# Patient Record
Sex: FEMALE | Race: WHITE | Employment: FULL TIME | URBAN - METROPOLITAN AREA
[De-identification: names, ages, dates, MRNs, and addresses within clinical notes are randomized per-mention and may not be internally consistent; named-entity substitution may affect disease eponyms.]

---

## 2017-09-29 ENCOUNTER — MYC MEDICAL ADVICE (OUTPATIENT)
Dept: FAMILY MEDICINE | Facility: CLINIC | Age: 47
End: 2017-09-29

## 2018-05-10 ENCOUNTER — OFFICE VISIT (OUTPATIENT)
Dept: FAMILY MEDICINE | Facility: CLINIC | Age: 48
End: 2018-05-10
Payer: COMMERCIAL

## 2018-05-10 VITALS
WEIGHT: 124 LBS | SYSTOLIC BLOOD PRESSURE: 98 MMHG | DIASTOLIC BLOOD PRESSURE: 62 MMHG | TEMPERATURE: 97.4 F | HEART RATE: 68 BPM | HEIGHT: 64 IN | BODY MASS INDEX: 21.17 KG/M2

## 2018-05-10 DIAGNOSIS — M54.50 ACUTE MIDLINE LOW BACK PAIN WITHOUT SCIATICA: Primary | ICD-10-CM

## 2018-05-10 DIAGNOSIS — B00.1 RECURRENT COLD SORES: ICD-10-CM

## 2018-05-10 PROCEDURE — 99213 OFFICE O/P EST LOW 20 MIN: CPT | Performed by: PHYSICIAN ASSISTANT

## 2018-05-10 RX ORDER — BIOTIN 10 MG
10000 TABLET ORAL DAILY
COMMUNITY

## 2018-05-10 RX ORDER — DIAZEPAM 5 MG
TABLET ORAL
Qty: 5 TABLET | Refills: 0 | Status: SHIPPED | OUTPATIENT
Start: 2018-05-10 | End: 2018-07-16

## 2018-05-10 RX ORDER — VALACYCLOVIR HYDROCHLORIDE 500 MG/1
500 TABLET, FILM COATED ORAL DAILY
Qty: 90 TABLET | Refills: 3 | Status: SHIPPED | OUTPATIENT
Start: 2018-05-10 | End: 2019-11-27 | Stop reason: DRUGHIGH

## 2018-05-10 ASSESSMENT — PAIN SCALES - GENERAL: PAINLEVEL: SEVERE PAIN (6)

## 2018-05-10 NOTE — MR AVS SNAPSHOT
After Visit Summary   5/10/2018    Kiesha Mcguire    MRN: [id number]           Patient Information     Date Of Birth          1970        Visit Information        Provider Department      5/10/2018 3:00 PM Alban Lynch PA-C [facility]        Today's Diagnoses     Acute midline low back pain without sciatica    -  1    Recurrent cold sores           Follow-ups after your visit        Additional Services     [facility] PT, HAND, AND CHIROPRACTIC REFERRAL       **This order will print in the [facility] Scheduling Office**    Physical Therapy, Hand Therapy and Chiropractic Care are available through:    *[facility]  *[facility]  *[facility]    Call one number to schedule at any of the above locations: [phone].    Your provider has referred you to: Physical Therapy at [facility] or [facility]    Indication/Reason for Referral: Low back pain acute, spasm, some pelvic floor tightness history   Onset of Illness: 2 days -   Therapy Orders: Evaluate and Treat  Special Programs: None  Special Request: None    Oumar Oseguera      Additional Comments for the Therapist or Chiropractor: Thanks     Please be aware that coverage of these services is subject to the terms and limitations of your health insurance plan.  Call member services at your health plan with any benefit or coverage questions.      Please bring the following to your appointment:    *Your personal calendar for scheduling future appointments  *Comfortable clothing                  Who to contact     If you have questions or need follow up information about today's clinic visit or your schedule please contact [facility] directly at [phone].  Normal or non-critical lab and imaging results will be communicated to you by MyChart, letter or phone within 4 business days after the clinic has received the results. If you do not hear from us within 7 days, please  "contact the clinic through [employer] or phone. If you have a critical or abnormal lab result, we will notify you by phone as soon as possible.  Submit refill requests through [employer] or call your pharmacy and they will forward the refill request to us. Please allow 3 business days for your refill to be completed.          Additional Information About Your Visit        [employer] Information     [employer] gives you secure access to your electronic health record. If you see a primary care provider, you can also send messages to your care team and make appointments. If you have questions, please call your primary care clinic.  If you do not have a primary care provider, please call [phone] and they will assist you.        Care EveryWhere ID     This is your Care EveryWhere ID. This could be used by other organizations to access your [facility] medical records  [id number]        Your Vitals Were     Pulse Temperature Height BMI (Body Mass Index)          68 97.4  F (36.3  C) (Oral) 5' 4.02\" (1.626 m) 21.27 kg/m2         Blood Pressure from Last 3 Encounters:   05/10/18 98/62   12/22/16 93/67   12/09/16 96/64    Weight from Last 3 Encounters:   05/10/18 124 lb (56.2 kg)   12/22/16 122 lb 14.4 oz (55.7 kg)   12/09/16 131 lb (59.4 kg)              We Performed the Following     [facility] PT, HAND, AND CHIROPRACTIC REFERRAL          Today's Medication Changes          These changes are accurate as of 5/10/18  3:32 PM.  If you have any questions, ask your nurse or doctor.               Start taking these medicines.        Dose/Directions    diazepam 5 MG tablet   Commonly known as:  VALIUM   Used for:  Acute midline low back pain without sciatica   Started by:  Alban Lynch PA-C        1/2 to 1 at bedtime for spasm   Quantity:  5 tablet   Refills:  0            Where to get your medicines      These medications were [place] [facility] [place] [place] - [address]  [address] [place] [facility] [address]     Phone:  [phone]     valACYclovir 500 MG tablet         Some of these will need a paper prescription and others can be bought over the counter.  Ask your nurse if you have questions.     Bring a paper prescription for each of these medications     diazepam 5 MG tablet                Primary Care Provider Office Phone # Fax #    Alban Lynch PA-C [phone]       [address] [facility] [address]        Equal Access to Services     KUN CHAMBERS : Hadii aad ku hadasho Soomaali, waaxda luqadaha, qaybta kaalmada adeegyada, waxay idiin hayaan adeeg kharash la'aan . So [facility] [phone].    ATENCIÓN: Si habla español, tiene a real disposición servicios gratuitos de asistencia lingüística. [facility] [phone].    We comply with applicable federal civil rights laws and [place] laws. We do not discriminate on the basis of race, color, national origin, age, disability, sex, sexual orientation, or gender identity.            Thank you!     Thank you for choosing [facility]  for your care. Our goal is always to provide you with excellent care. Hearing back from our patients is one way we can continue to improve our services. Please take a few minutes to complete the written survey that you may receive in the mail after your visit with us. Thank you!             Your Updated Medication List - Protect others around you: Learn how to safely use, store and throw away your medicines at www.disposemymeds.org.          This list is accurate as of 5/10/18  3:32 PM.  Always use your most recent med list.                   Brand Name Dispense Instructions for use Diagnosis    Biotin 10 MG Tabs tablet      Take 10,000 mcg by mouth daily        diazepam 5 MG tablet    VALIUM    5 tablet    1/2 to 1 at bedtime for spasm    Acute midline low back pain without sciatica       Lysine 500 MG Tabs      Take 500 mg by mouth daily        magnesium citrate solution       Takes 1 tsp twice a day        MIRENA (52 MG) 20 MCG/24HR IUD   Generic drug:  levonorgestrel      1 each by Intrauterine route once        sertraline 50 MG tablet    ZOLOFT    90 tablet    Take 1 tablet (50 mg) by mouth daily    Adjustment disorder with mixed anxiety and depressed mood       valACYclovir 500 MG tablet    VALTREX    90 tablet    Take 1 tablet (500 mg) by mouth daily    Recurrent cold sores       VITAMIN B 12 PO      Take by mouth daily        VITAMIN D3 PO      Take by mouth daily

## 2018-05-10 NOTE — PROGRESS NOTES
SUBJECTIVE:   Kiesha Mcguire is a 47 year old female who presents to clinic today for the following health issues:    Back Pain       Duration: 2 days         Specific cause: Working out    Description:   Location of pain: low back Middle   Character of pain: Spasming   Pain radiation:radiates into the right leg  New numbness or weakness in legs, not attributed to pain:  no     Intensity: Currently 6/10    History:   Pain interferes with job: YES  History of back problems: Pelvis issues after giving birth, does see PT   Any previous MRI or X-rays: None  Sees a specialist for back pain:  No  Therapies tried without relief: None     Alleviating factors:   Improved by: Ibuprofen has been helping       Precipitating factors:  Worsened by: Bending, Sitting and twisting     Functional and Psychosocial Screen (Oumar STarT Back):      Not performed today    Doing yoga regularly. Started a glute work out and developed this recently - low back pain. Bilateral. No radiation.    Patient Active Problem List   Diagnosis     Pure hypercholesterolemia     Herpes simplex virus (HSV) infection     iamTIBIALIS TENDINITIS     iamSPRAIN HIP & THIGH NOS     HYPERLIPIDEMIA LDL GOAL <160      Current Outpatient Prescriptions   Medication     Biotin 10 MG TABS tablet     Cholecalciferol (VITAMIN D3 PO)     Cyanocobalamin (VITAMIN B 12 PO)     diazepam (VALIUM) 5 MG tablet     levonorgestrel (MIRENA) 20 MCG/24HR IUD     Lysine 500 MG TABS     magnesium citrate solution     sertraline (ZOLOFT) 50 MG tablet     valACYclovir (VALTREX) 500 MG tablet     No current facility-administered medications for this visit.         Problem list and histories reviewed & adjusted, as indicated.  Additional history: as documented    Labs reviewed in EPIC    Reviewed and updated as needed this visit by clinical staff       Reviewed and updated as needed this visit by Provider         ROS:  Constitutional, HEENT, cardiovascular, pulmonary, gi and gu  "systems are negative, except as otherwise noted.    OBJECTIVE:     BP 98/62 (Cuff Size: Adult Regular)  Pulse 68  Temp 97.4  F (36.3  C) (Oral)  Ht 5' 4.02\" (1.626 m)  Wt 124 lb (56.2 kg)  BMI 21.27 kg/m2  Body mass index is 21.27 kg/(m^2).  GENERAL: healthy, alert and no distress  MUSC: Lumbar paraspinal and lumbosacral tightness and spasm. Bony exam normal. ROM is stiff but intact. Lower extremity strength, reflexes and sensation are symmetric and intact. Gait is mildly antalgic.    ASSESSMENT/PLAN:       (M54.5) Acute midline low back pain without sciatica  (primary encounter diagnosis)  Comment:   Plan: [facility] PT, HAND, AND CHIROPRACTIC REFERRAL,         diazepam (VALIUM) 5 MG tablet        Spasm. Acute. PT. Muscle spasms management given. Follow up if issues arise or persist.     (B00.1) Recurrent cold sores  Comment:   Plan: valACYclovir (VALTREX) 500 MG tablet        Refills - stable.     NAOMI MENJIVAR PA-C  [facility]  "

## 2018-05-18 ENCOUNTER — THERAPY VISIT (OUTPATIENT)
Dept: PHYSICAL THERAPY | Facility: CLINIC | Age: 48
End: 2018-05-18
Payer: COMMERCIAL

## 2018-05-18 DIAGNOSIS — M54.50 ACUTE RIGHT-SIDED LOW BACK PAIN WITHOUT SCIATICA: Primary | ICD-10-CM

## 2018-05-18 PROCEDURE — 97110 THERAPEUTIC EXERCISES: CPT | Mod: GP | Performed by: PHYSICAL THERAPIST

## 2018-05-18 PROCEDURE — 97530 THERAPEUTIC ACTIVITIES: CPT | Mod: GP | Performed by: PHYSICAL THERAPIST

## 2018-05-18 PROCEDURE — 97161 PT EVAL LOW COMPLEX 20 MIN: CPT | Mod: GP | Performed by: PHYSICAL THERAPIST

## 2018-05-18 NOTE — PROGRESS NOTES
[facility] Initial Evaluation -- Lumbar Spine    Evaluation Date: May 18, 2018  Kiesha Mcguire is a 47 year old female with a LS condition.   Referral: GP  Work mechanical stresses:  Desk job    Employment status: from home  Leisure mechanical stresses: doing Yoga 2 x week gluts are weak  Functional disability score: see chart  VAS score (0-10): 1  Patient goals/expectations:  Bend and lifting     HISTORY:    Present symptoms: Bilat.  LS R thigh  Pain quality (sharp/shooting/stabbing/aching/burning/cramping):  Tweak   Present since (onset date): 5/8/2018 when she started a video for glut strengthening   Symptoms (improving/unchaning/worsening):  improving.      Symptoms commenced as a result of: working out- started new glut program  Condition occurred in the following environment: home    Symptoms at onset: LS Paresthesia (yes/no):  In past R lat leg N/T foot drop post partum  Spinal history:   Cough/Sneeze (pos/neg):  Pos at start    Constant symptoms:   Intermittent symptoms: Y    Symptoms are worse with the following: Always Bending, Sometimes Sitting,  Walking,   and Time of day - No effect   Symptoms are better with the following: Other - supine 90/90 with heat    Continued use makes the pain (better/worse/no effect): worse    Disturbed night (yes/no): no    Pain at rest (yes/no):  no  Site (back/hip/knee/ankle/foot):      Other questions (swelling/clicking/locking/giving way/falling):       Previous episodes: 2010 neuropathy for 6 weeks had ZAYRA incontinent   Previous treatments: PT    Specific Questions:  General health (excellent/good/fair/poor):  excellent  Pertinent medical history includes: None  Medications (nil/NSAIDS/analg/steroids/anticoag/other):  Muscle relaxants  Medical allergies:  none  Imaging (none/Xray/MRI/other):  no  Recent or major surgery (yes/no):  no  Night pain (yes/no):  no  Accidents (yes/no):  no  Unexplained weight loss (yes/no):  na  Barriers at home:  none  Other red flags: none    Sites for physical examination (back/hip/knee/ankle/foot/other): LS    EXAMINATION    Posture:  Sitting (good/fair/poor): fair   Correction of Posture (better/worse/no effect/NA): better  Standing (good/fair/poor): good  Other observations:      Neurological: (NA/motor/sensory/reflexes/dural): na        Extremities (Hip / Knee / Ankle / Foot):     Movement Loss Ross Mod Min Nil Pain   Flexion        Extension        Abduction        Adduction        Internal Rotation        External Rotation        Dorsiflexion        Plantarflexion        Inversion        Eversion        LSROM: flexion to distal tibias ERP extension major loss SG wnl  Rep mvmts: RFIS produced, no worse CICI: produced dec ROM, dec ROM  EIL- NE then increased with reps, worse  JUNI- 3 x 30 sec NE, better inc ROM ext    Baseline Symptoms:   Repeated Tests Symptom Response Mechanical Response   Active/Passive movement, resisted test, functional test During -  Produce, Abolish, Increase, Decrease, NE After -  Better, Worse, NB, NW, NE Effect -   ? or ? ROM, strength or key functional test No   Effect                                       Effect of static positioning                  Provisional Classification (Extremity/Spine):  Spine - Derangement - Asymmetrical, unilateral, symptoms above knee      Princicple of Management:   Education:  Etiology DP POC sitting posture     Equipment provided:    Exercise and dosage:  JUNI 3 x 30 sec q 2-3 hrs    ASSESSMENT/PLAN:    Patient is a 47 year old female with lumbar complaints.    Patient has the following significant findings with corresponding treatment plan.                Diagnosis 1:  LBP  Pain -  self management, education, directional preference exercise and home program  Decreased ROM/flexibility - therapeutic exercise, therapeutic activity and home program  Decreased function - therapeutic activities and home program  Impaired posture - neuro re-education, therapeutic  activities and home program    Therapy Evaluation Codes:   1) History comprised of:   Personal factors that impact the plan of care:      None.    Comorbidity factors that impact the plan of care are:      None.     Medications impacting care: None.  2) Examination of Body Systems comprised of:   Body structures and functions that impact the plan of care:      Lumbar spine.   Activity limitations that impact the plan of care are:      Bending and Sitting.  3) Clinical presentation characteristics are:   Stable/Uncomplicated.  4) Decision-Making    Low complexity using standardized patient assessment instrument and/or measureable assessment of functional outcome.  Cumulative Therapy Evaluation is: Low complexity.    Previous and current functional limitations:  (See Goal Flow Sheet for this information)    Short term and Long term goals: (See Goal Flow Sheet for this information)     Communication ability:  Patient appears to be able to clearly communicate and understand verbal and written communication and follow directions correctly.  Treatment Explanation - The following has been discussed with the patient:   RX ordered/plan of care  Anticipated outcomes  Possible risks and side effects  This patient would benefit from PT intervention to resume normal activities.   Rehab potential is good.    Frequency:  1 X week, once daily  Duration:  for 6 weeks  Discharge Plan:  Achieve all LTG.  Independent in home treatment program.  Reach maximal therapeutic benefit.    Please refer to the daily flowsheet for treatment today, total treatment time and time spent performing 1:1 timed codes.     [facility] Initial Evaluation  Subjective:  HPI                    Objective:  System    Physical Exam    General     ROS

## 2018-05-23 ENCOUNTER — THERAPY VISIT (OUTPATIENT)
Dept: PHYSICAL THERAPY | Facility: CLINIC | Age: 48
End: 2018-05-23
Payer: COMMERCIAL

## 2018-05-23 DIAGNOSIS — M54.50 ACUTE RIGHT-SIDED LOW BACK PAIN WITHOUT SCIATICA: ICD-10-CM

## 2018-05-23 PROCEDURE — 97110 THERAPEUTIC EXERCISES: CPT | Mod: GP | Performed by: PHYSICAL THERAPIST

## 2018-05-23 PROCEDURE — 97530 THERAPEUTIC ACTIVITIES: CPT | Mod: GP | Performed by: PHYSICAL THERAPIST

## 2018-05-30 ENCOUNTER — THERAPY VISIT (OUTPATIENT)
Dept: PHYSICAL THERAPY | Facility: CLINIC | Age: 48
End: 2018-05-30
Payer: COMMERCIAL

## 2018-05-30 DIAGNOSIS — M54.50 ACUTE RIGHT-SIDED LOW BACK PAIN WITHOUT SCIATICA: ICD-10-CM

## 2018-05-30 PROCEDURE — 97110 THERAPEUTIC EXERCISES: CPT | Mod: GP | Performed by: PHYSICAL THERAPIST

## 2018-05-30 PROCEDURE — 97530 THERAPEUTIC ACTIVITIES: CPT | Mod: GP | Performed by: PHYSICAL THERAPIST

## 2018-06-13 ENCOUNTER — THERAPY VISIT (OUTPATIENT)
Dept: PHYSICAL THERAPY | Facility: CLINIC | Age: 48
End: 2018-06-13
Payer: COMMERCIAL

## 2018-06-13 DIAGNOSIS — M54.50 ACUTE RIGHT-SIDED LOW BACK PAIN WITHOUT SCIATICA: ICD-10-CM

## 2018-06-13 PROCEDURE — 97110 THERAPEUTIC EXERCISES: CPT | Mod: GP | Performed by: PHYSICAL THERAPIST

## 2018-06-13 PROCEDURE — 97530 THERAPEUTIC ACTIVITIES: CPT | Mod: GP | Performed by: PHYSICAL THERAPIST

## 2018-07-16 ENCOUNTER — OFFICE VISIT (OUTPATIENT)
Dept: OBGYN | Facility: CLINIC | Age: 48
End: 2018-07-16
Attending: ADVANCED PRACTICE MIDWIFE
Payer: COMMERCIAL

## 2018-07-16 VITALS
BODY MASS INDEX: 20.32 KG/M2 | WEIGHT: 119 LBS | SYSTOLIC BLOOD PRESSURE: 108 MMHG | HEART RATE: 61 BPM | HEIGHT: 64 IN | DIASTOLIC BLOOD PRESSURE: 72 MMHG

## 2018-07-16 DIAGNOSIS — Z97.5 IUD (INTRAUTERINE DEVICE) IN PLACE: Primary | ICD-10-CM

## 2018-07-16 DIAGNOSIS — Z00.00 VISIT FOR PREVENTIVE HEALTH EXAMINATION: ICD-10-CM

## 2018-07-16 RX ORDER — COLLAGEN, HYDROLYSATE (BOVINE) 100 %
POWDER (GRAM) MISCELLANEOUS
COMMUNITY

## 2018-07-16 NOTE — LETTER
2018       RE: Kiesha Mcguire  [address] [facility] [address]     Dear Colleague,    Thank you for referring your patient, Kiesha Mcguire, to the [facility] at [facility]. Please see a copy of my visit note below.          Progress Note    SUBJECTIVE:  Kiesha Mcguire is an 47 year old, , who requests an Annual Preventive Exam.       Concerns today include: none, plans to check fasting lipids this fall w work. Feeling good, doing hot yoga 2x wk    Menstrual History:  Menstrual History 2016   LAST MENSTRUAL PERIOD 2016 - -   Menarche age - 12 -   Period Cycle (Days) - every 90 days very light   Period Duration (Days) - - -   Method of Contraception - Mirena IUD Mirena IUD   Period Pattern - Regular -   Menstrual Flow - Light Light   Menstrual Control - - Panty liner   Dysmenorrhea - Mild None   PMS Symptoms - Cramping -   Reviewed Today - Yes Yes       Last    Lab Results   Component Value Date    PAP NIL 2015     History of abnormal Pap smear: NO - age 30-65 PAP every 5 years with negative HPV co-testing recommended    Last   Lab Results   Component Value Date    HPV16 Negative 2015     Last   Lab Results   Component Value Date    HPV18 Negative 2015     Last   Lab Results   Component Value Date    HRHPV Negative 2015       Mammogram current: no (patient will schedule)--pt needs to go to the breast center due to hx of lymph node in breast tissue on L breast  Last Mammogram:   Ma Screening Digital Bilateral    Result Date: 2016  Narrative: SCREENING MAMMOGRAM, BILATERAL, DIGITAL, with CAD 2016 HISTORY: Asymptomatic screening mammogram COMPARISON:9/26/15, 3/7/13 BREAST DENSITY: Heterogeneously dense. No significant change.     Ma Screening Digital Bilateral    Addendum Date: 2015    Addendum: Reason for addendum is that the appropriate EPIC  template was not included in the original report. BREAST DENSITY: Heterogeneously dense. IMPRESSION: BI-RADS CATEGORY: 1 -  NEGATIVE. RECOMMENDED FOLLOW-UP: Annual Mammography Results to be sent to patient. I have personally reviewed the examination and initial interpretation and I agree with the findings. CINDI DE LA VEGA MD    Result Date: 9/29/2015  Narrative: Examination: Bilateral digital screening mammography with computer aided detection. Comparison: 4/18/14. History: No current breast complaints, routine screening. Patient's calculated lifetime risk of breast cancer is 13 %. Findings: The breast tissue is heterogeneously dense.  There has been no change since the comparison study.     Us Breast Left    Result Date: 12/27/2016  Narrative: Exam: Left breast digital diagnostic mammogram and ultrasound including tomosynthesis COMPARISON: November 25, 2016 screening mammogram, 9/25/2015, 4/18/2015, 5/8/2014, 3/7/2013 HISTORY: Episodic left lateral breast pain. Recently had worsened although now has mostly resolved. She has had it off and on for the last several years. FINDINGS: No significant change mammographically including the region of pain. A normal-appearing intramammary lymph node is seen in the region of pain but no abnormality is seen.        Last Colonoscopy:  No results found for this or any previous visit.      HISTORY:    Current Outpatient Prescriptions on File Prior to Visit:  Biotin 10 MG TABS tablet Take 10,000 mcg by mouth daily   Cholecalciferol (VITAMIN D3 PO) Take by mouth daily   Cyanocobalamin (VITAMIN B 12 PO) Take by mouth daily   levonorgestrel (MIRENA) 20 MCG/24HR IUD 1 each by Intrauterine route once   Lysine 500 MG TABS Take 500 mg by mouth daily   magnesium citrate solution Takes 1 tsp twice a day   valACYclovir (VALTREX) 500 MG tablet Take 1 tablet (500 mg) by mouth daily (Patient not taking: Reported on 7/16/2018)     No current facility-administered medications on file prior to  visit.   Allergies   Allergen Reactions     Sumatriptan      imitrex     Augmentin Hives     Unsure if true reaction to med      Immunization History   Administered Date(s) Administered     Influenza (IIV3) PF 2010     TD (ADULT, 7+) 2004       Obstetric History       T2      L2     SAB0   TAB0   Ectopic0   Multiple0   Live Births2      Past Medical History:   Diagnosis Date     Depressive disorder      Depressive disorder, not elsewhere classified     resolved     Herpes simplex without mention of complication     oral     IUD (intrauterine device) in place 08/15/2013    Mirena     Migraine headache with aura     very occass, sig aura, speech loss x1     Pure hypercholesterolemia     follows w BIPIN CHATMAN     Past Surgical History:   Procedure Laterality Date     HC DILATION/CURETTAGE DIAG/THER NON OB  2006     Family History   Problem Relation Age of Onset     Hypertension Father      Cancer Father      neuro endrocin CA, neck     C.A.D. No family hx of      Cancer - colorectal No family hx of      Diabetes No family hx of      Cerebrovascular Disease No family hx of      Breast Cancer No family hx of      Prostate Cancer No family hx of      Social History     Social History     Marital status:      Spouse name: N/A     Number of children: 2     Years of education: 16     Occupational History      Ing     fulltime     Social History Main Topics     Smoking status: Former Smoker     Packs/day: 0.50     Years: 10.00     Types: Cigarettes     Quit date: 1994     Smokeless tobacco: Never Used     Alcohol use Yes      Comment: very rare     Drug use: No     Sexual activity: Yes     Partners: Male     Birth control/ protection: IUD      Comment: Mirena     Other Topics Concern     Parent/Sibling W/ Cabg, Mi Or Angioplasty Before 65f 55m? No     Social History Narrative    How much exercise per week? 2 90 minute yoga sessions    How much calcium per day? Diet     "   How much caffeine per day? 1-2 cups of coffee    How much vitamin D per day? Supplement     Do you/your family wear seatbelts?  Yes    Do you/your family use safety helmets? Yes    Do you/your family use sunscreen? Yes    Do you/your family keep firearms in the home? No    Do you/your family have a smoke detector(s)? Yes        Do you feel safe in your home? Yes    Has anyone ever touched you in an unwanted manner? No     Explain Kiesha Fletcher RYLEE 7/16/18                   ROS  [unfilled]  PHQ-9 SCORE 12/22/2016   Total Score 0     No flowsheet data found.  phq-9: 0  May-7: 0    EXAM:  Blood pressure 108/72, pulse 61, height 1.626 m (5' 4.02\"), weight 54 kg (119 lb). Body mass index is 20.41 kg/(m^2).  General - pleasant female in no acute distress.  Skin - no suspicious lesions or rashes  EENT-  PERRLA, euthyroid with out palpable nodules  Neck - supple without lymphadenopathy.  Lungs - clear to auscultation bilaterally.  Heart - regular rate and rhythm without murmur.  Abdomen - soft, nontender, nondistended, no masses or organomegaly noted.  Musculoskeletal - no gross deformities.  Neurological - normal strength, sensation, and mental status.    Breast Exam:  Breast: Without visible skin changes. No dimpling or lesions seen.   Breasts supple, non-tender with palpation, no dominant mass, nodularity, or nipple discharge noted bilaterally. Axillary nodes negative.      Pelvic Exam:  EG/BUS: Normal genital architecture without lesions, erythema or abnormal secretions Bartholin's, Urethra, [place]'s normal   Urethral meatus: normal   Urethra: no masses, tenderness, or scarring   Bladder: no masses or tenderness   Vagina: moist, pink, rugae with creamy, white and odorless  secretions  Cervix: Multiparous,, no lesions and pink, moist, closed, without lesion or CMT, strings felt at OS x 3 cm  Uterus: anteverted,  and small, smooth, firm, mobile w/o pain  Adnexa: Within normal limits and No masses, nodularity, " tenderness  Rectum:anus normal       ASSESSMENT:  Encounter Diagnoses   Name Primary?     Visit for preventive health examination      IUD (intrauterine device) in place Yes        PLAN:   No orders of the defined types were placed in this encounter.    Orders Placed This Encounter   Medications     Collagen Hydrolysate POWD       Additional teaching done at this visit regarding calcium (1200 mg per day), self breast exam and birth control.    Return to clinic in one year.  Follow-up as needed.      Again, thank you for allowing me to participate in the care of your patient.      Sincerely,    JERAMY Guerin CNM

## 2018-07-16 NOTE — PATIENT INSTRUCTIONS

## 2018-07-16 NOTE — MR AVS SNAPSHOT
After Visit Summary   7/16/2018    Kiesha Mcguire    MRN: [id number]           Patient Information     Date Of Birth          1970        Visit Information        Provider Department      7/16/2018 11:15 AM Nell Dos Santos APRN CNM [facility]        Today's Diagnoses     IUD (intrauterine device) in place    -  1    Visit for preventive health examination          Care Instructions      PREVENTIVE HEALTH RECOMMENDATIONS:   Most women need a yearly breast and pelvic exam.    A PAP screen, a test done DURING a pelvic exam, is NO longer recommended yearly.    March 2013, screening guidelines recommended by ACOG for PAP screen are:    1) First pap at age 21.    2) Pap every 3 years until age 30.    3) After age 30, pap every 3 years or Pap with HR HPV screen every 5 years until age 65.  4) Women do NOT need a vaginal Pap screen after a hysterectomy (surgical removal of the uterus) when they have not had cancer.    Exceptions:  1) Yearly pap if HIV+ or immunosuppressed secondary to organ transplant  2) LOGAN II-III continue routine screening for 20 years.    I encourage you continue looking for opportunities to choose a healthy lifestyle:       * Choose to eat a heart healthy diet. Check out the FOOD PLATE guidelines at: http://www.choosemyplate.gov/ for helpful hints on weight and cholesterol management.  Balance your caloric intake with exercise to maintain a BMI in the 22 to 26 range. For bone health: Eat calcium-rich foods like yogurt, broccoli or take chewable calcium pills (500 to 600 mg) twice a day with food.       * Exercise for at least an average of 30 minutes a day, 5 days of the week. This will help you control your weight, release stress, and help prevent disease.      * Take a Vitamin D3 supplement daily fall through spring and during summer unless you [id number]' full body sun exposure to skin without sunscreen.      * DO wear sunscreen to prevent  skin cancer after the first 15-30 minutes.      * Identify stressors in your life, find ways to release the stress, and, make time for yourself. PLEASE ask for help if mood changes last longer than two weeks.     * Limit alcohol to one drink per day.  No smoking.  Avoid second hand smoke. If you smoke, ask for help to stop.       *  If you are in a sexual relationship, talk with your partner about possible infection risks and take action to protect yourself from exposure to a sexual infection.    Please request an infection screen for STIs (sexually transmitted infections) if you are less than age 26 OR believe that you may be at risk.     Get a flu shot each year. Get a tetanus shot every 10 years. EVERYONE needs a pertussis (Whooping cough) booster.    See your dentist twice a year for an exam and preventive care cleaning.     Consider the following screen tests:    1) cholesterol test every 5 years.     2) yearly mammogram after age 40 unless you have identified risks.    3) colonoscopy every 10 years after age 50 unless you have identified risks.    4) diabetes blood test screening if you are at risk for diabetes.      Additional information that you may also find helpful:  The Internet now gives us access to LOTS of information -- some of it helpful, research documented and also plenty of harmful, anecdotal information that may not pertain to your situtaion. Consider visiting the following websites for accurate health information:    www.vitamindcouncil.org/ : Info and ongoing research re Vitamin D    www.fairview.org : Up to date and easily searchable information on multiple topics.    www.medlineplus.gov : medication info, interactive tutorials, watch real surgeries online    www.cdc.gov : public health info, travel advisories, epidemics (H1N1)    www.whitney/std.org: current research re diagnosis, treatment and prevention of sexually contacted infections.    www.health.[facility].[place] : [place] dept of heatlh, public  "health issues in [place], N1N1    www.familydoctor.org : good info from the Academy of Family Physicians                Follow-ups after your visit        Follow-up notes from your care team     Return in 1 year (on 7/16/2019) for Preventative Health Visit.      Who to contact     Please call your clinic at [phone] to:    Ask questions about your health    Make or cancel appointments    Discuss your medicines    Learn about your test results    Speak to your doctor            Additional Information About Your Visit        [employer] Information     [employer] gives you secure access to your electronic health record. If you see a primary care provider, you can also send messages to your care team and make appointments. If you have questions, please call your primary care clinic.  If you do not have a primary care provider, please call [phone] and they will assist you.      [employer] is an electronic gateway that provides easy, online access to your medical records. With [employer], you can request a clinic appointment, read your test results, renew a prescription or communicate with your care team.     To access your existing account, please contact your [facility] or call [phone] for assistance.        Care EveryWhere ID     This is your Care EveryWhere ID. This could be used by other organizations to access your [place] medical records  [id number]        Your Vitals Were     Pulse Height BMI (Body Mass Index)             61 1.626 m (5' 4.02\") 20.41 kg/m2          Blood Pressure from Last 3 Encounters:   07/16/18 108/72   05/10/18 98/62   12/22/16 93/67    Weight from Last 3 Encounters:   07/16/18 54 kg (119 lb)   05/10/18 56.2 kg (124 lb)   12/22/16 55.7 kg (122 lb 14.4 oz)              Today, you had the following     No orders found for display         Today's Medication Changes          These changes are accurate as of 7/16/18  1:03 PM.  If you have any questions, ask your nurse " or doctor.               Stop taking these medicines if you haven't already. Please contact your care team if you have questions.     diazepam 5 MG tablet   Commonly known as:  VALIUM   Stopped by:  Nell Dos Santos APRN CNM           Magnesium Chloride Powd   Stopped by:  Nell Dos Santos APRN CNM           sertraline 50 MG tablet   Commonly known as:  ZOLOFT   Stopped by:  Nell Dos Santos APRN CNM                    Primary Care Provider Office Phone # Fax Maria Del Carmen Lynch PA-C [phone]       [facility] [address]        Equal Access to Services     [facility]: Hadii aad ku hadasho Soomaali, waaxda luqadaha, qaybta kaalmada adeegyada, waxay ramila haypetrosn pattie martin . So [facility] [phone].    ATENCIÓN: Si habla español, tiene a real disposición servicios gratuitos de asistencia lingüística. [facility] [phone].    We comply with applicable federal civil rights laws and [place] laws. We do not discriminate on the basis of race, color, national origin, age, disability, sex, sexual orientation, or gender identity.            Thank you!     Thank you for choosing [facility]  for your care. Our goal is always to provide you with excellent care. Hearing back from our patients is one way we can continue to improve our services. Please take a few minutes to complete the written survey that you may receive in the mail after your visit with us. Thank you!             Your Updated Medication List - Protect others around you: Learn how to safely use, store and throw away your medicines at www.disposemymeds.org.          This list is accurate as of 7/16/18  1:03 PM.  Always use your most recent med list.                   Brand Name Dispense Instructions for use Diagnosis    Biotin 10 MG Tabs tablet      Take 10,000 mcg by mouth daily        Collagen Hydrolysate Powd           Lysine 500 MG Tabs      Take 500 mg by  mouth daily        magnesium citrate solution      Takes 1 tsp twice a day        MIRENA (52 MG) 20 MCG/24HR IUD   Generic drug:  levonorgestrel      1 each by Intrauterine route once        valACYclovir 500 MG tablet    VALTREX    90 tablet    Take 1 tablet (500 mg) by mouth daily    Recurrent cold sores       VITAMIN B 12 PO      Take by mouth daily        VITAMIN D3 PO      Take by mouth daily

## 2018-07-16 NOTE — PROGRESS NOTES
Progress Note    SUBJECTIVE:  Kiesha Mcguire is an 47 year old, , who requests an Annual Preventive Exam.       Concerns today include: none, plans to check fasting lipids this fall w work. Feeling good, doing hot yoga 2x wk    Menstrual History:  Menstrual History 2016   LAST MENSTRUAL PERIOD 2016 - -   Menarche age - 12 -   Period Cycle (Days) - every 90 days very light   Period Duration (Days) - - -   Method of Contraception - Mirena IUD Mirena IUD   Period Pattern - Regular -   Menstrual Flow - Light Light   Menstrual Control - - Panty liner   Dysmenorrhea - Mild None   PMS Symptoms - Cramping -   Reviewed Today - Yes Yes       Last    Lab Results   Component Value Date    PAP NIL 2015     History of abnormal Pap smear: NO - age 30-65 PAP every 5 years with negative HPV co-testing recommended    Last   Lab Results   Component Value Date    HPV16 Negative 2015     Last   Lab Results   Component Value Date    HPV18 Negative 2015     Last   Lab Results   Component Value Date    HRHPV Negative 2015       Mammogram current: no (patient will schedule)--pt needs to go to the breast center due to hx of lymph node in breast tissue on L breast  Last Mammogram:   Ma Screening Digital Bilateral    Result Date: 2016  Narrative: SCREENING MAMMOGRAM, BILATERAL, DIGITAL, with CAD 2016 HISTORY: Asymptomatic screening mammogram COMPARISON:9/26/15, 3/7/13 BREAST DENSITY: Heterogeneously dense. No significant change.     Ma Screening Digital Bilateral    Addendum Date: 2015    Addendum: Reason for addendum is that the appropriate EPIC template was not included in the original report. BREAST DENSITY: Heterogeneously dense. IMPRESSION: BI-RADS CATEGORY: 1 -  NEGATIVE. RECOMMENDED FOLLOW-UP: Annual Mammography Results to be sent to patient. I have personally reviewed the examination and initial interpretation and I agree with the findings.  CINDI DE LA VEGA MD    Result Date: 2015  Narrative: Examination: Bilateral digital screening mammography with computer aided detection. Comparison: 14. History: No current breast complaints, routine screening. Patient's calculated lifetime risk of breast cancer is 13 %. Findings: The breast tissue is heterogeneously dense.  There has been no change since the comparison study.     Us Breast Left    Result Date: 2016  Narrative: Exam: Left breast digital diagnostic mammogram and ultrasound including tomosynthesis COMPARISON: 2016 screening mammogram, 2015, 2015, 2014, 3/7/2013 HISTORY: Episodic left lateral breast pain. Recently had worsened although now has mostly resolved. She has had it off and on for the last several years. FINDINGS: No significant change mammographically including the region of pain. A normal-appearing intramammary lymph node is seen in the region of pain but no abnormality is seen.        Last Colonoscopy:  No results found for this or any previous visit.      HISTORY:    Current Outpatient Prescriptions on File Prior to Visit:  Biotin 10 MG TABS tablet Take 10,000 mcg by mouth daily   Cholecalciferol (VITAMIN D3 PO) Take by mouth daily   Cyanocobalamin (VITAMIN B 12 PO) Take by mouth daily   levonorgestrel (MIRENA) 20 MCG/24HR IUD 1 each by Intrauterine route once   Lysine 500 MG TABS Take 500 mg by mouth daily   magnesium citrate solution Takes 1 tsp twice a day   valACYclovir (VALTREX) 500 MG tablet Take 1 tablet (500 mg) by mouth daily (Patient not taking: Reported on 2018)     No current facility-administered medications on file prior to visit.   Allergies   Allergen Reactions     Sumatriptan      imitrex     Augmentin Hives     Unsure if true reaction to med      Immunization History   Administered Date(s) Administered     Influenza (IIV3) PF 2010     TD (ADULT, 7+) 2004       Obstetric History       T2      L2      SAB0   TAB0   Ectopic0   Multiple0   Live Births2      Past Medical History:   Diagnosis Date     Depressive disorder      Depressive disorder, not elsewhere classified     resolved     Herpes simplex without mention of complication     oral     IUD (intrauterine device) in place 08/15/2013    Mirena     Migraine headache with aura     very occass, sig aura, speech loss x1     Pure hypercholesterolemia     follows w BIPIN CHATMAN     Past Surgical History:   Procedure Laterality Date     HC DILATION/CURETTAGE DIAG/THER NON OB  12/2006     Family History   Problem Relation Age of Onset     Hypertension Father      Cancer Father      neuro endrocin CA, neck     C.A.D. No family hx of      Cancer - colorectal No family hx of      Diabetes No family hx of      Cerebrovascular Disease No family hx of      Breast Cancer No family hx of      Prostate Cancer No family hx of      Social History     Social History     Marital status:      Spouse name: N/A     Number of children: 2     Years of education: 16     Occupational History      [employer]     fulltime     Social History Main Topics     Smoking status: Former Smoker     Packs/day: 0.50     Years: 10.00     Types: Cigarettes     Quit date: 7/9/1994     Smokeless tobacco: Never Used     Alcohol use Yes      Comment: very rare     Drug use: No     Sexual activity: Yes     Partners: Male     Birth control/ protection: IUD      Comment: Mirena     Other Topics Concern     Parent/Sibling W/ Cabg, Mi Or Angioplasty Before 65f 55m? No     Social History Narrative    How much exercise per week? 2 90 minute yoga sessions    How much calcium per day? Diet       How much caffeine per day? 1-2 cups of coffee    How much vitamin D per day? Supplement     Do you/your family wear seatbelts?  Yes    Do you/your family use safety helmets? Yes    Do you/your family use sunscreen? Yes    Do you/your family keep firearms in the home? No    Do you/your family have a smoke  "detector(s)? Yes        Do you feel safe in your home? Yes    Has anyone ever touched you in an unwanted manner? No     Explain Kiesha Fletcher CMA 7/16/18                   ROS  [unfilled]  PHQ-9 SCORE 12/22/2016   Total Score 0     No flowsheet data found.  phq-9: 0  May-7: 0    EXAM:  Blood pressure 108/72, pulse 61, height 1.626 m (5' 4.02\"), weight 54 kg (119 lb). Body mass index is 20.41 kg/(m^2).  General - pleasant female in no acute distress.  Skin - no suspicious lesions or rashes  EENT-  PERRLA, euthyroid with out palpable nodules  Neck - supple without lymphadenopathy.  Lungs - clear to auscultation bilaterally.  Heart - regular rate and rhythm without murmur.  Abdomen - soft, nontender, nondistended, no masses or organomegaly noted.  Musculoskeletal - no gross deformities.  Neurological - normal strength, sensation, and mental status.    Breast Exam:  Breast: Without visible skin changes. No dimpling or lesions seen.   Breasts supple, non-tender with palpation, no dominant mass, nodularity, or nipple discharge noted bilaterally. Axillary nodes negative.      Pelvic Exam:  EG/BUS: Normal genital architecture without lesions, erythema or abnormal secretions Bartholin's, Urethra, [place]'s normal   Urethral meatus: normal   Urethra: no masses, tenderness, or scarring   Bladder: no masses or tenderness   Vagina: moist, pink, rugae with creamy, white and odorless  secretions  Cervix: Multiparous,, no lesions and pink, moist, closed, without lesion or CMT, strings felt at OS x 3 cm  Uterus: anteverted,  and small, smooth, firm, mobile w/o pain  Adnexa: Within normal limits and No masses, nodularity, tenderness  Rectum:anus normal       ASSESSMENT:  Encounter Diagnoses   Name Primary?     Visit for preventive health examination      IUD (intrauterine device) in place Yes        PLAN:   No orders of the defined types were placed in this encounter.    Orders Placed This Encounter   Medications     Collagen " Hydrolysate POWD       Additional teaching done at this visit regarding calcium (1200 mg per day), self breast exam and birth control.    Return to clinic in one year.  Follow-up as needed.

## 2018-09-18 ENCOUNTER — RADIANT APPOINTMENT (OUTPATIENT)
Dept: MAMMOGRAPHY | Facility: CLINIC | Age: 48
End: 2018-09-18
Payer: COMMERCIAL

## 2018-09-18 DIAGNOSIS — Z12.31 VISIT FOR SCREENING MAMMOGRAM: ICD-10-CM

## 2018-10-28 ENCOUNTER — HEALTH MAINTENANCE LETTER (OUTPATIENT)
Age: 48
End: 2018-10-28

## 2019-04-09 ENCOUNTER — OFFICE VISIT (OUTPATIENT)
Dept: OBGYN | Facility: CLINIC | Age: 49
End: 2019-04-09
Attending: ADVANCED PRACTICE MIDWIFE
Payer: COMMERCIAL

## 2019-04-09 VITALS
HEART RATE: 71 BPM | WEIGHT: 118.1 LBS | BODY MASS INDEX: 20.16 KG/M2 | HEIGHT: 64 IN | SYSTOLIC BLOOD PRESSURE: 107 MMHG | DIASTOLIC BLOOD PRESSURE: 73 MMHG

## 2019-04-09 DIAGNOSIS — N95.1 PERIMENOPAUSE: ICD-10-CM

## 2019-04-09 DIAGNOSIS — N95.2 VAGINAL ATROPHY: Primary | ICD-10-CM

## 2019-04-09 RX ORDER — ESTRADIOL 10 UG/1
10 INSERT VAGINAL DAILY
Qty: 14 TABLET | Refills: 0 | Status: SHIPPED | OUTPATIENT
Start: 2019-04-09 | End: 2019-09-23

## 2019-04-09 RX ORDER — ESTRADIOL 0.1 MG/G
2 CREAM VAGINAL DAILY
Qty: 42.5 G | Refills: 1 | Status: SHIPPED | OUTPATIENT
Start: 2019-04-09 | End: 2019-11-27 | Stop reason: DRUGHIGH

## 2019-04-09 RX ORDER — ESTRADIOL 10 UG/1
10 INSERT VAGINAL
Qty: 24 TABLET | Refills: 1 | Status: SHIPPED | OUTPATIENT
Start: 2019-04-11 | End: 2019-11-27

## 2019-04-09 RX ORDER — VALACYCLOVIR HYDROCHLORIDE 1 G/1
1000 TABLET, FILM COATED ORAL DAILY PRN
COMMUNITY
Start: 2019-02-19 | End: 2019-11-27

## 2019-04-09 ASSESSMENT — MIFFLIN-ST. JEOR: SCORE: 1145.7

## 2019-04-09 ASSESSMENT — PAIN SCALES - GENERAL: PAINLEVEL: NO PAIN (0)

## 2019-04-09 NOTE — LETTER
"2019       RE: Kiesha Mcguire  [address] [facility] [address]     Dear Colleague,    Thank you for referring your patient, Kiesha Mcguire, to the [facility] at [facility]. Please see a copy of my visit note below.    Pelvic Pain Visit Note    Kiesha Mcguire is a 48 year old y/o  presenting today for new evaluation and treatment of vaginal pain with SI. She reports onset of pain 6 week(s) ago. She describes the pain as Sharp, Throbbing and stinging . \"feels tight and very painful when we have SI, I was not active sexually x 1y (break-up) and now we're back together\" She states that the pain is during intercourse.   She has tried OTC lubricants and vaginal moisturizers this past month with very little improvement  Gyn Hx: happy w Mirena IUD x 5 yrs   No LMP recorded. (Menstrual status: IUD).     Pt denies hot flashes, mood swings, trouble sleeping. Does not get menses d/t to mirena. Has done research and would like local estrogen product to help with perimenopausal vaginal atrophy.    Contraception:   IUD mirena  Sexual Activity  yes, single partner, contraception - IUD  Male  Sexually transmitted disease history:  none  PAP smear history:  Lab Results   Component Value Date    PAP NIL 2015    PAP NIL 2012    PAP NIL 2011              Current Outpatient Medications   Medication Sig Dispense Refill     levonorgestrel (MIRENA) 20 MCG/24HR IUD 1 each by Intrauterine route once       valACYclovir (VALTREX) 500 MG tablet Take 1 tablet (500 mg) by mouth daily 90 tablet 3     Biotin 10 MG TABS tablet Take 10,000 mcg by mouth daily       Cholecalciferol (VITAMIN D3 PO) Take by mouth daily       Collagen Hydrolysate POWD        Cyanocobalamin (VITAMIN B 12 PO) Take by mouth daily       Lysine 500 MG TABS Take 500 mg by mouth daily       magnesium citrate solution Takes 1 tsp twice a day       valACYclovir " (VALTREX) 1000 mg tablet Take 1,000 mg by mouth daily as needed       Allergies   Allergen Reactions     Sumatriptan      imitrex     Augmentin Hives     Unsure if true reaction to med        Past Medical History:   Diagnosis Date     Depressive disorder      Depressive disorder, not elsewhere classified     resolved     Herpes simplex without mention of complication     oral     IUD (intrauterine device) in place 08/15/2013    Mirena     Migraine headache with aura     very occass, sig aura, speech loss x1     Pure hypercholesterolemia     follows w FM MD     Past Surgical History:   Procedure Laterality Date     HC DILATION/CURETTAGE DIAG/THER NON OB  2006     Family History   Problem Relation Age of Onset     Hypertension Father      Cancer Father         neuro endrocin CA, neck     C.A.D. No family hx of      Cancer - colorectal No family hx of      Diabetes No family hx of      Cerebrovascular Disease No family hx of      Breast Cancer No family hx of      Prostate Cancer No family hx of      Social History     Socioeconomic History     Marital status:      Spouse name: Not on file     Number of children: 2     Years of education: 16     Highest education level: Not on file   Occupational History     Occupation:      Employer: [employer]     Comment: fulltime   Social Needs     Financial resource strain: Not on file     Food insecurity:     Worry: Not on file     Inability: Not on file     Transportation needs:     Medical: Not on file     Non-medical: Not on file   Tobacco Use     Smoking status: Former Smoker     Packs/day: 0.50     Years: 10.00     Pack years: 5.00     Types: Cigarettes     Last attempt to quit: 1994     Years since quittin.7     Smokeless tobacco: Never Used   Substance and Sexual Activity     Alcohol use: Yes     Comment: very rare     Drug use: No     Sexual activity: Yes     Partners: Male     Birth control/protection: IUD     Comment: Mirena  "  Lifestyle     Physical activity:     Days per week: Not on file     Minutes per session: Not on file     Stress: Not on file   Relationships     Social connections:     Talks on phone: Not on file     Gets together: Not on file     Attends Protestant service: Not on file     Active member of club or organization: Not on file     Attends meetings of clubs or organizations: Not on file     Relationship status: Not on file     Intimate partner violence:     Fear of current or ex partner: Not on file     Emotionally abused: Not on file     Physically abused: Not on file     Forced sexual activity: Not on file   Other Topics Concern     Parent/sibling w/ CABG, MI or angioplasty before 65F 55M? No   Social History Narrative    How much exercise per week? 2 90 minute yoga sessions    How much calcium per day? Diet       How much caffeine per day? 1-2 cups of coffee    How much vitamin D per day? Supplement     Do you/your family wear seatbelts?  Yes    Do you/your family use safety helmets? Yes    Do you/your family use sunscreen? Yes    Do you/your family keep firearms in the home? No    Do you/your family have a smoke detector(s)? Yes        Do you feel safe in your home? Yes    Has anyone ever touched you in an unwanted manner? No     Explain Kiesha Slatermicheal [facility] 7/16/18              ROS: 10 point ROS neg other than the symptoms noted above in the HPI.    Exam:  /73   Pulse 71   Ht 1.626 m (5' 4\")   Wt 53.6 kg (118 lb 1.6 oz)   BMI 20.27 kg/m       Pelvic exam: EG: pale and atrophic, labial minora thinning. Introitus pale and atrophic s/p to estrogen loss. No s/s of lichen sclerosis. Not sensitive to touch or exam,no s/s vestibulitis  A;   perimenopause  (N95.2) Vaginal atrophy  (primary encounter diagnosis)    Plan:         estradiol (VAGIFEM) 10 MCG TABS vaginal tablet    Discussed use of IUD through menopause, will consider replacing IUD in August 2019  Discussed estrogen product options, possible cost, " use, expectations, and safety profiles.  Pt would like to try Estring, will RX vag cream or vagifem depending on cost.  Discussed instructions for use and gave patient handout.  RTC august 2019 for annual, vag atrophy check and IUD change.      Nell Dos Santos, APRN CNM

## 2019-04-09 NOTE — NURSING NOTE
Chief Complaint   Patient presents with     Menopausal Sx     Pt c/ of vaginal dryness and painful intercourse.

## 2019-04-09 NOTE — PATIENT INSTRUCTIONS
Patient Education     Estradiol vaginal ring (Estring)  Brand Name: Estring  What is this medicine?  ESTRADIOL (es tra DYE ole) vaginal ring is an insert that contains a female hormone. This medicine helps relieve symptoms of vaginal irritation and dryness that occurs in some women during menopause.  How should I use this medicine?  This medicine may be inserted by you or your physician. Follow the directions that are included with your prescription. If you are unsure how to insert the ring, contact your doctor or health care professional. The vaginal ring should remain in place for 90 days. After 90 days you should replace your old ring and insert a new one. Do not stop using except on the advice of your doctor or health care professional.  Contact your pediatrician regarding the use of this medicine in children. Special care may be needed.  A patient package insert for the product will be given with each prescription and refill. Read this sheet carefully each time. The sheet may change frequently.  What side effects may I notice from receiving this medicine?  Side effects that you should report to your doctor or health care professional as soon as possible:    allergic reactions like skin rash, itching or hives, swelling of the face, lips, or tongue    breast tissue changes or discharge    signs and symptoms of a blood clot such as breathing problems; changes in vision; chest pain; severe, sudden headache; pain, swelling, warmth in the leg; trouble speaking; sudden numbness or weakness of the face, arm or leg    signs and symptoms of infection like fever or chills; vomiting; diarrhea; muscle pain; dizziness; or a red, sunburn-like rash on face and body    signs and symptoms of liver injury like dark yellow or brown urine; general ill feeling or flu-like symptoms; light-colored stools; loss of appetite; nausea; right upper belly pain; unusually weak or tired; yellowing of the eyes or skin    symptoms of bowel  blockage like constipation, abdominal swelling, abdominal pain, inability to pass gas or have a bowel movement    symptoms of vaginal infection like itching, irritation or unusual discharge    unusual or increased vaginal bleeding    vaginal pain or soreness, redness, swelling  Side effects that usually do not require medical attention (report to your doctor or health care professional if they continue or are bothersome):    breast tenderness    fluid retention    hair loss    headache    nausea    upset stomach    vaginal spotting  What may interact with this medicine?  Do not take this medicine with any of the following medications:    aromatase inhibitors like aminoglutethimide, anastrozole, exemestane, letrozole, testolactone, vorozole  This medicine may also interact with the following medications:    carbamazepine    certain antibiotics used to treat infections    certain barbiturates used for inducing sleep or treating seizures    grapefruit juice    medicines for fungus infections like itraconazole and ketoconazole    raloxifene or tamoxifen    rifabutin, rifampin, or rifapentine    ritonavir    [place] Wort  What if I miss a dose?  If you miss a dose, use it as soon as you can. If it is almost time for your next dose, use only that dose. Do not use double or extra doses.  Where should I keep my medicine?  Keep out of the reach of children.  Store at room temperature between 15 and 25 degrees C (59 and 77 degrees F). Throw away any unused medicine after the expiration date.  What should I tell my health care provider before I take this medicine?  They need to know if you have any of these conditions:    abnormal vaginal bleeding    blood vessel disease or blood clots    breast, cervical, endometrial, ovarian, liver, or uterine cancer    dementia    diabetes    gallbladder disease    heart disease or recent heart attack    high blood pressure    high cholesterol    high level of calcium in the  blood    hysterectomy    kidney disease    liver disease    migraine headaches    protein C deficiency    protein S deficiency    stroke    systemic lupus erythematosus (SLE)    tobacco smoker    an unusual or allergic reaction to estrogens, other hormones, medicines, foods, dyes, or preservatives    pregnant or trying to get pregnant    breast-feeding  What should I watch for while using this medicine?  Visit your doctor or health care professional for regular checks on your progress. You will need a regular breast and pelvic exam and Pap smear while on this medicine. You should also discuss the need for regular mammograms with your health care professional, and follow his or her guidelines for these tests.  This medicine can make your body retain fluid, making your fingers, hands, or ankles swell. Your blood pressure can go up. Contact your doctor or health care professional if you feel you are retaining fluid.  If you have any reason to think you are pregnant, stop taking this medicine right away and contact your doctor or health care professional.  Smoking increases the risk of getting a blood clot or having a stroke while you are taking this medicine, especially if you are more than 35 years old. You are strongly advised not to smoke.  If you wear contact lenses and notice visual changes, or if the lenses begin to feel uncomfortable, consult your eye doctor or health care professional.  This medicine can increase the risk of developing a condition (endometrial hyperplasia) that may lead to cancer of the lining of the uterus. Taking progestins, another hormone drug, with this medicine lowers the risk of developing this condition. Therefore, if your uterus has not been removed (by a hysterectomy), your doctor may prescribe a progestin for you to take together with your estrogen. You should know, however, that taking estrogens with progestins may have additional health risks. You should discuss the use of  estrogens and progestins with your health care professional to determine the benefits and risks for you.  If you are going to have surgery, you may need to stop taking this medicine. Consult your health care professional for advice before you schedule the surgery.  You may bathe or participate in other activities while using this medicine. You do not need to remove the vaginal ring during sexual or other activities unless you are more comfortable doing so. Within the 90-day dosage period, you may remove the vaginal ring, rinse it with clean lukewarm (not hot or boiling) water, and re-insert the ring as needed.  NOTE:This sheet is a summary. It may not cover all possible information. If you have questions about this medicine, talk to your doctor, pharmacist, or health care provider. Copyright  2018 [employer]

## 2019-04-10 NOTE — PROGRESS NOTES
"Pelvic Pain Visit Note    Kiesha Mcguire is a 48 year old y/o  presenting today for new evaluation and treatment of vaginal pain with SI. She reports onset of pain 6 week(s) ago. She describes the pain as Sharp, Throbbing and stinging . \"feels tight and very painful when we have SI, I was not active sexually x 1y (break-up) and now we're back together\" She states that the pain is during intercourse.   She has tried OTC lubricants and vaginal moisturizers this past month with very little improvement  Gyn Hx: happy w Mirena IUD x 5 yrs   No LMP recorded. (Menstrual status: IUD).     Pt denies hot flashes, mood swings, trouble sleeping. Does not get menses d/t to mirena. Has done research and would like local estrogen product to help with perimenopausal vaginal atrophy.    Contraception:   IUD mirena  Sexual Activity  yes, single partner, contraception - IUD  Male  Sexually transmitted disease history:  none  PAP smear history:  Lab Results   Component Value Date    PAP NIL 2015    PAP NIL 2012    PAP NIL 2011              Current Outpatient Medications   Medication Sig Dispense Refill     levonorgestrel (MIRENA) 20 MCG/24HR IUD 1 each by Intrauterine route once       valACYclovir (VALTREX) 500 MG tablet Take 1 tablet (500 mg) by mouth daily 90 tablet 3     Biotin 10 MG TABS tablet Take 10,000 mcg by mouth daily       Cholecalciferol (VITAMIN D3 PO) Take by mouth daily       Collagen Hydrolysate POWD        Cyanocobalamin (VITAMIN B 12 PO) Take by mouth daily       Lysine 500 MG TABS Take 500 mg by mouth daily       magnesium citrate solution Takes 1 tsp twice a day       valACYclovir (VALTREX) 1000 mg tablet Take 1,000 mg by mouth daily as needed       Allergies   Allergen Reactions     Sumatriptan      imitrex     Augmentin Hives     Unsure if true reaction to med        Past Medical History:   Diagnosis Date     Depressive disorder      Depressive disorder, not elsewhere classified  "    resolved     Herpes simplex without mention of complication     oral     IUD (intrauterine device) in place 08/15/2013    Mirena     Migraine headache with aura     very occass, sig aura, speech loss x1     Pure hypercholesterolemia     follows w BIPIN CHATMAN     Past Surgical History:   Procedure Laterality Date     HC DILATION/CURETTAGE DIAG/THER NON OB  2006     Family History   Problem Relation Age of Onset     Hypertension Father      Cancer Father         neuro endrocin CA, neck     C.A.D. No family hx of      Cancer - colorectal No family hx of      Diabetes No family hx of      Cerebrovascular Disease No family hx of      Breast Cancer No family hx of      Prostate Cancer No family hx of      Social History     Socioeconomic History     Marital status:      Spouse name: Not on file     Number of children: 2     Years of education: 16     Highest education level: Not on file   Occupational History     Occupation:      Employer: [employer]     Comment: fulltime   Social Needs     Financial resource strain: Not on file     Food insecurity:     Worry: Not on file     Inability: Not on file     Transportation needs:     Medical: Not on file     Non-medical: Not on file   Tobacco Use     Smoking status: Former Smoker     Packs/day: 0.50     Years: 10.00     Pack years: 5.00     Types: Cigarettes     Last attempt to quit: 1994     Years since quittin.7     Smokeless tobacco: Never Used   Substance and Sexual Activity     Alcohol use: Yes     Comment: very rare     Drug use: No     Sexual activity: Yes     Partners: Male     Birth control/protection: IUD     Comment: Mirena   Lifestyle     Physical activity:     Days per week: Not on file     Minutes per session: Not on file     Stress: Not on file   Relationships     Social connections:     Talks on phone: Not on file     Gets together: Not on file     Attends Catholic service: Not on file     Active member of club or organization:  "Not on file     Attends meetings of clubs or organizations: Not on file     Relationship status: Not on file     Intimate partner violence:     Fear of current or ex partner: Not on file     Emotionally abused: Not on file     Physically abused: Not on file     Forced sexual activity: Not on file   Other Topics Concern     Parent/sibling w/ CABG, MI or angioplasty before 65F 55M? No   Social History Narrative    How much exercise per week? 2 90 minute yoga sessions    How much calcium per day? Diet       How much caffeine per day? 1-2 cups of coffee    How much vitamin D per day? Supplement     Do you/your family wear seatbelts?  Yes    Do you/your family use safety helmets? Yes    Do you/your family use sunscreen? Yes    Do you/your family keep firearms in the home? No    Do you/your family have a smoke detector(s)? Yes        Do you feel safe in your home? Yes    Has anyone ever touched you in an unwanted manner? No     Explain Kiesha Justine [facility] 7/16/18              ROS: 10 point ROS neg other than the symptoms noted above in the HPI.    Exam:  /73   Pulse 71   Ht 1.626 m (5' 4\")   Wt 53.6 kg (118 lb 1.6 oz)   BMI 20.27 kg/m      Pelvic exam: EG: pale and atrophic, labial minora thinning. Introitus pale and atrophic s/p to estrogen loss. No s/s of lichen sclerosis. Not sensitive to touch or exam,no s/s vestibulitis  A;   perimenopause  (N95.2) Vaginal atrophy  (primary encounter diagnosis)    Plan:         estradiol (VAGIFEM) 10 MCG TABS vaginal tablet    Discussed use of IUD through menopause, will consider replacing IUD in August 2019  Discussed estrogen product options, possible cost, use, expectations, and safety profiles.  Pt would like to try Estring, will RX vag cream or vagifem depending on cost.  Discussed instructions for use and gave patient handout.  RTC august 2019 for annual, vag atrophy check and IUD change.  Nell Dos Santos, APRN CNM          "

## 2019-09-23 ENCOUNTER — OFFICE VISIT (OUTPATIENT)
Dept: OBGYN | Facility: CLINIC | Age: 49
End: 2019-09-23
Attending: ADVANCED PRACTICE MIDWIFE
Payer: COMMERCIAL

## 2019-09-23 VITALS
HEART RATE: 68 BPM | BODY MASS INDEX: 19.81 KG/M2 | SYSTOLIC BLOOD PRESSURE: 102 MMHG | HEIGHT: 64 IN | WEIGHT: 116 LBS | DIASTOLIC BLOOD PRESSURE: 69 MMHG

## 2019-09-23 DIAGNOSIS — Z01.419 ENCOUNTER FOR WELL WOMAN EXAM WITH ROUTINE GYNECOLOGICAL EXAM: Primary | ICD-10-CM

## 2019-09-23 DIAGNOSIS — Z97.5 IUD (INTRAUTERINE DEVICE) IN PLACE: ICD-10-CM

## 2019-09-23 DIAGNOSIS — N95.2 VAGINAL ATROPHY: ICD-10-CM

## 2019-09-23 RX ORDER — ESTRADIOL 0.1 MG/G
2 CREAM VAGINAL
Qty: 42.5 G | Refills: 3 | Status: SHIPPED | OUTPATIENT
Start: 2019-09-23 | End: 2019-11-27

## 2019-09-23 RX ORDER — ESTRADIOL 10 UG/1
10 INSERT VAGINAL
Qty: 24 TABLET | Refills: 3 | Status: SHIPPED | OUTPATIENT
Start: 2019-09-23 | End: 2019-11-27

## 2019-09-23 ASSESSMENT — ANXIETY QUESTIONNAIRES
2. NOT BEING ABLE TO STOP OR CONTROL WORRYING: NOT AT ALL
5. BEING SO RESTLESS THAT IT IS HARD TO SIT STILL: NOT AT ALL
1. FEELING NERVOUS, ANXIOUS, OR ON EDGE: NOT AT ALL
7. FEELING AFRAID AS IF SOMETHING AWFUL MIGHT HAPPEN: NOT AT ALL
GAD7 TOTAL SCORE: 0
3. WORRYING TOO MUCH ABOUT DIFFERENT THINGS: NOT AT ALL
6. BECOMING EASILY ANNOYED OR IRRITABLE: NOT AT ALL

## 2019-09-23 ASSESSMENT — MIFFLIN-ST. JEOR: SCORE: 1136.17

## 2019-09-23 ASSESSMENT — PATIENT HEALTH QUESTIONNAIRE - PHQ9
SUM OF ALL RESPONSES TO PHQ QUESTIONS 1-9: 0
5. POOR APPETITE OR OVEREATING: NOT AT ALL

## 2019-09-23 NOTE — LETTER
2019       RE: Kiesha Mcguire  [address] [address]     Dear Colleague,    Thank you for referring your patient, Kiesha Mcguire, to the [facility] at [facility]. Please see a copy of my visit note below.      Progress Note    SUBJECTIVE:  Kiesha Mcguire is an 48 year old, , who requests an Annual Preventive Exam.       Concerns today include: -wants to renew vaginal estrogen. Liked effects of estring, but not using (bathing). Not sexually active, would like to be able to control dose of estrogen for lesser dose if she wants and then ability to go back to full dose if she is sexually active again  -happy w IUD, wants to go full 7 yr w mirena    Menstrual History:  Menstrual History 2016   LAST MENSTRUAL PERIOD 2016 - -   Menarche Age - 12 -   Period Cycle (Days) - every 90 days very light   Period Duration (Days) - - -   Method of Contraception - Mirena IUD Mirena IUD   Period Pattern - Regular -   Menstrual Flow - Light Light   Menstrual Control - - Panty liner   Dysmenorrhea - Mild None   PMS Symptoms - Cramping -   Reviewed Today - Yes Yes       Last    Lab Results   Component Value Date    PAP NIL 2015     History of abnormal Pap smear: NO - age 30-65 PAP every 5 years with negative HPV co-testing recommended    Last   Lab Results   Component Value Date    HPV16 Negative 2015     Last   Lab Results   Component Value Date    HPV18 Negative 2015     Last   Lab Results   Component Value Date    HRHPV Negative 2015     Mammogram current: no (patient will schedule)  Last Mammogram:   Ma Screening Digital Bilateral    Result Date: 2016  Narrative: SCREENING MAMMOGRAM, BILATERAL, DIGITAL, with CAD 2016 HISTORY: Asymptomatic screening mammogram COMPARISON:9/26/15, 3/7/13 BREAST DENSITY: Heterogeneously dense. No significant change.     Us  Breast Left    Result Date: 12/27/2016  Narrative: Exam: Left breast digital diagnostic mammogram and ultrasound including tomosynthesis COMPARISON: November 25, 2016 screening mammogram, 9/25/2015, 4/18/2015, 5/8/2014, 3/7/2013 HISTORY: Episodic left lateral breast pain. Recently had worsened although now has mostly resolved. She has had it off and on for the last several years. FINDINGS: No significant change mammographically including the region of pain. A normal-appearing intramammary lymph node is seen in the region of pain but no abnormality is seen.      Last Colonoscopy:  No results found for this or any previous visit.    HISTORY:  Biotin 10 MG TABS tablet, Take 10,000 mcg by mouth daily  Cholecalciferol (VITAMIN D3 PO), Take by mouth daily  Collagen Hydrolysate POWD,   Cyanocobalamin (VITAMIN B 12 PO), Take by mouth daily  estradiol (ESTRACE) 0.1 MG/GM vaginal cream, Place 2 g vaginally daily 2 weeks then 1 gm daily x 7 days, then 1 gm 2 times week  estradiol (ESTRING) 2 MG vaginal ring, Place 1 each vaginally every 3 months  estradiol (VAGIFEM) 10 MCG TABS vaginal tablet, Place 1 tablet (10 mcg) vaginally twice a week  levonorgestrel (MIRENA) 20 MCG/24HR IUD, 1 each by Intrauterine route once  Lysine 500 MG TABS, Take 500 mg by mouth daily  magnesium citrate solution, Takes 1 tsp twice a day  valACYclovir (VALTREX) 1000 mg tablet, Take 1,000 mg by mouth daily as needed  valACYclovir (VALTREX) 500 MG tablet, Take 1 tablet (500 mg) by mouth daily    No current facility-administered medications on file prior to visit.     Allergies   Allergen Reactions     Sumatriptan      imitrex     Augmentin Hives     Unsure if true reaction to med      Immunization History   Administered Date(s) Administered     Influenza (IIV3) PF 11/26/2010     Influenza Vaccine IM > 6 months Valent IIV4 10/14/2018     TD (ADULT, 7+) 03/23/2004     TDAP Vaccine (Adacel) 09/25/2019     Twinrix A/B 09/25/2019     Typhoid IM 09/25/2019        OB History    Para Term  AB Living   3 2 2 0 1 2   SAB TAB Ectopic Multiple Live Births   0 0 0 0 2     Past Medical History:   Diagnosis Date     Depressive disorder      Depressive disorder, not elsewhere classified     resolved     Herpes simplex without mention of complication     oral     IUD (intrauterine device) in place 08/15/2013    Mirena     Migraine headache with aura     very occass, sig aura, speech loss x1     Pure hypercholesterolemia     follows w BIPIN CHATMAN     Past Surgical History:   Procedure Laterality Date     HC DILATION/CURETTAGE DIAG/THER NON OB  2006     Family History   Problem Relation Age of Onset     Hypertension Father      Cancer Father         neuro endrocin CA, neck     C.A.D. No family hx of      Cancer - colorectal No family hx of      Diabetes No family hx of      Cerebrovascular Disease No family hx of      Breast Cancer No family hx of      Prostate Cancer No family hx of      Social History     Socioeconomic History     Marital status:      Spouse name: None     Number of children: 2     Years of education: 16     Highest education level: None   Occupational History     Occupation:      Employer: [employer]     Comment: fulltime   Social Needs     Financial resource strain: None     Food insecurity:     Worry: None     Inability: None     Transportation needs:     Medical: None     Non-medical: None   Tobacco Use     Smoking status: Former Smoker     Packs/day: 0.50     Years: 10.00     Pack years: 5.00     Types: Cigarettes     Last attempt to quit: 1994     Years since quittin.2     Smokeless tobacco: Never Used   Substance and Sexual Activity     Alcohol use: Yes     Comment: very rare     Drug use: No     Sexual activity: Yes     Partners: Male     Birth control/protection: I.U.D.     Comment: Mirena   Lifestyle     Physical activity:     Days per week: None     Minutes per session: None     Stress: None   Relationships  "    Social connections:     Talks on phone: None     Gets together: None     Attends Druze service: None     Active member of club or organization: None     Attends meetings of clubs or organizations: None     Relationship status: None     Intimate partner violence:     Fear of current or ex partner: None     Emotionally abused: None     Physically abused: None     Forced sexual activity: None   Other Topics Concern     Parent/sibling w/ CABG, MI or angioplasty before 65F 55M? No   Social History Narrative    How much exercise per week? 2 90 minute yoga sessions    How much calcium per day? Diet       How much caffeine per day? 1-2 cups of coffee    How much vitamin D per day? Supplement     Do you/your family wear seatbelts?  Yes    Do you/your family use safety helmets? Yes    Do you/your family use sunscreen? Yes    Do you/your family keep firearms in the home? No    Do you/your family have a smoke detector(s)? Yes        Do you feel safe in your home? Yes    Has anyone ever touched you in an unwanted manner? No     Explain Kiesha [facility] 7/16/18             ROS  [unfilled]  PHQ-9 SCORE 12/22/2016 9/23/2019   PHQ-9 Total Score 0 0     NORBERT-7 SCORE 9/23/2019   Total Score 0     EXAM:  Blood pressure 102/69, pulse 68, height 1.626 m (5' 4\"), weight 52.6 kg (116 lb). Body mass index is 19.91 kg/m .  General - pleasant female in no acute distress.  Skin - no suspicious lesions or rashes  EENT-  PERRLA, euthyroid with out palpable nodules  Neck - supple without lymphadenopathy.  Lungs - clear to auscultation bilaterally.  Heart - regular rate and rhythm without murmur.  Abdomen - soft, nontender, nondistended, no masses or organomegaly noted.  Musculoskeletal - no gross deformities.  Neurological - normal strength, sensation, and mental status.    Breast Exam:  Breast: Without visible skin changes. No dimpling or lesions seen.   Breasts supple, non-tender with palpation, no dominant mass, nodularity, or nipple " discharge noted bilaterally. Axillary nodes negative.      Pelvic Exam:  EG/BUS: Normal genital architecture without lesions, erythema or abnormal secretions Bartholin's, Urethra, [place]'s normal   Urethral meatus: normal, vulvar is slightly pale  Urethra: no masses, tenderness, or scarring   Bladder: no masses or tenderness   Vagina: moist, pink, rugae with creamy, white and odorless  secretions  Cervix: no lesions and pink, moist, closed, without lesion or CMT  Uterus: anteverted,  and small, smooth, firm, mobile w/o pain  Adnexa: Within normal limits and No masses, nodularity, tenderness  Rectum:anus normal     ASSESSMENT:  Encounter Diagnoses   Name Primary?     IUD (intrauterine device) in place      Vaginal atrophy      Encounter for well woman exam with routine gynecological exam Yes      PLAN:   No orders of the defined types were placed in this encounter.    Orders Placed This Encounter   Medications     estradiol (VAGIFEM) 10 MCG TABS vaginal tablet     Sig: Place 1 tablet (10 mcg) vaginally twice a week     Dispense:  24 tablet     Refill:  3     estradiol (ESTRACE) 0.1 MG/GM vaginal cream     Sig: Place 2 g vaginally twice a week     Dispense:  42.5 g     Refill:  3     Additional teaching done at this visit regarding self breast exam, exercise, birth control and perimenopause.  Discussed vaginal estrogen options--will order both vagifem and cream, pt will use the least expensive. Discuss approach to min use.  Discussed evidence to support 7 yr of mirena and due for pap 8/2020. Pt will consider replacing IUD or going without next summer  Return to clinic in one year.  Follow-up as needed. Following kate CHATMAN for blood work      Again, thank you for allowing me to participate in the care of your patient.      Sincerely,    JERAMY Guerin CNM

## 2019-09-24 ASSESSMENT — ANXIETY QUESTIONNAIRES: GAD7 TOTAL SCORE: 0

## 2019-09-25 ENCOUNTER — TELEPHONE (OUTPATIENT)
Dept: OBGYN | Facility: CLINIC | Age: 49
End: 2019-09-25

## 2019-09-25 ENCOUNTER — OFFICE VISIT (OUTPATIENT)
Dept: FAMILY MEDICINE | Facility: CLINIC | Age: 49
End: 2019-09-25
Payer: COMMERCIAL

## 2019-09-25 VITALS
SYSTOLIC BLOOD PRESSURE: 96 MMHG | RESPIRATION RATE: 16 BRPM | TEMPERATURE: 98.1 F | HEART RATE: 66 BPM | DIASTOLIC BLOOD PRESSURE: 66 MMHG | OXYGEN SATURATION: 98 %

## 2019-09-25 DIAGNOSIS — Z71.84 TRAVEL ADVICE ENCOUNTER: Primary | ICD-10-CM

## 2019-09-25 PROCEDURE — 90715 TDAP VACCINE 7 YRS/> IM: CPT | Mod: GA | Performed by: NURSE PRACTITIONER

## 2019-09-25 PROCEDURE — 90636 HEP A/HEP B VACC ADULT IM: CPT | Mod: GA | Performed by: NURSE PRACTITIONER

## 2019-09-25 PROCEDURE — 90471 IMMUNIZATION ADMIN: CPT | Mod: GA | Performed by: NURSE PRACTITIONER

## 2019-09-25 PROCEDURE — 90472 IMMUNIZATION ADMIN EACH ADD: CPT | Mod: GA | Performed by: NURSE PRACTITIONER

## 2019-09-25 PROCEDURE — 99402 PREV MED CNSL INDIV APPRX 30: CPT | Mod: 25 | Performed by: NURSE PRACTITIONER

## 2019-09-25 PROCEDURE — 86735 MUMPS ANTIBODY: CPT | Mod: GA | Performed by: NURSE PRACTITIONER

## 2019-09-25 PROCEDURE — 86765 RUBEOLA ANTIBODY: CPT | Mod: GA | Performed by: NURSE PRACTITIONER

## 2019-09-25 PROCEDURE — 36415 COLL VENOUS BLD VENIPUNCTURE: CPT | Mod: GA | Performed by: NURSE PRACTITIONER

## 2019-09-25 PROCEDURE — 86762 RUBELLA ANTIBODY: CPT | Mod: GA | Performed by: NURSE PRACTITIONER

## 2019-09-25 PROCEDURE — 90691 TYPHOID VACCINE IM: CPT | Mod: GA | Performed by: NURSE PRACTITIONER

## 2019-09-25 RX ORDER — AZITHROMYCIN 500 MG/1
500 TABLET, FILM COATED ORAL DAILY
Qty: 3 TABLET | Refills: 0 | Status: SHIPPED | OUTPATIENT
Start: 2019-09-25 | End: 2019-11-27

## 2019-09-25 NOTE — PATIENT INSTRUCTIONS
Today September 25, 2019 you received the    Twinrix (Hepatitis A & B combo) Vaccine - Please return on 10/25/19 for your 2nd dose and 3/23/20 or later for your 3rd and final dose.    Tetanus (Tdap) Vaccine    Typhoid - injectable. This vaccine is valid for two years.   .    These appointments can be made as a NURSE ONLY visit.    **It is very important for the vaccinations to be given on the scheduled day(s), this helps ensure you receive the full effectiveness of the vaccine.**    Please call [facility] with any questions [phone]    Thank you for visiting [facility]

## 2019-09-25 NOTE — NURSING NOTE
Prior to immunization administration, verified patients identity using patient s name and date of birth. Please see Immunization Activity for additional information.     Screening Questionnaire for Adult Immunization    Are you sick today?   No   Do you have allergies to medications, food, a vaccine component or latex?   No   Have you ever had a serious reaction after receiving a vaccination?   No   Do you have a long-term health problem with heart disease, lung disease, asthma, kidney disease, metabolic disease (e.g. diabetes), anemia, or other blood disorder?   No   Do you have cancer, leukemia, HIV/AIDS, or any other immune system problem?   No   In the past 3 months, have you taken medications that affect  your immune system, such as prednisone, other steroids, or anticancer drugs; drugs for the treatment of rheumatoid arthritis, Crohn s disease, or psoriasis; or have you had radiation treatments?   No   Have you had a seizure, or a brain or other nervous system problem?   No   During the past year, have you received a transfusion of blood or blood     products, or been given immune (gamma) globulin or antiviral drug?   No   For women: Are you pregnant or is there a chance you could become        pregnant during the next month?   No   Have you received any vaccinations in the past 4 weeks?   No     Immunization questionnaire answers were all negative.        Per orders of TAMIKA Simons, injection of Typhoid, Twinrix, and TDAP given by Radha Andrews CMA. Patient instructed to remain in clinic for 15 minutes afterwards, and to report any adverse reaction to me immediately.       Screening performed by Radha Andresw CMA on 9/25/2019 at 11:13 AM.

## 2019-09-25 NOTE — TELEPHONE ENCOUNTER
----- Message from Josephine Jones sent at 9/25/2019  9:16 AM CDT -----  Regarding: MMR booster shot  Contact: [phone]  Pt wants to know if she had an MMR booster shot when she was pregnant around [id number] please call pt back thanks

## 2019-09-25 NOTE — PROGRESS NOTES
Nurse Note      Itinerary:  [place]       Departure Date: 10/31/2019      Return Date: 11/12/2019      Length of Trip 2 weeks       Reason for Travel: Tourism           Urban or rural: both      Accommodations: Hotel        IMMUNIZATION HISTORY  Have you received any immunizations within the past 4 weeks?  No  Have you ever fainted from having your blood drawn or from an injection?  Yes  Have you ever had a fever reaction to vaccination?  No  Have you ever had any bad reaction or side effect from any vaccination?  No  Have you ever had hepatitis A or B vaccine?  No  Do you live (or work closely) with anyone who has AIDS, an AIDS-like condition, any other immune disorder or who is on chemotherapy for cancer?  No  Do you have a family history of immunodeficiency?  No  Have you received any injection of immune globulin or any blood products during the past 12 months?  No    Patient roomed by CAREN Grady is a 48 year old female seen today alone for counsultation for international travel to the stated countries.   Patient will be departing in  6 week(s) and  traveling with organized tour group.      Patient itinerary :  will be in the [facility]  region [facility] which presents risk for Dengue Fever and food borne illnesses. exposure.      Patient's activities will include Yoga.    Patient's country of birth is [place]    Special medical concerns: none  Pre-travel questionnaire was completed by patient and reviewed by provider.     Vitals: BP 96/66   Pulse 66   Temp 98.1  F (36.7  C) (Oral)   Resp 16   SpO2 98%   BMI= There is no height or weight on file to calculate BMI.    EXAM:  General:  Well-nourished, well-developed in no acute distress.  Appears to be stated age, interacts appropriately and expresses understanding of information given to patient.    Current Outpatient Medications   Medication Sig Dispense Refill     azithromycin (ZITHROMAX) 500 MG tablet Take 1 tablet  (500 mg) by mouth daily for 3 doses Take 1 tablet a day for up to 3 days for severe diarrhea 3 tablet 0     Biotin 10 MG TABS tablet Take 10,000 mcg by mouth daily       Cholecalciferol (VITAMIN D3 PO) Take by mouth daily       Collagen Hydrolysate POWD        Cyanocobalamin (VITAMIN B 12 PO) Take by mouth daily       estradiol (ESTRACE) 0.1 MG/GM vaginal cream Place 2 g vaginally twice a week 42.5 g 3     estradiol (ESTRACE) 0.1 MG/GM vaginal cream Place 2 g vaginally daily 2 weeks then 1 gm daily x 7 days, then 1 gm 2 times week 42.5 g 1     estradiol (ESTRING) 2 MG vaginal ring Place 1 each vaginally every 3 months 1 each 1     estradiol (VAGIFEM) 10 MCG TABS vaginal tablet Place 1 tablet (10 mcg) vaginally twice a week 24 tablet 3     estradiol (VAGIFEM) 10 MCG TABS vaginal tablet Place 1 tablet (10 mcg) vaginally twice a week 24 tablet 1     levonorgestrel (MIRENA) 20 MCG/24HR IUD 1 each by Intrauterine route once       Lysine 500 MG TABS Take 500 mg by mouth daily       magnesium citrate solution Takes 1 tsp twice a day       valACYclovir (VALTREX) 1000 mg tablet Take 1,000 mg by mouth daily as needed       valACYclovir (VALTREX) 500 MG tablet Take 1 tablet (500 mg) by mouth daily 90 tablet 3     Patient Active Problem List   Diagnosis     Pure hypercholesterolemia     Herpes simplex virus (HSV) infection     iamTIBIALIS TENDINITIS     iamSPRAIN HIP & THIGH NOS     HYPERLIPIDEMIA LDL GOAL <160     IUD (intrauterine device) in place     Allergies   Allergen Reactions     Sumatriptan      imitrex     Augmentin Hives     Unsure if true reaction to med          Immunizations discussed include:   Hepatitis A:  Twin Denise series started today  Hepatitis B: Twin Denise series started today  Influenza: deferred  Typhoid: Ordered/given today, risks, benefits and side effects reviewed  Rabies: Declined  reviewed managment of a animal bite or scratch (washing wound, seek medical care within 24 hours for post exposure  prophylaxis )  Yellow Fever: Not indicated  Japanese Encephalitis: low risk, declined vaccine  Meningococcus: Not indicated  Tetanus/Diphtheria: Ordered/given today, risks, benefits and side effects reviewed  Measles/Mumps/Rubella: Titers drawn  Cholera: Not needed  Polio: Up to date  Pneumococcal: Under age of 65  Varicella: Immune by disease history per patient report  Zostavax:  Not indicated  Shingrix: Not indicated  HPV:  Not indicated  TB:  Low risk     Altitude Exposure on this trip: no  Past tolerance to Altitude: na    ASSESSMENT/PLAN:    ICD-10-CM    1. Travel advice encounter Z71.89 Rubeola Antibody IgG     Mumps Immune Status, IgG     Rubella Antibody IgG Quantitative     Mumps Immune Status, IgG     azithromycin (ZITHROMAX) 500 MG tablet     I have reviewed general recommendations for safe travel   including: food/water precautions, insect precautions, safer sex   practices given high prevalence of Zika, HIV and other STDs,   roadway safety. Educational materials and [employer] report provided.    Malaraia prophylaxis recommended: na  Symptomatic treatment for traveler's diarrhea: azithromycin  Altitude illness prevention and treatment: na      Evacuation insurance advised and resources were provided to patient.    Total visit time 30 minutes  with over 50% of time spent counseling patient as detailed above.    Swathi Simons CNP

## 2019-09-25 NOTE — PROGRESS NOTES
Progress Note    SUBJECTIVE:  Kiesha Mcguire is an 48 year old, , who requests an Annual Preventive Exam.       Concerns today include: -wants to renew vaginal estrogen. Liked effects of estring, but not using (bathing). Not sexually active, would like to be able to control dose of estrogen for lesser dose if she wants and then ability to go back to full dose if she is sexually active again  -happy w IUD, wants to go full 7 yr w mirena    Menstrual History:  Menstrual History 2016   LAST MENSTRUAL PERIOD 2016 - -   Menarche Age - 12 -   Period Cycle (Days) - every 90 days very light   Period Duration (Days) - - -   Method of Contraception - Mirena IUD Mirena IUD   Period Pattern - Regular -   Menstrual Flow - Light Light   Menstrual Control - - Panty liner   Dysmenorrhea - Mild None   PMS Symptoms - Cramping -   Reviewed Today - Yes Yes       Last    Lab Results   Component Value Date    PAP NIL 2015     History of abnormal Pap smear: NO - age 30-65 PAP every 5 years with negative HPV co-testing recommended    Last   Lab Results   Component Value Date    HPV16 Negative 2015     Last   Lab Results   Component Value Date    HPV18 Negative 2015     Last   Lab Results   Component Value Date    HRHPV Negative 2015       Mammogram current: no (patient will schedule)  Last Mammogram:   Ma Screening Digital Bilateral    Result Date: 2016  Narrative: SCREENING MAMMOGRAM, BILATERAL, DIGITAL, with CAD 2016 HISTORY: Asymptomatic screening mammogram COMPARISON:9/26/15, 3/7/13 BREAST DENSITY: Heterogeneously dense. No significant change.     Us Breast Left    Result Date: 2016  Narrative: Exam: Left breast digital diagnostic mammogram and ultrasound including tomosynthesis COMPARISON: 2016 screening mammogram, 2015, 2015, 2014, 3/7/2013 HISTORY: Episodic left lateral breast pain. Recently had worsened although now  has mostly resolved. She has had it off and on for the last several years. FINDINGS: No significant change mammographically including the region of pain. A normal-appearing intramammary lymph node is seen in the region of pain but no abnormality is seen.        Last Colonoscopy:  No results found for this or any previous visit.      HISTORY:  Biotin 10 MG TABS tablet, Take 10,000 mcg by mouth daily  Cholecalciferol (VITAMIN D3 PO), Take by mouth daily  Collagen Hydrolysate POWD,   Cyanocobalamin (VITAMIN B 12 PO), Take by mouth daily  estradiol (ESTRACE) 0.1 MG/GM vaginal cream, Place 2 g vaginally daily 2 weeks then 1 gm daily x 7 days, then 1 gm 2 times week  estradiol (ESTRING) 2 MG vaginal ring, Place 1 each vaginally every 3 months  estradiol (VAGIFEM) 10 MCG TABS vaginal tablet, Place 1 tablet (10 mcg) vaginally twice a week  levonorgestrel (MIRENA) 20 MCG/24HR IUD, 1 each by Intrauterine route once  Lysine 500 MG TABS, Take 500 mg by mouth daily  magnesium citrate solution, Takes 1 tsp twice a day  valACYclovir (VALTREX) 1000 mg tablet, Take 1,000 mg by mouth daily as needed  valACYclovir (VALTREX) 500 MG tablet, Take 1 tablet (500 mg) by mouth daily    No current facility-administered medications on file prior to visit.     Allergies   Allergen Reactions     Sumatriptan      imitrex     Augmentin Hives     Unsure if true reaction to med      Immunization History   Administered Date(s) Administered     Influenza (IIV3) PF 2010     Influenza Vaccine IM > 6 months Valent IIV4 10/14/2018     TD (ADULT, 7+) 2004     TDAP Vaccine (Adacel) 2019     Twinrix A/B 2019     Typhoid IM 2019       OB History    Para Term  AB Living   3 2 2 0 1 2   SAB TAB Ectopic Multiple Live Births   0 0 0 0 2     Past Medical History:   Diagnosis Date     Depressive disorder      Depressive disorder, not elsewhere classified     resolved     Herpes simplex without mention of complication      oral     IUD (intrauterine device) in place 08/15/2013    Mirena     Migraine headache with aura     very occass, sig aura, speech loss x1     Pure hypercholesterolemia     follows w BIPIN CHATMAN     Past Surgical History:   Procedure Laterality Date     HC DILATION/CURETTAGE DIAG/THER NON OB  2006     Family History   Problem Relation Age of Onset     Hypertension Father      Cancer Father         neuro endrocin CA, neck     C.A.D. No family hx of      Cancer - colorectal No family hx of      Diabetes No family hx of      Cerebrovascular Disease No family hx of      Breast Cancer No family hx of      Prostate Cancer No family hx of      Social History     Socioeconomic History     Marital status:      Spouse name: None     Number of children: 2     Years of education: 16     Highest education level: None   Occupational History     Occupation:      Employer: [employer]     Comment: fulltime   Social Needs     Financial resource strain: None     Food insecurity:     Worry: None     Inability: None     Transportation needs:     Medical: None     Non-medical: None   Tobacco Use     Smoking status: Former Smoker     Packs/day: 0.50     Years: 10.00     Pack years: 5.00     Types: Cigarettes     Last attempt to quit: 1994     Years since quittin.2     Smokeless tobacco: Never Used   Substance and Sexual Activity     Alcohol use: Yes     Comment: very rare     Drug use: No     Sexual activity: Yes     Partners: Male     Birth control/protection: I.U.D.     Comment: Mirena   Lifestyle     Physical activity:     Days per week: None     Minutes per session: None     Stress: None   Relationships     Social connections:     Talks on phone: None     Gets together: None     Attends [employer] service: None     Active member of club or organization: None     Attends meetings of clubs or organizations: None     Relationship status: None     Intimate partner violence:     Fear of current or ex partner:  "None     Emotionally abused: None     Physically abused: None     Forced sexual activity: None   Other Topics Concern     Parent/sibling w/ CABG, MI or angioplasty before 65F 55M? No   Social History Narrative    How much exercise per week? 2 90 minute yoga sessions    How much calcium per day? Diet       How much caffeine per day? 1-2 cups of coffee    How much vitamin D per day? Supplement     Do you/your family wear seatbelts?  Yes    Do you/your family use safety helmets? Yes    Do you/your family use sunscreen? Yes    Do you/your family keep firearms in the home? No    Do you/your family have a smoke detector(s)? Yes        Do you feel safe in your home? Yes    Has anyone ever touched you in an unwanted manner? No     Explain Kiesha Fletcher CMA 7/16/18               ROS  [unfilled]  PHQ-9 SCORE 12/22/2016 9/23/2019   PHQ-9 Total Score 0 0     NORBERT-7 SCORE 9/23/2019   Total Score 0         EXAM:  Blood pressure 102/69, pulse 68, height 1.626 m (5' 4\"), weight 52.6 kg (116 lb). Body mass index is 19.91 kg/m .  General - pleasant female in no acute distress.  Skin - no suspicious lesions or rashes  EENT-  PERRLA, euthyroid with out palpable nodules  Neck - supple without lymphadenopathy.  Lungs - clear to auscultation bilaterally.  Heart - regular rate and rhythm without murmur.  Abdomen - soft, nontender, nondistended, no masses or organomegaly noted.  Musculoskeletal - no gross deformities.  Neurological - normal strength, sensation, and mental status.    Breast Exam:  Breast: Without visible skin changes. No dimpling or lesions seen.   Breasts supple, non-tender with palpation, no dominant mass, nodularity, or nipple discharge noted bilaterally. Axillary nodes negative.      Pelvic Exam:  EG/BUS: Normal genital architecture without lesions, erythema or abnormal secretions Bartholin's, Urethra, Washam's normal   Urethral meatus: normal, vulvar is slightly pale  Urethra: no masses, tenderness, or scarring   Bladder: " no masses or tenderness   Vagina: moist, pink, rugae with creamy, white and odorless  secretions  Cervix: no lesions and pink, moist, closed, without lesion or CMT  Uterus: anteverted,  and small, smooth, firm, mobile w/o pain  Adnexa: Within normal limits and No masses, nodularity, tenderness  Rectum:anus normal       ASSESSMENT:  Encounter Diagnoses   Name Primary?     IUD (intrauterine device) in place      Vaginal atrophy      Encounter for well woman exam with routine gynecological exam Yes        PLAN:   No orders of the defined types were placed in this encounter.    Orders Placed This Encounter   Medications     estradiol (VAGIFEM) 10 MCG TABS vaginal tablet     Sig: Place 1 tablet (10 mcg) vaginally twice a week     Dispense:  24 tablet     Refill:  3     estradiol (ESTRACE) 0.1 MG/GM vaginal cream     Sig: Place 2 g vaginally twice a week     Dispense:  42.5 g     Refill:  3       Additional teaching done at this visit regarding self breast exam, exercise, birth control and perimenopause.  Discussed vaginal estrogen options--will order both vagifem and cream, pt will use the least expensive. Discuss approach to min use.  Discussed evidence to support 7 yr of mirena and due for pap 8/2020. Pt will consider replacing IUD or going without next summer  Return to clinic in one year.  Follow-up as needed. Following w MD for blood work

## 2019-09-27 LAB
MEV IGG SER QL IA: 0.4 AI (ref 0–0.8)
MUV IGG SER QL IA: 0.3 AI (ref 0–0.8)
RUBV IGG SERPL IA-ACNC: 7 IU/ML

## 2019-10-10 ENCOUNTER — TELEPHONE (OUTPATIENT)
Dept: FAMILY MEDICINE | Facility: CLINIC | Age: 49
End: 2019-10-10

## 2019-10-10 NOTE — TELEPHONE ENCOUNTER
Patient will be calling to schedule a lab and mmr appt - if having trouble coordinating for patient's preferred time, please contact me to schedule.    Thanks!  Edison Reddy

## 2019-10-11 ENCOUNTER — ALLIED HEALTH/NURSE VISIT (OUTPATIENT)
Dept: NURSING | Facility: CLINIC | Age: 49
End: 2019-10-11
Payer: COMMERCIAL

## 2019-10-11 DIAGNOSIS — Z23 NEED FOR VACCINATION: Primary | ICD-10-CM

## 2019-10-11 PROCEDURE — 90471 IMMUNIZATION ADMIN: CPT

## 2019-10-11 PROCEDURE — 90707 MMR VACCINE SC: CPT

## 2019-10-29 ENCOUNTER — TELEPHONE (OUTPATIENT)
Dept: FAMILY MEDICINE | Facility: CLINIC | Age: 49
End: 2019-10-29

## 2019-10-29 NOTE — TELEPHONE ENCOUNTER
Detailed message left on VM for patient to call and make nurse only appointment for her 2nd series of Twinrix vaccination.    Aleyda Robbins RN

## 2019-10-29 NOTE — TELEPHONE ENCOUNTER
Reason for Call:  Other - Immunization Question    Detailed comments: Patient would like to know if the Twinrix Hep A & B is available at the clinic, or if she has to go to a travel clinic to get this. She stated her first one was done at the travel clinic with other shots. Please call back to discuss.    Phone Number Patient can be reached at: Cell: [phone]    Best Time: Anytime    Can we leave a detailed message on this number? YES    Call taken on 10/29/2019 at 12:50 PM by Julissa Lima

## 2019-10-30 ENCOUNTER — ALLIED HEALTH/NURSE VISIT (OUTPATIENT)
Dept: NURSING | Facility: CLINIC | Age: 49
End: 2019-10-30
Payer: COMMERCIAL

## 2019-10-30 DIAGNOSIS — Z23 NEED FOR HEPATITIS A AND B VACCINATION: Primary | ICD-10-CM

## 2019-10-30 PROCEDURE — 90471 IMMUNIZATION ADMIN: CPT

## 2019-10-30 PROCEDURE — 90636 HEP A/HEP B VACC ADULT IM: CPT

## 2019-10-30 NOTE — PROGRESS NOTES
Prior to immunization administration, verified patients identity using patient s name and date of birth. Please see Immunization Activity for additional information.     Screening Questionnaire for Adult Immunization    Are you sick today?   No   Do you have allergies to medications, food, a vaccine component or latex?   Yes   Have you ever had a serious reaction after receiving a vaccination?   No   Do you have a long-term health problem with heart disease, lung disease, asthma, kidney disease, metabolic disease (e.g. diabetes), anemia, or other blood disorder?   No   Do you have cancer, leukemia, HIV/AIDS, or any other immune system problem?   No   In the past 3 months, have you taken medications that affect  your immune system, such as prednisone, other steroids, or anticancer drugs; drugs for the treatment of rheumatoid arthritis, Crohn s disease, or psoriasis; or have you had radiation treatments?   No   Have you had a seizure, or a brain or other nervous system problem?   No   During the past year, have you received a transfusion of blood or blood     products, or been given immune (gamma) globulin or antiviral drug?   No   For women: Are you pregnant or is there a chance you could become        pregnant during the next month?   No   Have you received any vaccinations in the past 4 weeks?   Yes     Immunization questionnaire was positive for at least one answer.  Notified Martha Perez CNP.        Per orders of Alban Lynch PA-C, injection of Twinrix given by Lois Diamond CMA. Patient instructed to remain in clinic for 15 minutes afterwards, and to report any adverse reaction to me immediately.       Screening performed by Lois Diamond CMA on 10/30/2019 at 10:09 AM.

## 2019-11-05 ENCOUNTER — HEALTH MAINTENANCE LETTER (OUTPATIENT)
Age: 49
End: 2019-11-05

## 2019-11-27 ENCOUNTER — OFFICE VISIT (OUTPATIENT)
Dept: FAMILY MEDICINE | Facility: CLINIC | Age: 49
End: 2019-11-27
Payer: COMMERCIAL

## 2019-11-27 VITALS
HEIGHT: 64 IN | DIASTOLIC BLOOD PRESSURE: 62 MMHG | RESPIRATION RATE: 22 BRPM | BODY MASS INDEX: 19.29 KG/M2 | TEMPERATURE: 98.5 F | OXYGEN SATURATION: 96 % | HEART RATE: 94 BPM | SYSTOLIC BLOOD PRESSURE: 108 MMHG | WEIGHT: 113 LBS

## 2019-11-27 DIAGNOSIS — Z13.1 SCREENING FOR DIABETES MELLITUS: ICD-10-CM

## 2019-11-27 DIAGNOSIS — Z12.11 SPECIAL SCREENING FOR MALIGNANT NEOPLASMS, COLON: ICD-10-CM

## 2019-11-27 DIAGNOSIS — B00.9 HERPES SIMPLEX VIRUS (HSV) INFECTION: ICD-10-CM

## 2019-11-27 DIAGNOSIS — Z13.220 SCREENING FOR HYPERLIPIDEMIA: Primary | ICD-10-CM

## 2019-11-27 PROCEDURE — 99213 OFFICE O/P EST LOW 20 MIN: CPT | Performed by: NURSE PRACTITIONER

## 2019-11-27 PROCEDURE — 80061 LIPID PANEL: CPT | Performed by: NURSE PRACTITIONER

## 2019-11-27 PROCEDURE — 82947 ASSAY GLUCOSE BLOOD QUANT: CPT | Performed by: NURSE PRACTITIONER

## 2019-11-27 PROCEDURE — 36415 COLL VENOUS BLD VENIPUNCTURE: CPT | Performed by: NURSE PRACTITIONER

## 2019-11-27 RX ORDER — VALACYCLOVIR HYDROCHLORIDE 1 G/1
2000 TABLET, FILM COATED ORAL 2 TIMES DAILY
Qty: 12 TABLET | Refills: 3 | Status: SHIPPED | OUTPATIENT
Start: 2019-11-27 | End: 2020-12-21

## 2019-11-27 ASSESSMENT — MIFFLIN-ST. JEOR: SCORE: 1117.56

## 2019-11-28 LAB
CHOLEST SERPL-MCNC: 212 MG/DL
GLUCOSE SERPL-MCNC: 82 MG/DL (ref 70–99)
HDLC SERPL-MCNC: 65 MG/DL
LDLC SERPL CALC-MCNC: 134 MG/DL
NONHDLC SERPL-MCNC: 147 MG/DL
TRIGL SERPL-MCNC: 66 MG/DL

## 2019-11-29 ENCOUNTER — TELEPHONE (OUTPATIENT)
Dept: FAMILY MEDICINE | Facility: CLINIC | Age: 49
End: 2019-11-29

## 2019-11-29 NOTE — TELEPHONE ENCOUNTER
Biometric screen form completed and faxed.    Thank you,  Flavia REDMOND  [facility]  Team [place] Coordinator

## 2019-12-19 ENCOUNTER — MYC MEDICAL ADVICE (OUTPATIENT)
Dept: OBGYN | Facility: CLINIC | Age: 49
End: 2019-12-19

## 2019-12-19 DIAGNOSIS — N95.2 VAGINAL ATROPHY: ICD-10-CM

## 2019-12-20 RX ORDER — ESTRADIOL 10 UG/1
10 INSERT VAGINAL
Qty: 24 TABLET | Refills: 3 | Status: SHIPPED | OUTPATIENT
Start: 2019-12-23 | End: 2021-08-05

## 2020-02-03 ENCOUNTER — TELEPHONE (OUTPATIENT)
Dept: LAB | Facility: CLINIC | Age: 50
End: 2020-02-03

## 2020-10-12 ENCOUNTER — OFFICE VISIT (OUTPATIENT)
Dept: OBGYN | Facility: CLINIC | Age: 50
End: 2020-10-12
Attending: ADVANCED PRACTICE MIDWIFE
Payer: COMMERCIAL

## 2020-10-12 VITALS
BODY MASS INDEX: 21.22 KG/M2 | WEIGHT: 124.3 LBS | HEIGHT: 64 IN | SYSTOLIC BLOOD PRESSURE: 100 MMHG | DIASTOLIC BLOOD PRESSURE: 69 MMHG | HEART RATE: 69 BPM

## 2020-10-12 DIAGNOSIS — N95.2 VAGINAL ATROPHY: ICD-10-CM

## 2020-10-12 DIAGNOSIS — N39.3 FEMALE STRESS INCONTINENCE: ICD-10-CM

## 2020-10-12 DIAGNOSIS — Z12.4 SCREENING FOR MALIGNANT NEOPLASM OF CERVIX: ICD-10-CM

## 2020-10-12 DIAGNOSIS — Z01.419 ENCOUNTER FOR GYNECOLOGICAL EXAMINATION WITHOUT ABNORMAL FINDING: ICD-10-CM

## 2020-10-12 DIAGNOSIS — Z30.433 ENCOUNTER FOR REMOVAL AND REINSERTION OF INTRAUTERINE CONTRACEPTIVE DEVICE: Primary | ICD-10-CM

## 2020-10-12 PROCEDURE — 58301 REMOVE INTRAUTERINE DEVICE: CPT | Performed by: ADVANCED PRACTICE MIDWIFE

## 2020-10-12 PROCEDURE — 58300 INSERT INTRAUTERINE DEVICE: CPT | Performed by: ADVANCED PRACTICE MIDWIFE

## 2020-10-12 PROCEDURE — G0101 CA SCREEN;PELVIC/BREAST EXAM: HCPCS | Performed by: ADVANCED PRACTICE MIDWIFE

## 2020-10-12 PROCEDURE — 87624 HPV HI-RISK TYP POOLED RSLT: CPT | Performed by: ADVANCED PRACTICE MIDWIFE

## 2020-10-12 PROCEDURE — G0463 HOSPITAL OUTPT CLINIC VISIT: HCPCS | Mod: 25

## 2020-10-12 PROCEDURE — 250N000011 HC RX IP 250 OP 636: Performed by: ADVANCED PRACTICE MIDWIFE

## 2020-10-12 PROCEDURE — G0145 SCR C/V CYTO,THINLAYER,RESCR: HCPCS | Performed by: ADVANCED PRACTICE MIDWIFE

## 2020-10-12 RX ADMIN — LEVONORGESTREL 20 MCG: 52 INTRAUTERINE DEVICE INTRAUTERINE at 18:16

## 2020-10-12 SDOH — SOCIAL STABILITY: SOCIAL NETWORK: HOW OFTEN DO YOU GET TOGETHER WITH FRIENDS OR RELATIVES?: TWICE A WEEK

## 2020-10-12 SDOH — HEALTH STABILITY: MENTAL HEALTH: HOW OFTEN DO YOU HAVE 6 OR MORE DRINKS ON ONE OCCASION?: NOT ASKED

## 2020-10-12 SDOH — SOCIAL STABILITY: SOCIAL INSECURITY
WITHIN THE LAST YEAR, HAVE TO BEEN RAPED OR FORCED TO HAVE ANY KIND OF SEXUAL ACTIVITY BY YOUR PARTNER OR EX-PARTNER?: NOT ASKED

## 2020-10-12 SDOH — SOCIAL STABILITY: SOCIAL NETWORK
DO YOU BELONG TO ANY CLUBS OR ORGANIZATIONS SUCH AS CHURCH GROUPS UNIONS, FRATERNAL OR ATHLETIC GROUPS, OR SCHOOL GROUPS?: NO

## 2020-10-12 SDOH — SOCIAL STABILITY: SOCIAL INSECURITY
WITHIN THE LAST YEAR, HAVE YOU BEEN HUMILIATED OR EMOTIONALLY ABUSED IN OTHER WAYS BY YOUR PARTNER OR EX-PARTNER?: NOT ASKED

## 2020-10-12 SDOH — HEALTH STABILITY: MENTAL HEALTH: HOW OFTEN DO YOU HAVE A DRINK CONTAINING ALCOHOL?: MONTHLY OR LESS

## 2020-10-12 SDOH — SOCIAL STABILITY: SOCIAL NETWORK: HOW OFTEN DO YOU ATTEND CHURCH OR RELIGIOUS SERVICES?: NEVER

## 2020-10-12 SDOH — SOCIAL STABILITY: SOCIAL NETWORK: IN A TYPICAL WEEK, HOW MANY TIMES DO YOU TALK ON THE PHONE WITH FAMILY, FRIENDS, OR NEIGHBORS?: THREE TIMES A WEEK

## 2020-10-12 SDOH — SOCIAL STABILITY: SOCIAL INSECURITY: WITHIN THE LAST YEAR, HAVE YOU BEEN AFRAID OF YOUR PARTNER OR EX-PARTNER?: NOT ASKED

## 2020-10-12 SDOH — HEALTH STABILITY: PHYSICAL HEALTH: ON AVERAGE, HOW MANY MINUTES DO YOU ENGAGE IN EXERCISE AT THIS LEVEL?: 10 MIN

## 2020-10-12 SDOH — HEALTH STABILITY: MENTAL HEALTH
STRESS IS WHEN SOMEONE FEELS TENSE, NERVOUS, ANXIOUS, OR CAN'T SLEEP AT NIGHT BECAUSE THEIR MIND IS TROUBLED. HOW STRESSED ARE YOU?: TO SOME EXTENT

## 2020-10-12 SDOH — SOCIAL STABILITY: SOCIAL INSECURITY
WITHIN THE LAST YEAR, HAVE YOU BEEN KICKED, HIT, SLAPPED, OR OTHERWISE PHYSICALLY HURT BY YOUR PARTNER OR EX-PARTNER?: NOT ASKED

## 2020-10-12 SDOH — HEALTH STABILITY: MENTAL HEALTH: HOW MANY STANDARD DRINKS CONTAINING ALCOHOL DO YOU HAVE ON A TYPICAL DAY?: 1 OR 2

## 2020-10-12 SDOH — HEALTH STABILITY: PHYSICAL HEALTH: ON AVERAGE, HOW MANY DAYS PER WEEK DO YOU ENGAGE IN MODERATE TO STRENUOUS EXERCISE (LIKE A BRISK WALK)?: 1 DAY

## 2020-10-12 SDOH — SOCIAL STABILITY: SOCIAL NETWORK: ARE YOU MARRIED, WIDOWED, DIVORCED, SEPARATED, NEVER MARRIED, OR LIVING WITH A PARTNER?: DIVORCED

## 2020-10-12 SDOH — SOCIAL STABILITY: SOCIAL NETWORK: HOW OFTEN DO YOU ATTENT MEETINGS OF THE CLUB OR ORGANIZATION YOU BELONG TO?: NEVER

## 2020-10-12 ASSESSMENT — PAIN SCALES - GENERAL: PAINLEVEL: NO PAIN (0)

## 2020-10-12 ASSESSMENT — ANXIETY QUESTIONNAIRES
6. BECOMING EASILY ANNOYED OR IRRITABLE: NOT AT ALL
2. NOT BEING ABLE TO STOP OR CONTROL WORRYING: NOT AT ALL
7. FEELING AFRAID AS IF SOMETHING AWFUL MIGHT HAPPEN: NOT AT ALL
3. WORRYING TOO MUCH ABOUT DIFFERENT THINGS: NOT AT ALL
5. BEING SO RESTLESS THAT IT IS HARD TO SIT STILL: NOT AT ALL
GAD7 TOTAL SCORE: 0
1. FEELING NERVOUS, ANXIOUS, OR ON EDGE: NOT AT ALL

## 2020-10-12 ASSESSMENT — MIFFLIN-ST. JEOR: SCORE: 1169.07

## 2020-10-12 ASSESSMENT — PATIENT HEALTH QUESTIONNAIRE - PHQ9
SUM OF ALL RESPONSES TO PHQ QUESTIONS 1-9: 0
5. POOR APPETITE OR OVEREATING: NOT AT ALL

## 2020-10-12 NOTE — PATIENT INSTRUCTIONS
PREVENTIVE HEALTH RECOMMENDATIONS:   Most women need a yearly breast and pelvic exam.    A PAP screen, a test done DURING a pelvic exam, is NO longer recommended yearly.    March 2013, screening guidelines recommended by ACOG for PAP screen are:    1) First pap at age 21.    2) Pap every 3 years until age 30.    3) After age 30, pap every 3 years or Pap with HR HPV screen every 5 years until age 65.  4) Women do NOT need a vaginal Pap screen after a hysterectomy (surgical removal of the uterus) when they have not had cancer.    Exceptions:  1) Yearly pap if HIV+ or immunosuppressed secondary to organ transplant  2) LOGAN II-III continue routine screening for 20 years.    I encourage you continue looking for opportunities to choose a healthy lifestyle:       * Choose to eat a heart healthy diet. Check out the FOOD PLATE guidelines at: http://www.choosemyplate.gov/ for helpful hints on weight and cholesterol management.  Balance your caloric intake with exercise to maintain a BMI in the 22 to 26 range. For bone health: Eat calcium-rich foods like yogurt, broccoli or take chewable calcium pills (500 to 600 mg) twice a day with food.       * Exercise for at least an average of 30 minutes a day, 5 days of the week. This will help you control your weight, release stress, and help prevent disease.      * Take a Vitamin D3 supplement daily fall through spring and during summer unless you [id number]' full body sun exposure to skin without sunscreen.      * DO wear sunscreen to prevent skin cancer after the first 15-30 minutes.      * Identify stressors in your life, find ways to release the stress, and, make time for yourself. PLEASE ask for help if mood changes last longer than two weeks.     * Limit alcohol to one drink per day.  No smoking.  Avoid second hand smoke. If you smoke, ask for help to stop.       *  If you are in a sexual relationship, talk with your partner about possible infection risks and take action to  protect yourself from exposure to a sexual infection.    Please request an infection screen for STIs (sexually transmitted infections) if you are less than age 26 OR believe that you may be at risk.     Get a flu shot each year. Get a tetanus shot every 10 years. EVERYONE needs a pertussis (Whooping cough) booster.    See your dentist twice a year for an exam and preventive care cleaning.     Consider the following screen tests:    1) cholesterol test every 5 years.     2) yearly mammogram after age 40 unless you have identified risks.    3) colonoscopy every 10 years after age 50 unless you have identified risks.    4) diabetes blood test screening if you are at risk for diabetes.      Additional information that you may also find helpful:  The Internet now gives us access to LOTS of information -- some of it helpful, research documented and also plenty of harmful, anecdotal information that may not pertain to your situtaion. Consider visiting the following websites for accurate health information:    www.vitamindcouncil.org/ : Info and ongoing research re Vitamin D    www.fairview.org : Up to date and easily searchable information on multiple topics.    www.medlineplus.gov : medication info, interactive tutorials, watch real surgeries online    www.cdc.gov : public health info, travel advisories, epidemics (H1N1)    www.whitney/std.org: current research re diagnosis, treatment and prevention of sexually contacted infections.    www.health.[facility].[place] : [place] dept of heatl, public health issues in [place], N1N1    www.familydoctor.org : good info from the Academy of Family Physicians        IUD information:     Benefits: The IUD can be 97-99% effective when carefully following directions regarding use. It can be more effective if used with additional contraception. IUD containing progestin may decrease menstrual flow and menstrual cramping.     Risks/Side Effects: include but are not limited to spotting, bleeding,  hemorrhage, or anemia: cramping or pain: partial or complete expulsion of device; lost IUD strings; uterine or cervical perforation; embedding of IUD in the uterine wall; increased risk of pelvic inflammatory disease. Women who become pregnant with an IUD in place are at a higher risk for ectopic pregnancy should a pregnancy occur with an IUD in situ. There is a higher rate of miscarriage when pregnancy occurs with IUD in place.    Warning signs: Please call clinic if you have abnormal spotting or heavy bleeding, abdominal pain, dyspareunia, fever, chills, flu like symptoms, or unable to locate strings of IUD, or strings are longer or shorter than expected.    You are encouraged to use condoms for prevention of STD. You may also need back up contraception for 7 days with your IUD. You may use pain medications (ibuprofen) as needed for mild to moderate pain. Please follow-up in clinic in 4-6 weeks for IUD string check if unable to find strings or as directed by provider.

## 2020-10-12 NOTE — PROGRESS NOTES
Progress Note      SUBJECTIVE:  Kiesha Mcguire is an 49 year old, , here for breast and pelvic exam. Pt is due for Pap and for removal and insert of new IUD.     Concerns today include:   - Pap with co-test. No abn pap history. Last pap 8/25/15, NIL with neg high risk HPV  - Screenings due: Pt is due for mammogram and colonoscopy. Pt plans to schedule them before the end of the year.   - HX of HSV. Takes Valtrex for cold sores.   - Vaginal atrophy: Request new prescription for estrogen ring as she has found it to work the best for her.   - Hypercholesterolemia: Being followed by PCP. Being monitored yearly.   - Concerns of pelvic floor, her mother had experienced pelvic organ prolapse. Pt is agreeable to referral for pelvic floor therapy. C/o of occasional stress incontinence  - Stress: Pt is experiencing stress with life changes due to pandemic. Negative PHQ-9 and NORBERT-7.     Recommended Flu Vaccine.  Patient declined, do not offer --planning to do with children     Menstrual History:  Menstrual History 2016 2018 10/12/2020   LAST MENSTRUAL PERIOD - - 2020   Menarche Age 12 - -   Period Cycle (Days) every 90 days very light -   Period Duration (Days) - - -   Method of Contraception Mirena IUD Mirena IUD -   Period Pattern Regular - -   Menstrual Flow Light Light -   Menstrual Control - Panty liner -   Dysmenorrhea Mild None -   PMS Symptoms Cramping - -   Reviewed Today Yes Yes -       Last    Lab Results   Component Value Date    PAP NIL 2015     History of abnormal Pap smear: NO - age 30-65 PAP every 5 years with negative HPV co-testing recommended    Last   Lab Results   Component Value Date    HPV16 Negative 2015     Last   Lab Results   Component Value Date    HPV18 Negative 2015     Last   Lab Results   Component Value Date    HRHPV Negative 2015       Mammogram current: no, patient will schedule at breast center  Last Mammogram:   Ma Screening Digital  Bilateral    Result Date: 11/30/2016  Narrative: SCREENING MAMMOGRAM, BILATERAL, DIGITAL, with CAD 11/25/2016 HISTORY: Asymptomatic screening mammogram COMPARISON:9/26/15, 3/7/13 BREAST DENSITY: Heterogeneously dense. No significant change.     Us Breast Left    Result Date: 12/27/2016  Narrative: Exam: Left breast digital diagnostic mammogram and ultrasound including tomosynthesis COMPARISON: November 25, 2016 screening mammogram, 9/25/2015, 4/18/2015, 5/8/2014, 3/7/2013 HISTORY: Episodic left lateral breast pain. Recently had worsened although now has mostly resolved. She has had it off and on for the last several years. FINDINGS: No significant change mammographically including the region of pain. A normal-appearing intramammary lymph node is seen in the region of pain but no abnormality is seen.        Last Colonoscopy:  No results found for this or any previous visit.      HISTORY:       Biotin 10 MG TABS tablet, Take 10,000 mcg by mouth daily       Cholecalciferol (VITAMIN D3 PO), Take by mouth daily       Collagen Hydrolysate POWD,        Cyanocobalamin (VITAMIN B 12 PO), Take by mouth daily       estradiol (VAGIFEM) 10 MCG TABS vaginal tablet, Place 1 tablet (10 mcg) vaginally twice a week       levonorgestrel (MIRENA) 20 MCG/24HR IUD, 1 each (20 mcg) by Intrauterine route once       levonorgestrel (MIRENA) 20 MCG/24HR IUD, 1 each by Intrauterine route once       Lysine 500 MG TABS, Take 500 mg by mouth daily       magnesium citrate solution, Takes 1 tsp twice a day       NONFORMULARY, 2 tablets daily seabuckthorn       valACYclovir (VALTREX) 1000 mg tablet, Take 2 tablets (2,000 mg) by mouth 2 times daily for 1 day As needed for cold sores    No current facility-administered medications on file prior to visit.     Allergies   Allergen Reactions     Sumatriptan      imitrex     Augmentin Hives     Unsure if true reaction to med      Immunization History   Administered Date(s) Administered     Influenza  (IIV3) PF 2010     Influenza Vaccine IM > 6 months Valent IIV4 10/14/2018, 10/18/2019     MMR 10/11/2019     TD (ADULT, 7+) 2004     TDAP Vaccine (Adacel) 2019     Twinrix A/B 2019, 10/30/2019     Typhoid IM 2019       OB History    Para Term  AB Living   3 2 2 0 1 2   SAB TAB Ectopic Multiple Live Births   0 0 0 0 2     Past Medical History:   Diagnosis Date     Depressive disorder      Depressive disorder, not elsewhere classified     resolved     Herpes simplex without mention of complication     oral     IUD (intrauterine device) in place 08/15/2013    Mirena     Migraine headache with aura     very occass, sig aura, speech loss x1     Pure hypercholesterolemia     follows w BIPIN CHATMAN     Past Surgical History:   Procedure Laterality Date     HC DILATION/CURETTAGE DIAG/THER NON OB  2006     Family History   Problem Relation Age of Onset     Hypertension Father      Cancer Father         neuro endrocine CA, neck     Aortic aneurysm Maternal Grandfather          of ascending aortic aneurysm at age 64     Aortic aneurysm Maternal Aunt          in her 40s from ascending aortic aneurysm     C.A.D. No family hx of      Cancer - colorectal No family hx of      Diabetes No family hx of      Cerebrovascular Disease No family hx of      Breast Cancer No family hx of      Prostate Cancer No family hx of      Social History     Socioeconomic History     Marital status:      Spouse name: None     Number of children: 2     Years of education: 16     Highest education level: None   Occupational History     Occupation:      Employer: [employer]     Comment: fulltime   Social Needs     Financial resource strain: None     Food insecurity     Worry: None     Inability: None     Transportation needs     Medical: None     Non-medical: None   Tobacco Use     Smoking status: Former Smoker     Packs/day: 0.50     Years: 10.00     Pack years: 5.00     Types:  "Cigarettes     Quit date: 1994     Years since quittin.2     Smokeless tobacco: Never Used   Substance and Sexual Activity     Alcohol use: Yes     Frequency: Monthly or less     Drinks per session: 1 or 2     Comment: very rare     Drug use: No     Sexual activity: Not Currently     Partners: Male     Birth control/protection: I.U.D.     Comment: Mirena   Lifestyle     Physical activity     Days per week: None     Minutes per session: None     Stress: None   Relationships     Social connections     Talks on phone: None     Gets together: None     Attends Ep[employer] service: None     Active member of club or organization: None     Attends meetings of clubs or organizations: None     Relationship status: None     Intimate partner violence     Fear of current or ex partner: None     Emotionally abused: None     Physically abused: None     Forced sexual activity: None   Other Topics Concern     Parent/sibling w/ CABG, MI or angioplasty before 65F 55M? No   Social History Narrative    How much exercise per week? 2 90 minute yoga sessions      How much calcium per day? Diet       How much caffeine per day? 0    How much vitamin D per day? Supplement     Do you/your family wear seatbelts?  Yes    Do you/your family use safety helmets? Yes    Do you/your family use sunscreen? Yes    Do you/your family keep firearms in the home? No    Do you/your family have a smoke detector(s)? Yes        Do you feel safe in your home? Yes    Has anyone ever touched you in an unwanted manner? No     Explain Kiesha [facility] 18        Reviewed [facility] 10-12-20               PHQ-9 SCORE 2016 2019 10/12/2020   PHQ-9 Total Score 0 0 0     NORBERT-7 SCORE 2019 10/12/2020   Total Score 0 0       EXAM:  Blood pressure 100/69, pulse 69, height 1.626 m (5' 4.02\"), weight 56.4 kg (124 lb 4.8 oz), last menstrual period 2020. Body mass index is 21.33 kg/m .  General - pleasant female in no acute distress.    Breast " Exam:  Breast: Without visible skin changes. No dimpling or lesions seen.   Breasts supple, non-tender with palpation, no dominant mass, nodularity, or nipple discharge noted bilaterally. Axillary nodes negative.      Pelvic Exam:  EG/BUS: Normal genital architecture without lesions, erythema or abnormal secretions Bartholin's, Urethra, Salvo's normal   Urethral meatus: normal   Urethra: no masses, tenderness, or scarring   Bladder: no masses or tenderness  Vagina: moist, pink, rugae with odorless and clear  secretions  Cervix: Multiparous, and no lesions  Uterus: anteverted,   Adnexa: Within normal limits and No masses, nodularity, tenderness  Rectum:anus normal     SUBJECTIVE:    Is a pregnancy test required: No. Not sexually active. Has Mirena IUD.  Was a consent obtained?  Yes    Subjective: Kiesha Mcguire is a 49 year old  presents for IUD and desires Mirena type IUD.  She requests removal of the IUD because the IUD effectiveness has     Patient has been given the opportunity to ask questions about all forms of birth control, including all options appropriate for Kiesha Mcguire. Discussed that no method of birth control, except abstinence is 100% effective against pregnancy or sexually transmitted infection.     Kiesha Mcguire understands she may have the IUD removed at any time. IUD should be removed by a health care provider and the current IUD will be removed today.    The entire removal and insertion procedure was reviewed with the patient, including care after placement.    PROCEDURE:    Premedicated with ibuprofen.  A speculum exam was performed and the cervix was visualized. The IUD string was visualized. Using ring forceps, the string  was grasped and the IUD removed intact. Pt experienced brief cramping with removal of IUD.     Under sterile technique, cervix was visualized with speculum and prepped with Betadine solution swab x 3. Tenaculum was placed for stability.  The uterus was gently straightened and sounded to 7.0 cm. IUD prepared for placement, and IUD inserted according to 's instructions without difficulty or significant ressitance, and deployed at the fundus. The strings were visualized and trimmed to 3.0 cm from the external os. Tenaculum was removed and hemostasis noted. Speculum removed.  Patient tolerated procedure well.    Lot # [id number]  Exp: 2022    EBL: minimal    Complications: none      POST PROCEDURE:    Given 's handouts, including when to have IUD removed, list of danger s/sx, side effects and follow up recommended. Encouraged condom use for prevention of STD. Advised to call for any fever, for prolonged or severe pain or bleeding, abnormal vaginal dischage, or unable to palpate strings. She was advised to use pain medications (ibuprofen) as needed for mild to moderate pain. Advised to follow-up in clinic in 4-6 weeks for IUD string check if unable to find strings or as directed by provider.       ASSESSMENT:  Encounter Diagnoses   Name Primary?     Screening for malignant neoplasm of cervix      Encounter for gynecological examination without abnormal finding      Encounter for removal and reinsertion of intrauterine contraceptive device Yes     Female stress incontinence      Vaginal atrophy         PLAN:   Orders Placed This Encounter   Procedures     Pelvic and Breast Exam Procedure []     INSERTION INTRAUTERINE DEVICE     REMOVE INTRAUTERINE DEVICE     Pelvic and Breast Exam Procedure []     Pap Smear Exam [] Do Not Remove     Pap imaged thin layer screen with HPV - recommended age 30 - 65 years (select HPV order below)     HPV High Risk Types DNA Cervical     PHYSICAL THERAPY REFERRAL     Orders Placed This Encounter   Medications     NONFORMULARY     Si tablets daily seabuckthorn     levonorgestrel (MIRENA) 20 MCG/24HR IUD 20 mcg     levonorgestrel (MIRENA) 20 MCG/24HR IUD     Si each (20 mcg) by  Intrauterine route once     Dispense:        estradiol (ESTRING) 2 MG vaginal ring     Sig: Place 1 each vaginally every 3 months     Dispense:  1 each     Refill:  3     - Mirena IUD removed and  inserted  - Pap with co testing ordered  - Estring prescribed  - Pelvic floor therapy referral completed. PTOSI    Additional teaching done at this visit regarding self breast exam and perimenopause.    Return to clinic in one year.  Follow-up as needed.    I, Cliff Pepper, am serving as a scribe; to document services personally performed by  Nell Dos Santos based on data collection and the provider's statements to me.     Cliff Pepper, JILLIANN, DNP Student  I agree with the PFSH and ROS as completed by the student, except for changes made by me. The remainder of the encounter was performed by me and scribed by the student. The scribed note accurately reflects my personal services and decisions made by me.  Nell Dos Santos, SHAYY, CNM, APRN

## 2020-10-12 NOTE — LETTER
10/12/2020       RE: Kiesha Mcguire  [address] [place] [address]     Dear Colleague,    Thank you for referring your patient, Kiesha Mcguire, to the [facility] [place] at [facility]. Please see a copy of my visit note below.      Progress Note      SUBJECTIVE:  Kiesha Mcguire is an 49 year old, , here for breast and pelvic exam. Pt is due for Pap and for removal and insert of new IUD.     Concerns today include:   - Pap with co-test. No abn pap history. Last pap 8/25/15, NIL with neg high risk HPV  - Screenings due: Pt is due for mammogram and colonoscopy. Pt plans to schedule them before the end of the year.   - HX of HSV. Takes Valtrex for cold sores.   - Vaginal atrophy: Request new prescription for estrogen ring as she has found it to work the best for her.   - Hypercholesterolemia: Being followed by PCP. Being monitored yearly.   - Concerns of pelvic floor, her mother had experienced pelvic organ prolapse. Pt is agreeable to referral for pelvic floor therapy. C/o of occasional stress incontinence  - Stress: Pt is experiencing stress with life changes due to pandemic. Negative PHQ-9 and NORBERT-7.     Recommended Flu Vaccine.  Patient declined, do not offer --planning to do with children     Menstrual History:  Menstrual History 2016 2018 10/12/2020   LAST MENSTRUAL PERIOD - - 2020   Menarche Age 12 - -   Period Cycle (Days) every 90 days very light -   Period Duration (Days) - - -   Method of Contraception Mirena IUD Mirena IUD -   Period Pattern Regular - -   Menstrual Flow Light Light -   Menstrual Control - Panty liner -   Dysmenorrhea Mild None -   PMS Symptoms Cramping - -   Reviewed Today Yes Yes -       Last    Lab Results   Component Value Date    PAP NIL 2015     History of abnormal Pap smear: NO - age 30-65 PAP every 5 years with negative HPV co-testing recommended    Last   Lab  Results   Component Value Date    HPV16 Negative 08/28/2015     Last   Lab Results   Component Value Date    HPV18 Negative 08/28/2015     Last   Lab Results   Component Value Date    HRHPV Negative 08/28/2015       Mammogram current: no, patient will schedule at breast center  Last Mammogram:   Ma Screening Digital Bilateral    Result Date: 11/30/2016  Narrative: SCREENING MAMMOGRAM, BILATERAL, DIGITAL, with CAD 11/25/2016 HISTORY: Asymptomatic screening mammogram COMPARISON:9/26/15, 3/7/13 BREAST DENSITY: Heterogeneously dense. No significant change.     Us Breast Left    Result Date: 12/27/2016  Narrative: Exam: Left breast digital diagnostic mammogram and ultrasound including tomosynthesis COMPARISON: November 25, 2016 screening mammogram, 9/25/2015, 4/18/2015, 5/8/2014, 3/7/2013 HISTORY: Episodic left lateral breast pain. Recently had worsened although now has mostly resolved. She has had it off and on for the last several years. FINDINGS: No significant change mammographically including the region of pain. A normal-appearing intramammary lymph node is seen in the region of pain but no abnormality is seen.        Last Colonoscopy:  No results found for this or any previous visit.      HISTORY:       Biotin 10 MG TABS tablet, Take 10,000 mcg by mouth daily       Cholecalciferol (VITAMIN D3 PO), Take by mouth daily       Collagen Hydrolysate POWD,        Cyanocobalamin (VITAMIN B 12 PO), Take by mouth daily       estradiol (VAGIFEM) 10 MCG TABS vaginal tablet, Place 1 tablet (10 mcg) vaginally twice a week       levonorgestrel (MIRENA) 20 MCG/24HR IUD, 1 each (20 mcg) by Intrauterine route once       levonorgestrel (MIRENA) 20 MCG/24HR IUD, 1 each by Intrauterine route once       Lysine 500 MG TABS, Take 500 mg by mouth daily       magnesium citrate solution, Takes 1 tsp twice a day       NONFORMULARY, 2 tablets daily seabuckthorn       valACYclovir (VALTREX) 1000 mg tablet, Take 2 tablets (2,000 mg) by mouth 2  times daily for 1 day As needed for cold sores    No current facility-administered medications on file prior to visit.     Allergies   Allergen Reactions     Sumatriptan      imitrex     Augmentin Hives     Unsure if true reaction to med      Immunization History   Administered Date(s) Administered     Influenza (IIV3) PF 2010     Influenza Vaccine IM > 6 months Valent IIV4 10/14/2018, 10/18/2019     MMR 10/11/2019     TD (ADULT, 7+) 2004     TDAP Vaccine (Adacel) 2019     Twinrix A/B 2019, 10/30/2019     Typhoid IM 2019       OB History    Para Term  AB Living   3 2 2 0 1 2   SAB TAB Ectopic Multiple Live Births   0 0 0 0 2     Past Medical History:   Diagnosis Date     Depressive disorder      Depressive disorder, not elsewhere classified     resolved     Herpes simplex without mention of complication     oral     IUD (intrauterine device) in place 08/15/2013    Mirena     Migraine headache with aura     very occass, sig aura, speech loss x1     Pure hypercholesterolemia     follows w BIPIN CHATMAN     Past Surgical History:   Procedure Laterality Date     HC DILATION/CURETTAGE DIAG/THER NON OB  2006     Family History   Problem Relation Age of Onset     Hypertension Father      Cancer Father         neuro endrocine CA, neck     Aortic aneurysm Maternal Grandfather          of ascending aortic aneurysm at age 64     Aortic aneurysm Maternal Aunt          in her 40s from ascending aortic aneurysm     C.A.D. No family hx of      Cancer - colorectal No family hx of      Diabetes No family hx of      Cerebrovascular Disease No family hx of      Breast Cancer No family hx of      Prostate Cancer No family hx of      Social History     Socioeconomic History     Marital status:      Spouse name: None     Number of children: 2     Years of education: 16     Highest education level: None   Occupational History     Occupation:      Employer: [employer]      Comment: fulltime   Social Needs     Financial resource strain: None     Food insecurity     Worry: None     Inability: None     Transportation needs     Medical: None     Non-medical: None   Tobacco Use     Smoking status: Former Smoker     Packs/day: 0.50     Years: 10.00     Pack years: 5.00     Types: Cigarettes     Quit date: 1994     Years since quittin.2     Smokeless tobacco: Never Used   Substance and Sexual Activity     Alcohol use: Yes     Frequency: Monthly or less     Drinks per session: 1 or 2     Comment: very rare     Drug use: No     Sexual activity: Not Currently     Partners: Male     Birth control/protection: I.U.D.     Comment: Mirena   Lifestyle     Physical activity     Days per week: None     Minutes per session: None     Stress: None   Relationships     Social connections     Talks on phone: None     Gets together: None     Attends Gnosticist service: None     Active member of club or organization: None     Attends meetings of clubs or organizations: None     Relationship status: None     Intimate partner violence     Fear of current or ex partner: None     Emotionally abused: None     Physically abused: None     Forced sexual activity: None   Other Topics Concern     Parent/sibling w/ CABG, MI or angioplasty before 65F 55M? No   Social History Narrative    How much exercise per week? 2 90 minute yoga sessions      How much calcium per day? Diet       How much caffeine per day? 0    How much vitamin D per day? Supplement     Do you/your family wear seatbelts?  Yes    Do you/your family use safety helmets? Yes    Do you/your family use sunscreen? Yes    Do you/your family keep firearms in the home? No    Do you/your family have a smoke detector(s)? Yes        Do you feel safe in your home? Yes    Has anyone ever touched you in an unwanted manner? No     Explain Kiesha Fletcher CMA 18        Reviewed ana conde 10-12-20               PHQ-9 SCORE 2016 2019 10/12/2020  "  PHQ-9 Total Score 0 0 0     NORBERT-7 SCORE 2019 10/12/2020   Total Score 0 0       EXAM:  Blood pressure 100/69, pulse 69, height 1.626 m (5' 4.02\"), weight 56.4 kg (124 lb 4.8 oz), last menstrual period 2020. Body mass index is 21.33 kg/m .  General - pleasant female in no acute distress.    Breast Exam:  Breast: Without visible skin changes. No dimpling or lesions seen.   Breasts supple, non-tender with palpation, no dominant mass, nodularity, or nipple discharge noted bilaterally. Axillary nodes negative.      Pelvic Exam:  EG/BUS: Normal genital architecture without lesions, erythema or abnormal secretions Bartholin's, Urethra, Odum's normal   Urethral meatus: normal   Urethra: no masses, tenderness, or scarring   Bladder: no masses or tenderness  Vagina: moist, pink, rugae with odorless and clear  secretions  Cervix: Multiparous, and no lesions  Uterus: anteverted,   Adnexa: Within normal limits and No masses, nodularity, tenderness  Rectum:anus normal     SUBJECTIVE:    Is a pregnancy test required: No. Not sexually active. Has Mirena IUD.  Was a consent obtained?  Yes    Subjective: Kiesha Mcguire is a 49 year old  presents for IUD and desires Mirena type IUD.  She requests removal of the IUD because the IUD effectiveness has     Patient has been given the opportunity to ask questions about all forms of birth control, including all options appropriate for Kiesha Mcguire. Discussed that no method of birth control, except abstinence is 100% effective against pregnancy or sexually transmitted infection.     Kiesha Mcguire understands she may have the IUD removed at any time. IUD should be removed by a health care provider and the current IUD will be removed today.    The entire removal and insertion procedure was reviewed with the patient, including care after placement.    PROCEDURE:    Premedicated with ibuprofen.  A speculum exam was performed and the cervix was " visualized. The IUD string was visualized. Using ring forceps, the string  was grasped and the IUD removed intact. Pt experienced brief cramping with removal of IUD.     Under sterile technique, cervix was visualized with speculum and prepped with Betadine solution swab x 3. Tenaculum was placed for stability. The uterus was gently straightened and sounded to 7.0 cm. IUD prepared for placement, and IUD inserted according to 's instructions without difficulty or significant ressitance, and deployed at the fundus. The strings were visualized and trimmed to 3.0 cm from the external os. Tenaculum was removed and hemostasis noted. Speculum removed.  Patient tolerated procedure well.    Lot # [id number]  Exp: Nov 2022    EBL: minimal    Complications: none      POST PROCEDURE:    Given 's handouts, including when to have IUD removed, list of danger s/sx, side effects and follow up recommended. Encouraged condom use for prevention of STD. Advised to call for any fever, for prolonged or severe pain or bleeding, abnormal vaginal dischage, or unable to palpate strings. She was advised to use pain medications (ibuprofen) as needed for mild to moderate pain. Advised to follow-up in clinic in 4-6 weeks for IUD string check if unable to find strings or as directed by provider.       ASSESSMENT:  Encounter Diagnoses   Name Primary?     Screening for malignant neoplasm of cervix      Encounter for gynecological examination without abnormal finding      Encounter for removal and reinsertion of intrauterine contraceptive device Yes     Female stress incontinence      Vaginal atrophy         PLAN:   Orders Placed This Encounter   Procedures     Pelvic and Breast Exam Procedure []     INSERTION INTRAUTERINE DEVICE     REMOVE INTRAUTERINE DEVICE     Pelvic and Breast Exam Procedure []     Pap Smear Exam [] Do Not Remove     Pap imaged thin layer screen with HPV - recommended age 30 - 65 years (select  HPV order below)     HPV High Risk Types DNA Cervical     PHYSICAL THERAPY REFERRAL     Orders Placed This Encounter   Medications     NONFORMULARY     Si tablets daily seabuckthorn     levonorgestrel (MIRENA) 20 MCG/24HR IUD 20 mcg     levonorgestrel (MIRENA) 20 MCG/24HR IUD     Si each (20 mcg) by Intrauterine route once     Dispense:        estradiol (ESTRING) 2 MG vaginal ring     Sig: Place 1 each vaginally every 3 months     Dispense:  1 each     Refill:  3     - Mirena IUD removed and  inserted  - Pap with co testing ordered  - Estring prescribed  - Pelvic floor therapy referral completed. PTOSI    Additional teaching done at this visit regarding self breast exam and perimenopause.    Return to clinic in one year.  Follow-up as needed.    I, Cliff Pepper, am serving as a scribe; to document services personally performed by  Nell Dos Santos based on data collection and the provider's statements to me.     Cliff Pepper, BSN, DNP Student  I agree with the PFSH and ROS as completed by the student, except for changes made by me. The remainder of the encounter was performed by me and scribed by the student. The scribed note accurately reflects my personal services and decisions made by me.  Nell Dos Santos, DNP, CNM, APRN

## 2020-10-13 ASSESSMENT — ANXIETY QUESTIONNAIRES: GAD7 TOTAL SCORE: 0

## 2020-10-15 LAB
COPATH REPORT: NORMAL
PAP: NORMAL

## 2020-10-18 LAB
FINAL DIAGNOSIS: NORMAL
HPV HR 12 DNA CVX QL NAA+PROBE: NEGATIVE
HPV16 DNA SPEC QL NAA+PROBE: NEGATIVE
HPV18 DNA SPEC QL NAA+PROBE: NEGATIVE
SPECIMEN DESCRIPTION: NORMAL
SPECIMEN SOURCE CVX/VAG CYTO: NORMAL

## 2020-11-22 ENCOUNTER — HEALTH MAINTENANCE LETTER (OUTPATIENT)
Age: 50
End: 2020-11-22

## 2020-12-21 ENCOUNTER — MYC REFILL (OUTPATIENT)
Dept: FAMILY MEDICINE | Facility: CLINIC | Age: 50
End: 2020-12-21

## 2020-12-21 DIAGNOSIS — B00.9 HERPES SIMPLEX VIRUS (HSV) INFECTION: ICD-10-CM

## 2020-12-21 NOTE — TELEPHONE ENCOUNTER
Routing refill request to provider for review/approval because:  Patient needs to be seen because it has been more than 1 year since last office visit.    Creatinine   Date Value Ref Range Status   12/09/2016 0.59 0.52 - 1.04 mg/dL Final     Marly Hussein, RN, BSN, PHN  [facility]: [place]

## 2020-12-22 RX ORDER — VALACYCLOVIR HYDROCHLORIDE 1 G/1
2000 TABLET, FILM COATED ORAL 2 TIMES DAILY
Qty: 12 TABLET | Refills: 3 | Status: SHIPPED | OUTPATIENT
Start: 2020-12-22 | End: 2021-08-05

## 2021-02-18 ENCOUNTER — ANCILLARY PROCEDURE (OUTPATIENT)
Dept: MAMMOGRAPHY | Facility: CLINIC | Age: 51
End: 2021-02-18
Attending: PHYSICIAN ASSISTANT
Payer: COMMERCIAL

## 2021-02-18 DIAGNOSIS — Z12.31 VISIT FOR SCREENING MAMMOGRAM: ICD-10-CM

## 2021-02-18 PROCEDURE — 77063 BREAST TOMOSYNTHESIS BI: CPT | Performed by: RADIOLOGY

## 2021-02-18 PROCEDURE — 77067 SCR MAMMO BI INCL CAD: CPT | Performed by: RADIOLOGY

## 2021-08-05 ENCOUNTER — OFFICE VISIT (OUTPATIENT)
Dept: FAMILY MEDICINE | Facility: CLINIC | Age: 51
End: 2021-08-05
Payer: COMMERCIAL

## 2021-08-05 ENCOUNTER — TELEPHONE (OUTPATIENT)
Dept: OBGYN | Facility: CLINIC | Age: 51
End: 2021-08-05

## 2021-08-05 VITALS
OXYGEN SATURATION: 100 % | BODY MASS INDEX: 21.61 KG/M2 | HEART RATE: 65 BPM | SYSTOLIC BLOOD PRESSURE: 102 MMHG | WEIGHT: 126.6 LBS | HEIGHT: 64 IN | TEMPERATURE: 98.3 F | DIASTOLIC BLOOD PRESSURE: 72 MMHG

## 2021-08-05 DIAGNOSIS — Z13.220 SCREENING FOR HYPERLIPIDEMIA: ICD-10-CM

## 2021-08-05 DIAGNOSIS — Z12.11 SCREEN FOR COLON CANCER: ICD-10-CM

## 2021-08-05 DIAGNOSIS — L65.9 HAIR LOSS: Primary | ICD-10-CM

## 2021-08-05 DIAGNOSIS — Z11.59 NEED FOR HEPATITIS C SCREENING TEST: ICD-10-CM

## 2021-08-05 DIAGNOSIS — B00.9 HERPES SIMPLEX VIRUS (HSV) INFECTION: ICD-10-CM

## 2021-08-05 DIAGNOSIS — R41.840 ATTENTION DEFICIT: ICD-10-CM

## 2021-08-05 LAB
HOLD SPECIMEN: NORMAL
T3FREE SERPL-MCNC: 3.2 PG/ML (ref 2.3–4.2)
VIT B12 SERPL-MCNC: 414 PG/ML (ref 193–986)

## 2021-08-05 PROCEDURE — 80048 BASIC METABOLIC PNL TOTAL CA: CPT | Performed by: PHYSICIAN ASSISTANT

## 2021-08-05 PROCEDURE — 36415 COLL VENOUS BLD VENIPUNCTURE: CPT | Performed by: PHYSICIAN ASSISTANT

## 2021-08-05 PROCEDURE — 86803 HEPATITIS C AB TEST: CPT | Performed by: PHYSICIAN ASSISTANT

## 2021-08-05 PROCEDURE — 82607 VITAMIN B-12: CPT | Performed by: PHYSICIAN ASSISTANT

## 2021-08-05 PROCEDURE — 84443 ASSAY THYROID STIM HORMONE: CPT | Performed by: PHYSICIAN ASSISTANT

## 2021-08-05 PROCEDURE — 80061 LIPID PANEL: CPT | Performed by: PHYSICIAN ASSISTANT

## 2021-08-05 PROCEDURE — 84439 ASSAY OF FREE THYROXINE: CPT | Performed by: PHYSICIAN ASSISTANT

## 2021-08-05 PROCEDURE — 84481 FREE ASSAY (FT-3): CPT | Performed by: PHYSICIAN ASSISTANT

## 2021-08-05 PROCEDURE — 99214 OFFICE O/P EST MOD 30 MIN: CPT | Performed by: PHYSICIAN ASSISTANT

## 2021-08-05 PROCEDURE — 82728 ASSAY OF FERRITIN: CPT | Performed by: PHYSICIAN ASSISTANT

## 2021-08-05 RX ORDER — VALACYCLOVIR HYDROCHLORIDE 500 MG/1
500 TABLET, FILM COATED ORAL DAILY
Qty: 90 TABLET | Refills: 1 | Status: SHIPPED | OUTPATIENT
Start: 2021-08-05 | End: 2022-11-14

## 2021-08-05 RX ORDER — VALACYCLOVIR HYDROCHLORIDE 1 G/1
2000 TABLET, FILM COATED ORAL 2 TIMES DAILY
Qty: 12 TABLET | Refills: 11 | Status: SHIPPED | OUTPATIENT
Start: 2021-08-05

## 2021-08-05 ASSESSMENT — MIFFLIN-ST. JEOR: SCORE: 1174.57

## 2021-08-05 NOTE — TELEPHONE ENCOUNTER
Prior Authorization Retail Medication Request    Medication/Dose: Estring  ICD code (if different than what is on RX):    Previously Tried and Failed:    Rationale:    Insurance Name:  [facility]  Insurance ID:  [id number]      Pharmacy Information (if different than what is on RX)  Name:  [facility]  Phone:  [phone]

## 2021-08-05 NOTE — PATIENT INSTRUCTIONS
1. Could do Rhodiola for anxiety? Try 200 to 300 mg starting 1 day prior to kids leaving and take for the 2-3 days they are gone. (Claudia Brand)

## 2021-08-05 NOTE — PROGRESS NOTES
Assessment & Plan     Hair loss  This looks suspicious for alopecia areata.  Recommend she see dermatologist.  I will check labs.  Could consider topical steroids but will defer that to dermatology.  - Ferritin; Future  - Vitamin B12; Future  - TSH; Future  - T4, free; Future  - T3, Free; Future  - Adult Dermatology Referral; Future  - T3, Free  - T4, free  - TSH  - Vitamin B12  - Ferritin    Screen for colon cancer  She requested referral  - Adult Gastro Ref - Procedure Only; Future    Attention deficit  Recommend a formal evaluation.  - MENTAL HEALTH REFERRAL  - Adult; Assessments and Testing; ADHD; [facility] - Located within [facility] [phone]; We will contact you to schedule the appointment or please call with any questions; Future    Need for hepatitis C screening test  Biometric screening  - Hepatitis C Screen Reflex to HCV RNA Quant and Genotype; Future  - Hepatitis C Screen Reflex to HCV RNA Quant and Genotype    Screening for hyperlipidemia  Biometric screening  - Lipid panel reflex to direct LDL Fasting; Future  - Basic metabolic panel  (Ca, Cl, CO2, Creat, Gluc, K, Na, BUN); Future  - Basic metabolic panel  (Ca, Cl, CO2, Creat, Gluc, K, Na, BUN)  - Lipid panel reflex to direct LDL Fasting    Herpes simplex virus (HSV) infection  Refills given.  - valACYclovir (VALTREX) 1000 mg tablet; Take 2 tablets (2,000 mg) by mouth 2 times daily For 2 days as needed for cold sores  - valACYclovir (VALTREX) 500 MG tablet; Take 1 tablet (500 mg) by mouth daily       Return in about 2 weeks (around 8/19/2021) for Update me Via My Chart - No Charge.    KEVIN COLIN [facility]    Francine Pop is a 50 year old who presents for the following health issues     HPI     Concern - Hair loss  Onset: 7 months  Description: patient has noticed hair loss and bald spots, has been trying supplements and different shampoos which aren't helping. Patient would like to check thyroid.  She seems  to have 2 focal areas of hair loss on sides of her head.  She denies a known trigger.  She has not been ill recently.  The areas are not itchy or otherwise irritated.  Intensity: moderate  Progression of Symptoms:  worsening  Accompanying Signs & Symptoms: hair loss, noticed anxiety, fatigue  Previous history of similar problem: father  of neuroendocrine cancer  Precipitating factors:        Worsened by: none  Alleviating factors:        Improved by: none  Therapies tried and outcome: tried different hair shampoos and supplements for months, nothing is helping.     Patient would like to get labs for biometric screening form and patient is fasting today.     She needs refills of her Valtrex.    She had problems with task completion and initiation and she wonders if she could have ADHD and wonders if she can get an evaluation.  Patient Active Problem List   Diagnosis     Pure hypercholesterolemia     Herpes simplex virus (HSV) infection     iamTIBIALIS TENDINITIS     iamSPRAIN HIP & THIGH NOS     HYPERLIPIDEMIA LDL GOAL <160     IUD (intrauterine device) in place      Current Outpatient Medications   Medication     Biotin 10 MG TABS tablet     CALCIUM-VITAMIN D-VITAMIN K PO     Cyanocobalamin (VITAMIN B 12 PO)     estradiol (ESTRING) 2 MG vaginal ring     levonorgestrel (MIRENA) 20 MCG/24HR IUD     levonorgestrel (MIRENA) 20 MCG/24HR IUD     Lysine 500 MG TABS     magnesium citrate solution     NONFORMULARY     valACYclovir (VALTREX) 1000 mg tablet     valACYclovir (VALTREX) 500 MG tablet     Collagen Hydrolysate POWD     No current facility-administered medications for this visit.        Review of Systems   Constitutional, HEENT, cardiovascular, pulmonary, GI, , musculoskeletal, neuro, skin, endocrine and psych systems are negative, except as otherwise noted.      Objective    /72 (BP Location: Right arm, Patient Position: Chair, Cuff Size: Adult Regular)   Pulse 65   Temp 98.3  F (36.8  C) (Oral)   " Ht 1.626 m (5' 4.02\")   Wt 57.4 kg (126 lb 9.6 oz)   LMP  (LMP Unknown)   SpO2 100%   Breastfeeding No   BMI 21.72 kg/m    Body mass index is 21.72 kg/m .  Physical Exam   GENERAL: healthy, alert and no distress  SKIN: She has 2 patches on the sides of her scalp that have no hair.  This is smooth without any pin hairs or other findings.  The one on the right is about 3 cm x 4 cm and the one on the left is about 2 x 3 cm.        "

## 2021-08-06 LAB
ANION GAP SERPL CALCULATED.3IONS-SCNC: 6 MMOL/L (ref 3–14)
BUN SERPL-MCNC: 15 MG/DL (ref 7–30)
CALCIUM SERPL-MCNC: 9.2 MG/DL (ref 8.5–10.1)
CHLORIDE BLD-SCNC: 97 MMOL/L (ref 94–109)
CHOLEST SERPL-MCNC: 231 MG/DL
CO2 SERPL-SCNC: 27 MMOL/L (ref 20–32)
CREAT SERPL-MCNC: 0.66 MG/DL (ref 0.52–1.04)
FASTING STATUS PATIENT QL REPORTED: YES
FERRITIN SERPL-MCNC: 168 NG/ML (ref 8–252)
GFR SERPL CREATININE-BSD FRML MDRD: >90 ML/MIN/1.73M2
GLUCOSE BLD-MCNC: 78 MG/DL (ref 70–99)
HCV AB SERPL QL IA: NONREACTIVE
HDLC SERPL-MCNC: 79 MG/DL
LDLC SERPL CALC-MCNC: 142 MG/DL
NONHDLC SERPL-MCNC: 152 MG/DL
POTASSIUM BLD-SCNC: 3.7 MMOL/L (ref 3.4–5.3)
SODIUM SERPL-SCNC: 130 MMOL/L (ref 133–144)
T4 FREE SERPL-MCNC: 1.05 NG/DL (ref 0.76–1.46)
TRIGL SERPL-MCNC: 52 MG/DL
TSH SERPL DL<=0.005 MIU/L-ACNC: 2.2 MU/L (ref 0.4–4)

## 2021-08-06 NOTE — TELEPHONE ENCOUNTER
Central Prior Authorization Team   Phone: [phone]      [place] Initiation    Medication: estradiol (ESTRING) 2 MG vaginal ring  Insurance Company: [employer] - Phone [phone] Fax [phone]  Pharmacy Filling the Rx: [facility] - [place], [place] - [address] [place] [address].  Filling Pharmacy Phone: [phone]  Filling Pharmacy Fax:    Start Date: 8/6/2021

## 2021-08-09 NOTE — TELEPHONE ENCOUNTER
Prior Authorization Approval    Authorization Effective Date: 8/9/2021  Authorization Expiration Date: 8/9/2022  Medication: estradiol (ESTRING) 2 MG vaginal ring  Approved Dose/Quantity: 1  Reference #:     Insurance Company: [employer] - Phone [phone] Fax [phone]  Expected CoPay:        Which Pharmacy is filling the prescription (Not needed for infusion/clinic administered): [facility] [place] - [place], [place] - [address] [place] [address].  Pharmacy Notified: Yes - spoke to Elly  Patient Notified: No

## 2021-08-10 ENCOUNTER — TELEPHONE (OUTPATIENT)
Dept: FAMILY MEDICINE | Facility: CLINIC | Age: 51
End: 2021-08-10

## 2021-08-10 NOTE — TELEPHONE ENCOUNTER
Biometric form complete and faxed to [phone]  And stat faxed as well.     Ashley Waldrop,   [facility]

## 2021-08-11 ENCOUNTER — MYC MEDICAL ADVICE (OUTPATIENT)
Dept: FAMILY MEDICINE | Facility: CLINIC | Age: 51
End: 2021-08-11

## 2021-08-11 NOTE — TELEPHONE ENCOUNTER
There are two encounters created for this-- patient sent message 8/11 saying there was a date missing on form faxed on 8/10-- I fixed this and resent.     Ashley Waldrop,   [facility]

## 2021-09-17 ENCOUNTER — TELEPHONE (OUTPATIENT)
Dept: FAMILY MEDICINE | Facility: CLINIC | Age: 51
End: 2021-09-17

## 2021-09-17 NOTE — TELEPHONE ENCOUNTER
Index finger of right hand spontaneously turns purple, she sometimes hears a pop and swells and turns purple.  This does not happen gradually, it is instantly.  This occurs once or twice per year.  She has never had this evaluated by a doctor and repots it normally goes away on it's own- takes a couple of days.      Patient reports this started yesterday but now today says that it is spreading to her palm   During the time it is purple- it becomes uncomfortable because of the swelling - significant to where she cannot bend her finger.   Patient states same temperature as other fingers, no loss sensation, no numbness or tingling.    She has never had this evaluated by a Provider even though is seems chronic.  Advised that she either be seen by UC or walk in ortho to evaluate what is happening to her finger.      Aleyda Robbins RN

## 2021-09-19 ENCOUNTER — HEALTH MAINTENANCE LETTER (OUTPATIENT)
Age: 51
End: 2021-09-19

## 2021-09-22 ENCOUNTER — OFFICE VISIT (OUTPATIENT)
Dept: FAMILY MEDICINE | Facility: CLINIC | Age: 51
End: 2021-09-22
Payer: COMMERCIAL

## 2021-09-22 VITALS
HEART RATE: 60 BPM | WEIGHT: 127 LBS | RESPIRATION RATE: 16 BRPM | SYSTOLIC BLOOD PRESSURE: 120 MMHG | TEMPERATURE: 97.6 F | BODY MASS INDEX: 21.79 KG/M2 | OXYGEN SATURATION: 100 % | DIASTOLIC BLOOD PRESSURE: 69 MMHG

## 2021-09-22 DIAGNOSIS — M79.89 SWELLING OF FINGER, RIGHT: Primary | ICD-10-CM

## 2021-09-22 PROCEDURE — 99213 OFFICE O/P EST LOW 20 MIN: CPT | Performed by: PHYSICIAN ASSISTANT

## 2021-09-22 NOTE — PATIENT INSTRUCTIONS
Spontaneous swelling and bruising discoloration without pain.  Conservative measures discussed including rest and ice. Referred to ortho as this has happened a few times. Seek emergency care if you develop severe pain/swelling, inability to move extremity, skin paleness, or weakness.

## 2021-09-22 NOTE — PROGRESS NOTES
URGENT CARE VISIT:    SUBJECTIVE:   Chief Complaint   Patient presents with     Numbness     Index finger of right hand spontaneously turns purple, she sometimes hears a pop and swells and turns purple.  This does not happen gradually, it is instantly. started 9/16/21 spreading to her palm.  During the time it is purple- it becomes uncomfortable because of the swelling - significant to where she cannot bend her finger.  finger is getting more numb      Kiesha Mcguire is a 50 year old female who presents with a chief complaint of right finger second swelling, numbness, and purple discoloration.  Symptoms began 6 day(s)  ago, are moderate and sudden onset  No known injury however, she heard a pop then it swelled up and turned purple.  She denies pain. She treated it initially with no therapy. This is not the first time this type of injury has occurred to this patient. This usually happens once or twice a year but this episode is worse.    PMH:   Past Medical History:   Diagnosis Date     Depressive disorder      Depressive disorder, not elsewhere classified     resolved     Herpes simplex without mention of complication     oral     IUD (intrauterine device) in place 08/15/2013    Mirena     Migraine headache with aura     very occass, sig aura, speech loss x1     Pure hypercholesterolemia     follows w BIPIN CHATMAN     Allergies: Sumatriptan and Augmentin   Medications:   Current Outpatient Medications   Medication Sig Dispense Refill     Biotin 10 MG TABS tablet Take 10,000 mcg by mouth daily       CALCIUM-VITAMIN D-VITAMIN K PO        Cyanocobalamin (VITAMIN B 12 PO) Take by mouth daily       estradiol (ESTRING) 2 MG vaginal ring Place 1 each vaginally every 3 months 1 each 3     levonorgestrel (MIRENA) 20 MCG/24HR IUD 1 each (20 mcg) by Intrauterine route once       levonorgestrel (MIRENA) 20 MCG/24HR IUD 1 each by Intrauterine route once       Lysine 500 MG TABS Take 1,000 mg by mouth daily        magnesium  citrate solution Takes 1 tsp twice a day, 150 mg daily       NONFORMULARY 2 tablets daily seabuckthorn       valACYclovir (VALTREX) 1000 mg tablet Take 2 tablets (2,000 mg) by mouth 2 times daily For 2 days as needed for cold sores 12 tablet 11     valACYclovir (VALTREX) 500 MG tablet Take 1 tablet (500 mg) by mouth daily 90 tablet 1     Collagen Hydrolysate POWD  (Patient not taking: Reported on 2021)       Social History:   Social History     Tobacco Use     Smoking status: Former Smoker     Packs/day: 0.50     Years: 10.00     Pack years: 5.00     Types: Cigarettes     Quit date: 1994     Years since quittin.2     Smokeless tobacco: Never Used   Substance Use Topics     Alcohol use: Yes     Comment: very rare       ROS:  Review of systems negative except as stated above.    OBJECTIVE:  /69   Pulse 60   Temp 97.6  F (36.4  C) (Oral)   Resp 16   Wt 57.6 kg (127 lb)   SpO2 100%   BMI 21.79 kg/m    GENERAL APPEARANCE: healthy, alert and no distress  MUSCULOSKELETAL: No TTP over right index finger. FROM elicits no pain.  EXTREMITIES: peripheral pulses normal. Cap refill less than 2 sec  SKIN: skin is warm to touch. Purple discoloration and edema visible throughout 2nd right index finger.  NEURO: sensation intact.          ASSESSMENT:    ICD-10-CM    1. Swelling of finger, right  M79.89 Orthopedic  Referral       PLAN:  Patient Instructions   Spontaneous swelling and bruising discoloration without pain.  Unclear what caused it however, it doesn't appear to be emergent as exam was benign. DDx includes thrombosis, reynauds, or sprain. Unlikely to be reynauds as it is not related to temperatures. Continue to monitor. I Conservative measures discussed including rest and ice. Referred to ortho as this has happened a few times. Seek emergency care if you develop severe pain/swelling, inability to move extremity, skin paleness, or weakness.     Patient verbalized understanding and is  agreeable to plan. The patient was discharged ambulatory and in stable condition.    Gloria Corrigan PA-C on 9/22/2021 at 3:17 PM

## 2021-10-11 ENCOUNTER — TELEPHONE (OUTPATIENT)
Dept: GASTROENTEROLOGY | Facility: CLINIC | Age: 51
End: 2021-10-11

## 2021-10-11 DIAGNOSIS — Z11.59 ENCOUNTER FOR SCREENING FOR OTHER VIRAL DISEASES: ICD-10-CM

## 2021-10-11 NOTE — TELEPHONE ENCOUNTER
Screening Questions  1. Are you active on mychart? YES     2. What insurance is in the chart? [facility]     2.  Ordering/Referring Provider: Alban Lynch PA-C in [facility]    3. BMI 21.5    4. Do you have any pulmonary issues? Yes: NO    5. Have you had a heart, lung, or liver transplant? NO    6. Are you currently on dialysis or have chronic kidney disease? NO    7. Have you had a stroke or Transient ischemic atttack (TIA) within 6 months? NO    8. In the past 6 months, have you had any heart related issues including cardiomyopathy or heart attack? NO      If yes, did it require cardiac stenting or other implantable device?NO      9. Do you have any implantable devices in your body (pacemaker, defib, LVAD)? NO    10. Do you take nitroglycerin? If yes, how often? NO    11. Are you currently taking any blood thinners?NO    12. Are you a diabetic? NO    13. (Females) Are you currently pregnant? NO  If yes, how many weeks?      15. Are you taking any prescription pain medications on a routine schedule? NO If yes, MAC sedation.    16. Do you have any chemical dependencies such as alcohol, street drugs, or methadone? NOIf yes, MAC sedation.    17. Do you have any history of post-traumatic stress syndrome, severe anxiety or history of psychosis? NO    18. Do you transfer independently? YES    19.  Do you have any issues with constipation? NO    20. Preferred Pharmacy for Pre Prescription [facility]     Scheduling Details    Which Colonoscopy Prep was Sent?: MIRALAX  Procedure Scheduled: COLONOSCOPY   Provider/Surgeon: DR. VARELA  Date of Procedure: 10/27/2021  [place] [facility] [place] (Please ask for phone number if not scheduled by patient): JOHN       Sedation Type: CS  Conscious Sedation- Needs  for 6 hours after the procedure  MAC/General-Needs  for 24 hours after procedure    Pre-op Required at [facility] and OR for MAC sedation:   (if yes advise  patient they will need a pre-op prior to procedure)      Is patient on blood thinners? -NO (If yes- inform patient to follow up with PCP or provider for follow up instructions)     Informed patient they will need an adult  YES  Cannot take any type of public or medical transportation alone    Pre-Procedure Covid test to be completed at [facility] or Externally: SCHEDULED     Confirmed Nurse will call to complete assessment YES    Additional comments: NO

## 2021-10-13 ENCOUNTER — OFFICE VISIT (OUTPATIENT)
Dept: DERMATOLOGY | Facility: CLINIC | Age: 51
End: 2021-10-13
Attending: PHYSICIAN ASSISTANT
Payer: COMMERCIAL

## 2021-10-13 VITALS
DIASTOLIC BLOOD PRESSURE: 62 MMHG | WEIGHT: 127 LBS | HEART RATE: 69 BPM | SYSTOLIC BLOOD PRESSURE: 123 MMHG | OXYGEN SATURATION: 100 % | BODY MASS INDEX: 21.79 KG/M2

## 2021-10-13 DIAGNOSIS — L63.9 ALOPECIA AREATA: Primary | ICD-10-CM

## 2021-10-13 DIAGNOSIS — D48.5 NEOPLASM OF UNCERTAIN BEHAVIOR OF SKIN: ICD-10-CM

## 2021-10-13 DIAGNOSIS — L65.9 HAIR LOSS: ICD-10-CM

## 2021-10-13 PROCEDURE — 11102 TANGNTL BX SKIN SINGLE LES: CPT | Performed by: PHYSICIAN ASSISTANT

## 2021-10-13 PROCEDURE — 88305 TISSUE EXAM BY PATHOLOGIST: CPT | Performed by: DERMATOLOGY

## 2021-10-13 PROCEDURE — 99213 OFFICE O/P EST LOW 20 MIN: CPT | Mod: 25 | Performed by: PHYSICIAN ASSISTANT

## 2021-10-13 PROCEDURE — 11900 INJECT SKIN LESIONS </W 7: CPT | Mod: 51 | Performed by: PHYSICIAN ASSISTANT

## 2021-10-13 NOTE — PROGRESS NOTES
Kiesha Mcguire is an extremely pleasant 50 year old year old female patient here today for hair loss on scalp. Present for months. She notes no new patches. No rashes or itching. She also has tag like spot by back that gets irritated with clothing. Present for many years.   Patient has no other skin complaints today.  Remainder of the HPI, Meds, PMH, Allergies, FH, and SH was reviewed in chart.    Past Medical History:   Diagnosis Date     Depressive disorder      Depressive disorder, not elsewhere classified     resolved     Herpes simplex without mention of complication     oral     IUD (intrauterine device) in place 08/15/2013    Mirena     Migraine headache with aura     very occass, sig aura, speech loss x1     Pure hypercholesterolemia     follows w BIPIN CHATMAN       Past Surgical History:   Procedure Laterality Date     HC DILATION/CURETTAGE DIAG/THER NON OB  2006        Family History   Problem Relation Age of Onset     Hypertension Father      Cancer Father         neuro endrocine CA, neck     Aortic aneurysm Maternal Grandfather          of ascending aortic aneurysm at age 64     Aortic aneurysm Maternal Aunt          in her 40s from ascending aortic aneurysm     C.A.D. No family hx of      Cancer - colorectal No family hx of      Diabetes No family hx of      Cerebrovascular Disease No family hx of      Breast Cancer No family hx of      Prostate Cancer No family hx of        Social History     Socioeconomic History     Marital status:      Spouse name: Not on file     Number of children: 2     Years of education: 16     Highest education level: Not on file   Occupational History     Occupation:      Employer: [employer]     Comment: fulltime   Tobacco Use     Smoking status: Former Smoker     Packs/day: 0.50     Years: 10.00     Pack years: 5.00     Types: Cigarettes     Quit date: 1994     Years since quittin.2     Smokeless tobacco: Never Used   Substance and  Sexual Activity     Alcohol use: Yes     Comment: very rare     Drug use: No     Sexual activity: Not Currently     Partners: Male     Birth control/protection: I.U.D.     Comment: Mirena   Other Topics Concern     Parent/sibling w/ CABG, MI or angioplasty before 65F 55M? No   Social History Narrative    How much exercise per week? 2 90 minute yoga sessions      How much calcium per day? Diet       How much caffeine per day? 0    How much vitamin D per day? Supplement     Do you/your family wear seatbelts?  Yes    Do you/your family use safety helmets? Yes    Do you/your family use sunscreen? Yes    Do you/your family keep firearms in the home? No    Do you/your family have a smoke detector(s)? Yes        Do you feel safe in your home? Yes    Has anyone ever touched you in an unwanted manner? No     Explain K[facility]ha [facility]        [facility]             Social Determinants of Health     Financial Resource Strain:      Difficulty of Paying Living Expenses:    Food Insecurity:      Worried About Running Out of Food in the Last Year:      Ran Out of Food in the Last Year:    Transportation Needs:      Lack of Transportation (Medical):      Lack of Transportation (Non-Medical):    Physical Activity: Insufficiently Active     Days of Exercise per Week: 1 day     Minutes of Exercise per Session: 10 min   Stress: Stress Concern Present     Feeling of Stress : To some extent   Social Connections: Socially Isolated     Frequency of Communication with Friends and Family: Three times a week     Frequency of Social Gatherings with Friends and Family: Twice a week     Attends Mandaeism Services: Never     Active Member of Clubs or Organizations: No     Attends Club or Organization Meetings: Never     Marital Status:    Intimate Partner Violence: Unknown     Fear of Current or Ex-Partner: Not asked     Emotionally Abused: Not asked     Physically Abused: Not asked     Sexually Abused: Not  asked       Outpatient Encounter Medications as of 10/13/2021   Medication Sig Dispense Refill     Biotin 10 MG TABS tablet Take 10,000 mcg by mouth daily       CALCIUM-VITAMIN D-VITAMIN K PO        Collagen Hydrolysate POWD        Cyanocobalamin (VITAMIN B 12 PO) Take by mouth daily       estradiol (ESTRING) 2 MG vaginal ring Place 1 each vaginally every 3 months 1 each 3     levonorgestrel (MIRENA) 20 MCG/24HR IUD 1 each (20 mcg) by Intrauterine route once       levonorgestrel (MIRENA) 20 MCG/24HR IUD 1 each by Intrauterine route once       Lysine 500 MG TABS Take 1,000 mg by mouth daily        magnesium citrate solution Takes 1 tsp twice a day, 150 mg daily       NONFORMULARY 2 tablets daily seabuckthorn       valACYclovir (VALTREX) 1000 mg tablet Take 2 tablets (2,000 mg) by mouth 2 times daily For 2 days as needed for cold sores 12 tablet 11     valACYclovir (VALTREX) 500 MG tablet Take 1 tablet (500 mg) by mouth daily 90 tablet 1     No facility-administered encounter medications on file as of 10/13/2021.             O:   NAD, WDWN, Alert & Oriented, Mood & Affect wnl, Vitals stable   Here today alone   /62 (BP Location: Right arm, Patient Position: Sitting, Cuff Size: Adult Regular)   Pulse 69   Wt 57.6 kg (127 lb)   SpO2 100%   BMI 21.79 kg/m     General appearance normal   Vitals stable   Alert, oriented and in no acute distress     Patches of alopecia on right and left temporal   0.4 cm pinkish brown papule on left low back     Eyes: Conjunctivae/lids:Normal     ENT: Lips: normal    MSK:Normal    Pulm: Breathing Normal    Neuro/Psych: Orientation:Alert and Orientedx3 ; Mood/Affect:normal     A/P:  1. R/O intradermal nevus vs fibroma on left low back  TANGENTIAL BIOPSY SENT OUT:  After consent, anesthesia with LEC and prep, tangential excision performed and specimen sent out for permanent section histology.  No complications and routine wound care. Patient told to call our office in 1-2 weeks for  result.      2. Alopecia areata   IL TAC: PGACAC discussed.  Risks including but not limited to injection site reaction, bruising, no resolution.  All questions answered and entertained to patient s satisfaction.  Informed consent obtained.  IL TAC in concentration of 5 mg/ml was injected ID to scalp.  Total injected was  1ml.  Patient tolerated without complications and given wound care instructions, including not to move product around.  Return in 4 weeks for follow-up and possible additional IL TAC.    The following medication was given:     MEDICATION: Kenalog 10 mg  ROUTE: ID  SITE: scalp  DOSE: 0.5 mL  LOT #: [id number]  :  [employer]  EXPIRATION DATE:  1/2023  NDC#: 1895-5175-32     Recheck in 4 weeks.

## 2021-10-13 NOTE — LETTER
10/13/2021         RE: Kiesha Mcguire  [address] [address]        Dear Colleague,    Thank you for referring your patient, Kiesha Mcguire, to the [facility]. Please see a copy of my visit note below.    Kiesha Mcguire is an extremely pleasant 50 year old year old female patient here today for hair loss on scalp. Present for months. She notes no new patches. No rashes or itching. She also has tag like spot by back that gets irritated with clothing. Present for many years.   Patient has no other skin complaints today.  Remainder of the HPI, Meds, PMH, Allergies, FH, and SH was reviewed in chart.    Past Medical History:   Diagnosis Date     Depressive disorder      Depressive disorder, not elsewhere classified     resolved     Herpes simplex without mention of complication     oral     IUD (intrauterine device) in place 08/15/2013    Mirena     Migraine headache with aura     very occass, sig aura, speech loss x1     Pure hypercholesterolemia     follows w BIPIN CHATMAN       Past Surgical History:   Procedure Laterality Date     HC DILATION/CURETTAGE DIAG/THER NON OB  2006        Family History   Problem Relation Age of Onset     Hypertension Father      Cancer Father         neuro endrocine CA, neck     Aortic aneurysm Maternal Grandfather          of ascending aortic aneurysm at age 64     Aortic aneurysm Maternal Aunt          in her 40s from ascending aortic aneurysm     C.A.D. No family hx of      Cancer - colorectal No family hx of      Diabetes No family hx of      Cerebrovascular Disease No family hx of      Breast Cancer No family hx of      Prostate Cancer No family hx of        Social History     Socioeconomic History     Marital status:      Spouse name: Not on file     Number of children: 2     Years of education: 16     Highest education level: Not on file   Occupational History     Occupation:       Employer: [employer]     Comment: fulltime   Tobacco Use     Smoking status: Former Smoker     Packs/day: 0.50     Years: 10.00     Pack years: 5.00     Types: Cigarettes     Quit date: 1994     Years since quittin.2     Smokeless tobacco: Never Used   Substance and Sexual Activity     Alcohol use: Yes     Comment: very rare     Drug use: No     Sexual activity: Not Currently     Partners: Male     Birth control/protection: I.U.D.     Comment: Mirena   Other Topics Concern     Parent/sibling w/ CABG, MI or angioplasty before 65F 55M? No   Social History Narrative    How much exercise per week? 2 90 minute yoga sessions      How much calcium per day? Diet       How much caffeine per day? 0    How much vitamin D per day? Supplement     Do you/your family wear seatbelts?  Yes    Do you/your family use safety helmets? Yes    Do you/your family use sunscreen? Yes    Do you/your family keep firearms in the home? No    Do you/your family have a smoke detector(s)? Yes        Do you feel safe in your home? Yes    Has anyone ever touched you in an unwanted manner? No     Explain Kiesha [facility]        Reviewed [facility] 10-12-20             Social Determinants of Health     Financial Resource Strain:      Difficulty of Paying Living Expenses:    Food Insecurity:      Worried About Running Out of Food in the Last Year:      Ran Out of Food in the Last Year:    Transportation Needs:      Lack of Transportation (Medical):      Lack of Transportation (Non-Medical):    Physical Activity: Insufficiently Active     Days of Exercise per Week: 1 day     Minutes of Exercise per Session: 10 min   Stress: Stress Concern Present     Feeling of Stress : To some extent   Social Connections: Socially Isolated     Frequency of Communication with Friends and Family: Three times a week     Frequency of Social Gatherings with Friends and Family: Twice a week     Attends Buddhist Services: Never     Active Member of Clubs or  Organizations: No     Attends Club or Organization Meetings: Never     Marital Status:    Intimate Partner Violence: Unknown     Fear of Current or Ex-Partner: Not asked     Emotionally Abused: Not asked     Physically Abused: Not asked     Sexually Abused: Not asked       Outpatient Encounter Medications as of 10/13/2021   Medication Sig Dispense Refill     Biotin 10 MG TABS tablet Take 10,000 mcg by mouth daily       CALCIUM-VITAMIN D-VITAMIN K PO        Collagen Hydrolysate POWD        Cyanocobalamin (VITAMIN B 12 PO) Take by mouth daily       estradiol (ESTRING) 2 MG vaginal ring Place 1 each vaginally every 3 months 1 each 3     levonorgestrel (MIRENA) 20 MCG/24HR IUD 1 each (20 mcg) by Intrauterine route once       levonorgestrel (MIRENA) 20 MCG/24HR IUD 1 each by Intrauterine route once       Lysine 500 MG TABS Take 1,000 mg by mouth daily        magnesium citrate solution Takes 1 tsp twice a day, 150 mg daily       NONFORMULARY 2 tablets daily seabuckthorn       valACYclovir (VALTREX) 1000 mg tablet Take 2 tablets (2,000 mg) by mouth 2 times daily For 2 days as needed for cold sores 12 tablet 11     valACYclovir (VALTREX) 500 MG tablet Take 1 tablet (500 mg) by mouth daily 90 tablet 1     No facility-administered encounter medications on file as of 10/13/2021.             O:   NAD, WDWN, Alert & Oriented, Mood & Affect wnl, Vitals stable   Here today alone   /62 (BP Location: Right arm, Patient Position: Sitting, Cuff Size: Adult Regular)   Pulse 69   Wt 57.6 kg (127 lb)   SpO2 100%   BMI 21.79 kg/m     General appearance normal   Vitals stable   Alert, oriented and in no acute distress     Patches of alopecia on right and left temporal   0.4 cm pinkish brown papule on left low back     Eyes: Conjunctivae/lids:Normal     ENT: Lips: normal    MSK:Normal    Pulm: Breathing Normal    Neuro/Psych: Orientation:Alert and Orientedx3 ; Mood/Affect:normal     A/P:  1. R/O intradermal nevus vs  fibroma on left low back  TANGENTIAL BIOPSY SENT OUT:  After consent, anesthesia with LEC and prep, tangential excision performed and specimen sent out for permanent section histology.  No complications and routine wound care. Patient told to call our office in 1-2 weeks for result.      2. Alopecia areata   IL TAC: PGACAC discussed.  Risks including but not limited to injection site reaction, bruising, no resolution.  All questions answered and entertained to patient s satisfaction.  Informed consent obtained.  IL TAC in concentration of 5 mg/ml was injected ID to scalp.  Total injected was  1ml.  Patient tolerated without complications and given wound care instructions, including not to move product around.  Return in 4 weeks for follow-up and possible additional IL TAC.    The following medication was given:     MEDICATION: Kenalog 10 mg  ROUTE: ID  SITE: scalp  DOSE: 0.5 mL  LOT #: [id number]  :  [employer]  EXPIRATION DATE:  1/2023  NDC#: 9251-2675-89     Recheck in 4 weeks.       Again, thank you for allowing me to participate in the care of your patient.        Sincerely,        Rosemary Henao PA-C

## 2021-10-13 NOTE — NURSING NOTE
"Chief Complaint   Patient presents with     Hair/Scalp Problem     hair loss, started months ago. has tried OTC        Vitals:    10/13/21 1009   BP: 123/62   BP Location: Right arm   Patient Position: Sitting   Cuff Size: Adult Regular   Pulse: 69   SpO2: 100%   Weight: 57.6 kg (127 lb)     Wt Readings from Last 1 Encounters:   10/13/21 57.6 kg (127 lb)     Ht Readings from Last 1 Encounters:   08/05/21 1.626 m (5' 4.02\")       Francie Christianson [facility], 10/13/2021 10:10 AM    "

## 2021-10-14 ENCOUNTER — TELEPHONE (OUTPATIENT)
Dept: GASTROENTEROLOGY | Facility: CLINIC | Age: 51
End: 2021-10-14

## 2021-10-14 NOTE — TELEPHONE ENCOUNTER
Covid test scheduled: 10-25 [facility] lab    Arrival time: 1000    Facility location: [facility]    Sedation type: MAC    Bowel prep recommendation: Misha Perrin RN    Instructions,  policy, MAC sedation plan reviewed. Instructed patient to have someone stay with them for 24 hours post exam

## 2021-10-18 LAB
PATH REPORT.COMMENTS IMP SPEC: NORMAL
PATH REPORT.COMMENTS IMP SPEC: NORMAL
PATH REPORT.FINAL DX SPEC: NORMAL
PATH REPORT.GROSS SPEC: NORMAL
PATH REPORT.MICROSCOPIC SPEC OTHER STN: NORMAL
PATH REPORT.RELEVANT HX SPEC: NORMAL

## 2021-10-25 ENCOUNTER — LAB (OUTPATIENT)
Dept: LAB | Facility: CLINIC | Age: 51
End: 2021-10-25
Payer: COMMERCIAL

## 2021-10-25 DIAGNOSIS — Z11.59 ENCOUNTER FOR SCREENING FOR OTHER VIRAL DISEASES: ICD-10-CM

## 2021-10-25 PROCEDURE — U0005 INFEC AGEN DETEC AMPLI PROBE: HCPCS

## 2021-10-25 PROCEDURE — U0003 INFECTIOUS AGENT DETECTION BY NUCLEIC ACID (DNA OR RNA); SEVERE ACUTE RESPIRATORY SYNDROME CORONAVIRUS 2 (SARS-COV-2) (CORONAVIRUS DISEASE [COVID-19]), AMPLIFIED PROBE TECHNIQUE, MAKING USE OF HIGH THROUGHPUT TECHNOLOGIES AS DESCRIBED BY CMS-2020-01-R: HCPCS

## 2021-10-26 LAB — SARS-COV-2 RNA RESP QL NAA+PROBE: NEGATIVE

## 2021-10-27 ENCOUNTER — HOSPITAL ENCOUNTER (OUTPATIENT)
Facility: AMBULATORY SURGERY CENTER | Age: 51
End: 2021-10-27
Attending: INTERNAL MEDICINE
Payer: COMMERCIAL

## 2021-10-27 ENCOUNTER — ANESTHESIA EVENT (OUTPATIENT)
Dept: SURGERY | Facility: AMBULATORY SURGERY CENTER | Age: 51
End: 2021-10-27
Payer: COMMERCIAL

## 2021-10-27 ENCOUNTER — ANESTHESIA (OUTPATIENT)
Dept: SURGERY | Facility: AMBULATORY SURGERY CENTER | Age: 51
End: 2021-10-27
Payer: COMMERCIAL

## 2021-10-27 VITALS
HEART RATE: 50 BPM | HEIGHT: 64 IN | SYSTOLIC BLOOD PRESSURE: 93 MMHG | WEIGHT: 125 LBS | DIASTOLIC BLOOD PRESSURE: 63 MMHG | TEMPERATURE: 96.8 F | RESPIRATION RATE: 16 BRPM | OXYGEN SATURATION: 100 % | BODY MASS INDEX: 21.34 KG/M2

## 2021-10-27 VITALS — HEART RATE: 58 BPM

## 2021-10-27 LAB
COLONOSCOPY: NORMAL
HCG UR QL: NEGATIVE
INTERNAL QC OK POCT: NORMAL

## 2021-10-27 PROCEDURE — 88305 TISSUE EXAM BY PATHOLOGIST: CPT | Mod: TC | Performed by: INTERNAL MEDICINE

## 2021-10-27 PROCEDURE — 81025 URINE PREGNANCY TEST: CPT | Performed by: PATHOLOGY

## 2021-10-27 PROCEDURE — 45380 COLONOSCOPY AND BIOPSY: CPT | Mod: 33,59

## 2021-10-27 PROCEDURE — 45385 COLONOSCOPY W/LESION REMOVAL: CPT | Mod: 33

## 2021-10-27 RX ORDER — PROPOFOL 10 MG/ML
INJECTION, EMULSION INTRAVENOUS PRN
Status: DISCONTINUED | OUTPATIENT
Start: 2021-10-27 | End: 2021-10-27

## 2021-10-27 RX ORDER — ONDANSETRON 2 MG/ML
4 INJECTION INTRAMUSCULAR; INTRAVENOUS
Status: DISCONTINUED | OUTPATIENT
Start: 2021-10-27 | End: 2021-10-28 | Stop reason: HOSPADM

## 2021-10-27 RX ORDER — PROPOFOL 10 MG/ML
INJECTION, EMULSION INTRAVENOUS CONTINUOUS PRN
Status: DISCONTINUED | OUTPATIENT
Start: 2021-10-27 | End: 2021-10-27

## 2021-10-27 RX ORDER — LIDOCAINE 40 MG/G
CREAM TOPICAL
Status: DISCONTINUED | OUTPATIENT
Start: 2021-10-27 | End: 2021-10-28 | Stop reason: HOSPADM

## 2021-10-27 RX ORDER — LIDOCAINE HYDROCHLORIDE 20 MG/ML
INJECTION, SOLUTION INFILTRATION; PERINEURAL PRN
Status: DISCONTINUED | OUTPATIENT
Start: 2021-10-27 | End: 2021-10-27

## 2021-10-27 RX ORDER — SODIUM CHLORIDE, SODIUM LACTATE, POTASSIUM CHLORIDE, CALCIUM CHLORIDE 600; 310; 30; 20 MG/100ML; MG/100ML; MG/100ML; MG/100ML
500 INJECTION, SOLUTION INTRAVENOUS CONTINUOUS
Status: DISCONTINUED | OUTPATIENT
Start: 2021-10-27 | End: 2021-10-28 | Stop reason: HOSPADM

## 2021-10-27 RX ORDER — SODIUM CHLORIDE, SODIUM LACTATE, POTASSIUM CHLORIDE, CALCIUM CHLORIDE 600; 310; 30; 20 MG/100ML; MG/100ML; MG/100ML; MG/100ML
INJECTION, SOLUTION INTRAVENOUS CONTINUOUS PRN
Status: DISCONTINUED | OUTPATIENT
Start: 2021-10-27 | End: 2021-10-27

## 2021-10-27 RX ADMIN — PROPOFOL 30 MG: 10 INJECTION, EMULSION INTRAVENOUS at 10:55

## 2021-10-27 RX ADMIN — LIDOCAINE HYDROCHLORIDE 60 MG: 20 INJECTION, SOLUTION INFILTRATION; PERINEURAL at 10:55

## 2021-10-27 RX ADMIN — PROPOFOL 150 MCG/KG/MIN: 10 INJECTION, EMULSION INTRAVENOUS at 10:55

## 2021-10-27 RX ADMIN — SODIUM CHLORIDE, SODIUM LACTATE, POTASSIUM CHLORIDE, CALCIUM CHLORIDE 500 ML: 600; 310; 30; 20 INJECTION, SOLUTION INTRAVENOUS at 10:04

## 2021-10-27 RX ADMIN — SODIUM CHLORIDE, SODIUM LACTATE, POTASSIUM CHLORIDE, CALCIUM CHLORIDE: 600; 310; 30; 20 INJECTION, SOLUTION INTRAVENOUS at 10:52

## 2021-10-27 ASSESSMENT — MIFFLIN-ST. JEOR: SCORE: 1167

## 2021-10-27 NOTE — ANESTHESIA CARE TRANSFER NOTE
Patient: Kiesha Mcguire    Procedure: Procedure(s):  COLONOSCOPY, WITH POLYPECTOMY AND CLIP       Diagnosis: Screen for colon cancer [Z12.11]  Diagnosis Additional Information: No value filed.    Anesthesia Type:   No value filed.     Note:      Level of Consciousness: awake  Oxygen Supplementation: room air    Independent Airway: airway patency satisfactory and stable        Patient transferred to: Phase II    Handoff Report: Identifed the Patient, Identified the Reponsible Provider, Reviewed the pertinent medical history, Discussed the surgical course, Reviewed Intra-OP anesthesia mangement and issues during anesthesia, Set expectations for post-procedure period and Allowed opportunity for questions and acknowledgement of understanding      Vitals:  Vitals Value Taken Time   BP 82/60 10/27/21 1135   Temp 36  C (96.8  F) 10/27/21 1135   Pulse 59 10/27/21 1135   Resp 16 10/27/21 1135   SpO2 100 % 10/27/21 1135       Electronically Signed By: JERAMY Lantigua CRNA  October 27, 2021  11:35 AM

## 2021-10-28 LAB
PATH REPORT.COMMENTS IMP SPEC: NORMAL
PATH REPORT.COMMENTS IMP SPEC: NORMAL
PATH REPORT.FINAL DX SPEC: NORMAL
PATH REPORT.GROSS SPEC: NORMAL
PATH REPORT.MICROSCOPIC SPEC OTHER STN: NORMAL
PATH REPORT.RELEVANT HX SPEC: NORMAL
PHOTO IMAGE: NORMAL

## 2021-10-28 PROCEDURE — 88305 TISSUE EXAM BY PATHOLOGIST: CPT | Mod: 26 | Performed by: PATHOLOGY

## 2021-10-28 ASSESSMENT — LIFESTYLE VARIABLES: TOBACCO_USE: 1

## 2021-10-28 ASSESSMENT — COPD QUESTIONNAIRES: COPD: 0

## 2021-10-28 NOTE — ANESTHESIA POSTPROCEDURE EVALUATION
Patient: Kiesha Mcguire    Procedure: Procedure(s):  COLONOSCOPY, WITH POLYPECTOMY AND CLIP       Diagnosis:Screen for colon cancer [Z12.11]  Diagnosis Additional Information: No value filed.    Anesthesia Type:  MAC    Note:  Disposition: Outpatient   Postop Pain Control: Uneventful            Sign Out: Well controlled pain   PONV: No   Neuro/Psych: Uneventful            Sign Out: Acceptable/Baseline neuro status   Airway/Respiratory: Uneventful            Sign Out: Acceptable/Baseline resp. status   CV/Hemodynamics: Uneventful            Sign Out: Acceptable CV status; No obvious hypovolemia; No obvious fluid overload   Other NRE: NONE   DID A NON-ROUTINE EVENT OCCUR? No           Last vitals:  Vitals Value Taken Time   BP 93/63 10/27/21 1150   Temp 36  C (96.8  F) 10/27/21 1135   Pulse 50 10/27/21 1150   Resp 16 10/27/21 1150   SpO2 100 % 10/27/21 1150       Electronically Signed By: Lucas Hood MD  October 28, 2021  12:32 PM

## 2021-10-28 NOTE — H&P
Kieshacathryn Mcguire  [id number]  female  50 year old      Reason for procedure/surgery: Screening    Patient Active Problem List   Diagnosis     Pure hypercholesterolemia     Herpes simplex virus (HSV) infection     iamTIBIALIS TENDINITIS     iamSPRAIN HIP & THIGH NOS     HYPERLIPIDEMIA LDL GOAL <160     IUD (intrauterine device) in place       Past Surgical History:    Past Surgical History:   Procedure Laterality Date     COLONOSCOPY N/A 10/27/2021    Procedure: COLONOSCOPY, WITH POLYPECTOMY AND CLIP;  Surgeon: Og Gutierres MD;  Location: [facility] OR      DILATION/CURETTAGE DIAG/THER NON OB  2006       Past Medical History:   Past Medical History:   Diagnosis Date     Depressive disorder      Depressive disorder, not elsewhere classified     resolved     Herpes simplex without mention of complication     oral     IUD (intrauterine device) in place 08/15/2013    Mirena     Migraine headache with aura     very occass, sig aura, speech loss x1     Pure hypercholesterolemia     follows w BIPIN CHATMAN       Social History:   Social History     Tobacco Use     Smoking status: Former Smoker     Packs/day: 0.50     Years: 10.00     Pack years: 5.00     Types: Cigarettes     Quit date: 1994     Years since quittin.3     Smokeless tobacco: Never Used   Substance Use Topics     Alcohol use: Yes     Comment: very rare       Family History:   Family History   Problem Relation Age of Onset     Hypertension Father      Cancer Father         neuro endrocine CA, neck     Aortic aneurysm Maternal Grandfather          of ascending aortic aneurysm at age 64     Aortic aneurysm Maternal Aunt          in her 40s from ascending aortic aneurysm     C.A.D. No family hx of      Cancer - colorectal No family hx of      Diabetes No family hx of      Cerebrovascular Disease No family hx of      Breast Cancer No family hx of      Prostate Cancer No family hx of        Allergies:   Allergies   Allergen Reactions      "Sumatriptan      imitrex     Augmentin Hives     Unsure if true reaction to med        Active Medications:   Current Outpatient Medications   Medication Sig Dispense Refill     Biotin 10 MG TABS tablet Take 10,000 mcg by mouth daily       CALCIUM-VITAMIN D-VITAMIN K PO        Cyanocobalamin (VITAMIN B 12 PO) Take by mouth daily       Lysine 500 MG TABS Take 1,000 mg by mouth daily        NONFORMULARY 2 tablets daily seabuckthorn       valACYclovir (VALTREX) 1000 mg tablet Take 2 tablets (2,000 mg) by mouth 2 times daily For 2 days as needed for cold sores 12 tablet 11     Collagen Hydrolysate POWD        estradiol (ESTRING) 2 MG vaginal ring Place 1 each vaginally every 3 months 1 each 3     levonorgestrel (MIRENA) 20 MCG/24HR IUD 1 each (20 mcg) by Intrauterine route once       levonorgestrel (MIRENA) 20 MCG/24HR IUD 1 each by Intrauterine route once       magnesium citrate solution Takes 1 tsp twice a day, 150 mg daily       valACYclovir (VALTREX) 500 MG tablet Take 1 tablet (500 mg) by mouth daily 90 tablet 1       Systemic Review:   CONSTITUTIONAL: NEGATIVE for fever, chills, change in weight  ENT/MOUTH: NEGATIVE for ear, mouth and throat problems  RESP: NEGATIVE for significant cough or SOB  CV: NEGATIVE for chest pain, palpitations or peripheral edema    Physical Examination:   Vital Signs: BP 93/63   Pulse 50   Temp 96.8  F (36  C) (Temporal)   Resp 16   Ht 1.626 m (5' 4\")   Wt 56.7 kg (125 lb)   SpO2 100%   BMI 21.46 kg/m    GENERAL: healthy, alert and no distress  NECK: no adenopathy, no asymmetry, masses, or scars  RESP: lungs clear to auscultation - no rales, rhonchi or wheezes  CV: regular rate and rhythm, normal S1 S2, no S3 or S4, no murmur, click or rub, no peripheral edema and peripheral pulses strong  ABDOMEN: soft, nontender, no hepatosplenomegaly, no masses and bowel sounds normal  MS: no gross musculoskeletal defects noted, no edema    Plan: Appropriate to proceed as scheduled.      Og " Pasquale Gutierres MD  10/28/2021    PCP:  Alban Lynch

## 2021-10-28 NOTE — ANESTHESIA PREPROCEDURE EVALUATION
Anesthesia Pre-Procedure Evaluation    Patient: Kiesha Mcguire   MRN: [id number] : 1970        Preoperative Diagnosis: Screen for colon cancer [Z12.11]    Procedure : Procedure(s):  COLONOSCOPY, WITH POLYPECTOMY AND CLIP          Past Medical History:   Diagnosis Date     Depressive disorder      Depressive disorder, not elsewhere classified     resolved     Herpes simplex without mention of complication     oral     IUD (intrauterine device) in place 08/15/2013    Mirena     Migraine headache with aura     very occass, sig aura, speech loss x1     Pure hypercholesterolemia     follows w BIPIN CHATMAN      Past Surgical History:   Procedure Laterality Date     COLONOSCOPY N/A 10/27/2021    Procedure: COLONOSCOPY, WITH POLYPECTOMY AND CLIP;  Surgeon: Og Gutierres MD;  Location: [facility] OR      DILATION/CURETTAGE DIAG/THER NON OB  2006      Allergies   Allergen Reactions     Sumatriptan      imitrex     Augmentin Hives     Unsure if true reaction to med       Social History     Tobacco Use     Smoking status: Former Smoker     Packs/day: 0.50     Years: 10.00     Pack years: 5.00     Types: Cigarettes     Quit date: 1994     Years since quittin.3     Smokeless tobacco: Never Used   Substance Use Topics     Alcohol use: Yes     Comment: very rare      Wt Readings from Last 1 Encounters:   10/27/21 56.7 kg (125 lb)        Anesthesia Evaluation   Pt has had prior anesthetic.     No history of anesthetic complications       ROS/MED HX  ENT/Pulmonary:     (+) tobacco use, Past use,  (-) asthma, COPD and sleep apnea   Neurologic:     (+) migraines,     Cardiovascular:     (+) Dyslipidemia ----- (-) hypertension, CAD, CHF and GONZALEZ   METS/Exercise Tolerance: >4 METS    Hematologic:       Musculoskeletal:       GI/Hepatic:    (-) GERD   Renal/Genitourinary:       Endo:       Psychiatric/Substance Use:     (+) psychiatric history depression     Infectious Disease:       Malignancy:       Other:             Physical Exam    Airway  airway exam normal           Respiratory Devices and Support         Dental  no notable dental history         Cardiovascular   cardiovascular exam normal          Pulmonary   pulmonary exam normal                OUTSIDE LABS:  CBC:   Lab Results   Component Value Date    WBC 4.6 11/30/2012    WBC 6.3 12/11/2009    HGB 12.0 11/11/2015    HGB 12.3 11/30/2012    HCT 37.4 11/30/2012    HCT 38.4 12/11/2009     11/30/2012     12/11/2009     BMP:   Lab Results   Component Value Date     (L) 08/05/2021     12/09/2016    POTASSIUM 3.7 08/05/2021    POTASSIUM 3.1 (L) 12/09/2016    CHLORIDE 97 08/05/2021    CHLORIDE 103 12/09/2016    CO2 27 08/05/2021    CO2 23 12/09/2016    BUN 15 08/05/2021    BUN 12 12/09/2016    CR 0.66 08/05/2021    CR 0.59 12/09/2016    GLC 78 08/05/2021    GLC 82 11/27/2019     COAGS: No results found for: PTT, INR, FIBR  POC:   Lab Results   Component Value Date    HCG Negative 10/27/2021     HEPATIC:   Lab Results   Component Value Date    ALBUMIN 4.0 12/09/2016    PROTTOTAL 6.9 12/09/2016    ALT 37 12/09/2016    AST 20 12/09/2016    ALKPHOS 46 12/09/2016    BILITOTAL 0.6 12/09/2016     OTHER:   Lab Results   Component Value Date    MICHAEL 9.2 08/05/2021    LIPASE 107 11/30/2012    AMYLASE 79 11/30/2012    TSH 2.20 08/05/2021    T4 1.05 08/05/2021    SED 8 11/11/2015       Anesthesia Plan    ASA Status:  1   NPO Status:  NPO Appropriate    Anesthesia Type: MAC.     - Reason for MAC: straight local not clinically adequate   Induction: N/a.   Maintenance: N/A.        Consents    Anesthesia Plan(s) and associated risks, benefits, and realistic alternatives discussed. Questions answered and patient/representative(s) expressed understanding.     - Discussed with:  Patient         Postoperative Care       PONV prophylaxis: Ondansetron (or other 5HT-3)     Comments:                Lucas Hood MD

## 2021-11-17 ENCOUNTER — OFFICE VISIT (OUTPATIENT)
Dept: DERMATOLOGY | Facility: CLINIC | Age: 51
End: 2021-11-17
Payer: COMMERCIAL

## 2021-11-17 VITALS — DIASTOLIC BLOOD PRESSURE: 67 MMHG | SYSTOLIC BLOOD PRESSURE: 91 MMHG | OXYGEN SATURATION: 100 % | HEART RATE: 63 BPM

## 2021-11-17 DIAGNOSIS — L65.9 ALOPECIA: ICD-10-CM

## 2021-11-17 PROCEDURE — 88305 TISSUE EXAM BY PATHOLOGIST: CPT | Performed by: DERMATOLOGY

## 2021-11-17 PROCEDURE — 99213 OFFICE O/P EST LOW 20 MIN: CPT | Mod: 25 | Performed by: PHYSICIAN ASSISTANT

## 2021-11-17 PROCEDURE — 11900 INJECT SKIN LESIONS </W 7: CPT | Mod: 51 | Performed by: PHYSICIAN ASSISTANT

## 2021-11-17 PROCEDURE — 11104 PUNCH BX SKIN SINGLE LESION: CPT | Performed by: PHYSICIAN ASSISTANT

## 2021-11-17 ASSESSMENT — PAIN SCALES - GENERAL: PAINLEVEL: NO PAIN (0)

## 2021-11-17 NOTE — LETTER
2021         RE: Kiesha Mcguire  [address] [facility] [address]        Dear Colleague,    Thank you for referring your patient, Kiesha Mcguire, to the [facility]. Please see a copy of my visit note below.    Kiesha Mcguire is an extremely pleasant 51 year old year old female patient here today for recheck alopecia. She notes no new growth seen. No more loss seen either. Patient has no other skin complaints today.  Remainder of the HPI, Meds, PMH, Allergies, FH, and SH was reviewed in chart.   Past Medical History:   Diagnosis Date     Depressive disorder      Depressive disorder, not elsewhere classified     resolved     Herpes simplex without mention of complication     oral     IUD (intrauterine device) in place 08/15/2013    Mirena     Migraine headache with aura     very occass, sig aura, speech loss x1     Pure hypercholesterolemia     follows w BIPIN CHATMAN       Past Surgical History:   Procedure Laterality Date     COLONOSCOPY N/A 10/27/2021    Procedure: COLONOSCOPY, WITH POLYPECTOMY AND CLIP;  Surgeon: Og Gutierres MD;  [place] [facility] [place]      DILATION/CURETTAGE DIAG/THER NON OB  2006        Family History   Problem Relation Age of Onset     Hypertension Father      Cancer Father         neuro endrocine CA, neck     Aortic aneurysm Maternal Grandfather          of ascending aortic aneurysm at age 64     Aortic aneurysm Maternal Aunt          in her 40s from ascending aortic aneurysm     C.A.D. No family hx of      Cancer - colorectal No family hx of      Diabetes No family hx of      Cerebrovascular Disease No family hx of      Breast Cancer No family hx of      Prostate Cancer No family hx of        Social History     Socioeconomic History     Marital status:      Spouse name: Not on file     Number of children: 2     Years of education: 16     Highest education level: Not on file   Occupational History     Occupation:       Employer: [employer]     Comment: fulltime   Tobacco Use     Smoking status: Former Smoker     Packs/day: 0.50     Years: 10.00     Pack years: 5.00     Types: Cigarettes     Quit date: 1994     Years since quittin.3     Smokeless tobacco: Never Used   Substance and Sexual Activity     Alcohol use: Yes     Comment: very rare     Drug use: No     Sexual activity: Not Currently     Partners: Male     Birth control/protection: I.U.D.     Comment: Mirena   Other Topics Concern     Parent/sibling w/ CABG, MI or angioplasty before 65F 55M? No   Social History Narrative    How much exercise per week? 2 90 minute yoga sessions      How much calcium per day? Diet       How much caffeine per day? 0    How much vitamin D per day? Supplement     Do you/your family wear seatbelts?  Yes    Do you/your family use safety helmets? Yes    Do you/your family use sunscreen? Yes    Do you/your family keep firearms in the home? No    Do you/your family have a smoke detector(s)? Yes        Do you feel safe in your home? Yes    Has anyone ever touched you in an unwanted manner? No     Explain Kiesha [facility] 18        Reviewed [facility] 10-12-20             Social Determinants of Health     Financial Resource Strain: Not on file   Food Insecurity: Not on file   Transportation Needs: Not on file   Physical Activity: Not on file   Stress: Not on file   Social Connections: Not on file   Intimate Partner Violence: Unknown     Fear of Current or Ex-Partner: Not asked     Emotionally Abused: Not asked     Physically Abused: Not asked     Sexually Abused: Not asked   Housing Stability: Not on file       Outpatient Encounter Medications as of 2021   Medication Sig Dispense Refill     Biotin 10 MG TABS tablet Take 10,000 mcg by mouth daily       CALCIUM-VITAMIN D-VITAMIN K PO        Collagen Hydrolysate POWD        Cyanocobalamin (VITAMIN B 12 PO) Take by mouth daily       estradiol (ESTRING) 2 MG  vaginal ring Place 1 each vaginally every 3 months 1 each 3     levonorgestrel (MIRENA) 20 MCG/24HR IUD 1 each (20 mcg) by Intrauterine route once       levonorgestrel (MIRENA) 20 MCG/24HR IUD 1 each by Intrauterine route once       Lysine 500 MG TABS Take 1,000 mg by mouth daily        magnesium citrate solution Takes 1 tsp twice a day, 150 mg daily       NONFORMULARY 2 tablets daily seabuckthorn       valACYclovir (VALTREX) 1000 mg tablet Take 2 tablets (2,000 mg) by mouth 2 times daily For 2 days as needed for cold sores 12 tablet 11     valACYclovir (VALTREX) 500 MG tablet Take 1 tablet (500 mg) by mouth daily 90 tablet 1     No facility-administered encounter medications on file as of 11/17/2021.             O:   NAD, WDWN, Alert & Oriented, Mood & Affect wnl, Vitals stable   Here today alone   BP 91/67 (BP Location: Right arm, Patient Position: Sitting, Cuff Size: Adult Regular)   Pulse 63   SpO2 100%    General appearance normal   Vitals stable   Alert, oriented and in no acute distress     Patches of alopecia on frontal/parietal side of scalp, some accentuation of hair follicle, no scaling seen.      Eyes: Conjunctivae/lids:Normal     ENT: Lips: normal    MSK:Normal    Pulm: Breathing Normal    Neuro/Psych: Orientation:Alert and Orientedx3 ; Mood/Affect:normal   A/P:  1. Alopecia   R/O frontal fibrosing alopecia   TANGENTIAL BIOPSY SENT OUT:  After consent, anesthesia with LEC and prep, tangential excision performed and specimen sent out for permanent section histology.  No complications and routine wound care. Patient told to call our office in 1-2 weeks for result.      IL TAC: PGACAC discussed.  Risks including but not limited to injection site reaction, bruising, no resolution.  All questions answered and entertained to patient s satisfaction.  Informed consent obtained.  IL TAC in concentration of 5mg/ml was injected ID to alopecia.  Total injected was  1.0 ml.  Patient tolerated without complications  and given wound care instructions, including not to move product around.  Return in 4 weeks for follow-up and possible additional IL TAC.    The following medication was given:     MEDICATION: Kenalog 10 mg  ROUTE: ID  SITE: scalp  DOSE: 1.0 ML  LOT #: [id number]  :  [employer]  EXPIRATION DATE:  3/2023  NDC#: 2791-6532-89        Again, thank you for allowing me to participate in the care of your patient.        Sincerely,        Rosemary Henao PA-C

## 2021-11-17 NOTE — PROGRESS NOTES
Kiesha Mcgurie is an extremely pleasant 51 year old year old female patient here today for recheck alopecia. She notes no new growth seen. No more loss seen either. Patient has no other skin complaints today.  Remainder of the HPI, Meds, PMH, Allergies, FH, and SH was reviewed in chart.   Past Medical History:   Diagnosis Date     Depressive disorder      Depressive disorder, not elsewhere classified     resolved     Herpes simplex without mention of complication     oral     IUD (intrauterine device) in place 08/15/2013    Mirena     Migraine headache with aura     very occass, sig aura, speech loss x1     Pure hypercholesterolemia     follows w BIPIN CHATMAN       Past Surgical History:   Procedure Laterality Date     COLONOSCOPY N/A 10/27/2021    Procedure: COLONOSCOPY, WITH POLYPECTOMY AND CLIP;  Surgeon: Og Gutierres MD;  Location: [facility] OR      DILATION/CURETTAGE DIAG/THER NON OB  2006        Family History   Problem Relation Age of Onset     Hypertension Father      Cancer Father         neuro endrocine CA, neck     Aortic aneurysm Maternal Grandfather          of ascending aortic aneurysm at age 64     Aortic aneurysm Maternal Aunt          in her 40s from ascending aortic aneurysm     C.A.D. No family hx of      Cancer - colorectal No family hx of      Diabetes No family hx of      Cerebrovascular Disease No family hx of      Breast Cancer No family hx of      Prostate Cancer No family hx of        Social History     Socioeconomic History     Marital status:      Spouse name: Not on file     Number of children: 2     Years of education: 16     Highest education level: Not on file   Occupational History     Occupation:      Employer: [employer]     Comment: fulltime   Tobacco Use     Smoking status: Former Smoker     Packs/day: 0.50     Years: 10.00     Pack years: 5.00     Types: Cigarettes     Quit date: 1994     Years since quittin.3     Smokeless  tobacco: Never Used   Substance and Sexual Activity     Alcohol use: Yes     Comment: very rare     Drug use: No     Sexual activity: Not Currently     Partners: Male     Birth control/protection: I.U.D.     Comment: Mirena   Other Topics Concern     Parent/sibling w/ CABG, MI or angioplasty before 65F 55M? No   Social History Narrative    How much exercise per week? 2 90 minute yoga sessions      How much calcium per day? Diet       How much caffeine per day? 0    How much vitamin D per day? Supplement     Do you/your family wear seatbelts?  Yes    Do you/your family use safety helmets? Yes    Do you/your family use sunscreen? Yes    Do you/your family keep firearms in the home? No    Do you/your family have a smoke detector(s)? Yes        Do you feel safe in your home? Yes    Has anyone ever touched you in an unwanted manner? No     Explain Kiesha [facility] 7/16/18        Reviewed [facility] 10-12-20             Social Determinants of Health     Financial Resource Strain: Not on file   Food Insecurity: Not on file   Transportation Needs: Not on file   Physical Activity: Not on file   Stress: Not on file   Social Connections: Not on file   Intimate Partner Violence: Unknown     Fear of Current or Ex-Partner: Not asked     Emotionally Abused: Not asked     Physically Abused: Not asked     Sexually Abused: Not asked   Housing Stability: Not on file       Outpatient Encounter Medications as of 11/17/2021   Medication Sig Dispense Refill     Biotin 10 MG TABS tablet Take 10,000 mcg by mouth daily       CALCIUM-VITAMIN D-VITAMIN K PO        Collagen Hydrolysate POWD        Cyanocobalamin (VITAMIN B 12 PO) Take by mouth daily       estradiol (ESTRING) 2 MG vaginal ring Place 1 each vaginally every 3 months 1 each 3     levonorgestrel (MIRENA) 20 MCG/24HR IUD 1 each (20 mcg) by Intrauterine route once       levonorgestrel (MIRENA) 20 MCG/24HR IUD 1 each by Intrauterine route once       Lysine 500 MG TABS Take 1,000 mg  by mouth daily        magnesium citrate solution Takes 1 tsp twice a day, 150 mg daily       NONFORMULARY 2 tablets daily jozef       valACYclovir (VALTREX) 1000 mg tablet Take 2 tablets (2,000 mg) by mouth 2 times daily For 2 days as needed for cold sores 12 tablet 11     valACYclovir (VALTREX) 500 MG tablet Take 1 tablet (500 mg) by mouth daily 90 tablet 1     No facility-administered encounter medications on file as of 11/17/2021.             O:   NAD, WDWN, Alert & Oriented, Mood & Affect wnl, Vitals stable   Here today alone   BP 91/67 (BP Location: Right arm, Patient Position: Sitting, Cuff Size: Adult Regular)   Pulse 63   SpO2 100%    General appearance normal   Vitals stable   Alert, oriented and in no acute distress     Patches of alopecia on frontal/parietal side of scalp, some accentuation of hair follicle, no scaling seen.      Eyes: Conjunctivae/lids:Normal     ENT: Lips: normal    MSK:Normal    Pulm: Breathing Normal    Neuro/Psych: Orientation:Alert and Orientedx3 ; Mood/Affect:normal   A/P:  1. Alopecia   R/O frontal fibrosing alopecia   TANGENTIAL BIOPSY SENT OUT:  After consent, anesthesia with LEC and prep, tangential excision performed and specimen sent out for permanent section histology.  No complications and routine wound care. Patient told to call our office in 1-2 weeks for result.      IL TAC: PGACAC discussed.  Risks including but not limited to injection site reaction, bruising, no resolution.  All questions answered and entertained to patient s satisfaction.  Informed consent obtained.  IL TAC in concentration of 5mg/ml was injected ID to alopecia.  Total injected was  1.0 ml.  Patient tolerated without complications and given wound care instructions, including not to move product around.  Return in 4 weeks for follow-up and possible additional IL TAC.    The following medication was given:     MEDICATION: Kenalog 10 mg  ROUTE: ID  SITE: scalp  DOSE: 1.0 ML  LOT #:  [id number]  :  [employer]  EXPIRATION DATE:  3/2023  NDC#: 4110-3004-47

## 2021-11-19 DIAGNOSIS — N95.2 VAGINAL ATROPHY: ICD-10-CM

## 2021-11-19 NOTE — TELEPHONE ENCOUNTER
Refill received for estring, patient last seen 10/2020, due for annual exam. Send short supply per protocol and  Reminder message sent to schedule.

## 2021-12-03 ENCOUNTER — MYC MEDICAL ADVICE (OUTPATIENT)
Dept: DERMATOLOGY | Facility: CLINIC | Age: 51
End: 2021-12-03
Payer: COMMERCIAL

## 2021-12-13 ENCOUNTER — TELEPHONE (OUTPATIENT)
Dept: FAMILY MEDICINE | Facility: CLINIC | Age: 51
End: 2021-12-13
Payer: COMMERCIAL

## 2021-12-13 DIAGNOSIS — L66.12 FRONTAL FIBROSING ALOPECIA: Primary | ICD-10-CM

## 2021-12-13 NOTE — TELEPHONE ENCOUNTER
Patient notified of test results and providers message, patient has no further questions.    Pharmacy pended    Morenita MCMILLANRN BSN  [facility]  [phone]

## 2021-12-13 NOTE — TELEPHONE ENCOUNTER
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 This is a progressive scarring type of hair loss, treatment is to maintain or slow down current hair loss. We still treat with steroid injections and I would like to send in a topical steroid. What pharmacy would you like to use?

## 2021-12-14 RX ORDER — CLOBETASOL PROPIONATE 0.5 MG/ML
SOLUTION TOPICAL
Qty: 50 ML | Refills: 3 | Status: SHIPPED | OUTPATIENT
Start: 2021-12-14 | End: 2021-12-22

## 2021-12-22 ENCOUNTER — OFFICE VISIT (OUTPATIENT)
Dept: DERMATOLOGY | Facility: CLINIC | Age: 51
End: 2021-12-22
Payer: COMMERCIAL

## 2021-12-22 VITALS
BODY MASS INDEX: 21.46 KG/M2 | SYSTOLIC BLOOD PRESSURE: 98 MMHG | WEIGHT: 125 LBS | DIASTOLIC BLOOD PRESSURE: 63 MMHG | HEART RATE: 60 BPM

## 2021-12-22 DIAGNOSIS — L65.9 ALOPECIA: Primary | ICD-10-CM

## 2021-12-22 DIAGNOSIS — L66.12 FRONTAL FIBROSING ALOPECIA: ICD-10-CM

## 2021-12-22 PROCEDURE — 99213 OFFICE O/P EST LOW 20 MIN: CPT | Mod: 25 | Performed by: PHYSICIAN ASSISTANT

## 2021-12-22 PROCEDURE — 11900 INJECT SKIN LESIONS </W 7: CPT | Performed by: PHYSICIAN ASSISTANT

## 2021-12-22 RX ORDER — CLOBETASOL PROPIONATE 0.5 MG/ML
SOLUTION TOPICAL
Qty: 50 ML | Refills: 3 | Status: SHIPPED | OUTPATIENT
Start: 2021-12-22 | End: 2023-02-27

## 2021-12-22 RX ORDER — FINASTERIDE 5 MG/1
5 TABLET, FILM COATED ORAL DAILY
Qty: 90 TABLET | Refills: 3 | Status: SHIPPED | OUTPATIENT
Start: 2021-12-22 | End: 2022-04-27 | Stop reason: ALTCHOICE

## 2021-12-22 NOTE — LETTER
2021         RE: Kiesha Mcguire  [address] [facility], [place] [address]        Dear Colleague,    Thank you for referring your patient, Kiesha Mcguire, to the [facility]. Please see a copy of my visit note below.    Kiesha Mcguire is an extremely pleasant 51 year old year old female patient here today for recheck frontal fibrosing alopecia. Biopsy LOV confirmed this diagnosis. She notes hair loss is stable. She has not picked up topical clobetasol solution..  Patient has no other skin complaints today.  Remainder of the HPI, Meds, PMH, Allergies, FH, and SH was reviewed in chart.    Pertinent Hx:   FFA  Past Medical History:   Diagnosis Date     Depressive disorder      Depressive disorder, not elsewhere classified     resolved     Herpes simplex without mention of complication     oral     IUD (intrauterine device) in place 08/15/2013    Mirena     Migraine headache with aura     very occass, sig aura, speech loss x1     Pure hypercholesterolemia     follows w BIPIN CHATMAN       Past Surgical History:   Procedure Laterality Date     COLONOSCOPY N/A 10/27/2021    Procedure: COLONOSCOPY, WITH POLYPECTOMY AND CLIP;  Surgeon: Og Gutierres MD;  [place] [facility] [place]      DILATION/CURETTAGE DIAG/THER NON OB  2006        Family History   Problem Relation Age of Onset     Hypertension Father      Cancer Father         neuro endrocine CA, neck     Aortic aneurysm Maternal Grandfather          of ascending aortic aneurysm at age 64     Aortic aneurysm Maternal Aunt          in her 40s from ascending aortic aneurysm     C.A.D. No family hx of      Cancer - colorectal No family hx of      Diabetes No family hx of      Cerebrovascular Disease No family hx of      Breast Cancer No family hx of      Prostate Cancer No family hx of        Social History     Socioeconomic History     Marital status:      Spouse name: Not on file     Number of children: 2      Years of education: 16     Highest education level: Not on file   Occupational History     Occupation:      Employer: [employer]     Comment: fulltime   Tobacco Use     Smoking status: Former Smoker     Packs/day: 0.50     Years: 10.00     Pack years: 5.00     Types: Cigarettes     Quit date: 1994     Years since quittin.4     Smokeless tobacco: Never Used   Substance and Sexual Activity     Alcohol use: Yes     Comment: very rare     Drug use: No     Sexual activity: Not Currently     Partners: Male     Birth control/protection: I.U.D.     Comment: Mirena   Other Topics Concern     Parent/sibling w/ CABG, MI or angioplasty before 65F 55M? No   Social History Narrative    How much exercise per week? 2 90 minute yoga sessions      How much calcium per day? Diet       How much caffeine per day? 0    How much vitamin D per day? Supplement     Do you/your family wear seatbelts?  Yes    Do you/your family use safety helmets? Yes    Do you/your family use sunscreen? Yes    Do you/your family keep firearms in the home? No    Do you/your family have a smoke detector(s)? Yes        Do you feel safe in your home? Yes    Has anyone ever touched you in an unwanted manner? No     Explain [facility]        Reviewed [facility] 10-12-20             Social Determinants of Health     Financial Resource Strain: Not on file   Food Insecurity: Not on file   Transportation Needs: Not on file   Physical Activity: Not on file   Stress: Not on file   Social Connections: Not on file   Intimate Partner Violence: Unknown     Fear of Current or Ex-Partner: Not asked     Emotionally Abused: Not asked     Physically Abused: Not asked     Sexually Abused: Not asked   Housing Stability: Not on file       Outpatient Encounter Medications as of 2021   Medication Sig Dispense Refill     clobetasol (TEMOVATE) 0.05 % external solution Apply daily as needed to scalp 50 mL 3     finasteride (PROSCAR) 5 MG  tablet Take 1 tablet (5 mg) by mouth daily 90 tablet 3     Biotin 10 MG TABS tablet Take 10,000 mcg by mouth daily       CALCIUM-VITAMIN D-VITAMIN K PO        Collagen Hydrolysate POWD        Cyanocobalamin (VITAMIN B 12 PO) Take by mouth daily       estradiol (ESTRING) 2 MG vaginal ring Place 1 each vaginally every 3 months 1 each 1     levonorgestrel (MIRENA) 20 MCG/24HR IUD 1 each (20 mcg) by Intrauterine route once       levonorgestrel (MIRENA) 20 MCG/24HR IUD 1 each by Intrauterine route once       Lysine 500 MG TABS Take 1,000 mg by mouth daily        magnesium citrate solution Takes 1 tsp twice a day, 150 mg daily       NONFORMULARY 2 tablets daily [place]orn       valACYclovir (VALTREX) 1000 mg tablet Take 2 tablets (2,000 mg) by mouth 2 times daily For 2 days as needed for cold sores 12 tablet 11     valACYclovir (VALTREX) 500 MG tablet Take 1 tablet (500 mg) by mouth daily 90 tablet 1     [DISCONTINUED] clobetasol (TEMOVATE) 0.05 % external solution Apply daily as needed to scalp 50 mL 3     Facility-Administered Encounter Medications as of 12/22/2021   Medication Dose Route Frequency Provider Last Rate Last Admin     [COMPLETED] triamcinolone acetonide (KENALOG-10) injection 10 mg  10 mg Other Once Rosemary Delcid PA-C   10 mg at 12/22/21 1536             O:   NAD, WDWN, Alert & Oriented, Mood & Affect wnl, Vitals stable   Here today alone   BP 98/63   Pulse 60   Wt 56.7 kg (125 lb)   BMI 21.46 kg/m     General appearance normal   Vitals stable   Alert, oriented and in no acute distress   Patches of alopecia on frontal/parietal side of scalp, some accentuation of hair follicle, no scaling seen.         Eyes: Conjunctivae/lids:Normal     ENT: Lip: normal    MSK:Normal    Pulm: Breathing Normal    Neuro/Psych: Orientation:Alert and Orientedx3 ; Mood/Affect:normal     A/P:  1. Frontal fibrosing alopecia   Discussed treatment options: ilk, topical steroids, finasteride, plaquenil, cellcept.   She  would like to try finasteride 5 mg daily.   Start clobetasol 3 times weekly, if flared use twice daily, also did ilk today.  IL TAC: PGACAC discussed.  Risks including but not limited to injection site reaction, bruising, no resolution.  All questions answered and entertained to patient s satisfaction.  Informed consent obtained.  IL TAC in concentration of 5 mg/ml was injected ID to frontal scalp.  Total injected was  1 ml.  Patient tolerated without complications and given wound care instructions, including not to move product around.  Return in 4 weeks for follow-up and possible additional IL TAC.          Again, thank you for allowing me to participate in the care of your patient.        Sincerely,        Rosemary Henao PA-C

## 2021-12-23 ENCOUNTER — OFFICE VISIT (OUTPATIENT)
Dept: PEDIATRICS | Facility: CLINIC | Age: 51
End: 2021-12-23
Attending: FAMILY MEDICINE
Payer: COMMERCIAL

## 2021-12-23 ENCOUNTER — ANCILLARY PROCEDURE (OUTPATIENT)
Dept: CT IMAGING | Facility: CLINIC | Age: 51
End: 2021-12-23
Attending: PREVENTIVE MEDICINE
Payer: COMMERCIAL

## 2021-12-23 ENCOUNTER — HOSPITAL ENCOUNTER (OUTPATIENT)
Facility: CLINIC | Age: 51
Discharge: HOME | End: 2021-12-24
Attending: EMERGENCY MEDICINE | Admitting: SURGERY
Payer: COMMERCIAL

## 2021-12-23 ENCOUNTER — OFFICE VISIT (OUTPATIENT)
Dept: FAMILY MEDICINE | Facility: CLINIC | Age: 51
End: 2021-12-23
Payer: COMMERCIAL

## 2021-12-23 ENCOUNTER — ANESTHESIA EVENT (OUTPATIENT)
Dept: SURGERY | Facility: CLINIC | Age: 51
End: 2021-12-23
Payer: COMMERCIAL

## 2021-12-23 VITALS
HEART RATE: 88 BPM | DIASTOLIC BLOOD PRESSURE: 73 MMHG | OXYGEN SATURATION: 99 % | TEMPERATURE: 98 F | RESPIRATION RATE: 18 BRPM | SYSTOLIC BLOOD PRESSURE: 109 MMHG

## 2021-12-23 VITALS
HEIGHT: 64 IN | HEART RATE: 104 BPM | BODY MASS INDEX: 22.71 KG/M2 | SYSTOLIC BLOOD PRESSURE: 119 MMHG | OXYGEN SATURATION: 100 % | RESPIRATION RATE: 16 BRPM | WEIGHT: 133 LBS | DIASTOLIC BLOOD PRESSURE: 77 MMHG | TEMPERATURE: 98.6 F

## 2021-12-23 DIAGNOSIS — R10.13 ABDOMINAL PAIN, EPIGASTRIC: ICD-10-CM

## 2021-12-23 DIAGNOSIS — Z20.822 COVID-19 RULED OUT BY LABORATORY TESTING: ICD-10-CM

## 2021-12-23 DIAGNOSIS — R07.9 CHEST PAIN, UNSPECIFIED TYPE: Primary | ICD-10-CM

## 2021-12-23 DIAGNOSIS — K81.0 ACUTE CHOLECYSTITIS: Primary | ICD-10-CM

## 2021-12-23 DIAGNOSIS — R10.13 ABDOMINAL PAIN, EPIGASTRIC: Primary | ICD-10-CM

## 2021-12-23 LAB
ALBUMIN SERPL-MCNC: 4.2 G/DL (ref 3.4–5)
ALBUMIN UR-MCNC: NEGATIVE MG/DL
ALP SERPL-CCNC: 74 U/L (ref 40–150)
ALT SERPL W P-5'-P-CCNC: 36 U/L (ref 0–50)
AMYLASE SERPL-CCNC: 46 U/L (ref 30–110)
APPEARANCE UR: CLEAR
AST SERPL W P-5'-P-CCNC: 19 U/L (ref 0–45)
BILIRUB DIRECT SERPL-MCNC: 0.1 MG/DL (ref 0–0.2)
BILIRUB SERPL-MCNC: 0.5 MG/DL (ref 0.2–1.3)
BILIRUB UR QL STRIP: NEGATIVE
COLOR UR AUTO: YELLOW
ERYTHROCYTE [DISTWIDTH] IN BLOOD BY AUTOMATED COUNT: 13 % (ref 10–15)
FLUAV AG SPEC QL IA: NEGATIVE
FLUBV AG SPEC QL IA: NEGATIVE
GLUCOSE UR STRIP-MCNC: NEGATIVE MG/DL
HCG UR QL: NEGATIVE
HCT VFR BLD AUTO: 41.2 % (ref 35–47)
HGB BLD-MCNC: 13.3 G/DL (ref 11.7–15.7)
HGB UR QL STRIP: ABNORMAL
KETONES UR STRIP-MCNC: NEGATIVE MG/DL
LEUKOCYTE ESTERASE UR QL STRIP: NEGATIVE
LIPASE SERPL-CCNC: 103 U/L (ref 73–393)
MCH RBC QN AUTO: 30.3 PG (ref 26.5–33)
MCHC RBC AUTO-ENTMCNC: 32.3 G/DL (ref 31.5–36.5)
MCV RBC AUTO: 94 FL (ref 78–100)
MUCOUS THREADS #/AREA URNS LPF: PRESENT /LPF
NITRATE UR QL: NEGATIVE
PH UR STRIP: 7 [PH] (ref 5–7)
PLATELET # BLD AUTO: 198 10E3/UL (ref 150–450)
PROT SERPL-MCNC: 7.8 G/DL (ref 6.8–8.8)
RADIOLOGIST FLAGS: ABNORMAL
RBC # BLD AUTO: 4.39 10E6/UL (ref 3.8–5.2)
RBC #/AREA URNS AUTO: ABNORMAL /HPF
SARS-COV-2 RNA RESP QL NAA+PROBE: NEGATIVE
SARS-COV-2 RNA RESP QL NAA+PROBE: NEGATIVE
SP GR UR STRIP: 1.02 (ref 1–1.03)
SQUAMOUS #/AREA URNS AUTO: ABNORMAL /LPF
UROBILINOGEN UR STRIP-ACNC: 0.2 E.U./DL
WBC # BLD AUTO: 7.4 10E3/UL (ref 4–11)
WBC #/AREA URNS AUTO: ABNORMAL /HPF

## 2021-12-23 PROCEDURE — 81001 URINALYSIS AUTO W/SCOPE: CPT | Performed by: FAMILY MEDICINE

## 2021-12-23 PROCEDURE — U0003 INFECTIOUS AGENT DETECTION BY NUCLEIC ACID (DNA OR RNA); SEVERE ACUTE RESPIRATORY SYNDROME CORONAVIRUS 2 (SARS-COV-2) (CORONAVIRUS DISEASE [COVID-19]), AMPLIFIED PROBE TECHNIQUE, MAKING USE OF HIGH THROUGHPUT TECHNOLOGIES AS DESCRIBED BY CMS-2020-01-R: HCPCS | Performed by: PREVENTIVE MEDICINE

## 2021-12-23 PROCEDURE — 99285 EMERGENCY DEPT VISIT HI MDM: CPT | Mod: 25 | Performed by: EMERGENCY MEDICINE

## 2021-12-23 PROCEDURE — 74177 CT ABD & PELVIS W/CONTRAST: CPT | Mod: GC | Performed by: RADIOLOGY

## 2021-12-23 PROCEDURE — 258N000003 HC RX IP 258 OP 636: Performed by: EMERGENCY MEDICINE

## 2021-12-23 PROCEDURE — 71275 CT ANGIOGRAPHY CHEST: CPT | Mod: GC | Performed by: RADIOLOGY

## 2021-12-23 PROCEDURE — 96375 TX/PRO/DX INJ NEW DRUG ADDON: CPT | Performed by: EMERGENCY MEDICINE

## 2021-12-23 PROCEDURE — 99285 EMERGENCY DEPT VISIT HI MDM: CPT | Performed by: EMERGENCY MEDICINE

## 2021-12-23 PROCEDURE — 96374 THER/PROPH/DIAG INJ IV PUSH: CPT | Performed by: PREVENTIVE MEDICINE

## 2021-12-23 PROCEDURE — 96365 THER/PROPH/DIAG IV INF INIT: CPT | Performed by: EMERGENCY MEDICINE

## 2021-12-23 PROCEDURE — 250N000011 HC RX IP 250 OP 636: Performed by: EMERGENCY MEDICINE

## 2021-12-23 PROCEDURE — 80076 HEPATIC FUNCTION PANEL: CPT | Performed by: FAMILY MEDICINE

## 2021-12-23 PROCEDURE — 85027 COMPLETE CBC AUTOMATED: CPT | Performed by: FAMILY MEDICINE

## 2021-12-23 PROCEDURE — 96361 HYDRATE IV INFUSION ADD-ON: CPT | Performed by: EMERGENCY MEDICINE

## 2021-12-23 PROCEDURE — 81025 URINE PREGNANCY TEST: CPT | Performed by: FAMILY MEDICINE

## 2021-12-23 PROCEDURE — U0005 INFEC AGEN DETEC AMPLI PROBE: HCPCS | Performed by: PREVENTIVE MEDICINE

## 2021-12-23 PROCEDURE — 83690 ASSAY OF LIPASE: CPT | Performed by: FAMILY MEDICINE

## 2021-12-23 PROCEDURE — 82150 ASSAY OF AMYLASE: CPT | Performed by: FAMILY MEDICINE

## 2021-12-23 PROCEDURE — U0005 INFEC AGEN DETEC AMPLI PROBE: HCPCS | Performed by: EMERGENCY MEDICINE

## 2021-12-23 PROCEDURE — 96368 THER/DIAG CONCURRENT INF: CPT | Performed by: EMERGENCY MEDICINE

## 2021-12-23 PROCEDURE — 93000 ELECTROCARDIOGRAM COMPLETE: CPT | Performed by: PREVENTIVE MEDICINE

## 2021-12-23 PROCEDURE — 87804 INFLUENZA ASSAY W/OPTIC: CPT | Performed by: PREVENTIVE MEDICINE

## 2021-12-23 PROCEDURE — 36415 COLL VENOUS BLD VENIPUNCTURE: CPT | Performed by: FAMILY MEDICINE

## 2021-12-23 PROCEDURE — C9803 HOPD COVID-19 SPEC COLLECT: HCPCS | Performed by: EMERGENCY MEDICINE

## 2021-12-23 PROCEDURE — 99207 PR INPT ADMISSION FROM CLINIC: CPT | Mod: 25 | Performed by: PREVENTIVE MEDICINE

## 2021-12-23 PROCEDURE — 99207 REFERRAL TO ACUTE AND DIAGNOSTIC SERVICES: CPT | Performed by: FAMILY MEDICINE

## 2021-12-23 RX ORDER — ONDANSETRON 2 MG/ML
4 INJECTION INTRAMUSCULAR; INTRAVENOUS EVERY 6 HOURS PRN
Status: DISCONTINUED | OUTPATIENT
Start: 2021-12-23 | End: 2021-12-24 | Stop reason: HOSPADM

## 2021-12-23 RX ORDER — OXYCODONE HYDROCHLORIDE 5 MG/1
5 TABLET ORAL EVERY 4 HOURS PRN
Status: DISCONTINUED | OUTPATIENT
Start: 2021-12-23 | End: 2021-12-24 | Stop reason: HOSPADM

## 2021-12-23 RX ORDER — HYDROMORPHONE HCL IN WATER/PF 6 MG/30 ML
0.2 PATIENT CONTROLLED ANALGESIA SYRINGE INTRAVENOUS
Status: DISCONTINUED | OUTPATIENT
Start: 2021-12-23 | End: 2021-12-24 | Stop reason: HOSPADM

## 2021-12-23 RX ORDER — ACETAMINOPHEN 325 MG/1
975 TABLET ORAL EVERY 8 HOURS
Status: DISCONTINUED | OUTPATIENT
Start: 2021-12-23 | End: 2021-12-24 | Stop reason: HOSPADM

## 2021-12-23 RX ORDER — CEFTRIAXONE 1 G/1
1 INJECTION, POWDER, FOR SOLUTION INTRAMUSCULAR; INTRAVENOUS EVERY 24 HOURS
Status: DISCONTINUED | OUTPATIENT
Start: 2021-12-24 | End: 2021-12-24

## 2021-12-23 RX ORDER — IOPAMIDOL 755 MG/ML
81 INJECTION, SOLUTION INTRAVASCULAR ONCE
Status: COMPLETED | OUTPATIENT
Start: 2021-12-23 | End: 2021-12-23

## 2021-12-23 RX ORDER — HYDROMORPHONE HYDROCHLORIDE 1 MG/ML
0.5 INJECTION, SOLUTION INTRAMUSCULAR; INTRAVENOUS; SUBCUTANEOUS ONCE
Status: COMPLETED | OUTPATIENT
Start: 2021-12-23 | End: 2021-12-23

## 2021-12-23 RX ORDER — ACETAMINOPHEN 500 MG
1000 TABLET ORAL ONCE
Status: COMPLETED | OUTPATIENT
Start: 2021-12-23 | End: 2021-12-23

## 2021-12-23 RX ORDER — KETOROLAC TROMETHAMINE 30 MG/ML
15 INJECTION, SOLUTION INTRAMUSCULAR; INTRAVENOUS ONCE
Status: DISCONTINUED | OUTPATIENT
Start: 2021-12-23 | End: 2021-12-23

## 2021-12-23 RX ORDER — LIDOCAINE 40 MG/G
CREAM TOPICAL
Status: DISCONTINUED | OUTPATIENT
Start: 2021-12-23 | End: 2021-12-24 | Stop reason: HOSPADM

## 2021-12-23 RX ORDER — CEFTRIAXONE 2 G/1
2 INJECTION, POWDER, FOR SOLUTION INTRAMUSCULAR; INTRAVENOUS ONCE
Status: COMPLETED | OUTPATIENT
Start: 2021-12-23 | End: 2021-12-23

## 2021-12-23 RX ORDER — ONDANSETRON 2 MG/ML
4 INJECTION INTRAMUSCULAR; INTRAVENOUS ONCE
Status: COMPLETED | OUTPATIENT
Start: 2021-12-23 | End: 2021-12-23

## 2021-12-23 RX ORDER — ONDANSETRON 4 MG/1
4 TABLET, ORALLY DISINTEGRATING ORAL EVERY 6 HOURS PRN
Status: DISCONTINUED | OUTPATIENT
Start: 2021-12-23 | End: 2021-12-24 | Stop reason: HOSPADM

## 2021-12-23 RX ADMIN — KETOROLAC TROMETHAMINE 15 MG: 30 INJECTION, SOLUTION INTRAMUSCULAR; INTRAVENOUS at 14:25

## 2021-12-23 RX ADMIN — ONDANSETRON 4 MG: 2 INJECTION INTRAMUSCULAR; INTRAVENOUS at 18:05

## 2021-12-23 RX ADMIN — IOPAMIDOL 81 ML: 755 INJECTION, SOLUTION INTRAVASCULAR at 13:41

## 2021-12-23 RX ADMIN — Medication 1000 MG: at 14:20

## 2021-12-23 RX ADMIN — SODIUM CHLORIDE 1000 ML: 9 INJECTION, SOLUTION INTRAVENOUS at 18:04

## 2021-12-23 RX ADMIN — HYDROMORPHONE HYDROCHLORIDE 0.5 MG: 1 INJECTION, SOLUTION INTRAMUSCULAR; INTRAVENOUS; SUBCUTANEOUS at 18:04

## 2021-12-23 RX ADMIN — CEFTRIAXONE SODIUM 2 G: 2 INJECTION, POWDER, FOR SOLUTION INTRAMUSCULAR; INTRAVENOUS at 18:10

## 2021-12-23 RX ADMIN — METRONIDAZOLE 500 MG: 500 INJECTION, SOLUTION INTRAVENOUS at 18:11

## 2021-12-23 ASSESSMENT — ENCOUNTER SYMPTOMS
NAUSEA: 1
NECK PAIN: 0
SHORTNESS OF BREATH: 0
DYSURIA: 0
ABDOMINAL PAIN: 1
COUGH: 0
HEADACHES: 0
FEVER: 0
SORE THROAT: 0
VOMITING: 0
EYE REDNESS: 0
SLEEP DISTURBANCE: 0
DIFFICULTY URINATING: 0
BACK PAIN: 0

## 2021-12-23 ASSESSMENT — ACTIVITIES OF DAILY LIVING (ADL)
ADLS_ACUITY_SCORE: 5

## 2021-12-23 ASSESSMENT — LIFESTYLE VARIABLES: TOBACCO_USE: 1

## 2021-12-23 ASSESSMENT — MIFFLIN-ST. JEOR: SCORE: 1198.28

## 2021-12-23 NOTE — ED PROVIDER NOTES
ED Provider Note  [facility]      History     Chief Complaint   Patient presents with     Abdominal Pain     HPI  Kiesha Mcguire is a 51 year old female who presents to the emergency department with concern for acute cholecystitis.  The patient developed epigastric abdominal pain and bilateral mid back pain last night.  She self-induced some vomiting which did not improve her symptoms.  Patient had a loose stool today after taking a laxative.  She denies any fever.  She denies any chest pain or dyspnea.  No cough.  She went to her primary care clinic today where labs and a CT scan were obtained.  The CT scan was concerning for acute cholecystitis.    Results for orders placed or performed in visit on 12/23/21   CT Chest Pulmonary Embolism w Contrast     Status: None    Narrative    EXAMINATION: CTA pulmonary angiogram, 12/23/2021 1:54 PM     COMPARISON: Epigastric and upper back pain, microscopic hematuria, PE  suspected, low/intermediate probability, negative d-dimer    HISTORY: Same day CT abdomen, chest radiographs 12/14/2010    TECHNIQUE: Volumetric helical acquisition of CT images of the chest  from the lung apices to the kidneys were acquired after the  administration of 81 mL of Isovue-370 IV contrast. Non-Flash technique  with shallow inspiratory breath hold technique.  Post-processed  multiplanar and/or MIP reformations were obtained, archived to PACS  and used in interpretation of this study.     FINDINGS:      Pulmonary angiogram: Contrast bolus is excellent. Exam is negative for  pulmonary embolism. Right ventricle measures 4.8 cm, left ventricle  measures 4.6 cm (endocardial to endocardium), RV/LV ratio 1.0, which  is within normal limits in regards to right heart strain. No  interventricular septal bowing. No reflux of contrast into the IVC.  The main pulmonary artery measures 2.3 cm, within normal limits.    Lower neck: No supraclavicular or axillary lymphadenopathy.  Normal  thyroid.    Mediastinum: No mass or lymphadenopathy. Unremarkable esophagus.    Heart: Normal heart size. No pericardial effusion.    Lungs: No consolidations, pleural effusions, pneumothorax, or  pulmonary nodules.    Upper abdomen: Partially visualized dilated gallbladder with  surrounding fluid that are seen on the same day contrast-enhanced  abdomen CT.    Bones and soft tissues: Unremarkable.      Impression    IMPRESSION:  1. Negative for pulmonary embolism.  2. Partially imaged acute cholecystitis better evaluated on the same  day abdomen CT.    I have personally reviewed the examination and initial interpretation  and I agree with the findings.    JERONIMO SANCHEZ MD         SYSTEM ID:  [id number]   CT Abdomen Pelvis w Contrast     Status: Abnormal   Result Value Ref Range    Radiologist flags Probable acute cholecystitis (Urgent)     Narrative    EXAMINATION: CT ABDOMEN PELVIS W CONTRAST, 12/23/2021 1:47 PM    TECHNIQUE:  Helical CT images from the lung bases through the  symphysis pubis were obtained with IV contrast. Contrast dose:  iopamidol (ISOVUE-370) solution 81 mL    COMPARISON: None    HISTORY: Epigastric pain; epigastric and mid back pain, microscopic  hematuria; Abdominal pain, epigastric    FINDINGS:    Lung bases: Clear.    Liver: Normal parenchymal attenuation without focal mass.  Biliary system: Gallbladder is distended with moderate pericholecystic  fluid. Cholelithiasis. No intrahepatic or extrahepatic biliary ductal  dilatation.  Pancreas: No focal mass or dilation of the main pancreatic duct. Small  periampullary duodenal diverticulum.  Spleen: Within normal limits.  Adrenal glands: Within normal limits.  Kidneys: No focal mass, hydronephrosis, or stone.  Bladder: Within normal limits.  Reproductive organs: IUD in place in expected location.  GI: Within normal limits.  Lymph nodes: No intra-abdominal or pelvic lymphadenopathy.  Vasculature: Within normal limits.    Bones and soft  tissues: No suspicious soft tissue mass or fluid  collection. No suspicious osseous lesion.      Impression    IMPRESSION:   Findings are concerning for acute cholecystitis with gallbladder  distention, cholelithiasis, and pericholecystic fluid.    [Urgent Result: Probable acute cholecystitis]    Finding was identified on 12/23/2021 1:53 PM.     Dr. Nolan  was contacted by Dr. Johnston at 12/23/2021 2:03 PM and  verbalized understanding of the urgent finding.     I have personally reviewed the examination and initial interpretation  and I agree with the findings.    ASHLIE MARIN MD         SYSTEM ID:  [id number]   Influenza A & B Antigen - Clinic Collect     Status: Normal    Specimen: Nose; Swab   Result Value Ref Range    Influenza A antigen Negative Negative    Influenza B antigen Negative Negative    Narrative    Test results must be correlated with clinical data. If necessary, results should be confirmed by a molecular assay or viral culture.   Results for orders placed or performed in visit on 12/23/21   HCG Qual, Urine ([id number])     Status: Normal   Result Value Ref Range    hCG Urine Qualitative Negative Negative   Lipase     Status: Normal   Result Value Ref Range    Lipase 103 73 - 393 U/L   UA reflex to Microscopic and Culture     Status: Abnormal    Specimen: Urine, Midstream   Result Value Ref Range    Color Urine Yellow Colorless, Straw, Light Yellow, Yellow    Appearance Urine Clear Clear    Glucose Urine Negative Negative mg/dL    Bilirubin Urine Negative Negative    Ketones Urine Negative Negative mg/dL    Specific Gravity Urine 1.020 1.003 - 1.035    Blood Urine Trace (A) Negative    pH Urine 7.0 5.0 - 7.0    Protein Albumin Urine Negative Negative mg/dL    Urobilinogen Urine 0.2 0.2, 1.0 E.U./dL    Nitrite Urine Negative Negative    Leukocyte Esterase Urine Negative Negative   CBC with platelets     Status: Normal   Result Value Ref Range    WBC Count 7.4 4.0 - 11.0 10e3/uL    RBC Count 4.39  3.80 - 5.20 10e6/uL    Hemoglobin 13.3 11.7 - 15.7 g/dL    Hematocrit 41.2 35.0 - 47.0 %    MCV 94 78 - 100 fL    MCH 30.3 26.5 - 33.0 pg    MCHC 32.3 31.5 - 36.5 g/dL    RDW 13.0 10.0 - 15.0 %    Platelet Count 198 150 - 450 10e3/uL   Hepatic function panel     Status: Normal   Result Value Ref Range    Bilirubin Total 0.5 0.2 - 1.3 mg/dL    Bilirubin Direct 0.1 0.0 - 0.2 mg/dL    Protein Total 7.8 6.8 - 8.8 g/dL    Albumin 4.2 3.4 - 5.0 g/dL    Alkaline Phosphatase 74 40 - 150 U/L    AST 19 0 - 45 U/L    ALT 36 0 - 50 U/L   Amylase     Status: Normal   Result Value Ref Range    Amylase 46 30 - 110 U/L   Urine Microscopic     Status: Abnormal   Result Value Ref Range    RBC Urine 2-5 (A) 0-2 /HPF /HPF    WBC Urine 0-5 0-5 /HPF /HPF    Squamous Epithelials Urine Few (A) None Seen /LPF    Mucus Urine Present (A) None Seen /LPF    Narrative    Urine Culture not indicated          Past Medical History  Past Medical History:   Diagnosis Date     Depressive disorder      Depressive disorder, not elsewhere classified     resolved     Herpes simplex without mention of complication     oral     IUD (intrauterine device) in place 08/15/2013    Mirena     Migraine headache with aura     very occass, sig aura, speech loss x1     Pure hypercholesterolemia     follows w BIPIN CHATMAN     Past Surgical History:   Procedure Laterality Date     COLONOSCOPY N/A 10/27/2021    Procedure: COLONOSCOPY, WITH POLYPECTOMY AND CLIP;  Surgeon: Og Gutierres MD;  [place] [facility] [place]      DILATION/CURETTAGE DIAG/THER NON OB  12/2006     Biotin 10 MG TABS tablet  CALCIUM-VITAMIN D-VITAMIN K PO  clobetasol (TEMOVATE) 0.05 % external solution  Collagen Hydrolysate POWD  Cyanocobalamin (VITAMIN B 12 PO)  estradiol (ESTRING) 2 MG vaginal ring  finasteride (PROSCAR) 5 MG tablet  levonorgestrel (MIRENA) 20 MCG/24HR IUD  levonorgestrel (MIRENA) 20 MCG/24HR IUD  Lysine 500 MG TABS  magnesium citrate solution  NONFORMULARY  valACYclovir (VALTREX) 1000  mg tablet  valACYclovir (VALTREX) 500 MG tablet      Allergies   Allergen Reactions     Sumatriptan      imitrex     Augmentin Hives     Unsure if true reaction to med      Family History  Family History   Problem Relation Age of Onset     Hypertension Father      Cancer Father         neuro endrocine CA, neck     Aortic aneurysm Maternal Grandfather          of ascending aortic aneurysm at age 64     Aortic aneurysm Maternal Aunt          in her 40s from ascending aortic aneurysm     C.A.D. No family hx of      Cancer - colorectal No family hx of      Diabetes No family hx of      Cerebrovascular Disease No family hx of      Breast Cancer No family hx of      Prostate Cancer No family hx of      Social History   Social History     Tobacco Use     Smoking status: Former Smoker     Packs/day: 0.50     Years: 10.00     Pack years: 5.00     Types: Cigarettes     Quit date: 1994     Years since quittin.4     Smokeless tobacco: Never Used   Substance Use Topics     Alcohol use: Yes     Comment: very rare     Drug use: No      Past medical history, past surgical history, medications, allergies, family history, and social history were reviewed with the patient. No additional pertinent items.       Review of Systems   Constitutional: Negative for fever.   HENT: Negative for sore throat.    Eyes: Negative for redness.   Respiratory: Negative for cough and shortness of breath.    Cardiovascular: Negative for chest pain.   Gastrointestinal: Positive for abdominal pain and nausea. Negative for vomiting.   Genitourinary: Negative for difficulty urinating and dysuria.   Musculoskeletal: Negative for back pain and neck pain.   Skin: Negative for rash.   Neurological: Negative for headaches.   Psychiatric/Behavioral: Negative for sleep disturbance.   All other systems reviewed and are negative.    A complete review of systems was performed with pertinent positives and negatives noted in the HPI, and all other  systems negative.    Physical Exam   BP: 94/62  Pulse: 70  Temp: 98.3  F (36.8  C)  Resp: 16  SpO2: 100 %  Physical Exam  Vitals and nursing note reviewed.   Constitutional:       General: She is not in acute distress.     Appearance: She is well-developed. She is not diaphoretic.   HENT:      Head: Normocephalic and atraumatic.      Mouth/Throat:      Pharynx: No oropharyngeal exudate.   Eyes:      General: No scleral icterus.     Pupils: Pupils are equal, round, and reactive to light.   Cardiovascular:      Heart sounds: Normal heart sounds.   Pulmonary:      Effort: No respiratory distress.      Breath sounds: Normal breath sounds.   Abdominal:      General: Bowel sounds are normal.      Palpations: Abdomen is soft.      Tenderness: There is abdominal tenderness in the right upper quadrant. There is no guarding.   Musculoskeletal:         General: No tenderness.   Skin:     General: Skin is warm and dry.      Findings: No rash.   Neurological:      General: No focal deficit present.      Mental Status: She is alert.         ED Course      Procedures       The medical record was reviewed and interpreted.  Current labs reviewed and interpreted.  Previous labs reviewed and interpreted.  Current images reviewed and interpreted: Enlarged gallbladder with pericholecystic fluid.  Gallstones.              Results for orders placed or performed during the hospital encounter of 12/23/21   Asymptomatic COVID-19 Virus (Coronavirus) by PCR Nasopharyngeal     Status: Normal    Specimen: Nasopharyngeal; Swab   Result Value Ref Range    SARS CoV2 PCR Negative Negative, Testing sent to reference lab. Results will be returned via unsolicited result    Narrative    Testing was performed using the Xpert Xpress SARS-CoV-2 Assay on the  [employer]. Additional information about  this Emergency Use Authorization (EUA) assay can be found via the Lab  Guide. This test should be ordered for the detection of  SARS-CoV-2 in  individuals who meet SARS-CoV-2 clinical and/or epidemiological  criteria. Test performance is unknown in asymptomatic patients. This  test is for in vitro diagnostic use under the FDA EUA for  laboratories certified under CLIA to perform high complexity testing.  This test has not been FDA cleared or approved. A negative result  does not rule out the presence of PCR inhibitors in the specimen or  target RNA in concentration below the limit of detection for the  assay. The possibility of a false negative should be considered if  the patient's recent exposure or clinical presentation suggests  COVID-19. This test was validated by the [facility]. This laboratory is certified under  the Clinical Laboratory Improvement Amendments of 1988 (CLIA-88) as  qualified to perform high complexity laboratory testing.     Results for orders placed or performed in visit on 12/23/21   CT Chest Pulmonary Embolism w Contrast     Status: None    Narrative    EXAMINATION: CTA pulmonary angiogram, 12/23/2021 1:54 PM     COMPARISON: Epigastric and upper back pain, microscopic hematuria, PE  suspected, low/intermediate probability, negative d-dimer    HISTORY: Same day CT abdomen, chest radiographs 12/14/2010    TECHNIQUE: Volumetric helical acquisition of CT images of the chest  from the lung apices to the kidneys were acquired after the  administration of 81 mL of Isovue-370 IV contrast. Non-Flash technique  with shallow inspiratory breath hold technique.  Post-processed  multiplanar and/or MIP reformations were obtained, archived to PACS  and used in interpretation of this study.     FINDINGS:      Pulmonary angiogram: Contrast bolus is excellent. Exam is negative for  pulmonary embolism. Right ventricle measures 4.8 cm, left ventricle  measures 4.6 cm (endocardial to endocardium), RV/LV ratio 1.0, which  is within normal limits in regards to right heart strain.  No  interventricular septal bowing. No reflux of contrast into the IVC.  The main pulmonary artery measures 2.3 cm, within normal limits.    Lower neck: No supraclavicular or axillary lymphadenopathy. Normal  thyroid.    Mediastinum: No mass or lymphadenopathy. Unremarkable esophagus.    Heart: Normal heart size. No pericardial effusion.    Lungs: No consolidations, pleural effusions, pneumothorax, or  pulmonary nodules.    Upper abdomen: Partially visualized dilated gallbladder with  surrounding fluid that are seen on the same day contrast-enhanced  abdomen CT.    Bones and soft tissues: Unremarkable.      Impression    IMPRESSION:  1. Negative for pulmonary embolism.  2. Partially imaged acute cholecystitis better evaluated on the same  day abdomen CT.    I have personally reviewed the examination and initial interpretation  and I agree with the findings.    JERONIMO SANCHEZ MD         SYSTEM ID:  [id number]   CT Abdomen Pelvis w Contrast     Status: Abnormal   Result Value Ref Range    Radiologist flags Probable acute cholecystitis (Urgent)     Narrative    EXAMINATION: CT ABDOMEN PELVIS W CONTRAST, 12/23/2021 1:47 PM    TECHNIQUE:  Helical CT images from the lung bases through the  symphysis pubis were obtained with IV contrast. Contrast dose:  iopamidol (ISOVUE-370) solution 81 mL    COMPARISON: None    HISTORY: Epigastric pain; epigastric and mid back pain, microscopic  hematuria; Abdominal pain, epigastric    FINDINGS:    Lung bases: Clear.    Liver: Normal parenchymal attenuation without focal mass.  Biliary system: Gallbladder is distended with moderate pericholecystic  fluid. Cholelithiasis. No intrahepatic or extrahepatic biliary ductal  dilatation.  Pancreas: No focal mass or dilation of the main pancreatic duct. Small  periampullary duodenal diverticulum.  Spleen: Within normal limits.  Adrenal glands: Within normal limits.  Kidneys: No focal mass, hydronephrosis, or stone.  Bladder: Within normal  limits.  Reproductive organs: IUD in place in expected location.  GI: Within normal limits.  Lymph nodes: No intra-abdominal or pelvic lymphadenopathy.  Vasculature: Within normal limits.    Bones and soft tissues: No suspicious soft tissue mass or fluid  collection. No suspicious osseous lesion.      Impression    IMPRESSION:   Findings are concerning for acute cholecystitis with gallbladder  distention, cholelithiasis, and pericholecystic fluid.    [Urgent Result: Probable acute cholecystitis]    Finding was identified on 12/23/2021 1:53 PM.     Dr. Nolan  was contacted by Dr. Johnston at 12/23/2021 2:03 PM and  verbalized understanding of the urgent finding.     I have personally reviewed the examination and initial interpretation  and I agree with the findings.    ASHLIE MARIN MD         SYSTEM ID:  [id number]   Symptomatic; Unknown COVID-19 Virus (Coronavirus) by PCR Nose     Status: Normal    Specimen: Nose; Swab   Result Value Ref Range    SARS CoV2 PCR Negative Negative, Testing sent to reference lab. Results will be returned via unsolicited result    Narrative    Testing was performed using the Xpert Xpress SARS-CoV-2 Assay on the  [employer]. Additional information about  this Emergency Use Authorization (EUA) assay can be found via the Lab  Guide. This test should be ordered for the detection of SARS-CoV-2 in  individuals who meet SARS-CoV-2 clinical and/or epidemiological  criteria. Test performance is unknown in asymptomatic patients. This  test is for in vitro diagnostic use under the FDA EUA for  laboratories certified under CLIA to perform high complexity testing.  This test has not been FDA cleared or approved. A negative result  does not rule out the presence of PCR inhibitors in the specimen or  target RNA in concentration below the limit of detection for the  assay. The possibility of a false negative should be considered if  the patient's recent exposure or  clinical presentation suggests  COVID-19. This test was validated by the [facility]. This laboratory is certified under  the Clinical Laboratory Improvement Amendments of 1988 (CLIA-88) as  qualified to perform high complexity laboratory testing.     Influenza A & B Antigen - Clinic Collect     Status: Normal    Specimen: Nose; Swab   Result Value Ref Range    Influenza A antigen Negative Negative    Influenza B antigen Negative Negative    Narrative    Test results must be correlated with clinical data. If necessary, results should be confirmed by a molecular assay or viral culture.   Results for orders placed or performed in visit on 12/23/21   HCG Qual, Urine ([id number])     Status: Normal   Result Value Ref Range    hCG Urine Qualitative Negative Negative   Lipase     Status: Normal   Result Value Ref Range    Lipase 103 73 - 393 U/L   UA reflex to Microscopic and Culture     Status: Abnormal    Specimen: Urine, Midstream   Result Value Ref Range    Color Urine Yellow Colorless, Straw, Light Yellow, Yellow    Appearance Urine Clear Clear    Glucose Urine Negative Negative mg/dL    Bilirubin Urine Negative Negative    Ketones Urine Negative Negative mg/dL    Specific Gravity Urine 1.020 1.003 - 1.035    Blood Urine Trace (A) Negative    pH Urine 7.0 5.0 - 7.0    Protein Albumin Urine Negative Negative mg/dL    Urobilinogen Urine 0.2 0.2, 1.0 E.U./dL    Nitrite Urine Negative Negative    Leukocyte Esterase Urine Negative Negative   CBC with platelets     Status: Normal   Result Value Ref Range    WBC Count 7.4 4.0 - 11.0 10e3/uL    RBC Count 4.39 3.80 - 5.20 10e6/uL    Hemoglobin 13.3 11.7 - 15.7 g/dL    Hematocrit 41.2 35.0 - 47.0 %    MCV 94 78 - 100 fL    MCH 30.3 26.5 - 33.0 pg    MCHC 32.3 31.5 - 36.5 g/dL    RDW 13.0 10.0 - 15.0 %    Platelet Count 198 150 - 450 10e3/uL   Hepatic function panel     Status: Normal   Result Value Ref Range    Bilirubin Total 0.5  0.2 - 1.3 mg/dL    Bilirubin Direct 0.1 0.0 - 0.2 mg/dL    Protein Total 7.8 6.8 - 8.8 g/dL    Albumin 4.2 3.4 - 5.0 g/dL    Alkaline Phosphatase 74 40 - 150 U/L    AST 19 0 - 45 U/L    ALT 36 0 - 50 U/L   Amylase     Status: Normal   Result Value Ref Range    Amylase 46 30 - 110 U/L   Urine Microscopic     Status: Abnormal   Result Value Ref Range    RBC Urine 2-5 (A) 0-2 /HPF /HPF    WBC Urine 0-5 0-5 /HPF /HPF    Squamous Epithelials Urine Few (A) None Seen /LPF    Mucus Urine Present (A) None Seen /LPF    Narrative    Urine Culture not indicated     Medications   lidocaine 1 % 0.1-1 mL (has no administration in time range)   lidocaine (LMX4) cream (has no administration in time range)   sodium chloride (PF) 0.9% PF flush 3 mL (3 mLs Intracatheter Not Given 12/23/21 2340)   sodium chloride (PF) 0.9% PF flush 3 mL (has no administration in time range)   melatonin tablet 1 mg (has no administration in time range)   acetaminophen (TYLENOL) tablet 975 mg (975 mg Oral Not Given 12/23/21 2341)   oxyCODONE (ROXICODONE) tablet 5 mg (has no administration in time range)   HYDROmorphone (DILAUDID) injection 0.2 mg (has no administration in time range)   ondansetron (ZOFRAN-ODT) ODT tab 4 mg (has no administration in time range)     Or   ondansetron (ZOFRAN) injection 4 mg (has no administration in time range)   metroNIDAZOLE (FLAGYL) infusion 500 mg (has no administration in time range)   cefTRIAXone (ROCEPHIN) 1 g vial to attach to  mL bag for ADULTS or NS 50 mL bag for PEDS (has no administration in time range)   0.9% sodium chloride BOLUS (0 mLs Intravenous Stopped 12/23/21 2341)   HYDROmorphone (PF) (DILAUDID) injection 0.5 mg (0.5 mg Intravenous Given 12/23/21 1804)   ondansetron (ZOFRAN) injection 4 mg (4 mg Intravenous Given 12/23/21 1805)   cefTRIAXone (ROCEPHIN) 2 g vial to attach to  ml bag for ADULTS or NS 50 ml bag for PEDS (0 g Intravenous Stopped 12/23/21 2114)   metroNIDAZOLE (FLAGYL) infusion  500 mg (0 mg Intravenous Stopped 12/23/21 2346)        Assessments & Plan (with Medical Decision Making)   51 year old female to the emergency department with CT findings concerning for acute cholecystitis.  She has had abdominal pain for the past day.  She is tender in the right upper quadrant.  She does not have leukocytosis.  Her liver enzymes and lipase are normal.  She is tender in the right upper quadrant and guards with palpation.  Patient was given ceftriaxone and metronidazole as she does have penicillin allergy.  She was seen by general surgery and will be admitted with plan for cholecystectomy.    I have reviewed the nursing notes. I have reviewed the findings, diagnosis, plan and need for follow up with the patient.    New Prescriptions    No medications on file       Final diagnoses:   Acute cholecystitis     Chart documentation was completed with Dragon voice-recognition software. Even though reviewed, this chart may still contain some grammatical, spelling, and word errors.     --  Aramis Gant Md  [facility] EMERGENCY DEPARTMENT  12/23/2021     Aramis Gant MD  12/23/21 [id number]

## 2021-12-23 NOTE — CONSULTS
General Surgery Consultation Note    I was asked by ED provider to see this patient for the following problem:    CC: RUQ abdominal pain     HPI:  Patient is a 51-year-old female with a history of depression and migraines who presents to the emergency department with 1 day duration of right upper quadrant abdominal pain. She reports the pain began last night after dinner. Associated with nausea and self-induced emesis without relief. Continued progressive pain throughout the day today. She went to her primary care clinic today where she had a CT scan showing evidence of acute cholecystitis. Lab notable for WBC 7.4, normal lipase, normal LFTs. She denies any fevers/chills.    Currently states her pain is 3/10 after narcotics, 8/10 prior. No current nausea. Previous  section, no other abdominal surgeries. No history of difficulties with anesthesia. Last meal yesterday evening. No family history of bleeding/clotting disorders.      Past Medical History:  Past Medical History:   Diagnosis Date     Depressive disorder      Depressive disorder, not elsewhere classified     resolved     Herpes simplex without mention of complication     oral     IUD (intrauterine device) in place 08/15/2013    Mirena     Migraine headache with aura     very occass, sig aura, speech loss x1     Pure hypercholesterolemia     follows w BIPIN CHATMAN     Patient Active Problem List   Diagnosis     Pure hypercholesterolemia     Herpes simplex virus (HSV) infection     iamTIBIALIS TENDINITIS     iamSPRAIN HIP & THIGH NOS     HYPERLIPIDEMIA LDL GOAL <160     IUD (intrauterine device) in place       Surgical History:  Past Surgical History:   Procedure Laterality Date     COLONOSCOPY N/A 10/27/2021    Procedure: COLONOSCOPY, WITH POLYPECTOMY AND CLIP;  Surgeon: Og Gutierres MD;  Location: [facility] OR      DILATION/CURETTAGE DIAG/THER NON OB  2006       Medications:  Prior to Admission medications    Medication Sig Start Date End Date  Taking? Authorizing Provider   Biotin 10 MG TABS tablet Take 10,000 mcg by mouth daily    Reported, Patient   CALCIUM-VITAMIN D-VITAMIN K PO     Reported, Patient   clobetasol (TEMOVATE) 0.05 % external solution Apply daily as needed to scalp 12/22/21   Rosemary Delcid PA-C   Collagen Hydrolysate POWD     Reported, Patient   Cyanocobalamin (VITAMIN B 12 PO) Take by mouth daily    Reported, Patient   estradiol (ESTRING) 2 MG vaginal ring Place 1 each vaginally every 3 months 11/19/21   Nell Dos Santos APRN CNM   finasteride (PROSCAR) 5 MG tablet Take 1 tablet (5 mg) by mouth daily 12/22/21   Rosemary Delcid PA-C   levonorgestrel (MIRENA) 20 MCG/24HR IUD 1 each (20 mcg) by Intrauterine route once    Nell Dos Santos APRN CNM   levonorgestrel (MIRENA) 20 MCG/24HR IUD 1 each by Intrauterine route once 8/15/13   Nell Dos Santos APRN CNM   Lysine 500 MG TABS Take 1,000 mg by mouth daily     Reported, Patient   magnesium citrate solution Takes 1 tsp twice a day, 150 mg daily    Reported, Patient   NONFORMULARY 2 tablets daily jozef    Reported, Patient   valACYclovir (VALTREX) 1000 mg tablet Take 2 tablets (2,000 mg) by mouth 2 times daily For 2 days as needed for cold sores 8/5/21   Alban Lynch PA-C   valACYclovir (VALTREX) 500 MG tablet Take 1 tablet (500 mg) by mouth daily 8/5/21   Alban Lynch PA-C     Current Outpatient Medications   Medication Sig Dispense Refill     Biotin 10 MG TABS tablet Take 10,000 mcg by mouth daily       CALCIUM-VITAMIN D-VITAMIN K PO        clobetasol (TEMOVATE) 0.05 % external solution Apply daily as needed to scalp 50 mL 3     Collagen Hydrolysate POWD        Cyanocobalamin (VITAMIN B 12 PO) Take by mouth daily       estradiol (ESTRING) 2 MG vaginal ring Place 1 each vaginally every 3 months 1 each 1     finasteride (PROSCAR) 5 MG tablet Take 1 tablet (5 mg) by mouth daily 90 tablet 3     levonorgestrel (MIRENA) 20  MCG/24HR IUD 1 each (20 mcg) by Intrauterine route once       levonorgestrel (MIRENA) 20 MCG/24HR IUD 1 each by Intrauterine route once       Lysine 500 MG TABS Take 1,000 mg by mouth daily        magnesium citrate solution Takes 1 tsp twice a day, 150 mg daily       NONFORMULARY 2 tablets daily seabuckthorn       valACYclovir (VALTREX) 1000 mg tablet Take 2 tablets (2,000 mg) by mouth 2 times daily For 2 days as needed for cold sores 12 tablet 11     valACYclovir (VALTREX) 500 MG tablet Take 1 tablet (500 mg) by mouth daily 90 tablet 1       Allergies:  Allergies   Allergen Reactions     Sumatriptan      imitrex     Augmentin Hives     Unsure if true reaction to med        Social History:  Social History     Socioeconomic History     Marital status:      Spouse name: None     Number of children: 2     Years of education: 16     Highest education level: None   Occupational History     Occupation:      Employer: [employer]     Comment: fulltime   Tobacco Use     Smoking status: Former Smoker     Packs/day: 0.50     Years: 10.00     Pack years: 5.00     Types: Cigarettes     Quit date: 1994     Years since quittin.4     Smokeless tobacco: Never Used   Substance and Sexual Activity     Alcohol use: Yes     Comment: very rare     Drug use: No     Sexual activity: Not Currently     Partners: Male     Birth control/protection: I.U.D.     Comment: Mirena   Other Topics Concern     Parent/sibling w/ CABG, MI or angioplasty before 65F 55M? No   Social History Narrative    How much exercise per week? 2 90 minute yoga sessions      How much calcium per day? Diet       How much caffeine per day? 0    How much vitamin D per day? Supplement     Do you/your family wear seatbelts?  Yes    Do you/your family use safety helmets? Yes    Do you/your family use sunscreen? Yes    Do you/your family keep firearms in the home? No    Do you/your family have a smoke detector(s)? Yes        Do you feel safe in  your home? Yes    Has anyone ever touched you in an unwanted manner? No     Explain Kiesha Fletcher [facility] 18        [facility]             Social Determinants of Health     Financial Resource Strain: Not on file   Food Insecurity: Not on file   Transportation Needs: Not on file   Physical Activity: Not on file   Stress: Not on file   Social Connections: Not on file   Intimate Partner Violence: Unknown     Fear of Current or Ex-Partner: Not asked     Emotionally Abused: Not asked     Physically Abused: Not asked     Sexually Abused: Not asked   Housing Stability: Not on file       Family History:  Family History   Problem Relation Age of Onset     Hypertension Father      Cancer Father         neuro endrocine CA, neck     Aortic aneurysm Maternal Grandfather          of ascending aortic aneurysm at age 64     Aortic aneurysm Maternal Aunt          in her 40s from ascending aortic aneurysm     C.A.D. No family hx of      Cancer - colorectal No family hx of      Diabetes No family hx of      Cerebrovascular Disease No family hx of      Breast Cancer No family hx of      Prostate Cancer No family hx of        Physical Examination:  Vital Signs: BP 94/62   Pulse 70   Temp 98.3  F (36.8  C) (Oral)   Resp 16   SpO2 100%   Alert, awake, no acute distress  Regular rate and rhythm on pulse  Nonlabored breathing on room air  Abdomen is soft, nondistended, tender to palpation in the right upper quadrant. Braden sign is negative, no rebound or guarding, no peritoneal signs  Extremities warm, well perfused, no LE edema  Neuro intact    Imaging:  CT Reviewed   IMPRESSION:   Findings are concerning for acute cholecystitis with gallbladder  distention, cholelithiasis, and pericholecystic fluid.    Laboratory testing:    Lab Results   Component Value Date    WBC 7.4 2021    WBC 4.6 2012     Lab Results   Component Value Date    RBC 4.39 2021    RBC 4.19 2012     Lab Results    Component Value Date    HGB 13.3 12/23/2021    HGB 12.0 11/11/2015     Lab Results   Component Value Date    HCT 41.2 12/23/2021    HCT 37.4 11/30/2012     No components found for: MCT  Lab Results   Component Value Date    MCV 94 12/23/2021    MCV 89 11/30/2012     Lab Results   Component Value Date    MCH 30.3 12/23/2021    MCH 29.4 11/30/2012     Lab Results   Component Value Date    MCHC 32.3 12/23/2021    MCHC 32.9 11/30/2012     Lab Results   Component Value Date    RDW 13.0 12/23/2021    RDW 13.3 11/30/2012     Lab Results   Component Value Date     12/23/2021     11/30/2012     Last Comprehensive Metabolic Panel:  Sodium   Date Value Ref Range Status   08/05/2021 130 (L) 133 - 144 mmol/L Final   12/09/2016 138 133 - 144 mmol/L Final     Potassium   Date Value Ref Range Status   08/05/2021 3.7 3.4 - 5.3 mmol/L Final   12/09/2016 3.1 (L) 3.4 - 5.3 mmol/L Final     Chloride   Date Value Ref Range Status   08/05/2021 97 94 - 109 mmol/L Final   12/09/2016 103 94 - 109 mmol/L Final     Carbon Dioxide   Date Value Ref Range Status   12/09/2016 23 20 - 32 mmol/L Final     Carbon Dioxide (CO2)   Date Value Ref Range Status   08/05/2021 27 20 - 32 mmol/L Final     Anion Gap   Date Value Ref Range Status   08/05/2021 6 3 - 14 mmol/L Final   12/09/2016 12 3 - 14 mmol/L Final     Glucose   Date Value Ref Range Status   08/05/2021 78 70 - 99 mg/dL Final   11/27/2019 82 70 - 99 mg/dL Final     Comment:     Non Fasting     Urea Nitrogen   Date Value Ref Range Status   08/05/2021 15 7 - 30 mg/dL Final   12/09/2016 12 7 - 30 mg/dL Final     Creatinine   Date Value Ref Range Status   08/05/2021 0.66 0.52 - 1.04 mg/dL Final   12/09/2016 0.59 0.52 - 1.04 mg/dL Final     GFR Estimate   Date Value Ref Range Status   08/05/2021 >90 >60 mL/min/1.73m2 Final     Comment:     As of July 11, 2021, eGFR is calculated by the CKD-EPI creatinine equation, without race adjustment. eGFR can be influenced by muscle mass,  exercise, and diet. The reported eGFR is an estimation only and is only applicable if the renal function is stable.   12/09/2016 >90  Non  GFR Calc   >60 mL/min/1.7m2 Final     Calcium   Date Value Ref Range Status   08/05/2021 9.2 8.5 - 10.1 mg/dL Final   12/09/2016 8.8 8.5 - 10.1 mg/dL Final       Liver Function Studies -   Recent Labs   Lab Test 12/23/21  1015   PROTTOTAL 7.8   ALBUMIN 4.2   BILITOTAL 0.5   ALKPHOS 74   AST 19   ALT 36       Assessment/Plan:    51-year-old female without significant past medical history presenting with acute onset RUQ pain and CT evidence of acute cholecystitis. Will proceed to OR in AM for laparoscopic cholecystectomy.    Clear liquid diet now  NPO mn  IVF  COVID pending  IV abx (ceftriaxone/flagyl)  Pain control prn  Antiemetic prn  OR for laparoscopic cholecystectomy 12/24    Discussed with chief resident and staff.    Wesley Pérez MD  Surgery PGY-2

## 2021-12-23 NOTE — PROGRESS NOTES
Kiesha Mcguire is an extremely pleasant 51 year old year old female patient here today for recheck frontal fibrosing alopecia. Biopsy LOV confirmed this diagnosis. She notes hair loss is stable. She has not picked up topical clobetasol solution..  Patient has no other skin complaints today.  Remainder of the HPI, Meds, PMH, Allergies, FH, and SH was reviewed in chart.    Pertinent Hx:   FFA  Past Medical History:   Diagnosis Date     Depressive disorder      Depressive disorder, not elsewhere classified     resolved     Herpes simplex without mention of complication     oral     IUD (intrauterine device) in place 08/15/2013    Mirena     Migraine headache with aura     very occass, sig aura, speech loss x1     Pure hypercholesterolemia     follows w BIPIN CHATMAN       Past Surgical History:   Procedure Laterality Date     COLONOSCOPY N/A 10/27/2021    Procedure: COLONOSCOPY, WITH POLYPECTOMY AND CLIP;  Surgeon: Og Gutierres MD;  Location: [facility] OR      DILATION/CURETTAGE DIAG/THER NON OB  2006        Family History   Problem Relation Age of Onset     Hypertension Father      Cancer Father         neuro endrocine CA, neck     Aortic aneurysm Maternal Grandfather          of ascending aortic aneurysm at age 64     Aortic aneurysm Maternal Aunt          in her 40s from ascending aortic aneurysm     C.A.D. No family hx of      Cancer - colorectal No family hx of      Diabetes No family hx of      Cerebrovascular Disease No family hx of      Breast Cancer No family hx of      Prostate Cancer No family hx of        Social History     Socioeconomic History     Marital status:      Spouse name: Not on file     Number of children: 2     Years of education: 16     Highest education level: Not on file   Occupational History     Occupation:      Employer: [employer]     Comment: fulltime   Tobacco Use     Smoking status: Former Smoker     Packs/day: 0.50     Years: 10.00     Pack  years: 5.00     Types: Cigarettes     Quit date: 1994     Years since quittin.4     Smokeless tobacco: Never Used   Substance and Sexual Activity     Alcohol use: Yes     Comment: very rare     Drug use: No     Sexual activity: Not Currently     Partners: Male     Birth control/protection: I.U.D.     Comment: Mirena   Other Topics Concern     Parent/sibling w/ CABG, MI or angioplasty before 65F 55M? No   Social History Narrative    How much exercise per week? 2 90 minute yoga sessions      How much calcium per day? Diet       How much caffeine per day? 0    How much vitamin D per day? Supplement     Do you/your family wear seatbelts?  Yes    Do you/your family use safety helmets? Yes    Do you/your family use sunscreen? Yes    Do you/your family keep firearms in the home? No    Do you/your family have a smoke detector(s)? Yes        Do you feel safe in your home? Yes    Has anyone ever touched you in an unwanted manner? No     Explain Kiesha [facility] 18        Reviewed [facility] 10-12-20             Social Determinants of Health     Financial Resource Strain: Not on file   Food Insecurity: Not on file   Transportation Needs: Not on file   Physical Activity: Not on file   Stress: Not on file   Social Connections: Not on file   Intimate Partner Violence: Unknown     Fear of Current or Ex-Partner: Not asked     Emotionally Abused: Not asked     Physically Abused: Not asked     Sexually Abused: Not asked   Housing Stability: Not on file       Outpatient Encounter Medications as of 2021   Medication Sig Dispense Refill     clobetasol (TEMOVATE) 0.05 % external solution Apply daily as needed to scalp 50 mL 3     finasteride (PROSCAR) 5 MG tablet Take 1 tablet (5 mg) by mouth daily 90 tablet 3     Biotin 10 MG TABS tablet Take 10,000 mcg by mouth daily       CALCIUM-VITAMIN D-VITAMIN K PO        Collagen Hydrolysate POWD        Cyanocobalamin (VITAMIN B 12 PO) Take by mouth daily       estradiol  (ESTRING) 2 MG vaginal ring Place 1 each vaginally every 3 months 1 each 1     levonorgestrel (MIRENA) 20 MCG/24HR IUD 1 each (20 mcg) by Intrauterine route once       levonorgestrel (MIRENA) 20 MCG/24HR IUD 1 each by Intrauterine route once       Lysine 500 MG TABS Take 1,000 mg by mouth daily        magnesium citrate solution Takes 1 tsp twice a day, 150 mg daily       NONFORMULARY 2 tablets daily se[place]thorn       valACYclovir (VALTREX) 1000 mg tablet Take 2 tablets (2,000 mg) by mouth 2 times daily For 2 days as needed for cold sores 12 tablet 11     valACYclovir (VALTREX) 500 MG tablet Take 1 tablet (500 mg) by mouth daily 90 tablet 1     [DISCONTINUED] clobetasol (TEMOVATE) 0.05 % external solution Apply daily as needed to scalp 50 mL 3     Facility-Administered Encounter Medications as of 12/22/2021   Medication Dose Route Frequency Provider Last Rate Last Admin     [COMPLETED] triamcinolone acetonide (KENALOG-10) injection 10 mg  10 mg Other Once Rosemary Delcid PA-C   10 mg at 12/22/21 1536             O:   NAD, WDWN, Alert & Oriented, Mood & Affect wnl, Vitals stable   Here today alone   BP 98/63   Pulse 60   Wt 56.7 kg (125 lb)   BMI 21.46 kg/m     General appearance normal   Vitals stable   Alert, oriented and in no acute distress   Patches of alopecia on frontal/parietal side of scalp, some accentuation of hair follicle, no scaling seen.         Eyes: Conjunctivae/lids:Normal     ENT: Lip: normal    MSK:Normal    Pulm: Breathing Normal    Neuro/Psych: Orientation:Alert and Orientedx3 ; Mood/Affect:normal     A/P:  1. Frontal fibrosing alopecia   Discussed treatment options: ilk, topical steroids, finasteride, plaquenil, cellcept.   She would like to try finasteride 5 mg daily.   Start clobetasol 3 times weekly, if flared use twice daily, also did ilk today.  IL TAC: PGACAC discussed.  Risks including but not limited to injection site reaction, bruising, no resolution.  All questions answered  and entertained to patient s satisfaction.  Informed consent obtained.  IL TAC in concentration of 5 mg/ml was injected ID to frontal scalp.  Total injected was  1 ml.  Patient tolerated without complications and given wound care instructions, including not to move product around.  Return in 4 weeks for follow-up and possible additional IL TAC.

## 2021-12-23 NOTE — PROGRESS NOTES
Assessment & Plan     Abdominal pain, epigastric RUQ radiating to back  ua with Microhematuria  Other labs pending   CBC is normal   ? Renal stone vs gastritis vs cholecystitis  - Amylase; Future  - Hepatic function panel; Future  - CBC with platelets; Future  - UA reflex to Microscopic and Culture; Future  - Lipase; Future  - HCG Qual, Urine ([id number]); Future  - HCG Qual, Urine ([id number])  - Lipase  - UA reflex to Microscopic and Culture  - CBC with platelets  - Hepatic function panel  - Amylase  - Urine Microscopic  Ua with Hematuria  -? Renal stone    Pt will need CT scan     Mariza Rey MD  [facility] SANGEETHA Ppo is a 51 year old who presents for the following health issues    HPI     Abdominal Pain  When did you first notice your pain? - Less than 1 week   Have you seen anyone else for your pain? No  Where in your body do you have pain? Abdominal/pain  Onset/Duration: STARTED LAST NIGHT  Description:   Character: Sharp  Location: epigastric area -radiates to the back  Radiation: Back  Intensity: severe  Progression of Symptoms:  worsening and constant  Accompanying Signs & Symptoms:  Fever/Chills: no  Gas/Bloating: no  Nausea: YES  Vomitting: put the finger inside the mouth to vomite  Diarrhea: no  Constipation: no  Dysuria or Hematuria: no  History:   Trauma: no  Previous similar pain: YES but not this bad-has taken Pepto bismol in the past and has helped-But this time Nothing is helping  Previous tests done: none  Precipitating factors:   Does the pain change with:     Food: sometimes pain starts after eating    Bowel Movement: no    Urination: no   Other factors:  no  Therapies tried and outcome: pepto bismol, alkaseltzer  Not sure if Food make It worse  No LMP recorded. (Menstrual status: IUD).     Clinic Administered Medication Documentation        Oral Medication Documentation    Patient was given Antiacid and Lidocaine Hydrochloride 2%. Prior to medication  "administration, verified patients identity using patient s name and date of birth. Please see MAR and medication order for additional information.     Was entire amount of medication used? No 15 Ml of each medication mixed together    Expiration Date: 3/1/23 Antacid  Lot # [id number] . NDC 31248-550-25 and 4/1/2020 Lidocaine Lot # [id number] NDC # [phone]    Review of Systems   CONSTITUTIONAL: NEGATIVE for fever, chills, change in weight  ENT/MOUTH: NEGATIVE for ear, mouth and throat problems  RESP: NEGATIVE for significant cough or SOB  CV: NEGATIVE for chest pain, palpitations or peripheral edema  GI: as above  : no problems  Rest of the ROS is Negative except see above and Problem list [stable]        Objective    /77   Pulse 104   Temp 98.6  F (37  C) (Oral)   Resp 16   Ht 1.626 m (5' 4\")   Wt 60.3 kg (133 lb)   SpO2 100%   BMI 22.83 kg/m    Body mass index is 22.83 kg/m .  Physical Exam   GENERAL: alert and apperas to be in pain-bent Over  NECK: no adenopathy, no asymmetry, masses, or scars and thyroid normal to palpation  RESP: lungs clear to auscultation - no rales, rhonchi or wheezes  CV: regular rate and rhythm, normal S1 S2, no S3 or S4, no murmur, click or rub, no peripheral edema and peripheral pulses strong  ABDOMEN: tenderness epigastric and and  RUQ-also Mild tenderness Lower abdomen but most tender epigastric and RUQ, no organomegaly or masses and bowel sounds normal  MS: no gross musculoskeletal defects noted, no edema    Results for orders placed or performed in visit on 12/23/21   HCG Qual, Urine ([id number])     Status: Normal   Result Value Ref Range    hCG Urine Qualitative Negative Negative   UA reflex to Microscopic and Culture     Status: Abnormal    Specimen: Urine, Midstream   Result Value Ref Range    Color Urine Yellow Colorless, Straw, Light Yellow, Yellow    Appearance Urine Clear Clear    Glucose Urine Negative Negative mg/dL    Bilirubin Urine Negative Negative    Ketones " Urine Negative Negative mg/dL    Specific Gravity Urine 1.020 1.003 - 1.035    Blood Urine Trace (A) Negative    pH Urine 7.0 5.0 - 7.0    Protein Albumin Urine Negative Negative mg/dL    Urobilinogen Urine 0.2 0.2, 1.0 E.U./dL    Nitrite Urine Negative Negative    Leukocyte Esterase Urine Negative Negative   CBC with platelets     Status: Normal   Result Value Ref Range    WBC Count 7.4 4.0 - 11.0 10e3/uL    RBC Count 4.39 3.80 - 5.20 10e6/uL    Hemoglobin 13.3 11.7 - 15.7 g/dL    Hematocrit 41.2 35.0 - 47.0 %    MCV 94 78 - 100 fL    MCH 30.3 26.5 - 33.0 pg    MCHC 32.3 31.5 - 36.5 g/dL    RDW 13.0 10.0 - 15.0 %    Platelet Count 198 150 - 450 10e3/uL   Urine Microscopic     Status: Abnormal   Result Value Ref Range    RBC Urine 2-5 (A) 0-2 /HPF /HPF    WBC Urine 0-5 0-5 /HPF /HPF    Squamous Epithelials Urine Few (A) None Seen /LPF    Mucus Urine Present (A) None Seen /LPF    Narrative    Urine Culture not indicated

## 2021-12-23 NOTE — PATIENT INSTRUCTIONS
Imaging consistent with acute cholecystitis - advise ED evaluation now for further assessment and treatment.

## 2021-12-23 NOTE — PROGRESS NOTES
Assessment & Plan     (R07.9) Chest pain, unspecified type  (primary encounter diagnosis)  Plan: Symptomatic; Unknown COVID-19 Virus         (Coronavirus) by PCR Nose, Influenza A & B         Antigen - Clinic Collect, CT Chest Pulmonary     (R10.13) Abdominal pain, epigastric  Plan: Symptomatic; Unknown COVID-19 Virus         (Coronavirus) by PCR Nose, Influenza A & B         Antigen - Clinic Collect        Contrast, acetaminophen (TYLENOL) tablet 1,000         mg, ketorolac (TORADOL) injection 15 mg,     Consistent with acute cholecystitis - advise ED evaluation now as will need IV abx and likely surgery  Pain control with toradol and tylenol, BMP pending, covid pending  Labs not pursued in urgent care as CT result was available and will not change mangement here.  BMP can be obtained in ED.    113 minutes spent on the date of the encounter doing chart review, review of test results, interpretation of tests, patient visit and documentation        See Patient Instructions    No follow-ups on file.    Vivek Nolan MD  [facility]    Francine Pop is a 51 year old who presents for the following health issues     HPI     Pain History:  When did you first notice your pain? - Less than 1 week   Have you seen anyone else for your pain? Yes - Primary Care. Patient was given a GI cocktail at the clinic and she states it has not helped.     Where in your body do you have pain? Abdominal/Flank Pain  Onset/Duration: Yesterday night 12/22/21  Description:   Character: Sharp  Location: epigastric region right flank left flank  Radiation: Back  Intensity: 8/10  Progression of Symptoms:  worsening  Accompanying Signs & Symptoms:  Fever/Chills: YES, chills  Gas/Bloating: no  Nausea: YES  Vomitting: YES  Diarrhea: no  Constipation: no  Dysuria or Hematuria: no  History:   Trauma: no  Previous similar pain: YES  Previous tests done: none  Precipitating factors:   Does the pain  change with:     Food: not applicable, patient has not been able to eat    Bowel Movement: no    Urination: no   Other factors:  no  Therapies tried and outcome: GI cocktail, laxative, Pepto, Alke- selzer   No LMP recorded. (Menstrual status: IUD).    Patient wanted me to make note that she has steroid injections in her forehead for Alopecia.     This is a 52 yo female who has severe epigastric and bilateral mid back pain that started last night.  Some nausea but no vomiting.  No fever or chills.  No hx of abdominal or pelvic surgery.  Also had a headache 2 days ago.  No diarrhea or constipation.  No pain with urination or blood in urine.  No blood in stool.  No smoking, vaping, nsaid use, alcohol, recreational drug use.        Review of Systems   Constitutional, HEENT, cardiovascular, pulmonary, GI, , musculoskeletal, neuro, skin, endocrine and psych systems are negative, except as otherwise noted.      Objective    There were no vitals taken for this visit.  There is no height or weight on file to calculate BMI.  Physical Exam   GENERAL: healthy, alert and no distress  EYES: Eyes grossly normal to inspection, PERRL and conjunctivae and sclerae normal  HENT: ear canals and TM's normal, nose and mouth without ulcers or lesions  NECK: no adenopathy, no asymmetry, masses, or scars and thyroid normal to palpation  RESP: lungs clear to auscultation - no rales, rhonchi or wheezes  BREAST: normal without masses, tenderness or nipple discharge and no palpable axillary masses or adenopathy  CV: regular rate and rhythm, normal S1 S2, no S3 or S4, no murmur, click or rub, no peripheral edema and peripheral pulses strong  ABDOMEN: soft, nontender, no hepatosplenomegaly, no masses and bowel sounds normal  MS: no gross musculoskeletal defects noted, no edema  SKIN: no suspicious lesions or rashes  NEURO: Normal strength and tone, mentation intact and speech normal  PSYCH: mentation appears normal, affect  normal/bright    Results for orders placed or performed in visit on 12/23/21 (from the past 24 hour(s))   CT Abdomen Pelvis w Contrast   Result Value Ref Range    Radiologist flags Probable acute cholecystitis (Urgent)     Narrative    EXAMINATION: CT ABDOMEN PELVIS W CONTRAST, 12/23/2021 1:47 PM    TECHNIQUE:  Helical CT images from the lung bases through the  symphysis pubis were obtained with IV contrast. Contrast dose:  iopamidol (ISOVUE-370) solution 81 mL    COMPARISON: None    HISTORY: Epigastric pain; epigastric and mid back pain, microscopic  hematuria; Abdominal pain, epigastric    FINDINGS:    Lung bases: Clear.    Liver: Normal parenchymal attenuation without focal mass.  Biliary system: Gallbladder is distended with moderate pericholecystic  fluid. Cholelithiasis. No intrahepatic or extrahepatic biliary ductal  dilatation.  Pancreas: No focal mass or dilation of the main pancreatic duct. Small  periampullary duodenal diverticulum.  Spleen: Within normal limits.  Adrenal glands: Within normal limits.  Kidneys: No focal mass, hydronephrosis, or stone.  Bladder: Within normal limits.  Reproductive organs: IUD in place in expected location.  GI: Within normal limits.  Lymph nodes: No intra-abdominal or pelvic lymphadenopathy.  Vasculature: Within normal limits.    Bones and soft tissues: No suspicious soft tissue mass or fluid  collection. No suspicious osseous lesion.      Impression    IMPRESSION:   Findings are concerning for acute cholecystitis with gallbladder  distention, cholelithiasis, and pericholecystic fluid.    [Urgent Result: Probable acute cholecystitis]    Finding was identified on 12/23/2021 1:53 PM.     Dr. Nolan  was contacted by Dr. Johnston at 12/23/2021 2:03 PM and  verbalized understanding of the urgent finding.     I have personally reviewed the examination and initial interpretation  and I agree with the findings.    ASHLIE MARIN MD         SYSTEM ID:  [id number]   Influenza A &  B Antigen - Clinic Collect    Specimen: Nose; Swab   Result Value Ref Range    Influenza A antigen Negative Negative    Influenza B antigen Negative Negative    Narrative    Test results must be correlated with clinical data. If necessary, results should be confirmed by a molecular assay or viral culture.   CT Chest Pulmonary Embolism w Contrast    Impression    RESIDENT PRELIMINARY INTERPRETATION  IMPRESSION:  1. Negative for pulmonary embolism.  2. Partially imaged acute cholecystitis better evaluated on the same  day abdomen CT.     CBC, U preg negative today, LFTs and lipase normal, influenza negative  UA negative other than 2-5 rbc today

## 2021-12-23 NOTE — ED TRIAGE NOTES
Patient reports that she was told she needed to have gallbladder surgery due to an infection. Reports having abdominal pain, but was given toradol and tylenol PTA, which is reported to be effective.

## 2021-12-24 ENCOUNTER — ANESTHESIA (OUTPATIENT)
Dept: SURGERY | Facility: CLINIC | Age: 51
End: 2021-12-24
Payer: COMMERCIAL

## 2021-12-24 VITALS
BODY MASS INDEX: 22.82 KG/M2 | HEART RATE: 72 BPM | OXYGEN SATURATION: 100 % | SYSTOLIC BLOOD PRESSURE: 89 MMHG | WEIGHT: 132.94 LBS | RESPIRATION RATE: 16 BRPM | TEMPERATURE: 97.3 F | DIASTOLIC BLOOD PRESSURE: 50 MMHG

## 2021-12-24 PROBLEM — Z20.822 COVID-19 RULED OUT BY LABORATORY TESTING: Status: ACTIVE | Noted: 2021-12-24

## 2021-12-24 PROBLEM — R10.13 ABDOMINAL PAIN, EPIGASTRIC: Status: ACTIVE | Noted: 2021-12-24

## 2021-12-24 LAB
ALBUMIN SERPL-MCNC: 3.5 G/DL (ref 3.4–5)
ALP SERPL-CCNC: 74 U/L (ref 40–150)
ALT SERPL W P-5'-P-CCNC: 142 U/L (ref 0–50)
ANION GAP SERPL CALCULATED.3IONS-SCNC: 8 MMOL/L (ref 3–14)
AST SERPL W P-5'-P-CCNC: 115 U/L (ref 0–45)
BILIRUB SERPL-MCNC: 0.6 MG/DL (ref 0.2–1.3)
BUN SERPL-MCNC: 12 MG/DL (ref 7–30)
CALCIUM SERPL-MCNC: 8.9 MG/DL (ref 8.5–10.1)
CHLORIDE BLD-SCNC: 107 MMOL/L (ref 94–109)
CO2 SERPL-SCNC: 24 MMOL/L (ref 20–32)
CREAT SERPL-MCNC: 0.54 MG/DL (ref 0.52–1.04)
ERYTHROCYTE [DISTWIDTH] IN BLOOD BY AUTOMATED COUNT: 12.8 % (ref 10–15)
GFR SERPL CREATININE-BSD FRML MDRD: >90 ML/MIN/1.73M2
GLUCOSE BLD-MCNC: 79 MG/DL (ref 70–99)
GLUCOSE BLDC GLUCOMTR-MCNC: 102 MG/DL (ref 70–99)
GLUCOSE BLDC GLUCOMTR-MCNC: 64 MG/DL (ref 70–99)
HCT VFR BLD AUTO: 38.4 % (ref 35–47)
HGB BLD-MCNC: 12.4 G/DL (ref 11.7–15.7)
MCH RBC QN AUTO: 30.6 PG (ref 26.5–33)
MCHC RBC AUTO-ENTMCNC: 32.3 G/DL (ref 31.5–36.5)
MCV RBC AUTO: 95 FL (ref 78–100)
PLATELET # BLD AUTO: 174 10E3/UL (ref 150–450)
POTASSIUM BLD-SCNC: 3.8 MMOL/L (ref 3.4–5.3)
PROT SERPL-MCNC: 6.8 G/DL (ref 6.8–8.8)
RBC # BLD AUTO: 4.05 10E6/UL (ref 3.8–5.2)
SODIUM SERPL-SCNC: 139 MMOL/L (ref 133–144)
WBC # BLD AUTO: 4.2 10E3/UL (ref 4–11)

## 2021-12-24 PROCEDURE — 999N000141 HC STATISTIC PRE-PROCEDURE NURSING ASSESSMENT: Performed by: SURGERY

## 2021-12-24 PROCEDURE — 710N000010 HC RECOVERY PHASE 1, LEVEL 2, PER MIN: Performed by: SURGERY

## 2021-12-24 PROCEDURE — 82040 ASSAY OF SERUM ALBUMIN: CPT | Performed by: STUDENT IN AN ORGANIZED HEALTH CARE EDUCATION/TRAINING PROGRAM

## 2021-12-24 PROCEDURE — 250N000025 HC SEVOFLURANE, PER MIN: Performed by: SURGERY

## 2021-12-24 PROCEDURE — 272N000001 HC OR GENERAL SUPPLY STERILE: Performed by: SURGERY

## 2021-12-24 PROCEDURE — 96376 TX/PRO/DX INJ SAME DRUG ADON: CPT | Mod: 59

## 2021-12-24 PROCEDURE — 250N000013 HC RX MED GY IP 250 OP 250 PS 637: Performed by: STUDENT IN AN ORGANIZED HEALTH CARE EDUCATION/TRAINING PROGRAM

## 2021-12-24 PROCEDURE — 258N000003 HC RX IP 258 OP 636: Performed by: SURGERY

## 2021-12-24 PROCEDURE — 88304 TISSUE EXAM BY PATHOLOGIST: CPT | Mod: TC | Performed by: SURGERY

## 2021-12-24 PROCEDURE — 250N000009 HC RX 250

## 2021-12-24 PROCEDURE — 250N000011 HC RX IP 250 OP 636: Performed by: STUDENT IN AN ORGANIZED HEALTH CARE EDUCATION/TRAINING PROGRAM

## 2021-12-24 PROCEDURE — 258N000003 HC RX IP 258 OP 636

## 2021-12-24 PROCEDURE — 250N000013 HC RX MED GY IP 250 OP 250 PS 637: Performed by: SURGERY

## 2021-12-24 PROCEDURE — G0378 HOSPITAL OBSERVATION PER HR: HCPCS

## 2021-12-24 PROCEDURE — 360N000076 HC SURGERY LEVEL 3, PER MIN: Performed by: SURGERY

## 2021-12-24 PROCEDURE — 36415 COLL VENOUS BLD VENIPUNCTURE: CPT | Performed by: STUDENT IN AN ORGANIZED HEALTH CARE EDUCATION/TRAINING PROGRAM

## 2021-12-24 PROCEDURE — 370N000017 HC ANESTHESIA TECHNICAL FEE, PER MIN: Performed by: SURGERY

## 2021-12-24 PROCEDURE — 85027 COMPLETE CBC AUTOMATED: CPT | Performed by: STUDENT IN AN ORGANIZED HEALTH CARE EDUCATION/TRAINING PROGRAM

## 2021-12-24 PROCEDURE — 250N000011 HC RX IP 250 OP 636

## 2021-12-24 PROCEDURE — 80053 COMPREHEN METABOLIC PANEL: CPT | Performed by: STUDENT IN AN ORGANIZED HEALTH CARE EDUCATION/TRAINING PROGRAM

## 2021-12-24 PROCEDURE — 258N000003 HC RX IP 258 OP 636: Performed by: STUDENT IN AN ORGANIZED HEALTH CARE EDUCATION/TRAINING PROGRAM

## 2021-12-24 PROCEDURE — 250N000009 HC RX 250: Performed by: SURGERY

## 2021-12-24 PROCEDURE — 47562 LAPAROSCOPIC CHOLECYSTECTOMY: CPT | Mod: GC | Performed by: SURGERY

## 2021-12-24 PROCEDURE — 250N000011 HC RX IP 250 OP 636: Performed by: SURGERY

## 2021-12-24 RX ORDER — ONDANSETRON 4 MG/1
4 TABLET, ORALLY DISINTEGRATING ORAL EVERY 30 MIN PRN
Status: DISCONTINUED | OUTPATIENT
Start: 2021-12-24 | End: 2021-12-24 | Stop reason: HOSPADM

## 2021-12-24 RX ORDER — LIDOCAINE 40 MG/G
CREAM TOPICAL
Status: DISCONTINUED | OUTPATIENT
Start: 2021-12-24 | End: 2021-12-24 | Stop reason: HOSPADM

## 2021-12-24 RX ORDER — CLINDAMYCIN PHOSPHATE 900 MG/50ML
900 INJECTION, SOLUTION INTRAVENOUS
Status: COMPLETED | OUTPATIENT
Start: 2021-12-24 | End: 2021-12-24

## 2021-12-24 RX ORDER — SODIUM CHLORIDE, SODIUM LACTATE, POTASSIUM CHLORIDE, CALCIUM CHLORIDE 600; 310; 30; 20 MG/100ML; MG/100ML; MG/100ML; MG/100ML
INJECTION, SOLUTION INTRAVENOUS CONTINUOUS
Status: DISCONTINUED | OUTPATIENT
Start: 2021-12-24 | End: 2021-12-24

## 2021-12-24 RX ORDER — SODIUM CHLORIDE 9 MG/ML
INJECTION, SOLUTION INTRAVENOUS CONTINUOUS
Status: DISCONTINUED | OUTPATIENT
Start: 2021-12-24 | End: 2021-12-24

## 2021-12-24 RX ORDER — NALOXONE HYDROCHLORIDE 0.4 MG/ML
0.4 INJECTION, SOLUTION INTRAMUSCULAR; INTRAVENOUS; SUBCUTANEOUS
Status: DISCONTINUED | OUTPATIENT
Start: 2021-12-24 | End: 2021-12-24 | Stop reason: HOSPADM

## 2021-12-24 RX ORDER — ACETAMINOPHEN 500 MG
1000 TABLET ORAL ONCE
Status: DISCONTINUED | OUTPATIENT
Start: 2021-12-24 | End: 2022-09-07

## 2021-12-24 RX ORDER — HYDROMORPHONE HCL IN WATER/PF 6 MG/30 ML
0.4 PATIENT CONTROLLED ANALGESIA SYRINGE INTRAVENOUS EVERY 5 MIN PRN
Status: DISCONTINUED | OUTPATIENT
Start: 2021-12-24 | End: 2021-12-24 | Stop reason: HOSPADM

## 2021-12-24 RX ORDER — ONDANSETRON 2 MG/ML
4 INJECTION INTRAMUSCULAR; INTRAVENOUS EVERY 30 MIN PRN
Status: DISCONTINUED | OUTPATIENT
Start: 2021-12-24 | End: 2021-12-24 | Stop reason: HOSPADM

## 2021-12-24 RX ORDER — NALOXONE HYDROCHLORIDE 0.4 MG/ML
0.2 INJECTION, SOLUTION INTRAMUSCULAR; INTRAVENOUS; SUBCUTANEOUS
Status: DISCONTINUED | OUTPATIENT
Start: 2021-12-24 | End: 2021-12-24 | Stop reason: HOSPADM

## 2021-12-24 RX ORDER — PREDNISOLONE ACETATE 10 MG/ML
SUSPENSION/ DROPS OPHTHALMIC
COMMUNITY
Start: 2021-12-22 | End: 2022-04-27

## 2021-12-24 RX ORDER — EPHEDRINE SULFATE 50 MG/ML
INJECTION, SOLUTION INTRAMUSCULAR; INTRAVENOUS; SUBCUTANEOUS PRN
Status: DISCONTINUED | OUTPATIENT
Start: 2021-12-24 | End: 2021-12-24

## 2021-12-24 RX ORDER — BUPIVACAINE HYDROCHLORIDE 5 MG/ML
INJECTION, SOLUTION PERINEURAL PRN
Status: DISCONTINUED | OUTPATIENT
Start: 2021-12-24 | End: 2021-12-24 | Stop reason: HOSPADM

## 2021-12-24 RX ORDER — PROPOFOL 10 MG/ML
INJECTION, EMULSION INTRAVENOUS PRN
Status: DISCONTINUED | OUTPATIENT
Start: 2021-12-24 | End: 2021-12-24

## 2021-12-24 RX ORDER — OXYCODONE HYDROCHLORIDE 5 MG/1
5 TABLET ORAL EVERY 4 HOURS PRN
Status: DISCONTINUED | OUTPATIENT
Start: 2021-12-24 | End: 2021-12-24 | Stop reason: HOSPADM

## 2021-12-24 RX ORDER — DEXAMETHASONE SODIUM PHOSPHATE 4 MG/ML
INJECTION, SOLUTION INTRA-ARTICULAR; INTRALESIONAL; INTRAMUSCULAR; INTRAVENOUS; SOFT TISSUE PRN
Status: DISCONTINUED | OUTPATIENT
Start: 2021-12-24 | End: 2021-12-24

## 2021-12-24 RX ORDER — CLINDAMYCIN PHOSPHATE 900 MG/50ML
900 INJECTION, SOLUTION INTRAVENOUS SEE ADMIN INSTRUCTIONS
Status: DISCONTINUED | OUTPATIENT
Start: 2021-12-24 | End: 2021-12-24 | Stop reason: HOSPADM

## 2021-12-24 RX ORDER — OXYCODONE HYDROCHLORIDE 5 MG/1
5 TABLET ORAL EVERY 4 HOURS PRN
Qty: 10 TABLET | Refills: 0 | Status: SHIPPED | OUTPATIENT
Start: 2021-12-24 | End: 2022-04-27

## 2021-12-24 RX ORDER — FENTANYL CITRATE 50 UG/ML
50 INJECTION, SOLUTION INTRAMUSCULAR; INTRAVENOUS EVERY 5 MIN PRN
Status: DISCONTINUED | OUTPATIENT
Start: 2021-12-24 | End: 2021-12-24 | Stop reason: HOSPADM

## 2021-12-24 RX ORDER — SODIUM CHLORIDE, SODIUM LACTATE, POTASSIUM CHLORIDE, CALCIUM CHLORIDE 600; 310; 30; 20 MG/100ML; MG/100ML; MG/100ML; MG/100ML
INJECTION, SOLUTION INTRAVENOUS CONTINUOUS
Status: DISCONTINUED | OUTPATIENT
Start: 2021-12-24 | End: 2021-12-24 | Stop reason: HOSPADM

## 2021-12-24 RX ORDER — LIDOCAINE HYDROCHLORIDE 20 MG/ML
INJECTION, SOLUTION INFILTRATION; PERINEURAL PRN
Status: DISCONTINUED | OUTPATIENT
Start: 2021-12-24 | End: 2021-12-24

## 2021-12-24 RX ADMIN — FENTANYL CITRATE 50 MCG: 50 INJECTION, SOLUTION INTRAMUSCULAR; INTRAVENOUS at 07:13

## 2021-12-24 RX ADMIN — FENTANYL CITRATE 100 MCG: 50 INJECTION, SOLUTION INTRAMUSCULAR; INTRAVENOUS at 07:50

## 2021-12-24 RX ADMIN — FENTANYL CITRATE 50 MCG: 50 INJECTION, SOLUTION INTRAMUSCULAR; INTRAVENOUS at 09:35

## 2021-12-24 RX ADMIN — METRONIDAZOLE 500 MG: 500 INJECTION, SOLUTION INTRAVENOUS at 03:25

## 2021-12-24 RX ADMIN — HYDROMORPHONE HYDROCHLORIDE 0.2 MG: 0.2 INJECTION, SOLUTION INTRAMUSCULAR; INTRAVENOUS; SUBCUTANEOUS at 00:37

## 2021-12-24 RX ADMIN — SUGAMMADEX 200 MG: 100 INJECTION, SOLUTION INTRAVENOUS at 08:39

## 2021-12-24 RX ADMIN — OXYCODONE HYDROCHLORIDE 5 MG: 5 TABLET ORAL at 03:37

## 2021-12-24 RX ADMIN — SODIUM CHLORIDE: 9 INJECTION, SOLUTION INTRAVENOUS at 03:25

## 2021-12-24 RX ADMIN — Medication 10 MG: at 07:27

## 2021-12-24 RX ADMIN — CLINDAMYCIN PHOSPHATE 900 MG: 900 INJECTION, SOLUTION INTRAVENOUS at 07:12

## 2021-12-24 RX ADMIN — SODIUM CHLORIDE, POTASSIUM CHLORIDE, SODIUM LACTATE AND CALCIUM CHLORIDE: 600; 310; 30; 20 INJECTION, SOLUTION INTRAVENOUS at 07:07

## 2021-12-24 RX ADMIN — ROCURONIUM BROMIDE 50 MG: 50 INJECTION, SOLUTION INTRAVENOUS at 07:13

## 2021-12-24 RX ADMIN — HYDROMORPHONE HYDROCHLORIDE 0.2 MG: 0.2 INJECTION, SOLUTION INTRAMUSCULAR; INTRAVENOUS; SUBCUTANEOUS at 10:38

## 2021-12-24 RX ADMIN — OXYCODONE HYDROCHLORIDE 5 MG: 5 TABLET ORAL at 12:08

## 2021-12-24 RX ADMIN — ACETAMINOPHEN 975 MG: 325 TABLET, FILM COATED ORAL at 03:30

## 2021-12-24 RX ADMIN — SODIUM CHLORIDE, POTASSIUM CHLORIDE, SODIUM LACTATE AND CALCIUM CHLORIDE: 600; 310; 30; 20 INJECTION, SOLUTION INTRAVENOUS at 12:00

## 2021-12-24 RX ADMIN — ACETAMINOPHEN 975 MG: 325 TABLET, FILM COATED ORAL at 12:13

## 2021-12-24 RX ADMIN — ONDANSETRON 4 MG: 2 INJECTION INTRAMUSCULAR; INTRAVENOUS at 08:28

## 2021-12-24 RX ADMIN — LIDOCAINE HYDROCHLORIDE 40 MG: 20 INJECTION, SOLUTION INFILTRATION; PERINEURAL at 07:13

## 2021-12-24 RX ADMIN — PROPOFOL 130 MG: 10 INJECTION, EMULSION INTRAVENOUS at 07:13

## 2021-12-24 RX ADMIN — DEXAMETHASONE SODIUM PHOSPHATE 4 MG: 4 INJECTION, SOLUTION INTRA-ARTICULAR; INTRALESIONAL; INTRAMUSCULAR; INTRAVENOUS; SOFT TISSUE at 07:31

## 2021-12-24 ASSESSMENT — ACTIVITIES OF DAILY LIVING (ADL)
ADLS_ACUITY_SCORE: 5

## 2021-12-24 NOTE — OP NOTE
Operative Report  Kiesha Mcguire  [id number]  12/24/2021      Procedure: Laparoscopic cholecystectomy  Preoperative diagnosis: Acute cholecystitis  Postoperative diagnosis: Acute cholecystitis  Surgeon: Denise Cast MD  Assistant: Tito Leon MD  Anesthesia: General  EBL: 10 mL  Specimens: Gallbladder and contents  Drains: None       Indications for procedure: Kiesha Mcguire is a 51 year old female with acute cholecystitis. The risks/benefits of surgery vs nonoperative management were discussed in detail. The patient understood that life threatening complications, while rare, are nevertheless always a possibility. Risks of bleeding, infection, and damage to surrounding organs including bile duct injury was discussed. She is understanding of the discussion and agreeable to proceed with laparoscopic cholecystectomy.      Description of procedure:  Kiesha Mcguire was taken to the operating room and placed on the operating table in a supine position. Preoperative antibiotics had been given prior to incision. A boothe was placed. The abdomen was prepped and draped in the usual sterile fashion. Prior to incision, a time out was performed verifying the patient and procedure. Veress was attempted at Anderson's point but no insufflation pressure could be achieved. A supraumbilical incision was made and blunt dissection was carried down to the fascia. The fascia was elevated and incised and a 12 mm balloon port was placed into the abdomen under direct visualization. The abdomen was insufflated with CO2 to 15 mm Hg. A laparoscope was inserted and the abdomen was inspected to ensure that no injuries were made during initial entry. Additional 5 mm ports were placed, an epigastric port and two ports at the right costal margin.       Filmy adhesions between the gallbladder and the omentum were lysed. The dome of the gallbladder was grasped with an atraumatic grasper and elevated over the dome of the liver. The  infundibulum was also grasped and retracted toward the right lower quadrant. The peritoneum was incised and the cystic duct and artery were circumferentially dissected using a combination of electrocautery and blunt dissection. A critical view of safety was achieved. The cystic artery was doubly clipped proximally and clipped distally and sharply divided. The cystic duct was triply clipped proximally and singly clipped distally and sharply divided. The gallbladder was dissected off the liver bed using electrocautery and removed using an endoscopic retrieval bag bag. Hemostasis was ensured and the gallbladder fossa was irrigated. The clips were in good position at the end of the case and there was no leakage of blood or bile.       The pneumoperitoneum was evacuated and the ports were removed. The 12 mm port site fascia was closed using an 0 Vicryl suture in a figure-of-eight fashion. The skin was closed with 4-0 Monocryl suture and skin glue was applied. All sponge, needle, and instrument counts were correct x2 at the conclusion of the procedure. The boothe was removed at the end of the procedure. There were no immediate postoperative complications. The patient was extubated in the OR and taken to PACU.      Dr. Cast was present and scrubbed for the entire procedure.      Tito Leon MD  PGY-5, General Surgery

## 2021-12-24 NOTE — ED NOTES
[facility]   ED Nurse to Floor Handoff     Kiesha Mcguire is a 51 year old female who speaks English and lives   in a home  They arrived in the ED by car from clinic    ED Chief Complaint: Abdominal Pain    ED Dx;   Final diagnoses:   Acute cholecystitis         Needed?: No    Allergies:   Allergies   Allergen Reactions     Sumatriptan      imitrex     Augmentin Hives     Unsure if true reaction to med    .  Past Medical Hx:   Past Medical History:   Diagnosis Date     Depressive disorder      Depressive disorder, not elsewhere classified     resolved     Herpes simplex without mention of complication     oral     IUD (intrauterine device) in place 08/15/2013    Mirena     Migraine headache with aura     very occass, sig aura, speech loss x1     Pure hypercholesterolemia     follows w FM MD      Baseline Mental status: WDL  Current Mental Status changes: at basesline    Infection present or suspected this encounter: no  Sepsis suspected: No  Isolation type: Special Precautions-COVID-19  Patient tested for COVID 19 prior to admission: YES     Activity level - Baseline/Home:  Independent  Activity Level - Current:   Independent    Bariatric equipment needed?: No    In the ED these meds were given:   Medications   lidocaine 1 % 0.1-1 mL (has no administration in time range)   lidocaine (LMX4) cream (has no administration in time range)   sodium chloride (PF) 0.9% PF flush 3 mL (3 mLs Intracatheter Not Given 12/23/21 2340)   sodium chloride (PF) 0.9% PF flush 3 mL (has no administration in time range)   melatonin tablet 1 mg (has no administration in time range)   acetaminophen (TYLENOL) tablet 975 mg (975 mg Oral Not Given 12/23/21 2341)   oxyCODONE (ROXICODONE) tablet 5 mg (has no administration in time range)   HYDROmorphone (DILAUDID) injection 0.2 mg (has no administration in time range)   ondansetron (ZOFRAN-ODT) ODT tab 4 mg (has no administration in  time range)     Or   ondansetron (ZOFRAN) injection 4 mg (has no administration in time range)   metroNIDAZOLE (FLAGYL) infusion 500 mg (has no administration in time range)   cefTRIAXone (ROCEPHIN) 1 g vial to attach to  mL bag for ADULTS or NS 50 mL bag for PEDS (has no administration in time range)   0.9% sodium chloride BOLUS (0 mLs Intravenous Stopped 12/23/21 2341)   HYDROmorphone (PF) (DILAUDID) injection 0.5 mg (0.5 mg Intravenous Given 12/23/21 1804)   ondansetron (ZOFRAN) injection 4 mg (4 mg Intravenous Given 12/23/21 1805)   cefTRIAXone (ROCEPHIN) 2 g vial to attach to  ml bag for ADULTS or NS 50 ml bag for PEDS (0 g Intravenous Stopped 12/23/21 1854)   metroNIDAZOLE (FLAGYL) infusion 500 mg (0 mg Intravenous Stopped 12/23/21 2341)       Drips running?  No    Home pump  No    Current LDAs  Peripheral IV 12/23/21 Anterior;Right Upper forearm (Active)   Site Assessment WDL 12/23/21 1802   Line Status Infusing 12/23/21 1802   Dressing Intervention New dressing  12/23/21 1802   Phlebitis Scale 0-->no symptoms 12/23/21 1802   Number of days: 1       Labs results:   Labs Ordered and Resulted from Time of ED Arrival to Time of ED Departure   COVID-19 VIRUS (CORONAVIRUS) BY PCR - Normal       Result Value    SARS CoV2 PCR Negative         Imaging Studies:   Recent Results (from the past 24 hour(s))   CT Abdomen Pelvis w Contrast   Result Value    Radiologist flags Probable acute cholecystitis (Urgent)    Narrative    EXAMINATION: CT ABDOMEN PELVIS W CONTRAST, 12/23/2021 1:47 PM    TECHNIQUE:  Helical CT images from the lung bases through the  symphysis pubis were obtained with IV contrast. Contrast dose:  iopamidol (ISOVUE-370) solution 81 mL    COMPARISON: None    HISTORY: Epigastric pain; epigastric and mid back pain, microscopic  hematuria; Abdominal pain, epigastric    FINDINGS:    Lung bases: Clear.    Liver: Normal parenchymal attenuation without focal mass.  Biliary system: Gallbladder is  distended with moderate pericholecystic  fluid. Cholelithiasis. No intrahepatic or extrahepatic biliary ductal  dilatation.  Pancreas: No focal mass or dilation of the main pancreatic duct. Small  periampullary duodenal diverticulum.  Spleen: Within normal limits.  Adrenal glands: Within normal limits.  Kidneys: No focal mass, hydronephrosis, or stone.  Bladder: Within normal limits.  Reproductive organs: IUD in place in expected location.  GI: Within normal limits.  Lymph nodes: No intra-abdominal or pelvic lymphadenopathy.  Vasculature: Within normal limits.    Bones and soft tissues: No suspicious soft tissue mass or fluid  collection. No suspicious osseous lesion.      Impression    IMPRESSION:   Findings are concerning for acute cholecystitis with gallbladder  distention, cholelithiasis, and pericholecystic fluid.    [Urgent Result: Probable acute cholecystitis]    Finding was identified on 12/23/2021 1:53 PM.     Dr. Nolan  was contacted by Dr. Johnston at 12/23/2021 2:03 PM and  verbalized understanding of the urgent finding.     I have personally reviewed the examination and initial interpretation  and I agree with the findings.    ASHLIE MARIN MD         SYSTEM ID:  [id number]   CT Chest Pulmonary Embolism w Contrast    Narrative    EXAMINATION: CTA pulmonary angiogram, 12/23/2021 1:54 PM     COMPARISON: Epigastric and upper back pain, microscopic hematuria, PE  suspected, low/intermediate probability, negative d-dimer    HISTORY: Same day CT abdomen, chest radiographs 12/14/2010    TECHNIQUE: Volumetric helical acquisition of CT images of the chest  from the lung apices to the kidneys were acquired after the  administration of 81 mL of Isovue-370 IV contrast. Non-Flash technique  with shallow inspiratory breath hold technique.  Post-processed  multiplanar and/or MIP reformations were obtained, archived to PACS  and used in interpretation of this study.     FINDINGS:      Pulmonary angiogram: Contrast  bolus is excellent. Exam is negative for  pulmonary embolism. Right ventricle measures 4.8 cm, left ventricle  measures 4.6 cm (endocardial to endocardium), RV/LV ratio 1.0, which  is within normal limits in regards to right heart strain. No  interventricular septal bowing. No reflux of contrast into the IVC.  The main pulmonary artery measures 2.3 cm, within normal limits.    Lower neck: No supraclavicular or axillary lymphadenopathy. Normal  thyroid.    Mediastinum: No mass or lymphadenopathy. Unremarkable esophagus.    Heart: Normal heart size. No pericardial effusion.    Lungs: No consolidations, pleural effusions, pneumothorax, or  pulmonary nodules.    Upper abdomen: Partially visualized dilated gallbladder with  surrounding fluid that are seen on the same day contrast-enhanced  abdomen CT.    Bones and soft tissues: Unremarkable.      Impression    IMPRESSION:  1. Negative for pulmonary embolism.  2. Partially imaged acute cholecystitis better evaluated on the same  day abdomen CT.    I have personally reviewed the examination and initial interpretation  and I agree with the findings.    JERONIMO SANCHEZ MD         SYSTEM ID:  [id number]       Recent vital signs:   BP 92/59   Pulse 60   Temp 99  F (37.2  C) (Oral)   Resp 16   SpO2 97%     Rigoberto Coma Scale Score: 15 (12/24/21 0007)         Pt needs tele? No  Skin/wound Issues: None    Code Status: Full Code    Pain control: good    Nausea control: good    Abnormal labs/tests/findings requiring intervention: See EMR    Family present during ED course? No     Tasks needing completion: None    Cheryl Peterson, RN  [phone] [facility]

## 2021-12-24 NOTE — PLAN OF CARE
"Vital signs:  Temp: (!) 96  F (35.6  C) Temp src: Oral BP: (!) 84/46 Pulse: 57   Resp: 16 SpO2: 97 % O2 Device: None (Room air)        Estimated body mass index is 22.83 kg/m  as calculated from the following:    Height as of an earlier encounter on 12/23/21: 1.626 m (5' 4\").    Weight as of an earlier encounter on 12/23/21: 60.3 kg (133 lb).    NEURO: AOx4.   RESPIRATORY: denies SOB, LSC, denies cough.  CARDIAC: BP min 84/46. HRR. Denied dizziness/lightheadedness.   GI/: +BS. LBM 12/23. Voids spontaneously w/o difficulty.   DIET: NPO except meds.   PAIN/NAUSEA: pain to midline and L abdomen and L shoulder, controlled w/ prn IV dilaudid, PO oxycodone, and scheduled tylenol. Denies nausea.   INCISION/DRAINS/SKIN: skin WDL, 2 RN assessment completed. Enmanuel wipes completed.    IV ACCESS: R PIV infusing NS @75ml/hr w/ intermittent flagyl.   ACTIVITY: up ad derrick  LAB: reviewed. Lab came to draw labs after pt was being transported to PACU.   CHANGES: started on IVMF. Report given to PACU and updated about BP.   PLAN: continue w/ POC. Cholecystectomy this AM.      "

## 2021-12-24 NOTE — UTILIZATION REVIEW
Concurrent stay review; Secondary Review Determination    Under the authority of the Utilization Management Committee, the utilization review process indicated a secondary review on the above patient. The review outcome is based on review of the medical records, discussions with staff, and applying clinical experience noted on the date of the review.    (x) Observation Status Appropriate - Concurrent stay review        RATIONALE FOR DETERMINATION: 51-year-old female presents to the hospital right upper quadrant pain and found to have evidence of acute cholecystitis with normal white blood count and afebrile.  Observation care appropriate for initial IV fluids, antibiotics and surgical plans of laparoscopic cholecystectomy.  Case reviewed with treating team.    Patient is clinically improving and there is no clear indication to change patient's status to inpatient. The severity of illness, intensity of service provided, expected LOS and risk for adverse outcome make the care appropriate for observation.    This document was produced using voice recognition software    The information on this document is developed by the utilization review team in order for the business office to ensure compliance. This only denotes the appropriateness of proper admission status and does not reflect the quality of care rendered.    The definitions of Inpatient Status and Observation Status used in making the determination above are those provided in the CMS Coverage Manual, Chapter 1 and Chapter 6, section 70.4.    Sincerely,    Kunal Herring MD  Utilization Review  Physician Advisor  [facility], [place].

## 2021-12-24 NOTE — ANESTHESIA CARE TRANSFER NOTE
Patient: Kiesha Mcguire    Procedure: Procedure(s):  CHOLECYSTECTOMY, LAPAROSCOPIC       Diagnosis: Acute cholecystitis [K81.0]  Diagnosis Additional Information: No value filed.    Anesthesia Type:   General     Note:    Oropharynx: spontaneously breathing  Level of Consciousness: awake  Oxygen Supplementation: face mask  Level of Supplemental Oxygen (L/min / FiO2): 6  Independent Airway: airway patency satisfactory and stable  Dentition: dentition unchanged  Vital Signs Stable: post-procedure vital signs reviewed and stable  Report to RN Given: handoff report given  Patient transferred to: PACU    Handoff Report: Identifed the Patient, Identified the Reponsible Provider, Reviewed the pertinent medical history, Discussed the surgical course, Reviewed Intra-OP anesthesia mangement and issues during anesthesia, Set expectations for post-procedure period and Allowed opportunity for questions and acknowledgement of understanding      Vitals:  Vitals Value Taken Time   BP     Temp     Pulse 83 12/24/21 0856   Resp     SpO2 100 % 12/24/21 0856   Vitals shown include unvalidated device data.    Electronically Signed By: Wilder Felix Junior, MD  December 24, 2021  8:57 AM

## 2021-12-24 NOTE — ANESTHESIA POSTPROCEDURE EVALUATION
Patient: Kiesha Mcguire    Procedure: Procedure(s):  CHOLECYSTECTOMY, LAPAROSCOPIC       Diagnosis:Acute cholecystitis [K81.0]  Diagnosis Additional Information: No value filed.    Anesthesia Type:  General    Note:  Disposition: Inpatient   Postop Pain Control: Uneventful            Sign Out: Well controlled pain   PONV: No   Neuro/Psych: Uneventful            Sign Out: Acceptable/Baseline neuro status   Airway/Respiratory: Uneventful            Sign Out: Acceptable/Baseline resp. status   CV/Hemodynamics: Uneventful            Sign Out: Acceptable CV status; No obvious hypovolemia; No obvious fluid overload   Other NRE: NONE   DID A NON-ROUTINE EVENT OCCUR? No           Last vitals:  Vitals Value Taken Time   /65 12/24/21 0940   Temp 35.3  C (95.5  F) 12/24/21 0930   Pulse 67 12/24/21 0941   Resp 16 12/24/21 0930   SpO2 98 % 12/24/21 0941   Vitals shown include unvalidated device data.    Electronically Signed By: Cole Shields DO  December 24, 2021  9:43 AM

## 2021-12-24 NOTE — PLAN OF CARE
Problem: Adult Inpatient Plan of Care  Goal: Plan of Care Review  Outcome: Adequate for Discharge  Goal: Patient-Specific Goal (Individualized)  Outcome: Adequate for Discharge  Goal: Absence of Hospital-Acquired Illness or Injury  Outcome: Adequate for Discharge  Intervention: Identify and Manage Fall Risk  Recent Flowsheet Documentation  Taken 12/24/2021 1000 by Kati Grimes RN  Safety Promotion/Fall Prevention:   assistive device/personal items within reach   clutter free environment maintained   room door open  Goal: Optimal Comfort and Wellbeing  Outcome: Adequate for Discharge  Goal: Readiness for Transition of Care  Outcome: Adequate for Discharge

## 2021-12-24 NOTE — ANESTHESIA PREPROCEDURE EVALUATION
Anesthesia Pre-Procedure Evaluation    Patient: Kiesha Mcguire   MRN: [id number] : 1970        Preoperative Diagnosis: Acute cholecystitis [K81.0]    Procedure : Procedure(s):  CHOLECYSTECTOMY, LAPAROSCOPIC          Past Medical History:   Diagnosis Date     Depressive disorder      Depressive disorder, not elsewhere classified     resolved     Herpes simplex without mention of complication     oral     IUD (intrauterine device) in place 08/15/2013    Mirena     Migraine headache with aura     very occass, sig aura, speech loss x1     Pure hypercholesterolemia     follows w BIPIN CHATMAN      Past Surgical History:   Procedure Laterality Date     COLONOSCOPY N/A 10/27/2021    Procedure: COLONOSCOPY, WITH POLYPECTOMY AND CLIP;  Surgeon: Og Gutierres MD;  Location: [facility] OR      DILATION/CURETTAGE DIAG/THER NON OB  2006      Allergies   Allergen Reactions     Sumatriptan      imitrex     Augmentin Hives     Unsure if true reaction to med       Social History     Tobacco Use     Smoking status: Former Smoker     Packs/day: 0.50     Years: 10.00     Pack years: 5.00     Types: Cigarettes     Quit date: 1994     Years since quittin.4     Smokeless tobacco: Never Used   Substance Use Topics     Alcohol use: Yes     Comment: very rare      Wt Readings from Last 1 Encounters:   21 60.3 kg (133 lb)        Anesthesia Evaluation   Pt has had prior anesthetic. Type: MAC.        ROS/MED HX  ENT/Pulmonary:     (+) tobacco use, Past use,     Neurologic:     (+) migraines,     Cardiovascular:     (+) Dyslipidemia -----    METS/Exercise Tolerance:     Hematologic:  - neg hematologic  ROS     Musculoskeletal:  - neg musculoskeletal ROS     GI/Hepatic: Comment: Acute cholecystitis      Renal/Genitourinary:  - neg Renal ROS     Endo:  - neg endo ROS     Psychiatric/Substance Use: Comment: Depression      Infectious Disease: Comment: COVID negative       Malignancy:  - neg malignancy ROS      Other:            Physical Exam    Airway        Mallampati: II   TM distance: > 3 FB   Neck ROM: full   Mouth opening: > 3 cm    Respiratory Devices and Support         Dental  no notable dental history         Cardiovascular          Rhythm and rate: regular and normal     Pulmonary   pulmonary exam normal        breath sounds clear to auscultation           OUTSIDE LABS:  CBC:   Lab Results   Component Value Date    WBC 7.4 12/23/2021    WBC 4.6 11/30/2012    HGB 13.3 12/23/2021    HGB 12.0 11/11/2015    HCT 41.2 12/23/2021    HCT 37.4 11/30/2012     12/23/2021     11/30/2012     BMP:   Lab Results   Component Value Date     (L) 08/05/2021     12/09/2016    POTASSIUM 3.7 08/05/2021    POTASSIUM 3.1 (L) 12/09/2016    CHLORIDE 97 08/05/2021    CHLORIDE 103 12/09/2016    CO2 27 08/05/2021    CO2 23 12/09/2016    BUN 15 08/05/2021    BUN 12 12/09/2016    CR 0.66 08/05/2021    CR 0.59 12/09/2016    GLC 78 08/05/2021    GLC 82 11/27/2019     COAGS: No results found for: PTT, INR, FIBR  POC:   Lab Results   Component Value Date    HCG Negative 12/23/2021     HEPATIC:   Lab Results   Component Value Date    ALBUMIN 4.2 12/23/2021    PROTTOTAL 7.8 12/23/2021    ALT 36 12/23/2021    AST 19 12/23/2021    ALKPHOS 74 12/23/2021    BILITOTAL 0.5 12/23/2021     OTHER:   Lab Results   Component Value Date    MICHAEL 9.2 08/05/2021    LIPASE 103 12/23/2021    AMYLASE 46 12/23/2021    TSH 2.20 08/05/2021    T4 1.05 08/05/2021    SED 8 11/11/2015       Anesthesia Plan    ASA Status:  2      Anesthesia Type: General.     - Airway: ETT   Induction: Intravenous.   Maintenance: Balanced.   Techniques and Equipment:     - Lines/Monitors: BIS, 2nd IV     Consents    Anesthesia Plan(s) and associated risks, benefits, and realistic alternatives discussed. Questions answered and patient/representative(s) expressed understanding.    - Discussed:     - Discussed with:  Patient      - Extended Intubation/Ventilatory  Support Discussed: No.      - Patient is DNR/DNI Status: No    Use of blood products discussed: No .     Postoperative Care    Pain management: Multi-modal analgesia.   PONV prophylaxis: Ondansetron (or other 5HT-3), Dexamethasone or Solumedrol     Comments:                Rajni Gutierrez

## 2021-12-24 NOTE — PLAN OF CARE
Admitted/transferred from: ED at 00:30 for acute cholecystitis.   2 RN full   skin assessment completed by Violeta Joyce RN and Jesica KHAN RN.  Skin assessment finding: skin intact, no problems pt reported old  scar; completely healed and barely visible. Tattoo L flank.   Interventions/actions: pillows applied. Pt turns and repositions independently.      Will continue to monitor.

## 2021-12-24 NOTE — BRIEF OP NOTE
[facility]    Brief Operative Note    Pre-operative diagnosis: Acute cholecystitis [K81.0]  Post-operative diagnosis Same as pre-operative diagnosis    Procedure: Procedure(s):  CHOLECYSTECTOMY, LAPAROSCOPIC  Surgeon: Surgeon(s) and Role:     * Denise Cast MD - Primary     * Tito Leon MD - Resident - Assisting  Anesthesia: General   Estimated Blood Loss: 10 mL    Drains: None  Specimens:   ID Type Source Tests Collected by Time Destination   1 : Gallbladder and contents Tissue Gallbladder SURGICAL PATHOLOGY EXAM Denise Cast MD 12/24/2021  8:24 AM      Findings:   Hydropic gallbladder, surrounding edema.  Complications: None.  Implants: * No implants in log *

## 2021-12-24 NOTE — PROVIDER NOTIFICATION
7B rm 37-1 nevaeh aaron. FYI BP 84/46. denies dizziness/lightheadedness. NPO for AM surgery and currently no MIVF.  petty #[id number]

## 2021-12-24 NOTE — PROVIDER NOTIFICATION
7B 237(1)  Erik Larose   Patient would like to see a provider to discuss/get update about surgery before she discharge home.  Thank you.  Kati [phone]  Alphonso Blanco MD from general surgery paged.

## 2021-12-24 NOTE — OR NURSING
Page sent to general surgery resident:    MURALI Mcguire [phone]: pt needs pre op orders for cholecystectomy.  Thanks. Rachel *[phone]

## 2021-12-24 NOTE — PLAN OF CARE
Patient discharged home following hospital stay for:    Vitals stable.  Oxygen status: on room air   Patient tolerating diet: clear liquid.    Belongings sent with patient. IV site discontinued. Discharge meds to discharge pharmacy. AVS and education gone over with patient, signed copy in patient chart. Pt to follow up as outpatient and with PCP. Pt verbalized understanding of all education and information.

## 2021-12-24 NOTE — ANESTHESIA PROCEDURE NOTES
Airway       Patient location during procedure: OR  Staff -        Anesthesiologist:  Ori Gomez DO       Resident/Fellow: Wilder Orellana Jr., MD       Performed By: resident  Consent for Airway        Urgency: elective  Indications and Patient Condition       Indications for airway management: js-procedural       Induction type:intravenous       Mask difficulty assessment: 1 - vent by mask    Final Airway Details       Final airway type: endotracheal airway       Successful airway: ETT - single and Oral  Endotracheal Airway Details        ETT size (mm): 7.5       Cuffed: yes       Successful intubation technique: direct laryngoscopy       DL Blade Type: MAC 4       Grade View of Cords: 1       Position: Right       Measured from: lips       Secured at (cm): 24       Bite block used: None    Post intubation assessment        Placement verified by: capnometry, equal breath sounds and chest rise        Number of attempts at approach: 1       Secured with: paper tape       Ease of procedure: easy       Dentition: Unchanged

## 2021-12-25 NOTE — DISCHARGE SUMMARY
Discharge Summary    Kiesha Mcguire MRN# [id number]   YOB: 1970 Age: 51 year old     Date of Admission:  12/23/2021  Date of Discharge::  12/24/2021  6:18 PM  Admitting Physician:  Denise Cast MD  Discharge Physician:  Pete Duran PA-C  Primary Care Physician:        Alban Lynch          Admission Diagnoses:   Acute cholecystitis [K81.0]  Abdominal pain, epigastric [R10.13]         Procedures:   Laparoscopic cholecystectomy         Non-operative procedures:   None performed           Brief History of Illness:   Patient is a 51-year-old female with a history of depression and migraines who presents to the emergency department with 1 day duration of right upper quadrant abdominal pain. She reports the pain began last night after dinner. Associated with nausea and self-induced emesis without relief. Continued progressive pain throughout the day today. She went to her primary care clinic today where she had a CT scan showing evidence of acute cholecystitis. Lab notable for WBC 7.4, normal lipase, normal LFTs. She denies any fevers/chills.         Hospital Course:   Patient was admitted and underwent the procedure above and tolerated it well. On discharge day, her nausea controlled by antiemetics, she was generally tolerating regular diet, voiding, and ambulating, and pain was controlled with PO pain medication.    Physical Exam:  Laying comfortably in bed   NLB on RA   RRR on pulse   Abdomen soft non distended, appropriately tender around incisions. Incisions c/d.i          Consultations:   None           Imaging Studies:   [unfilled]         Medications Prior to Admission:     No medications prior to admission.            Discharge Medications:     Discharge Medication List as of 12/24/2021  2:04 PM      START taking these medications    Details   oxyCODONE (ROXICODONE) 5 MG tablet Take 1 tablet (5 mg) by mouth every 4 hours as needed for moderate to severe pain, Disp-10  tablet, R-0, Local Print         CONTINUE these medications which have NOT CHANGED    Details   Biotin 10 MG TABS tablet Take 10,000 mcg by mouth daily, Historical      CALCIUM-VITAMIN D-VITAMIN K PO Historical      clobetasol (TEMOVATE) 0.05 % external solution Apply daily as needed to scalpDisp-50 mL, [id number]      Collagen Hydrolysate POWD Historical      Cyanocobalamin (VITAMIN B 12 PO) Take by mouth daily, Historical      estradiol (ESTRING) 2 MG vaginal ring Place 1 each vaginally every 3 months, Disp-1 each, R-1, E-Prescribe      finasteride (PROSCAR) 5 MG tablet Take 1 tablet (5 mg) by mouth daily, Disp-90 tablet, R-3, E-Prescribe      !! levonorgestrel (MIRENA) 20 MCG/24HR IUD 1 each (20 mcg) by Intrauterine route onceHistorical      !! levonorgestrel (MIRENA) 20 MCG/24HR IUD 1 each by Intrauterine route onceHistorical      Lysine 500 MG TABS Take 1,000 mg by mouth daily , Historical      magnesium citrate solution Takes 1 tsp twice a day, 150 mg daily, Historical      NONFORMULARY 2 tablets daily jozef, Historical      prednisoLONE acetate (PRED FORTE) 1 % ophthalmic suspension Pt states she was prescribed but has not started it yet, Historical      !! valACYclovir (VALTREX) 1000 mg tablet Take 2 tablets (2,000 mg) by mouth 2 times daily For 2 days as needed for cold sores, Disp-12 tablet, R-11, E-Prescribe      !! valACYclovir (VALTREX) 500 MG tablet Take 1 tablet (500 mg) by mouth daily, Disp-90 tablet, R-1, E-Prescribe       !! - Potential duplicate medications found. Please discuss with provider.                Medications Discontinued or Adjusted During This Hospitalization:   New narcotics initiated: Oxycodone          Antibiotics Prescribed at Discharge:   None prescribed         Final Pathology Result:   Pending at time of discharge         Discharge Instructions and Follow-Up:   Discharge diet: Regular   Discharge activity: Lifting restricted to 5 pounds  No lifting,or strenuous  exercise for 4 week(s)   Discharge follow-up: None    Tubes/Lines/Drains: None   Wound care: May get incision wet in shower but do not soak or scrub          Home Health Care:   Not needed           Discharge Disposition:   Discharged to home      Condition at discharge: Stable    Pt discussed with staff.    Ruslan Huerta MD   Surgery PGY-2

## 2021-12-26 ENCOUNTER — PATIENT OUTREACH (OUTPATIENT)
Dept: CARE COORDINATION | Facility: CLINIC | Age: 51
End: 2021-12-26
Payer: COMMERCIAL

## 2021-12-26 DIAGNOSIS — Z71.89 OTHER SPECIFIED COUNSELING: ICD-10-CM

## 2021-12-26 NOTE — PROGRESS NOTES
Clinic Care Coordination Contact  [facility]/Voicemail       Clinical Data: Care Coordinator Outreach  Outreach attempted x 1.  Left message on patient's voicemail with call back information and requested return call.  Plan: Care Coordinator {cc letter  Care Coordinator will try to reach patient again in 1-2 business days.    Mikaela Montes, [facility]  [phone]  [facility]

## 2021-12-27 ENCOUNTER — PATIENT OUTREACH (OUTPATIENT)
Dept: SURGERY | Facility: CLINIC | Age: 51
End: 2021-12-27
Payer: COMMERCIAL

## 2021-12-27 NOTE — PROGRESS NOTES
Background: Care Coordination referral placed from [facility] discharge report for reason of patient meeting criteria for a TCM outreach call by [facility] team.    Assessment: Upon chart review, [facility] Team member will cancel/close the referral for TCM outreach due to reason below:    Patient has been contacted by a clinic RN or provider within [facility] for reason of discussing hospital follow up plan of care and answering questions patient may have related to discharge instructions.     Plan: Care Coordination referral for TCM outreach canceled to minimize duplicative efforts.    Gisele Flanagan MA  [facility] [place], [facility]

## 2021-12-27 NOTE — PROGRESS NOTES
RN Post-Op/Post-Discharge Care Coordination Note    Ms. Kiesha Mcguire is a 51 year old female who underwent laparoscopic cholecystectomy on 12/24 with  Dr. Cast.  Spoke with Patient.    Support  Patient able to care for self independently     Health Status  Nausea/Vomiting: Patient denies nausea/vomiting. Reports heartburn near RUQ. Recommended OTC TUMS or Rolaids per package instructions. If no improvement, asked to contact office.  Eating/drinking: Patient is able to eat and drink without any complaints. Reports decreased appetite. Recommended small, frequent meals high in protein. Tolerating fluids.   Bowel habits: Patient reports having loose stool, starting to bulk up. Should improve over next 1-2 weeks. If no improvement she was asked to contact this office.  Drains (ANDREY): N/A  Fevers/chills: Patient denies any fever or chills.  Incisions: Patient denies any signs and symptoms of infection..  Wound closure:  Skin Sealant.  Patient report a small area with a pin point rash just below her breasts -two small areas.  It is slightly itchy and she may use Hydrocortisone cream PRN or PO Benadryl per package instructions. She denies SOB, edema, or difficulty swallowing. She will go to the ED if develops respiratory distress or difficulty swallowing. She will call if the rash does not resolve or spreads.  Pain: Improving. Patient is medicating with Tylenol PRN per package instructions. She has been taking Oxycodone twice daily. Discussed weaning off of narcotics.  New Medications:  Oxycodone, Tylenol    Activity/Restrictions  No lifting in excess of 15-20 pounds for 3-4 weeks    Equipment  None    Pathology reviewed with patient:  No, not yet available    Forms/Letters  No    All of her questions were answered.  She will call this office if she has any further questions and/or concerns.      In lieu of a post-op clinic patient that patient would like to be contacted in approximately 7-10 days via  telephone.    A copy of this note was routed to the primary surgeon.      Whom and When to Call  Patient acknowledges understanding of how to manage any medication changes and   when to seek medical care.     Patient advised that if after hour medical concerns arise to please call [phone] and choose option 4 to speak to the physician on call.

## 2021-12-28 PROCEDURE — 88304 TISSUE EXAM BY PATHOLOGIST: CPT | Mod: 26 | Performed by: PATHOLOGY

## 2021-12-30 ENCOUNTER — OFFICE VISIT (OUTPATIENT)
Dept: OBGYN | Facility: CLINIC | Age: 51
End: 2021-12-30
Attending: ADVANCED PRACTICE MIDWIFE
Payer: COMMERCIAL

## 2021-12-30 VITALS
WEIGHT: 132 LBS | BODY MASS INDEX: 22.53 KG/M2 | HEART RATE: 67 BPM | SYSTOLIC BLOOD PRESSURE: 117 MMHG | HEIGHT: 64 IN | DIASTOLIC BLOOD PRESSURE: 80 MMHG

## 2021-12-30 DIAGNOSIS — Z00.00 VISIT FOR PREVENTIVE HEALTH EXAMINATION: ICD-10-CM

## 2021-12-30 DIAGNOSIS — Z97.5 IUD (INTRAUTERINE DEVICE) IN PLACE: ICD-10-CM

## 2021-12-30 DIAGNOSIS — Z00.00 ANNUAL PHYSICAL EXAM: Primary | ICD-10-CM

## 2021-12-30 DIAGNOSIS — B00.9 HERPES SIMPLEX VIRUS (HSV) INFECTION: ICD-10-CM

## 2021-12-30 DIAGNOSIS — N95.2 VAGINAL ATROPHY: ICD-10-CM

## 2021-12-30 PROCEDURE — 99396 PREV VISIT EST AGE 40-64: CPT | Performed by: ADVANCED PRACTICE MIDWIFE

## 2021-12-30 PROCEDURE — G0463 HOSPITAL OUTPT CLINIC VISIT: HCPCS

## 2021-12-30 ASSESSMENT — ANXIETY QUESTIONNAIRES
2. NOT BEING ABLE TO STOP OR CONTROL WORRYING: NOT AT ALL
7. FEELING AFRAID AS IF SOMETHING AWFUL MIGHT HAPPEN: NOT AT ALL
GAD7 TOTAL SCORE: 3
1. FEELING NERVOUS, ANXIOUS, OR ON EDGE: SEVERAL DAYS
3. WORRYING TOO MUCH ABOUT DIFFERENT THINGS: SEVERAL DAYS
6. BECOMING EASILY ANNOYED OR IRRITABLE: NOT AT ALL
5. BEING SO RESTLESS THAT IT IS HARD TO SIT STILL: NOT AT ALL

## 2021-12-30 ASSESSMENT — PATIENT HEALTH QUESTIONNAIRE - PHQ9: 5. POOR APPETITE OR OVEREATING: SEVERAL DAYS

## 2021-12-30 ASSESSMENT — MIFFLIN-ST. JEOR: SCORE: 1193.75

## 2021-12-30 NOTE — PROGRESS NOTES
Progress Note    SUBJECTIVE:  Kiesha Mcguire is an 51 year old, , who requests an Annual Preventive Exam.   -new dx of alopecia-doing injections, will start steriod cream  -recent surgery lap urvashi, healing well  -happy w IUD, checks strings routinely  -happy with local estrogen Estring, denies other s/s menopause,  Symptoms well controlled with estring  -does yearly biometrics  -up tic in anxiety this fall d/t covid, has started to see a therapist again, feeling stable    Concerns today include: none    Menstrual History:  Menstrual History 2016 2018 10/12/2020   LAST MENSTRUAL PERIOD - - 2020   Menarche Age 12 - -   Period Cycle (Days) every 90 days very light -   Period Duration (Days) - - -   Method of Contraception Mirena IUD Mirena IUD -   Period Pattern Regular - -   Menstrual Flow Light Light -   Menstrual Control - Panty liner -   Dysmenorrhea Mild None -   PMS Symptoms Cramping - -   Reviewed Today Yes Yes -       Last    Lab Results   Component Value Date    PAP NIL 10/12/2020     History of abnormal Pap smear: NO - age 30-65 PAP every 5 years with negative HPV co-testing recommended    Last   Lab Results   Component Value Date    HPV16 Negative 10/12/2020     Last   Lab Results   Component Value Date    HPV18 Negative 10/12/2020     Last   Lab Results   Component Value Date    HRHPV Negative 10/12/2020       Mammogram current: yes and not applicable  Last Mammogram:   No results found.     Last Colonoscopy:  Results for orders placed or performed during the hospital encounter of 10/27/21   COLONOSCOPY   Result Value Ref Range    COLONOSCOPY       [facility]  [address] [place], [place] [address]., [place] [address] [phone]     Endoscopy Department  _______________________________________________________________________________  Patient Name: Kiesha Mcguire       Procedure Date: 10/27/2021 10:04 AM  MRN: [id number]                       Account Number: [id number]  Date  of Birth: 1970              Admit Type: Outpatient  Age: 50                               Room: Pro 5  Gender: Female                        Note Status: Finalized  Attending MD: Og Gutierres ,    Total Sedation Time:   _______________________________________________________________________________     Procedure:           Colonoscopy  Indications:         Screening for colorectal malignant neoplasm  Providers:           Og Gutierres, Sadiq Pepper RN, Merle Marley  Referring MD:        KIM Mtz  Medicines:           Monitored Anesthesia Care  Complications:       No immediate complications.  ____________________________________ ___________________________________________  Procedure:           Pre-Anesthesia Assessment:                       - Please see EPIC H&P for pre-anesthesia assessment                       After obtaining informed consent, the colonoscope was                        passed under direct vision. Throughout the procedure,                        the patient's blood pressure, pulse, and oxygen                        saturations were monitored continuously. The Colonoscope                        was introduced through the anus and advanced to the                        terminal ileum, with identification of the appendiceal                        orifice and IC valve. The colonoscopy was performed                        without difficulty. The patient tolerated the procedure                        well. The quality of the bowel preparation was excellent.                                                                                   Findings:       The perianal and digital rectal examination s were normal.       A 6 mm polyp was found in the sigmoid colon. The polyp was sessile. The        polyp was removed with a cold snare. Resection and retrieval were        complete. To prevent bleeding after the polypectomy, one hemostatic clip        was successfully placed  (MR conditional). There was no bleeding at the        end of the maneuver.       Three flat polyps were found in the recto-sigmoid colon. The polyps were        1 to 3 mm in size. These polyps were removed with a cold biopsy forceps.        Resection and retrieval were complete. Verification of patient        identification for the specimen was done. Estimated blood loss was        minimal.       The terminal ileum appeared normal.                                                                                   Impression:          - One 6 mm polyp in the sigmoid colon, removed with a                        cold snare. Resected and retrieved. Clip (MR                        conditional) was placed.                        - Three 1 to 3 mm polyps at the recto-sigmoid colon,                        removed with a cold biopsy forceps. Resected and                        retrieved.                       - The examined portion of the ileum was normal.  Recommendation:      - Discharge patient to home.                       - Resume previous diet.                       - Continue present medications.                       - Await pathology results.                                                                                     Electronically signed by: Og Gutierres MD  _____________________  Og Gutierres,   10/27/2021 11:38:18 AM  I was physically present for the entire viewing portion of the exam.  __________________________  Signature of teaching physician  Og Gutierres  Number of Addenda: 0    Note Initiated On: 10/27/2021 10:04 AM  Scope In:  Scope Out:           HISTORY:  Biotin 10 MG TABS tablet, Take 10,000 mcg by mouth daily  Collagen Hydrolysate POWD,   Lysine 500 MG TABS, Take 1,000 mg by mouth daily   magnesium citrate solution, Takes 1 tsp twice a day, 150 mg daily  NONFORMULARY, 2 tablets daily [place]  oxyCODONE (ROXICODONE) 5 MG tablet, Take 1 tablet (5 mg) by mouth every 4 hours as needed  for moderate to severe pain  valACYclovir (VALTREX) 1000 mg tablet, Take 2 tablets (2,000 mg) by mouth 2 times daily For 2 days as needed for cold sores  valACYclovir (VALTREX) 500 MG tablet, Take 1 tablet (500 mg) by mouth daily  CALCIUM-VITAMIN D-VITAMIN K PO,   clobetasol (TEMOVATE) 0.05 % external solution, Apply daily as needed to scalp (Patient not taking: Reported on 2021)  Cyanocobalamin (VITAMIN B 12 PO), Take by mouth daily (Patient not taking: Reported on 2021)  finasteride (PROSCAR) 5 MG tablet, Take 1 tablet (5 mg) by mouth daily (Patient not taking: Reported on 2021)  levonorgestrel (MIRENA) 20 MCG/24HR IUD, 1 each (20 mcg) by Intrauterine route once  levonorgestrel (MIRENA) 20 MCG/24HR IUD, 1 each by Intrauterine route once  prednisoLONE acetate (PRED FORTE) 1 % ophthalmic suspension, Pt states she was prescribed but has not started it yet (Patient not taking: Reported on 2021)    acetaminophen (TYLENOL) tablet 1,000 mg      Allergies   Allergen Reactions     Sumatriptan Unknown     imitrex     Augmentin Hives     Unsure if true reaction to med      Immunization History   Administered Date(s) Administered     COVID-19,PF,Moderna 2021     COVID-19,PF,Pfizer (12+ Yrs) 2021, 2021     Influenza (IIV3) PF 2010     Influenza Vaccine IM > 6 months Valent IIV4 (Alfuria,Fluzone) 10/14/2018, 10/18/2019     MMR 10/11/2019     TD (ADULT, 7+) 2004     TDAP Vaccine (Adacel) 2019     Twinrix A/B 2019, 10/30/2019     Typhoid IM 2019       OB History    Para Term  AB Living   3 2 2 0 1 2   SAB IAB Ectopic Multiple Live Births   0 0 0 0 2     Past Medical History:   Diagnosis Date     Depressive disorder      Depressive disorder, not elsewhere classified     resolved     Herpes simplex without mention of complication     oral     IUD (intrauterine device) in place 08/15/2013    Mirena     Migraine headache with aura     very occass,  sig aura, speech loss x1     Pure hypercholesterolemia     follows w BIPIN CHATMAN     Past Surgical History:   Procedure Laterality Date     COLONOSCOPY N/A 10/27/2021    Procedure: COLONOSCOPY, WITH POLYPECTOMY AND CLIP;  Surgeon: Og Gutierres MD;  Location: [facility] OR      DILATION/CURETTAGE DIAG/THER NON OB  2006     LAPAROSCOPIC CHOLECYSTECTOMY N/A 2021    Procedure: CHOLECYSTECTOMY, LAPAROSCOPIC;  Surgeon: Denise Cast MD;  Location: [facility] OR     Family History   Problem Relation Age of Onset     Hypertension Mother      Hyperlipidemia Mother      Depression Mother      Hypertension Father      Cancer Father 65        neuro endrocine CA, neck     Hyperlipidemia Father      Aortic aneurysm Maternal Grandfather          of ascending aortic aneurysm at age 64     Aortic aneurysm Maternal Aunt          in her 40s from ascending aortic aneurysm     C.A.D. No family hx of      Cancer - colorectal No family hx of      Diabetes No family hx of      Cerebrovascular Disease No family hx of      Breast Cancer No family hx of      Prostate Cancer No family hx of      Social History     Socioeconomic History     Marital status:      Spouse name: None     Number of children: 2     Years of education: 16     Highest education level: None   Occupational History     Occupation:      Employer: [employer]     Comment: fulltime   Tobacco Use     Smoking status: Former Smoker     Packs/day: 0.50     Years: 10.00     Pack years: 5.00     Types: Cigarettes     Quit date: 1994     Years since quittin.4     Smokeless tobacco: Never Used   Substance and Sexual Activity     Alcohol use: Yes     Comment: very rare     Drug use: No     Sexual activity: Not Currently     Partners: Male     Birth control/protection: I.U.D.     Comment: Mirena   Other Topics Concern     Parent/sibling w/ CABG, MI or angioplasty before 65F 55M? No   Social History Narrative    How much exercise per week?  "2 90 minute yoga sessions      How much calcium per day? Diet       How much caffeine per day? 0    How much vitamin D per day? Supplement     Do you/your family wear seatbelts?  Yes    Do you/your family use safety helmets? Yes    Do you/your family use sunscreen? Yes    Do you/your family keep firearms in the home? No    Do you/your family have a smoke detector(s)? Yes        Do you feel safe in your home? Yes    Has anyone ever touched you in an unwanted manner? No     Explain Kiesha Justine [facility] 7/16/18        Reviewed ana conde 10-12-20             Social Determinants of Health     Financial Resource Strain: Not on file   Food Insecurity: Not on file   Transportation Needs: Not on file   Physical Activity: Not on file   Stress: Not on file   Social Connections: Not on file   Intimate Partner Violence: Unknown     Fear of Current or Ex-Partner: Not asked     Emotionally Abused: Not asked     Physically Abused: Not asked     Sexually Abused: Not asked   Housing Stability: Not on file      ROS: 10 point ROS neg other than the symptoms noted above in the HPI.    ROS  [unfilled]  PHQ-9 SCORE 12/22/2016 9/23/2019 10/12/2020   PHQ-9 Total Score 0 0 0     NORBERT-7 SCORE 9/23/2019 10/12/2020 12/30/2021   Total Score 0 0 3         EXAM:  Blood pressure 117/80, pulse 67, height 1.626 m (5' 4\"), weight 59.9 kg (132 lb), not currently breastfeeding. Body mass index is 22.66 kg/m .  General - pleasant female in no acute distress.  Skin - no suspicious lesions or rashes  EENT-  PERRLA, euthyroid with out palpable nodules  Neck - supple without lymphadenopathy.  Lungs - clear to auscultation bilaterally.  Heart - regular rate and rhythm without murmur.  Abdomen - soft, nontender, nondistended, no masses or organomegaly noted.  Musculoskeletal - no gross deformities.  Neurological - normal strength, sensation, and mental status.    Breast Exam:  Breast: Without visible skin changes. No dimpling or lesions seen.   Breasts supple, " non-tender with palpation, no dominant mass, nodularity, or nipple discharge noted bilaterally. Axillary nodes negative.      Pelvic Exam:  EG/BUS: Normal genital architecture without lesions, pale w evidence of atrophy, erythema or abnormal secretions Bartholin's, Urethra, [place]'s normal   Urethral meatus: normal   Urethra: no masses, tenderness, or scarring   Bladder: no masses or tenderness   Vagina: moist, pale pink, rugae with creamy, white and odorless  secretions  Cervix: no lesions, moist, closed, without lesion or CMT and IUD strings extend 2 cm from external os.  Uterus: anteverted,  and small, smooth, firm, mobile w/o pain  Adnexa: Within normal limits and No masses, nodularity, tenderness  Rectum:anus normal       ASSESSMENT:  Encounter Diagnoses   Name Primary?     IUD (intrauterine device) in place      Herpes simplex virus (HSV) infection      Vaginal atrophy      Annual physical exam Yes        PLAN:   No orders of the defined types were placed in this encounter.    Orders Placed This Encounter   Medications     estradiol (ESTRING) 2 MG vaginal ring     Sig: Place 1 each vaginally every 3 months     Dispense:  1 each     Refill:  3       Additional teaching done at this visit regarding self breast exam, birth control, perimenopause and mental health.  Reviewed use of IUD and estrogen--continue established plan  Will call to schedule mammo in a couple of months    Return to clinic in one year.  Follow-up as needed.

## 2021-12-30 NOTE — PATIENT INSTRUCTIONS

## 2021-12-30 NOTE — LETTER
2021       RE: Kiesha Mcguire  [address] [facility] [address]     Dear Colleague,    Thank you for referring your patient, Kiesha Mcguire, to the [facility] [place] at [facility]. Please see a copy of my visit note below.      Progress Note    SUBJECTIVE:  Kiesha Mcguire is an 51 year old, , who requests an Annual Preventive Exam.   -new dx of alopecia-doing injections, will start steriod cream  -recent surgery lap urvashi, healing well  -happy w IUD, checks strings routinely  -happy with local estrogen Estring, denies other s/s menopause,  Symptoms well controlled with estring  -does yearly biometrics  -up tic in anxiety this fall d/t covid, has started to see a therapist again, feeling stable    Concerns today include: none    Menstrual History:  Menstrual History 2016 2018 10/12/2020   LAST MENSTRUAL PERIOD - - 2020   Menarche Age 12 - -   Period Cycle (Days) every 90 days very light -   Period Duration (Days) - - -   Method of Contraception Mirena IUD Mirena IUD -   Period Pattern Regular - -   Menstrual Flow Light Light -   Menstrual Control - Panty liner -   Dysmenorrhea Mild None -   PMS Symptoms Cramping - -   Reviewed Today Yes Yes -       Last    Lab Results   Component Value Date    PAP NIL 10/12/2020     History of abnormal Pap smear: NO - age 30-65 PAP every 5 years with negative HPV co-testing recommended    Last   Lab Results   Component Value Date    HPV16 Negative 10/12/2020     Last   Lab Results   Component Value Date    HPV18 Negative 10/12/2020     Last   Lab Results   Component Value Date    HRHPV Negative 10/12/2020       Mammogram current: yes and not applicable  Last Mammogram:   No results found.     Last Colonoscopy:  Results for orders placed or performed during the hospital encounter of 10/27/21   COLONOSCOPY   Result Value Ref Range    COLONOSCOPY        [facility] [address] [place] [address] [phone]     Endoscopy Department  _______________________________________________________________________________  Patient Name: Kiesha Mcguire       Procedure Date: 10/27/2021 10:04 AM  MRN: [id number]                       Account Number: [id number]  YOB: 1970              Admit Type: Outpatient  Age: 50                               Room: Pro 5  Gender: Female                        Note Status: Finalized  Attending MD: Og Gutierres ,    Total Sedation Time:   _______________________________________________________________________________     Procedure:           Colonoscopy  Indications:         Screening for colorectal malignant neoplasm  Providers:           Og Gutierres, Sadiq Pepper RN, Merle Marley  Referring MD:        KIM Mtz  Medicines:           Monitored Anesthesia Care  Complications:       No immediate complications.  ____________________________________ ___________________________________________  Procedure:           Pre-Anesthesia Assessment:                       - Please see EPIC H&P for pre-anesthesia assessment                       After obtaining informed consent, the colonoscope was                        passed under direct vision. Throughout the procedure,                        the patient's blood pressure, pulse, and oxygen                        saturations were monitored continuously. The Colonoscope                        was introduced through the anus and advanced to the                        terminal ileum, with identification of the appendiceal                        orifice and IC valve. The colonoscopy was performed                        without difficulty. The patient tolerated the procedure                        well. The quality of the bowel preparation was excellent.                                                                                    Findings:       The perianal and digital rectal examination s were normal.       A 6 mm polyp was found in the sigmoid colon. The polyp was sessile. The        polyp was removed with a cold snare. Resection and retrieval were        complete. To prevent bleeding after the polypectomy, one hemostatic clip        was successfully placed (MR conditional). There was no bleeding at the        end of the maneuver.       Three flat polyps were found in the recto-sigmoid colon. The polyps were        1 to 3 mm in size. These polyps were removed with a cold biopsy forceps.        Resection and retrieval were complete. Verification of patient        identification for the specimen was done. Estimated blood loss was        minimal.       The terminal ileum appeared normal.                                                                                   Impression:          - One 6 mm polyp in the sigmoid colon, removed with a                        cold snare. Resected and retrieved. Clip (MR                        conditional) was placed.                        - Three 1 to 3 mm polyps at the recto-sigmoid colon,                        removed with a cold biopsy forceps. Resected and                        retrieved.                       - The examined portion of the ileum was normal.  Recommendation:      - Discharge patient to home.                       - Resume previous diet.                       - Continue present medications.                       - Await pathology results.                                                                                     Electronically signed by: Og Gutierres MD  _____________________  Og Gutierres,   10/27/2021 11:38:18 AM  I was physically present for the entire viewing portion of the exam.  __________________________  Signature of teaching physician  Og Gutierres  Number of Addenda: 0    Note Initiated On: 10/27/2021 10:04 AM  Scope In:  Scope Out:            HISTORY:  Biotin 10 MG TABS tablet, Take 10,000 mcg by mouth daily  Collagen Hydrolysate POWD,   Lysine 500 MG TABS, Take 1,000 mg by mouth daily   magnesium citrate solution, Takes 1 tsp twice a day, 150 mg daily  NONFORMULARY, 2 tablets daily seabuckthorn  oxyCODONE (ROXICODONE) 5 MG tablet, Take 1 tablet (5 mg) by mouth every 4 hours as needed for moderate to severe pain  valACYclovir (VALTREX) 1000 mg tablet, Take 2 tablets (2,000 mg) by mouth 2 times daily For 2 days as needed for cold sores  valACYclovir (VALTREX) 500 MG tablet, Take 1 tablet (500 mg) by mouth daily  CALCIUM-VITAMIN D-VITAMIN K PO,   clobetasol (TEMOVATE) 0.05 % external solution, Apply daily as needed to scalp (Patient not taking: Reported on 2021)  Cyanocobalamin (VITAMIN B 12 PO), Take by mouth daily (Patient not taking: Reported on 2021)  finasteride (PROSCAR) 5 MG tablet, Take 1 tablet (5 mg) by mouth daily (Patient not taking: Reported on 2021)  levonorgestrel (MIRENA) 20 MCG/24HR IUD, 1 each (20 mcg) by Intrauterine route once  levonorgestrel (MIRENA) 20 MCG/24HR IUD, 1 each by Intrauterine route once  prednisoLONE acetate (PRED FORTE) 1 % ophthalmic suspension, Pt states she was prescribed but has not started it yet (Patient not taking: Reported on 2021)    acetaminophen (TYLENOL) tablet 1,000 mg      Allergies   Allergen Reactions     Sumatriptan Unknown     imitrex     Augmentin Hives     Unsure if true reaction to med      Immunization History   Administered Date(s) Administered     COVID-19,PF,Moderna 2021     COVID-19,PF,Pfizer (12+ Yrs) 2021, 2021     Influenza (IIV3) PF 2010     Influenza Vaccine IM > 6 months Valent IIV4 (Alfuria,Fluzone) 10/14/2018, 10/18/2019     MMR 10/11/2019     TD (ADULT, 7+) 2004     TDAP Vaccine (Adacel) 2019     Twinrix A/B 2019, 10/30/2019     Typhoid IM 2019       OB History    Para Term  AB Living   3 2 2 0  1 2   SAB IAB Ectopic Multiple Live Births   0 0 0 0 2     Past Medical History:   Diagnosis Date     Depressive disorder      Depressive disorder, not elsewhere classified     resolved     Herpes simplex without mention of complication     oral     IUD (intrauterine device) in place 08/15/2013    Mirena     Migraine headache with aura     very occass, sig aura, speech loss x1     Pure hypercholesterolemia     follows w BIPIN CHATMAN     Past Surgical History:   Procedure Laterality Date     COLONOSCOPY N/A 10/27/2021    Procedure: COLONOSCOPY, WITH POLYPECTOMY AND CLIP;  Surgeon: Og Gutierres MD;  Location: [facility] OR      DILATION/CURETTAGE DIAG/THER NON OB  2006     LAPAROSCOPIC CHOLECYSTECTOMY N/A 2021    Procedure: CHOLECYSTECTOMY, LAPAROSCOPIC;  Surgeon: Denise Cast MD;  Location: [facility] OR     Family History   Problem Relation Age of Onset     Hypertension Mother      Hyperlipidemia Mother      Depression Mother      Hypertension Father      Cancer Father 65        neuro endrocine CA, neck     Hyperlipidemia Father      Aortic aneurysm Maternal Grandfather          of ascending aortic aneurysm at age 64     Aortic aneurysm Maternal Aunt          in her 40s from ascending aortic aneurysm     C.A.D. No family hx of      Cancer - colorectal No family hx of      Diabetes No family hx of      Cerebrovascular Disease No family hx of      Breast Cancer No family hx of      Prostate Cancer No family hx of      Social History     Socioeconomic History     Marital status:      Spouse name: None     Number of children: 2     Years of education: 16     Highest education level: None   Occupational History     Occupation:      Employer: [employer]     Comment: fulltime   Tobacco Use     Smoking status: Former Smoker     Packs/day: 0.50     Years: 10.00     Pack years: 5.00     Types: Cigarettes     Quit date: 1994     Years since quittin.4     Smokeless tobacco: Never  "Used   Substance and Sexual Activity     Alcohol use: Yes     Comment: very rare     Drug use: No     Sexual activity: Not Currently     Partners: Male     Birth control/protection: I.U.D.     Comment: Mirena   Other Topics Concern     Parent/sibling w/ CABG, MI or angioplasty before 65F 55M? No   Social History Narrative    How much exercise per week? 2 90 minute yoga sessions      How much calcium per day? Diet       How much caffeine per day? 0    How much vitamin D per day? Supplement     Do you/your family wear seatbelts?  Yes    Do you/your family use safety helmets? Yes    Do you/your family use sunscreen? Yes    Do you/your family keep firearms in the home? No    Do you/your family have a smoke detector(s)? Yes        Do you feel safe in your home? Yes    Has anyone ever touched you in an unwanted manner? No     Explain Kiesha [facility] 7/16/18        Reviewed [facility] 10-12-20             Social Determinants of Health     Financial Resource Strain: Not on file   Food Insecurity: Not on file   Transportation Needs: Not on file   Physical Activity: Not on file   Stress: Not on file   Social Connections: Not on file   Intimate Partner Violence: Unknown     Fear of Current or Ex-Partner: Not asked     Emotionally Abused: Not asked     Physically Abused: Not asked     Sexually Abused: Not asked   Housing Stability: Not on file      ROS: 10 point ROS neg other than the symptoms noted above in the HPI.    ROS  [unfilled]  PHQ-9 SCORE 12/22/2016 9/23/2019 10/12/2020   PHQ-9 Total Score 0 0 0     NORBERT-7 SCORE 9/23/2019 10/12/2020 12/30/2021   Total Score 0 0 3         EXAM:  Blood pressure 117/80, pulse 67, height 1.626 m (5' 4\"), weight 59.9 kg (132 lb), not currently breastfeeding. Body mass index is 22.66 kg/m .  General - pleasant female in no acute distress.  Skin - no suspicious lesions or rashes  EENT-  PERRLA, euthyroid with out palpable nodules  Neck - supple without lymphadenopathy.  Lungs - clear to " auscultation bilaterally.  Heart - regular rate and rhythm without murmur.  Abdomen - soft, nontender, nondistended, no masses or organomegaly noted.  Musculoskeletal - no gross deformities.  Neurological - normal strength, sensation, and mental status.    Breast Exam:  Breast: Without visible skin changes. No dimpling or lesions seen.   Breasts supple, non-tender with palpation, no dominant mass, nodularity, or nipple discharge noted bilaterally. Axillary nodes negative.      Pelvic Exam:  EG/BUS: Normal genital architecture without lesions, pale w evidence of atrophy, erythema or abnormal secretions Bartholin's, Urethra, Hauula's normal   Urethral meatus: normal   Urethra: no masses, tenderness, or scarring   Bladder: no masses or tenderness   Vagina: moist, pale pink, rugae with creamy, white and odorless  secretions  Cervix: no lesions, moist, closed, without lesion or CMT and IUD strings extend 2 cm from external os.  Uterus: anteverted,  and small, smooth, firm, mobile w/o pain  Adnexa: Within normal limits and No masses, nodularity, tenderness  Rectum:anus normal       ASSESSMENT:  Encounter Diagnoses   Name Primary?     IUD (intrauterine device) in place      Herpes simplex virus (HSV) infection      Vaginal atrophy      Annual physical exam Yes        PLAN:   No orders of the defined types were placed in this encounter.    Orders Placed This Encounter   Medications     estradiol (ESTRING) 2 MG vaginal ring     Sig: Place 1 each vaginally every 3 months     Dispense:  1 each     Refill:  3       Additional teaching done at this visit regarding self breast exam, birth control, perimenopause and mental health.  Reviewed use of IUD and estrogen--continue established plan  Will call to schedule mammo in a couple of months    Return to clinic in one year.  Follow-up as needed.        Again, thank you for allowing me to participate in the care of your patient.      Sincerely,    JERAMY Guerin CNM

## 2021-12-31 ASSESSMENT — ANXIETY QUESTIONNAIRES: GAD7 TOTAL SCORE: 3

## 2022-01-04 ENCOUNTER — PATIENT OUTREACH (OUTPATIENT)
Dept: SURGERY | Facility: CLINIC | Age: 52
End: 2022-01-04
Payer: COMMERCIAL

## 2022-01-04 NOTE — PROGRESS NOTES
Kiesha Mcguire was contacted several days post procedure via telephone for a status update and elected to have a telephone follow -up call approximately 7-10 days after original contact in lieu of a clinic visit with Dr. Cast.  She continues to do well and the skin glue  has not peeled off.  The patients wounds are healed and the area is C/D/I. The subxyphoid incision appears irritated from bra rubbing over area.  She is afebrile, pain free, and yumiko po; the patient is slowly resuming his normal activity.   Discussed restrictions including no lifting in excess of 15-20 pounds for 3 weeks.  Pathology was reviewed with the patient: Yes     Stools remain loose but are slowly bulking up.  She will call this office is there is no improvement.    All of Kiesha Mcguire question's were answered and  she would like to return to the clinic on a PRN basis.  The patient is aware that  she may schedule a post op appointment at any time.    A copy of this note was routed to the patients surgeon.      Pathology:  Case Report   Surgical Pathology Report                         Case: [id number]                                   Authorizing Provider:  Denise Cast MD  Collected:           12/24/2021 08:24 AM           Ordering Location:      MAIN OR                 Received:            12/27/2021 07:54 AM           Pathologist:           Kirk Herrera MD                                                              Specimen:    Gallbladder, Gallbladder and contents                                                      Final Diagnosis   A. GALLBLADDER, CHOLECYSTECTOMY:  - Acute and chronic cholecystitis with cholelithiasis

## 2022-01-21 ENCOUNTER — OFFICE VISIT (OUTPATIENT)
Dept: FAMILY MEDICINE | Facility: CLINIC | Age: 52
End: 2022-01-21
Payer: COMMERCIAL

## 2022-01-21 VITALS
BODY MASS INDEX: 22.71 KG/M2 | TEMPERATURE: 97.9 F | OXYGEN SATURATION: 99 % | DIASTOLIC BLOOD PRESSURE: 66 MMHG | WEIGHT: 133 LBS | SYSTOLIC BLOOD PRESSURE: 94 MMHG | RESPIRATION RATE: 16 BRPM | HEART RATE: 74 BPM | HEIGHT: 64 IN

## 2022-01-21 DIAGNOSIS — R07.9 EXERTIONAL CHEST PAIN: Primary | ICD-10-CM

## 2022-01-21 DIAGNOSIS — Z12.31 VISIT FOR SCREENING MAMMOGRAM: ICD-10-CM

## 2022-01-21 PROCEDURE — 93000 ELECTROCARDIOGRAM COMPLETE: CPT | Performed by: FAMILY MEDICINE

## 2022-01-21 PROCEDURE — 99214 OFFICE O/P EST MOD 30 MIN: CPT | Performed by: FAMILY MEDICINE

## 2022-01-21 ASSESSMENT — PAIN SCALES - GENERAL: PAINLEVEL: MILD PAIN (2)

## 2022-01-21 ASSESSMENT — MIFFLIN-ST. JEOR: SCORE: 1198.28

## 2022-01-21 NOTE — PROGRESS NOTES
Assessment & Plan     Exertional chest pain  - EKG WNL  - Family history of AAA, pt has HLD.  No other risk factors  - Will obtain a stress echo  - Advised no strenuous exercise until stress test is back   - EKG 12-lead complete w/read - Clinics  - Echocardiogram Exercise Stress; Future    Visit for screening mammogram  - MA SCREENING DIGITAL BILAT - Future  (s+30); Future      Return in about 2 weeks (around 2/4/2022).    Magali Cobb, DO  [facility]    ======================================================================    Subjective   Kiesha is a 51 year old who presents for the following health issues     HPI     Patient was on her stationary bike on Wednesday night and started to have chest pain when she was doing a sprint.  The pain lasted for the rest of the night.  Pain is in the center of her chest and is difficult to describe.  Pain went away and then happened again last night.    No SOB.  She sometimes gets palpitations at night, but nothing related to this pain.      Patient has a history of epigastic abdominal pain and cholecystitis (s/p cholecystectomy in December 2021).      Pain History:  When did you first notice your pain? - Less than 1 week   Have you seen anyone else for your pain? No  Where in your body do you have pain? Chest Pain  Onset/Duration: a couple days to a wk   Description:   Location: middle of chest   Character: not sure   Radiation: mid chest    Duration: all night  Wed, night   Intensity: mild  Progression of Symptoms: same and intermittent  Accompanying Signs & Symptoms:  Shortness of breath: no  Sweating: no  Nausea/vomiting: no  Lightheadedness: no  Palpitations: YES, heart rate was 86   Fever/Chills: no  Cough: no           Heartburn: not sure if that is what this is    History:   Family history of heart disease: no/ AAA does have Fx Hx   Tobacco use: no  Previous similar symptoms: no  Precipitating factors:   Worse with exertion: YES-  "with exercise   Worse with deep breaths: no           Related to eating: not sure             Better with burping: no  Alleviating factors: mild  Therapies tried and outcome: none       Review of Systems   Constitutional, HEENT, cardiovascular, pulmonary, gi and gu systems are negative, except as otherwise noted.      Objective    BP 94/66 (BP Location: Right arm)   Pulse 74   Temp 97.9  F (36.6  C) (Oral)   Resp 16   Ht 1.626 m (5' 4\")   Wt 60.3 kg (133 lb)   SpO2 99%   BMI 22.83 kg/m    Body mass index is 22.83 kg/m .  Physical Exam   GENERAL: healthy, alert and no distress  RESP: lungs clear to auscultation - no rales, rhonchi or wheezes  CV: regular rates and rhythm, normal S1 S2, no S3 or S4 and no murmur, click or rub    EKG - Reviewed and interpreted by me appears normal, NSR, normal axis, normal intervals, no acute ST/T changes c/w ischemia, no LVH by voltage criteria, there are no prior tracings available          "

## 2022-02-02 ENCOUNTER — ANCILLARY PROCEDURE (OUTPATIENT)
Dept: CARDIOLOGY | Facility: CLINIC | Age: 52
End: 2022-02-02
Attending: FAMILY MEDICINE
Payer: COMMERCIAL

## 2022-02-02 DIAGNOSIS — R07.9 EXERTIONAL CHEST PAIN: ICD-10-CM

## 2022-02-02 PROCEDURE — 93017 CV STRESS TEST TRACING ONLY: CPT | Performed by: INTERNAL MEDICINE

## 2022-02-02 PROCEDURE — 99207 PR STATISTIC IV PUSH SINGLE INITIAL SUBSTANCE: CPT | Performed by: INTERNAL MEDICINE

## 2022-02-02 PROCEDURE — 93352 ADMIN ECG CONTRAST AGENT: CPT | Performed by: INTERNAL MEDICINE

## 2022-02-02 PROCEDURE — 93018 CV STRESS TEST I&R ONLY: CPT | Performed by: INTERNAL MEDICINE

## 2022-02-02 PROCEDURE — 93325 DOPPLER ECHO COLOR FLOW MAPG: CPT | Mod: 26 | Performed by: INTERNAL MEDICINE

## 2022-02-02 PROCEDURE — 93350 STRESS TTE ONLY: CPT | Mod: TC | Performed by: INTERNAL MEDICINE

## 2022-02-02 PROCEDURE — 93325 DOPPLER ECHO COLOR FLOW MAPG: CPT | Mod: TC | Performed by: INTERNAL MEDICINE

## 2022-02-02 PROCEDURE — 93321 DOPPLER ECHO F-UP/LMTD STD: CPT | Mod: TC | Performed by: INTERNAL MEDICINE

## 2022-02-02 PROCEDURE — 93350 STRESS TTE ONLY: CPT | Mod: 26 | Performed by: INTERNAL MEDICINE

## 2022-02-02 PROCEDURE — 93321 DOPPLER ECHO F-UP/LMTD STD: CPT | Mod: 26 | Performed by: INTERNAL MEDICINE

## 2022-02-02 PROCEDURE — 93016 CV STRESS TEST SUPVJ ONLY: CPT | Performed by: INTERNAL MEDICINE

## 2022-02-02 RX ADMIN — Medication 3 ML: at 10:02

## 2022-02-09 ENCOUNTER — OFFICE VISIT (OUTPATIENT)
Dept: DERMATOLOGY | Facility: CLINIC | Age: 52
End: 2022-02-09
Payer: COMMERCIAL

## 2022-02-09 VITALS
WEIGHT: 132 LBS | BODY MASS INDEX: 22.66 KG/M2 | DIASTOLIC BLOOD PRESSURE: 62 MMHG | HEART RATE: 76 BPM | SYSTOLIC BLOOD PRESSURE: 89 MMHG | OXYGEN SATURATION: 100 %

## 2022-02-09 DIAGNOSIS — L66.12 FRONTAL FIBROSING ALOPECIA: Primary | ICD-10-CM

## 2022-02-09 LAB
ALBUMIN SERPL-MCNC: 4.2 G/DL (ref 3.4–5)
ALP SERPL-CCNC: 84 U/L (ref 40–150)
ALT SERPL W P-5'-P-CCNC: 47 U/L (ref 0–50)
ANION GAP SERPL CALCULATED.3IONS-SCNC: 7 MMOL/L (ref 3–14)
AST SERPL W P-5'-P-CCNC: 27 U/L (ref 0–45)
BILIRUB SERPL-MCNC: 0.5 MG/DL (ref 0.2–1.3)
BUN SERPL-MCNC: 15 MG/DL (ref 7–30)
CALCIUM SERPL-MCNC: 9.7 MG/DL (ref 8.5–10.1)
CHLORIDE BLD-SCNC: 102 MMOL/L (ref 94–109)
CO2 SERPL-SCNC: 27 MMOL/L (ref 20–32)
CREAT SERPL-MCNC: 0.64 MG/DL (ref 0.52–1.04)
ERYTHROCYTE [DISTWIDTH] IN BLOOD BY AUTOMATED COUNT: 13.1 % (ref 10–15)
GFR SERPL CREATININE-BSD FRML MDRD: >90 ML/MIN/1.73M2
GLUCOSE BLD-MCNC: 48 MG/DL (ref 70–99)
HCT VFR BLD AUTO: 38.6 % (ref 35–47)
HGB BLD-MCNC: 12.6 G/DL (ref 11.7–15.7)
MCH RBC QN AUTO: 30.5 PG (ref 26.5–33)
MCHC RBC AUTO-ENTMCNC: 32.6 G/DL (ref 31.5–36.5)
MCV RBC AUTO: 94 FL (ref 78–100)
PLATELET # BLD AUTO: 215 10E3/UL (ref 150–450)
POTASSIUM BLD-SCNC: 4.1 MMOL/L (ref 3.4–5.3)
PROT SERPL-MCNC: 7.5 G/DL (ref 6.8–8.8)
RBC # BLD AUTO: 4.13 10E6/UL (ref 3.8–5.2)
SODIUM SERPL-SCNC: 136 MMOL/L (ref 133–144)
WBC # BLD AUTO: 4.4 10E3/UL (ref 4–11)

## 2022-02-09 PROCEDURE — 80053 COMPREHEN METABOLIC PANEL: CPT | Performed by: PHYSICIAN ASSISTANT

## 2022-02-09 PROCEDURE — 11900 INJECT SKIN LESIONS </W 7: CPT | Performed by: PHYSICIAN ASSISTANT

## 2022-02-09 PROCEDURE — 85027 COMPLETE CBC AUTOMATED: CPT | Performed by: PHYSICIAN ASSISTANT

## 2022-02-09 PROCEDURE — 36415 COLL VENOUS BLD VENIPUNCTURE: CPT | Performed by: PHYSICIAN ASSISTANT

## 2022-02-09 PROCEDURE — 99213 OFFICE O/P EST LOW 20 MIN: CPT | Mod: 25 | Performed by: PHYSICIAN ASSISTANT

## 2022-02-09 RX ORDER — HYDROXYCHLOROQUINE SULFATE 200 MG/1
200 TABLET, FILM COATED ORAL 2 TIMES DAILY
Qty: 180 TABLET | Refills: 3 | Status: SHIPPED | OUTPATIENT
Start: 2022-02-09 | End: 2023-03-17

## 2022-02-09 NOTE — LETTER
2022         RE: Kiesha Mcguire  [address] [facility] [address]        Dear Colleague,    Thank you for referring your patient, Kiesha Mcguire, to the [facility]. Please see a copy of my visit note below.    Kiesha Mcguire is an extremely pleasant 51 year old year old female patient here today for recheck FFA. She believes temporal  scalp is seeing more loss. Patient has no other skin complaints today.  Remainder of the HPI, Meds, PMH, Allergies, FH, and SH was reviewed in chart.    Past Medical History:   Diagnosis Date     Depressive disorder      Depressive disorder, not elsewhere classified     resolved     Herpes simplex without mention of complication     oral     IUD (intrauterine device) in place 08/15/2013    Mirena     Migraine headache with aura     very occass, sig aura, speech loss x1     Pure hypercholesterolemia     follows w BIPIN CHATMAN       Past Surgical History:   Procedure Laterality Date     COLONOSCOPY N/A 10/27/2021    Procedure: COLONOSCOPY, WITH POLYPECTOMY AND CLIP;  Surgeon: Og Gutierres MD;  [place] [facility] [place]      DILATION/CURETTAGE DIAG/THER NON OB  2006     LAPAROSCOPIC CHOLECYSTECTOMY N/A 2021    Procedure: CHOLECYSTECTOMY, LAPAROSCOPIC;  Surgeon: Denise Cast MD;  Location:  OR        Family History   Problem Relation Age of Onset     Hypertension Mother      Hyperlipidemia Mother      Depression Mother      Hypertension Father      Cancer Father 65        neuro endrocine CA, neck     Hyperlipidemia Father      Aortic aneurysm Maternal Grandfather          of ascending aortic aneurysm at age 64     Aortic aneurysm Maternal Aunt          in her 40s from ascending aortic aneurysm     C.A.D. No family hx of      Cancer - colorectal No family hx of      Diabetes No family hx of      Cerebrovascular Disease No family hx of      Breast Cancer No family hx of      Prostate Cancer No family hx of         Social History     Socioeconomic History     Marital status:      Spouse name: Not on file     Number of children: 2     Years of education: 16     Highest education level: Not on file   Occupational History     Occupation:      Employer: [employer]     Comment: fulltime   Tobacco Use     Smoking status: Former Smoker     Packs/day: 0.50     Years: 10.00     Pack years: 5.00     Types: Cigarettes     Quit date: 1994     Years since quittin.6     Smokeless tobacco: Never Used   Substance and Sexual Activity     Alcohol use: Yes     Comment: very rare     Drug use: No     Sexual activity: Not Currently     Partners: Male     Birth control/protection: I.U.D.     Comment: Mirena   Other Topics Concern     Parent/sibling w/ CABG, MI or angioplasty before 65F 55M? No   Social History Narrative    How much exercise per week? 2 90 minute yoga sessions      How much calcium per day? Diet       How much caffeine per day? 0    How much vitamin D per day? Supplement     Do you/your family wear seatbelts?  Yes    Do you/your family use safety helmets? Yes    Do you/your family use sunscreen? Yes    Do you/your family keep firearms in the home? No    Do you/your family have a smoke detector(s)? Yes        Do you feel safe in your home? Yes    Has anyone ever touched you in an unwanted manner? No     Explain Kiesha Fletcher [facility] 18        Reviewed [facility] 10-12-20             Social Determinants of Health     Financial Resource Strain: Not on file   Food Insecurity: Not on file   Transportation Needs: Not on file   Physical Activity: Not on file   Stress: Not on file   Social Connections: Not on file   Intimate Partner Violence: Unknown     Fear of Current or Ex-Partner: Not asked     Emotionally Abused: Not asked     Physically Abused: Not asked     Sexually Abused: Not asked   Housing Stability: Not on file       Outpatient Encounter Medications as of 2022   Medication Sig  Dispense Refill     Biotin 10 MG TABS tablet Take 10,000 mcg by mouth daily       CALCIUM-VITAMIN D-VITAMIN K PO        clobetasol (TEMOVATE) 0.05 % external solution Apply daily as needed to scalp 50 mL 3     Collagen Hydrolysate POWD        Cyanocobalamin (VITAMIN B 12 PO) Take by mouth daily        estradiol (ESTRING) 2 MG vaginal ring Place 1 each vaginally every 3 months 1 each 3     finasteride (PROSCAR) 5 MG tablet Take 1 tablet (5 mg) by mouth daily 90 tablet 3     hydroxychloroquine (PLAQUENIL) 200 MG tablet Take 1 tablet (200 mg) by mouth 2 times daily 180 tablet 3     levonorgestrel (MIRENA) 20 MCG/24HR IUD 1 each (20 mcg) by Intrauterine route once       levonorgestrel (MIRENA) 20 MCG/24HR IUD 1 each by Intrauterine route once       Lysine 500 MG TABS Take 1,000 mg by mouth daily        magnesium citrate solution Takes 1 tsp twice a day, 150 mg daily       NONFORMULARY 2 tablets daily seabuckthorn       oxyCODONE (ROXICODONE) 5 MG tablet Take 1 tablet (5 mg) by mouth every 4 hours as needed for moderate to severe pain 10 tablet 0     prednisoLONE acetate (PRED FORTE) 1 % ophthalmic suspension Pt states she was prescribed but has not started it yet       valACYclovir (VALTREX) 1000 mg tablet Take 2 tablets (2,000 mg) by mouth 2 times daily For 2 days as needed for cold sores 12 tablet 11     valACYclovir (VALTREX) 500 MG tablet Take 1 tablet (500 mg) by mouth daily 90 tablet 1     Facility-Administered Encounter Medications as of 2/9/2022   Medication Dose Route Frequency Provider Last Rate Last Admin     acetaminophen (TYLENOL) tablet 1,000 mg  1,000 mg Oral Once Ruslan Huerta MD                 O:   NAD, WDWN, Alert & Oriented, Mood & Affect wnl, Vitals stable   Here today alone   BP (!) 89/62 (BP Location: Right arm, Patient Position: Sitting, Cuff Size: Adult Regular)   Pulse 76   Wt 59.9 kg (132 lb)   SpO2 100%   BMI 22.66 kg/m     General appearance normal   Vitals stable   Alert, oriented  and in no acute distress       Patches of alopecia on frontal/temporal side of scalp, some accentuation of hair follicle, no scaling seen.        Eyes: Conjunctivae/lids:Normal     ENT: Lips normal    MSK:Normal    Pulm: Breathing Normal    Neuro/Psych: Orientation:Alert and Orientedx3 ; Mood/Affect:normal   A/P:  1. Frontal fibrosing alopecia   Picture taken today to monitor hair loss  IL TAC: PGACAC discussed.  Risks including but not limited to injection site reaction, bruising, no resolution.  All questions answered and entertained to patient s satisfaction.  Informed consent obtained.  IL TAC in concentration of 5 mg/ml was injected ID to frontal scalp.  Total injected was  1 ml.  Patient tolerated without complications and given wound care instructions, including not to move product around.  Return in 4 weeks for follow-up and possible additional IL TAC.      Apply clobetasol solution 2-3 times weekly.   Continue finasteride 5 mg daily.   Check cbc and cmp.   Start plaquenil 200 mg twice daily.   She plans on see her eye doctor for exam today.   Recheck in 4-6 weeks.       Again, thank you for allowing me to participate in the care of your patient.        Sincerely,        Rosemary Henao PA-C

## 2022-02-09 NOTE — NURSING NOTE
"Chief Complaint   Patient presents with     RECHECK     injection. questions about topical medication.        Vitals:    02/09/22 1120   BP: (!) 89/62   BP Location: Right arm   Patient Position: Sitting   Cuff Size: Adult Regular   Pulse: 76   SpO2: 100%   Weight: 59.9 kg (132 lb)     Wt Readings from Last 1 Encounters:   02/09/22 59.9 kg (132 lb)     Ht Readings from Last 1 Encounters:   01/21/22 1.626 m (5' 4\")       Francie Christianson [facility], 2/9/2022 11:20 AM    "

## 2022-02-09 NOTE — PROGRESS NOTES
Kiesha Mcguire is an extremely pleasant 51 year old year old female patient here today for recheck FFA. She believes temporal  scalp is seeing more loss. Patient has no other skin complaints today.  Remainder of the HPI, Meds, PMH, Allergies, FH, and SH was reviewed in chart.    Past Medical History:   Diagnosis Date     Depressive disorder      Depressive disorder, not elsewhere classified     resolved     Herpes simplex without mention of complication     oral     IUD (intrauterine device) in place 08/15/2013    Mirena     Migraine headache with aura     very occass, sig aura, speech loss x1     Pure hypercholesterolemia     follows kate VOSS MD       Past Surgical History:   Procedure Laterality Date     COLONOSCOPY N/A 10/27/2021    Procedure: COLONOSCOPY, WITH POLYPECTOMY AND CLIP;  Surgeon: Og Gutierres MD;  [place] [facility] [place]      DILATION/CURETTAGE DIAG/THER NON OB  2006     LAPAROSCOPIC CHOLECYSTECTOMY N/A 2021    Procedure: CHOLECYSTECTOMY, LAPAROSCOPIC;  Surgeon: Denise Cast MD;  Location: [facility] OR        Family History   Problem Relation Age of Onset     Hypertension Mother      Hyperlipidemia Mother      Depression Mother      Hypertension Father      Cancer Father 65        neuro endrocine CA, neck     Hyperlipidemia Father      Aortic aneurysm Maternal Grandfather          of ascending aortic aneurysm at age 64     Aortic aneurysm Maternal Aunt          in her 40s from ascending aortic aneurysm     C.A.D. No family hx of      Cancer - colorectal No family hx of      Diabetes No family hx of      Cerebrovascular Disease No family hx of      Breast Cancer No family hx of      Prostate Cancer No family hx of        Social History     Socioeconomic History     Marital status:      Spouse name: Not on file     Number of children: 2     Years of education: 16     Highest education level: Not on file   Occupational History     Occupation:       Employer: [employer]     Comment: fulltime   Tobacco Use     Smoking status: Former Smoker     Packs/day: 0.50     Years: 10.00     Pack years: 5.00     Types: Cigarettes     Quit date: 1994     Years since quittin.6     Smokeless tobacco: Never Used   Substance and Sexual Activity     Alcohol use: Yes     Comment: very rare     Drug use: No     Sexual activity: Not Currently     Partners: Male     Birth control/protection: I.U.D.     Comment: Mirena   Other Topics Concern     Parent/sibling w/ CABG, MI or angioplasty before 65F 55M? No   Social History Narrative    How much exercise per week? 2 90 minute yoga sessions      How much calcium per day? Diet       How much caffeine per day? 0    How much vitamin D per day? Supplement     Do you/your family wear seatbelts?  Yes    Do you/your family use safety helmets? Yes    Do you/your family use sunscreen? Yes    Do you/your family keep firearms in the home? No    Do you/your family have a smoke detector(s)? Yes        Do you feel safe in your home? Yes    Has anyone ever touched you in an unwanted manner? No     Explain Kiesha Fletcher [facility] 18        Reviewed [facility] 10-12-20             Social Determinants of Health     Financial Resource Strain: Not on file   Food Insecurity: Not on file   Transportation Needs: Not on file   Physical Activity: Not on file   Stress: Not on file   Social Connections: Not on file   Intimate Partner Violence: Unknown     Fear of Current or Ex-Partner: Not asked     Emotionally Abused: Not asked     Physically Abused: Not asked     Sexually Abused: Not asked   Housing Stability: Not on file       Outpatient Encounter Medications as of 2022   Medication Sig Dispense Refill     Biotin 10 MG TABS tablet Take 10,000 mcg by mouth daily       CALCIUM-VITAMIN D-VITAMIN K PO        clobetasol (TEMOVATE) 0.05 % external solution Apply daily as needed to scalp 50 mL 3     Collagen Hydrolysate POWD        Cyanocobalamin (VITAMIN B 12  PO) Take by mouth daily        estradiol (ESTRING) 2 MG vaginal ring Place 1 each vaginally every 3 months 1 each 3     finasteride (PROSCAR) 5 MG tablet Take 1 tablet (5 mg) by mouth daily 90 tablet 3     hydroxychloroquine (PLAQUENIL) 200 MG tablet Take 1 tablet (200 mg) by mouth 2 times daily 180 tablet 3     levonorgestrel (MIRENA) 20 MCG/24HR IUD 1 each (20 mcg) by Intrauterine route once       levonorgestrel (MIRENA) 20 MCG/24HR IUD 1 each by Intrauterine route once       Lysine 500 MG TABS Take 1,000 mg by mouth daily        magnesium citrate solution Takes 1 tsp twice a day, 150 mg daily       NONFORMULARY 2 tablets daily se[place]orn       oxyCODONE (ROXICODONE) 5 MG tablet Take 1 tablet (5 mg) by mouth every 4 hours as needed for moderate to severe pain 10 tablet 0     prednisoLONE acetate (PRED FORTE) 1 % ophthalmic suspension Pt states she was prescribed but has not started it yet       valACYclovir (VALTREX) 1000 mg tablet Take 2 tablets (2,000 mg) by mouth 2 times daily For 2 days as needed for cold sores 12 tablet 11     valACYclovir (VALTREX) 500 MG tablet Take 1 tablet (500 mg) by mouth daily 90 tablet 1     Facility-Administered Encounter Medications as of 2/9/2022   Medication Dose Route Frequency Provider Last Rate Last Admin     acetaminophen (TYLENOL) tablet 1,000 mg  1,000 mg Oral Once Ruslan Huerta MD                 O:   NAD, WDWN, Alert & Oriented, Mood & Affect wnl, Vitals stable   Here today alone   BP (!) 89/62 (BP Location: Right arm, Patient Position: Sitting, Cuff Size: Adult Regular)   Pulse 76   Wt 59.9 kg (132 lb)   SpO2 100%   BMI 22.66 kg/m     General appearance normal   Vitals stable   Alert, oriented and in no acute distress       Patches of alopecia on frontal/temporal side of scalp, some accentuation of hair follicle, no scaling seen.        Eyes: Conjunctivae/lids:Normal     ENT: Lips normal    MSK:Normal    Pulm: Breathing Normal    Neuro/Psych: Orientation:Alert  and Orientedx3 ; Mood/Affect:normal   A/P:  1. Frontal fibrosing alopecia   Picture taken today to monitor hair loss  IL TAC: PGACAC discussed.  Risks including but not limited to injection site reaction, bruising, no resolution.  All questions answered and entertained to patient s satisfaction.  Informed consent obtained.  IL TAC in concentration of 5 mg/ml was injected ID to frontal scalp.  Total injected was  1 ml.  Patient tolerated without complications and given wound care instructions, including not to move product around.  Return in 4 weeks for follow-up and possible additional IL TAC.      Apply clobetasol solution 2-3 times weekly.   Continue finasteride 5 mg daily.   Check cbc and cmp.   Start plaquenil 200 mg twice daily.   She plans on see her eye doctor for exam today.   Recheck in 4-6 weeks.

## 2022-02-18 ENCOUNTER — TRANSFERRED RECORDS (OUTPATIENT)
Dept: HEALTH INFORMATION MANAGEMENT | Facility: CLINIC | Age: 52
End: 2022-02-18
Payer: COMMERCIAL

## 2022-03-16 ENCOUNTER — OFFICE VISIT (OUTPATIENT)
Dept: DERMATOLOGY | Facility: CLINIC | Age: 52
End: 2022-03-16
Payer: COMMERCIAL

## 2022-03-16 VITALS
SYSTOLIC BLOOD PRESSURE: 115 MMHG | HEART RATE: 80 BPM | WEIGHT: 132 LBS | OXYGEN SATURATION: 100 % | DIASTOLIC BLOOD PRESSURE: 70 MMHG | BODY MASS INDEX: 22.66 KG/M2

## 2022-03-16 DIAGNOSIS — L66.12 FRONTAL FIBROSING ALOPECIA: Primary | ICD-10-CM

## 2022-03-16 PROCEDURE — 11900 INJECT SKIN LESIONS </W 7: CPT | Performed by: PHYSICIAN ASSISTANT

## 2022-03-16 PROCEDURE — 99207 PR DROP WITH A PROCEDURE: CPT | Performed by: PHYSICIAN ASSISTANT

## 2022-03-16 NOTE — LETTER
3/16/2022         RE: Kiesha Mcguire  [address] [facility] [address]        Dear Colleague,    Thank you for referring your patient, Kiesha Mcguire, to the [facility]. Please see a copy of my visit note below.    Clinic Administered Medication Documentation    Administrations This Visit     triamcinolone acetonide (KENALOG-10) injection 5 mg     Admin Date  03/16/2022 Action  Given Dose  5 mg Route  Other Site  Other (see comments) Administered By  Merle Driver RN    Ordering Provider: Rosemary Delcid PA-C    NDC: 7858-0098-36    Lot#: [id number]    : [employer] (PRIMARY CARE)    Patient Supplied?: No    Comments: frontal scalp                        Kiesha Mcguire is an extremely pleasant 51 year old year old female patient here today for recheck FFA. She denies any side effects with plaquenil. Here today for more injections. No increased loss. Patient has no other skin complaints today.  Remainder of the HPI, Meds, PMH, Allergies, FH, and SH was reviewed in chart.    Past Medical History:   Diagnosis Date     Depressive disorder      Depressive disorder, not elsewhere classified     resolved     Herpes simplex without mention of complication     oral     IUD (intrauterine device) in place 08/15/2013    Mirena     Migraine headache with aura     very occass, sig aura, speech loss x1     Pure hypercholesterolemia     follows kate VOSS MD       Past Surgical History:   Procedure Laterality Date     COLONOSCOPY N/A 10/27/2021    Procedure: COLONOSCOPY, WITH POLYPECTOMY AND CLIP;  Surgeon: Og Gutierres MD;  [place] [facility] [place]      DILATION/CURETTAGE DIAG/THER NON OB  12/2006     LAPAROSCOPIC CHOLECYSTECTOMY N/A 12/24/2021    Procedure: CHOLECYSTECTOMY, LAPAROSCOPIC;  Surgeon: Denise Cast MD;  Location:  OR        Family History   Problem Relation Age of Onset     Hypertension Mother      Hyperlipidemia Mother       Depression Mother      Hypertension Father      Cancer Father 65        neuro endrocine CA, neck     Hyperlipidemia Father      Aortic aneurysm Maternal Grandfather          of ascending aortic aneurysm at age 64     Aortic aneurysm Maternal Aunt          in her 40s from ascending aortic aneurysm     C.A.D. No family hx of      Cancer - colorectal No family hx of      Diabetes No family hx of      Cerebrovascular Disease No family hx of      Breast Cancer No family hx of      Prostate Cancer No family hx of        Social History     Socioeconomic History     Marital status:      Spouse name: Not on file     Number of children: 2     Years of education: 16     Highest education level: Not on file   Occupational History     Occupation:      Employer: [employer]     Comment: fulltime   Tobacco Use     Smoking status: Former Smoker     Packs/day: 0.50     Years: 10.00     Pack years: 5.00     Types: Cigarettes     Quit date: 1994     Years since quittin.7     Smokeless tobacco: Never Used   Substance and Sexual Activity     Alcohol use: Yes     Comment: very rare     Drug use: No     Sexual activity: Not Currently     Partners: Male     Birth control/protection: I.U.D.     Comment: Mirena   Other Topics Concern     Parent/sibling w/ CABG, MI or angioplasty before 65F 55M? No   Social History Narrative    How much exercise per week? 2 90 minute yoga sessions      How much calcium per day? Diet       How much caffeine per day? 0    How much vitamin D per day? Supplement     Do you/your family wear seatbelts?  Yes    Do you/your family use safety helmets? Yes    Do you/your family use sunscreen? Yes    Do you/your family keep firearms in the home? No    Do you/your family have a smoke detector(s)? Yes        Do you feel safe in your home? Yes    Has anyone ever touched you in an unwanted manner? No     Explain Kiesha Fletcher CMA 18        [place] [facility] [place]              Social Determinants of Health     Financial Resource Strain: Not on file   Food Insecurity: Not on file   Transportation Needs: Not on file   Physical Activity: Not on file   Stress: Not on file   Social Connections: Not on file   Intimate Partner Violence: Unknown     Fear of Current or Ex-Partner: Not asked     Emotionally Abused: Not asked     Physically Abused: Not asked     Sexually Abused: Not asked   Housing Stability: Not on file       Outpatient Encounter Medications as of 3/16/2022   Medication Sig Dispense Refill     Biotin 10 MG TABS tablet Take 10,000 mcg by mouth daily       CALCIUM-VITAMIN D-VITAMIN K PO        clobetasol (TEMOVATE) 0.05 % external solution Apply daily as needed to scalp 50 mL 3     Collagen Hydrolysate POWD        Cyanocobalamin (VITAMIN B 12 PO) Take by mouth daily        estradiol (ESTRING) 2 MG vaginal ring Place 1 each vaginally every 3 months 1 each 3     finasteride (PROSCAR) 5 MG tablet Take 1 tablet (5 mg) by mouth daily 90 tablet 3     hydroxychloroquine (PLAQUENIL) 200 MG tablet Take 1 tablet (200 mg) by mouth 2 times daily 180 tablet 3     levonorgestrel (MIRENA) 20 MCG/24HR IUD 1 each (20 mcg) by Intrauterine route once       levonorgestrel (MIRENA) 20 MCG/24HR IUD 1 each by Intrauterine route once       Lysine 500 MG TABS Take 1,000 mg by mouth daily        magnesium citrate solution Takes 1 tsp twice a day, 150 mg daily       NONFORMULARY 2 tablets daily se[place]orn       oxyCODONE (ROXICODONE) 5 MG tablet Take 1 tablet (5 mg) by mouth every 4 hours as needed for moderate to severe pain 10 tablet 0     prednisoLONE acetate (PRED FORTE) 1 % ophthalmic suspension Pt states she was prescribed but has not started it yet       valACYclovir (VALTREX) 1000 mg tablet Take 2 tablets (2,000 mg) by mouth 2 times daily For 2 days as needed for cold sores 12 tablet 11     valACYclovir (VALTREX) 500 MG tablet Take 1 tablet (500 mg) by mouth daily 90 tablet 1      Facility-Administered Encounter Medications as of 3/16/2022   Medication Dose Route Frequency Provider Last Rate Last Admin     acetaminophen (TYLENOL) tablet 1,000 mg  1,000 mg Oral Once Ruslan Huerta MD         [COMPLETED] triamcinolone acetonide (KENALOG-10) injection 5 mg  5 mg Other Once Rosemary Delcid PA-C   5 mg at 03/16/22 0919             O:   NAD, WDWN, Alert & Oriented, Mood & Affect wnl, Vitals stable   Here today alone   /70 (BP Location: Right arm, Patient Position: Sitting, Cuff Size: Adult Regular)   Pulse 80   Wt 59.9 kg (132 lb)   SpO2 100%   BMI 22.66 kg/m     General appearance normal   Vitals stable   Alert, oriented and in no acute distress   Patches of alopecia on frontal/temporal side of scalp, some accentuation of hair follicle, no scaling seen.        Eyes: Conjunctivae/lids:Normal     ENT: Lips: normal    MSK:Normal    Pulm: Breathing Normal    Neuro/Psych: Orientation:Alert and Orientedx3 ; Mood/Affect:normal   A/P:  1. Frontal fibrosing alopecia   Picture taken today to monitor hair loss  IL TAC: PGACAC discussed.  Risks including but not limited to injection site reaction, bruising, no resolution.  All questions answered and entertained to patient s satisfaction.  Informed consent obtained.  IL TAC in concentration of 5 mg/ml was injected ID to frontal scalp.  Total injected was  1 ml.  Patient tolerated without complications and given wound care instructions, including not to move product around.  Return in 4 weeks for follow-up and possible additional IL TAC.       Apply clobetasol solution 2-3 times weekly.   Continue finasteride 5 mg daily.   Continue plaquenil 200 mg twice daily.   She plans on see her eye doctor for exam today.   Recheck in 4-6 weeks.       Again, thank you for allowing me to participate in the care of your patient.        Sincerely,        Rosemary Delcid PA-C

## 2022-03-16 NOTE — PROGRESS NOTES
Clinic Administered Medication Documentation    Administrations This Visit     triamcinolone acetonide (KENALOG-10) injection 5 mg     Admin Date  03/16/2022 Action  Given Dose  5 mg Route  Other Site  Other (see comments) Administered By  Merle Driver RN    Ordering Provider: Rosemary Delcid PA-C    NDC: 6440-7276-17    Lot#: [id number]    : [employer] (PRIMARY CARE)    Patient Supplied?: No    Comments: frontal scalp

## 2022-03-17 NOTE — PROGRESS NOTES
Kiesha Mcguire is an extremely pleasant 51 year old year old female patient here today for recheck FFA. She denies any side effects with plaquenil. Here today for more injections. No increased loss. Patient has no other skin complaints today.  Remainder of the HPI, Meds, PMH, Allergies, FH, and SH was reviewed in chart.    Past Medical History:   Diagnosis Date     Depressive disorder      Depressive disorder, not elsewhere classified     resolved     Herpes simplex without mention of complication     oral     IUD (intrauterine device) in place 08/15/2013    Mirena     Migraine headache with aura     very occass, sig aura, speech loss x1     Pure hypercholesterolemia     follows w BIPIN CHATMAN       Past Surgical History:   Procedure Laterality Date     COLONOSCOPY N/A 10/27/2021    Procedure: COLONOSCOPY, WITH POLYPECTOMY AND CLIP;  Surgeon: Og Gutierres MD;  [facility]      DILATION/CURETTAGE DIAG/THER NON OB  2006     LAPAROSCOPIC CHOLECYSTECTOMY N/A 2021    Procedure: CHOLECYSTECTOMY, LAPAROSCOPIC;  Surgeon: Denise Cast MD;  Location:  OR        Family History   Problem Relation Age of Onset     Hypertension Mother      Hyperlipidemia Mother      Depression Mother      Hypertension Father      Cancer Father 65        neuro endrocine CA, neck     Hyperlipidemia Father      Aortic aneurysm Maternal Grandfather          of ascending aortic aneurysm at age 64     Aortic aneurysm Maternal Aunt          in her 40s from ascending aortic aneurysm     C.A.D. No family hx of      Cancer - colorectal No family hx of      Diabetes No family hx of      Cerebrovascular Disease No family hx of      Breast Cancer No family hx of      Prostate Cancer No family hx of        Social History     Socioeconomic History     Marital status:      Spouse name: Not on file     Number of children: 2     Years of education: 16     Highest education level: Not on file   Occupational  History     Occupation:      Employer: [employer]     Comment: fulltime   Tobacco Use     Smoking status: Former Smoker     Packs/day: 0.50     Years: 10.00     Pack years: 5.00     Types: Cigarettes     Quit date: 1994     Years since quittin.7     Smokeless tobacco: Never Used   Substance and Sexual Activity     Alcohol use: Yes     Comment: very rare     Drug use: No     Sexual activity: Not Currently     Partners: Male     Birth control/protection: I.U.D.     Comment: Mirena   Other Topics Concern     Parent/sibling w/ CABG, MI or angioplasty before 65F 55M? No   Social History Narrative    How much exercise per week? 2 90 minute yoga sessions      How much calcium per day? Diet       How much caffeine per day? 0    How much vitamin D per day? Supplement     Do you/your family wear seatbelts?  Yes    Do you/your family use safety helmets? Yes    Do you/your family use sunscreen? Yes    Do you/your family keep firearms in the home? No    Do you/your family have a smoke detector(s)? Yes        Do you feel safe in your home? Yes    Has anyone ever touched you in an unwanted manner? No     Explain Kiesha [facility]        Reviewed [facility] 10-12-20             Social Determinants of Health     Financial Resource Strain: Not on file   Food Insecurity: Not on file   Transportation Needs: Not on file   Physical Activity: Not on file   Stress: Not on file   Social Connections: Not on file   Intimate Partner Violence: Unknown     Fear of Current or Ex-Partner: Not asked     Emotionally Abused: Not asked     Physically Abused: Not asked     Sexually Abused: Not asked   Housing Stability: Not on file       Outpatient Encounter Medications as of 3/16/2022   Medication Sig Dispense Refill     Biotin 10 MG TABS tablet Take 10,000 mcg by mouth daily       CALCIUM-VITAMIN D-VITAMIN K PO        clobetasol (TEMOVATE) 0.05 % external solution Apply daily as needed to scalp 50 mL 3     Collagen  Hydrolysate POWD        Cyanocobalamin (VITAMIN B 12 PO) Take by mouth daily        estradiol (ESTRING) 2 MG vaginal ring Place 1 each vaginally every 3 months 1 each 3     finasteride (PROSCAR) 5 MG tablet Take 1 tablet (5 mg) by mouth daily 90 tablet 3     hydroxychloroquine (PLAQUENIL) 200 MG tablet Take 1 tablet (200 mg) by mouth 2 times daily 180 tablet 3     levonorgestrel (MIRENA) 20 MCG/24HR IUD 1 each (20 mcg) by Intrauterine route once       levonorgestrel (MIRENA) 20 MCG/24HR IUD 1 each by Intrauterine route once       Lysine 500 MG TABS Take 1,000 mg by mouth daily        magnesium citrate solution Takes 1 tsp twice a day, 150 mg daily       NONFORMULARY 2 tablets daily [place]       oxyCODONE (ROXICODONE) 5 MG tablet Take 1 tablet (5 mg) by mouth every 4 hours as needed for moderate to severe pain 10 tablet 0     prednisoLONE acetate (PRED FORTE) 1 % ophthalmic suspension Pt states she was prescribed but has not started it yet       valACYclovir (VALTREX) 1000 mg tablet Take 2 tablets (2,000 mg) by mouth 2 times daily For 2 days as needed for cold sores 12 tablet 11     valACYclovir (VALTREX) 500 MG tablet Take 1 tablet (500 mg) by mouth daily 90 tablet 1     Facility-Administered Encounter Medications as of 3/16/2022   Medication Dose Route Frequency Provider Last Rate Last Admin     acetaminophen (TYLENOL) tablet 1,000 mg  1,000 mg Oral Once Ruslan Huerta MD         [COMPLETED] triamcinolone acetonide (KENALOG-10) injection 5 mg  5 mg Other Once Rosemary Delcid PA-C   5 mg at 03/16/22 0919             O:   NAD, WDWN, Alert & Oriented, Mood & Affect wnl, Vitals stable   Here today alone   /70 (BP Location: Right arm, Patient Position: Sitting, Cuff Size: Adult Regular)   Pulse 80   Wt 59.9 kg (132 lb)   SpO2 100%   BMI 22.66 kg/m     General appearance normal   Vitals stable   Alert, oriented and in no acute distress   Patches of alopecia on frontal/temporal side of scalp,  some accentuation of hair follicle, no scaling seen.        Eyes: Conjunctivae/lids:Normal     ENT: Lips: normal    MSK:Normal    Pulm: Breathing Normal    Neuro/Psych: Orientation:Alert and Orientedx3 ; Mood/Affect:normal   A/P:  1. Frontal fibrosing alopecia   Picture taken today to monitor hair loss  IL TAC: PGACAC discussed.  Risks including but not limited to injection site reaction, bruising, no resolution.  All questions answered and entertained to patient s satisfaction.  Informed consent obtained.  IL TAC in concentration of 5 mg/ml was injected ID to frontal scalp.  Total injected was  1 ml.  Patient tolerated without complications and given wound care instructions, including not to move product around.  Return in 4 weeks for follow-up and possible additional IL TAC.       Apply clobetasol solution 2-3 times weekly.   Continue finasteride 5 mg daily.   Continue plaquenil 200 mg twice daily.   She plans on see her eye doctor for exam today.   Recheck in 4-6 weeks.

## 2022-04-04 ENCOUNTER — TELEPHONE (OUTPATIENT)
Dept: BEHAVIORAL HEALTH | Facility: CLINIC | Age: 52
End: 2022-04-04
Payer: COMMERCIAL

## 2022-04-04 NOTE — TELEPHONE ENCOUNTER
Left a VM to remind pt about their Wednesday 4/06/22 video appt at 1 pm.     Writer mentioned, pt will need to connect virtual/video appt via My Chart and finish e-check in question 15-30 min prior to appt time.

## 2022-04-06 ENCOUNTER — VIRTUAL VISIT (OUTPATIENT)
Dept: PSYCHOLOGY | Facility: CLINIC | Age: 52
End: 2022-04-06
Payer: COMMERCIAL

## 2022-04-06 DIAGNOSIS — F90.0 ATTENTION DEFICIT HYPERACTIVITY DISORDER, INATTENTIVE TYPE: Primary | ICD-10-CM

## 2022-04-06 PROCEDURE — 90834 PSYTX W PT 45 MINUTES: CPT | Mod: GT | Performed by: PSYCHOLOGIST

## 2022-04-07 NOTE — PROGRESS NOTES
[facility]   Mental Health & Addiction Services     Progress Note - Initial Visit    Patient  Name:  Kiesha Mcguire Date: 22         Service Type: Individual     Visit Start Time: 1:03pm Visit End Time: 1:55pm    Visit #: 1 52 minutes    Attendees: Client attended alone    Service Modality:  Video Visit:      Provider verified identity through the following two step process.  Patient provided:  Patient photo and Patient     Telemedicine Visit: The patient's condition can be safely assessed and treated via synchronous audio and visual telemedicine encounter.      Reason for Telemedicine Visit: Services only offered telehealth    Originating Site (Patient Location): Patient's home    Distant Site (Provider Location): Provider Remote Setting- Home Office    Consent:  The patient/guardian has verbally consented to: the potential risks and benefits of telemedicine (video visit) versus in person care; bill my insurance or make self-payment for services provided; and responsibility for payment of non-covered services.     Patient would like the video invitation sent by:  Send to e-mail at: [email or web address]    Mode of Communication:  Video Conference via [facility]    As the provider I attest to compliance with applicable laws and regulations related to telemedicine.       DATA:   Interactive Complexity: No   Crisis: No     Presenting Concerns/  Current Stressors:   Client is having difficulty at work due to a lack of concentration and ability to stay on task.  Her symptoms of anxiety have been increasing for the past two years.      ASSESSMENT:  Mental Status Assessment:  Appearance:   Appropriate   Eye Contact:   Good   Psychomotor Behavior: Normal   Attitude:   Cooperative   Orientation:   All  Speech   Rate / Production: Normal/ Responsive   Volume:  Normal   Mood:    Anxious   Affect:    Appropriate   Thought Content:  Clear   Thought Form:  Coherent   Insight:    Fair        Safety Issues and Plan for Safety and Risk Management:     [place] Suicide Severity Rating Scale (Lifetime/Recent)  [place] Suicide Severity Rating (Lifetime/Recent) 4/6/2022   1. Wish to be Dead (Lifetime) 0   2. Non-Specific Active Suicidal Thoughts (Lifetime) 0   Actual Attempt (Lifetime) 0   Has subject engaged in non-suicidal self-injurious behavior? (Lifetime) 1   Has subject engaged in non-suicidal self-injurious behavior? (Past 3 Months) 0   Interrupted Attempts (Lifetime) 0   Aborted or Self-Interrupted Attempt (Lifetime) 0   Preparatory Acts or Behavior (Lifetime) 0   Calculated C-SSRS Risk Score (Lifetime/Recent) No Risk Indicated     Patient denies current fears or concerns for personal safety.  Patient denies current or recent suicidal ideation or behaviors.  Patient denies current or recent homicidal ideation or behaviors.  Patient denies current or recent self injurious behavior or ideation.  Patient denies other safety concerns.  Recommended that patient call 911 or go to the local ED should there be a change in any of these risk factors.  Patient reports there are no firearms in the house.     Diagnostic Criteria:  Attention Deficit Hyperactivity Disorder  A) A persistent pattern of inattention and/or hyperactivity-impulsivity that interferes with functioning or development, as characterized by (1) Inattention and/or (2) Hyperactivity and Impulsivity  - Often has difficulty sustaining attention in tasks or play activities  - Is often easily distractedby extraneous stimuli  - Is often forgetful in daily activities  E) The Symptoms do not occur exclusively during the course of schizophrenia or another psychotic disorder and are not better explained by another mental disorder      DSM5 Diagnoses: (Sustained by DSM5 Criteria Listed Above)  Diagnoses: Attention-Deficit/Hyperactivity Disorder  314.00 (F90.0) Predominantly inattentive presentation (Provisional)  Psychosocial & Contextual Factors:  Client is having difficulty at work due to a lack of concentration and ability to stay on task.  Her symptoms of anxiety have been increasing for the past two years.  WHODAS 2.0 (12 item):   WHODAS 2.0 Total Score 4/6/2022   Total Score 19   Total Score MyChart 19     Intervention:   Psychoeducation; Skill review/identification & recommendation; Pattern recognition; Socratic Questioning; Active listening, validation, and other rapport building skills; Administer and explain screeners    Collateral Reports Completed:  Not Applicable      PLAN: (Homework, other):  1. Provider will continue Diagnostic Assessment.  Patient was given the following to do until next session:  Client will work next session on a Timeline of symptoms.    2. Provider recommended the following referrals: None at this time.      3.  Suicide Risk and Safety Concerns were assessed for Kiesha Mcguire.    Patient meets the following risk assessment and triage: Patient denied any current/recent/lifetime history of suicidal ideation and/or behaviors.  No safety plan indicated at this time.       Garland Harris  April 7, 2022

## 2022-04-13 ENCOUNTER — OFFICE VISIT (OUTPATIENT)
Dept: FAMILY MEDICINE | Facility: CLINIC | Age: 52
End: 2022-04-13
Payer: COMMERCIAL

## 2022-04-13 VITALS
DIASTOLIC BLOOD PRESSURE: 75 MMHG | RESPIRATION RATE: 16 BRPM | HEART RATE: 80 BPM | SYSTOLIC BLOOD PRESSURE: 109 MMHG | WEIGHT: 130 LBS | HEIGHT: 64 IN | BODY MASS INDEX: 22.2 KG/M2 | OXYGEN SATURATION: 100 % | TEMPERATURE: 97.7 F

## 2022-04-13 DIAGNOSIS — R19.7 DIARRHEA, UNSPECIFIED TYPE: Primary | ICD-10-CM

## 2022-04-13 LAB
ALBUMIN SERPL-MCNC: 3.6 G/DL (ref 3.4–5)
ALP SERPL-CCNC: 79 U/L (ref 40–150)
ALT SERPL W P-5'-P-CCNC: 27 U/L (ref 0–50)
ANION GAP SERPL CALCULATED.3IONS-SCNC: 8 MMOL/L (ref 3–14)
AST SERPL W P-5'-P-CCNC: 18 U/L (ref 0–45)
BILIRUB SERPL-MCNC: 0.3 MG/DL (ref 0.2–1.3)
BUN SERPL-MCNC: 11 MG/DL (ref 7–30)
CALCIUM SERPL-MCNC: 9.5 MG/DL (ref 8.5–10.1)
CHLORIDE BLD-SCNC: 104 MMOL/L (ref 94–109)
CO2 SERPL-SCNC: 25 MMOL/L (ref 20–32)
CREAT SERPL-MCNC: 0.76 MG/DL (ref 0.52–1.04)
CRP SERPL-MCNC: 4.1 MG/L (ref 0–8)
ERYTHROCYTE [DISTWIDTH] IN BLOOD BY AUTOMATED COUNT: 13.1 % (ref 10–15)
ERYTHROCYTE [SEDIMENTATION RATE] IN BLOOD BY WESTERGREN METHOD: 18 MM/HR (ref 0–30)
GFR SERPL CREATININE-BSD FRML MDRD: >90 ML/MIN/1.73M2
GLUCOSE BLD-MCNC: 86 MG/DL (ref 70–99)
HCT VFR BLD AUTO: 39.2 % (ref 35–47)
HGB BLD-MCNC: 12.8 G/DL (ref 11.7–15.7)
MCH RBC QN AUTO: 29.8 PG (ref 26.5–33)
MCHC RBC AUTO-ENTMCNC: 32.7 G/DL (ref 31.5–36.5)
MCV RBC AUTO: 91 FL (ref 78–100)
PLATELET # BLD AUTO: 255 10E3/UL (ref 150–450)
POTASSIUM BLD-SCNC: 4.2 MMOL/L (ref 3.4–5.3)
PROT SERPL-MCNC: 7.3 G/DL (ref 6.8–8.8)
RBC # BLD AUTO: 4.29 10E6/UL (ref 3.8–5.2)
SODIUM SERPL-SCNC: 137 MMOL/L (ref 133–144)
WBC # BLD AUTO: 7.1 10E3/UL (ref 4–11)

## 2022-04-13 PROCEDURE — 86140 C-REACTIVE PROTEIN: CPT | Performed by: NURSE PRACTITIONER

## 2022-04-13 PROCEDURE — 85027 COMPLETE CBC AUTOMATED: CPT | Performed by: NURSE PRACTITIONER

## 2022-04-13 PROCEDURE — 80053 COMPREHEN METABOLIC PANEL: CPT | Performed by: NURSE PRACTITIONER

## 2022-04-13 PROCEDURE — 85652 RBC SED RATE AUTOMATED: CPT | Performed by: NURSE PRACTITIONER

## 2022-04-13 PROCEDURE — 36415 COLL VENOUS BLD VENIPUNCTURE: CPT | Performed by: NURSE PRACTITIONER

## 2022-04-13 PROCEDURE — 99213 OFFICE O/P EST LOW 20 MIN: CPT | Performed by: NURSE PRACTITIONER

## 2022-04-13 RX ORDER — LOPERAMIDE HYDROCHLORIDE 2 MG/1
2 TABLET ORAL 4 TIMES DAILY PRN
Qty: 90 TABLET | Refills: 0 | Status: SHIPPED | OUTPATIENT
Start: 2022-04-13 | End: 2022-04-13

## 2022-04-13 ASSESSMENT — ENCOUNTER SYMPTOMS: DIARRHEA: 1

## 2022-04-13 NOTE — PROGRESS NOTES
Assessment & Plan     Diarrhea, unspecified type  Counseled on self-care measures including; OTC imodium 4 times/day as needed, eat a bland diet, avoid fatty foods, stay well hydrated with water and propel/pedilyte, eat smaller meals; warning signs of when to seek urgent medical care including: vomiting or diarrhea, blood in stool or urine, lightheadedness, worsening symptoms, fever, pain that does not resolve after 5 days.  - Cryptosporidium/Giardia Immunoassay  - Ova and Parasite Exam Routine  - Erythrocyte sedimentation rate auto  - CRP inflammation  - Comprehensive metabolic panel  - CBC with platelets  - Fat Stool Qual Random Collection    Ordering of each unique test    See Patient Instructions    Return in about 1 week (around 4/20/2022), or if symptoms worsen or fail to improve.    JERAMY Alcantar CNP  [facility] SANGEETHA Pop is a 51 year old who presents for the following health issues     Diarrhea    History of Present Illness       Reason for visit:  Persistent diarrhea  Symptom onset:  1-2 weeks ago  Symptoms include:  Diarrhea cramps nausea  Symptom intensity:  Moderate  Symptom progression:  Worsening    She eats 2-3 servings of fruits and vegetables daily.She consumes 0 sweetened beverage(s) daily.She exercises with enough effort to increase her heart rate 9 or less minutes per day.  She exercises with enough effort to increase her heart rate 3 or less days per week.   She is taking medications regularly.     Patient reports, fostering 3 dogs that is currently being treated with hook worm. Fever in the last 3 days, resolved. She is still having diarrhea and lots of abdominal cramps.     Had lap urvashi 12/2021.     Review of Systems   Gastrointestinal: Positive for diarrhea.      Constitutional, HEENT, cardiovascular, pulmonary, GI, , musculoskeletal, neuro, skin, endocrine and psych systems are negative, except as otherwise noted.      Objective    BP  "109/75   Pulse 80   Temp 97.7  F (36.5  C) (Oral)   Resp 16   Ht 1.626 m (5' 4\")   Wt 59 kg (130 lb)   SpO2 100%   BMI 22.31 kg/m    Body mass index is 22.31 kg/m .     Physical Exam   GENERAL: healthy, alert and no distress  EYES: Eyes grossly normal to inspection, PERRL and conjunctivae and sclerae normal  RESP: lungs clear to auscultation - no rales, rhonchi or wheezes  CV: regular rate and rhythm, normal S1 S2, no S3 or S4, no murmur, click or rub, no peripheral edema and peripheral pulses strong  ABDOMEN: tenderness generalized, hyperactive bowel sounds  PSYCH: mentation appears normal, affect normal/bright          "

## 2022-04-14 ENCOUNTER — APPOINTMENT (OUTPATIENT)
Dept: LAB | Facility: CLINIC | Age: 52
End: 2022-04-14
Payer: COMMERCIAL

## 2022-04-14 ENCOUNTER — VIRTUAL VISIT (OUTPATIENT)
Dept: PSYCHOLOGY | Facility: CLINIC | Age: 52
End: 2022-04-14
Payer: COMMERCIAL

## 2022-04-14 DIAGNOSIS — F32.A DEPRESSIVE DISORDER: Primary | ICD-10-CM

## 2022-04-14 PROCEDURE — 87329 GIARDIA AG IA: CPT | Performed by: NURSE PRACTITIONER

## 2022-04-14 PROCEDURE — 87328 CRYPTOSPORIDIUM AG IA: CPT | Performed by: NURSE PRACTITIONER

## 2022-04-14 PROCEDURE — 99000 SPECIMEN HANDLING OFFICE-LAB: CPT | Performed by: NURSE PRACTITIONER

## 2022-04-14 PROCEDURE — 87209 SMEAR COMPLEX STAIN: CPT | Performed by: NURSE PRACTITIONER

## 2022-04-14 PROCEDURE — 82705 FATS/LIPIDS FECES QUAL: CPT | Mod: 90 | Performed by: NURSE PRACTITIONER

## 2022-04-14 PROCEDURE — 90791 PSYCH DIAGNOSTIC EVALUATION: CPT | Mod: 95 | Performed by: PSYCHOLOGIST

## 2022-04-14 PROCEDURE — 87177 OVA AND PARASITES SMEARS: CPT | Performed by: NURSE PRACTITIONER
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
## 2022-04-15 LAB
C PARVUM AG STL QL IA: NEGATIVE
G LAMBLIA AG STL QL IA: NEGATIVE
O+P STL MICRO: NEGATIVE

## 2022-04-16 LAB
FAT STL QL: NORMAL
NEUTRAL FAT STL QL: NORMAL

## 2022-04-22 ENCOUNTER — VIRTUAL VISIT (OUTPATIENT)
Dept: PSYCHOLOGY | Facility: CLINIC | Age: 52
End: 2022-04-22
Payer: COMMERCIAL

## 2022-04-22 DIAGNOSIS — F90.0 ATTENTION DEFICIT HYPERACTIVITY DISORDER, INATTENTIVE TYPE: ICD-10-CM

## 2022-04-22 DIAGNOSIS — F32.A DEPRESSIVE DISORDER: Primary | ICD-10-CM

## 2022-04-22 NOTE — PROGRESS NOTES
[facility]                                     Progress Note    Patient Name: Kiesha Mcguire  Date: 4/22/22         Service Type: Individual      Session Start Time: 9:01am Session End Time: 9:50am     Session Length: 49 minutes    Session #: 3    Attendees: Client attended alone    Service Modality:  Video Visit:      Provider verified identity through the following two step process.  Patient provided:  Patient photo    Telemedicine Visit: The patient's condition can be safely assessed and treated via synchronous audio and visual telemedicine encounter.      Reason for Telemedicine Visit: Services only offered telehealth    Originating Site (Patient Location): Patient's home    Distant Site (Provider Location): Provider Remote Setting- Home Office    Consent:  The patient/guardian has verbally consented to: the potential risks and benefits of telemedicine (video visit) versus in person care; bill my insurance or make self-payment for services provided; and responsibility for payment of non-covered services.     Patient would like the video invitation sent by:  Send to e-mail at: [email or web address]    Mode of Communication:  Video Conference via Amwell    As the provider I attest to compliance with applicable laws and regulations related to telemedicine.    DATA  Interactive Complexity: No  Crisis: No        Progress Since Last Session (Related to Symptoms / Goals / Homework):   Symptoms: No change : Client still struggles with her train of thought, focus, concentration    Homework: Achieved / completed to satisfaction      Episode of Care Goals: Achieved / completed to satisfaction - ACTION (Actively working towards change); Intervened by reinforcing change plan / affirming steps taken     Current / Ongoing Stressors and Concerns:   Client struggles at work and with her personal tasks/responsibilities, such as paying bills monthly.    [place] Multiphasic Personality Inventory-2  (MMPI-2):  Others with similar Validity Scale Profiles answered in a consistent manner.  The profile suggests someone who answered in a candid manner and a willingness admit to common human failings.  It suggest people who are conventional in that they report few, if any, unusual thoughts, beliefs, or behaviors and is in no particular distress at the time of testing.  They are often described as calm, dependable, sincere, unpretentious, and honest.  This validity scale profile suggests someone who lives are generally well managed, and they feel in control.  They are generally described as independent, enterprising, ingenious, resourceful, enthusiastic with wide interests, and having a variety of interpersonal relationships.  However, one scale suggest that the claims of good adjustment may be slightly exaggerated.  Even in light of a slight exaggeration, the clinical scale profile is likely an adequate representation of the client's current state.    Others with similar Clinical Scale Profiles reflect individuals who are sensitive, whose feelings are easily hurt, and who have a tendency to misinterpret other' motives and to take things personally.  They have paranoid defense mechanisms of projection, denial, reaction formation, and the development under stress of unreasonable jealousies.  They usually feel unfairly treated.  At times, these paranoid traits can shade toward paranoid episodes without psychotic breaks.  In unthreatening situations, they can appear quite normal, but they will show difficulty in recognizing their anger and expressing it directly.  They typically are subtly judgmental, easily hurt, and guarded and distrustful in their personal relations.  They will tend to feel controlled by others' expectations of them and tend to develop rationalized resentments.    Some Restructured Clinical scales suggest someone who is socially disengaged.  Their score suggests a depleted, impoverished emotional  "life.  They are uncomfortable around other people, and tends to avoid social situations.  Relations with others are distant, although not hostile.  They tend to avoid interpersonal conflict (Supplementary Scale) and to \"go along to get along.\"  Some supplementary scores (Sc3) emphasize a disruption of thought processes by the intrusion of troubling thoughts as opposed to a disability that interferes with the completion of mental work (D4). They tend to have a sense of disability where thinking is concerned.  They believe they are \"losing their mind\" and have trouble with concentration and memory functions.  They have a sense of malfunctioning in their cognitive operations, such as attention, concentration, judgment, and memory, along with withdrawal of interest, that leads to a lack of pleasure and interest in daily life.       SUMMARY:  The results of the MMPI-2, in light of an ADHD assessment, can help de-emphasize depression and anxiety as major contributors to the client's symptoms of: \"trouble paying my pills, paying attention at work, concentration, food shopping.\"  During today's feedback session, due to the client's background, trauma was discussed in more detail even though the MMPI-2 scales did not indicate trauma.  Symptoms from trauma can manifest similarly to sytmptoms ADHD.  The client stated during the Diagnostic Assessment: \"I got  about nine years ago and that's the first time I became aware of these things.\"       Treatment Objective(s) Addressed in This Session:   The client was provided feedback on the MMPI-2 she completed as part of her ADHD assessment.  All client's questions were answered and a plan was made to continue her ADHD assessment.     Intervention:   Psychoeducation; Recommendations/Plans; Pattern recognition; Socratic Questioning; Active listening, validation, and other rapport building skills; Provided feedback on MMPI-2      Assessments completed prior to visit:  The " following assessments were completed by patient for this visit:     None      ASSESSMENT: Current Emotional / Mental Status (status of significant symptoms):   Risk status (Self / Other harm or suicidal ideation)   Patient denies current fears or concerns for personal safety.   Patient denies current or recent suicidal ideation or behaviors.   Patient denies current or recent homicidal ideation or behaviors.   Patient denies current or recent self injurious behavior or ideation.   Patient denies other safety concerns.   Patient reports there has been no change in risk factors since their last session.     Patient reports there has been no change in protective factors since their last session.     Recommended that patient call 911 or go to the local ED should there be a change in any of these risk factors.     Appearance:   Appropriate    Eye Contact:   Fair    Psychomotor Behavior: Normal    Attitude:   Cooperative    Orientation:   All   Speech    Rate / Production: Normal/ Responsive Normal     Volume:  Normal    Mood:    Normal   Affect:    Appropriate  Blunted    Thought Content:  Clear    Thought Form:  Coherent    Insight:    Good  and Fair      Medication Review:   No changes to current psychiatric medication(s)     Medication Compliance:   Yes     Changes in Health Issues:   None reported     Chemical Use Review:   Substance Use: Chemical use reviewed, no active concerns identified      Tobacco Use: No change in amount of tobacco use since last session.  Did not address this session    Diagnosis:  1. Unspecified Depressive Disorder    2. Attention deficit hyperactivity disorder, inattentive type (Provisional)        Collateral Reports Completed:   Not Applicable    PLAN: (Patient Tasks / Therapist Tasks / Other)  The client agreed to complete and return the ADHD Questionnaires as the next step to her differential diagnosis, particularly ADHD vs PTSD.  Once the ADHD questionnaires have been returned, this  writer will present this case to his team of psychologists to get feedback.  The client also welcomed the idea of being referred to a therapist who specializes in Trauma work.  A referral has been made and this writer is waiting for the client to confirm setting up a session.  If needed, post ADHD questionnaires, the client agreed to take either the WAIS-IV or CNS Vital Signs (tests on cognitive functioning).      Garland Harris  April 21, 2022

## 2022-04-27 ENCOUNTER — OFFICE VISIT (OUTPATIENT)
Dept: DERMATOLOGY | Facility: CLINIC | Age: 52
End: 2022-04-27
Payer: COMMERCIAL

## 2022-04-27 VITALS — DIASTOLIC BLOOD PRESSURE: 71 MMHG | SYSTOLIC BLOOD PRESSURE: 105 MMHG | OXYGEN SATURATION: 100 % | HEART RATE: 79 BPM

## 2022-04-27 DIAGNOSIS — L66.12 FRONTAL FIBROSING ALOPECIA: Primary | ICD-10-CM

## 2022-04-27 PROCEDURE — 99207 PR DROP WITH A PROCEDURE: CPT | Performed by: PHYSICIAN ASSISTANT

## 2022-04-27 PROCEDURE — 11900 INJECT SKIN LESIONS </W 7: CPT | Performed by: PHYSICIAN ASSISTANT

## 2022-04-27 NOTE — LETTER
2022         RE: Kiesha Mcguire  [address] [id number]        Dear Colleague,    Thank you for referring your patient, Kiesha Mcguire, to the [facility]. Please see a copy of my visit note below.    Kiesha Mcguire is an extremely pleasant 51 year old year old female patient here today for recheck frontal fibrosing alopecia. She notes hair loss has stabilized. She is currently taking plaquenil and clobetasol solution. Here today for intralesional steroid injections.  Patient has no other skin complaints today.  Remainder of the HPI, Meds, PMH, Allergies, FH, and SH was reviewed in chart.    Pertinent Hx:  FFA  Past Medical History:   Diagnosis Date     Depressive disorder      Depressive disorder, not elsewhere classified     resolved     Herpes simplex without mention of complication     oral     IUD (intrauterine device) in place 08/15/2013    Mirena     Migraine headache with aura     very occass, sig aura, speech loss x1     Pure hypercholesterolemia     follows kate VOSS MD       Past Surgical History:   Procedure Laterality Date     COLONOSCOPY N/A 10/27/2021    Procedure: COLONOSCOPY, WITH POLYPECTOMY AND CLIP;  Surgeon: Og Gutierres MD;  Location: [facility] [place] OR      DILATION/CURETTAGE DIAG/THER NON OB  2006     LAPAROSCOPIC CHOLECYSTECTOMY N/A 2021    Procedure: CHOLECYSTECTOMY, LAPAROSCOPIC;  Surgeon: Denise Cast MD;  Location:  OR        Family History   Problem Relation Age of Onset     Hypertension Mother      Hyperlipidemia Mother      Depression Mother      Hypertension Father      Cancer Father 65        neuro endrocine CA, neck     Hyperlipidemia Father      Aortic aneurysm Maternal Grandfather          of ascending aortic aneurysm at age 64     Aortic aneurysm Maternal Aunt          in her 40s from ascending aortic aneurysm     C.A.D. No family hx of      Cancer - colorectal No family hx of       Diabetes No family hx of      Cerebrovascular Disease No family hx of      Breast Cancer No family hx of      Prostate Cancer No family hx of        Social History     Socioeconomic History     Marital status:      Spouse name: Not on file     Number of children: 2     Years of education: 16     Highest education level: Not on file   Occupational History     Occupation:      Employer: [employer]     Comment: fulltime   Tobacco Use     Smoking status: Former Smoker     Packs/day: 0.50     Years: 10.00     Pack years: 5.00     Types: Cigarettes     Quit date: 1994     Years since quittin.8     Smokeless tobacco: Never Used   Substance and Sexual Activity     Alcohol use: Yes     Comment: very rare     Drug use: No     Sexual activity: Not Currently     Partners: Male     Birth control/protection: I.U.D.     Comment: Mirena   Other Topics Concern     Parent/sibling w/ CABG, MI or angioplasty before 65F 55M? No   Social History Narrative    How much exercise per week? 2 90 minute yoga sessions      How much calcium per day? Diet       How much caffeine per day? 0    How much vitamin D per day? Supplement     Do you/your family wear seatbelts?  Yes    Do you/your family use safety helmets? Yes    Do you/your family use sunscreen? Yes    Do you/your family keep firearms in the home? No    Do you/your family have a smoke detector(s)? Yes        Do you feel safe in your home? Yes    Has anyone ever touched you in an unwanted manner? No     Explain Kiesha Fletcher [facility] 18        Reviewed [facility] 10-12-20             Social Determinants of Health     Financial Resource Strain: Not on file   Food Insecurity: Not on file   Transportation Needs: Not on file   Physical Activity: Not on file   Stress: Not on file   Social Connections: Not on file   Intimate Partner Violence: Unknown     Fear of Current or Ex-Partner: Not asked     Emotionally Abused: Not asked     Physically Abused: Not asked      Sexually Abused: Not asked   Housing Stability: Not on file       Outpatient Encounter Medications as of 4/27/2022   Medication Sig Dispense Refill     Biotin 10 MG TABS tablet Take 10,000 mcg by mouth daily       CALCIUM-VITAMIN D-VITAMIN K PO        clobetasol (TEMOVATE) 0.05 % external solution Apply daily as needed to scalp 50 mL 3     Collagen Hydrolysate POWD        Cyanocobalamin (VITAMIN B 12 PO) Take by mouth daily        estradiol (ESTRING) 2 MG vaginal ring Place 1 each vaginally every 3 months 1 each 3     hydroxychloroquine (PLAQUENIL) 200 MG tablet Take 1 tablet (200 mg) by mouth 2 times daily 180 tablet 3     levonorgestrel (MIRENA) 20 MCG/24HR IUD 1 each (20 mcg) by Intrauterine route once       levonorgestrel (MIRENA) 20 MCG/24HR IUD 1 each by Intrauterine route once       Lysine 500 MG TABS Take 1,000 mg by mouth daily        magnesium citrate solution Takes 1 tsp twice a day, 150 mg daily       NONFORMULARY 2 tablets daily seabuckthorn       valACYclovir (VALTREX) 1000 mg tablet Take 2 tablets (2,000 mg) by mouth 2 times daily For 2 days as needed for cold sores 12 tablet 11     valACYclovir (VALTREX) 500 MG tablet Take 1 tablet (500 mg) by mouth daily 90 tablet 1     [DISCONTINUED] finasteride (PROSCAR) 5 MG tablet Take 1 tablet (5 mg) by mouth daily 90 tablet 3     [DISCONTINUED] oxyCODONE (ROXICODONE) 5 MG tablet Take 1 tablet (5 mg) by mouth every 4 hours as needed for moderate to severe pain 10 tablet 0     [DISCONTINUED] prednisoLONE acetate (PRED FORTE) 1 % ophthalmic suspension Pt states she was prescribed but has not started it yet       Facility-Administered Encounter Medications as of 4/27/2022   Medication Dose Route Frequency Provider Last Rate Last Admin     acetaminophen (TYLENOL) tablet 1,000 mg  1,000 mg Oral Once Ruslan Huerta MD         [COMPLETED] triamcinolone acetonide (KENALOG-10) injection 5 mg  5 mg Intra-Lesional Once Rosemary Delcid PA-C   2.5 mg at  04/27/22 1100             O:   NAD, WDWN, Alert & Oriented, Mood & Affect wnl, Vitals stable   Here today alone   /71   Pulse 79   SpO2 100%    General appearance normal   Vitals stable   Alert, oriented and in no acute distress     Patches of alopecia on frontal/temporal side of scalp, some accentuation of hair follicle, no scaling seen.                                      Eyes: Conjunctivae/lids:Normal                                 ENT: Lips: normal                                MSK:Normal                                Pulm: Breathing Normal                                Neuro/Psych: Orientation:Alert and Orientedx3 ; Mood/Affect:normal   A/P:  1. Frontal fibrosing alopecia   Picture taken today to monitor hair loss  IL TAC: PGACAC discussed.  Risks including but not limited to injection site reaction, bruising, no resolution.  All questions answered and entertained to patient s satisfaction.  Informed consent obtained.  IL TAC in concentration of 5 mg/ml was injected ID to frontal scalp.  Total injected was  1 ml.  Patient tolerated without complications and given wound care instructions, including not to move product around.  Return in 4 weeks for follow-up and possible additional IL TAC.       Apply clobetasol solution 2-3 times weekly.    Continue plaquenil 200 mg twice daily.   Recheck in 2 months.      Again, thank you for allowing me to participate in the care of your patient.        Sincerely,        Rosemary Henao PA-C

## 2022-04-28 ENCOUNTER — HOSPITAL ENCOUNTER (OUTPATIENT)
Dept: MAMMOGRAPHY | Facility: CLINIC | Age: 52
Discharge: HOME | End: 2022-04-28
Admitting: RADIOLOGY
Payer: COMMERCIAL

## 2022-04-28 DIAGNOSIS — Z12.31 VISIT FOR SCREENING MAMMOGRAM: ICD-10-CM

## 2022-04-28 PROCEDURE — 77067 SCR MAMMO BI INCL CAD: CPT

## 2022-05-02 NOTE — PROGRESS NOTES
Kiesha Mcguire is an extremely pleasant 51 year old year old female patient here today for recheck frontal fibrosing alopecia. She notes hair loss has stabilized. She is currently taking plaquenil and clobetasol solution. Here today for intralesional steroid injections.  Patient has no other skin complaints today.  Remainder of the HPI, Meds, PMH, Allergies, FH, and SH was reviewed in chart.    Pertinent Hx:  FFA  Past Medical History:   Diagnosis Date     Depressive disorder      Depressive disorder, not elsewhere classified     resolved     Herpes simplex without mention of complication     oral     IUD (intrauterine device) in place 08/15/2013    Mirena     Migraine headache with aura     very occass, sig aura, speech loss x1     Pure hypercholesterolemia     follows w BIPIN CHATMAN       Past Surgical History:   Procedure Laterality Date     COLONOSCOPY N/A 10/27/2021    Procedure: COLONOSCOPY, WITH POLYPECTOMY AND CLIP;  Surgeon: Og Gutierres MD;  [place] [facility] [place]      DILATION/CURETTAGE DIAG/THER NON OB  2006     LAPAROSCOPIC CHOLECYSTECTOMY N/A 2021    Procedure: CHOLECYSTECTOMY, LAPAROSCOPIC;  Surgeon: Denise Cast MD;  Location:  OR        Family History   Problem Relation Age of Onset     Hypertension Mother      Hyperlipidemia Mother      Depression Mother      Hypertension Father      Cancer Father 65        neuro endrocine CA, neck     Hyperlipidemia Father      Aortic aneurysm Maternal Grandfather          of ascending aortic aneurysm at age 64     Aortic aneurysm Maternal Aunt          in her 40s from ascending aortic aneurysm     C.A.D. No family hx of      Cancer - colorectal No family hx of      Diabetes No family hx of      Cerebrovascular Disease No family hx of      Breast Cancer No family hx of      Prostate Cancer No family hx of        Social History     Socioeconomic History     Marital status:      Spouse name: Not on file     Number of  children: 2     Years of education: 16     Highest education level: Not on file   Occupational History     Occupation:      Employer: [employer]     Comment: fulltime   Tobacco Use     Smoking status: Former Smoker     Packs/day: 0.50     Years: 10.00     Pack years: 5.00     Types: Cigarettes     Quit date: 1994     Years since quittin.8     Smokeless tobacco: Never Used   Substance and Sexual Activity     Alcohol use: Yes     Comment: very rare     Drug use: No     Sexual activity: Not Currently     Partners: Male     Birth control/protection: I.U.D.     Comment: Mirena   Other Topics Concern     Parent/sibling w/ CABG, MI or angioplasty before 65F 55M? No   Social History Narrative    How much exercise per week? 2 90 minute yoga sessions      How much calcium per day? Diet       How much caffeine per day? 0    How much vitamin D per day? Supplement     Do you/your family wear seatbelts?  Yes    Do you/your family use safety helmets? Yes    Do you/your family use sunscreen? Yes    Do you/your family keep firearms in the home? No    Do you/your family have a smoke detector(s)? Yes        Do you feel safe in your home? Yes    Has anyone ever touched you in an unwanted manner? No     Explain Kiesha Fletcher [facility] 18        Reviewed [facility] 10-12-20             Social Determinants of Health     Financial Resource Strain: Not on file   Food Insecurity: Not on file   Transportation Needs: Not on file   Physical Activity: Not on file   Stress: Not on file   Social Connections: Not on file   Intimate Partner Violence: Unknown     Fear of Current or Ex-Partner: Not asked     Emotionally Abused: Not asked     Physically Abused: Not asked     Sexually Abused: Not asked   Housing Stability: Not on file       Outpatient Encounter Medications as of 2022   Medication Sig Dispense Refill     Biotin 10 MG TABS tablet Take 10,000 mcg by mouth daily       CALCIUM-VITAMIN D-VITAMIN K PO         clobetasol (TEMOVATE) 0.05 % external solution Apply daily as needed to scalp 50 mL 3     Collagen Hydrolysate POWD        Cyanocobalamin (VITAMIN B 12 PO) Take by mouth daily        estradiol (ESTRING) 2 MG vaginal ring Place 1 each vaginally every 3 months 1 each 3     hydroxychloroquine (PLAQUENIL) 200 MG tablet Take 1 tablet (200 mg) by mouth 2 times daily 180 tablet 3     levonorgestrel (MIRENA) 20 MCG/24HR IUD 1 each (20 mcg) by Intrauterine route once       levonorgestrel (MIRENA) 20 MCG/24HR IUD 1 each by Intrauterine route once       Lysine 500 MG TABS Take 1,000 mg by mouth daily        magnesium citrate solution Takes 1 tsp twice a day, 150 mg daily       NONFORMULARY 2 tablets daily seabuckthorn       valACYclovir (VALTREX) 1000 mg tablet Take 2 tablets (2,000 mg) by mouth 2 times daily For 2 days as needed for cold sores 12 tablet 11     valACYclovir (VALTREX) 500 MG tablet Take 1 tablet (500 mg) by mouth daily 90 tablet 1     [DISCONTINUED] finasteride (PROSCAR) 5 MG tablet Take 1 tablet (5 mg) by mouth daily 90 tablet 3     [DISCONTINUED] oxyCODONE (ROXICODONE) 5 MG tablet Take 1 tablet (5 mg) by mouth every 4 hours as needed for moderate to severe pain 10 tablet 0     [DISCONTINUED] prednisoLONE acetate (PRED FORTE) 1 % ophthalmic suspension Pt states she was prescribed but has not started it yet       Facility-Administered Encounter Medications as of 4/27/2022   Medication Dose Route Frequency Provider Last Rate Last Admin     acetaminophen (TYLENOL) tablet 1,000 mg  1,000 mg Oral Once Ruslan Huerta MD         [COMPLETED] triamcinolone acetonide (KENALOG-10) injection 5 mg  5 mg Intra-Lesional Once Rosemary Delcid PA-C   2.5 mg at 04/27/22 1100             O:   NAD, WDWN, Alert & Oriented, Mood & Affect wnl, Vitals stable   Here today alone   /71   Pulse 79   SpO2 100%    General appearance normal   Vitals stable   Alert, oriented and in no acute distress     Patches of alopecia  on frontal/temporal side of scalp, some accentuation of hair follicle, no scaling seen.                                      Eyes: Conjunctivae/lids:Normal                                 ENT: Lips: normal                                MSK:Normal                                Pulm: Breathing Normal                                Neuro/Psych: Orientation:Alert and Orientedx3 ; Mood/Affect:normal   A/P:  1. Frontal fibrosing alopecia   Picture taken today to monitor hair loss  IL TAC: PGACAC discussed.  Risks including but not limited to injection site reaction, bruising, no resolution.  All questions answered and entertained to patient s satisfaction.  Informed consent obtained.  IL TAC in concentration of 5 mg/ml was injected ID to frontal scalp.  Total injected was  1 ml.  Patient tolerated without complications and given wound care instructions, including not to move product around.  Return in 4 weeks for follow-up and possible additional IL TAC.       Apply clobetasol solution 2-3 times weekly.    Continue plaquenil 200 mg twice daily.   Recheck in 2 months.

## 2022-05-11 ENCOUNTER — VIRTUAL VISIT (OUTPATIENT)
Dept: PSYCHOLOGY | Facility: CLINIC | Age: 52
End: 2022-05-11
Payer: COMMERCIAL

## 2022-05-11 DIAGNOSIS — F32.A DEPRESSIVE DISORDER: Primary | ICD-10-CM

## 2022-05-11 PROCEDURE — 90834 PSYTX W PT 45 MINUTES: CPT | Mod: GT | Performed by: SOCIAL WORKER
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
## 2022-05-17 ENCOUNTER — VIRTUAL VISIT (OUTPATIENT)
Dept: PSYCHOLOGY | Facility: CLINIC | Age: 52
End: 2022-05-17
Payer: COMMERCIAL

## 2022-05-17 DIAGNOSIS — F32.A DEPRESSIVE DISORDER: Primary | ICD-10-CM

## 2022-05-17 PROCEDURE — 90834 PSYTX W PT 45 MINUTES: CPT | Mod: GT | Performed by: SOCIAL WORKER
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
## 2022-05-20 ENCOUNTER — OFFICE VISIT (OUTPATIENT)
Dept: FAMILY MEDICINE | Facility: CLINIC | Age: 52
End: 2022-05-20
Payer: COMMERCIAL

## 2022-05-20 VITALS
WEIGHT: 132.8 LBS | RESPIRATION RATE: 14 BRPM | HEART RATE: 88 BPM | SYSTOLIC BLOOD PRESSURE: 102 MMHG | BODY MASS INDEX: 22.8 KG/M2 | OXYGEN SATURATION: 100 % | TEMPERATURE: 98.2 F | DIASTOLIC BLOOD PRESSURE: 71 MMHG

## 2022-05-20 DIAGNOSIS — M62.830 LUMBAR PARASPINAL MUSCLE SPASM: Primary | ICD-10-CM

## 2022-05-20 PROCEDURE — 99213 OFFICE O/P EST LOW 20 MIN: CPT | Performed by: FAMILY MEDICINE

## 2022-05-20 RX ORDER — DICLOFENAC SODIUM 75 MG/1
75 TABLET, DELAYED RELEASE ORAL 2 TIMES DAILY PRN
Qty: 30 TABLET | Refills: 0 | Status: SHIPPED | OUTPATIENT
Start: 2022-05-20 | End: 2023-08-14

## 2022-05-20 RX ORDER — CYCLOBENZAPRINE HCL 5 MG
5 TABLET ORAL 3 TIMES DAILY PRN
Qty: 42 TABLET | Refills: 0 | Status: SHIPPED | OUTPATIENT
Start: 2022-05-20 | End: 2023-02-27

## 2022-05-20 NOTE — PROGRESS NOTES
Assessment & Plan     Lumbar paraspinal muscle spasm  - diclofenac (VOLTAREN) 75 MG EC tablet  Dispense: 30 tablet; Refill: 0  - cyclobenzaprine (FLEXERIL) 5 MG tablet  Dispense: 42 tablet; Refill: 0  - Physical Therapy Referral    Patient education and Handout with home care instructions given. See AVS for details.    Return in about 2 weeks (around 6/3/2022), or if symptoms worsen or fail to improve.    Cammie Tapia MD  [facility] KAYA Pop is a 51 year old who presents for the following health issues     History of Present Illness       Back Pain:  She presents for follow up of back pain. Patient's back pain is a recurring problem.  Location of back pain:  Right lower back, left lower back and other  Description of back pain: sharp and shooting  Back pain spreads: nowhere    Since patient first noticed back pain, pain is: unchanged  Does back pain interfere with her job:  Yes      She eats 2-3 servings of fruits and vegetables daily.She consumes 1 sweetened beverage(s) daily.She exercises with enough effort to increase her heart rate 9 or less minutes per day.  She exercises with enough effort to increase her heart rate 3 or less days per week. She is missing 2 dose(s) of medications per week.       She has been doing a lot of gardening last week and so she has been sore all week, but yesterday pain started to worsen with muscle spasms that take her breath away.   Rates her current bilateral low back pain as a 6-7/10 and somewhat limiting her routine activities.  Not able to go gardening at this time.  She also has a pain that shoots down her back and thigh on left side- this pain has flared over the past several weeks.  States she split her pubis bone 12 years ago during child birth and tends to have this shooting pain on and off over the years.  States that she has had a similar kind of pain in the past and was given diazepam.She had left over Diazepam which she took,  unsure if it helped.  Also took ibuprofen for the pain.   Would like a referral to physical therapy with an option to see a PT provider outside of [place].      Review of Systems   Constitutional, HEENT, cardiovascular, pulmonary, gi and gu systems are negative, except as otherwise noted.      Objective    /71   Pulse 88   Temp 98.2  F (36.8  C) (Tympanic)   Resp 14   Wt 60.2 kg (132 lb 12.8 oz)   SpO2 100%   Breastfeeding No   BMI 22.80 kg/m    Body mass index is 22.8 kg/m .  Physical Exam   GENERAL: healthy, alert and no distress  Comprehensive back pain exam:  Tenderness of Bilateral tenderness of the lumbar paraspinal muscles., Range of motion not limited by pain, Lower extremity strength functional and equal on both sides and Ambulating without any ambulatory device., Lower extremity reflexes within normal limits bilaterally, Lower extremity sensation normal and equal on both sides and Straight leg raise test is equivocal bilaterally.      DATA  No previous back imaging studies noted

## 2022-06-29 ENCOUNTER — VIRTUAL VISIT (OUTPATIENT)
Dept: PSYCHOLOGY | Facility: CLINIC | Age: 52
End: 2022-06-29
Payer: COMMERCIAL

## 2022-06-29 DIAGNOSIS — F32.A DEPRESSIVE DISORDER: Primary | ICD-10-CM

## 2022-06-29 PROCEDURE — 90834 PSYTX W PT 45 MINUTES: CPT | Mod: GT | Performed by: SOCIAL WORKER
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
## 2022-07-13 ENCOUNTER — OFFICE VISIT (OUTPATIENT)
Dept: DERMATOLOGY | Facility: CLINIC | Age: 52
End: 2022-07-13
Payer: COMMERCIAL

## 2022-07-13 DIAGNOSIS — L65.9 ALOPECIA: Primary | ICD-10-CM

## 2022-07-13 DIAGNOSIS — L66.12 FRONTAL FIBROSING ALOPECIA: ICD-10-CM

## 2022-07-13 PROCEDURE — 11900 INJECT SKIN LESIONS </W 7: CPT | Performed by: PHYSICIAN ASSISTANT

## 2022-07-13 PROCEDURE — 99207 PR DROP WITH A PROCEDURE: CPT | Performed by: PHYSICIAN ASSISTANT

## 2022-07-13 ASSESSMENT — PAIN SCALES - GENERAL: PAINLEVEL: NO PAIN (0)

## 2022-07-14 ENCOUNTER — VIRTUAL VISIT (OUTPATIENT)
Dept: PSYCHOLOGY | Facility: CLINIC | Age: 52
End: 2022-07-14
Payer: COMMERCIAL

## 2022-07-14 DIAGNOSIS — F32.A DEPRESSIVE DISORDER: Primary | ICD-10-CM

## 2022-07-14 PROCEDURE — 90834 PSYTX W PT 45 MINUTES: CPT | Mod: GT | Performed by: SOCIAL WORKER

## 2022-07-14 NOTE — PROGRESS NOTES
Kiesha Mcguire is an extremely pleasant 51 year old year old female patient here today for recheck FFA. She notes hair loss is stable. Currently on plaquenil. No side effects.   Patient has no other skin complaints today.  Remainder of the HPI, Meds, PMH, Allergies, FH, and SH was reviewed in chart.    Past Medical History:   Diagnosis Date     Depressive disorder      Depressive disorder, not elsewhere classified     resolved     Herpes simplex without mention of complication     oral     IUD (intrauterine device) in place 08/15/2013    Mirena     Migraine headache with aura     very occass, sig aura, speech loss x1     Pure hypercholesterolemia     follows w BIPIN CHATMAN       Past Surgical History:   Procedure Laterality Date     COLONOSCOPY N/A 10/27/2021    Procedure: COLONOSCOPY, WITH POLYPECTOMY AND CLIP;  Surgeon: Og Gutierres MD;  Location: [facility] OR      DILATION/CURETTAGE DIAG/THER NON OB  2006     LAPAROSCOPIC CHOLECYSTECTOMY N/A 2021    Procedure: CHOLECYSTECTOMY, LAPAROSCOPIC;  Surgeon: Denise Cast MD;  Location: [facility] OR        Family History   Problem Relation Age of Onset     Hypertension Mother      Hyperlipidemia Mother      Depression Mother      Hypertension Father      Cancer Father 65        neuro endrocine CA, neck     Hyperlipidemia Father      Aortic aneurysm Maternal Grandfather          of ascending aortic aneurysm at age 64     Aortic aneurysm Maternal Aunt          in her 40s from ascending aortic aneurysm     C.A.D. No family hx of      Cancer - colorectal No family hx of      Diabetes No family hx of      Cerebrovascular Disease No family hx of      Breast Cancer No family hx of      Prostate Cancer No family hx of        Social History     Socioeconomic History     Marital status:      Spouse name: Not on file     Number of children: 2     Years of education: 16     Highest education level: Not on file   Occupational History     Occupation:       Employer: [employer]     Comment: fulltime   Tobacco Use     Smoking status: Former Smoker     Packs/day: 0.50     Years: 10.00     Pack years: 5.00     Types: Cigarettes     Quit date: 1994     Years since quittin.0     Smokeless tobacco: Never Used   Substance and Sexual Activity     Alcohol use: Yes     Comment: very rare     Drug use: No     Sexual activity: Not Currently     Partners: Male     Birth control/protection: I.U.D.     Comment: Mirena   Other Topics Concern     Parent/sibling w/ CABG, MI or angioplasty before 65F 55M? No   Social History Narrative    How much exercise per week? 2 90 minute yoga sessions      How much calcium per day? Diet       How much caffeine per day? 0    How much vitamin D per day? Supplement     Do you/your family wear seatbelts?  Yes    Do you/your family use safety helmets? Yes    Do you/your family use sunscreen? Yes    Do you/your family keep firearms in the home? No    Do you/your family have a smoke detector(s)? Yes        Do you feel safe in your home? Yes    Has anyone ever touched you in an unwanted manner? No     Explain Kiesha [facility]        Reviewed [facility] 10-12-20             Social Determinants of Health     Financial Resource Strain: Not on file   Food Insecurity: Not on file   Transportation Needs: Not on file   Physical Activity: Not on file   Stress: Not on file   Social Connections: Not on file   Intimate Partner Violence: Unknown     Fear of Current or Ex-Partner: Not asked     Emotionally Abused: Not asked     Physically Abused: Not asked     Sexually Abused: Not asked   Housing Stability: Not on file       Outpatient Encounter Medications as of 2022   Medication Sig Dispense Refill     UNABLE TO FIND daily Ashwaganda - patient reported       UNABLE TO FIND daily Rhodiola- patient reported       Biotin 10 MG TABS tablet Take 10,000 mcg by mouth daily       CALCIUM-VITAMIN D-VITAMIN K PO        clobetasol  (TEMOVATE) 0.05 % external solution Apply daily as needed to scalp 50 mL 3     Collagen Hydrolysate POWD        Cyanocobalamin (VITAMIN B 12 PO) Take by mouth daily        cyclobenzaprine (FLEXERIL) 5 MG tablet Take 1 tablet (5 mg) by mouth 3 times daily as needed for muscle spasms (Avoid driving or operating machinery while on this medication-has drowsy effects). 42 tablet 0     diclofenac (VOLTAREN) 75 MG EC tablet Take 1 tablet (75 mg) by mouth 2 times daily as needed for moderate pain (take with food) 30 tablet 0     estradiol (ESTRING) 2 MG vaginal ring Place 1 each vaginally every 3 months 1 each 3     hydroxychloroquine (PLAQUENIL) 200 MG tablet Take 1 tablet (200 mg) by mouth 2 times daily 180 tablet 3     levonorgestrel (MIRENA) 20 MCG/24HR IUD 1 each by Intrauterine route once       Lysine 500 MG TABS Take 1,000 mg by mouth daily        magnesium citrate solution Takes 1 tsp twice a day, 150 mg daily       NONFORMULARY 2 tablets daily seabuckthorn       valACYclovir (VALTREX) 1000 mg tablet Take 2 tablets (2,000 mg) by mouth 2 times daily For 2 days as needed for cold sores 12 tablet 11     valACYclovir (VALTREX) 500 MG tablet Take 1 tablet (500 mg) by mouth daily 90 tablet 1     Facility-Administered Encounter Medications as of 7/13/2022   Medication Dose Route Frequency Provider Last Rate Last Admin     acetaminophen (TYLENOL) tablet 1,000 mg  1,000 mg Oral Once Ruslan Huerta MD         [COMPLETED] triamcinolone acetonide (KENALOG-10) injection 5 mg  5 mg Intra-Lesional Once Rosemary Delcid PA-C   5 mg at 07/13/22 1126             O:   NAD, WDWN, Alert & Oriented, Mood & Affect wnl, Vitals stable   Here today alone   There were no vitals taken for this visit.   General appearance normal   Vitals stable   Alert, oriented and in no acute distress         Patches of alopecia on frontal/temporal side of scalp, some accentuation of hair follicle, no scaling seen.      Eyes: Conjunctivae/lids:Normal      ENT: Lipsnormal    MSK:Normal    Pulm: Breathing Normal    Neuro/Psych: Orientation:Alert and Orientedx3 ; Mood/Affect:normal   A/P:  1. Frontal fibrosing alopecia   Well controlled.   IL TAC: PGACAC discussed.  Risks including but not limited to injection site reaction, bruising, no resolution.  All questions answered and entertained to patient s satisfaction.  Informed consent obtained.  IL TAC in concentration of 5 mg/ml was injected ID to frontal scalp.  Total injected was  1 ml.  Patient tolerated without complications and given wound care instructions, including not to move product around.  Return in 4 weeks for follow-up and possible additional IL TAC.       Apply clobetasol solution 2-3 times weekly.    Continue plaquenil 200 mg twice daily.   Recheck in 2 months.
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
## 2022-07-28 ENCOUNTER — VIRTUAL VISIT (OUTPATIENT)
Dept: PSYCHOLOGY | Facility: CLINIC | Age: 52
End: 2022-07-28
Payer: COMMERCIAL

## 2022-07-28 DIAGNOSIS — F32.A DEPRESSIVE DISORDER: Primary | ICD-10-CM

## 2022-07-28 PROCEDURE — 90834 PSYTX W PT 45 MINUTES: CPT | Mod: GT | Performed by: SOCIAL WORKER
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
## 2022-08-11 ENCOUNTER — VIRTUAL VISIT (OUTPATIENT)
Dept: PSYCHOLOGY | Facility: CLINIC | Age: 52
End: 2022-08-11
Payer: COMMERCIAL

## 2022-08-11 DIAGNOSIS — F32.A DEPRESSIVE DISORDER: Primary | ICD-10-CM

## 2022-08-11 PROCEDURE — 90834 PSYTX W PT 45 MINUTES: CPT | Mod: GT | Performed by: SOCIAL WORKER

## 2022-08-11 ASSESSMENT — ANXIETY QUESTIONNAIRES
2. NOT BEING ABLE TO STOP OR CONTROL WORRYING: NOT AT ALL
8. IF YOU CHECKED OFF ANY PROBLEMS, HOW DIFFICULT HAVE THESE MADE IT FOR YOU TO DO YOUR WORK, TAKE CARE OF THINGS AT HOME, OR GET ALONG WITH OTHER PEOPLE?: NOT DIFFICULT AT ALL
GAD7 TOTAL SCORE: 2
GAD7 TOTAL SCORE: 2
7. FEELING AFRAID AS IF SOMETHING AWFUL MIGHT HAPPEN: NOT AT ALL
1. FEELING NERVOUS, ANXIOUS, OR ON EDGE: SEVERAL DAYS
5. BEING SO RESTLESS THAT IT IS HARD TO SIT STILL: NOT AT ALL
7. FEELING AFRAID AS IF SOMETHING AWFUL MIGHT HAPPEN: NOT AT ALL
GAD7 TOTAL SCORE: 2
IF YOU CHECKED OFF ANY PROBLEMS ON THIS QUESTIONNAIRE, HOW DIFFICULT HAVE THESE PROBLEMS MADE IT FOR YOU TO DO YOUR WORK, TAKE CARE OF THINGS AT HOME, OR GET ALONG WITH OTHER PEOPLE: NOT DIFFICULT AT ALL
6. BECOMING EASILY ANNOYED OR IRRITABLE: NOT AT ALL
4. TROUBLE RELAXING: SEVERAL DAYS
3. WORRYING TOO MUCH ABOUT DIFFERENT THINGS: NOT AT ALL

## 2022-08-11 ASSESSMENT — PATIENT HEALTH QUESTIONNAIRE - PHQ9
10. IF YOU CHECKED OFF ANY PROBLEMS, HOW DIFFICULT HAVE THESE PROBLEMS MADE IT FOR YOU TO DO YOUR WORK, TAKE CARE OF THINGS AT HOME, OR GET ALONG WITH OTHER PEOPLE: SOMEWHAT DIFFICULT
SUM OF ALL RESPONSES TO PHQ QUESTIONS 1-9: 4
SUM OF ALL RESPONSES TO PHQ QUESTIONS 1-9: 4
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
## 2022-08-25 ENCOUNTER — VIRTUAL VISIT (OUTPATIENT)
Dept: PSYCHOLOGY | Facility: CLINIC | Age: 52
End: 2022-08-25
Payer: COMMERCIAL

## 2022-08-25 DIAGNOSIS — F32.A DEPRESSIVE DISORDER: Primary | ICD-10-CM

## 2022-08-25 PROCEDURE — 90834 PSYTX W PT 45 MINUTES: CPT | Mod: GT | Performed by: SOCIAL WORKER

## 2022-08-25 PROCEDURE — 90785 PSYTX COMPLEX INTERACTIVE: CPT | Mod: GT | Performed by: SOCIAL WORKER
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
## 2022-09-07 ENCOUNTER — OFFICE VISIT (OUTPATIENT)
Dept: FAMILY MEDICINE | Facility: CLINIC | Age: 52
End: 2022-09-07
Payer: COMMERCIAL

## 2022-09-07 ENCOUNTER — OFFICE VISIT (OUTPATIENT)
Dept: DERMATOLOGY | Facility: CLINIC | Age: 52
End: 2022-09-07
Payer: COMMERCIAL

## 2022-09-07 VITALS — BODY MASS INDEX: 23.9 KG/M2 | HEIGHT: 64 IN | WEIGHT: 140 LBS

## 2022-09-07 VITALS
TEMPERATURE: 98.1 F | DIASTOLIC BLOOD PRESSURE: 66 MMHG | WEIGHT: 140 LBS | BODY MASS INDEX: 24.03 KG/M2 | OXYGEN SATURATION: 98 % | SYSTOLIC BLOOD PRESSURE: 116 MMHG | HEART RATE: 64 BPM

## 2022-09-07 DIAGNOSIS — Z79.899 HIGH RISK MEDICATION USE: ICD-10-CM

## 2022-09-07 DIAGNOSIS — R79.89 ABNORMAL CBC: ICD-10-CM

## 2022-09-07 DIAGNOSIS — R35.0 URINARY FREQUENCY: ICD-10-CM

## 2022-09-07 DIAGNOSIS — L29.9 ITCHING: ICD-10-CM

## 2022-09-07 DIAGNOSIS — L66.12 FRONTAL FIBROSING ALOPECIA: Chronic | ICD-10-CM

## 2022-09-07 DIAGNOSIS — L66.12 FRONTAL FIBROSING ALOPECIA: Primary | ICD-10-CM

## 2022-09-07 DIAGNOSIS — Z97.5 IUD (INTRAUTERINE DEVICE) IN PLACE: ICD-10-CM

## 2022-09-07 DIAGNOSIS — L29.9 SKIN PRURITUS: Primary | ICD-10-CM

## 2022-09-07 PROBLEM — Z20.822 COVID-19 RULED OUT BY LABORATORY TESTING: Status: RESOLVED | Noted: 2021-12-24 | Resolved: 2022-09-07

## 2022-09-07 LAB
ALBUMIN SERPL-MCNC: 3.7 G/DL (ref 3.4–5)
ALP SERPL-CCNC: 72 U/L (ref 40–150)
ALT SERPL W P-5'-P-CCNC: 29 U/L (ref 0–50)
ANION GAP SERPL CALCULATED.3IONS-SCNC: 6 MMOL/L (ref 3–14)
AST SERPL W P-5'-P-CCNC: 23 U/L (ref 0–45)
BASOPHILS # BLD AUTO: 0.1 10E3/UL (ref 0–0.2)
BASOPHILS NFR BLD AUTO: 2 %
BILIRUB SERPL-MCNC: 0.5 MG/DL (ref 0.2–1.3)
BUN SERPL-MCNC: 15 MG/DL (ref 7–30)
CALCIUM SERPL-MCNC: 9.2 MG/DL (ref 8.5–10.1)
CHLORIDE BLD-SCNC: 103 MMOL/L (ref 94–109)
CO2 SERPL-SCNC: 29 MMOL/L (ref 20–32)
CREAT SERPL-MCNC: 0.65 MG/DL (ref 0.52–1.04)
EOSINOPHIL # BLD AUTO: 0.1 10E3/UL (ref 0–0.7)
EOSINOPHIL NFR BLD AUTO: 3 %
ERYTHROCYTE [DISTWIDTH] IN BLOOD BY AUTOMATED COUNT: 12.5 % (ref 10–15)
GFR SERPL CREATININE-BSD FRML MDRD: >90 ML/MIN/1.73M2
GLUCOSE BLD-MCNC: 85 MG/DL (ref 70–99)
HCT VFR BLD AUTO: 38.1 % (ref 35–47)
HGB BLD-MCNC: 12.3 G/DL (ref 11.7–15.7)
HOLD SPECIMEN: NORMAL
HOLD SPECIMEN: NORMAL
IMM GRANULOCYTES # BLD: 0 10E3/UL
IMM GRANULOCYTES NFR BLD: 0 %
LYMPHOCYTES # BLD AUTO: 1.9 10E3/UL (ref 0.8–5.3)
LYMPHOCYTES NFR BLD AUTO: 51 %
MCH RBC QN AUTO: 30.4 PG (ref 26.5–33)
MCHC RBC AUTO-ENTMCNC: 32.3 G/DL (ref 31.5–36.5)
MCV RBC AUTO: 94 FL (ref 78–100)
MONOCYTES # BLD AUTO: 0.5 10E3/UL (ref 0–1.3)
MONOCYTES NFR BLD AUTO: 12 %
NEUTROPHILS # BLD AUTO: 1.2 10E3/UL (ref 1.6–8.3)
NEUTROPHILS NFR BLD AUTO: 32 %
NRBC # BLD AUTO: 0 10E3/UL
NRBC BLD AUTO-RTO: 0 /100
PLATELET # BLD AUTO: 193 10E3/UL (ref 150–450)
POTASSIUM BLD-SCNC: 3.7 MMOL/L (ref 3.4–5.3)
PROT SERPL-MCNC: 6.8 G/DL (ref 6.8–8.8)
RBC # BLD AUTO: 4.05 10E6/UL (ref 3.8–5.2)
SODIUM SERPL-SCNC: 138 MMOL/L (ref 133–144)
TSH SERPL DL<=0.005 MIU/L-ACNC: 1.99 MU/L (ref 0.4–4)
WBC # BLD AUTO: 3.6 10E3/UL (ref 4–11)

## 2022-09-07 PROCEDURE — 11900 INJECT SKIN LESIONS </W 7: CPT | Performed by: PHYSICIAN ASSISTANT

## 2022-09-07 PROCEDURE — 99214 OFFICE O/P EST MOD 30 MIN: CPT | Performed by: PHYSICIAN ASSISTANT

## 2022-09-07 PROCEDURE — 36415 COLL VENOUS BLD VENIPUNCTURE: CPT | Performed by: PHYSICIAN ASSISTANT

## 2022-09-07 PROCEDURE — 99213 OFFICE O/P EST LOW 20 MIN: CPT | Mod: 25 | Performed by: PHYSICIAN ASSISTANT

## 2022-09-07 PROCEDURE — 80050 GENERAL HEALTH PANEL: CPT | Performed by: PHYSICIAN ASSISTANT

## 2022-09-07 ASSESSMENT — PAIN SCALES - GENERAL: PAINLEVEL: NO PAIN (0)

## 2022-09-07 NOTE — NURSING NOTE
"Chief Complaint   Patient presents with     RECHECK     Patient also had deep stinging itching feeling for the last few weeks.        Vitals:    09/07/22 1501   Weight: 63.5 kg (140 lb)   Height: 1.626 m (5' 4\")     Wt Readings from Last 1 Encounters:   09/07/22 63.5 kg (140 lb)     Ht Readings from Last 1 Encounters:   09/07/22 1.626 m (5' 4\")       Francie Christianson [facility], 9/7/2022 3:01 PM    "

## 2022-09-07 NOTE — LETTER
2022         RE: Kiesha Mcguire  [address] [facility] [address]        Dear Colleague,    Thank you for referring your patient, Kiesha Mcguire, to the [facility]. Please see a copy of my visit note below.    Kiesha Mcguire is an extremely pleasant 51 year old year old female patient here today to recheck frontal fibrosing alopecia. She notes she had itchy for two weeks she reports that has since resolved. She denies any new products or medications. She reports she read somewhere that this can occur with general formulation of hydroxychloroquine vs brand name. She notes hair loss has stabilized. Patient has no other skin complaints today.  Remainder of the HPI, Meds, PMH, Allergies, FH, and SH was reviewed in chart.    Past Medical History:   Diagnosis Date     Depressive disorder      Depressive disorder, not elsewhere classified     resolved     Herpes simplex without mention of complication     oral     IUD (intrauterine device) in place 08/15/2013    Mirena     Migraine headache with aura     very occass, sig aura, speech loss x1     Pure hypercholesterolemia     follows w BIPIN CHATMAN       Past Surgical History:   Procedure Laterality Date     COLONOSCOPY N/A 10/27/2021    Procedure: COLONOSCOPY, WITH POLYPECTOMY AND CLIP;  Surgeon: Og Gutierres MD;  Location: [facility] OR      DILATION/CURETTAGE DIAG/THER NON OB  2006     LAPAROSCOPIC CHOLECYSTECTOMY N/A 2021    Procedure: CHOLECYSTECTOMY, LAPAROSCOPIC;  Surgeon: Denise Cast MD;  Location:  OR        Family History   Problem Relation Age of Onset     Hypertension Mother      Hyperlipidemia Mother      Depression Mother      Hypertension Father      Cancer Father 65        neuro endrocine CA, neck     Hyperlipidemia Father      Aortic aneurysm Maternal Grandfather          of ascending aortic aneurysm at age 64     Aortic aneurysm Maternal Aunt          in her 40s from  ascending aortic aneurysm     C.A.D. No family hx of      Cancer - colorectal No family hx of      Diabetes No family hx of      Cerebrovascular Disease No family hx of      Breast Cancer No family hx of      Prostate Cancer No family hx of        Social History     Socioeconomic History     Marital status:      Spouse name: Not on file     Number of children: 2     Years of education: 16     Highest education level: Not on file   Occupational History     Occupation:      Employer: [employer]     Comment: fulltime   Tobacco Use     Smoking status: Former Smoker     Packs/day: 0.50     Years: 10.00     Pack years: 5.00     Types: Cigarettes     Quit date: 1994     Years since quittin.1     Smokeless tobacco: Never Used   Substance and Sexual Activity     Alcohol use: Yes     Comment: very rare     Drug use: No     Sexual activity: Not Currently     Partners: Male     Birth control/protection: I.U.D.     Comment: Mirena   Other Topics Concern     Parent/sibling w/ CABG, MI or angioplasty before 65F 55M? No   Social History Narrative    How much exercise per week? 2 90 minute yoga sessions      How much calcium per day? Diet       How much caffeine per day? 0    How much vitamin D per day? Supplement     Do you/your family wear seatbelts?  Yes    Do you/your family use safety helmets? Yes    Do you/your family use sunscreen? Yes    Do you/your family keep firearms in the home? No    Do you/your family have a smoke detector(s)? Yes        Do you feel safe in your home? Yes    Has anyone ever touched you in an unwanted manner? No     Explain Kiesha [facility] 18        Reviewed [facility] 10-12-20             Social Determinants of Health     Financial Resource Strain: Not on file   Food Insecurity: Not on file   Transportation Needs: Not on file   Physical Activity: Not on file   Stress: Not on file   Social Connections: Not on file   Intimate Partner Violence: Unknown     Fear of  Current or Ex-Partner: Not asked     Emotionally Abused: Not asked     Physically Abused: Not asked     Sexually Abused: Not asked   Housing Stability: Not on file       Outpatient Encounter Medications as of 9/7/2022   Medication Sig Dispense Refill     Acetaminophen (TYLENOL PO)        Biotin 10 MG TABS tablet Take 10,000 mcg by mouth daily       CALCIUM-VITAMIN D-VITAMIN K PO        clobetasol (TEMOVATE) 0.05 % external solution Apply daily as needed to scalp 50 mL 3     Collagen Hydrolysate POWD        Cyanocobalamin (VITAMIN B 12 PO) Take by mouth daily        cyclobenzaprine (FLEXERIL) 5 MG tablet Take 1 tablet (5 mg) by mouth 3 times daily as needed for muscle spasms (Avoid driving or operating machinery while on this medication-has drowsy effects). 42 tablet 0     diclofenac (VOLTAREN) 75 MG EC tablet Take 1 tablet (75 mg) by mouth 2 times daily as needed for moderate pain (take with food) 30 tablet 0     estradiol (ESTRING) 2 MG vaginal ring Place 1 each vaginally every 3 months 1 each 3     hydroxychloroquine (PLAQUENIL) 200 MG tablet Take 1 tablet (200 mg) by mouth 2 times daily 180 tablet 3     levonorgestrel (MIRENA) 20 MCG/24HR IUD 1 each by Intrauterine route once       Lysine 500 MG TABS Take 1,000 mg by mouth daily        magnesium citrate solution Takes 1 tsp twice a day, 150 mg daily       NONFORMULARY 2 tablets daily jozef       UNABLE TO FIND daily Ashwaganda - patient reported       UNABLE TO FIND daily Rhodiola- patient reported       valACYclovir (VALTREX) 1000 mg tablet Take 2 tablets (2,000 mg) by mouth 2 times daily For 2 days as needed for cold sores 12 tablet 11     valACYclovir (VALTREX) 500 MG tablet Take 1 tablet (500 mg) by mouth daily 90 tablet 1     Facility-Administered Encounter Medications as of 9/7/2022   Medication Dose Route Frequency Provider Last Rate Last Admin     [COMPLETED] triamcinolone acetonide (KENALOG-10) injection 5 mg  5 mg Intradermal Once Farhana  "Rosemary Bloom PA-C   0.5 mL at 09/07/22 1558             O:   NAD, WDWN, Alert & Oriented, Mood & Affect wnl, Vitals stable   Here today alone   Ht 1.626 m (5' 4\")   Wt 63.5 kg (140 lb)   BMI 24.03 kg/m     General appearance normal   Vitals stable   Alert, oriented and in no acute distress       Patches of alopecia on frontal/temporal side of scalp, minimal accentuation of hair follicle, no scaling seen.      Eyes: Conjunctivae/lids:Normal     ENT: Lips,: normal    MSK:Normal    Pulm: Breathing Normal    Neuro/Psych: Orientation:Alert and Orientedx3 ; Mood/Affect:normal   A/P:  1. FFA  Well controlled.   IL TAC: PGACAC discussed.  Risks including but not limited to injection site reaction, bruising, no resolution.  All questions answered and entertained to patient s satisfaction.  Informed consent obtained.  IL TAC in concentration of 5 mg/ml was injected ID to frontal fibrosing alopecia.  Total injected was  0.5 ml.  Patient tolerated without complications and given wound care instructions, including not to move product around.  Return in 4 weeks for follow-up and possible additional IL TAC.    Continue plaquenil 200 mg bid .   Recheck in 3-4 months.   CMP normal   2. Skin pruritis   Resolved. Check tsh today        Again, thank you for allowing me to participate in the care of your patient.        Sincerely,        Rosemary Henao PA-C    "

## 2022-09-07 NOTE — PROGRESS NOTES
Assessment & Plan     Frontal fibrosing alopecia  - Comprehensive metabolic panel; Future  - CBC with Platelets & Differential; Future  - Comprehensive metabolic panel  - CBC with Platelets & Differential    High risk medication use  Will check liver and kidney due to plaquenil use. However, itchign has improved despite using this medication. Uncertain etiology, follow up with derm as planned.   - Comprehensive metabolic panel; Future  - CBC with Platelets & Differential; Future  - Comprehensive metabolic panel  - CBC with Platelets & Differential    Urinary frequency  Ok to start with physical therapy.   - Physical Therapy Referral; Future    Itching  As above.   - Comprehensive metabolic panel; Future  - CBC with Platelets & Differential; Future  - Comprehensive metabolic panel  - CBC with Platelets & Differential    IUD (intrauterine device) in place  Discussed patient should speak with her GYN. Patient already on IUD and estrace, not certain what changes she is looking to make with hormones.                    No follow-ups on file.    KEVIN [facility] SANGEETHA Pop is a 51 year old, presenting for the following health issues:  No chief complaint on file.      History of Present Illness       Reason for visit:  Severe itching  Symptom onset:  1-2 weeks ago  Symptom intensity:  Severe  Symptom progression:  Improving  Had these symptoms before:  No    She eats 2-3 servings of fruits and vegetables daily.She consumes 1 sweetened beverage(s) daily.She exercises with enough effort to increase her heart rate 9 or less minutes per day.  She exercises with enough effort to increase her heart rate 3 or less days per week. She is missing 2 dose(s) of medications per week.  She is not taking prescribed medications regularly due to remembering to take.     Pt said that she has been having itching. That seems like a deep biting itching. Pt is wondering about   hydroxychloroquine (PLAQUENIL) 200 MG tablet and side effects.  No new exposures to lotions, soaps, food and or detergents.      Patient is new to me. Has been seeing derm for alopecia treatment with plaquenil. Had a sudden onset of severe itching that moved around the body. Has subsequently improved. Still taking the plaquenil. No rashes or hives ever with this itching.   Has appointment with derm later today.     Would like to discuss HRT.   Patient already on estrace vaginal ring and IUD. Having abdominal weight gain, fatigue and brain fog.     Has had urinary frequency. Recent glucose for work screening normal. No pain or blood.     Review of Systems         Objective    /66 (BP Location: Left arm, Patient Position: Chair, Cuff Size: Adult Regular)   Pulse 64   Temp 98.1  F (36.7  C) (Oral)   Wt 63.5 kg (140 lb)   SpO2 98%   Breastfeeding No   BMI 24.03 kg/m    Body mass index is 24.03 kg/m .  Physical Exam   GENERAL: healthy, alert and no distress  PSYCH: mentation appears normal, affect normal/bright  Skin: clear

## 2022-09-08 ENCOUNTER — VIRTUAL VISIT (OUTPATIENT)
Dept: PSYCHOLOGY | Facility: CLINIC | Age: 52
End: 2022-09-08
Payer: COMMERCIAL

## 2022-09-08 DIAGNOSIS — F32.A DEPRESSIVE DISORDER: Primary | ICD-10-CM

## 2022-09-08 PROCEDURE — 90834 PSYTX W PT 45 MINUTES: CPT | Mod: GT | Performed by: SOCIAL WORKER

## 2022-09-08 NOTE — PROGRESS NOTES
Kiesha Mcguire is an extremely pleasant 51 year old year old female patient here today to recheck frontal fibrosing alopecia. She notes she had itchy for two weeks she reports that has since resolved. She denies any new products or medications. She reports she read somewhere that this can occur with general formulation of hydroxychloroquine vs brand name. She notes hair loss has stabilized. Patient has no other skin complaints today.  Remainder of the HPI, Meds, PMH, Allergies, FH, and SH was reviewed in chart.    Past Medical History:   Diagnosis Date     Depressive disorder      Depressive disorder, not elsewhere classified     resolved     Herpes simplex without mention of complication     oral     IUD (intrauterine device) in place 08/15/2013    Mirena     Migraine headache with aura     very occass, sig aura, speech loss x1     Pure hypercholesterolemia     follows kate VOSS MD       Past Surgical History:   Procedure Laterality Date     COLONOSCOPY N/A 10/27/2021    Procedure: COLONOSCOPY, WITH POLYPECTOMY AND CLIP;  Surgeon: Og Gutierres MD;  Location: [facility] OR      DILATION/CURETTAGE DIAG/THER NON OB  2006     LAPAROSCOPIC CHOLECYSTECTOMY N/A 2021    Procedure: CHOLECYSTECTOMY, LAPAROSCOPIC;  Surgeon: Denise Cast MD;  Location:  OR        Family History   Problem Relation Age of Onset     Hypertension Mother      Hyperlipidemia Mother      Depression Mother      Hypertension Father      Cancer Father 65        neuro endrocine CA, neck     Hyperlipidemia Father      Aortic aneurysm Maternal Grandfather          of ascending aortic aneurysm at age 64     Aortic aneurysm Maternal Aunt          in her 40s from ascending aortic aneurysm     C.A.D. No family hx of      Cancer - colorectal No family hx of      Diabetes No family hx of      Cerebrovascular Disease No family hx of      Breast Cancer No family hx of      Prostate Cancer No family hx of        Social History      Socioeconomic History     Marital status:      Spouse name: Not on file     Number of children: 2     Years of education: 16     Highest education level: Not on file   Occupational History     Occupation:      Employer: [employer]     Comment: fulltime   Tobacco Use     Smoking status: Former Smoker     Packs/day: 0.50     Years: 10.00     Pack years: 5.00     Types: Cigarettes     Quit date: 1994     Years since quittin.1     Smokeless tobacco: Never Used   Substance and Sexual Activity     Alcohol use: Yes     Comment: very rare     Drug use: No     Sexual activity: Not Currently     Partners: Male     Birth control/protection: I.U.D.     Comment: Mirena   Other Topics Concern     Parent/sibling w/ CABG, MI or angioplasty before 65F 55M? No   Social History Narrative    How much exercise per week? 2 90 minute yoga sessions      How much calcium per day? Diet       How much caffeine per day? 0    How much vitamin D per day? Supplement     Do you/your family wear seatbelts?  Yes    Do you/your family use safety helmets? Yes    Do you/your family use sunscreen? Yes    Do you/your family keep firearms in the home? No    Do you/your family have a smoke detector(s)? Yes        Do you feel safe in your home? Yes    Has anyone ever touched you in an unwanted manner? No     Explain Kiesha Fletcher [facility] 18        Reviewed [facility] 10-12-20             Social Determinants of Health     Financial Resource Strain: Not on file   Food Insecurity: Not on file   Transportation Needs: Not on file   Physical Activity: Not on file   Stress: Not on file   Social Connections: Not on file   Intimate Partner Violence: Unknown     Fear of Current or Ex-Partner: Not asked     Emotionally Abused: Not asked     Physically Abused: Not asked     Sexually Abused: Not asked   Housing Stability: Not on file       Outpatient Encounter Medications as of 2022   Medication Sig Dispense Refill      "Acetaminophen (TYLENOL PO)        Biotin 10 MG TABS tablet Take 10,000 mcg by mouth daily       CALCIUM-VITAMIN D-VITAMIN K PO        clobetasol (TEMOVATE) 0.05 % external solution Apply daily as needed to scalp 50 mL 3     Collagen Hydrolysate POWD        Cyanocobalamin (VITAMIN B 12 PO) Take by mouth daily        cyclobenzaprine (FLEXERIL) 5 MG tablet Take 1 tablet (5 mg) by mouth 3 times daily as needed for muscle spasms (Avoid driving or operating machinery while on this medication-has drowsy effects). 42 tablet 0     diclofenac (VOLTAREN) 75 MG EC tablet Take 1 tablet (75 mg) by mouth 2 times daily as needed for moderate pain (take with food) 30 tablet 0     estradiol (ESTRING) 2 MG vaginal ring Place 1 each vaginally every 3 months 1 each 3     hydroxychloroquine (PLAQUENIL) 200 MG tablet Take 1 tablet (200 mg) by mouth 2 times daily 180 tablet 3     levonorgestrel (MIRENA) 20 MCG/24HR IUD 1 each by Intrauterine route once       Lysine 500 MG TABS Take 1,000 mg by mouth daily        magnesium citrate solution Takes 1 tsp twice a day, 150 mg daily       NONFORMULARY 2 tablets daily jozef       UNABLE TO FIND daily Ashwaganda - patient reported       UNABLE TO FIND daily Rhodiola- patient reported       valACYclovir (VALTREX) 1000 mg tablet Take 2 tablets (2,000 mg) by mouth 2 times daily For 2 days as needed for cold sores 12 tablet 11     valACYclovir (VALTREX) 500 MG tablet Take 1 tablet (500 mg) by mouth daily 90 tablet 1     Facility-Administered Encounter Medications as of 9/7/2022   Medication Dose Route Frequency Provider Last Rate Last Admin     [COMPLETED] triamcinolone acetonide (KENALOG-10) injection 5 mg  5 mg Intradermal Once Rosemary Delcid PA-C   0.5 mL at 09/07/22 1558             O:   NAD, WDWN, Alert & Oriented, Mood & Affect wnl, Vitals stable   Here today alone   Ht 1.626 m (5' 4\")   Wt 63.5 kg (140 lb)   BMI 24.03 kg/m     General appearance normal   Vitals stable   Alert, " oriented and in no acute distress       Patches of alopecia on frontal/temporal side of scalp, minimal accentuation of hair follicle, no scaling seen.      Eyes: Conjunctivae/lids:Normal     ENT: Lips,: normal    MSK:Normal    Pulm: Breathing Normal    Neuro/Psych: Orientation:Alert and Orientedx3 ; Mood/Affect:normal   A/P:  1. FFA  Well controlled.   IL TAC: PGACAC discussed.  Risks including but not limited to injection site reaction, bruising, no resolution.  All questions answered and entertained to patient s satisfaction.  Informed consent obtained.  IL TAC in concentration of 5 mg/ml was injected ID to frontal fibrosing alopecia.  Total injected was  0.5 ml.  Patient tolerated without complications and given wound care instructions, including not to move product around.  Return in 4 weeks for follow-up and possible additional IL TAC.    Continue plaquenil 200 mg bid .   Recheck in 3-4 months.   CMP normal   2. Skin pruritis   Resolved. Check tsh today

## 2022-09-08 NOTE — RESULT ENCOUNTER NOTE
Your white blood cell count is just slightly low. This can happen when you body is exposed to a virus (even though you may not be sick). It would be unlikely to be related to your itching. I would recommend repeating this in about a month.   Bridget Reyes PA-C
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
## 2022-09-23 ENCOUNTER — VIRTUAL VISIT (OUTPATIENT)
Dept: PSYCHOLOGY | Facility: CLINIC | Age: 52
End: 2022-09-23
Payer: COMMERCIAL

## 2022-09-23 DIAGNOSIS — F32.A DEPRESSIVE DISORDER: Primary | ICD-10-CM

## 2022-09-23 DIAGNOSIS — F43.10 PTSD (POST-TRAUMATIC STRESS DISORDER): ICD-10-CM

## 2022-09-23 PROCEDURE — 96130 PSYCL TST EVAL PHYS/QHP 1ST: CPT | Mod: GT | Performed by: PSYCHOLOGIST

## 2022-09-23 PROCEDURE — 96131 PSYCL TST EVAL PHYS/QHP EA: CPT | Mod: GT | Performed by: PSYCHOLOGIST

## 2022-09-23 PROCEDURE — 90785 PSYTX COMPLEX INTERACTIVE: CPT | Mod: GT | Performed by: SOCIAL WORKER

## 2022-09-23 PROCEDURE — 90834 PSYTX W PT 45 MINUTES: CPT | Mod: GT | Performed by: SOCIAL WORKER
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
## 2022-10-04 ENCOUNTER — VIRTUAL VISIT (OUTPATIENT)
Dept: PSYCHOLOGY | Facility: CLINIC | Age: 52
End: 2022-10-04
Payer: COMMERCIAL

## 2022-10-04 DIAGNOSIS — F32.A DEPRESSIVE DISORDER: Primary | ICD-10-CM

## 2022-10-04 PROCEDURE — 90834 PSYTX W PT 45 MINUTES: CPT | Mod: GT | Performed by: SOCIAL WORKER
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
## 2022-10-11 ENCOUNTER — VIRTUAL VISIT (OUTPATIENT)
Dept: PSYCHOLOGY | Facility: CLINIC | Age: 52
End: 2022-10-11
Payer: COMMERCIAL

## 2022-10-11 DIAGNOSIS — F32.A DEPRESSIVE DISORDER: Primary | ICD-10-CM

## 2022-10-11 PROCEDURE — 90834 PSYTX W PT 45 MINUTES: CPT | Mod: GT | Performed by: SOCIAL WORKER
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
## 2022-10-13 ENCOUNTER — OFFICE VISIT (OUTPATIENT)
Dept: INTERNAL MEDICINE | Facility: CLINIC | Age: 52
End: 2022-10-13
Payer: COMMERCIAL

## 2022-10-13 ENCOUNTER — NURSE TRIAGE (OUTPATIENT)
Dept: FAMILY MEDICINE | Facility: CLINIC | Age: 52
End: 2022-10-13

## 2022-10-13 VITALS
HEART RATE: 69 BPM | RESPIRATION RATE: 16 BRPM | SYSTOLIC BLOOD PRESSURE: 107 MMHG | OXYGEN SATURATION: 100 % | TEMPERATURE: 98.4 F | HEIGHT: 64 IN | WEIGHT: 130 LBS | BODY MASS INDEX: 22.2 KG/M2 | DIASTOLIC BLOOD PRESSURE: 72 MMHG

## 2022-10-13 DIAGNOSIS — U07.1 INFECTION DUE TO 2019 NOVEL CORONAVIRUS: Primary | ICD-10-CM

## 2022-10-13 PROCEDURE — 99213 OFFICE O/P EST LOW 20 MIN: CPT | Mod: CS | Performed by: INTERNAL MEDICINE

## 2022-10-13 RX ORDER — BENZONATATE 200 MG/1
200 CAPSULE ORAL 3 TIMES DAILY PRN
Qty: 30 CAPSULE | Refills: 1 | Status: SHIPPED | OUTPATIENT
Start: 2022-10-13 | End: 2022-11-14

## 2022-10-13 ASSESSMENT — PAIN SCALES - GENERAL: PAINLEVEL: MODERATE PAIN (5)

## 2022-10-13 NOTE — TELEPHONE ENCOUNTER
"Patient is on day 12 of COVID-19 infection. Her symptoms have improved except for cough. She notes that she feels mild SOB with and without cough.     Triage protocol advises to be seen today. Warm transferred to central scheduling to assist. Discussed to be seen in  if no appointments available.     Patient verbalized understanding and in agreement with plan of care.       Marly Bingham, RN, BSN, PHN  [facility]      Reason for Disposition    MILD difficulty breathing (e.g., minimal/no SOB at rest, SOB with walking, pulse <100) and still present when not coughing    Additional Information    Negative: Bluish (or gray) lips or face    Negative: SEVERE difficulty breathing (e.g., struggling for each breath, speaks in single words)    Negative: Rapid onset of cough and has hives    Negative: Coughing started suddenly after medicine, an allergic food or bee sting    Negative: Difficulty breathing after exposure to flames, smoke, or fumes    Negative: Sounds like a life-threatening emergency to the triager    Negative: MODERATE difficulty breathing (e.g., speaks in phrases, SOB even at rest, pulse 100-120) and still present when not coughing    Negative: Chest pain present when not coughing    Negative: Passed out (i.e., fainted, collapsed and was not responding)    Negative: Patient sounds very sick or weak to the triager    Answer Assessment - Initial Assessment Questions  1. ONSET: \"When did the cough begin?\"       2 weeks  2. SEVERITY: \"How bad is the cough today?\"       moderate  3. SPUTUM: \"Describe the color of your sputum\" (none, dry cough; clear, white, yellow, green)      no  4. HEMOPTYSIS: \"Are you coughing up any blood?\" If so ask: \"How much?\" (flecks, streaks, tablespoons, etc.)      no  5. DIFFICULTY BREATHING: \"Are you having difficulty breathing?\" If Yes, ask: \"How bad is it?\" (e.g., mild, moderate, severe)     - MILD: No SOB at rest, mild SOB with walking, speaks normally in " "sentences, can lie down, no retractions, pulse < 100.     - MODERATE: SOB at rest, SOB with minimal exertion and prefers to sit, cannot lie down flat, speaks in phrases, mild retractions, audible wheezing, pulse 100-120.     - SEVERE: Very SOB at rest, speaks in single words, struggling to breathe, sitting hunched forward, retractions, pulse > 120       Shallow breathing - starts coughing when taking deep breath  6. FEVER: \"Do you have a fever?\" If Yes, ask: \"What is your temperature, how was it measured, and when did it start?\"      no  7. CARDIAC HISTORY: \"Do you have any history of heart disease?\" (e.g., heart attack, congestive heart failure)       no  8. LUNG HISTORY: \"Do you have any history of lung disease?\"  (e.g., pulmonary embolus, asthma, emphysema)      no  9. PE RISK FACTORS: \"Do you have a history of blood clots?\" (or: recent major surgery, recent prolonged travel, bedridden)      no  10. OTHER SYMPTOMS: \"Do you have any other symptoms?\" (e.g., runny nose, wheezing, chest pain)        covid  11. PREGNANCY: \"Is there any chance you are pregnant?\" \"When was your last menstrual period?\"        no  12. TRAVEL: \"Have you traveled out of the country in the last month?\" (e.g., travel history, exposures)        no    Protocols used: COUGH-A-[place]      "

## 2022-10-13 NOTE — PROGRESS NOTES
"  Assessment & Plan     Infection due to 2019 novel coronavirus  Cough is mild- exam quite normal. Nothing suggestive of secondary infection.  - benzonatate (TESSALON) 200 MG capsule; Take 1 capsule (200 mg) by mouth 3 times daily as needed for cough    Prescription drug management             No follow-ups on file.    Shane Rankin MD  [facility] MADINA Pop is a 51 year old, presenting for the following health issues:  Covid Concern (Patient is on day 12 of COVID-19 infection. Her symptoms have improved except for cough.)      History of Present Illness       Reason for visit:  Post covid cough  Symptom onset:  1-2 weeks ago  Symptom intensity:  Severe  Symptom progression:  Worsening  Had these symptoms before:  No  What makes it better:  Cough drops    She eats 2-3 servings of fruits and vegetables daily.She consumes 1 sweetened beverage(s) daily.She exercises with enough effort to increase her heart rate 9 or less minutes per day.  She exercises with enough effort to increase her heart rate 3 or less days per week.   She is taking medications regularly.             Review of Systems         Objective    /72 (BP Location: Right arm, Patient Position: Chair, Cuff Size: Adult Regular)   Pulse 69   Temp 98.4  F (36.9  C) (Temporal)   Resp 16   Ht 1.626 m (5' 4\")   Wt 59 kg (130 lb)   SpO2 100%   BMI 22.31 kg/m    Body mass index is 22.31 kg/m .  Physical Exam   GENERAL: healthy, alert and no distress  RESP: lungs clear to auscultation - no rales, rhonchi or wheezes  CV: regular rate and rhythm, normal S1 S2, no S3 or S4, no murmur, click or rub, no peripheral edema and peripheral pulses strong                    "

## 2022-10-18 ENCOUNTER — MYC MEDICAL ADVICE (OUTPATIENT)
Dept: INTERNAL MEDICINE | Facility: CLINIC | Age: 52
End: 2022-10-18

## 2022-10-18 ENCOUNTER — VIRTUAL VISIT (OUTPATIENT)
Dept: PSYCHOLOGY | Facility: CLINIC | Age: 52
End: 2022-10-18
Payer: COMMERCIAL

## 2022-10-18 DIAGNOSIS — F32.A DEPRESSIVE DISORDER: Primary | ICD-10-CM

## 2022-10-18 PROCEDURE — 90834 PSYTX W PT 45 MINUTES: CPT | Mod: GT | Performed by: SOCIAL WORKER

## 2022-10-18 PROCEDURE — 90785 PSYTX COMPLEX INTERACTIVE: CPT | Mod: GT | Performed by: SOCIAL WORKER
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
## 2022-10-20 NOTE — TELEPHONE ENCOUNTER
Pt wants to report she has back pain since she was advised to watch for this symptom. Pt denies chest pain or breathing problems. Has a chronic cough post COVID-19. Triage advised she see UC for evaluation of back pain. She questions need to be seen. Asked triage why she would need to be seen. Triage advised he sees UC for evaluation of back pain and get appropriate imaging if needed. Pt states she will call her primary clinic.

## 2022-10-20 NOTE — TELEPHONE ENCOUNTER
On call back, please triage patient symptoms of upper left sided back pain.    Patient Contact    Attempt # 1    Was call answered?  No.  Left message on voicemail with information to call me back.

## 2022-10-25 ENCOUNTER — VIRTUAL VISIT (OUTPATIENT)
Dept: PSYCHOLOGY | Facility: CLINIC | Age: 52
End: 2022-10-25
Payer: COMMERCIAL

## 2022-10-25 DIAGNOSIS — F32.A DEPRESSIVE DISORDER: Primary | ICD-10-CM

## 2022-10-25 PROCEDURE — 90834 PSYTX W PT 45 MINUTES: CPT | Mod: GT | Performed by: SOCIAL WORKER

## 2022-10-25 PROCEDURE — 90785 PSYTX COMPLEX INTERACTIVE: CPT | Mod: GT | Performed by: SOCIAL WORKER
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
## 2022-10-31 ENCOUNTER — OFFICE VISIT (OUTPATIENT)
Dept: OBGYN | Facility: CLINIC | Age: 52
End: 2022-10-31
Attending: ADVANCED PRACTICE MIDWIFE
Payer: COMMERCIAL

## 2022-10-31 VITALS
HEIGHT: 64 IN | DIASTOLIC BLOOD PRESSURE: 60 MMHG | WEIGHT: 136 LBS | HEART RATE: 83 BPM | SYSTOLIC BLOOD PRESSURE: 99 MMHG | BODY MASS INDEX: 23.22 KG/M2

## 2022-10-31 DIAGNOSIS — R39.15 URGENCY OF URINATION: ICD-10-CM

## 2022-10-31 DIAGNOSIS — N95.2 VAGINAL ATROPHY: Primary | ICD-10-CM

## 2022-10-31 DIAGNOSIS — Z97.5 IUD (INTRAUTERINE DEVICE) IN PLACE: ICD-10-CM

## 2022-10-31 PROBLEM — R10.13 ABDOMINAL PAIN, EPIGASTRIC: Status: RESOLVED | Noted: 2021-12-24 | Resolved: 2022-10-31

## 2022-10-31 PROBLEM — K81.0 ACUTE CHOLECYSTITIS: Status: RESOLVED | Noted: 2021-12-23 | Resolved: 2022-10-31

## 2022-10-31 PROCEDURE — 99214 OFFICE O/P EST MOD 30 MIN: CPT | Performed by: ADVANCED PRACTICE MIDWIFE

## 2022-10-31 PROCEDURE — G0463 HOSPITAL OUTPT CLINIC VISIT: HCPCS

## 2022-10-31 RX ORDER — AZITHROMYCIN 250 MG/1
TABLET, FILM COATED ORAL
COMMUNITY
Start: 2022-10-28 | End: 2022-11-14

## 2022-10-31 ASSESSMENT — PAIN SCALES - GENERAL: PAINLEVEL: NO PAIN (0)

## 2022-10-31 NOTE — LETTER
10/31/2022       RE: Kiesha Mcguire  [address] [facility] [address]     Dear Colleague,    Thank you for referring your patient, Kiesha Mcguire, to the [facility] [place] [facility]. Please see a copy of my visit note below.    Here to discuss s/s of menopause and options for therapy.    -recent COVID infection, healing  -continues to struggle with vaginal atrophy and discomfort, had been using estrace cream with little improvement, used estring in the past with improvement but very expensive  -mild experiences of warm flashes and some sleep disruption  -vaginal estrogen cream did not work well for symptoms and hard to manage the cream, messy  -wants to discuss systemic hormone therapy, concerned about stroke risk.  -happy w IUD, no spotting/bleeding  -continues to struggle with urge to void, would like to see pelvic floor PT again    Seeing therapist to manage depression/anxiety, not sexually active at this time  Single parent to 12 and 15 yo boys    PAST MEDICAL HISTORY:   Past Medical History:   Diagnosis Date     Depressive disorder      Depressive disorder, not elsewhere classified     resolved     Herpes simplex without mention of complication     oral     IUD (intrauterine device) in place 08/15/2013    Mirena     Migraine headache with aura     very occass, sig aura, speech loss x1     Pure hypercholesterolemia     follows w BIPIN CHATMAN       PAST SURGICAL HISTORY:   Past Surgical History:   Procedure Laterality Date     COLONOSCOPY N/A 10/27/2021    Procedure: COLONOSCOPY, WITH POLYPECTOMY AND CLIP;  Surgeon: Og Gutierres MD;  Location: [facility] [place] OR      DILATION/CURETTAGE DIAG/THER NON OB  12/2006     LAPAROSCOPIC CHOLECYSTECTOMY N/A 12/24/2021    Procedure: CHOLECYSTECTOMY, LAPAROSCOPIC;  Surgeon: Denise Cast MD;  Location:  OR       FAMILY HISTORY:   Family History   Problem Relation Age of  "Onset     Hypertension Mother      Hyperlipidemia Mother      Depression Mother      Hypertension Father      Cancer Father 65        neuro endrocine CA, neck     Hyperlipidemia Father      Aortic aneurysm Maternal Grandfather          of ascending aortic aneurysm at age 64     Aortic aneurysm Maternal Aunt          in her 40s from ascending aortic aneurysm     C.A.D. No family hx of      Cancer - colorectal No family hx of      Diabetes No family hx of      Cerebrovascular Disease No family hx of      Breast Cancer No family hx of      Prostate Cancer No family hx of        SOCIAL HISTORY:   Social History     Tobacco Use     Smoking status: Former     Packs/day: 0.50     Years: 10.00     Pack years: 5.00     Types: Cigarettes     Quit date: 1994     Years since quittin.3     Smokeless tobacco: Never   Substance Use Topics     Alcohol use: Yes     Comment: very rare     OB History    Para Term  AB Living   3 2 2 0 1 2   SAB IAB Ectopic Multiple Live Births   0 0 0 0 2      # Outcome Date GA Lbr [place]/2nd Weight Sex Delivery Anes PTL Lv   3 Term 07/27/10    M    ARMAND   2 Term 07    M    ARMAND      Birth Comments: System Generated. Please review and update pregnancy details.   1 AB 2006              ROS: 10 point ROS neg other than the symptoms noted above in the HPI.    BP 99/60   Pulse 83   Ht 1.626 m (5' 4.02\")   Wt 61.7 kg (136 lb)   BMI 23.33 kg/m      A/P:  Vaginal atropy  IUD surveillance  Perimenopausal  Urgency of urination      (N95.2) Vaginal atrophy  (primary encounter diagnosis)  -vaginal estrogen cream not effective  *  Plan: estradiol (ESTRING) 2 MG vaginal ring, --prior auth rejected.  Rx for imvexxy 10mcg, daily x 14, then 3 x week  (Z97.5) IUD (intrauterine device) in place    (R39.15) Urgency of urination    Plan: Physical Therapy Referral    Discussed and reviewed risks associated with systemic estrogen, specifically the patch-no increased risk for stroke.  " Discussed possible vaginal atrophy improvement w patch, but some will still need local estrogen. Pt will consider.  Brief review kate Alatorre re: COVID risk for stroke and use of estrogen-no data or recommendations, but often surgery will be delayed 6-8wks after a person has covid to decrease risk of stroke after surgery--will share this info with Kiesha.  Due for annual exam in 2 mos.  Nell Dos Santos, SHAYY, APRN, CNM, FACNM                        Again, thank you for allowing me to participate in the care of your patient.      Sincerely,    JERAMY Guerin CNM

## 2022-10-31 NOTE — LETTER
Date:November 7, 2022      Patient was self referred, no letter generated. Do not send.        [facility] Health Information

## 2022-10-31 NOTE — PROGRESS NOTES
Here to discuss s/s of menopause and options for therapy.    -recent COVID infection, healing  -continues to struggle with vaginal atrophy and discomfort, had been using estrace cream with little improvement, used estring in the past with improvement but very expensive  -mild experiences of warm flashes and some sleep disruption  -vaginal estrogen cream did not work well for symptoms and hard to manage the cream, messy  -wants to discuss systemic hormone therapy, concerned about stroke risk.  -happy w IUD, no spotting/bleeding  -continues to struggle with urge to void, would like to see pelvic floor PT again    Seeing therapist to manage depression/anxiety, not sexually active at this time  Single parent to 12 and 15 yo boys    PAST MEDICAL HISTORY:   Past Medical History:   Diagnosis Date     Depressive disorder      Depressive disorder, not elsewhere classified     resolved     Herpes simplex without mention of complication     oral     IUD (intrauterine device) in place 08/15/2013    Mirena     Migraine headache with aura     very occass, sig aura, speech loss x1     Pure hypercholesterolemia     follows w BIPIN CHATMAN       PAST SURGICAL HISTORY:   Past Surgical History:   Procedure Laterality Date     COLONOSCOPY N/A 10/27/2021    Procedure: COLONOSCOPY, WITH POLYPECTOMY AND CLIP;  Surgeon: Og Gutierres MD;  Location: [facility] [place] OR      DILATION/CURETTAGE DIAG/THER NON OB  2006     LAPAROSCOPIC CHOLECYSTECTOMY N/A 2021    Procedure: CHOLECYSTECTOMY, LAPAROSCOPIC;  Surgeon: Denise Cast MD;  Location:  OR       FAMILY HISTORY:   Family History   Problem Relation Age of Onset     Hypertension Mother      Hyperlipidemia Mother      Depression Mother      Hypertension Father      Cancer Father 65        neuro endrocine CA, neck     Hyperlipidemia Father      Aortic aneurysm Maternal Grandfather          of ascending aortic aneurysm at age 64     Aortic aneurysm Maternal Aunt          in  "her 40s from ascending aortic aneurysm     C.A.D. No family hx of      Cancer - colorectal No family hx of      Diabetes No family hx of      Cerebrovascular Disease No family hx of      Breast Cancer No family hx of      Prostate Cancer No family hx of        SOCIAL HISTORY:   Social History     Tobacco Use     Smoking status: Former     Packs/day: 0.50     Years: 10.00     Pack years: 5.00     Types: Cigarettes     Quit date: 1994     Years since quittin.3     Smokeless tobacco: Never   Substance Use Topics     Alcohol use: Yes     Comment: very rare     OB History    Para Term  AB Living   3 2 2 0 1 2   SAB IAB Ectopic Multiple Live Births   0 0 0 0 2      # Outcome Date GA Lbr [place]/2nd Weight Sex Delivery Anes PTL Lv   3 Term 07/27/10    M    ARMAND   2 Term 07    M    ARMAND      Birth Comments: System Generated. Please review and update pregnancy details.   1 AB               ROS: 10 point ROS neg other than the symptoms noted above in the HPI.    BP 99/60   Pulse 83   Ht 1.626 m (5' 4.02\")   Wt 61.7 kg (136 lb)   BMI 23.33 kg/m      A/P:  Vaginal atropy  IUD surveillance  Perimenopausal  Urgency of urination      (N95.2) Vaginal atrophy  (primary encounter diagnosis)  -vaginal estrogen cream not effective  *  Plan: estradiol (ESTRING) 2 MG vaginal ring, --prior auth rejected.  Rx for imvexxy 10mcg, daily x 14, then 3 x week  (Z97.5) IUD (intrauterine device) in place    (R39.15) Urgency of urination    Plan: Physical Therapy Referral    Discussed and reviewed risks associated with systemic estrogen, specifically the patch-no increased risk for stroke.  Discussed possible vaginal atrophy improvement w patch, but some will still need local estrogen. Pt will consider.  Brief review kate Alatorre re: COVID risk for stroke and use of estrogen-no data or recommendations, but often surgery will be delayed 6-8wks after a person has covid to decrease risk of stroke after surgery--will share " this info with Kiesha.  Due for annual exam in 2 mos.  Nell Dos Santos, DNP, APRN, CNM, FACNM

## 2022-11-02 ENCOUNTER — VIRTUAL VISIT (OUTPATIENT)
Dept: PSYCHOLOGY | Facility: CLINIC | Age: 52
End: 2022-11-02
Payer: COMMERCIAL

## 2022-11-02 DIAGNOSIS — F32.A DEPRESSIVE DISORDER: Primary | ICD-10-CM

## 2022-11-02 PROCEDURE — 90834 PSYTX W PT 45 MINUTES: CPT | Mod: GT | Performed by: SOCIAL WORKER
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
## 2022-11-04 ENCOUNTER — THERAPY VISIT (OUTPATIENT)
Dept: PHYSICAL THERAPY | Facility: CLINIC | Age: 52
End: 2022-11-04
Payer: COMMERCIAL

## 2022-11-04 DIAGNOSIS — M62.89 PELVIC FLOOR DYSFUNCTION: ICD-10-CM

## 2022-11-04 PROCEDURE — 97530 THERAPEUTIC ACTIVITIES: CPT | Mod: GP | Performed by: PHYSICAL THERAPIST

## 2022-11-04 PROCEDURE — 97161 PT EVAL LOW COMPLEX 20 MIN: CPT | Mod: GP | Performed by: PHYSICAL THERAPIST

## 2022-11-04 PROCEDURE — 97110 THERAPEUTIC EXERCISES: CPT | Mod: GP | Performed by: PHYSICAL THERAPIST

## 2022-11-04 NOTE — PROGRESS NOTES
"Physical Therapy Initial Evaluation  Subjective:  The history is provided by the patient. No  was used.   Patient Health History  Kiesha Mcguire being seen for frequent nighttime urination, began a couple of years ago.     Date of Onset: 10/31/22 date of order.      Pain is reported as 0/10 on pain scale.  General health as reported by patient is good.  Health conditions: numbness/tingling, foot drop, migarines/headache.   Red flags:  None as reported by patient.   Other medical allergies details: imitrex.    Other surgery history details: gall bladder.     Other medications details: alopecia medication.    Current occupation is .   Primary job tasks include:  Computer work.                   Therapist Assessment:   Clinical Impression: Pt presents with primary complaint of nocturia and urinary urgency.  Per clinical examination, pt with coordination and strength training of pelvic floor muscles as well as proximal strengthening.  Pt will benefit from skilled physical therapy for coordination and strength training of pelvic floor muscles as well as proximal strengthening, education on bladder health, and instruction on urge suppression techniques.    Chief Complaint:  The pt presents today with frequent urination especially at nighttime. Sometimes she is up 6-8x with the urge to urinate. Occasionally at night she will strain when urinating. The amount she voids at night is small to average. During the day she puts off her urge for a while and then she will get a really strong urge.     The pt had pelvic injury, where her pubic symphysis , when giving birth that resulted in neuropathy and foot drop on her right side. She continues to have some numbness in her foot. She will get periodic pelvic pain and will go the the chiropractor to put her pelvis \"back in place\". Pt notes chronic low back pain and periodic sciatic pain on her right side.      Goals: reduce " nocturia and pain    Urination   Do you leak on the way to the bathroom or with a strong urge to void? Sometimes, 1x every 2 weeks  Do you leak with cough, sneeze, jumping, running? no  Any other activities that cause leaking? n/a  Do you have triggers that make you feel you can't wait to go to the bathroom? yes What are they? Walking at night, getting up from standing when sitting for a while  Type of pad and number used per day? none  When you leak what is the amount? Small drops  How long can you delay the need to urinate? A long time  How many times do you get up to urinate at night? 2-6x  How many times do you urinate during the day? About every 2-4 hours during the day  Can you stop the flow of urine when on the toilet? yes  Is the volume of urine passed usually: average  Do you strain to pass urine? Sometimes at night  Do you have a slow or hesitant urinary stream? no  Do you have difficulty initiating the urine stream? no  How many bladder infections have you had in the last 12 months? 0  Has a hard time drinking water. She will have decaf tea, about 3 cups, most during the day, kombucha in the evening, and some juice at night.  Do you feel like you empty completely when voiding? Yes, but sometimes will feel that she has to empty 10 minutes later.  The pt denies any vaginal pressure or heaviness.     Bowel Habits  Frequency of bowel movements? 1-2x a day  Consistency of stool? Soft, soft formed  Do you ignore the urge to defecate? no  Do you strain to pass stool? no  Do you feel that you empty completely? yes  The pt got her gallbladder out last year, which can cause some diarrhea.    Pelvic Pain  When do you have pelvic pain? sometimes pelvis gets out of place  The pt has been taking topical estrogen for atrophy which has been helpful.  Are you sexually active? no  Is initial penetration during intercourse painful? no  Is deeper penetration painful? no  Do you use lubrication? yes  Have you given birth? Yes  1 vaginal delivery and 1   Have you ever been worried for your physical safety? no  Any abdominal or pelvic surgeries?   Are you having regular exercise? Not currently, was doing yoga, but forward flexion has caused some of her back pain. The pt feels like the pandemic has messed up her routine. She would like to get back to yoga. She would like to be more active.   Have you practiced the PF (kegel) exercise for 4 or more weeks? no    Objective:    POSTURE: slight forward head    ABDOMINAL WALL: no ZAYRA present slight tension of rectus abdominis bilaterally    EXTERNAL ASSESSMENT:  Skin condition:normal  Bearing down/coughing:slight bulge with cue go cough  Muscle contraction/perineal mobility: elevation and urogenital triangle descent, slight lift, no urogenital triangle descent, no movement, substitution      INTERNAL VAGINAL ASSESSMENT:  Baseline PF tone:hyper  PF Tone with cough: hypo   PF Response quality: normal  PF Power: Center: 3/5  Accessory Muscle use:n/a  Endurance: Maximum contraction in seconds:10 seconds   Quick contraction repetitions prior to fatigue: 10/10  Palpation: tenderness to palpation of: bulbocavernosus on R side, iliococcygeus  And obturator internus bilaterally    Hip MMT:   Flexion: Left: 5-/5, Right: 5-/5   Extension: Left: 4-/5, Right: 4-/5   Abduction: Left: 4+/5, Right: 4/5   IR: Left: 5/5, Right: 5/5   ER: Left: 5-/5, Right: 5-/5    Myotomes: L2-S1: normal bilaterally    Special Tests:   Active Straight Leg Raise: R:-, L: +     SIJ Special Tests:   Thigh Thrust: L:- , R: -    Assessment/Plan:    Patient is a 51 year old female with pelvic complaints.    Patient has the following significant findings with corresponding treatment plan.                Diagnosis 1:  Pelvic floor dysfunction  Pain -  hot/cold therapy, US, electric stimulation, mechanical traction, manual therapy, STS, splint/taping/bracing/orthotics, self management, education, directional preference  exercise and home program  Decreased ROM/flexibility - manual therapy, therapeutic exercise, therapeutic activity and home program  Decreased strength - therapeutic exercise, therapeutic activities and home program  Impaired muscle performance - biofeedback, electric stimulation, neuro re-education and home program  Decreased function - therapeutic activities, home program and functional performance testing    Therapy Evaluation Codes:   Cumulative Therapy Evaluation is: Low complexity.    Previous and current functional limitations:  (See Goal Flow Sheet for this information)    Short term and Long term goals: (See Goal Flow Sheet for this information)     Communication ability:  Patient appears to be able to clearly communicate and understand verbal and written communication and follow directions correctly.  Treatment Explanation - The following has been discussed with the patient:   RX ordered/plan of care  Anticipated outcomes  Possible risks and side effects  This patient would benefit from PT intervention to resume normal activities.   Rehab potential is good.    Frequency:  1 X week, once daily  Duration:  for 4 weeks tapering to 2 X a month over 8 weeks  Discharge Plan:  Achieve all LTG.  Independent in home treatment program.  Reach maximal therapeutic benefit.    Please refer to the daily flowsheet for treatment today, total treatment time and time spent performing 1:1 timed codes.

## 2022-11-07 RX ORDER — ESTRADIOL 10 UG/1
INSERT VAGINAL
Qty: 44 EACH | Refills: 1 | Status: SHIPPED | OUTPATIENT
Start: 2022-11-07 | End: 2022-11-22 | Stop reason: ALTCHOICE

## 2022-11-08 ENCOUNTER — VIRTUAL VISIT (OUTPATIENT)
Dept: PSYCHOLOGY | Facility: CLINIC | Age: 52
End: 2022-11-08
Payer: COMMERCIAL

## 2022-11-08 DIAGNOSIS — F32.A DEPRESSIVE DISORDER: Primary | ICD-10-CM

## 2022-11-08 PROCEDURE — 90834 PSYTX W PT 45 MINUTES: CPT | Mod: GT | Performed by: SOCIAL WORKER

## 2022-11-08 PROCEDURE — 90785 PSYTX COMPLEX INTERACTIVE: CPT | Mod: GT | Performed by: SOCIAL WORKER
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
## 2022-11-09 ENCOUNTER — THERAPY VISIT (OUTPATIENT)
Dept: PHYSICAL THERAPY | Facility: CLINIC | Age: 52
End: 2022-11-09
Payer: COMMERCIAL

## 2022-11-09 DIAGNOSIS — M62.89 PELVIC FLOOR DYSFUNCTION: Primary | ICD-10-CM

## 2022-11-09 PROCEDURE — 97110 THERAPEUTIC EXERCISES: CPT | Mod: GP | Performed by: PHYSICAL THERAPIST

## 2022-11-09 PROCEDURE — 97530 THERAPEUTIC ACTIVITIES: CPT | Mod: GP | Performed by: PHYSICAL THERAPIST

## 2022-11-14 ENCOUNTER — OFFICE VISIT (OUTPATIENT)
Dept: FAMILY MEDICINE | Facility: CLINIC | Age: 52
End: 2022-11-14
Payer: COMMERCIAL

## 2022-11-14 VITALS
SYSTOLIC BLOOD PRESSURE: 110 MMHG | HEIGHT: 64 IN | OXYGEN SATURATION: 100 % | BODY MASS INDEX: 23.6 KG/M2 | HEART RATE: 73 BPM | DIASTOLIC BLOOD PRESSURE: 74 MMHG | WEIGHT: 138.2 LBS

## 2022-11-14 DIAGNOSIS — D72.819 LEUKOPENIA, UNSPECIFIED TYPE: ICD-10-CM

## 2022-11-14 DIAGNOSIS — G89.29 CHRONIC RIGHT-SIDED LOW BACK PAIN WITH RIGHT-SIDED SCIATICA: ICD-10-CM

## 2022-11-14 DIAGNOSIS — E78.5 HYPERLIPIDEMIA LDL GOAL <160: ICD-10-CM

## 2022-11-14 DIAGNOSIS — L66.12 FRONTAL FIBROSING ALOPECIA: ICD-10-CM

## 2022-11-14 DIAGNOSIS — Z00.00 ROUTINE GENERAL MEDICAL EXAMINATION AT A HEALTH CARE FACILITY: Primary | ICD-10-CM

## 2022-11-14 DIAGNOSIS — G57.61 MORTON'S NEUROMA, RIGHT: ICD-10-CM

## 2022-11-14 DIAGNOSIS — B00.9 HERPES SIMPLEX VIRUS (HSV) INFECTION: ICD-10-CM

## 2022-11-14 DIAGNOSIS — M54.41 CHRONIC RIGHT-SIDED LOW BACK PAIN WITH RIGHT-SIDED SCIATICA: ICD-10-CM

## 2022-11-14 LAB
ERYTHROCYTE [DISTWIDTH] IN BLOOD BY AUTOMATED COUNT: 13.2 % (ref 10–15)
HCT VFR BLD AUTO: 37.4 % (ref 35–47)
HGB BLD-MCNC: 12.2 G/DL (ref 11.7–15.7)
MCH RBC QN AUTO: 30.7 PG (ref 26.5–33)
MCHC RBC AUTO-ENTMCNC: 32.6 G/DL (ref 31.5–36.5)
MCV RBC AUTO: 94 FL (ref 78–100)
PLATELET # BLD AUTO: 225 10E3/UL (ref 150–450)
RBC # BLD AUTO: 3.98 10E6/UL (ref 3.8–5.2)
WBC # BLD AUTO: 4.1 10E3/UL (ref 4–11)

## 2022-11-14 PROCEDURE — 85027 COMPLETE CBC AUTOMATED: CPT | Performed by: PHYSICIAN ASSISTANT

## 2022-11-14 PROCEDURE — 90682 RIV4 VACC RECOMBINANT DNA IM: CPT | Performed by: PHYSICIAN ASSISTANT

## 2022-11-14 PROCEDURE — 90471 IMMUNIZATION ADMIN: CPT | Performed by: PHYSICIAN ASSISTANT

## 2022-11-14 PROCEDURE — 99396 PREV VISIT EST AGE 40-64: CPT | Mod: 25 | Performed by: PHYSICIAN ASSISTANT

## 2022-11-14 PROCEDURE — 99213 OFFICE O/P EST LOW 20 MIN: CPT | Mod: 25 | Performed by: PHYSICIAN ASSISTANT

## 2022-11-14 PROCEDURE — 36415 COLL VENOUS BLD VENIPUNCTURE: CPT | Performed by: PHYSICIAN ASSISTANT

## 2022-11-14 RX ORDER — VALACYCLOVIR HYDROCHLORIDE 500 MG/1
500 TABLET, FILM COATED ORAL DAILY
Qty: 90 TABLET | Refills: 1 | Status: SHIPPED | OUTPATIENT
Start: 2022-11-14 | End: 2024-01-04

## 2022-11-14 ASSESSMENT — ENCOUNTER SYMPTOMS
NERVOUS/ANXIOUS: 0
NAUSEA: 0
EYE PAIN: 0
BREAST MASS: 0
SORE THROAT: 0
COUGH: 0
PALPITATIONS: 0
JOINT SWELLING: 0
HEMATOCHEZIA: 0
WEAKNESS: 0
FREQUENCY: 1
DIZZINESS: 0
PARESTHESIAS: 0
HEARTBURN: 0
HEADACHES: 0
CHILLS: 0
SHORTNESS OF BREATH: 0
DYSURIA: 0
HEMATURIA: 0
ARTHRALGIAS: 0
MYALGIAS: 1
FEVER: 0
CONSTIPATION: 0
ABDOMINAL PAIN: 0
DIARRHEA: 1
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
## 2022-11-15 ENCOUNTER — VIRTUAL VISIT (OUTPATIENT)
Dept: PSYCHOLOGY | Facility: CLINIC | Age: 52
End: 2022-11-15
Payer: COMMERCIAL

## 2022-11-15 DIAGNOSIS — F32.A DEPRESSIVE DISORDER: Primary | ICD-10-CM

## 2022-11-15 PROCEDURE — 90785 PSYTX COMPLEX INTERACTIVE: CPT | Mod: GT | Performed by: SOCIAL WORKER

## 2022-11-15 PROCEDURE — 90834 PSYTX W PT 45 MINUTES: CPT | Mod: GT | Performed by: SOCIAL WORKER
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
## 2022-11-16 ENCOUNTER — TELEPHONE (OUTPATIENT)
Dept: OBGYN | Facility: CLINIC | Age: 52
End: 2022-11-16

## 2022-11-16 NOTE — TELEPHONE ENCOUNTER
Placed a call to the [place] pharmacy regarding this patient and switching her Imvexxy to Vagifem due to it is cheaper.    It was ok'd by Pooja Julien CNM to change to Vagifem 10mcg tablets.    Pharmacist stated he will change the prescription

## 2022-11-17 ENCOUNTER — THERAPY VISIT (OUTPATIENT)
Dept: PHYSICAL THERAPY | Facility: CLINIC | Age: 52
End: 2022-11-17
Payer: COMMERCIAL

## 2022-11-17 DIAGNOSIS — M62.89 PELVIC FLOOR DYSFUNCTION: Primary | ICD-10-CM

## 2022-11-17 PROCEDURE — 97110 THERAPEUTIC EXERCISES: CPT | Mod: GP | Performed by: PHYSICAL THERAPIST

## 2022-11-17 PROCEDURE — 97140 MANUAL THERAPY 1/> REGIONS: CPT | Mod: GP | Performed by: PHYSICAL THERAPIST

## 2022-11-18 DIAGNOSIS — N95.2 VAGINAL ATROPHY: Primary | ICD-10-CM

## 2022-11-18 RX ORDER — ESTRADIOL 10 UG/1
INSERT VAGINAL
Qty: 88 TABLET | Refills: 3 | Status: SHIPPED | OUTPATIENT
Start: 2022-11-18 | End: 2022-11-22 | Stop reason: ALTCHOICE

## 2022-11-22 ENCOUNTER — VIRTUAL VISIT (OUTPATIENT)
Dept: PSYCHOLOGY | Facility: CLINIC | Age: 52
End: 2022-11-22
Payer: COMMERCIAL

## 2022-11-22 ENCOUNTER — TELEPHONE (OUTPATIENT)
Dept: OBGYN | Facility: CLINIC | Age: 52
End: 2022-11-22

## 2022-11-22 DIAGNOSIS — F32.A DEPRESSIVE DISORDER: Primary | ICD-10-CM

## 2022-11-22 DIAGNOSIS — N95.2 VAGINAL ATROPHY: ICD-10-CM

## 2022-11-22 PROCEDURE — 90834 PSYTX W PT 45 MINUTES: CPT | Mod: GT | Performed by: SOCIAL WORKER

## 2022-11-22 PROCEDURE — 90785 PSYTX COMPLEX INTERACTIVE: CPT | Mod: GT | Performed by: SOCIAL WORKER

## 2022-11-22 RX ORDER — ESTRADIOL 10 UG/1
10 INSERT VAGINAL DAILY
Qty: 88 TABLET | Refills: 3 | Status: SHIPPED | OUTPATIENT
Start: 2022-11-22 | End: 2023-08-14

## 2022-11-22 NOTE — PROGRESS NOTES
[facility]                                     Progress Note    Patient Name: Kiesha Mcguire  Date: 11/22/2022       Service Type: Individual      Session Start Time:  11:03 AM Session End Time: 11:48 AM     Session Length: 38-52 minutes    Session #: 17 with this provider    Attendees: Client attended alone    Service Modality:  Video Visit:      Provider verified identity through the following two step process.  Patient provided:  Patient is known previously to provider    Telemedicine Visit: The patient's condition can be safely assessed and treated via synchronous audio and visual telemedicine encounter.      Reason for Telemedicine Visit: Patient convenience (e.g. access to timely appointments / distance to available provider) and Services only offered telehealth    Originating Site (Patient Location): Patient's home    Distant Site (Provider Location): Provider Remote Setting- Home Office    Consent:  The patient/guardian has verbally consented to: the potential risks and benefits of telemedicine (video visit) versus in person care; bill my insurance or make self-payment for services provided; and responsibility for payment of non-covered services.     Patient would like the video invitation sent by:  "[employer]"    Mode of Communication:  Video Conference via well    As the provider I attest to compliance with applicable laws and regulations related to telemedicine.    DATA  Interactive Complexity: Yes, visit entailed Interactive Complexity evidenced by:  -Use of play equipment or physical devices to overcome barriers to diagnostic or therapeutic interaction with a patient who is not fluent in the same language or who has not developed or lost expressive or receptive language skills to use or understand typical language   Engage client in BLS for EMDR therapy   Crisis: No      Progress Since Last Session (Related to Symptoms / Goals / Homework):  Symptoms: improving, client reported  "less anxiety    Homework: Completed in session      Episode of Care Goals: Satisfactory progress - ACTION (Actively working towards change); Intervened by reinforcing change plan / affirming steps taken     Current / Ongoing Stressors and Concerns:   Difficulty functioning at home and work due to problems with focus, organization, and completing tasks.  Client reported that she has been struggling paying bills on time, difficulty preparing food for herself, organizing her home   Grief related to relationship with her mother   Difficulty trusting others and being vulnerable   Paralyzed by decision making; difficulty trusting herself  Client shared concerns of hoarding; difficulty throwing away or getting rid of items  Concerns with purchasing food in excess; does not feel she has control   Concern about short-term memory    Desire for more intimate relationships   Fear of being rejected by others; belief she is not good enough   Worries about the world; climate change, war    Guilt about how divorce ended   Frustration with lack of direction from recent psychological testing     Treatment Objective(s) Addressed in This Session:   EMDR: phases 3-8     Safe/calm state titled \"calm\"  Container: box with a lock    Reevaluated previous target: head injury, client reported ALBINO's 0/10    Conducted EMDR session today.  Worked on desensitization, installation.  Target trauma was  \"seeing ex in public\"  Negative cognition targeted today was \"I am in danger.  I am not safe.\"  Positive cognition identified was \"I am safe now.\"  Initial VOC was 4/7 and final VOC was 7/7.  Emotion targeted this session: \"fear, panic.\"   Initial overall ALBINO score of 4/10 and final ALBINO score of 0/10.  Body sensations targeted today were \"tears, tightness in chest, throat closing\".  Desensitization and installation phases completed today.      Closed with resource installation and safe place exercise.  Debriefed today's session.  Client's positive " "statement about today's work was \"I am safe.  No one can hurt me.  I am in control.\"      Potential Targets:   Guilt about how divorce ended      Current Potential Targets  Absence of friendships  Not fitting in  Seeing ex on social media  Seeing ex in public   Defensiveness    Past:   image of child self at 8 years old   Disagreement about salad   [facility]    Trauma symptoms: avoids places in neighborhood for fear of running into ex-boyfriend  Has avoided romantic relationships since  Dissociative symptoms while in relationship  Recurrent, intrusive, distressing memories  Problems with concentration     Intervention:   EMDR: phases 3-8, engaged client in grounding to stay in window of tolerance   Motivational interviewing: expressed empathy/understanding, use of reflective listening and open ended questions, supported autonomy     Assessments completed prior to visit:   The following assessments were completed by patient for this visit:   None    ASSESSMENT: Current Emotional / Mental Status (status of significant symptoms):   Risk status (Self / Other harm or suicidal ideation)   Patient denies current fears or concerns for personal safety.   Patient denies current or recent suicidal ideation or behaviors.   Patient denies current or recent homicidal ideation or behaviors.   Patient denies current or recent self injurious behavior or ideation.   Patient denies other safety concerns.   Patient reports there has been no change in risk factors since their last session.     Patient reports there has been no change in protective factors since their last session.     Recommended that patient call 911 or go to the local ED should there be a change in any of these risk factors.     Appearance:   Appropriate    Eye Contact:   Good ; closed intermittently when engaging in BLS for EMDR therapy   Psychomotor Behavior: Normal    Attitude:   Cooperative  Interested Pleasant "    Orientation:   All   Speech    Rate / Production: Normal/ Responsive Emotional    Volume:  Normal    Mood:    Anxious  Sad    Affect:    Appropriate  Tearful   Thought Content:  Clear    Thought Form:  Coherent  Goal Directed  Logical    Insight:    Good      Medication Review:   No current psychiatric medications prescribed     Medication Compliance:   NA     Changes in Health Issues:   None reported     Chemical Use Review:   Substance Use: no use     Tobacco Use: no use    Diagnosis:  1. Unspecified Depressive Disorder        Generalized Anxiety Disorder, Provisional    Collateral Reports Completed:   Not Applicable    PLAN: (Patient Tasks / Therapist Tasks / Other)  Client is considering talking with PCP about psychiatric medication.  EMDR: reevaluate target: seeing ex in public  Client shared plan to take a walk after appointment.  Therapist encouraged client to contact her if needed before next appointment.    Magali Montilla, [facility] 11/22/2022                                              ______________________________________________________________________    Individual Treatment Plan    Patient's Name: Kiesha Mcguire  YOB: 1970    Date of Creation: 5/11/2022  Date Treatment Plan Last Reviewed/Revised: 9/8/2022    DSM5 Diagnoses:   1. Unspecified Depressive Disorder    2. Attention deficit hyperactivity disorder, inattentive type (Provisional)      Psychosocial / Contextual Factors: functioning impacted by difficulty with focus, organization, and completion of tasks  PROMIS (reviewed every 90 days):  31    Referral / Collaboration:  Referral to another professional/service is not indicated at this time..    Anticipated number of session for this episode of care: will review in 90 days  Anticipation frequency of session: Every other week  Anticipated Duration of each session: 38-52 minutes  Treatment plan will be reviewed in 90 days or when goals have been changed.  "      MeasurableTreatment Goal(s) related to diagnosis / functional impairment(s)  Goal 1: Patient will sustain attention and concentration for consistently longer periods of time.  Patient will meet goals set for completing tasks 80% of the time.   Client's Goal:  I will know I've met my goal when my space isn't so chaotic, meeting deadlines around bills and work, when I am not always playing catch-up, completing tasks.      Objective #A (Patient Action)    Patient will learn and implement organization and planning skills.  Status: New - Date: 5/11/2022, continued date: 9/8/2022    Intervention(s)  Therapist will teach the client organization and planning skills.  Therapist will address any potential barriers to using skills.    Objective #B  Patient will identify, challenge, and change self-talk that contributes to maladaptive feelings and actions.  Status: New - Date: 5/11/2022, Continued date: 9/8/2022    Intervention(s)  Therapist will teach the CBT model, cognitive distortions, and cognitive restructuring techniques.      Goal 2: Patient will be able to recall the traumatic events without becoming overwhelmed with negative thoughts, feelings, or urges.   Client's Goal:  I will know I've met my goal when I do not cry every time I talk about it.\"    Objective #A (Patient Action)    Status: New - Date: 5/11/2022, continued date: 9/8/2022    Patient will describe the history of and nature of trauma symptoms    Intervention(s)  Therapist will assess the client's frequency, intensity, duration, and history of trauma symptoms and their impact on functioning.    Objective #B (Patient Action)  Status: New Date: 5/11/2022, continued date: 9/8/2022    Patient will cooperate with eye movement desensitization and reprocessing (EMDR) to reduce emotional reaction to traumatic event(s)    Intervention(s)  Therapist will utilize EMDR to reduce the client's emotional reactivity to the traumatic event(s).    Objective #C " (Patient Action)  Status: New Date: 5/11/2022, continued date: 9/8/2022    Patient will learn and implement calming and coping strategies to manage trauma symptoms.    Intervention(s)  Therapist will teach grounding techniques, distress tolerance, and emotion regulation techniques.      Patient has reviewed and agreed to the above plan.      Magali Montilla, ROYCE  May 11, 2022  ROYCE Crespo  9/8/2022

## 2022-11-23 ENCOUNTER — ANCILLARY PROCEDURE (OUTPATIENT)
Dept: GENERAL RADIOLOGY | Facility: CLINIC | Age: 52
End: 2022-11-23
Attending: PODIATRIST
Payer: COMMERCIAL

## 2022-11-23 ENCOUNTER — OFFICE VISIT (OUTPATIENT)
Dept: PODIATRY | Facility: CLINIC | Age: 52
End: 2022-11-23
Attending: PHYSICIAN ASSISTANT
Payer: COMMERCIAL

## 2022-11-23 ENCOUNTER — THERAPY VISIT (OUTPATIENT)
Dept: PHYSICAL THERAPY | Facility: CLINIC | Age: 52
End: 2022-11-23
Attending: PHYSICIAN ASSISTANT
Payer: COMMERCIAL

## 2022-11-23 VITALS
SYSTOLIC BLOOD PRESSURE: 98 MMHG | BODY MASS INDEX: 24.06 KG/M2 | WEIGHT: 138 LBS | DIASTOLIC BLOOD PRESSURE: 71 MMHG | HEART RATE: 97 BPM

## 2022-11-23 DIAGNOSIS — G57.61 MORTON'S NEUROMA, RIGHT: Primary | ICD-10-CM

## 2022-11-23 DIAGNOSIS — M54.41 CHRONIC RIGHT-SIDED LOW BACK PAIN WITH RIGHT-SIDED SCIATICA: ICD-10-CM

## 2022-11-23 DIAGNOSIS — M79.671 RIGHT FOOT PAIN: ICD-10-CM

## 2022-11-23 DIAGNOSIS — M20.11 VALGUS DEFORMITY OF BOTH GREAT TOES: ICD-10-CM

## 2022-11-23 DIAGNOSIS — M20.12 VALGUS DEFORMITY OF BOTH GREAT TOES: ICD-10-CM

## 2022-11-23 DIAGNOSIS — G89.29 CHRONIC RIGHT-SIDED LOW BACK PAIN WITH RIGHT-SIDED SCIATICA: ICD-10-CM

## 2022-11-23 PROCEDURE — 99203 OFFICE O/P NEW LOW 30 MIN: CPT | Performed by: PODIATRIST

## 2022-11-23 PROCEDURE — 73630 X-RAY EXAM OF FOOT: CPT | Mod: TC | Performed by: RADIOLOGY

## 2022-11-23 PROCEDURE — 97161 PT EVAL LOW COMPLEX 20 MIN: CPT | Mod: GP | Performed by: PHYSICAL THERAPIST

## 2022-11-23 PROCEDURE — 97110 THERAPEUTIC EXERCISES: CPT | Mod: GP | Performed by: PHYSICAL THERAPIST

## 2022-11-23 NOTE — PROGRESS NOTES
Assessment:      ICD-10-CM    1. Hernandez's neuroma, right  G57.61 Orthopedic  Referral     MR Foot Right w/o Contrast     Orthotics and Prosthetics DME Orthotic; Foot Orthotics      2. Valgus deformity of both great toes  M20.11 XR Foot Right G/E 3 Views    M20.12            Plan:  Orders Placed This Encounter   Procedures     XR Foot Right G/E 3 Views     MR Foot Right w/o Contrast     Orthotics and Prosthetics DME Orthotic; Foot Orthotics       Discussed the etiology and treatment of the condition with the patient.  Imaging studies reviewed and discussed with the patient.  Discussed surgical and conservative options.      -MRI  -Repeat injection if needed  Orthtoisc - Rx today  Surigcal excision if needed discussed      Return:  No follow-ups on file.    Flori Whittington DPM                Chief Complaint:     Patient presents with:  Right Foot - Pain     right foot pain    HPI:  Kiesha Mcguire is a 52 year old year old female who presents for evaluation of foot pain.    Pain location- 3rd, 4th digits    No joint or bunion pain  Had injection to neuroma before, did not help per pt        Past Medical & Surgical History:  Past Medical History:   Diagnosis Date     Depressive disorder      Depressive disorder, not elsewhere classified     resolved     Herpes simplex without mention of complication     oral     IUD (intrauterine device) in place 08/15/2013    Mirena     Migraine headache with aura     very occass, sig aura, speech loss x1     Pure hypercholesterolemia     follows w BIPIN CHATMAN      Past Surgical History:   Procedure Laterality Date     COLONOSCOPY N/A 10/27/2021    Procedure: COLONOSCOPY, WITH POLYPECTOMY AND CLIP;  Surgeon: Og Gutierres MD;  Location: [facility] OR      DILATION/CURETTAGE DIAG/THER NON OB  12/2006     LAPAROSCOPIC CHOLECYSTECTOMY N/A 12/24/2021    Procedure: CHOLECYSTECTOMY, LAPAROSCOPIC;  Surgeon: Denise Cast MD;  Location: [facility] OR      Family History    Problem Relation Age of Onset     Hypertension Mother      Hyperlipidemia Mother      Depression Mother      Hypertension Father      Cancer Father 65        neuro endrocine CA, neck     Hyperlipidemia Father      Aortic aneurysm Maternal Grandfather          of ascending aortic aneurysm at age 64     Aortic aneurysm Maternal Aunt          in her 40s from ascending aortic aneurysm     C.A.D. No family hx of      Cancer - colorectal No family hx of      Diabetes No family hx of      Cerebrovascular Disease No family hx of      Breast Cancer No family hx of      Prostate Cancer No family hx of         Social History:  ?  History   Smoking Status     Former     Packs/day: 0.50     Years: 10.00     Types: Cigarettes     Quit date: 1994   Smokeless Tobacco     Never     History   Drug Use No     Social History    Substance and Sexual Activity      Alcohol use: Yes        Comment: very rare      Allergies:  ?   Allergies   Allergen Reactions     Sumatriptan Unknown     imitrex     Augmentin Hives     Unsure if true reaction to med         Medications:    Current Outpatient Medications   Medication     Acetaminophen (TYLENOL PO)     Biotin 10 MG TABS tablet     CALCIUM-VITAMIN D-VITAMIN K PO     clobetasol (TEMOVATE) 0.05 % external solution     Collagen Hydrolysate POWD     Cyanocobalamin (VITAMIN B 12 PO)     cyclobenzaprine (FLEXERIL) 5 MG tablet     diclofenac (VOLTAREN) 75 MG EC tablet     estradiol (VAGIFEM) 10 MCG TABS vaginal tablet     hydroxychloroquine (PLAQUENIL) 200 MG tablet     levonorgestrel (MIRENA) 20 MCG/24HR IUD     Lysine 500 MG TABS     magnesium citrate solution     NONFORMULARY     NONFORMULARY     UNABLE TO FIND     UNABLE TO FIND     valACYclovir (VALTREX) 1000 mg tablet     valACYclovir (VALTREX) 500 MG tablet     No current facility-administered medications for this visit.       Physical Exam:  ?  Vitals:  BP 98/71 (BP Location: Left arm)   Pulse 97   Wt 62.6 kg (138 lb)   BMI  24.06 kg/m     General:  WD/WN, in NAD.  A&O x3.  Dermatologic:    Skin is intact, open lesions absent.   Skin texture, turgor is normal.  Vascular:  Pulses palpable bilateral.  Digital capillary refill time normal bilateral.  Skin temperature is normal bilateral.  Generalized edema- none right.  Focal edema- none  right.  Neurologic:    Gross sensation normal.  Oscar's Click present 3rd IMS R  Gait and balance normal.  Musculoskeletal:  No pain to palpation of sub 3rd or 4th mets, 3rd IMS, sub 2nd met, 1st MTPJ right.  Lesser MTPJ ROM full, smooth, not painful, R    Muscle strength 5/5  foot and ankle bilateral.    Stance:  RCSP Valgus bilateral.  Foot type:  Flexible valgus  Clinical deformity: hallux valugs > limitus    Imaging:   x-ray independently reviewed and interpreted by myself today.  Weight-bearing views right foot dated 11/23/22, reveal no lesser MTPJ degeneration, moderate hallux valgus w/ mild 1st MTPJ degeneration.

## 2022-11-23 NOTE — PATIENT INSTRUCTIONS
PATIENT INSTRUCTIONS - Podiatry / Foot & Ankle Surgery    Imaging  An advanced imaging study has been ordered for you today MRI    Please call radiology to schedule your study:      [facility] ([place] and [place] clinics and surgery centers): [phone]    [facility] (both [place] & [facility])  [facility] - [facility] Clinics, including [facility]    North: [phone]  [facility] Clinics, including [place], [place], [place], [place], and [place]    South: [phone]  [facility], including [place], [facility], and [facility] ([facility]): [phone]  [facility] Clinics, including [place], [facility] and [facility] CUSTOM FOOT ORTHOTICS LOCATIONS  [facility]  [address]  [place] [address]  Phone: [phone]  Fax: [phone] [facility]  [address]  [place] [address]  Phone: [phone]   Fax: [phone]   [facility]  [address]  [place] [address]  Phone: [phone]  Fax: [phone] [facility]  [address]  [place] [address]  Phone: [phone]   Fax: [phone]   [facility]   [address]  [place] [address]  Phone: [phone]  Fax: [phone] * Please call any location listed to make an appointment for a casting/fitting. Your referral was sent to their central office and they will all have the order on file.         We can inject or excise if neuroma is present

## 2022-11-23 NOTE — PROGRESS NOTES
Physical Therapy Initial Evaluation  Subjective:  The history is provided by the patient. No  was used.   Patient Health History  Kiesha Mcguire being seen for Low back pain with right leg pain. Patient reports hx of pubic injury after a childbirth injury including increased low back pain years ago. The back pain is recurrent and primarily right sided. .     Problem began: 11/2/2022 (acute on chronic).   Problem occurred: pelvic injury in 2010 with recent exacerbation, she thinks from pelvic PT exercises   Pain is reported as 1/10 on pain scale.  General health as reported by patient is good.  Pertinent medical history includes: numbness/tingling and depression.   Red flags:  Foot drop.      Other surgery history details: Gall bladder removal, imitrex.    Current medications:  Other. Other medications details: HCQ.    Current occupation is [employer].   Primary job tasks include:  Computer work.                  Therapist Generated HPI Evaluation  Problem details: Patient reports history of neuropathy and foot drop into her right leg since 2010..         Type of problem:  Lumbar.    This is a chronic condition.  Condition occurred with:  Insidious onset.  Where condition occurred: for unknown reasons.  Patient reports pain:  Lumbar spine right and lower lumbar spine.  Pain is described as aching and is intermittent.  Pain radiates to:  Gluteals right, thigh right, knee right and other (numbness into foot on lateral side). Pain timing: variable; no specific pattern.  Since onset symptoms are unchanged.  Associated symptoms:  Loss of motion/stiffness, numbness and tingling (reports some chronic numbness into left foot). Symptoms are exacerbated by lying down and other  and relieved by heat, NSAID's and muscle relaxants (piriformis stretch).    Previous treatment includes chiropractic (regularly gets adjusted and feels it helps immediately). There was moderate improvement following  previous treatment.  Restrictions due to condition include:  Working in normal job without restrictions.  Barriers include:  None as reported by patient.                        Objective:  System         Lumbar/SI Evaluation  ROM:    AROM Lumbar:   Flexion:          To floor, no pain; no change with rep flex  Ext:                    Normal, no pain; no change in pain with rep ext   Side Bend:        Left:     Right:   Rotation:           Left:     Right:   Side Glide:        Left:  Normal    Right:  Normal          Lumbar Myotomes:  normal (no notable weakness in right DF)            Lumbar DTR's:  Lumbar dtr's: normal LLE; decreased response for patellar and Achilles reflex on R.      Cord Signs:  normal    Lumbar Dermtomes:  Lumbar dermatomes: Patient reports decreased sensation to light touch throughout RLE.                        Spinal Segmental Conclusions: Hypomobile L3-5                                            Hip Evaluation    Hip Strength:      Extension:  Left: 4/5  Pain:Right: 4/5    Pain:                                     General     ROS    Assessment/Plan:    Patient is a 52 year old female with lumbar complaints.    Patient has the following significant findings with corresponding treatment plan.                Diagnosis 1:  Right sided low back pain with right sided sciatica  Pain -  hot/cold therapy, manual therapy, self management, education, directional preference exercise and home program  Decreased ROM/flexibility - manual therapy, therapeutic exercise and home program  Decreased joint mobility - manual therapy, therapeutic exercise and home program  Decreased strength - therapeutic exercise, therapeutic activities and home program  Impaired muscle performance - neuro re-education and home program  Decreased function - therapeutic activities and home program  Impaired posture - neuro re-education and home program    Therapy Evaluation Codes:   Cumulative Therapy Evaluation is: Low  complexity.    Previous and current functional limitations:  (See Goal Flow Sheet for this information)    Short term and Long term goals: (See Goal Flow Sheet for this information)     Communication ability:  Patient appears to be able to clearly communicate and understand verbal and written communication and follow directions correctly.  Treatment Explanation - The following has been discussed with the patient:   RX ordered/plan of care  Anticipated outcomes  Possible risks and side effects  This patient would benefit from PT intervention to resume normal activities.   Rehab potential is good.    Frequency:  1 X week, once daily  Duration:  for 8 weeks  Discharge Plan:  Achieve all LTG.  Independent in home treatment program.  Reach maximal therapeutic benefit.    Please refer to the daily flowsheet for treatment today, total treatment time and time spent performing 1:1 timed codes.

## 2022-11-23 NOTE — LETTER
11/23/2022         RE: Kiesha Mcguire  [address] [facility] [address]        Dear Colleague,    Thank you for referring your patient, Kiesha Mcguire, to the [facility]. Please see a copy of my visit note below.    Assessment:      ICD-10-CM    1. Hernandez's neuroma, right  G57.61 Orthopedic  Referral     MR Foot Right w/o Contrast     Orthotics and Prosthetics DME Orthotic; Foot Orthotics      2. Valgus deformity of both great toes  M20.11 XR Foot Right G/E 3 Views    M20.12            Plan:  Orders Placed This Encounter   Procedures     XR Foot Right G/E 3 Views     MR Foot Right w/o Contrast     Orthotics and Prosthetics DME Orthotic; Foot Orthotics       Discussed the etiology and treatment of the condition with the patient.  Imaging studies reviewed and discussed with the patient.  Discussed surgical and conservative options.      -MRI  -Repeat injection if needed  Orthtoisc - Rx today  Surigcal excision if needed discussed      Return:  No follow-ups on file.    Flori Whittington DPM                Chief Complaint:     Patient presents with:  Right Foot - Pain     right foot pain    HPI:  Kiesha Mcguire is a 52 year old year old female who presents for evaluation of foot pain.    Pain location- 3rd, 4th digits    No joint or bunion pain  Had injection to neuroma before, did not help per pt        Past Medical & Surgical History:  Past Medical History:   Diagnosis Date     Depressive disorder      Depressive disorder, not elsewhere classified     resolved     Herpes simplex without mention of complication     oral     IUD (intrauterine device) in place 08/15/2013    Mirena     Migraine headache with aura     very occass, sig aura, speech loss x1     Pure hypercholesterolemia     follows w BIPIN CHATMAN      Past Surgical History:   Procedure Laterality Date     COLONOSCOPY N/A 10/27/2021    Procedure: COLONOSCOPY, WITH POLYPECTOMY AND CLIP;  Surgeon:  Og Gutierres MD;  Location: [facility] [place]      DILATION/CURETTAGE DIAG/THER NON OB  2006     LAPAROSCOPIC CHOLECYSTECTOMY N/A 2021    Procedure: CHOLECYSTECTOMY, LAPAROSCOPIC;  Surgeon: Denise Cast MD;  Location:  OR      Family History   Problem Relation Age of Onset     Hypertension Mother      Hyperlipidemia Mother      Depression Mother      Hypertension Father      Cancer Father 65        neuro endrocine CA, neck     Hyperlipidemia Father      Aortic aneurysm Maternal Grandfather          of ascending aortic aneurysm at age 64     Aortic aneurysm Maternal Aunt          in her 40s from ascending aortic aneurysm     C.A.D. No family hx of      Cancer - colorectal No family hx of      Diabetes No family hx of      Cerebrovascular Disease No family hx of      Breast Cancer No family hx of      Prostate Cancer No family hx of         Social History:  ?  History   Smoking Status     Former     Packs/day: 0.50     Years: 10.00     Types: Cigarettes     Quit date: 1994   Smokeless Tobacco     Never     History   Drug Use No     Social History    Substance and Sexual Activity      Alcohol use: Yes        Comment: very rare      Allergies:  ?   Allergies   Allergen Reactions     Sumatriptan Unknown     imitrex     Augmentin Hives     Unsure if true reaction to med         Medications:    Current Outpatient Medications   Medication     Acetaminophen (TYLENOL PO)     Biotin 10 MG TABS tablet     CALCIUM-VITAMIN D-VITAMIN K PO     clobetasol (TEMOVATE) 0.05 % external solution     Collagen Hydrolysate POWD     Cyanocobalamin (VITAMIN B 12 PO)     cyclobenzaprine (FLEXERIL) 5 MG tablet     diclofenac (VOLTAREN) 75 MG EC tablet     estradiol (VAGIFEM) 10 MCG TABS vaginal tablet     hydroxychloroquine (PLAQUENIL) 200 MG tablet     levonorgestrel (MIRENA) 20 MCG/24HR IUD     Lysine 500 MG TABS     magnesium citrate solution     NONFORMULARY     NONFORMULARY     UNABLE TO FIND     UNABLE TO  FIND     valACYclovir (VALTREX) 1000 mg tablet     valACYclovir (VALTREX) 500 MG tablet     No current facility-administered medications for this visit.       Physical Exam:  ?  Vitals:  BP 98/71 (BP Location: Left arm)   Pulse 97   Wt 62.6 kg (138 lb)   BMI 24.06 kg/m     General:  WD/WN, in NAD.  A&O x3.  Dermatologic:    Skin is intact, open lesions absent.   Skin texture, turgor is normal.  Vascular:  Pulses palpable bilateral.  Digital capillary refill time normal bilateral.  Skin temperature is normal bilateral.  Generalized edema- none right.  Focal edema- none  right.  Neurologic:    Gross sensation normal.  Oscar's Click present 3rd IMS R  Gait and balance normal.  Musculoskeletal:  No pain to palpation of sub 3rd or 4th mets, 3rd IMS, sub 2nd met, 1st MTPJ right.  Lesser MTPJ ROM full, smooth, not painful, R    Muscle strength 5/5  foot and ankle bilateral.    Stance:  RCSP Valgus bilateral.  Foot type:  Flexible valgus  Clinical deformity: hallux valugs > limitus    Imaging:   x-ray independently reviewed and interpreted by myself today.  Weight-bearing views right foot dated 11/23/22, reveal no lesser MTPJ degeneration, moderate hallux valgus w/ mild 1st MTPJ degeneration.                          Again, thank you for allowing me to participate in the care of your patient.        Sincerely,        Flori Whittington DPM

## 2022-11-29 ENCOUNTER — VIRTUAL VISIT (OUTPATIENT)
Dept: PSYCHOLOGY | Facility: CLINIC | Age: 52
End: 2022-11-29
Payer: COMMERCIAL

## 2022-11-29 DIAGNOSIS — F32.A DEPRESSIVE DISORDER: Primary | ICD-10-CM

## 2022-11-29 PROCEDURE — 90834 PSYTX W PT 45 MINUTES: CPT | Mod: GT | Performed by: SOCIAL WORKER

## 2022-11-29 PROCEDURE — 90785 PSYTX COMPLEX INTERACTIVE: CPT | Mod: GT | Performed by: SOCIAL WORKER

## 2022-11-29 NOTE — PROGRESS NOTES
[facility]                                     Progress Note    Patient Name: Kiesha Mcguire  Date: 11/29/2022       Service Type: Individual      Session Start Time:  1:03 PM Session End Time: 1:41 PM     Session Length: 38-52 minutes    Session #: 18 with this provider    Attendees: Client attended alone    Service Modality:  Video Visit:      Provider verified identity through the following two step process.  Patient provided:  Patient is known previously to provider    Telemedicine Visit: The patient's condition can be safely assessed and treated via synchronous audio and visual telemedicine encounter.      Reason for Telemedicine Visit: Patient convenience (e.g. access to timely appointments / distance to available provider) and Services only offered telehealth    Originating Site (Patient Location): Patient's home    Distant Site (Provider Location): Provider Remote Setting- Home Office    Consent:  The patient/guardian has verbally consented to: the potential risks and benefits of telemedicine (video visit) versus in person care; bill my insurance or make self-payment for services provided; and responsibility for payment of non-covered services.     Patient would like the video invitation sent by:  TRData    Mode of Communication:  Video Conference via well    As the provider I attest to compliance with applicable laws and regulations related to telemedicine.    DATA  Interactive Complexity: Yes, visit entailed Interactive Complexity evidenced by:  -Use of play equipment or physical devices to overcome barriers to diagnostic or therapeutic interaction with a patient who is not fluent in the same language or who has not developed or lost expressive or receptive language skills to use or understand typical language   Engage client in BLS for EMDR therapy   Crisis: No      Progress Since Last Session (Related to Symptoms / Goals / Homework):  Symptoms: no change since previous  "appointment   Client reported feeling dizzy and throat closing during session when engaging in EMDR therapy  Client reported increase in motivation to engage in organization    Homework: Completed in session      Episode of Care Goals: Satisfactory progress - ACTION (Actively working towards change); Intervened by reinforcing change plan / affirming steps taken     Current / Ongoing Stressors and Concerns:   Difficulty functioning at home and work due to problems with focus, organization, and completing tasks.  Client reported that she has been struggling paying bills on time, difficulty preparing food for herself, organizing her home.     Grief related to relationship with her mother   Difficulty trusting others and being vulnerable   Paralyzed by decision making; difficulty trusting herself  Client shared concerns of hoarding; difficulty throwing away or getting rid of items  Concerns with purchasing food in excess; does not feel she has control   Concern about short-term memory    Desire for more intimate relationships   Fear of being rejected by others; belief she is not good enough   Worries about the world; climate change, war    Guilt about how divorce ended   Frustration with lack of direction from recent psychological testing     Treatment Objective(s) Addressed in This Session:   EMDR: phases 3-8     Safe/calm state titled \"calm\"  Container: box with a lock    Reevaluated previous target: seeing ex in public, client reported ALBINO's 0/10    Conducted EMDR session today.  Worked on desensitization, installation.  Target trauma was  \"fear of seeing ex-boyfriend at a concert\"  Negative cognition targeted today was \"I do not have control.  I cannot trust my judgement.\"  Positive cognition identified was \"I am now in control.\"  Initial VOC was 2/7.  Positive cognition identified was \"I can trust my judgement.:  Initial VOC was 4/7.  Emotion targeted this session: \"fear, shame.\"   Initial overall ALBINO score of 6/10 " "and final ALBINO score of 3/10.  Body sensations targeted today were \"throat, dizziness\".     Debriefed today's session.  Client's positive statement about today's work was \"I am safe.  I am healing.\"      Potential Targets:   Guilt about how divorce ended      Current Potential Targets  Absence of friendships  Not fitting in  Defensiveness    Past:   image of child self at 8 years old   Disagreement about salad   [facility]   head injury    Trauma symptoms: avoids places in neighborhood for fear of running into ex-boyfriend  Has avoided romantic relationships since  Dissociative symptoms while in relationship  Recurrent, intrusive, distressing memories  Problems with concentration     Intervention:   EMDR: phases 3-8, engaged client in grounding to stay in window of tolerance   Motivational interviewing: expressed empathy/understanding, use of reflective listening and open ended questions, supported autonomy     Assessments completed prior to visit:   The following assessments were completed by patient for this visit:   None    ASSESSMENT: Current Emotional / Mental Status (status of significant symptoms):   Risk status (Self / Other harm or suicidal ideation)   Patient denies current fears or concerns for personal safety.   Patient denies current or recent suicidal ideation or behaviors.   Patient denies current or recent homicidal ideation or behaviors.   Patient denies current or recent self injurious behavior or ideation.   Patient denies other safety concerns.   Patient reports there has been no change in risk factors since their last session.     Patient reports there has been no change in protective factors since their last session.     Recommended that patient call 911 or go to the local ED should there be a change in any of these risk factors.     Appearance:   Appropriate    Eye Contact:   Good ; closed intermittently when engaging in BLS for EMDR therapy   Psychomotor Behavior: Normal "    Attitude:   Cooperative  Interested Pleasant    Orientation:   All   Speech    Rate / Production: Normal/ Responsive Emotional    Volume:  Normal    Mood:    Fearful   Affect:    Appropriate  Tearful   Thought Content:  Clear    Thought Form:  Coherent  Logical    Insight:    Good      Medication Review:   No current psychiatric medications prescribed     Medication Compliance:   NA     Changes in Health Issues:   None reported     Chemical Use Review:   Substance Use: no use     Tobacco Use: no use    Diagnosis:  1. Unspecified Depressive Disorder        Generalized Anxiety Disorder, Provisional    Collateral Reports Completed:   Not Applicable    PLAN: (Patient Tasks / Therapist Tasks / Other)  Client is considering talking with PCP about psychiatric medication.  EMDR: reevaluate target: fear of seeing ex-boyfriend at a concert.  Plan for next session to have client keep her eyes open.    Magali Montilla, [facility] 11/29/2022                                              ______________________________________________________________________    Individual Treatment Plan    Patient's Name: Kiesha Mcguire  YOB: 1970    Date of Creation: 5/11/2022  Date Treatment Plan Last Reviewed/Revised: 9/8/2022    DSM5 Diagnoses:   1. Unspecified Depressive Disorder    2. Attention deficit hyperactivity disorder, inattentive type (Provisional)      Psychosocial / Contextual Factors: functioning impacted by difficulty with focus, organization, and completion of tasks  PROMIS (reviewed every 90 days):  31    Referral / Collaboration:  Referral to another professional/service is not indicated at this time..    Anticipated number of session for this episode of care: will review in 90 days  Anticipation frequency of session: Every other week  Anticipated Duration of each session: 38-52 minutes  Treatment plan will be reviewed in 90 days or when goals have been changed.       MeasurableTreatment Goal(s) related to  "diagnosis / functional impairment(s)  Goal 1: Patient will sustain attention and concentration for consistently longer periods of time.  Patient will meet goals set for completing tasks 80% of the time.   Client's Goal:  I will know I've met my goal when my space isn't so chaotic, meeting deadlines around bills and work, when I am not always playing catch-up, completing tasks.      Objective #A (Patient Action)    Patient will learn and implement organization and planning skills.  Status: New - Date: 5/11/2022, continued date: 9/8/2022    Intervention(s)  Therapist will teach the client organization and planning skills.  Therapist will address any potential barriers to using skills.    Objective #B  Patient will identify, challenge, and change self-talk that contributes to maladaptive feelings and actions.  Status: New - Date: 5/11/2022, Continued date: 9/8/2022    Intervention(s)  Therapist will teach the CBT model, cognitive distortions, and cognitive restructuring techniques.      Goal 2: Patient will be able to recall the traumatic events without becoming overwhelmed with negative thoughts, feelings, or urges.   Client's Goal:  I will know I've met my goal when I do not cry every time I talk about it.\"    Objective #A (Patient Action)    Status: New - Date: 5/11/2022, continued date: 9/8/2022    Patient will describe the history of and nature of trauma symptoms    Intervention(s)  Therapist will assess the client's frequency, intensity, duration, and history of trauma symptoms and their impact on functioning.    Objective #B (Patient Action)  Status: New Date: 5/11/2022, continued date: 9/8/2022    Patient will cooperate with eye movement desensitization and reprocessing (EMDR) to reduce emotional reaction to traumatic event(s)    Intervention(s)  Therapist will utilize EMDR to reduce the client's emotional reactivity to the traumatic event(s).    Objective #C (Patient Action)  Status: New Date: 5/11/2022, " continued date: 9/8/2022    Patient will learn and implement calming and coping strategies to manage trauma symptoms.    Intervention(s)  Therapist will teach grounding techniques, distress tolerance, and emotion regulation techniques.      Patient has reviewed and agreed to the above plan.      Magali Montilla, ROYCE  May 11, 2022  ROYCE Crespo  9/8/2022

## 2022-11-30 ENCOUNTER — THERAPY VISIT (OUTPATIENT)
Dept: PHYSICAL THERAPY | Facility: CLINIC | Age: 52
End: 2022-11-30
Payer: COMMERCIAL

## 2022-11-30 DIAGNOSIS — M62.89 PELVIC FLOOR DYSFUNCTION: Primary | ICD-10-CM

## 2022-11-30 PROCEDURE — 97140 MANUAL THERAPY 1/> REGIONS: CPT | Mod: GP | Performed by: PHYSICAL THERAPIST

## 2022-11-30 PROCEDURE — 97110 THERAPEUTIC EXERCISES: CPT | Mod: GP | Performed by: PHYSICAL THERAPIST

## 2022-11-30 PROCEDURE — 97530 THERAPEUTIC ACTIVITIES: CPT | Mod: GP | Performed by: PHYSICAL THERAPIST

## 2022-12-01 ENCOUNTER — HOSPITAL ENCOUNTER (OUTPATIENT)
Dept: MRI IMAGING | Facility: HOSPITAL | Age: 52
Discharge: HOME | End: 2022-12-01
Attending: PODIATRIST | Admitting: PODIATRIST
Payer: COMMERCIAL

## 2022-12-01 DIAGNOSIS — G57.61 MORTON'S NEUROMA, RIGHT: ICD-10-CM

## 2022-12-01 PROCEDURE — 255N000002 HC RX 255 OP 636: Performed by: PODIATRIST

## 2022-12-01 PROCEDURE — 73720 MRI LWR EXTREMITY W/O&W/DYE: CPT | Mod: RT

## 2022-12-01 PROCEDURE — A9585 GADOBUTROL INJECTION: HCPCS | Performed by: PODIATRIST

## 2022-12-01 RX ORDER — GADOBUTROL 604.72 MG/ML
0.1 INJECTION INTRAVENOUS ONCE
Status: COMPLETED | OUTPATIENT
Start: 2022-12-01 | End: 2022-12-01

## 2022-12-01 RX ADMIN — GADOBUTROL 6 ML: 604.72 INJECTION INTRAVENOUS at 18:51

## 2022-12-06 ENCOUNTER — THERAPY VISIT (OUTPATIENT)
Dept: PHYSICAL THERAPY | Facility: CLINIC | Age: 52
End: 2022-12-06
Payer: COMMERCIAL

## 2022-12-06 DIAGNOSIS — G89.29 CHRONIC RIGHT-SIDED LOW BACK PAIN WITH RIGHT-SIDED SCIATICA: Primary | ICD-10-CM

## 2022-12-06 DIAGNOSIS — M54.41 CHRONIC RIGHT-SIDED LOW BACK PAIN WITH RIGHT-SIDED SCIATICA: Primary | ICD-10-CM

## 2022-12-06 PROCEDURE — 97110 THERAPEUTIC EXERCISES: CPT | Mod: GP | Performed by: PHYSICAL THERAPIST

## 2022-12-07 ENCOUNTER — VIRTUAL VISIT (OUTPATIENT)
Dept: PSYCHOLOGY | Facility: CLINIC | Age: 52
End: 2022-12-07
Payer: COMMERCIAL

## 2022-12-07 ENCOUNTER — THERAPY VISIT (OUTPATIENT)
Dept: PHYSICAL THERAPY | Facility: CLINIC | Age: 52
End: 2022-12-07
Attending: ADVANCED PRACTICE MIDWIFE
Payer: COMMERCIAL

## 2022-12-07 DIAGNOSIS — M62.89 PELVIC FLOOR DYSFUNCTION: Primary | ICD-10-CM

## 2022-12-07 DIAGNOSIS — F43.89 REACTION TO CHRONIC STRESS: Primary | ICD-10-CM

## 2022-12-07 DIAGNOSIS — R39.15 URGENCY OF URINATION: ICD-10-CM

## 2022-12-07 DIAGNOSIS — F32.A DEPRESSIVE DISORDER: ICD-10-CM

## 2022-12-07 DIAGNOSIS — N95.2 VAGINAL ATROPHY: ICD-10-CM

## 2022-12-07 PROCEDURE — 97110 THERAPEUTIC EXERCISES: CPT | Mod: GP | Performed by: PHYSICAL THERAPIST

## 2022-12-07 PROCEDURE — 90834 PSYTX W PT 45 MINUTES: CPT | Mod: GT | Performed by: SOCIAL WORKER

## 2022-12-07 PROCEDURE — 97530 THERAPEUTIC ACTIVITIES: CPT | Mod: GP | Performed by: PHYSICAL THERAPIST
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
## 2022-12-13 ENCOUNTER — MYC MEDICAL ADVICE (OUTPATIENT)
Dept: PODIATRY | Facility: CLINIC | Age: 52
End: 2022-12-13

## 2022-12-13 NOTE — TELEPHONE ENCOUNTER
Patient was last seen on 11/23/2022.  Plan Per LOV:  -MRI  -Repeat injection if needed  - Orthtoisc - Rx today  - Surigcal excision if needed discussed    Patient had MRI right foot on 12/1/2022.  Patient has orthotics appointment in January.    Please see patient's MyChart message regarding MRI results and advise on treatment questions or if patient needs to follow-up in clinic to discuss recommendations.    Rosa Shoemaker MBA, ATC

## 2022-12-16 NOTE — TELEPHONE ENCOUNTER
MRI reviewed, likely bursitis.  Same treatment plan but no surgery, just orthotics, NSAID, and repeat injection if needed        LULU McdonoughM

## 2022-12-20 ENCOUNTER — THERAPY VISIT (OUTPATIENT)
Dept: PHYSICAL THERAPY | Facility: CLINIC | Age: 52
End: 2022-12-20
Payer: COMMERCIAL

## 2022-12-20 DIAGNOSIS — M62.89 PELVIC FLOOR DYSFUNCTION: Primary | ICD-10-CM

## 2022-12-20 PROCEDURE — 97110 THERAPEUTIC EXERCISES: CPT | Mod: GP | Performed by: PHYSICAL THERAPIST

## 2022-12-20 PROCEDURE — 97530 THERAPEUTIC ACTIVITIES: CPT | Mod: GP | Performed by: PHYSICAL THERAPIST

## 2022-12-21 ENCOUNTER — VIRTUAL VISIT (OUTPATIENT)
Dept: PSYCHOLOGY | Facility: CLINIC | Age: 52
End: 2022-12-21
Payer: COMMERCIAL

## 2022-12-21 DIAGNOSIS — F32.A DEPRESSIVE DISORDER: Primary | ICD-10-CM

## 2022-12-21 DIAGNOSIS — F43.89 REACTION TO CHRONIC STRESS: ICD-10-CM

## 2022-12-21 PROCEDURE — 90834 PSYTX W PT 45 MINUTES: CPT | Mod: GT | Performed by: SOCIAL WORKER
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
## 2022-12-27 ENCOUNTER — THERAPY VISIT (OUTPATIENT)
Dept: PHYSICAL THERAPY | Facility: CLINIC | Age: 52
End: 2022-12-27
Payer: COMMERCIAL

## 2022-12-27 DIAGNOSIS — M62.89 PELVIC FLOOR DYSFUNCTION: Primary | ICD-10-CM

## 2022-12-27 PROCEDURE — 97530 THERAPEUTIC ACTIVITIES: CPT | Mod: GP | Performed by: PHYSICAL THERAPIST

## 2022-12-27 PROCEDURE — 97110 THERAPEUTIC EXERCISES: CPT | Mod: GP | Performed by: PHYSICAL THERAPIST

## 2022-12-28 ENCOUNTER — THERAPY VISIT (OUTPATIENT)
Dept: PHYSICAL THERAPY | Facility: CLINIC | Age: 52
End: 2022-12-28
Payer: COMMERCIAL

## 2022-12-28 DIAGNOSIS — G89.29 CHRONIC RIGHT-SIDED LOW BACK PAIN WITH RIGHT-SIDED SCIATICA: Primary | ICD-10-CM

## 2022-12-28 DIAGNOSIS — M54.41 CHRONIC RIGHT-SIDED LOW BACK PAIN WITH RIGHT-SIDED SCIATICA: Primary | ICD-10-CM

## 2022-12-28 PROCEDURE — 97140 MANUAL THERAPY 1/> REGIONS: CPT | Mod: GP | Performed by: PHYSICAL THERAPIST

## 2022-12-28 PROCEDURE — 97110 THERAPEUTIC EXERCISES: CPT | Mod: GP | Performed by: PHYSICAL THERAPIST

## 2022-12-29 ENCOUNTER — VIRTUAL VISIT (OUTPATIENT)
Dept: PSYCHOLOGY | Facility: CLINIC | Age: 52
End: 2022-12-29
Payer: COMMERCIAL

## 2022-12-29 DIAGNOSIS — F32.A DEPRESSIVE DISORDER: Primary | ICD-10-CM

## 2022-12-29 DIAGNOSIS — F43.89 REACTION TO CHRONIC STRESS: ICD-10-CM

## 2022-12-29 PROCEDURE — 90834 PSYTX W PT 45 MINUTES: CPT | Mod: GT | Performed by: SOCIAL WORKER
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
## 2023-01-04 ENCOUNTER — MYC MEDICAL ADVICE (OUTPATIENT)
Dept: DERMATOLOGY | Facility: CLINIC | Age: 53
End: 2023-01-04

## 2023-01-04 NOTE — TELEPHONE ENCOUNTER
Rescheduled pt for 1/11 at 9 am at  clinic with Rosemary Gallegos.    Keira NOVAK RN  [facility] [place]  [phone]

## 2023-01-10 ENCOUNTER — VIRTUAL VISIT (OUTPATIENT)
Dept: PSYCHOLOGY | Facility: CLINIC | Age: 53
End: 2023-01-10
Payer: COMMERCIAL

## 2023-01-10 DIAGNOSIS — F32.A DEPRESSIVE DISORDER: Primary | ICD-10-CM

## 2023-01-10 DIAGNOSIS — F43.89 REACTION TO CHRONIC STRESS: ICD-10-CM

## 2023-01-10 PROCEDURE — 90834 PSYTX W PT 45 MINUTES: CPT | Mod: GT | Performed by: SOCIAL WORKER
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
## 2023-01-18 ENCOUNTER — OFFICE VISIT (OUTPATIENT)
Dept: DERMATOLOGY | Facility: CLINIC | Age: 53
End: 2023-01-18
Payer: COMMERCIAL

## 2023-01-18 DIAGNOSIS — L65.9 ALOPECIA: ICD-10-CM

## 2023-01-18 DIAGNOSIS — H02.723 HYPOTRICHOSIS OF EYELID OF BOTH EYES: Primary | ICD-10-CM

## 2023-01-18 DIAGNOSIS — H02.726 HYPOTRICHOSIS OF EYELID OF BOTH EYES: Primary | ICD-10-CM

## 2023-01-18 PROCEDURE — 99212 OFFICE O/P EST SF 10 MIN: CPT | Mod: 25 | Performed by: PHYSICIAN ASSISTANT

## 2023-01-18 PROCEDURE — 11900 INJECT SKIN LESIONS </W 7: CPT | Performed by: PHYSICIAN ASSISTANT

## 2023-01-18 RX ORDER — BIMATOPROST 3 UG/ML
1 SOLUTION TOPICAL AT BEDTIME
Qty: 5 ML | Refills: 3 | Status: SHIPPED | OUTPATIENT
Start: 2023-01-18 | End: 2023-08-07

## 2023-01-18 RX ORDER — SODIUM PHOSPHATE,MONO-DIBASIC 19G-7G/118
1 ENEMA (ML) RECTAL DAILY
COMMUNITY
End: 2023-11-15

## 2023-01-19 NOTE — PROGRESS NOTES
Kiesha Mcguire is an extremely pleasant 51 year old year old female patient here today to recheck frontal fibrosing alopecia. She notes she had itchy for two weeks she reports that has since resolved. She denies any new products or medications. She reports she read somewhere that this can occur with general formulation of hydroxychloroquine vs brand name. She notes hair loss has stabilized. Patient has no other skin complaints today.  Remainder of the HPI, Meds, PMH, Allergies, FH, and SH was reviewed in chart.    Past Medical History:   Diagnosis Date     Depressive disorder      Depressive disorder, not elsewhere classified     resolved     Herpes simplex without mention of complication     oral     IUD (intrauterine device) in place 08/15/2013    Mirena     Migraine headache with aura     very occass, sig aura, speech loss x1     Pure hypercholesterolemia     follows kate VOSS MD       Past Surgical History:   Procedure Laterality Date     COLONOSCOPY N/A 10/27/2021    Procedure: COLONOSCOPY, WITH POLYPECTOMY AND CLIP;  Surgeon: Og Gutierres MD;  Location: [facility] OR      DILATION/CURETTAGE DIAG/THER NON OB  2006     LAPAROSCOPIC CHOLECYSTECTOMY N/A 2021    Procedure: CHOLECYSTECTOMY, LAPAROSCOPIC;  Surgeon: Denise Cast MD;  Location:  OR        Family History   Problem Relation Age of Onset     Hypertension Mother      Hyperlipidemia Mother      Depression Mother      Hypertension Father      Cancer Father 65        neuro endrocine CA, neck     Hyperlipidemia Father      Aortic aneurysm Maternal Grandfather          of ascending aortic aneurysm at age 64     Aortic aneurysm Maternal Aunt          in her 40s from ascending aortic aneurysm     C.A.D. No family hx of      Cancer - colorectal No family hx of      Diabetes No family hx of      Cerebrovascular Disease No family hx of      Breast Cancer No family hx of      Prostate Cancer No family hx of        Social History      Socioeconomic History     Marital status:      Spouse name: Not on file     Number of children: 2     Years of education: 16     Highest education level: Not on file   Occupational History     Occupation:      Employer: [employer]     Comment: fulltime   Tobacco Use     Smoking status: Former     Packs/day: 0.50     Years: 10.00     Pack years: 5.00     Types: Cigarettes     Quit date: 1994     Years since quittin.5     Smokeless tobacco: Never   Vaping Use     Vaping Use: Never used   Substance and Sexual Activity     Alcohol use: Yes     Comment: very rare     Drug use: No     Sexual activity: Not Currently     Partners: Male     Birth control/protection: I.U.D.     Comment: Mirena   Other Topics Concern     Parent/sibling w/ CABG, MI or angioplasty before 65F 55M? No   Social History Narrative    How much exercise per week? 2 90 minute yoga sessions      How much calcium per day? Diet       How much caffeine per day? 0    How much vitamin D per day? Supplement     Do you/your family wear seatbelts?  Yes    Do you/your family use safety helmets? Yes    Do you/your family use sunscreen? Yes    Do you/your family keep firearms in the home? No    Do you/your family have a smoke detector(s)? Yes        Do you feel safe in your home? Yes    Has anyone ever touched you in an unwanted manner? No     Explain Kiesha Fletcher [facility] 18        Reviewed [facility] 10--             Social Determinants of Health     Financial Resource Strain: Not on file   Food Insecurity: Not on file   Transportation Needs: Not on file   Physical Activity: Not on file   Stress: Not on file   Social Connections: Not on file   Intimate Partner Violence: Not on file   Housing Stability: Not on file       Outpatient Encounter Medications as of 2023   Medication Sig Dispense Refill     bimatoprost (LATISSE) 0.03 % external opthalmic solution Apply 1 drop topically At Bedtime 5 mL 3      glucosamine-chondroitin 500-400 MG CAPS per capsule Take 1 capsule by mouth daily Patient reported: over the counter, unsure of dose       hydroxychloroquine (PLAQUENIL) 200 MG tablet Take 1 tablet (200 mg) by mouth 2 times daily 180 tablet 3     Acetaminophen (TYLENOL PO)        Biotin 10 MG TABS tablet Take 10,000 mcg by mouth daily       CALCIUM-VITAMIN D-VITAMIN K PO        clobetasol (TEMOVATE) 0.05 % external solution Apply daily as needed to scalp (Patient not taking: Reported on 1/18/2023) 50 mL 3     Collagen Hydrolysate POWD        Cyanocobalamin (VITAMIN B 12 PO) Take by mouth daily        cyclobenzaprine (FLEXERIL) 5 MG tablet Take 1 tablet (5 mg) by mouth 3 times daily as needed for muscle spasms (Avoid driving or operating machinery while on this medication-has drowsy effects). 42 tablet 0     diclofenac (VOLTAREN) 75 MG EC tablet Take 1 tablet (75 mg) by mouth 2 times daily as needed for moderate pain (take with food) 30 tablet 0     estradiol (VAGIFEM) 10 MCG TABS vaginal tablet Place 1 tablet (10 mcg) vaginally daily X 14 days then 1 tablet twice weekly 88 tablet 3     levonorgestrel (MIRENA) 20 MCG/24HR IUD 1 each by Intrauterine route once       Lysine 500 MG TABS Take 1,000 mg by mouth daily        magnesium citrate solution Takes 1 tsp twice a day, 150 mg daily       NONFORMULARY 1 capsule daily carmen       NONFORMULARY 2 tablets daily seabuckthorn       UNABLE TO FIND daily Ashwaganda - patient reported       UNABLE TO FIND daily Rhodiola- patient reported       valACYclovir (VALTREX) 1000 mg tablet Take 2 tablets (2,000 mg) by mouth 2 times daily For 2 days as needed for cold sores 12 tablet 11     valACYclovir (VALTREX) 500 MG tablet Take 1 tablet (500 mg) by mouth daily 90 tablet 1     Facility-Administered Encounter Medications as of 1/18/2023   Medication Dose Route Frequency Provider Last Rate Last Admin     [COMPLETED] triamcinolone acetonide (KENALOG-10) injection 5 mg  5 mg Intradermal  Once Rosemary Delcid PA-C   0.1 mL at 01/18/23 1300             O:   NAD, WDWN, Alert & Oriented, Mood & Affect wnl, Vitals stable   Here today alone   There were no vitals taken for this visit.   General appearance normal   Vitals stable   Alert, oriented and in no acute distress       Patches of alopecia on frontal/temporal side of scalp, minimal accentuation of hair follicle, no scaling seen.      Eyes: Conjunctivae/lids:Normal     ENT: Lips,: normal    MSK:Normal    Pulm: Breathing Normal    Neuro/Psych: Orientation:Alert and Orientedx3 ; Mood/Affect:normal   A/P:  1. FFA  Well controlled.   IL TAC: PGACAC discussed.  Risks including but not limited to injection site reaction, bruising, no resolution.  All questions answered and entertained to patient s satisfaction.  Informed consent obtained.  IL TAC in concentration of 5 mg/ml was injected ID to frontal fibrosing alopecia.  Total injected was  0.2 ml.  Patient tolerated without complications and given wound care instructions, including not to move product around.  Return in 4 weeks for follow-up and possible additional IL TAC.    Continue plaquenil 200 mg bid .   Recheck in 3-4 months.     1. Hypotrichosis of eyelashes   Discussed side effects.  prescript on sent, use [employer] coupon.

## 2023-01-20 ENCOUNTER — TELEPHONE (OUTPATIENT)
Dept: FAMILY MEDICINE | Facility: CLINIC | Age: 53
End: 2023-01-20
Payer: COMMERCIAL

## 2023-01-23 NOTE — TELEPHONE ENCOUNTER
routing to provider to see if there is a less expensive alternative.    Thank you,    Morenita MCMILLANRN BSN  [facility]- [phone]

## 2023-01-23 NOTE — TELEPHONE ENCOUNTER
Called pharmacy and notified this is pay out of pocket and patient was notified at the time of script being written.  Thank you,    Morenita MCMILLANRN BSN  [facility]- [phone]

## 2023-01-24 ENCOUNTER — VIRTUAL VISIT (OUTPATIENT)
Dept: PSYCHOLOGY | Facility: CLINIC | Age: 53
End: 2023-01-24
Payer: COMMERCIAL

## 2023-01-24 DIAGNOSIS — F43.89 REACTION TO CHRONIC STRESS: ICD-10-CM

## 2023-01-24 DIAGNOSIS — F32.A DEPRESSIVE DISORDER: Primary | ICD-10-CM

## 2023-01-24 PROCEDURE — 90834 PSYTX W PT 45 MINUTES: CPT | Mod: GT | Performed by: SOCIAL WORKER

## 2023-01-24 NOTE — PROGRESS NOTES
[facility]                                     Progress Note    Patient Name: Kiesha Mcguire  Date: 1/24/2023       Service Type: Individual      Session Start Time: 10:03 AM Session End Time: 10:49 AM     Session Length: 38-52 minutes    Session #: 23 with this provider    Attendees: Client attended alone    Service Modality:  Video Visit:      Provider verified identity through the following two step process.  Patient provided:  Patient is known previously to provider    Telemedicine Visit: The patient's condition can be safely assessed and treated via synchronous audio and visual telemedicine encounter.      Reason for Telemedicine Visit: Patient convenience (e.g. access to timely appointments / distance to available provider) and Services only offered telehealth    Originating Site (Patient Location): Patient's home    Distant Site (Provider Location): Provider Remote Setting- Home Office/Off-Site    Consent:  The patient/guardian has verbally consented to: the potential risks and benefits of telemedicine (video visit) versus in person care; bill my insurance or make self-payment for services provided; and responsibility for payment of non-covered services.     Patient would like the video invitation sent by:  [employer]    Mode of Communication:  Video Conference via Douj[employer]    As the provider I attest to compliance with applicable laws and regulations related to telemedicine.    DATA  Interactive Complexity: No   Crisis: No      Progress Since Last Session (Related to Symptoms / Goals / Homework):  Symptoms: no change since previous appointment    Homework: Achieved / completed to satisfaction   Social engagement, organization     Episode of Care Goals: Satisfactory progress - ACTION (Actively working towards change); Intervened by reinforcing change plan / affirming steps taken     Current / Ongoing Stressors and Concerns:   Difficulty functioning at home and work due to problems with  "focus, organization, and completing tasks.  Client reported that she has been struggling paying bills on time, difficulty preparing food for herself, organizing her home.  Client reported related shame and hopelessness   Grief related to past and impact on herself and her children   Difficulty trusting others and being vulnerable   Paralyzed by decision making; difficulty trusting herself   Desire for more intimate relationships   Fear of being rejected by others; belief she is not good enough   Worries about the world; climate change, war    Anniversary of father's death- 4 years   Stressors related to parenting and co-parenting      Treatment Objective(s) Addressed in This Session:   identify 2 fears / thoughts that contribute to feeling anxious   Identify losses and coping    Safe/calm state titled \"calm\"  Container: box with a lock    Potential Targets:   Guilt about how divorce ended, Negative Belief \"It's my fault\"  Guilt about son's surgery and outcome  Negative Belief: There is something wrong with me  Being blamed by ex-boyfriend, feel shame  Childhood: not belonging, not being good enough      Current Potential Targets  Absence of friendships  Not fitting in  Defensiveness  Ex on social media  Driving in certain areas will remind her of her ex    Past:   image of child self at 8 years old   [facility]   [facility]    Trauma symptoms: avoids places in neighborhood for fear of running into ex-boyfriend  Has avoided romantic relationships since  Dissociative symptoms while in relationship  Recurrent, intrusive, distressing memories  Problems with concentration     Intervention:   CBT: identified feelings, cognitions, and behaviors, reinforced behavior changes    Motivational interviewing: expressed empathy/understanding, use of reflective listening and open ended questions, supported autonomy     Assessments completed prior to visit:   The following assessments " were completed by patient for this visit:   None    ASSESSMENT: Current Emotional / Mental Status (status of significant symptoms):   Risk status (Self / Other harm or suicidal ideation)   Patient denies current fears or concerns for personal safety.   Patient denies current or recent suicidal ideation or behaviors.   Patient denies current or recent homicidal ideation or behaviors.   Patient denies current or recent self injurious behavior or ideation.   Patient denies other safety concerns.   Patient reports there has been no change in risk factors since their last session.     Patient reports there has been no change in protective factors since their last session.     Recommended that patient call 911 or go to the local ED should there be a change in any of these risk factors.     Appearance:   Appropriate    Eye Contact:   Good    Psychomotor Behavior: Normal    Attitude:   Cooperative  Interested Pleasant Open to feedback and support    Orientation:   All   Speech    Rate / Production: Normal/ Responsive    Volume:  Normal    Mood:    Anxious  sad-minimal   Affect:    Appropriate  Tearful-minimal   Thought Content:  Clear    Thought Form:  Coherent  Goal Directed  Logical    Insight:    Good      Medication Review:   No current psychiatric medications prescribed     Medication Compliance:   NA     Changes in Health Issues:   None reported     Chemical Use Review:   Substance Use: no use     Tobacco Use: no use    Diagnosis:  1. Unspecified Depressive Disorder     Other Specified Trauma and Stress Related Disorder   Generalized Anxiety Disorder, Provisional    Collateral Reports Completed:   Not Applicable    PLAN: (Patient Tasks / Therapist Tasks / Other)  EMDR: reevaluate target: fear of seeing ex-boyfriend at a concert.  Plan for next session to have client keep her eyes open.  Client is considering ways to increase interpersonal relationships.  Client shared plans to continue to complete organizational tasks  in her home and stay calm and present with her children.    Magali Montilla, LICSW 1/24/2023  ______________________________________________________________________    Individual Treatment Plan    Patient's Name: Kiesha Mcguire  YOB: 1970    Date of Creation: 5/11/2022  Date Treatment Plan Last Reviewed/Revised: 12/21/2022    DSM5 Diagnoses:   1. Unspecified Depressive Disorder     Other Specified Trauma and Stress Related Disorder   Generalized Anxiety Disorder, Provisional    Psychosocial / Contextual Factors: functioning impacted by difficulty with focus, organization, and completion of tasks  PROMIS (reviewed every 90 days):  31    Referral / Collaboration:  Referral to another professional/service is not indicated at this time..    Anticipated number of session for this episode of care: will review in 90 days  Anticipation frequency of session: Every other week  Anticipated Duration of each session: 38-52 minutes  Treatment plan will be reviewed in 90 days or when goals have been changed.       MeasurableTreatment Goal(s) related to diagnosis / functional impairment(s)  Goal 1: Patient will sustain attention and concentration for consistently longer periods of time.  Patient will meet goals set for completing tasks 80% of the time.   Client's Goal:  I will know I've met my goal when my space isn't so chaotic, meeting deadlines around bills and work, when I am not always playing catch-up, completing tasks.      Objective #A (Patient Action)    Patient will learn and implement organization and planning skills.  Status: New - Date: 5/11/2022, continued date: 9/8/2022, continued date: 12/21/2022    Intervention(s)  Therapist will teach the client organization and planning skills.  Therapist will address any potential barriers to using skills.    Objective #B  Patient will identify, challenge, and change self-talk that contributes to maladaptive feelings and actions.  Status: New - Date:  "5/11/2022, Continued date: 9/8/2022, continued date: 12/21/2022    Intervention(s)  Therapist will teach the CBT model, cognitive distortions, and cognitive restructuring techniques.      Goal 2: Patient will be able to recall the traumatic events without becoming overwhelmed with negative thoughts, feelings, or urges.   Client's Goal:  I will know I've met my goal when I do not cry every time I talk about it.\"    Objective #A (Patient Action)    Status: New - Date: 5/11/2022, continued date: 9/8/2022, continued date: 12/21/2022    Patient will describe the history of and nature of trauma symptoms    Intervention(s)  Therapist will assess the client's frequency, intensity, duration, and history of trauma symptoms and their impact on functioning.    Objective #B (Patient Action)  Status: New Date: 5/11/2022, continued date: 9/8/2022, continued date: 12/21/2022    Patient will cooperate with eye movement desensitization and reprocessing (EMDR) to reduce emotional reaction to traumatic event(s)    Intervention(s)  Therapist will utilize EMDR to reduce the client's emotional reactivity to the traumatic event(s).    Objective #C (Patient Action)  Status: New Date: 5/11/2022, continued date: 9/8/2022, continued date: 12/21/2022    Patient will learn and implement calming and coping strategies to manage trauma symptoms.    Intervention(s)  Therapist will teach grounding techniques, distress tolerance, and emotion regulation techniques.      Patient has reviewed and agreed to the above plan.      ROYCE Crespo  May 11, 2022  ROYCE Crespo  9/8/2022  ROYCE Crespo  12/21/2022  "

## 2023-02-01 ENCOUNTER — THERAPY VISIT (OUTPATIENT)
Dept: PHYSICAL THERAPY | Facility: CLINIC | Age: 53
End: 2023-02-01
Payer: COMMERCIAL

## 2023-02-01 DIAGNOSIS — M62.89 PELVIC FLOOR DYSFUNCTION: Primary | ICD-10-CM

## 2023-02-01 PROCEDURE — 97530 THERAPEUTIC ACTIVITIES: CPT | Mod: GP | Performed by: PHYSICAL THERAPIST

## 2023-02-01 PROCEDURE — 97110 THERAPEUTIC EXERCISES: CPT | Mod: GP | Performed by: PHYSICAL THERAPIST

## 2023-02-03 PROBLEM — G89.29 CHRONIC RIGHT-SIDED LOW BACK PAIN WITH RIGHT-SIDED SCIATICA: Status: RESOLVED | Noted: 2022-11-23 | Resolved: 2023-02-03

## 2023-02-03 PROBLEM — M54.41 CHRONIC RIGHT-SIDED LOW BACK PAIN WITH RIGHT-SIDED SCIATICA: Status: RESOLVED | Noted: 2022-11-23 | Resolved: 2023-02-03

## 2023-02-03 NOTE — PROGRESS NOTES
DISCHARGE REPORT    Progress reporting period is from 11/23/2022 to 12/28/2022.       SUBJECTIVE  Subjective: Still having pain in the middle of the back. 2 of the new exercises have been difficult to do. Pain with crunches.    Current Pain level: 0/10.     Initial Pain level: 0/10.   Changes in function:  None  Adverse reaction to treatment or activity: None    OBJECTIVE  Changes noted in objective findings:  The objective findings below are from DOS 12/28/2022.    Objective: Observation - No increase in discomfort with performance of side steps or monster walks which were painful at last visit.     ASSESSMENT/PLAN  Updated problem list and treatment plan: Diagnosis 1:  Low back pain with right sided sciatica  STG/LTGs have been met or progress has been made towards goals:  None  Assessment of Progress: The patient's condition is unchanged.  Self Management Plans:  Patient has been instructed in a home treatment program.  Patient is independent in a home treatment program.  I have re-evaluated this patient and find that the nature, scope, duration and intensity of the therapy is appropriate for the medical condition of the patient.    Recommendations:  Pt has not returned to physical therapy in over 1 month and will be discharged to [facility] at this time.    Please refer to the daily flowsheet for treatment today, total treatment time and time spent performing 1:1 timed codes.

## 2023-02-09 ENCOUNTER — VIRTUAL VISIT (OUTPATIENT)
Dept: PSYCHOLOGY | Facility: CLINIC | Age: 53
End: 2023-02-09
Payer: COMMERCIAL

## 2023-02-09 DIAGNOSIS — F43.89 REACTION TO CHRONIC STRESS: ICD-10-CM

## 2023-02-09 DIAGNOSIS — F32.A DEPRESSIVE DISORDER: Primary | ICD-10-CM

## 2023-02-09 PROCEDURE — 90834 PSYTX W PT 45 MINUTES: CPT | Mod: GT | Performed by: SOCIAL WORKER

## 2023-02-09 NOTE — PROGRESS NOTES
[facility]                                     Progress Note    Patient Name: Kiesha Mcguire  Date: 2/9/2023       Service Type: Individual      Session Start Time: 10:03 AM Session End Time: 10:47 AM     Session Length: 38-52 minutes    Session #: 24 with this provider    Attendees: Client attended alone    Service Modality:  Video Visit:      Provider verified identity through the following two step process.  Patient provided:  Patient is known previously to provider    Telemedicine Visit: The patient's condition can be safely assessed and treated via synchronous audio and visual telemedicine encounter.      Reason for Telemedicine Visit: Patient convenience (e.g. access to timely appointments / distance to available provider) and Services only offered telehealth    Originating Site (Patient Location): Patient's home    Distant Site (Provider Location): Provider Remote Setting- Home Office/Off-Site    Consent:  The patient/guardian has verbally consented to: the potential risks and benefits of telemedicine (video visit) versus in person care; bill my insurance or make self-payment for services provided; and responsibility for payment of non-covered services.     Patient would like the video invitation sent by:  [employer]    Mode of Communication:  Video Conference via [employer]    As the provider I attest to compliance with applicable laws and regulations related to telemedicine.    DATA  Interactive Complexity: No   Crisis: No      Progress Since Last Session (Related to Symptoms / Goals / Homework):  Symptoms: no change since previous appointment  Saw pictures of ex; did not experience any disturbance    Homework: Completed in session      Episode of Care Goals: Satisfactory progress - ACTION (Actively working towards change); Intervened by reinforcing change plan / affirming steps taken     Current / Ongoing Stressors and Concerns:   Difficulty functioning at home and work due to  "problems with focus, organization, and completing tasks.  Client reported that she has been struggling paying bills on time, difficulty preparing food for herself, organizing her home.  Client reported related shame and hopelessness   Grief related to past and impact on herself and her children   Difficulty trusting others and being vulnerable   Paralyzed by decision making; difficulty trusting herself   Desire for more intimate relationships   Fear of being rejected by others; belief she is not good enough   Worries about the world; climate change, war    Stressors related to parenting and co-parenting   Lack of trust with providers      Treatment Objective(s) Addressed in This Session:   identify 1 fears / thoughts that contribute to feeling anxious      Safe/calm state titled \"calm\"  Container: box with a lock    Potential Targets:   Guilt about how divorce ended, Negative Belief \"It's my fault\"  Guilt about son's surgery and outcome  Negative Belief: There is something wrong with me  Being blamed by ex-boyfriend, feel shame  Childhood: not belonging, not being good enough      Current Potential Targets  Absence of friendships  Not fitting in  Defensiveness  Ex on social media  Driving in certain areas will remind her of her ex    Past:   image of child self at 8 years old   Disagreement about [facility]   head injury    Trauma symptoms: avoids places in neighborhood for fear of running into ex-boyfriend  Has avoided romantic relationships since  Dissociative symptoms while in relationship  Recurrent, intrusive, distressing memories  Problems with concentration     Intervention:   CBT: identified feelings, cognitions, and behaviors    Engaged in problem solving   Motivational interviewing: expressed empathy/understanding, use of reflective listening and open ended questions, supported autonomy     Assessments completed prior to visit:   The following assessments were completed by patient " for this visit:   None    ASSESSMENT: Current Emotional / Mental Status (status of significant symptoms):   Risk status (Self / Other harm or suicidal ideation)   Patient denies current fears or concerns for personal safety.   Patient denies current or recent suicidal ideation or behaviors.   Patient denies current or recent homicidal ideation or behaviors.   Patient denies current or recent self injurious behavior or ideation.   Patient denies other safety concerns.   Patient reports there has been no change in risk factors since their last session.     Patient reports there has been no change in protective factors since their last session.     Recommended that patient call 911 or go to the local ED should there be a change in any of these risk factors.     Appearance:   Appropriate    Eye Contact:   Good    Psychomotor Behavior: Normal    Attitude:   Cooperative  Interested Open to feedback and support    Orientation:   All   Speech    Rate / Production: Normal/ Responsive    Volume:  Normal    Mood:    Anxious  Sad   Affect:    Appropriate  Tearful   Thought Content:  Clear    Thought Form:  Coherent  Goal Directed  Logical    Insight:    Good      Medication Review:   No current psychiatric medications prescribed     Medication Compliance:   NA     Changes in Health Issues:   None reported     Chemical Use Review:   Substance Use: no use     Tobacco Use: no use    Diagnosis:  1. Unspecified Depressive Disorder     Other Specified Trauma and Stress Related Disorder   Generalized Anxiety Disorder, Provisional    Collateral Reports Completed:   Not Applicable    PLAN: (Patient Tasks / Therapist Tasks / Other)  EMDR: reevaluate target: fear of seeing ex-boyfriend at a concert.  Plan for next session to have client keep her eyes open.  Client is considering scheduling with psychiatry.  Client has a PCP appointment on 2/27.    Magali Montilla  [facility] 2/9/2023  ______________________________________________________________________    Individual Treatment Plan    Patient's Name: Kiesha Mcguire  YOB: 1970    Date of Creation: 5/11/2022  Date Treatment Plan Last Reviewed/Revised: 12/21/2022    DSM5 Diagnoses:   1. Unspecified Depressive Disorder     Other Specified Trauma and Stress Related Disorder   Generalized Anxiety Disorder, Provisional    Psychosocial / Contextual Factors: functioning impacted by difficulty with focus, organization, and completion of tasks  PROMIS (reviewed every 90 days):  31    Referral / Collaboration:  Referral to another professional/service is not indicated at this time..    Anticipated number of session for this episode of care: will review in 90 days  Anticipation frequency of session: Every other week  Anticipated Duration of each session: 38-52 minutes  Treatment plan will be reviewed in 90 days or when goals have been changed.       MeasurableTreatment Goal(s) related to diagnosis / functional impairment(s)  Goal 1: Patient will sustain attention and concentration for consistently longer periods of time.  Patient will meet goals set for completing tasks 80% of the time.   Client's Goal:  I will know I've met my goal when my space isn't so chaotic, meeting deadlines around bills and work, when I am not always playing catch-up, completing tasks.      Objective #A (Patient Action)    Patient will learn and implement organization and planning skills.  Status: New - Date: 5/11/2022, continued date: 9/8/2022, continued date: 12/21/2022    Intervention(s)  Therapist will teach the client organization and planning skills.  Therapist will address any potential barriers to using skills.    Objective #B  Patient will identify, challenge, and change self-talk that contributes to maladaptive feelings and actions.  Status: New - Date: 5/11/2022, Continued date: 9/8/2022, continued date:  "12/21/2022    Intervention(s)  Therapist will teach the CBT model, cognitive distortions, and cognitive restructuring techniques.      Goal 2: Patient will be able to recall the traumatic events without becoming overwhelmed with negative thoughts, feelings, or urges.   Client's Goal:  I will know I've met my goal when I do not cry every time I talk about it.\"    Objective #A (Patient Action)    Status: New - Date: 5/11/2022, continued date: 9/8/2022, continued date: 12/21/2022    Patient will describe the history of and nature of trauma symptoms    Intervention(s)  Therapist will assess the client's frequency, intensity, duration, and history of trauma symptoms and their impact on functioning.    Objective #B (Patient Action)  Status: New Date: 5/11/2022, continued date: 9/8/2022, continued date: 12/21/2022    Patient will cooperate with eye movement desensitization and reprocessing (EMDR) to reduce emotional reaction to traumatic event(s)    Intervention(s)  Therapist will utilize EMDR to reduce the client's emotional reactivity to the traumatic event(s).    Objective #C (Patient Action)  Status: New Date: 5/11/2022, continued date: 9/8/2022, continued date: 12/21/2022    Patient will learn and implement calming and coping strategies to manage trauma symptoms.    Intervention(s)  Therapist will teach grounding techniques, distress tolerance, and emotion regulation techniques.      Patient has reviewed and agreed to the above plan.      ROYCE Crespo  May 11, 2022  ROYCE Crespo  9/8/2022  ROYCE Crespo  12/21/2022  "

## 2023-02-23 ENCOUNTER — VIRTUAL VISIT (OUTPATIENT)
Dept: PSYCHOLOGY | Facility: CLINIC | Age: 53
End: 2023-02-23
Payer: COMMERCIAL

## 2023-02-23 DIAGNOSIS — F43.89 REACTION TO CHRONIC STRESS: ICD-10-CM

## 2023-02-23 DIAGNOSIS — F32.A DEPRESSIVE DISORDER: Primary | ICD-10-CM

## 2023-02-23 PROCEDURE — 90834 PSYTX W PT 45 MINUTES: CPT | Mod: GT | Performed by: SOCIAL WORKER

## 2023-02-23 NOTE — PROGRESS NOTES
[facility]                                     Progress Note    Patient Name: Kiesha Mcguire  Date: 2/23/2023       Service Type: Individual      Session Start Time: 10:03 AM Session End Time: 10:45 AM     Session Length: 38-52 minutes    Session #: 25 with this provider    Attendees: Client attended alone    Service Modality:  Video Visit:      Provider verified identity through the following two step process.  Patient provided:  Patient is known previously to provider    Telemedicine Visit: The patient's condition can be safely assessed and treated via synchronous audio and visual telemedicine encounter.      Reason for Telemedicine Visit: Patient convenience (e.g. access to timely appointments / distance to available provider) and Services only offered telehealth    Originating Site (Patient Location): Patient's home    Distant Site (Provider Location): Provider Remote Setting- Home Office/Off-Site    Consent:  The patient/guardian has verbally consented to: the potential risks and benefits of telemedicine (video visit) versus in person care; bill my insurance or make self-payment for services provided; and responsibility for payment of non-covered services.     Patient would like the video invitation sent by:  [employer]    Mode of Communication:  Video Conference via [employer]    As the provider I attest to compliance with applicable laws and regulations related to telemedicine.    DATA  Interactive Complexity: No   Crisis: No      Progress Since Last Session (Related to Symptoms / Goals / Homework):  Symptoms: no change since previous appointment; client reported mood has been stable, experiencing frustration with continued difficulty with focus and organization and not having a solution    Homework: Completed in session      Episode of Care Goals: Satisfactory progress - ACTION (Actively working towards change); Intervened by reinforcing change plan / affirming steps taken     Current /  "Ongoing Stressors and Concerns:   Difficulty functioning at home and work due to problems with focus, organization, and completing tasks.  Client reported that she has been struggling paying bills on time, difficulty preparing food for herself, organizing her home.  Client reported related shame and hopelessness   Grief related to past and impact on herself and her children   Difficulty trusting others and being vulnerable   Paralyzed by decision making; difficulty trusting herself   Desire for more intimate relationships   Fear of being rejected by others; belief she is not good enough   Stressors related to parenting and co-parenting   Lack of trust with providers      Treatment Objective(s) Addressed in This Session:   identify and use 1 strategies to improve organization  identify 1 fears / thoughts that contribute to feeling anxious  Identify negative self-talk and behaviors: challenge core beliefs, myths, and actions      Safe/calm state titled \"calm\"  Container: box with a lock    Potential Targets:   Guilt about how divorce ended, Negative Belief \"It's my fault\"  Guilt about son's surgery and outcome  Negative Belief: There is something wrong with me  Being blamed by ex-boyfriend, feel shame  Childhood: not belonging, not being good enough      Current Potential Targets  Absence of friendships  Not fitting in  Defensiveness  Ex on social media  Driving in certain areas will remind her of her ex    Past:   image of child self at 8 years old   Disagreement about [facility]   head injury    Trauma symptoms: avoids places in neighborhood for fear of running into ex-boyfriend  Has avoided romantic relationships since  Dissociative symptoms while in relationship  Recurrent, intrusive, distressing memories  Problems with concentration     Intervention:   CBT: identified feelings, cognitions, and behaviors    Offered support with recent stressors   EMDR: discussed next steps   Solution " focused: exception questions   Motivational interviewing: expressed empathy/understanding, use of reflective listening and open ended questions, supported autonomy     Assessments completed prior to visit:   The following assessments were completed by patient for this visit:   None    ASSESSMENT: Current Emotional / Mental Status (status of significant symptoms):   Risk status (Self / Other harm or suicidal ideation)   Patient denies current fears or concerns for personal safety.   Patient denies current or recent suicidal ideation or behaviors.   Patient denies current or recent homicidal ideation or behaviors.   Patient denies current or recent self injurious behavior or ideation.   Patient denies other safety concerns.   Patient reports there has been no change in risk factors since their last session.     Patient reports there has been no change in protective factors since their last session.     Recommended that patient call 911 or go to the local ED should there be a change in any of these risk factors.     Appearance:   Appropriate    Eye Contact:   Good    Psychomotor Behavior: Normal    Attitude:   Cooperative  Interested Pleasant Open to feedback and support    Orientation:   All   Speech    Rate / Production: Normal/ Responsive    Volume:  Normal    Mood:    Sad  Anxious Client reported frustrated   Affect:    Appropriate  Tearful   Thought Content:  Clear    Thought Form:  Coherent  Goal Directed  Logical    Insight:    Good      Medication Review:   No current psychiatric medications prescribed     Medication Compliance:   NA     Changes in Health Issues:   None reported     Chemical Use Review:   Substance Use: no use     Tobacco Use: no use    Diagnosis:  1. Unspecified Depressive Disorder     Other Specified Trauma and Stress Related Disorder   Generalized Anxiety Disorder, Provisional    Collateral Reports Completed:   Not Applicable    PLAN: (Patient Tasks / Therapist Tasks / Other)  EMDR:  reevaluate target: fear of seeing ex-boyfriend at a concert.  Plan for next session to have client keep her eyes open.  Target negative belief: I cannot trust anyone.  Client is considering scheduling with psychiatry.  Client has a PCP appointment on 2/27  Therapist sent a handout with ADHD resources and suggestions to manage symptoms.      Magali Montilla, [facility] 2/23/2023  ______________________________________________________________________    Individual Treatment Plan    Patient's Name: Kiesha Mcguire  YOB: 1970    Date of Creation: 5/11/2022  Date Treatment Plan Last Reviewed/Revised: 12/21/2022    DSM5 Diagnoses:   1. Unspecified Depressive Disorder     Other Specified Trauma and Stress Related Disorder   Generalized Anxiety Disorder, Provisional    Psychosocial / Contextual Factors: functioning impacted by difficulty with focus, organization, and completion of tasks  PROMIS (reviewed every 90 days):  31    Referral / Collaboration:  Referral to another professional/service is not indicated at this time..    Anticipated number of session for this episode of care: will review in 90 days  Anticipation frequency of session: Every other week  Anticipated Duration of each session: 38-52 minutes  Treatment plan will be reviewed in 90 days or when goals have been changed.       MeasurableTreatment Goal(s) related to diagnosis / functional impairment(s)  Goal 1: Patient will sustain attention and concentration for consistently longer periods of time.  Patient will meet goals set for completing tasks 80% of the time.   Client's Goal:  I will know I've met my goal when my space isn't so chaotic, meeting deadlines around bills and work, when I am not always playing catch-up, completing tasks.      Objective #A (Patient Action)    Patient will learn and implement organization and planning skills.  Status: New - Date: 5/11/2022, continued date: 9/8/2022, continued date:  "12/21/2022    Intervention(s)  Therapist will teach the client organization and planning skills.  Therapist will address any potential barriers to using skills.    Objective #B  Patient will identify, challenge, and change self-talk that contributes to maladaptive feelings and actions.  Status: New - Date: 5/11/2022, Continued date: 9/8/2022, continued date: 12/21/2022    Intervention(s)  Therapist will teach the CBT model, cognitive distortions, and cognitive restructuring techniques.      Goal 2: Patient will be able to recall the traumatic events without becoming overwhelmed with negative thoughts, feelings, or urges.   Client's Goal:  I will know I've met my goal when I do not cry every time I talk about it.\"    Objective #A (Patient Action)    Status: New - Date: 5/11/2022, continued date: 9/8/2022, continued date: 12/21/2022    Patient will describe the history of and nature of trauma symptoms    Intervention(s)  Therapist will assess the client's frequency, intensity, duration, and history of trauma symptoms and their impact on functioning.    Objective #B (Patient Action)  Status: New Date: 5/11/2022, continued date: 9/8/2022, continued date: 12/21/2022    Patient will cooperate with eye movement desensitization and reprocessing (EMDR) to reduce emotional reaction to traumatic event(s)    Intervention(s)  Therapist will utilize EMDR to reduce the client's emotional reactivity to the traumatic event(s).    Objective #C (Patient Action)  Status: New Date: 5/11/2022, continued date: 9/8/2022, continued date: 12/21/2022    Patient will learn and implement calming and coping strategies to manage trauma symptoms.    Intervention(s)  Therapist will teach grounding techniques, distress tolerance, and emotion regulation techniques.      Patient has reviewed and agreed to the above plan.      ROYCE Crespo  May 11, 2022  ROYCE Crespo  9/8/2022  ROYCE Crespo  12/21/2022  "

## 2023-02-27 ENCOUNTER — OFFICE VISIT (OUTPATIENT)
Dept: FAMILY MEDICINE | Facility: CLINIC | Age: 53
End: 2023-02-27
Payer: COMMERCIAL

## 2023-02-27 VITALS
TEMPERATURE: 97.8 F | HEART RATE: 76 BPM | WEIGHT: 144.2 LBS | BODY MASS INDEX: 25.14 KG/M2 | SYSTOLIC BLOOD PRESSURE: 106 MMHG | OXYGEN SATURATION: 100 % | DIASTOLIC BLOOD PRESSURE: 71 MMHG

## 2023-02-27 DIAGNOSIS — N63.21 MASS OF UPPER OUTER QUADRANT OF LEFT BREAST: ICD-10-CM

## 2023-02-27 DIAGNOSIS — F90.0 ATTENTION DEFICIT HYPERACTIVITY DISORDER (ADHD), PREDOMINANTLY INATTENTIVE TYPE: Primary | ICD-10-CM

## 2023-02-27 PROCEDURE — 99214 OFFICE O/P EST MOD 30 MIN: CPT | Performed by: FAMILY MEDICINE

## 2023-02-27 RX ORDER — DEXTROAMPHETAMINE SACCHARATE, AMPHETAMINE ASPARTATE, DEXTROAMPHETAMINE SULFATE AND AMPHETAMINE SULFATE 2.5; 2.5; 2.5; 2.5 MG/1; MG/1; MG/1; MG/1
10 TABLET ORAL DAILY
Qty: 30 TABLET | Refills: 0 | Status: SHIPPED | OUTPATIENT
Start: 2023-02-27 | End: 2023-03-27

## 2023-02-27 ASSESSMENT — PATIENT HEALTH QUESTIONNAIRE - PHQ9
10. IF YOU CHECKED OFF ANY PROBLEMS, HOW DIFFICULT HAVE THESE PROBLEMS MADE IT FOR YOU TO DO YOUR WORK, TAKE CARE OF THINGS AT HOME, OR GET ALONG WITH OTHER PEOPLE: SOMEWHAT DIFFICULT
SUM OF ALL RESPONSES TO PHQ QUESTIONS 1-9: 2
SUM OF ALL RESPONSES TO PHQ QUESTIONS 1-9: 2

## 2023-02-27 NOTE — PROGRESS NOTES
Assessment & Plan   Problem List Items Addressed This Visit        Behavioral    Attention deficit hyperactivity disorder (ADHD), predominantly inattentive type - Primary    Relevant Medications    amphetamine-dextroamphetamine (ADDERALL) 10 MG tablet       Other    Mass of upper outer quadrant of left breast    Relevant Orders    US Breast Left Limited 1-3 Quadrants   Follow-up 1 month 6}     New diagnosis    Return in about 4 weeks (around 3/27/2023).    SRINIVASAN SOOD MD  [facility] SANGEETHA Pop is a 52 year old, presenting for the following health issues:  Patient Request (Discuss medication ; executive functioning disorder)    Chief Complaint   Patient presents with     Patient Request     Discuss medication ; executive functioning disorder     Was recommended by psychology to see PCP for possible stimulants     History of Present Illness       Reason for visit:  Medication question    She eats 2-3 servings of fruits and vegetables daily.She consumes 1 sweetened beverage(s) daily.She exercises with enough effort to increase her heart rate 10 to 19 minutes per day.  She exercises with enough effort to increase her heart rate 3 or less days per week.   She is taking medications regularly.    Today's PHQ-9         PHQ-9 Total Score: 2    PHQ-9 Q9 Thoughts of better off dead/self-harm past 2 weeks :   Not at all    How difficult have these problems made it for you to do your work, take care of things at home, or get along with other people: Somewhat difficult     8 years of difficulty at work, paying bills, self-care.  Was diagnosed with an executive functioning disorder but believes she may be struggling with ADHD.  She did trial friends Adderall and said she had a super profound effect with her abilities.  She would like to start this.    She also has a history of lymph node embedded in her left upper outer quadrant of her breast and she says that although her mammograms have  been normal it is giving her more pain than usual.    Review of Systems   Constitutional, HEENT, cardiovascular, pulmonary, gi and gu systems are negative, except as otherwise noted.      Objective    /71   Pulse 76   Temp 97.8  F (36.6  C) (Oral)   Wt 65.4 kg (144 lb 3.2 oz)   SpO2 100%   BMI 25.14 kg/m    Body mass index is 25.14 kg/m .  Physical Exam   GENERAL: healthy, alert and no distress, pleasant, well-groomed  RESP: lungs clear to auscultation - no rales, rhonchi or wheezes  CV: regular rate and rhythm, normal S1 S2, no S3 or S4, no murmur, click or rub, no peripheral edema and peripheral pulses strong  MS: no gross musculoskeletal defects noted, no edema  Psych: Normal mood and affect, no pressured speech                   Unknown if ever smoked

## 2023-03-09 ENCOUNTER — HOSPITAL ENCOUNTER (OUTPATIENT)
Dept: MAMMOGRAPHY | Facility: CLINIC | Age: 53
Discharge: HOME | End: 2023-03-09
Attending: FAMILY MEDICINE
Payer: COMMERCIAL

## 2023-03-09 ENCOUNTER — VIRTUAL VISIT (OUTPATIENT)
Dept: PSYCHOLOGY | Facility: CLINIC | Age: 53
End: 2023-03-09
Payer: COMMERCIAL

## 2023-03-09 DIAGNOSIS — N63.21 MASS OF UPPER OUTER QUADRANT OF LEFT BREAST: ICD-10-CM

## 2023-03-09 DIAGNOSIS — F32.A DEPRESSIVE DISORDER: Primary | ICD-10-CM

## 2023-03-09 DIAGNOSIS — F43.89 REACTION TO CHRONIC STRESS: ICD-10-CM

## 2023-03-09 PROCEDURE — 76642 ULTRASOUND BREAST LIMITED: CPT | Mod: LT

## 2023-03-09 PROCEDURE — 77066 DX MAMMO INCL CAD BI: CPT

## 2023-03-09 PROCEDURE — 90834 PSYTX W PT 45 MINUTES: CPT | Mod: GT | Performed by: SOCIAL WORKER

## 2023-03-09 NOTE — PROGRESS NOTES
[facility]                                     Progress Note    Patient Name: Kiesha Mcguire  Date: 3/9/2023       Service Type: Individual      Session Start Time: 10:01 AM Session End Time: 10:47 AM     Session Length: 38-52 minutes    Session #: 26 with this provider    Attendees: Client attended alone    Service Modality:  Video Visit:      Provider verified identity through the following two step process.  Patient provided:  Patient is known previously to provider    Telemedicine Visit: The patient's condition can be safely assessed and treated via synchronous audio and visual telemedicine encounter.      Reason for Telemedicine Visit: Patient convenience (e.g. access to timely appointments / distance to available provider) and Services only offered telehealth    Originating Site (Patient Location): Patient's home    Distant Site (Provider Location): Provider Remote Setting- Home Office/Off-Site    Consent:  The patient/guardian has verbally consented to: the potential risks and benefits of telemedicine (video visit) versus in person care; bill my insurance or make self-payment for services provided; and responsibility for payment of non-covered services.     Patient would like the video invitation sent by:  [employer]    Mode of Communication:  Video Conference via [employer]    As the provider I attest to compliance with applicable laws and regulations related to telemedicine.    DATA  Interactive Complexity: No   Crisis: No      Progress Since Last Session (Related to Symptoms / Goals / Homework):  Symptoms: increased focus, continued anxiety symptoms  Continued difficulty making decisions    Homework: Achieved / completed to satisfaction   Completing tasks      Episode of Care Goals: Satisfactory progress - ACTION (Actively working towards change); Intervened by reinforcing change plan / affirming steps taken     Current / Ongoing Stressors and Concerns:   Difficulty functioning at  "home and work due to problems with focus, organization, and completing tasks; client reported improvements   Grief related to past and impact on herself and her children   Difficulty trusting others and being vulnerable   Paralyzed by decision making; difficulty trusting herself   Desire for more intimate relationships   Fear of being rejected by others; belief she is not good enough   Stressors related to parenting and co-parenting   Lack of trust with providers      Treatment Objective(s) Addressed in This Session:   identify 2 fears / thoughts that contribute to feeling anxious   Identify trauma and coping strategies      Safe/calm state titled \"calm\"  Container: box with a lock    Potential Targets:   Relationship with ex-  Guilt about how divorce ended, Negative Belief \"It's my fault\"  Guilt about son's surgery and outcome  Negative Belief: There is something wrong with me  Being blamed by ex-boyfriend, feel shame  Childhood: not belonging, not being good enough      Current Potential Targets  Absence of friendships  Not fitting in  Defensiveness  Ex on social media  Driving in certain areas will remind her of her ex  Relationship with ex-    Past:   image of child self at 8 years old   Disagreement about [facility]   head injury    Trauma symptoms: avoids places in neighborhood for fear of running into ex-boyfriend  Has avoided romantic relationships since  Dissociative symptoms while in relationship  Recurrent, intrusive, distressing memories  Problems with concentration     Intervention:   CBT: identified feelings, cognitions, and behaviors, reinforced behavior changes    EMDR: identified additional targets   Motivational interviewing: expressed empathy/understanding, use of reflective listening and open ended questions, supported autonomy     Assessments completed prior to visit:   The following assessments were completed by patient for this visit: "   None    ASSESSMENT: Current Emotional / Mental Status (status of significant symptoms):   Risk status (Self / Other harm or suicidal ideation)   Patient denies current fears or concerns for personal safety.   Patient denies current or recent suicidal ideation or behaviors.   Patient denies current or recent homicidal ideation or behaviors.   Patient denies current or recent self injurious behavior or ideation.   Patient denies other safety concerns.   Patient reports there has been no change in risk factors since their last session.     Patient reports there has been no change in protective factors since their last session.     Recommended that patient call 911 or go to the local ED should there be a change in any of these risk factors.     Appearance:   Appropriate    Eye Contact:   Good    Psychomotor Behavior: Normal    Attitude:   Cooperative  Interested Friendly Open to feedback and support    Orientation:   All   Speech    Rate / Production: Normal/ Responsive    Volume:  Normal    Mood:    Anxious     Affect:    Appropriate  Tearful -minimal   Thought Content:  Clear    Thought Form:  Coherent  Goal Directed  Logical    Insight:    Good      Medication Review:   Changes to psychiatric medications, see updated Medication List in EPIC.      amphetamine-dextroamphetamine (ADDERALL) 10 MG tablet      Client reported taking 5 mg     Medication Compliance:   Yes     Changes in Health Issues:   None reported     Chemical Use Review:   Substance Use: no use     Tobacco Use: no use    Diagnosis:   Unspecified Depressive Disorder     Other Specified Trauma and Stress Related Disorder   Generalized Anxiety Disorder, Provisional    Collateral Reports Completed:   Not Applicable    PLAN: (Patient Tasks / Therapist Tasks / Other)  EMDR: reevaluate target: fear of seeing ex-boyfriend at a concert.  Plan for next session to have client keep her eyes open.  Target negative belief: I cannot trust anyone.  Client shared goal  to continue to complete tasks.  Client is considering journaling.     Magali Montilla, [facility] 3/9/2023  ______________________________________________________________________    Individual Treatment Plan    Patient's Name: Kiesha Mcguire  YOB: 1970    Date of Creation: 5/11/2022  Date Treatment Plan Last Reviewed/Revised: 12/21/2022    DSM5 Diagnoses:   1. Unspecified Depressive Disorder     Other Specified Trauma and Stress Related Disorder   Generalized Anxiety Disorder, Provisional    Psychosocial / Contextual Factors: functioning impacted by difficulty with focus, organization, and completion of tasks  PROMIS (reviewed every 90 days):  31    Referral / Collaboration:  Referral to another professional/service is not indicated at this time..    Anticipated number of session for this episode of care: will review in 90 days  Anticipation frequency of session: Every other week  Anticipated Duration of each session: 38-52 minutes  Treatment plan will be reviewed in 90 days or when goals have been changed.       MeasurableTreatment Goal(s) related to diagnosis / functional impairment(s)  Goal 1: Patient will sustain attention and concentration for consistently longer periods of time.  Patient will meet goals set for completing tasks 80% of the time.   Client's Goal:  I will know I've met my goal when my space isn't so chaotic, meeting deadlines around bills and work, when I am not always playing catch-up, completing tasks.      Objective #A (Patient Action)    Patient will learn and implement organization and planning skills.  Status: New - Date: 5/11/2022, continued date: 9/8/2022, continued date: 12/21/2022    Intervention(s)  Therapist will teach the client organization and planning skills.  Therapist will address any potential barriers to using skills.    Objective #B  Patient will identify, challenge, and change self-talk that contributes to maladaptive feelings and actions.  Status: New -  "Date: 5/11/2022, Continued date: 9/8/2022, continued date: 12/21/2022    Intervention(s)  Therapist will teach the CBT model, cognitive distortions, and cognitive restructuring techniques.      Goal 2: Patient will be able to recall the traumatic events without becoming overwhelmed with negative thoughts, feelings, or urges.   Client's Goal:  I will know I've met my goal when I do not cry every time I talk about it.\"    Objective #A (Patient Action)    Status: New - Date: 5/11/2022, continued date: 9/8/2022, continued date: 12/21/2022    Patient will describe the history of and nature of trauma symptoms    Intervention(s)  Therapist will assess the client's frequency, intensity, duration, and history of trauma symptoms and their impact on functioning.    Objective #B (Patient Action)  Status: New Date: 5/11/2022, continued date: 9/8/2022, continued date: 12/21/2022    Patient will cooperate with eye movement desensitization and reprocessing (EMDR) to reduce emotional reaction to traumatic event(s)    Intervention(s)  Therapist will utilize EMDR to reduce the client's emotional reactivity to the traumatic event(s).    Objective #C (Patient Action)  Status: New Date: 5/11/2022, continued date: 9/8/2022, continued date: 12/21/2022    Patient will learn and implement calming and coping strategies to manage trauma symptoms.    Intervention(s)  Therapist will teach grounding techniques, distress tolerance, and emotion regulation techniques.      Patient has reviewed and agreed to the above plan.      ROYCE Crespo  May 11, 2022  ROYCE Crespo  9/8/2022  ROYCE Crespo  12/21/2022  "

## 2023-03-17 DIAGNOSIS — L66.12 FRONTAL FIBROSING ALOPECIA: ICD-10-CM

## 2023-03-20 RX ORDER — HYDROXYCHLOROQUINE SULFATE 200 MG/1
200 TABLET, FILM COATED ORAL 2 TIMES DAILY
Qty: 180 TABLET | Refills: 3 | Status: SHIPPED | OUTPATIENT
Start: 2023-03-20 | End: 2023-05-24

## 2023-03-27 ENCOUNTER — OFFICE VISIT (OUTPATIENT)
Dept: FAMILY MEDICINE | Facility: CLINIC | Age: 53
End: 2023-03-27
Payer: COMMERCIAL

## 2023-03-27 VITALS
TEMPERATURE: 98.2 F | DIASTOLIC BLOOD PRESSURE: 71 MMHG | BODY MASS INDEX: 24.76 KG/M2 | RESPIRATION RATE: 18 BRPM | SYSTOLIC BLOOD PRESSURE: 102 MMHG | HEART RATE: 69 BPM | OXYGEN SATURATION: 100 % | WEIGHT: 142 LBS

## 2023-03-27 DIAGNOSIS — F90.0 ATTENTION DEFICIT HYPERACTIVITY DISORDER (ADHD), PREDOMINANTLY INATTENTIVE TYPE: Primary | ICD-10-CM

## 2023-03-27 PROCEDURE — 99213 OFFICE O/P EST LOW 20 MIN: CPT | Performed by: FAMILY MEDICINE

## 2023-03-27 RX ORDER — DEXTROAMPHETAMINE SACCHARATE, AMPHETAMINE ASPARTATE, DEXTROAMPHETAMINE SULFATE AND AMPHETAMINE SULFATE 2.5; 2.5; 2.5; 2.5 MG/1; MG/1; MG/1; MG/1
10 TABLET ORAL DAILY
Qty: 90 TABLET | Refills: 0 | Status: SHIPPED | OUTPATIENT
Start: 2023-03-27 | End: 2023-05-15 | Stop reason: SINTOL

## 2023-03-27 NOTE — PROGRESS NOTES
Assessment & Plan   Problem List Items Addressed This Visit        Behavioral    Attention deficit hyperactivity disorder (ADHD), predominantly inattentive type - Primary    Relevant Medications    amphetamine-dextroamphetamine (ADDERALL) 10 MG tablet      Continue same dose, which is working well for her. F/u 3 months for weight check and symptom check.     DO GRAHAM AVILES [facility] SANGEETHA Pop is a 52 year old, presenting for the following health issues:  Recheck Medication  No flowsheet data found.  History of Present Illness       Reason for visit:  Medication followup    She eats 2-3 servings of fruits and vegetables daily.She consumes 1 sweetened beverage(s) daily.She exercises with enough effort to increase her heart rate 9 or less minutes per day.  She exercises with enough effort to increase her heart rate 3 or less days per week.   She is taking medications regularly.       Medication Followup of Adderall    Taking Medication as prescribed: yes    Side Effects:  None    Medication Helping Symptoms:  yes          Review of Systems   Appetite slightly suppressed, but no weight loss      Objective    /71 (BP Location: Right arm, Patient Position: Chair, Cuff Size: Adult Regular)   Pulse 69   Temp 98.2  F (36.8  C) (Oral)   Resp 18   Wt 64.4 kg (142 lb)   SpO2 100%   BMI 24.76 kg/m    Body mass index is 24.76 kg/m .  Physical Exam   GENERAL: healthy, alert and no distress  PSYCH: mentation appears normal, affect normal

## 2023-03-29 ENCOUNTER — VIRTUAL VISIT (OUTPATIENT)
Dept: PSYCHOLOGY | Facility: CLINIC | Age: 53
End: 2023-03-29
Payer: COMMERCIAL

## 2023-03-29 DIAGNOSIS — F43.89 REACTION TO CHRONIC STRESS: Primary | ICD-10-CM

## 2023-03-29 PROCEDURE — 90834 PSYTX W PT 45 MINUTES: CPT | Mod: GT | Performed by: SOCIAL WORKER

## 2023-03-29 NOTE — PROGRESS NOTES
[facility]                                     Progress Note    Patient Name: Kiesha Mcguire  Date: 3/29/2023       Service Type: Individual      Session Start Time: 9:01 AM Session End Time: 9:53 AM     Session Length: 38-52 minutes    Session #: 27 with this provider    Attendees: Client attended alone    Service Modality:  Video Visit:      Provider verified identity through the following two step process.  Patient provided:  Patient is known previously to provider    Telemedicine Visit: The patient's condition can be safely assessed and treated via synchronous audio and visual telemedicine encounter.      Reason for Telemedicine Visit: Patient convenience (e.g. access to timely appointments / distance to available provider) and Services only offered telehealth    Originating Site (Patient Location): Patient's home    Distant Site (Provider Location): [facility]: HIRAL Castellanos/On-site    Consent:  The patient/guardian has verbally consented to: the potential risks and benefits of telemedicine (video visit) versus in person care; bill my insurance or make self-payment for services provided; and responsibility for payment of non-covered services.     Patient would like the video invitation sent by:  [employer]    Mode of Communication:  Video Conference via [employer]    As the provider I attest to compliance with applicable laws and regulations related to telemedicine.    DATA  Interactive Complexity: No   Crisis: No      Progress Since Last Session (Related to Symptoms / Goals / Homework):  Symptoms: no change since previous appointment, continue to notice improvements with focus    Homework: Completed in session      Episode of Care Goals: Satisfactory progress - ACTION (Actively working towards change); Intervened by reinforcing change plan / affirming steps taken     Current / Ongoing Stressors and Concerns:   Grief related to past and impact on herself and her  "children   Difficulty trusting others and being vulnerable   Desire for more intimate relationships   Fear of being rejected by others; belief she is not good enough   Stressors related to parenting and co-parenting; dynamics in relationship with sons   Lack of trust with providers      Treatment Objective(s) Addressed in This Session:   identify 2 fears / thoughts that contribute to feeling anxious   Use assertive communication skills    Safe/calm state titled \"calm\"  Container: box with a lock    Potential Targets:   Relationship with ex-  Guilt about how divorce ended, Negative Belief \"It's my fault\"  Guilt about son's surgery and outcome  Negative Belief: There is something wrong with me  Being blamed by ex-boyfriend, feel shame  Childhood: not belonging, not being good enough      Current Potential Targets  Absence of friendships  Not fitting in  Defensiveness  Ex on social media  Driving in certain areas will remind her of her ex  Relationship with ex-    Past:   image of child self at 8 years old   Disagreement about salad   [facility]   head injury    Trauma symptoms: avoids places in neighborhood for fear of running into ex-boyfriend  Has avoided romantic relationships since  Dissociative symptoms while in relationship  Recurrent, intrusive, distressing memories  Problems with concentration     Intervention:   CBT: identified feelings, cognitions, and behaviors    Offered parenting support   Motivational interviewing: expressed empathy/understanding, use of reflective listening and open ended questions, supported autonomy     Assessments completed prior to visit:   The following assessments were completed by patient for this visit:   None    ASSESSMENT: Current Emotional / Mental Status (status of significant symptoms):   Risk status (Self / Other harm or suicidal ideation)   Patient denies current fears or concerns for personal safety.   Patient denies current or recent " suicidal ideation or behaviors.   Patient denies current or recent homicidal ideation or behaviors.   Patient denies current or recent self injurious behavior or ideation.   Patient denies other safety concerns.   Patient reports there has been no change in risk factors since their last session.     Patient reports there has been no change in protective factors since their last session.     Recommended that patient call 911 or go to the local ED should there be a change in any of these risk factors.     Appearance:   Appropriate    Eye Contact:   Good    Psychomotor Behavior: Normal    Attitude:   Cooperative  Interested Open to feedback and support    Orientation:   All   Speech    Rate / Production: Normal/ Responsive    Volume:  Normal    Mood:    Anxious     Affect:    Appropriate     Thought Content:  Clear    Thought Form:  Coherent  Goal Directed  Logical    Insight:    Good      Medication Review:   No changes to current psychiatric medication(s)     amphetamine-dextroamphetamine (ADDERALL) 10 MG tablet      Client reported taking 5 mg     Medication Compliance:   Yes     Changes in Health Issues:   None reported     Chemical Use Review:   Substance Use: no use     Tobacco Use: no use    Diagnosis:   Unspecified Depressive Disorder     Other Specified Trauma and Stress Related Disorder   Generalized Anxiety Disorder, Provisional    Collateral Reports Completed:   Not Applicable    PLAN: (Patient Tasks / Therapist Tasks / Other)  EMDR: reevaluate target: fear of seeing ex-boyfriend at a concert.  Plan for next session to have client keep her eyes open.  Target negative belief: I cannot trust anyone.  Client shared goal to identify a therapist for client.  Client will continue to use assertive communication skills.  Client will complete PROMIS 10.    Magali Montilla, [facility] 3/29/2023  ______________________________________________________________________    Individual Treatment Plan    Patient's Name: Kiesha  Nell Mcguire  YOB: 1970    Date of Creation: 5/11/2022  Date Treatment Plan Last Reviewed/Revised: 3/29/2023    DSM5 Diagnoses:   1. Unspecified Depressive Disorder     Other Specified Trauma and Stress Related Disorder   Generalized Anxiety Disorder, Provisional    Psychosocial / Contextual Factors: stressors related to parenting  PROMIS (reviewed every 90 days):  31    Referral / Collaboration:  Referral to another professional/service is not indicated at this time..    Anticipated number of session for this episode of care: will review in 90 days  Anticipation frequency of session: Every other week  Anticipated Duration of each session: 38-52 minutes  Treatment plan will be reviewed in 90 days or when goals have been changed.       MeasurableTreatment Goal(s) related to diagnosis / functional impairment(s)  Goal 1: Patient will sustain attention and concentration for consistently longer periods of time.  Patient will meet goals set for completing tasks 80% of the time.   Client's Goal:  I will know I've met my goal when my space isn't so chaotic, meeting deadlines around bills and work, when I am not always playing catch-up, completing tasks.      Objective #A (Patient Action)    Patient will learn and implement organization and planning skills.  Status: New - Date: 5/11/2022, continued date: 9/8/2022, continued date: 12/21/2022, completed date: 3/29/2023    Intervention(s)  Therapist will teach the client organization and planning skills.  Therapist will address any potential barriers to using skills.    Objective #B  Patient will identify, challenge, and change self-talk that contributes to maladaptive feelings and actions.  Status: New - Date: 5/11/2022, Continued date: 9/8/2022, continued date: 12/21/2022, continued date: 3/29/2023    Intervention(s)  Therapist will teach the CBT model, cognitive distortions, and cognitive restructuring techniques.      Goal 2: Patient will be able to recall  "the traumatic events without becoming overwhelmed with negative thoughts, feelings, or urges.   Client's Goal:  I will know I've met my goal when I do not cry every time I talk about it.\"    Objective #A (Patient Action)    Status: New - Date: 5/11/2022, continued date: 9/8/2022, continued date: 12/21/2022, continued date: 3/29/2023    Patient will describe the history of and nature of trauma symptoms    Intervention(s)  Therapist will assess the client's frequency, intensity, duration, and history of trauma symptoms and their impact on functioning.    Objective #B (Patient Action)  Status: New Date: 5/11/2022, continued date: 9/8/2022, continued date: 12/21/2022, continued date: 3/29/2023    Patient will cooperate with eye movement desensitization and reprocessing (EMDR) to reduce emotional reaction to traumatic event(s)    Intervention(s)  Therapist will utilize EMDR to reduce the client's emotional reactivity to the traumatic event(s).    Objective #C (Patient Action)  Status: New Date: 5/11/2022, continued date: 9/8/2022, continued date: 12/21/2022, continued date: 3/29/2023    Patient will learn and implement calming and coping strategies to manage trauma symptoms.    Intervention(s)  Therapist will teach grounding techniques, distress tolerance, and emotion regulation techniques.      Patient has reviewed and agreed to the above plan.      Magali Montilla, [facility]  May 11, 2022  Magali Montilla, [facility]  9/8/2022  Magali Montilla, [facility]  12/21/2022  Magali Montilla [facility]  3/29/2023  "

## 2023-04-11 ENCOUNTER — VIRTUAL VISIT (OUTPATIENT)
Dept: PSYCHOLOGY | Facility: CLINIC | Age: 53
End: 2023-04-11
Payer: COMMERCIAL

## 2023-04-11 DIAGNOSIS — F43.89 REACTION TO CHRONIC STRESS: Primary | ICD-10-CM

## 2023-04-11 DIAGNOSIS — F32.A DEPRESSIVE DISORDER: ICD-10-CM

## 2023-04-11 PROCEDURE — 90834 PSYTX W PT 45 MINUTES: CPT | Mod: VID | Performed by: SOCIAL WORKER

## 2023-04-11 NOTE — PROGRESS NOTES
[facility]                                     Progress Note    Patient Name: Kiesha Mcguire  Date: 4/11/2023       Service Type: Individual      Session Start Time: 1:01 PM Session End Time: 1:52 AM     Session Length: 38-52 minutes    Session #: 28 with this provider    Attendees: Client attended alone    Service Modality:  Video Visit:      Provider verified identity through the following two step process.  Patient provided:  Patient is known previously to provider    Telemedicine Visit: The patient's condition can be safely assessed and treated via synchronous audio and visual telemedicine encounter.      Reason for Telemedicine Visit: Patient convenience (e.g. access to timely appointments / distance to available provider) and Services only offered telehealth    Originating Site (Patient Location): Patient's home    Distant Site (Provider Location): Provider Remote Setting- Home Office/Off-site    Consent:  The patient/guardian has verbally consented to: the potential risks and benefits of telemedicine (video visit) versus in person care; bill my insurance or make self-payment for services provided; and responsibility for payment of non-covered services.     Patient would like the video invitation sent by:  [employer]    Mode of Communication:  Video Conference via [employer]    As the provider I attest to compliance with applicable laws and regulations related to telemedicine.    DATA  Interactive Complexity: No   Crisis: No      Progress Since Last Session (Related to Symptoms / Goals / Homework):  Symptoms: some grief/sadness specific to client's children's stage of life    Continued difficulty with decision making    Homework: Completed in session      Episode of Care Goals: Satisfactory progress - ACTION (Actively working towards change); Intervened by reinforcing change plan / affirming steps taken     Current / Ongoing Stressors and Concerns:   Grief related to past and impact on  "herself and her children   Difficulty trusting others and being vulnerable   Desire for more intimate relationships   Fear of being rejected by others; belief she is not good enough   Stressors related to parenting and co-parenting; dynamics in relationship with sons   Lack of trust with providers   difficulty with decision making      Treatment Objective(s) Addressed in This Session:   use a minimum of 1 coping strategy to manage symptoms   Identify grief and coping     Safe/calm state titled \"calm\"  Container: box with a lock    Potential Targets:   Relationship with ex-  Guilt about how divorce ended, Negative Belief \"It's my fault\"  Guilt about son's surgery and outcome  Negative Belief: There is something wrong with me  Being blamed by ex-boyfriend, feel shame  Childhood: not belonging, not being good enough      Current Potential Targets  Absence of friendships  Not fitting in  Defensiveness  Ex on social media  Driving in certain areas will remind her of her ex  Relationship with ex-    Past:   image of child self at 8 years old   Disagreement about salad   [facility]   head injury    Trauma symptoms: avoids places in neighborhood for fear of running into ex-boyfriend  Has avoided romantic relationships since  Dissociative symptoms while in relationship  Recurrent, intrusive, distressing memories  Problems with concentration     Intervention:   CBT: identified feelings, cognitions, and behaviors    Motivational interviewing: expressed empathy/understanding, use of reflective listening and open ended questions, supported autonomy   Engaged completion of PROMIS 10     Assessments completed prior to visit:   The following assessments were completed by patient for this visit:  PROMIS 10-Global Health (only subscores and total score):       7/14/2022    10:01 AM 8/11/2022     8:55 AM 8/25/2022     9:51 AM 12/7/2022    12:04 PM 12/21/2022     1:01 PM 12/29/2022     9:56 AM 4/11/2023 "     1:09 PM   PROMIS-10 Scores Only   Global Mental Health Score 13 12 13 14 14 13 13   Global Physical Health Score 18 16 18 17 17 16 16   PROMIS TOTAL - SUBSCORES 31 28 31 31 31 29 29          ASSESSMENT: Current Emotional / Mental Status (status of significant symptoms):   Risk status (Self / Other harm or suicidal ideation)   Patient denies current fears or concerns for personal safety.   Patient denies current or recent suicidal ideation or behaviors.   Patient denies current or recent homicidal ideation or behaviors.   Patient denies current or recent self injurious behavior or ideation.   Patient denies other safety concerns.   Patient reports there has been no change in risk factors since their last session.     Patient reports there has been no change in protective factors since their last session.     Recommended that patient call 911 or go to the local ED should there be a change in any of these risk factors.     Appearance:   Appropriate    Eye Contact:   Good    Psychomotor Behavior: Normal    Attitude:   Cooperative  Interested Open to feedback and support    Orientation:   All   Speech    Rate / Production: Normal/ Responsive    Volume:  Normal    Mood:    Sad Stable    Affect:    Appropriate  minimally tearful   Thought Content:  Clear    Thought Form:  Coherent  Goal Directed  Logical    Insight:    Good      Medication Review:   No changes to current psychiatric medication(s)     amphetamine-dextroamphetamine (ADDERALL) 10 MG tablet      Client reported taking 5 mg     Medication Compliance:   Yes     Changes in Health Issues:   None reported     Chemical Use Review:   Substance Use: no use     Tobacco Use: no use    Diagnosis:   Unspecified Depressive Disorder     Other Specified Trauma and Stress Related Disorder          Generalized Anxiety Disorder, Provisional    Collateral Reports Completed:   Not Applicable    PLAN: (Patient Tasks / Therapist Tasks / Other)  EMDR: reevaluate target: fear of  seeing ex-boyfriend at a concert.  Plan for next session to have client keep her eyes open.  Target negative belief: I cannot trust anyone.  Therapist encouraged client to write down her thoughts, feelings, and identified barriers when experiencing difficulty with decision making.  Therapist encouraged client to identify and cope with emotions versus ignoring them.    Magali Montilla, [facility] 4/11/2023    ______________________________________________________________________    Individual Treatment Plan    Patient's Name: Kiesha Mcguire  YOB: 1970    Date of Creation: 5/11/2022  Date Treatment Plan Last Reviewed/Revised: 3/29/2023    DSM5 Diagnoses:   1. Unspecified Depressive Disorder     Other Specified Trauma and Stress Related Disorder   Generalized Anxiety Disorder, Provisional    Psychosocial / Contextual Factors: stressors related to parenting  PROMIS (reviewed every 90 days):  31    Referral / Collaboration:  Referral to another professional/service is not indicated at this time..    Anticipated number of session for this episode of care: will review in 90 days  Anticipation frequency of session: Every other week  Anticipated Duration of each session: 38-52 minutes  Treatment plan will be reviewed in 90 days or when goals have been changed.       MeasurableTreatment Goal(s) related to diagnosis / functional impairment(s)  Goal 1: Patient will sustain attention and concentration for consistently longer periods of time.  Patient will meet goals set for completing tasks 80% of the time.   Client's Goal:  I will know I've met my goal when my space isn't so chaotic, meeting deadlines around bills and work, when I am not always playing catch-up, completing tasks.      Objective #A (Patient Action)    Patient will learn and implement organization and planning skills.  Status: New - Date: 5/11/2022, continued date: 9/8/2022, continued date: 12/21/2022, completed date:  "3/29/2023    Intervention(s)  Therapist will teach the client organization and planning skills.  Therapist will address any potential barriers to using skills.    Objective #B  Patient will identify, challenge, and change self-talk that contributes to maladaptive feelings and actions.  Status: New - Date: 5/11/2022, Continued date: 9/8/2022, continued date: 12/21/2022, continued date: 3/29/2023    Intervention(s)  Therapist will teach the CBT model, cognitive distortions, and cognitive restructuring techniques.      Goal 2: Patient will be able to recall the traumatic events without becoming overwhelmed with negative thoughts, feelings, or urges.   Client's Goal:  I will know I've met my goal when I do not cry every time I talk about it.\"    Objective #A (Patient Action)    Status: New - Date: 5/11/2022, continued date: 9/8/2022, continued date: 12/21/2022, continued date: 3/29/2023    Patient will describe the history of and nature of trauma symptoms    Intervention(s)  Therapist will assess the client's frequency, intensity, duration, and history of trauma symptoms and their impact on functioning.    Objective #B (Patient Action)  Status: New Date: 5/11/2022, continued date: 9/8/2022, continued date: 12/21/2022, continued date: 3/29/2023    Patient will cooperate with eye movement desensitization and reprocessing (EMDR) to reduce emotional reaction to traumatic event(s)    Intervention(s)  Therapist will utilize EMDR to reduce the client's emotional reactivity to the traumatic event(s).    Objective #C (Patient Action)  Status: New Date: 5/11/2022, continued date: 9/8/2022, continued date: 12/21/2022, continued date: 3/29/2023    Patient will learn and implement calming and coping strategies to manage trauma symptoms.    Intervention(s)  Therapist will teach grounding techniques, distress tolerance, and emotion regulation techniques.      Patient has reviewed and agreed to the above plan.      Magali Montilla, " [facility]  May 11, 2022  Magali Montilla, [facility]  9/8/2022  Magali Montilla, [facility]  12/21/2022  Magali Montilla, [facility]  3/29/2023

## 2023-04-25 ENCOUNTER — VIRTUAL VISIT (OUTPATIENT)
Dept: PSYCHOLOGY | Facility: CLINIC | Age: 53
End: 2023-04-25
Payer: COMMERCIAL

## 2023-04-25 DIAGNOSIS — F43.89 REACTION TO CHRONIC STRESS: Primary | ICD-10-CM

## 2023-04-25 DIAGNOSIS — F32.A DEPRESSIVE DISORDER: ICD-10-CM

## 2023-04-25 PROCEDURE — 90834 PSYTX W PT 45 MINUTES: CPT | Mod: VID | Performed by: SOCIAL WORKER

## 2023-04-25 NOTE — PROGRESS NOTES
[facility]                                     Progress Note    Patient Name: Kiesha Mcguire  Date: 4/25/2023       Service Type: Individual      Session Start Time: 10:04 AM Session End Time: 10:51 AM     Session Length: 38-52 minutes    Session #: 29 with this provider    Attendees: Client attended alone    Service Modality:  Video Visit:      Provider verified identity through the following two step process.  Patient provided:  Patient is known previously to provider    Telemedicine Visit: The patient's condition can be safely assessed and treated via synchronous audio and visual telemedicine encounter.      Reason for Telemedicine Visit: Patient convenience (e.g. access to timely appointments / distance to available provider) and Services only offered telehealth    Originating Site (Patient Location): Patient's home    Distant Site (Provider Location): Provider Remote Setting- Home Office/Off-site    Consent:  The patient/guardian has verbally consented to: the potential risks and benefits of telemedicine (video visit) versus in person care; bill my insurance or make self-payment for services provided; and responsibility for payment of non-covered services.     Patient would like the video invitation sent by:  [employer]    Mode of Communication:  Video Conference via [employer]    As the provider I attest to compliance with applicable laws and regulations related to telemedicine.    DATA  Interactive Complexity: No   Crisis: No      Progress Since Last Session (Related to Symptoms / Goals / Homework):  Symptoms: increase in irritability, anxiety and vivid dreams; client questions whether this is due to Adderall and plans to discuss with provider    Continued improvements with focus and ability to redirect herself in order to complete a task    Homework: Achieved / completed to satisfaction      Episode of Care Goals: Satisfactory progress - ACTION (Actively working towards change);  "Intervened by reinforcing change plan / affirming steps taken     Current / Ongoing Stressors and Concerns:   Grief related to past and impact on herself and her children   Difficulty trusting others and being vulnerable   Desire for more intimate relationships   Fear of being rejected by others; belief she is not good enough   Lack of trust with providers   difficulty with decision making  Dynamics in relationship with ex-, including lack of communication      Treatment Objective(s) Addressed in This Session:   Identify negative self-talk and behaviors: challenge core beliefs, myths, and actions  EMDR: phase 1       Safe/calm state titled \"calm\"  Container: box with a lock    Potential Targets:   Relationship with ex-  Guilt about how divorce ended, Negative Belief \"It's my fault\"  Guilt about son's surgery and outcome  Negative Belief: There is something wrong with me  Being blamed by ex-boyfriend, feel shame  Childhood: not belonging, not being good enough      Current Potential Targets  Absence of friendships  Not fitting in  Defensiveness  Ex on social media  Driving in certain areas will remind her of her ex  Relationship with ex-    Past:   image of child self at 8 years old   [facility]   [facility]    Trauma symptoms: avoids places in neighborhood for fear of running into ex-boyfriend  Has avoided romantic relationships since  Dissociative symptoms while in relationship  Recurrent, intrusive, distressing memories  Problems with concentration     Intervention:   CBT: identified feelings, cognitions, and behaviors    Motivational interviewing: expressed empathy/understanding, use of reflective listening and open ended questions, supported autonomy   EMDR: discussed next steps   Ego state therapy     Assessments completed prior to visit:   The following assessments were completed by patient for this visit:  None    ASSESSMENT: Current Emotional " / Mental Status (status of significant symptoms):   Risk status (Self / Other harm or suicidal ideation)   Patient denies current fears or concerns for personal safety.   Patient denies current or recent suicidal ideation or behaviors.   Patient denies current or recent homicidal ideation or behaviors.   Patient denies current or recent self injurious behavior or ideation.   Patient denies other safety concerns.   Patient reports there has been no change in risk factors since their last session.     Patient reports there has been no change in protective factors since their last session.     Recommended that patient call 911 or go to the local ED should there be a change in any of these risk factors.     Appearance:   Appropriate    Eye Contact:   Good    Psychomotor Behavior: Normal    Attitude:   Cooperative  Interested Open to feedback and support    Orientation:   All   Speech    Rate / Production: Normal/ Responsive    Volume:  Normal    Mood:    Anxious  Sad  angry when discussing ex-   Affect:    Appropriate  minimally tearful   Thought Content:  Clear    Thought Form:  Coherent  Tangential    Insight:    Good      Medication Review:   No changes to current psychiatric medication(s)     amphetamine-dextroamphetamine (ADDERALL) 10 MG tablet      Client reported taking 5 mg     Medication Compliance:   Yes     Changes in Health Issues:   None reported     Chemical Use Review:   Substance Use: no use     Tobacco Use: no use    Diagnosis:   Unspecified Depressive Disorder     Other Specified Trauma and Stress Related Disorder          Generalized Anxiety Disorder, Provisional    Collateral Reports Completed:   Not Applicable    PLAN: (Patient Tasks / Therapist Tasks / Other)  EMDR: reevaluate target: fear of seeing ex-boyfriend at a concert.  Plan for next session to have client keep her eyes open.  Target negative belief: I cannot trust anyone.  Consider next target to be ex-.  Therapist to consider  engaging client in conference room exercise.    Magali Montilla, [facility] 4/25/2023    ______________________________________________________________________    Individual Treatment Plan    Patient's Name: Kiesha Mcguire  YOB: 1970    Date of Creation: 5/11/2022  Date Treatment Plan Last Reviewed/Revised: 3/29/2023    DSM5 Diagnoses:   1. Unspecified Depressive Disorder     Other Specified Trauma and Stress Related Disorder   Generalized Anxiety Disorder, Provisional    Psychosocial / Contextual Factors: stressors related to parenting  PROMIS (reviewed every 90 days):  31    Referral / Collaboration:  Referral to another professional/service is not indicated at this time..    Anticipated number of session for this episode of care: will review in 90 days  Anticipation frequency of session: Every other week  Anticipated Duration of each session: 38-52 minutes  Treatment plan will be reviewed in 90 days or when goals have been changed.       MeasurableTreatment Goal(s) related to diagnosis / functional impairment(s)  Goal 1: Patient will sustain attention and concentration for consistently longer periods of time.  Patient will meet goals set for completing tasks 80% of the time.   Client's Goal:  I will know I've met my goal when my space isn't so chaotic, meeting deadlines around bills and work, when I am not always playing catch-up, completing tasks.      Objective #A (Patient Action)    Patient will learn and implement organization and planning skills.  Status: New - Date: 5/11/2022, continued date: 9/8/2022, continued date: 12/21/2022, completed date: 3/29/2023    Intervention(s)  Therapist will teach the client organization and planning skills.  Therapist will address any potential barriers to using skills.    Objective #B  Patient will identify, challenge, and change self-talk that contributes to maladaptive feelings and actions.  Status: New - Date: 5/11/2022, Continued date: 9/8/2022,  "continued date: 12/21/2022, continued date: 3/29/2023    Intervention(s)  Therapist will teach the CBT model, cognitive distortions, and cognitive restructuring techniques.      Goal 2: Patient will be able to recall the traumatic events without becoming overwhelmed with negative thoughts, feelings, or urges.   Client's Goal:  I will know I've met my goal when I do not cry every time I talk about it.\"    Objective #A (Patient Action)    Status: New - Date: 5/11/2022, continued date: 9/8/2022, continued date: 12/21/2022, continued date: 3/29/2023    Patient will describe the history of and nature of trauma symptoms    Intervention(s)  Therapist will assess the client's frequency, intensity, duration, and history of trauma symptoms and their impact on functioning.    Objective #B (Patient Action)  Status: New Date: 5/11/2022, continued date: 9/8/2022, continued date: 12/21/2022, continued date: 3/29/2023    Patient will cooperate with eye movement desensitization and reprocessing (EMDR) to reduce emotional reaction to traumatic event(s)    Intervention(s)  Therapist will utilize EMDR to reduce the client's emotional reactivity to the traumatic event(s).    Objective #C (Patient Action)  Status: New Date: 5/11/2022, continued date: 9/8/2022, continued date: 12/21/2022, continued date: 3/29/2023    Patient will learn and implement calming and coping strategies to manage trauma symptoms.    Intervention(s)  Therapist will teach grounding techniques, distress tolerance, and emotion regulation techniques.      Patient has reviewed and agreed to the above plan.      Magali Montilla, [facility]  May 11, 2022  Magali Montilla [facility]  9/8/2022  Magali Montilla [facility]  12/21/2022  Magali Montilla [facility]  3/29/2023  "

## 2023-05-03 ENCOUNTER — OFFICE VISIT (OUTPATIENT)
Dept: PODIATRY | Facility: CLINIC | Age: 53
End: 2023-05-03
Payer: COMMERCIAL

## 2023-05-03 VITALS — WEIGHT: 142 LBS | BODY MASS INDEX: 24.76 KG/M2

## 2023-05-03 DIAGNOSIS — G57.81 INTERDIGITAL NEUROMA OF RIGHT FOOT: Primary | ICD-10-CM

## 2023-05-03 DIAGNOSIS — M77.51 BURSITIS OF RIGHT FOOT: ICD-10-CM

## 2023-05-03 DIAGNOSIS — M20.11 VALGUS DEFORMITY OF BOTH GREAT TOES: ICD-10-CM

## 2023-05-03 DIAGNOSIS — M20.12 VALGUS DEFORMITY OF BOTH GREAT TOES: ICD-10-CM

## 2023-05-03 PROCEDURE — 99213 OFFICE O/P EST LOW 20 MIN: CPT | Mod: 25 | Performed by: PODIATRIST

## 2023-05-03 PROCEDURE — 64455 NJX AA&/STRD PLTR COM DG NRV: CPT | Mod: RT | Performed by: PODIATRIST

## 2023-05-03 RX ORDER — TRIAMCINOLONE ACETONIDE 40 MG/ML
40 INJECTION, SUSPENSION INTRA-ARTICULAR; INTRAMUSCULAR ONCE
Status: CANCELLED | OUTPATIENT
Start: 2023-05-03 | End: 2023-05-03

## 2023-05-03 RX ORDER — DEXAMETHASONE SODIUM PHOSPHATE 4 MG/ML
4 INJECTION, SOLUTION INTRA-ARTICULAR; INTRALESIONAL; INTRAMUSCULAR; INTRAVENOUS; SOFT TISSUE ONCE
Status: COMPLETED | OUTPATIENT
Start: 2023-05-03 | End: 2023-05-03

## 2023-05-03 RX ORDER — BUPIVACAINE HYDROCHLORIDE 5 MG/ML
1 INJECTION, SOLUTION PERINEURAL ONCE
Status: COMPLETED | OUTPATIENT
Start: 2023-05-03 | End: 2023-05-03

## 2023-05-03 RX ADMIN — BUPIVACAINE HYDROCHLORIDE 5 MG: 5 INJECTION, SOLUTION PERINEURAL at 15:35

## 2023-05-03 RX ADMIN — DEXAMETHASONE SODIUM PHOSPHATE 4 MG: 4 INJECTION, SOLUTION INTRA-ARTICULAR; INTRALESIONAL; INTRAMUSCULAR; INTRAVENOUS; SOFT TISSUE at 15:34

## 2023-05-03 NOTE — PATIENT INSTRUCTIONS
"PATIENT INSTRUCTIONS - Podiatry / Foot & Ankle Surgery    Aftercare for Steroid Injection  Activity:  -Resume normal activity as tolerated.  -Tendon, ligament, fascia injectionons- avoid high-impact activity for 1 week.  Icing:  -May apply to the injection site if needed.  Infection:  -Risk is generally low (<1%), but must be aware of the signs/symptoms.    -Both infection and an inflammatory reaction to the steroid (\"steroid flare\") will cause redness, warmth, and increased pain.   -If these symptoms develop within 12-24h & resolve within 2-3 days- \"steroid flare\"- ice (non-worrisome)  -If these symptoms develop after 24-48h, progressively get worse, & are associated with a fever- possible infection; call the clinic or present to urgent care immediately        Metatarsal pads on orthotics:    Place metatarsal pads on an orthotic or shoe liner, adhesive side down.    The pad should contact your foot BEHIND the metatarsophalangeal joints (\"MPJs\" or \"ball\") of your foot.   If the pad sits beneath the joints themselves, it may actually increase pain.  Use glue to prevent shifting of the metatarsal pads on the orthotic / shoe liner.  Available on drjillsfootpads.com (Blue PPT metatarsal pads).    SuperFeet orthotics are available on [employer], at ihush.com.     Place the metatarsal pads on the SuperFeet to ensure they are in the exact location needed to float the MPJs  You can then move the SuperFeet with metatarsal pads between different shoes.      Diclofenac / Voltaren Gel- Apply to affected area only.  Apply 3-4 times daily for the first 3-4 days, then 1-2 times daily as needed.  Over the counter (OTC), a prescription is not needed.  Available at most pharmacies, [employer], [employer].      Calf muscle stretching 2-3x daily as instructed, both with the knees straight and the knees bent. Hold for 30-60 seconds.  For personal instruction on this,  please request a Physical Therapy referral from your doctor.        Toe " "Spacers:  Flexible bunions without arthritis can benefit from multiple toe spacers, worn to align the toes and strengthen the small muscles in the feet.  They are intended for active individuals in shoes with a foot shaped toe box and toe socks worn underneath.    -Multiple toe spacers: https://www.correcttoes.com, https://naturalfootgear.com    Toe socks:   -Worn underneath spacers  -Help prevent blisters when worn in shoes with a wide toe box (with or without toe spacers)  -https://www.injinji.com  -https://www.toesox.com.  -https://naturalfootgear.com    Shoes with a wide toe box:  - Foot shaped  - widest at the tips of the toes (non-tapering)  -Allow the toes to spread/splay as they naturally should  -Promote strengthening the small intrinsic muscles in the foot that balance the toes & support the arch  -Accommodate toe spacers   With arch support & cushioning:  Birkenstock, Crocs, Keen,    With cushioning:  Altra, Mo, Jenna   With minimal cushioning:  Altra, Mo, Rohan Sarmientos    \"Toe yoga\":    Intrinsic foot muscle strengthening. For personal instruction on this,  please request a Physical Therapy referral from your doctor.      If physical therapy referral desired, call or message      "

## 2023-05-03 NOTE — PROGRESS NOTES
Assessment:      ICD-10-CM    1. Interdigital neuroma of right foot  G57.81 dexamethasone (DECADRON) injection 4 mg     Bupivacaine 0.5 % Injection     N BLOCK INJ,  PLANTAR DIGIT      2. Bursitis of right foot  M77.51       3. Valgus deformity of both great toes  M20.11     M20.12            Plan:  Orders Placed This Encounter   Procedures     N BLOCK INJ,  PLANTAR DIGIT       Discussed the etiology and treatment of the condition with the patient.  Imaging studies reviewed and discussed with the patient.  Discussed surgical and conservative options.    Procedure:  injection  PARQ session held, verbal consent obtained.  Sterile skin prep.    Location:  Right 2nd IMS  Contents:  2ml total, marcaine, dexamethasone phosphate  Dressing applied.    Post-injection instructions reviewed including close attention to amount and duration of pain relief.    MRI discussed - 3rd IMS bursitis, 2nd IMS neuroma      -NSAID- voltaren gel ok  -Orthoses- SuperFeet + met pads  -PT- referral discussed   -Activity- calf stretching, intrinsic foot muscle strengthening  -See AVS    Return:  No follow-ups on file.    Flori Whittington DPM                Chief Complaint:     Patient presents with:  Right Foot - RECHECK     right foot pain    HPI:  Kiesha Mcguire is a 52 year old year old female who presents for evaluation of foot pain.    Pain location- 3rd digit only    No 2nd or 4th digit pain    No joint or bunion pain but notices sub 2nd callus (bunion)  Had injection to neuroma before, did not help per pt    Went to go get orthotics, had a bad experience so didn't get them      Past Medical & Surgical History:  Past Medical History:   Diagnosis Date     Depressive disorder      Depressive disorder, not elsewhere classified     resolved     Herpes simplex without mention of complication     oral     IUD (intrauterine device) in place 08/15/2013    Mirena     Migraine headache with aura     very occass, sig aura, speech loss x1      Pure hypercholesterolemia     follows w BIPIN CHATMAN      Past Surgical History:   Procedure Laterality Date     COLONOSCOPY N/A 10/27/2021    Procedure: COLONOSCOPY, WITH POLYPECTOMY AND CLIP;  Surgeon: Og Gutierres MD;  Location: [facility] OR      DILATION/CURETTAGE DIAG/THER NON OB  2006     LAPAROSCOPIC CHOLECYSTECTOMY N/A 2021    Procedure: CHOLECYSTECTOMY, LAPAROSCOPIC;  Surgeon: Denise Cast MD;  Location: [facility] OR      Family History   Problem Relation Age of Onset     Hypertension Mother      Hyperlipidemia Mother      Depression Mother      Hypertension Father      Cancer Father 65        neuro endrocine CA, neck     Hyperlipidemia Father      Aortic aneurysm Maternal Grandfather          of ascending aortic aneurysm at age 64     Aortic aneurysm Maternal Aunt          in her 40s from ascending aortic aneurysm     C.A.D. No family hx of      Cancer - colorectal No family hx of      Diabetes No family hx of      Cerebrovascular Disease No family hx of      Breast Cancer No family hx of      Prostate Cancer No family hx of         Social History:  ?  History   Smoking Status     Former     Packs/day: 0.50     Years: 10.00     Types: Cigarettes     Quit date: 1994   Smokeless Tobacco     Never     History   Drug Use No     Social History    Substance and Sexual Activity      Alcohol use: Yes        Comment: very rare      Allergies:  ?   Allergies   Allergen Reactions     Triptans Unknown     imitrex     Amoxicillin-Pot Clavulanate Hives     Unsure if true reaction to med         Medications:    Current Outpatient Medications   Medication     Acetaminophen (TYLENOL PO)     amphetamine-dextroamphetamine (ADDERALL) 10 MG tablet     bimatoprost (LATISSE) 0.03 % external opthalmic solution     Biotin 10 MG TABS tablet     CALCIUM-VITAMIN D-VITAMIN K PO     Collagen Hydrolysate POWD     Cyanocobalamin (VITAMIN B 12 PO)     diclofenac (VOLTAREN) 75 MG EC tablet     estradiol (VAGIFEM)  10 MCG TABS vaginal tablet     glucosamine-chondroitin 500-400 MG CAPS per capsule     hydroxychloroquine (PLAQUENIL) 200 MG tablet     levonorgestrel (MIRENA) 20 MCG/24HR IUD     Lysine 500 MG TABS     magnesium citrate solution     NONFORMULARY     NONFORMULARY     UNABLE TO FIND     UNABLE TO FIND     valACYclovir (VALTREX) 1000 mg tablet     valACYclovir (VALTREX) 500 MG tablet     No current facility-administered medications for this visit.       Physical Exam:  ?  Vitals:  Wt 64.4 kg (142 lb)   BMI 24.76 kg/m     General:  WD/WN, in NAD.  A&O x3.  Dermatologic:    Skin is intact, open lesions absent.   Skin texture, turgor is normal.  Vascular:  Pulses palpable bilateral.  Digital capillary refill time normal bilateral.  Skin temperature is normal bilateral.  Generalized edema- none right.  Focal edema- none  right.  Neurologic:    Gross sensation normal.  Oscar's Click present 2nd vs 3rd IMS with forefoot compression.    Palpation 2nd IMS while performing reproduces 3rd digit pain  Gait and balance normal.  Musculoskeletal:  Palpation 2nd IMS reproduces 3rd digit pain  No pain to palpation of sub 3rd or 4th mets, 3rd IMS, sub 2nd met, 1st MTPJ right.  Lesser MTPJ ROM full, smooth, not painful, R    Muscle strength 5/5  foot and ankle bilateral.    Stance:  RCSP Valgus bilateral.  Foot type:  Flexible valgus  Clinical deformity: hallux valugs > limitus    Imaging:   MRI independently reviewed and interpreted by myself today.   right foot dated 2022, reveal possibel 2nd IMS neuroma, 3rd IMS bursitis.      x-ray independently reviewed and interpreted by myself today.  Weight-bearing views right foot dated 11/23/22, reveal no lesser MTPJ degeneration, moderate hallux valgus w/ mild 1st MTPJ degeneration.

## 2023-05-03 NOTE — LETTER
5/3/2023         RE: Kiesha Mcguire  [address] [address]        Dear Colleague,    Thank you for referring your patient, Kiesha Mcguire, to the [facility]. Please see a copy of my visit note below.    Assessment:      ICD-10-CM    1. Interdigital neuroma of right foot  G57.81 dexamethasone (DECADRON) injection 4 mg     Bupivacaine 0.5 % Injection     N BLOCK INJ,  PLANTAR DIGIT      2. Bursitis of right foot  M77.51       3. Valgus deformity of both great toes  M20.11     M20.12            Plan:  Orders Placed This Encounter   Procedures     N BLOCK INJ,  PLANTAR DIGIT       Discussed the etiology and treatment of the condition with the patient.  Imaging studies reviewed and discussed with the patient.  Discussed surgical and conservative options.    Procedure:  injection  PARQ session held, verbal consent obtained.  Sterile skin prep.    Location:  Right 2nd IMS  Contents:  2ml total, marcaine, dexamethasone phosphate  Dressing applied.    Post-injection instructions reviewed including close attention to amount and duration of pain relief.    MRI discussed - 3rd IMS bursitis, 2nd IMS neuroma      -NSAID- voltaren gel ok  -Orthoses- SuperFeet + met pads  -PT- referral discussed   -Activity- calf stretching, intrinsic foot muscle strengthening  -See AVS    Return:  No follow-ups on file.    Flori Whittington DPM                Chief Complaint:     Patient presents with:  Right Foot - RECHECK     right foot pain    HPI:  Kiesha Mcguire is a 52 year old year old female who presents for evaluation of foot pain.    Pain location- 3rd digit only    No 2nd or 4th digit pain    No joint or bunion pain but notices sub 2nd callus (bunion)  Had injection to neuroma before, did not help per pt    Went to go get orthotics, had a bad experience so didn't get them      Past Medical & Surgical History:  Past Medical History:   Diagnosis Date     Depressive  disorder      Depressive disorder, not elsewhere classified     resolved     Herpes simplex without mention of complication     oral     IUD (intrauterine device) in place 08/15/2013    Mirena     Migraine headache with aura     very occass, sig aura, speech loss x1     Pure hypercholesterolemia     follows kate VOSS MD      Past Surgical History:   Procedure Laterality Date     COLONOSCOPY N/A 10/27/2021    Procedure: COLONOSCOPY, WITH POLYPECTOMY AND CLIP;  Surgeon: Og Gutierres MD;  [place] [facility]      DILATION/CURETTAGE DIAG/THER NON OB  2006     LAPAROSCOPIC CHOLECYSTECTOMY N/A 2021    Procedure: CHOLECYSTECTOMY, LAPAROSCOPIC;  Surgeon: Denise Cast MD;  Location: [facility] OR      Family History   Problem Relation Age of Onset     Hypertension Mother      Hyperlipidemia Mother      Depression Mother      Hypertension Father      Cancer Father 65        neuro endrocine CA, neck     Hyperlipidemia Father      Aortic aneurysm Maternal Grandfather          of ascending aortic aneurysm at age 64     Aortic aneurysm Maternal Aunt          in her 40s from ascending aortic aneurysm     C.A.D. No family hx of      Cancer - colorectal No family hx of      Diabetes No family hx of      Cerebrovascular Disease No family hx of      Breast Cancer No family hx of      Prostate Cancer No family hx of         Social History:  ?  History   Smoking Status     Former     Packs/day: 0.50     Years: 10.00     Types: Cigarettes     Quit date: 1994   Smokeless Tobacco     Never     History   Drug Use No     Social History    Substance and Sexual Activity      Alcohol use: Yes        Comment: very rare      Allergies:  ?   Allergies   Allergen Reactions     Triptans Unknown     imitrex     Amoxicillin-Pot Clavulanate Hives     Unsure if true reaction to med         Medications:    Current Outpatient Medications   Medication     Acetaminophen (TYLENOL PO)     amphetamine-dextroamphetamine (ADDERALL) 10  MG tablet     bimatoprost (LATISSE) 0.03 % external opthalmic solution     Biotin 10 MG TABS tablet     CALCIUM-VITAMIN D-VITAMIN K PO     Collagen Hydrolysate POWD     Cyanocobalamin (VITAMIN B 12 PO)     diclofenac (VOLTAREN) 75 MG EC tablet     estradiol (VAGIFEM) 10 MCG TABS vaginal tablet     glucosamine-chondroitin 500-400 MG CAPS per capsule     hydroxychloroquine (PLAQUENIL) 200 MG tablet     levonorgestrel (MIRENA) 20 MCG/24HR IUD     Lysine 500 MG TABS     magnesium citrate solution     NONFORMULARY     NONFORMULARY     UNABLE TO FIND     UNABLE TO FIND     valACYclovir (VALTREX) 1000 mg tablet     valACYclovir (VALTREX) 500 MG tablet     No current facility-administered medications for this visit.       Physical Exam:  ?  Vitals:  Wt 64.4 kg (142 lb)   BMI 24.76 kg/m     General:  WD/WN, in NAD.  A&O x3.  Dermatologic:    Skin is intact, open lesions absent.   Skin texture, turgor is normal.  Vascular:  Pulses palpable bilateral.  Digital capillary refill time normal bilateral.  Skin temperature is normal bilateral.  Generalized edema- none right.  Focal edema- none  right.  Neurologic:    Gross sensation normal.  Oscar's Click present 2nd vs 3rd IMS with forefoot compression.    Palpation 2nd IMS while performing reproduces 3rd digit pain  Gait and balance normal.  Musculoskeletal:  Palpation 2nd IMS reproduces 3rd digit pain  No pain to palpation of sub 3rd or 4th mets, 3rd IMS, sub 2nd met, 1st MTPJ right.  Lesser MTPJ ROM full, smooth, not painful, R    Muscle strength 5/5  foot and ankle bilateral.    Stance:  RCSP Valgus bilateral.  Foot type:  Flexible valgus  Clinical deformity: hallux valugs > limitus    Imaging:   MRI independently reviewed and interpreted by myself today.   right foot dated 2022, reveal possibel 2nd IMS neuroma, 3rd IMS bursitis.      x-ray independently reviewed and interpreted by myself today.  Weight-bearing views right foot dated 11/23/22, reveal no lesser MTPJ  degeneration, moderate hallux valgus w/ mild 1st MTPJ degeneration.                        Again, thank you for allowing me to participate in the care of your patient.        Sincerely,        Flori Whittington DPM

## 2023-05-12 ENCOUNTER — VIRTUAL VISIT (OUTPATIENT)
Dept: PSYCHOLOGY | Facility: CLINIC | Age: 53
End: 2023-05-12
Payer: COMMERCIAL

## 2023-05-12 DIAGNOSIS — F43.89 REACTION TO CHRONIC STRESS: ICD-10-CM

## 2023-05-12 DIAGNOSIS — F32.A DEPRESSIVE DISORDER: Primary | ICD-10-CM

## 2023-05-12 PROCEDURE — 90834 PSYTX W PT 45 MINUTES: CPT | Mod: VID | Performed by: SOCIAL WORKER

## 2023-05-12 NOTE — PROGRESS NOTES
[facility]                                     Progress Note    Patient Name: Kiesha Mcguire  Date: 5/12/2023       Service Type: Individual      Session Start Time: 12:02 PM Session End Time: 12:44 PM     Session Length: 38-52 minutes    Session #: 30 with this provider    Attendees: Client attended alone    Service Modality:  Video Visit:      Provider verified identity through the following two step process.  Patient provided:  Patient is known previously to provider    Telemedicine Visit: The patient's condition can be safely assessed and treated via synchronous audio and visual telemedicine encounter.      Reason for Telemedicine Visit: Patient convenience (e.g. access to timely appointments / distance to available provider) and Services only offered telehealth    Originating Site (Patient Location): Patient's home    Distant Site (Provider Location): Provider Remote Setting- Home Office/Off-site    Consent:  The patient/guardian has verbally consented to: the potential risks and benefits of telemedicine (video visit) versus in person care; bill my insurance or make self-payment for services provided; and responsibility for payment of non-covered services.     Patient would like the video invitation sent by:  PingTank    Mode of Communication:  Video Conference via [employer]    As the provider I attest to compliance with applicable laws and regulations related to telemedicine.    DATA  Interactive Complexity: No   Crisis: No      Progress Since Last Session (Related to Symptoms / Goals / Homework):  Symptoms: feeling overwhelmed, on edge, irritable, experiencing worry    Homework: Achieved / completed to satisfaction   Organizing, communicating her needs      Episode of Care Goals: Satisfactory progress - ACTION (Actively working towards change); Intervened by reinforcing change plan / affirming steps taken     Current / Ongoing Stressors and Concerns:   Grief related to past and impact  "on herself and her children   Difficulty trusting others and being vulnerable   Desire for more intimate relationships   Fear of being rejected by others; belief she is not good enough   Lack of trust with providers   difficulty with decision making  Dynamics in relationship with ex-  Son's physical health, missing school  Worry about upcoming trip out of state  Started dating      Treatment Objective(s) Addressed in This Session:   identify 3 fears / thoughts that contribute to feeling anxious   Identify a minimum of 1 strategy to manage symptoms  Use assertive communication    Safe/calm state titled \"calm\"  Container: box with a lock    Potential Targets:   Relationship with ex-  Guilt about how divorce ended, Negative Belief \"It's my fault\"  Guilt about son's surgery and outcome  Negative Belief: There is something wrong with me  Being blamed by ex-boyfriend, feel shame  Childhood: not belonging, not being good enough      Current Potential Targets  Absence of friendships  Not fitting in  Defensiveness  Ex on social media  Driving in certain areas will remind her of her ex  Relationship with ex-    Past:   image of child self at 8 years old   Disagreement about [facility]   head injury    Trauma symptoms: avoids places in neighborhood for fear of running into ex-boyfriend  Has avoided romantic relationships since  Dissociative symptoms while in relationship  Recurrent, intrusive, distressing memories  Problems with concentration     Intervention:   CBT: identified feelings, cognitions, and behaviors, engaged client in identifying coping strategies    Motivational interviewing: expressed empathy/understanding, use of reflective listening and open ended questions, supported autonomy   Ego state therapy     Assessments completed prior to visit:   The following assessments were completed by patient for this visit:  None    ASSESSMENT: Current Emotional / Mental Status " (status of significant symptoms):   Risk status (Self / Other harm or suicidal ideation)   Patient denies current fears or concerns for personal safety.   Patient denies current or recent suicidal ideation or behaviors.   Patient denies current or recent homicidal ideation or behaviors.   Patient denies current or recent self injurious behavior or ideation.   Patient denies other safety concerns.   Patient reports there has been no change in risk factors since their last session.     Patient reports there has been no change in protective factors since their last session.     Recommended that patient call 911 or go to the local ED should there be a change in any of these risk factors.     Appearance:   Appropriate    Eye Contact:   Good    Psychomotor Behavior: Normal    Attitude:   Cooperative  Interested Open to feedback and support    Orientation:   All   Speech    Rate / Production: Normal/ Responsive    Volume:  Normal    Mood:    Anxious  frustrated   Affect:    Appropriate  minimally tearful   Thought Content:  Clear    Thought Form:  Coherent  Tangential    Insight:    Good      Medication Review:   No changes to current psychiatric medication(s)     amphetamine-dextroamphetamine (ADDERALL) 10 MG tablet      Client reported taking 5 mg     Medication Compliance:   Yes     Changes in Health Issues:   None reported     Chemical Use Review:   Substance Use: no use     Tobacco Use: no use    Diagnosis:   Unspecified Depressive Disorder     Other Specified Trauma and Stress Related Disorder          Generalized Anxiety Disorder, Provisional    Collateral Reports Completed:   Not Applicable    PLAN: (Patient Tasks / Therapist Tasks / Other)  EMDR: reevaluate target: fear of seeing ex-boyfriend at a concert.  Plan for next session to have client keep her eyes open.  Target negative belief: I cannot trust anyone.  Consider next target to be ex-.  Therapist to consider engaging client in conference room  exercise.  Client shared goal to goal to identify what she can let go of, in response to feeling overwhelmed.  Client identified future goal to reengage in yoga and meditation.     Magali Montilla, [facility] 5/12/2023    ______________________________________________________________________    Individual Treatment Plan    Patient's Name: Kiesha Mcguire  YOB: 1970    Date of Creation: 5/11/2022  Date Treatment Plan Last Reviewed/Revised: 3/29/2023    DSM5 Diagnoses:   1. Unspecified Depressive Disorder     Other Specified Trauma and Stress Related Disorder   Generalized Anxiety Disorder, Provisional    Psychosocial / Contextual Factors: stressors related to parenting  PROMIS (reviewed every 90 days):  31    Referral / Collaboration:  Referral to another professional/service is not indicated at this time..    Anticipated number of session for this episode of care: will review in 90 days  Anticipation frequency of session: Every other week  Anticipated Duration of each session: 38-52 minutes  Treatment plan will be reviewed in 90 days or when goals have been changed.       MeasurableTreatment Goal(s) related to diagnosis / functional impairment(s)  Goal 1: Patient will sustain attention and concentration for consistently longer periods of time.  Patient will meet goals set for completing tasks 80% of the time.   Client's Goal:  I will know I've met my goal when my space isn't so chaotic, meeting deadlines around bills and work, when I am not always playing catch-up, completing tasks.      Objective #A (Patient Action)    Patient will learn and implement organization and planning skills.  Status: New - Date: 5/11/2022, continued date: 9/8/2022, continued date: 12/21/2022, completed date: 3/29/2023    Intervention(s)  Therapist will teach the client organization and planning skills.  Therapist will address any potential barriers to using skills.    Objective #B  Patient will identify, challenge, and  "change self-talk that contributes to maladaptive feelings and actions.  Status: New - Date: 5/11/2022, Continued date: 9/8/2022, continued date: 12/21/2022, continued date: 3/29/2023    Intervention(s)  Therapist will teach the CBT model, cognitive distortions, and cognitive restructuring techniques.      Goal 2: Patient will be able to recall the traumatic events without becoming overwhelmed with negative thoughts, feelings, or urges.   Client's Goal:  I will know I've met my goal when I do not cry every time I talk about it.\"    Objective #A (Patient Action)    Status: New - Date: 5/11/2022, continued date: 9/8/2022, continued date: 12/21/2022, continued date: 3/29/2023    Patient will describe the history of and nature of trauma symptoms    Intervention(s)  Therapist will assess the client's frequency, intensity, duration, and history of trauma symptoms and their impact on functioning.    Objective #B (Patient Action)  Status: New Date: 5/11/2022, continued date: 9/8/2022, continued date: 12/21/2022, continued date: 3/29/2023    Patient will cooperate with eye movement desensitization and reprocessing (EMDR) to reduce emotional reaction to traumatic event(s)    Intervention(s)  Therapist will utilize EMDR to reduce the client's emotional reactivity to the traumatic event(s).    Objective #C (Patient Action)  Status: New Date: 5/11/2022, continued date: 9/8/2022, continued date: 12/21/2022, continued date: 3/29/2023    Patient will learn and implement calming and coping strategies to manage trauma symptoms.    Intervention(s)  Therapist will teach grounding techniques, distress tolerance, and emotion regulation techniques.      Patient has reviewed and agreed to the above plan.      Magali Montilla, [facility]  May 11, 2022  Magali Montilla, [facility]  9/8/2022  Magali Montilla, [facility]  12/21/2022  Magali Montilla, [facility]  3/29/2023  "

## 2023-05-15 ENCOUNTER — MYC MEDICAL ADVICE (OUTPATIENT)
Dept: FAMILY MEDICINE | Facility: CLINIC | Age: 53
End: 2023-05-15
Payer: COMMERCIAL

## 2023-05-15 DIAGNOSIS — F90.0 ATTENTION DEFICIT HYPERACTIVITY DISORDER (ADHD), PREDOMINANTLY INATTENTIVE TYPE: Primary | ICD-10-CM

## 2023-05-15 RX ORDER — LISDEXAMFETAMINE DIMESYLATE 10 MG/1
10 CAPSULE ORAL DAILY
Qty: 30 CAPSULE | Refills: 0 | Status: SHIPPED | OUTPATIENT
Start: 2023-05-15 | End: 2023-06-08

## 2023-05-17 ENCOUNTER — VIRTUAL VISIT (OUTPATIENT)
Dept: PSYCHOLOGY | Facility: CLINIC | Age: 53
End: 2023-05-17
Payer: COMMERCIAL

## 2023-05-17 DIAGNOSIS — F32.A DEPRESSIVE DISORDER: Primary | ICD-10-CM

## 2023-05-17 DIAGNOSIS — F43.89 REACTION TO CHRONIC STRESS: ICD-10-CM

## 2023-05-17 PROCEDURE — 90834 PSYTX W PT 45 MINUTES: CPT | Mod: VID | Performed by: SOCIAL WORKER

## 2023-05-17 NOTE — PROGRESS NOTES
[facility]                                     Progress Note    Patient Name: Kiesha Mcguire  Date: 5/17/2023       Service Type: Individual      Session Start Time: 10:01 AM Session End Time: 10:48 AM     Session Length: 38-52 minutes    Session #: 31 with this provider    Attendees: Client attended alone    Service Modality:  Video Visit:      Provider verified identity through the following two step process.  Patient provided:  Patient is known previously to provider    Telemedicine Visit: The patient's condition can be safely assessed and treated via synchronous audio and visual telemedicine encounter.      Reason for Telemedicine Visit: Patient convenience (e.g. access to timely appointments / distance to available provider) and Services only offered telehealth    Originating Site (Patient Location): Patient's home    Distant Site (Provider Location): [facility]: [place]/On-site    Consent:  The patient/guardian has verbally consented to: the potential risks and benefits of telemedicine (video visit) versus in person care; bill my insurance or make self-payment for services provided; and responsibility for payment of non-covered services.     Patient would like the video invitation sent by:  [facility]    Mode of Communication:  Video Conference via [employer]    As the provider I attest to compliance with applicable laws and regulations related to telemedicine.    DATA  Interactive Complexity: No   Crisis: No      Progress Since Last Session (Related to Symptoms / Goals / Homework):  Symptoms: client reported crying on Monday, unable to control, client reported feeling abandoned by everyone    Homework: Completed in session      Episode of Care Goals: Satisfactory progress - ACTION (Actively working towards change); Intervened by reinforcing change plan / affirming steps taken     Current / Ongoing Stressors and Concerns:   Grief related to past and impact on herself  "and her children   Difficulty trusting others and being vulnerable   Desire for more intimate relationships   Fear of being rejected by others; belief she is not good enough   Lack of trust with providers   difficulty with decision making  Dynamics in relationship with ex-  Son's physical health, missing school  Lack of support system  Started dating      Treatment Objective(s) Addressed in This Session:   identify 1 fears / thoughts that contribute to feeling anxious  Decrease frequency and intensity of feeling down, depressed, hopeless   Identify a minimum of 1 strategy to manage symptoms  Use assertive communication    Safe/calm state titled \"calm\"  Container: box with a lock    Potential Targets:   Relationship with ex-  Guilt about how divorce ended, Negative Belief \"It's my fault\"  Guilt about son's surgery and outcome  Negative Belief: There is something wrong with me  Being blamed by ex-boyfriend, feel shame  Childhood: not belonging, not being good enough      Current Potential Targets  Absence of friendships  Not fitting in  Defensiveness  Ex on social media  Driving in certain areas will remind her of her ex  Relationship with ex-    Past:   image of child self at 8 years old   Disagreement about [facility] [place]   head injury    Trauma symptoms: avoids places in neighborhood for fear of running into ex-boyfriend  Has avoided romantic relationships since  Dissociative symptoms while in relationship  Recurrent, intrusive, distressing memories  Problems with concentration     Intervention:   CBT: identified feelings, cognitions, and behaviors, engaged client in perspective taking   Motivational interviewing: expressed empathy/understanding, use of reflective listening and open ended questions, supported autonomy   Supported client with recent stressors     Assessments completed prior to visit:   The following assessments were completed by patient for this " visit:  None    ASSESSMENT: Current Emotional / Mental Status (status of significant symptoms):   Risk status (Self / Other harm or suicidal ideation)   Patient denies current fears or concerns for personal safety.   Patient denies current or recent suicidal ideation or behaviors.   Patient denies current or recent homicidal ideation or behaviors.   Patient denies current or recent self injurious behavior or ideation.   Patient denies other safety concerns.   Patient reports there has been no change in risk factors since their last session.     Patient reports there has been no change in protective factors since their last session.     Recommended that patient call 911 or go to the local ED should there be a change in any of these risk factors.     Appearance:   Appropriate    Eye Contact:   Good    Psychomotor Behavior: Normal    Attitude:   Cooperative  Interested Open to feedback and support    Orientation:   All   Speech    Rate / Production: Normal/ Responsive Emotional    Volume:  Normal    Mood:    Down    Affect:    Appropriate  tearful   Thought Content:  Clear    Thought Form:  Coherent  Tangential    Insight:    Good      Medication Review:   Changes to psychiatric medications, see updated Medication List in EPIC.    Adderall - discontinued  Vyvanse -started     Medication Compliance:   Yes     Changes in Health Issues:   None reported     Chemical Use Review:   Substance Use: no use     Tobacco Use: no use    Diagnosis:   Unspecified Depressive Disorder     Other Specified Trauma and Stress Related Disorder          Generalized Anxiety Disorder, Provisional    Collateral Reports Completed:   Not Applicable    PLAN: (Patient Tasks / Therapist Tasks / Other)  EMDR: reevaluate target: fear of seeing ex-boyfriend at a concert.  Plan for next session to have client keep her eyes open.  Target negative belief: I cannot trust anyone.  Consider next target to be ex-.  Therapist to consider engaging client  in conference room exercise.  Client shared goal of considering to reach out to her friend.    Magali Montilla, [facility] 5/17/2023    ______________________________________________________________________    Individual Treatment Plan    Patient's Name: Kiesha Mcguire  YOB: 1970    Date of Creation: 5/11/2022  Date Treatment Plan Last Reviewed/Revised: 3/29/2023    DSM5 Diagnoses:   1. Unspecified Depressive Disorder     Other Specified Trauma and Stress Related Disorder   Generalized Anxiety Disorder, Provisional    Psychosocial / Contextual Factors: stressors related to parenting  PROMIS (reviewed every 90 days):  31    Referral / Collaboration:  Referral to another professional/service is not indicated at this time..    Anticipated number of session for this episode of care: will review in 90 days  Anticipation frequency of session: Every other week  Anticipated Duration of each session: 38-52 minutes  Treatment plan will be reviewed in 90 days or when goals have been changed.       MeasurableTreatment Goal(s) related to diagnosis / functional impairment(s)  Goal 1: Patient will sustain attention and concentration for consistently longer periods of time.  Patient will meet goals set for completing tasks 80% of the time.   Client's Goal:  I will know I've met my goal when my space isn't so chaotic, meeting deadlines around bills and work, when I am not always playing catch-up, completing tasks.      Objective #A (Patient Action)    Patient will learn and implement organization and planning skills.  Status: New - Date: 5/11/2022, continued date: 9/8/2022, continued date: 12/21/2022, completed date: 3/29/2023    Intervention(s)  Therapist will teach the client organization and planning skills.  Therapist will address any potential barriers to using skills.    Objective #B  Patient will identify, challenge, and change self-talk that contributes to maladaptive feelings and actions.  Status: New -  "Date: 5/11/2022, Continued date: 9/8/2022, continued date: 12/21/2022, continued date: 3/29/2023    Intervention(s)  Therapist will teach the CBT model, cognitive distortions, and cognitive restructuring techniques.      Goal 2: Patient will be able to recall the traumatic events without becoming overwhelmed with negative thoughts, feelings, or urges.   Client's Goal:  I will know I've met my goal when I do not cry every time I talk about it.\"    Objective #A (Patient Action)    Status: New - Date: 5/11/2022, continued date: 9/8/2022, continued date: 12/21/2022, continued date: 3/29/2023    Patient will describe the history of and nature of trauma symptoms    Intervention(s)  Therapist will assess the client's frequency, intensity, duration, and history of trauma symptoms and their impact on functioning.    Objective #B (Patient Action)  Status: New Date: 5/11/2022, continued date: 9/8/2022, continued date: 12/21/2022, continued date: 3/29/2023    Patient will cooperate with eye movement desensitization and reprocessing (EMDR) to reduce emotional reaction to traumatic event(s)    Intervention(s)  Therapist will utilize EMDR to reduce the client's emotional reactivity to the traumatic event(s).    Objective #C (Patient Action)  Status: New Date: 5/11/2022, continued date: 9/8/2022, continued date: 12/21/2022, continued date: 3/29/2023    Patient will learn and implement calming and coping strategies to manage trauma symptoms.    Intervention(s)  Therapist will teach grounding techniques, distress tolerance, and emotion regulation techniques.      Patient has reviewed and agreed to the above plan.      Magali Montilla, [facility]  May 11, 2022  Magali Montilla, [facility]  9/8/2022  Magali Montilla, [facility]  12/21/2022  Magali Montilla [facility]  3/29/2023  "

## 2023-05-24 ENCOUNTER — OFFICE VISIT (OUTPATIENT)
Dept: DERMATOLOGY | Facility: CLINIC | Age: 53
End: 2023-05-24
Payer: COMMERCIAL

## 2023-05-24 DIAGNOSIS — L66.12 FRONTAL FIBROSING ALOPECIA: ICD-10-CM

## 2023-05-24 PROCEDURE — 11900 INJECT SKIN LESIONS </W 7: CPT | Performed by: PHYSICIAN ASSISTANT

## 2023-05-24 RX ORDER — HYDROXYCHLOROQUINE SULFATE 200 MG/1
200 TABLET, FILM COATED ORAL 2 TIMES DAILY
Qty: 180 TABLET | Refills: 3 | Status: SHIPPED | OUTPATIENT
Start: 2023-05-24 | End: 2024-02-28

## 2023-05-24 NOTE — LETTER
2023         RE: Kiesha Mcguire  [address] [address]        Dear Colleague,    Thank you for referring your patient, Kiesha Mcguire, to the [facility]. Please see a copy of my visit note below.    Kiesha Mcguire is an extremely pleasant 51 year old year old female patient here today to recheck frontal fibrosing alopecia. She notes that she take Plaquenil but not always twice daily.  She notes hair loss has stabilized. Patient has no other skin complaints today.  Remainder of the HPI, Meds, PMH, Allergies, FH, and SH was reviewed in chart.    Past Medical History:   Diagnosis Date     Depressive disorder      Depressive disorder, not elsewhere classified     resolved     Herpes simplex without mention of complication     oral     IUD (intrauterine device) in place 08/15/2013    Mirena     Migraine headache with aura     very occass, sig aura, speech loss x1     Pure hypercholesterolemia     follows kate VOSS MD       Past Surgical History:   Procedure Laterality Date     COLONOSCOPY N/A 10/27/2021    Procedure: COLONOSCOPY, WITH POLYPECTOMY AND CLIP;  Surgeon: Og Gutierres [place];  [place] [facility] [place]      DILATION/CURETTAGE DIAG/THER NON OB  2006     LAPAROSCOPIC CHOLECYSTECTOMY N/A 2021    Procedure: CHOLECYSTECTOMY, LAPAROSCOPIC;  Surgeon: Denise Cast MD;  Location:  OR        Family History   Problem Relation Age of Onset     Hypertension Mother      Hyperlipidemia Mother      Depression Mother      Squamous cell carcinoma Mother      Hypertension Father      Cancer Father 65        neuro endrocine CA, neck     Hyperlipidemia Father      Aortic aneurysm Maternal Grandfather          of ascending aortic aneurysm at age 64     Aortic aneurysm Maternal Aunt          in her 40s from ascending aortic aneurysm     C.A.D. No family hx of      Cancer - colorectal No family hx of      Diabetes No family hx of       Cerebrovascular Disease No family hx of      Breast Cancer No family hx of      Prostate Cancer No family hx of        Social History     Socioeconomic History     Marital status:      Spouse name: Not on file     Number of children: 2     Years of education: 16     Highest education level: Not on file   Occupational History     Occupation:      Employer: [employer]     Comment: fulltime   Tobacco Use     Smoking status: Former     Packs/day: 0.50     Years: 10.00     Pack years: 5.00     Types: Cigarettes     Quit date: 1994     Years since quittin.8     Smokeless tobacco: Never   Vaping Use     Vaping status: Never Used     Passive vaping exposure: Yes   Substance and Sexual Activity     Alcohol use: Yes     Comment: very rare     Drug use: No     Sexual activity: Not Currently     Partners: Male     Birth control/protection: I.U.D.     Comment: Mirena   Other Topics Concern     Parent/sibling w/ CABG, MI or angioplasty before 65F 55M? No   Social History Narrative    How much exercise per week? 2 90 minute yoga sessions      How much calcium per day? Diet       How much caffeine per day? 0    How much vitamin D per day? Supplement     Do you/your family wear seatbelts?  Yes    Do you/your family use safety helmets? Yes    Do you/your family use sunscreen? Yes    Do you/your family keep firearms in the home? No    Do you/your family have a smoke detector(s)? Yes        Do you feel safe in your home? Yes    Has anyone ever touched you in an unwanted manner? No     Explain Kiesha Fletcher [facility]        [facility]             Social Determinants of Health     Financial Resource Strain: Not on file   Food Insecurity: Not on file   Transportation Needs: Not on file   Physical Activity: Insufficiently Active (10/12/2020)    Exercise Vital Sign      Days of Exercise per Week: 1 day      Minutes of Exercise per Session: 10 min   Stress: Stress Concern Present  (10/12/2020)    [employer] [place] [employer] - Occupational Stress Questionnaire      Feeling of Stress : To some extent   Social Connections: Socially Isolated (10/12/2020)    Social Connection and Isolation Panel [NHANES]      Frequency of Communication with Friends and Family: Three times a week      Frequency of Social Gatherings with Friends and Family: Twice a week      Attends Gnosticism Services: Never      Active Member of Clubs or Organizations: No      Attends Club or Organization Meetings: Never      Marital Status:    Intimate Partner Violence: Unknown (10/12/2020)    Humiliation, Afraid, Rape, and Kick questionnaire      Fear of Current or Ex-Partner: Not asked      Emotionally Abused: Not asked      Physically Abused: Not asked      Sexually Abused: Not asked   Housing Stability: Not on file       Outpatient Encounter Medications as of 5/24/2023   Medication Sig Dispense Refill     hydroxychloroquine (PLAQUENIL) 200 MG tablet Take 1 tablet (200 mg) by mouth 2 times daily 180 tablet 3     Acetaminophen (TYLENOL PO)        bimatoprost (LATISSE) 0.03 % external opthalmic solution Apply 1 drop topically At Bedtime 5 mL 3     Biotin 10 MG TABS tablet Take 10,000 mcg by mouth daily       CALCIUM-VITAMIN D-VITAMIN K PO        Collagen Hydrolysate POWD        Cyanocobalamin (VITAMIN B 12 PO) Take by mouth daily        diclofenac (VOLTAREN) 75 MG EC tablet Take 1 tablet (75 mg) by mouth 2 times daily as needed for moderate pain (take with food) 30 tablet 0     estradiol (VAGIFEM) 10 MCG TABS vaginal tablet Place 1 tablet (10 mcg) vaginally daily X 14 days then 1 tablet twice weekly 88 tablet 3     glucosamine-chondroitin 500-400 MG CAPS per capsule Take 1 capsule by mouth daily Patient reported: over the counter, unsure of dose       levonorgestrel (MIRENA) 20 MCG/24HR IUD 1 each by Intrauterine route once       lisdexamfetamine (VYVANSE) 10 MG capsule Take 1 capsule (10 mg) by mouth daily  for 30 days 30 capsule 0     Lysine 500 MG TABS Take 1,000 mg by mouth daily        magnesium citrate solution Takes 1 tsp twice a day, 150 mg daily       NONFORMULARY 1 capsule daily carmen       NONFORMULARY 2 tablets daily jozef       UNABLE TO FIND daily Ashwaganda - patient reported       UNABLE TO FIND daily Rhodiola- patient reported       valACYclovir (VALTREX) 1000 mg tablet Take 2 tablets (2,000 mg) by mouth 2 times daily For 2 days as needed for cold sores 12 tablet 11     valACYclovir (VALTREX) 500 MG tablet Take 1 tablet (500 mg) by mouth daily 90 tablet 1     [DISCONTINUED] hydroxychloroquine (PLAQUENIL) 200 MG tablet Take 1 tablet (200 mg) by mouth 2 times daily 180 tablet 3     No facility-administered encounter medications on file as of 5/24/2023.             O:   NAD, WDWN, Alert & Oriented, Mood & Affect wnl, Vitals stable   Here today alone   There were no vitals taken for this visit.   General appearance normal   Vitals stable   Alert, oriented and in no acute distress       Patches of alopecia on frontal/temporal side of scalp, minimal accentuation of hair follicle, no scaling seen.      Eyes: Conjunctivae/lids:Normal     ENT: Lips,: normal    MSK:Normal    Pulm: Breathing Normal    Neuro/Psych: Orientation:Alert and Orientedx3 ; Mood/Affect:normal   A/P:  1. FFA  Well controlled.   IL TAC: PGACAC discussed.  Risks including but not limited to injection site reaction, bruising, no resolution.  All questions answered and entertained to patient s satisfaction.  Informed consent obtained.  IL TAC in concentration of 5 mg/ml was injected ID to frontal fibrosing alopecia.  Total injected was  0.5 ml.  Patient tolerated without complications and given wound care instructions, including not to move product around.  Return in 4 weeks for follow-up and possible additional IL TAC.    Continue plaquenil 200 mg bid .   Recheck in 3-4 months.         Again, thank you for allowing me to participate in  the care of your patient.        Sincerely,        Rosemary Henao PA-C

## 2023-05-25 NOTE — PROGRESS NOTES
Kiesha Mcguire is an extremely pleasant 51 year old year old female patient here today to recheck frontal fibrosing alopecia. She notes that she take Plaquenil but not always twice daily.  She notes hair loss has stabilized. Patient has no other skin complaints today.  Remainder of the HPI, Meds, PMH, Allergies, FH, and SH was reviewed in chart.    Past Medical History:   Diagnosis Date     Depressive disorder      Depressive disorder, not elsewhere classified     resolved     Herpes simplex without mention of complication     oral     IUD (intrauterine device) in place 08/15/2013    Mirena     Migraine headache with aura     very occass, sig aura, speech loss x1     Pure hypercholesterolemia     follows kate VOSS MD       Past Surgical History:   Procedure Laterality Date     COLONOSCOPY N/A 10/27/2021    Procedure: COLONOSCOPY, WITH POLYPECTOMY AND CLIP;  Surgeon: Og Gutierres MD;  [place] [facility] [place]      DILATION/CURETTAGE DIAG/THER NON OB  2006     LAPAROSCOPIC CHOLECYSTECTOMY N/A 2021    Procedure: CHOLECYSTECTOMY, LAPAROSCOPIC;  Surgeon: Denise Cast MD;  Location:  OR        Family History   Problem Relation Age of Onset     Hypertension Mother      Hyperlipidemia Mother      Depression Mother      Squamous cell carcinoma Mother      Hypertension Father      Cancer Father 65        neuro endrocine CA, neck     Hyperlipidemia Father      Aortic aneurysm Maternal Grandfather          of ascending aortic aneurysm at age 64     Aortic aneurysm Maternal Aunt          in her 40s from ascending aortic aneurysm     C.A.D. No family hx of      Cancer - colorectal No family hx of      Diabetes No family hx of      Cerebrovascular Disease No family hx of      Breast Cancer No family hx of      Prostate Cancer No family hx of        Social History     Socioeconomic History     Marital status:      Spouse name: Not on file     Number of children: 2     Years of education:  16     Highest education level: Not on file   Occupational History     Occupation:      Employer: [employer]     Comment: fulltime   Tobacco Use     Smoking status: Former     Packs/day: 0.50     Years: 10.00     Pack years: 5.00     Types: Cigarettes     Quit date: 1994     Years since quittin.8     Smokeless tobacco: Never   Vaping Use     Vaping status: Never Used     Passive vaping exposure: Yes   Substance and Sexual Activity     Alcohol use: Yes     Comment: very rare     Drug use: No     Sexual activity: Not Currently     Partners: Male     Birth control/protection: I.U.D.     Comment: Mirena   Other Topics Concern     Parent/sibling w/ CABG, MI or angioplasty before 65F 55M? No   Social History Narrative    How much exercise per week? 2 90 minute yoga sessions      How much calcium per day? Diet       How much caffeine per day? 0    How much vitamin D per day? Supplement     Do you/your family wear seatbelts?  Yes    Do you/your family use safety helmets? Yes    Do you/your family use sunscreen? Yes    Do you/your family keep firearms in the home? No    Do you/your family have a smoke detector(s)? Yes        Do you feel safe in your home? Yes    Has anyone ever touched you in an unwanted manner? No     Explain Kiesha Fletcher [facility] 18        Reviewed [facility] 10-12-20             Social Determinants of Health     Financial Resource Strain: Not on file   Food Insecurity: Not on file   Transportation Needs: Not on file   Physical Activity: Insufficiently Active (10/12/2020)    Exercise Vital Sign      Days of Exercise per Week: 1 day      Minutes of Exercise per Session: 10 min   Stress: Stress Concern Present (10/12/2020)    [employer] [place] [employer] - Occupational Stress Questionnaire      Feeling of Stress : To some extent   Social Connections: Socially Isolated (10/12/2020)    Social Connection and Isolation Panel [NHANES]      Frequency of Communication with  Friends and Family: Three times a week      Frequency of Social Gatherings with Friends and Family: Twice a week      Attends Mosque Services: Never      Active Member of Clubs or Organizations: No      Attends Club or Organization Meetings: Never      Marital Status:    Intimate Partner Violence: Unknown (10/12/2020)    Humiliation, Afraid, Rape, and Kick questionnaire      Fear of Current or Ex-Partner: Not asked      Emotionally Abused: Not asked      Physically Abused: Not asked      Sexually Abused: Not asked   Housing Stability: Not on file       Outpatient Encounter Medications as of 5/24/2023   Medication Sig Dispense Refill     hydroxychloroquine (PLAQUENIL) 200 MG tablet Take 1 tablet (200 mg) by mouth 2 times daily 180 tablet 3     Acetaminophen (TYLENOL PO)        bimatoprost (LATISSE) 0.03 % external opthalmic solution Apply 1 drop topically At Bedtime 5 mL 3     Biotin 10 MG TABS tablet Take 10,000 mcg by mouth daily       CALCIUM-VITAMIN D-VITAMIN K PO        Collagen Hydrolysate POWD        Cyanocobalamin (VITAMIN B 12 PO) Take by mouth daily        diclofenac (VOLTAREN) 75 MG EC tablet Take 1 tablet (75 mg) by mouth 2 times daily as needed for moderate pain (take with food) 30 tablet 0     estradiol (VAGIFEM) 10 MCG TABS vaginal tablet Place 1 tablet (10 mcg) vaginally daily X 14 days then 1 tablet twice weekly 88 tablet 3     glucosamine-chondroitin 500-400 MG CAPS per capsule Take 1 capsule by mouth daily Patient reported: over the counter, unsure of dose       levonorgestrel (MIRENA) 20 MCG/24HR IUD 1 each by Intrauterine route once       lisdexamfetamine (VYVANSE) 10 MG capsule Take 1 capsule (10 mg) by mouth daily for 30 days 30 capsule 0     Lysine 500 MG TABS Take 1,000 mg by mouth daily        magnesium citrate solution Takes 1 tsp twice a day, 150 mg daily       NONFORMULARY 1 capsule daily carmen       NONFORMULARY 2 tablets daily jozef       UNABLE TO FIND daily José Antonio  - patient reported       UNABLE TO FIND daily Rhodiola- patient reported       valACYclovir (VALTREX) 1000 mg tablet Take 2 tablets (2,000 mg) by mouth 2 times daily For 2 days as needed for cold sores 12 tablet 11     valACYclovir (VALTREX) 500 MG tablet Take 1 tablet (500 mg) by mouth daily 90 tablet 1     [DISCONTINUED] hydroxychloroquine (PLAQUENIL) 200 MG tablet Take 1 tablet (200 mg) by mouth 2 times daily 180 tablet 3     No facility-administered encounter medications on file as of 5/24/2023.             O:   NAD, WDWN, Alert & Oriented, Mood & Affect wnl, Vitals stable   Here today alone   There were no vitals taken for this visit.   General appearance normal   Vitals stable   Alert, oriented and in no acute distress       Patches of alopecia on frontal/temporal side of scalp, minimal accentuation of hair follicle, no scaling seen.      Eyes: Conjunctivae/lids:Normal     ENT: Lips,: normal    MSK:Normal    Pulm: Breathing Normal    Neuro/Psych: Orientation:Alert and Orientedx3 ; Mood/Affect:normal   A/P:  1. FFA  Well controlled.   IL TAC: PGACAC discussed.  Risks including but not limited to injection site reaction, bruising, no resolution.  All questions answered and entertained to patient s satisfaction.  Informed consent obtained.  IL TAC in concentration of 5 mg/ml was injected ID to frontal fibrosing alopecia.  Total injected was  0.5 ml.  Patient tolerated without complications and given wound care instructions, including not to move product around.  Return in 4 weeks for follow-up and possible additional IL TAC.    Continue plaquenil 200 mg bid .   Recheck in 3-4 months.

## 2023-06-01 ENCOUNTER — VIRTUAL VISIT (OUTPATIENT)
Dept: PSYCHOLOGY | Facility: CLINIC | Age: 53
End: 2023-06-01
Payer: COMMERCIAL

## 2023-06-01 DIAGNOSIS — F43.89 REACTION TO CHRONIC STRESS: ICD-10-CM

## 2023-06-01 DIAGNOSIS — F32.A DEPRESSIVE DISORDER: Primary | ICD-10-CM

## 2023-06-01 PROCEDURE — 90834 PSYTX W PT 45 MINUTES: CPT | Mod: VID | Performed by: SOCIAL WORKER

## 2023-06-01 NOTE — PROGRESS NOTES
[facility]                                     Progress Note    Patient Name: Kiesha Mcguire  Date: 6/1/2023       Service Type: Individual      Session Start Time: 10:04 AM Session End Time: 10:51 AM     Session Length: 38-52 minutes    Session #: 32 with this provider    Attendees: Client attended alone    Service Modality:  Video Visit:      Provider verified identity through the following two step process.  Patient provided:  Patient is known previously to provider    Telemedicine Visit: The patient's condition can be safely assessed and treated via synchronous audio and visual telemedicine encounter.      Reason for Telemedicine Visit: Patient convenience (e.g. access to timely appointments / distance to available provider) and Services only offered telehealth    Originating Site (Patient Location): Patient's home    Distant Site (Provider Location): Provider Remote Setting- Home Office/Off-site    Consent:  The patient/guardian has verbally consented to: the potential risks and benefits of telemedicine (video visit) versus in person care; bill my insurance or make self-payment for services provided; and responsibility for payment of non-covered services.     Patient would like the video invitation sent by:  [employer]    Mode of Communication:  Video Conference via [employer]    As the provider I attest to compliance with applicable laws and regulations related to telemedicine.    DATA  Interactive Complexity: No   Crisis: No      Progress Since Last Session (Related to Symptoms / Goals / Homework):  Symptoms: no change since previous appointment, client reported feeling overwhelmed     Homework: Achieved / completed to satisfaction   Asking others for help      Episode of Care Goals: Satisfactory progress - ACTION (Actively working towards change); Intervened by reinforcing change plan / affirming steps taken     Current / Ongoing Stressors and Concerns:   Grief related to past and  "impact on herself and her children   Difficulty trusting others and being vulnerable   Desire for more intimate relationships   Fear of being rejected by others; belief she is not good enough   Lack of trust with providers   difficulty with decision making  Dynamics in relationship with ex-  Lack of support system  Started dating  Son's health      Treatment Objective(s) Addressed in This Session:   identify 1 fears / thoughts that contribute to feeling anxious  Decrease frequency and intensity of feeling down, depressed, hopeless   Identify a minimum of 1 strategy to manage symptoms    Safe/calm state titled \"calm\"  Container: box with a lock    Potential Targets:   Relationship with ex-  Guilt about how divorce ended, Negative Belief \"It's my fault\"  Guilt about son's surgery and outcome  Negative Belief: There is something wrong with me  Being blamed by ex-boyfriend, feel shame  Childhood: not belonging, not being good enough      Current Potential Targets  Absence of friendships  Not fitting in  Defensiveness  Ex on social media  Driving in certain areas will remind her of her ex  Relationship with ex-    Past:   image of child self at 8 years old   Disagreement about [facility]   head injury    Trauma symptoms: avoids places in neighborhood for fear of running into ex-boyfriend  Has avoided romantic relationships since  Dissociative symptoms while in relationship  Recurrent, intrusive, distressing memories  Problems with concentration     Intervention:   CBT: identified feelings, cognitions, and behaviors, engaged client in perspective taking   Motivational interviewing: expressed empathy/understanding, use of reflective listening and open ended questions, supported autonomy   Supported client with recent stressors     Assessments completed prior to visit:   The following assessments were completed by patient for this visit:  None    ASSESSMENT: Current Emotional / " Mental Status (status of significant symptoms):   Risk status (Self / Other harm or suicidal ideation)   Patient denies current fears or concerns for personal safety.   Patient denies current or recent suicidal ideation or behaviors.   Patient denies current or recent homicidal ideation or behaviors.   Patient denies current or recent self injurious behavior or ideation.   Patient denies other safety concerns.   Patient reports there has been no change in risk factors since their last session.     Patient reports there has been no change in protective factors since their last session.     Recommended that patient call 911 or go to the local ED should there be a change in any of these risk factors.     Appearance:   Appropriate    Eye Contact:   Good    Psychomotor Behavior: Normal    Attitude:   Cooperative  Interested Open to feedback and support    Orientation:   All   Speech    Rate / Production: Normal/ Responsive Emotional    Volume:  Normal    Mood:    Down Anxious   Affect:    Tearful    Thought Content:  Clear    Thought Form:  Coherent    Insight:    Good      Medication Review:   No changes to current psychiatric medication(s)   Vyvanse      Medication Compliance:   Yes     Changes in Health Issues:   None reported     Chemical Use Review:   Substance Use: no use     Tobacco Use: no use    Diagnosis:   Unspecified Depressive Disorder     Other Specified Trauma and Stress Related Disorder          Generalized Anxiety Disorder, Provisional    Collateral Reports Completed:   Not Applicable    PLAN: (Patient Tasks / Therapist Tasks / Other)  EMDR: reevaluate target: fear of seeing ex-boyfriend at a concert.  Plan for next session to have client keep her eyes open.  Target negative belief: I cannot trust anyone.  Consider next target to be ex-.  Therapist to consider engaging client in conference room exercise.  Client shared goal to engage in self-care by resting and exercising, staying in present  moment.    Magali Boymelquiades, [facility] 6/1/2023    ______________________________________________________________________    Individual Treatment Plan    Patient's Name: Kiesha Mcguire  YOB: 1970    Date of Creation: 5/11/2022  Date Treatment Plan Last Reviewed/Revised: 3/29/2023    DSM5 Diagnoses:   1. Unspecified Depressive Disorder     Other Specified Trauma and Stress Related Disorder   Generalized Anxiety Disorder, Provisional    Psychosocial / Contextual Factors: stressors related to parenting  PROMIS (reviewed every 90 days):  31    Referral / Collaboration:  Referral to another professional/service is not indicated at this time..    Anticipated number of session for this episode of care: will review in 90 days  Anticipation frequency of session: Every other week  Anticipated Duration of each session: 38-52 minutes  Treatment plan will be reviewed in 90 days or when goals have been changed.       MeasurableTreatment Goal(s) related to diagnosis / functional impairment(s)  Goal 1: Patient will sustain attention and concentration for consistently longer periods of time.  Patient will meet goals set for completing tasks 80% of the time.   Client's Goal:  I will know I've met my goal when my space isn't so chaotic, meeting deadlines around bills and work, when I am not always playing catch-up, completing tasks.      Objective #A (Patient Action)    Patient will learn and implement organization and planning skills.  Status: New - Date: 5/11/2022, continued date: 9/8/2022, continued date: 12/21/2022, completed date: 3/29/2023    Intervention(s)  Therapist will teach the client organization and planning skills.  Therapist will address any potential barriers to using skills.    Objective #B  Patient will identify, challenge, and change self-talk that contributes to maladaptive feelings and actions.  Status: New - Date: 5/11/2022, Continued date: 9/8/2022, continued date: 12/21/2022, continued date:  "3/29/2023    Intervention(s)  Therapist will teach the CBT model, cognitive distortions, and cognitive restructuring techniques.      Goal 2: Patient will be able to recall the traumatic events without becoming overwhelmed with negative thoughts, feelings, or urges.   Client's Goal:  I will know I've met my goal when I do not cry every time I talk about it.\"    Objective #A (Patient Action)    Status: New - Date: 5/11/2022, continued date: 9/8/2022, continued date: 12/21/2022, continued date: 3/29/2023    Patient will describe the history of and nature of trauma symptoms    Intervention(s)  Therapist will assess the client's frequency, intensity, duration, and history of trauma symptoms and their impact on functioning.    Objective #B (Patient Action)  Status: New Date: 5/11/2022, continued date: 9/8/2022, continued date: 12/21/2022, continued date: 3/29/2023    Patient will cooperate with eye movement desensitization and reprocessing (EMDR) to reduce emotional reaction to traumatic event(s)    Intervention(s)  Therapist will utilize EMDR to reduce the client's emotional reactivity to the traumatic event(s).    Objective #C (Patient Action)  Status: New Date: 5/11/2022, continued date: 9/8/2022, continued date: 12/21/2022, continued date: 3/29/2023    Patient will learn and implement calming and coping strategies to manage trauma symptoms.    Intervention(s)  Therapist will teach grounding techniques, distress tolerance, and emotion regulation techniques.      Patient has reviewed and agreed to the above plan.      Magali Montilla, [facility]  May 11, 2022  Magali Montilla [facility]  9/8/2022  Magali Montilla [facility]  12/21/2022  Magali Montilla [facility]  3/29/2023  "

## 2023-06-05 ENCOUNTER — THERAPY VISIT (OUTPATIENT)
Dept: PHYSICAL THERAPY | Facility: CLINIC | Age: 53
End: 2023-06-05
Payer: COMMERCIAL

## 2023-06-05 DIAGNOSIS — M62.89 PELVIC FLOOR DYSFUNCTION: Primary | ICD-10-CM

## 2023-06-05 PROCEDURE — 97110 THERAPEUTIC EXERCISES: CPT | Mod: GP | Performed by: PHYSICAL THERAPIST

## 2023-06-05 PROCEDURE — 97530 THERAPEUTIC ACTIVITIES: CPT | Mod: GP | Performed by: PHYSICAL THERAPIST

## 2023-06-08 ENCOUNTER — MYC MEDICAL ADVICE (OUTPATIENT)
Dept: FAMILY MEDICINE | Facility: CLINIC | Age: 53
End: 2023-06-08
Payer: COMMERCIAL

## 2023-06-08 DIAGNOSIS — F90.0 ATTENTION DEFICIT HYPERACTIVITY DISORDER (ADHD), PREDOMINANTLY INATTENTIVE TYPE: ICD-10-CM

## 2023-06-08 RX ORDER — LISDEXAMFETAMINE DIMESYLATE 10 MG/1
10 CAPSULE ORAL DAILY
Qty: 30 CAPSULE | Refills: 0 | Status: SHIPPED | OUTPATIENT
Start: 2023-06-08 | End: 2023-07-06

## 2023-06-15 ENCOUNTER — VIRTUAL VISIT (OUTPATIENT)
Dept: PSYCHOLOGY | Facility: CLINIC | Age: 53
End: 2023-06-15
Payer: COMMERCIAL

## 2023-06-15 DIAGNOSIS — F43.89 REACTION TO CHRONIC STRESS: ICD-10-CM

## 2023-06-15 DIAGNOSIS — F32.A DEPRESSIVE DISORDER: Primary | ICD-10-CM

## 2023-06-15 PROCEDURE — 90834 PSYTX W PT 45 MINUTES: CPT | Mod: VID | Performed by: SOCIAL WORKER

## 2023-06-29 ENCOUNTER — VIRTUAL VISIT (OUTPATIENT)
Dept: PSYCHOLOGY | Facility: CLINIC | Age: 53
End: 2023-06-29
Payer: COMMERCIAL

## 2023-06-29 DIAGNOSIS — F32.A DEPRESSIVE DISORDER: ICD-10-CM

## 2023-06-29 DIAGNOSIS — F43.89 REACTION TO CHRONIC STRESS: Primary | ICD-10-CM

## 2023-06-29 PROCEDURE — 90834 PSYTX W PT 45 MINUTES: CPT | Mod: VID | Performed by: SOCIAL WORKER

## 2023-06-29 NOTE — PROGRESS NOTES
[facility]                                     Progress Note    Patient Name: Kiesha Mcguire  Date: 6/29/2023       Service Type: Individual      Session Start Time: 10:03 AM Session End Time: 10:45 AM     Session Length: 38-52 minutes    Session #: 34 with this provider    Attendees: Client attended alone    Service Modality:  Video Visit:      Provider verified identity through the following two step process.  Patient provided:  Patient is known previously to provider    Telemedicine Visit: The patient's condition can be safely assessed and treated via synchronous audio and visual telemedicine encounter.      Reason for Telemedicine Visit: Patient convenience (e.g. access to timely appointments / distance to available provider) and Services only offered telehealth    Originating Site (Patient Location): Patient's home    Distant Site (Provider Location): Provider Remote Setting- Home Office/Off-site    Consent:  The patient/guardian has verbally consented to: the potential risks and benefits of telemedicine (video visit) versus in person care; bill my insurance or make self-payment for services provided; and responsibility for payment of non-covered services.     Patient would like the video invitation sent by:  [employer]    Mode of Communication:  Video Conference via [employer]    As the provider I attest to compliance with applicable laws and regulations related to telemedicine.    DATA  Interactive Complexity: No   Crisis: No      Progress Since Last Session (Related to Symptoms / Goals / Homework):  Symptoms: no change since previous appointment, sadness     Homework: Achieved / completed to satisfaction      Episode of Care Goals: Satisfactory progress - ACTION (Actively working towards change); Intervened by reinforcing change plan / affirming steps taken     Current / Ongoing Stressors and Concerns:   Grief related to past and impact on herself and her children   Difficulty trusting  "others and being vulnerable   Desire for more intimate relationships   Fear of being rejected by others; belief she is not good enough   Lack of trust with providers  Dynamics in relationship with ex-  Lack of support system  Son's health  Fear/sadness about children growing up      Treatment Objective(s) Addressed in This Session:   Increase interest, engagement, and pleasure in doing things  Identify negative self-talk and behaviors: challenge core beliefs, myths, and actions   Identify a minimum of 1 strategy to manage symptoms  EMDR: identified additional current targets    Safe/calm state titled \"calm\"  Container: box with a lock    Potential Targets:   Relationship with ex-  Guilt about how divorce ended, Negative Belief \"It's my fault\"  Guilt about son's surgery and outcome  Negative Belief: There is something wrong with me  Being blamed by ex-boyfriend, feel shame  Childhood: not belonging, not being good enough      Current Potential Targets  Absence of friendships  Not fitting in  Defensiveness  Ex on social media  Driving in certain areas will remind her of her ex  Relationship with ex-  Need to be perfect    Past:   image of child self at 8 years old   Disagreement about salad   [facility]   head injury    Trauma symptoms: avoids places in neighborhood for fear of running into ex-boyfriend  Has avoided romantic relationships since  Dissociative symptoms while in relationship  Recurrent, intrusive, distressing memories  Problems with concentration     Intervention:   CBT: identified feelings, cognitions, and behaviors, engaged client in identifying coping strategies   Motivational interviewing: expressed empathy/understanding, use of reflective listening and open ended questions, supported autonomy   Supported client with recent stressors   EMDR: discussed next steps, identified additional current targets     Assessments completed prior to visit:   The following " assessments were completed by patient for this visit:  None    ASSESSMENT: Current Emotional / Mental Status (status of significant symptoms):   Risk status (Self / Other harm or suicidal ideation)   Patient denies current fears or concerns for personal safety.   Patient denies current or recent suicidal ideation or behaviors.   Patient denies current or recent homicidal ideation or behaviors.   Patient denies current or recent self injurious behavior or ideation.   Patient denies other safety concerns.   Patient reports there has been no change in risk factors since their last session.     Patient reports there has been no change in protective factors since their last session.     Recommended that patient call 911 or go to the local ED should there be a change in any of these risk factors.     Appearance:   Appropriate    Eye Contact:   Good    Psychomotor Behavior: Normal    Attitude:   Cooperative  Interested Open to feedback and support    Orientation:   All   Speech    Rate / Production: Normal/ Responsive Emotional    Volume:  Normal    Mood:    Down Anxious   Affect:    Tearful    Thought Content:  Clear    Thought Form:  Coherent  Goal Directed    Insight:    Good      Medication Review:   No changes to current psychiatric medication(s)   Vyvanse      Medication Compliance:   Yes     Changes in Health Issues:   None reported     Chemical Use Review:   Substance Use: no use     Tobacco Use: no use    Diagnosis:   Other Specified Trauma and Stress Related Disorder   Unspecified Depressive Disorder          Generalized Anxiety Disorder, Provisional    Collateral Reports Completed:   Not Applicable    PLAN: (Patient Tasks / Therapist Tasks / Other)  EMDR: reevaluate target: fear of seeing ex-boyfriend at a concert.  Plan for next session to have client keep her eyes open.  Target negative belief: I cannot trust anyone.  Therapist to use float back technique to identify next target, consider  ex-.  Therapist to consider engaging client in conference room exercise.  Client is reading the following books; Co-regulation handbook, pathological demand avoidance, and Declarative language  Client shared goal to increase engagement in self-care by engaging in meditation, exercise and in areas of interest such as watching movies and gardening.      Magali Montilla, [facility] 6/29/2023    ______________________________________________________________________    Individual Treatment Plan    Patient's Name: Kiesha Mcguire  YOB: 1970    Date of Creation: 5/11/2022  Date Treatment Plan Last Reviewed/Revised: 3/29/2023    DSM5 Diagnoses:   1. Unspecified Depressive Disorder     Other Specified Trauma and Stress Related Disorder   Generalized Anxiety Disorder, Provisional    Psychosocial / Contextual Factors: stressors related to parenting  PROMIS (reviewed every 90 days):  31    Referral / Collaboration:  Referral to another professional/service is not indicated at this time..    Anticipated number of session for this episode of care: will review in 90 days  Anticipation frequency of session: Every other week  Anticipated Duration of each session: 38-52 minutes  Treatment plan will be reviewed in 90 days or when goals have been changed.       MeasurableTreatment Goal(s) related to diagnosis / functional impairment(s)  Goal 1: Patient will sustain attention and concentration for consistently longer periods of time.  Patient will meet goals set for completing tasks 80% of the time.   Client's Goal:  I will know I've met my goal when my space isn't so chaotic, meeting deadlines around bills and work, when I am not always playing catch-up, completing tasks.      Objective #A (Patient Action)    Patient will learn and implement organization and planning skills.  Status: New - Date: 5/11/2022, continued date: 9/8/2022, continued date: 12/21/2022, completed date:  "3/29/2023    Intervention(s)  Therapist will teach the client organization and planning skills.  Therapist will address any potential barriers to using skills.    Objective #B  Patient will identify, challenge, and change self-talk that contributes to maladaptive feelings and actions.  Status: New - Date: 5/11/2022, Continued date: 9/8/2022, continued date: 12/21/2022, continued date: 3/29/2023    Intervention(s)  Therapist will teach the CBT model, cognitive distortions, and cognitive restructuring techniques.      Goal 2: Patient will be able to recall the traumatic events without becoming overwhelmed with negative thoughts, feelings, or urges.   Client's Goal:  I will know I've met my goal when I do not cry every time I talk about it.\"    Objective #A (Patient Action)    Status: New - Date: 5/11/2022, continued date: 9/8/2022, continued date: 12/21/2022, continued date: 3/29/2023    Patient will describe the history of and nature of trauma symptoms    Intervention(s)  Therapist will assess the client's frequency, intensity, duration, and history of trauma symptoms and their impact on functioning.    Objective #B (Patient Action)  Status: New Date: 5/11/2022, continued date: 9/8/2022, continued date: 12/21/2022, continued date: 3/29/2023    Patient will cooperate with eye movement desensitization and reprocessing (EMDR) to reduce emotional reaction to traumatic event(s)    Intervention(s)  Therapist will utilize EMDR to reduce the client's emotional reactivity to the traumatic event(s).    Objective #C (Patient Action)  Status: New Date: 5/11/2022, continued date: 9/8/2022, continued date: 12/21/2022, continued date: 3/29/2023    Patient will learn and implement calming and coping strategies to manage trauma symptoms.    Intervention(s)  Therapist will teach grounding techniques, distress tolerance, and emotion regulation techniques.      Patient has reviewed and agreed to the above plan.      Magali Montilla, " [facility]  May 11, 2022  Magali Montilla, [facility]  9/8/2022  Magali Montilla, [facility]  12/21/2022  Magali Montilla, [facility]  3/29/2023

## 2023-06-30 NOTE — PROGRESS NOTES
-Due to immunosuppresive medication for lupus   Subjective     Kiesha Mcguire is a 49 year old female who presents to clinic today for the following health issues:    HPI   Labs and biometric screen for work.  She originally wanted a physical today but she was told she had a physical by her OB/GYN 2 months ago.    Cold sores: She requests refill of Valtrex that she uses as needed for cold sores.  She gets rare flares would like to have it on hand.    Patient reports family history of ascending aortic aneurysm and has questions about screening for this.    Patient Active Problem List   Diagnosis     Pure hypercholesterolemia     Herpes simplex virus (HSV) infection     iamTIBIALIS TENDINITIS     iamSPRAIN HIP & THIGH NOS     HYPERLIPIDEMIA LDL GOAL <160     IUD (intrauterine device) in place     Past Surgical History:   Procedure Laterality Date     HC DILATION/CURETTAGE DIAG/THER NON OB  2006       Social History     Tobacco Use     Smoking status: Former Smoker     Packs/day: 0.50     Years: 10.00     Pack years: 5.00     Types: Cigarettes     Last attempt to quit: 1994     Years since quittin.4     Smokeless tobacco: Never Used   Substance Use Topics     Alcohol use: Yes     Comment: very rare     Family History   Problem Relation Age of Onset     Hypertension Father      Cancer Father         neuro endrocine CA, neck     Aortic aneurysm Maternal Grandfather          of ascending aortic aneurysm at age 64     Aortic aneurysm Maternal Aunt          in her 40s from ascending aortic aneurysm     C.A.D. No family hx of      Cancer - colorectal No family hx of      Diabetes No family hx of      Cerebrovascular Disease No family hx of      Breast Cancer No family hx of      Prostate Cancer No family hx of          Current Outpatient Medications   Medication Sig Dispense Refill     Biotin 10 MG TABS tablet Take 10,000 mcg by mouth daily       Cholecalciferol (VITAMIN D3 PO) Take by mouth daily       Collagen Hydrolysate POWD         "Cyanocobalamin (VITAMIN B 12 PO) Take by mouth daily       levonorgestrel (MIRENA) 20 MCG/24HR IUD 1 each by Intrauterine route once       Lysine 500 MG TABS Take 500 mg by mouth daily       magnesium citrate solution Takes 1 tsp twice a day       valACYclovir (VALTREX) 1000 mg tablet 1000 mg by mouth       Allergies   Allergen Reactions     Sumatriptan      imitrex     Augmentin Hives     Unsure if true reaction to med      BP Readings from Last 3 Encounters:   11/27/19 108/62   09/25/19 96/66   09/23/19 102/69    Wt Readings from Last 3 Encounters:   11/27/19 51.3 kg (113 lb)   09/23/19 52.6 kg (116 lb)   04/09/19 53.6 kg (118 lb 1.6 oz)                    Reviewed and updated as needed this visit by Provider  Tobacco  Allergies  Meds  Problems  Med Hx  Surg Hx  Fam Hx         Review of Systems   ROS COMP: Constitutional, HEENT, cardiovascular, pulmonary, gi and gu systems are negative, except as otherwise noted.      Objective    /62 (BP Location: Right arm, Patient Position: Chair, Cuff Size: Adult Regular)   Pulse 94   Temp 98.5  F (36.9  C) (Oral)   Resp 22   Ht 1.626 m (5' 4\")   Wt 51.3 kg (113 lb)   SpO2 96%   BMI 19.40 kg/m    Body mass index is 19.4 kg/m .  Physical Exam   GENERAL: healthy, alert and no distress  RESP: lungs clear to auscultation - no rales, rhonchi or wheezes  CV: regular rate and rhythm, normal S1 S2, no S3 or S4, no murmur, click or rub  PSYCH: mentation appears normal, affect normal/bright          Assessment & Plan     1. Screening for hyperlipidemia    - Lipid panel reflex to direct LDL Fasting    2. Screening for diabetes mellitus  Not fasting, patient states she had tea with honey this morning.  - Glucose    3. Special screening for malignant neoplasms, colon    - GASTROENTEROLOGY ADULT REF PROCEDURE ONLY [facility] [phone]; No Provider Preference    4. Herpes simplex virus (HSV) infection  Chronic, stable, continue current treatment.  Patient " reports infrequent flares.  I updated the dosing to an appropriate cold sore treatment and this was discussed with patient.  - valACYclovir (VALTREX) 1000 mg tablet; Take 2 tablets (2,000 mg) by mouth 2 times daily for 1 day As needed for cold sores  Dispense: 12 tablet; Refill: 3         Return in about 1 year (around 11/27/2020) for Physical Exam.    Martha Perez NP  [facility]

## 2023-07-05 ENCOUNTER — THERAPY VISIT (OUTPATIENT)
Dept: PHYSICAL THERAPY | Facility: CLINIC | Age: 53
End: 2023-07-05
Payer: COMMERCIAL

## 2023-07-05 DIAGNOSIS — M62.89 PELVIC FLOOR DYSFUNCTION: Primary | ICD-10-CM

## 2023-07-05 PROCEDURE — 97110 THERAPEUTIC EXERCISES: CPT | Mod: GP | Performed by: PHYSICAL THERAPIST

## 2023-07-05 PROCEDURE — 97530 THERAPEUTIC ACTIVITIES: CPT | Mod: GP | Performed by: PHYSICAL THERAPIST

## 2023-07-06 ENCOUNTER — OFFICE VISIT (OUTPATIENT)
Dept: FAMILY MEDICINE | Facility: CLINIC | Age: 53
End: 2023-07-06
Payer: COMMERCIAL

## 2023-07-06 VITALS
HEART RATE: 61 BPM | RESPIRATION RATE: 18 BRPM | TEMPERATURE: 98.2 F | DIASTOLIC BLOOD PRESSURE: 72 MMHG | BODY MASS INDEX: 23.54 KG/M2 | WEIGHT: 135 LBS | OXYGEN SATURATION: 100 % | SYSTOLIC BLOOD PRESSURE: 105 MMHG

## 2023-07-06 DIAGNOSIS — F90.0 ATTENTION DEFICIT HYPERACTIVITY DISORDER (ADHD), PREDOMINANTLY INATTENTIVE TYPE: Primary | ICD-10-CM

## 2023-07-06 PROCEDURE — 99214 OFFICE O/P EST MOD 30 MIN: CPT | Performed by: FAMILY MEDICINE

## 2023-07-06 RX ORDER — LISDEXAMFETAMINE DIMESYLATE 10 MG/1
10 CAPSULE ORAL DAILY
Qty: 30 CAPSULE | Refills: 0 | Status: SHIPPED | OUTPATIENT
Start: 2023-08-06 | End: 2023-09-05

## 2023-07-06 RX ORDER — LISDEXAMFETAMINE DIMESYLATE 10 MG/1
10 CAPSULE ORAL DAILY
Qty: 30 CAPSULE | Refills: 0 | Status: CANCELLED | OUTPATIENT
Start: 2023-07-06

## 2023-07-06 RX ORDER — BUSPIRONE HYDROCHLORIDE 10 MG/1
10 TABLET ORAL 2 TIMES DAILY
Qty: 60 TABLET | Refills: 2 | Status: SHIPPED | OUTPATIENT
Start: 2023-07-06 | End: 2023-08-07

## 2023-07-06 RX ORDER — LISDEXAMFETAMINE DIMESYLATE 10 MG/1
10 CAPSULE ORAL DAILY
Qty: 30 CAPSULE | Refills: 0 | Status: SHIPPED | OUTPATIENT
Start: 2023-07-06 | End: 2023-08-05

## 2023-07-06 RX ORDER — LISDEXAMFETAMINE DIMESYLATE 10 MG/1
10 CAPSULE ORAL DAILY
Qty: 30 CAPSULE | Refills: 0 | Status: SHIPPED | OUTPATIENT
Start: 2023-09-06 | End: 2023-10-06

## 2023-07-06 NOTE — PROGRESS NOTES
Assessment & Plan   Problem List Items Addressed This Visit        Behavioral    Attention deficit hyperactivity disorder (ADHD), predominantly inattentive type - Primary    Relevant Medications    lisdexamfetamine (VYVANSE) 10 MG capsule    lisdexamfetamine (VYVANSE) 10 MG capsule (Start on 8/6/2023)    lisdexamfetamine (VYVANSE) 10 MG capsule (Start on 9/6/2023)    busPIRone (BUSPAR) 10 MG tablet      Will use exercise, buspar trial for the anxiety, HRT also from OB/GYN, f/u 90 days       DO GRAHAM AVIL[facility] SANGEETHA Pop is a 52 year old, presenting for the following health issues:  Recheck Medication        7/6/2023     8:34 AM   Additional Questions   Roomed by Elyssa PARIS CMA     History of Present Illness       Reason for visit:  Medication    She eats 2-3 servings of fruits and vegetables daily.She consumes 1 sweetened beverage(s) daily.She exercises with enough effort to increase her heart rate 9 or less minutes per day.  She exercises with enough effort to increase her heart rate 3 or less days per week.   She is taking medications regularly.     Some anxiety with vyvanse, but better than Adderall salts. Function is very high she says. Cannot live without the Vyvnase.      Review of Systems         Objective    /72 (BP Location: Right arm, Patient Position: Chair, Cuff Size: Adult Small)   Pulse 61   Temp 98.2  F (36.8  C) (Oral)   Resp 18   Wt 61.2 kg (135 lb)   SpO2 100%   BMI 23.54 kg/m    Body mass index is 23.54 kg/m .  Physical Exam   GENERAL: healthy, alert and no distress  PSYCH: mentation appears normal, affect normal/bright

## 2023-07-12 ENCOUNTER — OFFICE VISIT (OUTPATIENT)
Dept: PODIATRY | Facility: CLINIC | Age: 53
End: 2023-07-12
Payer: COMMERCIAL

## 2023-07-12 ENCOUNTER — MYC MEDICAL ADVICE (OUTPATIENT)
Dept: PODIATRY | Facility: CLINIC | Age: 53
End: 2023-07-12

## 2023-07-12 VITALS
SYSTOLIC BLOOD PRESSURE: 104 MMHG | BODY MASS INDEX: 23.54 KG/M2 | HEART RATE: 76 BPM | DIASTOLIC BLOOD PRESSURE: 69 MMHG | WEIGHT: 135 LBS

## 2023-07-12 DIAGNOSIS — G57.61 NEUROMA OF SECOND INTERSPACE OF RIGHT FOOT: Primary | ICD-10-CM

## 2023-07-12 PROCEDURE — 99213 OFFICE O/P EST LOW 20 MIN: CPT | Mod: 25 | Performed by: PODIATRIST

## 2023-07-12 PROCEDURE — 64455 NJX AA&/STRD PLTR COM DG NRV: CPT | Mod: RT | Performed by: PODIATRIST

## 2023-07-12 RX ORDER — BUPIVACAINE HYDROCHLORIDE 5 MG/ML
1 INJECTION, SOLUTION PERINEURAL ONCE
Status: COMPLETED | OUTPATIENT
Start: 2023-07-12 | End: 2023-07-12

## 2023-07-12 RX ORDER — DEXAMETHASONE SODIUM PHOSPHATE 4 MG/ML
4 INJECTION, SOLUTION INTRA-ARTICULAR; INTRALESIONAL; INTRAMUSCULAR; INTRAVENOUS; SOFT TISSUE ONCE
Status: COMPLETED | OUTPATIENT
Start: 2023-07-12 | End: 2023-07-12

## 2023-07-12 RX ADMIN — DEXAMETHASONE SODIUM PHOSPHATE 4 MG: 4 INJECTION, SOLUTION INTRA-ARTICULAR; INTRALESIONAL; INTRAMUSCULAR; INTRAVENOUS; SOFT TISSUE at 15:02

## 2023-07-12 RX ADMIN — BUPIVACAINE HYDROCHLORIDE 5 MG: 5 INJECTION, SOLUTION PERINEURAL at 15:01

## 2023-07-12 NOTE — PROGRESS NOTES
Assessment:      ICD-10-CM    1. Neuroma of second interspace of right foot  G57.61 Bupivacaine 0.5 % Injection     dexamethasone (DECADRON) injection 4 mg     N BLOCK INJ,  PLANTAR DIGIT           Plan:  Orders Placed This Encounter   Procedures     N BLOCK INJ,  PLANTAR DIGIT       Discussed the etiology and treatment of the condition with the patient.  Imaging studies reviewed and discussed with the patient.  Discussed surgical and conservative options.    Procedure:  injection  PARQ session held, verbal consent obtained.  Sterile skin prep.    Location:  Right 2nd IMS  Contents:  2ml total, marcaine, dexamethasone phosphate  Dressing applied.    Post-injection instructions reviewed including close attention to amount and duration of pain relief.      MRI discussed - 3rd IMS bursitis, 2nd IMS neuroma      -NSAID- voltaren gel ok  -Orthoses- SuperFeet + met pads  -See AVS        Return:  No follow-ups on file.    Flori Whittington DPM                Chief Complaint:     Patient presents with:  Right Foot - RECHECK     right foot pain    HPI:  Kiesha Mcguire is a 52 year old year old female who presents for evaluation of foot pain.    Pain location- 3rd digit only    No 2nd or 4th digit pain    No joint or bunion pain but notices sub 2nd callus (bunion)  Had injection to neuroma before, did not help per pt    Went to go get orthotics, had a bad experience so didn't get them      Past Medical & Surgical History:  Past Medical History:   Diagnosis Date     Depressive disorder      Depressive disorder, not elsewhere classified     resolved     Herpes simplex without mention of complication     oral     IUD (intrauterine device) in place 08/15/2013    Mirena     Migraine headache with aura     very occass, sig aura, speech loss x1     Pure hypercholesterolemia     follows w BIPIN CHATMAN      Past Surgical History:   Procedure Laterality Date     COLONOSCOPY N/A 10/27/2021    Procedure: COLONOSCOPY, WITH POLYPECTOMY  AND CLIP;  Surgeon: Og Gutierres MD;  Location: [facility] OR      DILATION/CURETTAGE DIAG/THER NON OB  2006     LAPAROSCOPIC CHOLECYSTECTOMY N/A 2021    Procedure: CHOLECYSTECTOMY, LAPAROSCOPIC;  Surgeon: Denise Cast MD;  Location: [facility] OR      Family History   Problem Relation Age of Onset     Hypertension Mother      Hyperlipidemia Mother      Depression Mother      Squamous cell carcinoma Mother      Hypertension Father      Cancer Father 65        neuro endrocine CA, neck     Hyperlipidemia Father      Aortic aneurysm Maternal Grandfather          of ascending aortic aneurysm at age 64     Aortic aneurysm Maternal Aunt          in her 40s from ascending aortic aneurysm     C.A.D. No family hx of      Cancer - colorectal No family hx of      Diabetes No family hx of      Cerebrovascular Disease No family hx of      Breast Cancer No family hx of      Prostate Cancer No family hx of         Social History:  ?  History   Smoking Status     Former     Packs/day: 0.50     Years: 10.00     Types: Cigarettes     Quit date: 1994   Smokeless Tobacco     Never     History   Drug Use No     Social History    Substance and Sexual Activity      Alcohol use: Yes        Comment: very rare      Allergies:  ?   Allergies   Allergen Reactions     Triptans Unknown     imitrex     Amoxicillin-Pot Clavulanate Hives     Unsure if true reaction to med         Medications:    Current Outpatient Medications   Medication     Acetaminophen (TYLENOL PO)     Biotin 10 MG TABS tablet     busPIRone (BUSPAR) 10 MG tablet     CALCIUM-VITAMIN D-VITAMIN K PO     Collagen Hydrolysate POWD     Cyanocobalamin (VITAMIN B 12 PO)     diclofenac (VOLTAREN) 75 MG EC tablet     estradiol (VAGIFEM) 10 MCG TABS vaginal tablet     glucosamine-chondroitin 500-400 MG CAPS per capsule     hydroxychloroquine (PLAQUENIL) 200 MG tablet     levonorgestrel (MIRENA) 20 MCG/24HR IUD     lisdexamfetamine (VYVANSE) 10 MG capsule      [START ON 8/6/2023] lisdexamfetamine (VYVANSE) 10 MG capsule     [START ON 9/6/2023] lisdexamfetamine (VYVANSE) 10 MG capsule     Lysine 500 MG TABS     magnesium citrate solution     NONFORMULARY     NONFORMULARY     UNABLE TO FIND     UNABLE TO FIND     valACYclovir (VALTREX) 1000 mg tablet     valACYclovir (VALTREX) 500 MG tablet     bimatoprost (LATISSE) 0.03 % external opthalmic solution     No current facility-administered medications for this visit.       Physical Exam:  ?  Vitals:  /69   Pulse 76   Wt 61.2 kg (135 lb)   BMI 23.54 kg/m     General:  WD/WN, in NAD.  A&O x3.  Dermatologic:    Skin is intact, open lesions absent.   Skin texture, turgor is normal.  Vascular:  Pulses palpable bilateral.  Digital capillary refill time normal bilateral.  Skin temperature is normal bilateral.  Generalized edema- none right.  Focal edema- none  right.  Neurologic:    Gross sensation normal.  Oscar's Click absent 2nd, 3rd IMS with forefoot compression, R  Palpation 2nd IMS while performing reproduces 3rd digit pain  Gait and balance normal.  Musculoskeletal:  Palpation 2nd IMS reproduces 3rd digit pain - shoots into toe  No pain to palpation of sub 3rd or 4th mets, 3rd IMS, sub 2nd met, 1st MTPJ right.  Lesser MTPJ ROM full, smooth, not painful, R    Muscle strength 5/5  foot and ankle bilateral.    Stance:  RCSP Valgus bilateral.  Foot type:  Flexible valgus  Clinical deformity: hallux valugs > limitus    Imaging:   MRI independently reviewed and interpreted by myself today.   right foot dated 2022, reveal possibel 2nd IMS neuroma, 3rd IMS bursitis.      x-ray independently reviewed and interpreted by myself today.  Weight-bearing views right foot dated 11/23/22, reveal no lesser MTPJ degeneration, moderate hallux valgus w/ mild 1st MTPJ degeneration.

## 2023-07-12 NOTE — LETTER
7/12/2023         RE: Kiesha Mcguire  [address] [facility] [address]        Dear Colleague,    Thank you for referring your patient, Kiesha Mcguire, to the [facility]. Please see a copy of my visit note below.    Assessment:      ICD-10-CM    1. David's neuroma, right  G57.61 Bupivacaine 0.5 % Injection     dexamethasone (DECADRON) injection 4 mg     N BLOCK INJ,  PLANTAR DIGIT           Plan:  Orders Placed This Encounter   Procedures     N BLOCK INJ,  PLANTAR DIGIT       Discussed the etiology and treatment of the condition with the patient.  Imaging studies reviewed and discussed with the patient.  Discussed surgical and conservative options.    Procedure:  injection  PARQ session held, verbal consent obtained.  Sterile skin prep.    Location:  Right 2nd IMS  Contents:  2ml total, marcaine, dexamethasone phosphate  Dressing applied.    Post-injection instructions reviewed including close attention to amount and duration of pain relief.      MRI discussed - 3rd IMS bursitis, 2nd IMS neuroma      -NSAID- voltaren gel ok  -Orthoses- SuperFeet + met pads  -See AVS        Return:  No follow-ups on file.    Flori Whittington DPM                Chief Complaint:     Patient presents with:  Right Foot - RECHECK     right foot pain    HPI:  Kiesha Mcguire is a 52 year old year old female who presents for evaluation of foot pain.    Pain location- 3rd digit only    No 2nd or 4th digit pain    No joint or bunion pain but notices sub 2nd callus (bunion)  Had injection to neuroma before, did not help per pt    Went to go get orthotics, had a bad experience so didn't get them      Past Medical & Surgical History:  Past Medical History:   Diagnosis Date     Depressive disorder      Depressive disorder, not elsewhere classified     resolved     Herpes simplex without mention of complication     oral     IUD (intrauterine device) in place 08/15/2013    Mirena      Migraine headache with aura     very occass, sig aura, speech loss x1     Pure hypercholesterolemia     follows w [facility]IP[facility]MAN      Past Surgical History:   Procedure Laterality Date     COLONOSCOPY N/A 10/27/2021    Procedure: COLONOSCOPY, WITH POLYPECTOMY AND CLIP;  Surgeon: Og Gutierres MD;  [place] [facility] [place]      DILATION/CURETTAGE DIAG/THER NON OB  2006     LAPAROSCOPIC CHOLECYSTECTOMY N/A 2021    Procedure: CHOLECYSTECTOMY, LAPAROSCOPIC;  Surgeon: Denise Cast MD;  Location:  OR      Family History   Problem Relation Age of Onset     Hypertension Mother      Hyperlipidemia Mother      Depression Mother      Squamous cell carcinoma Mother      Hypertension Father      Cancer Father 65        neuro endrocine CA, neck     Hyperlipidemia Father      Aortic aneurysm Maternal Grandfather          of ascending aortic aneurysm at age 64     Aortic aneurysm Maternal Aunt          in her 40s from ascending aortic aneurysm     C.A.D. No family hx of      Cancer - colorectal No family hx of      Diabetes No family hx of      Cerebrovascular Disease No family hx of      Breast Cancer No family hx of      Prostate Cancer No family hx of         Social History:  ?  History   Smoking Status     Former     Packs/day: 0.50     Years: 10.00     Types: Cigarettes     Quit date: 1994   Smokeless Tobacco     Never     History   Drug Use No     Social History    Substance and Sexual Activity      Alcohol use: Yes        Comment: very rare      Allergies:  ?   Allergies   Allergen Reactions     Triptans Unknown     imitrex     Amoxicillin-Pot Clavulanate Hives     Unsure if true reaction to med         Medications:    Current Outpatient Medications   Medication     Acetaminophen (TYLENOL PO)     Biotin 10 MG TABS tablet     busPIRone (BUSPAR) 10 MG tablet     CALCIUM-VITAMIN D-VITAMIN K PO     Collagen Hydrolysate POWD     Cyanocobalamin (VITAMIN B 12 PO)     diclofenac (VOLTAREN) 75 MG EC tablet      estradiol (VAGIFEM) 10 MCG TABS vaginal tablet     glucosamine-chondroitin 500-400 MG CAPS per capsule     hydroxychloroquine (PLAQUENIL) 200 MG tablet     levonorgestrel (MIRENA) 20 MCG/24HR IUD     lisdexamfetamine (VYVANSE) 10 MG capsule     [START ON 8/6/2023] lisdexamfetamine (VYVANSE) 10 MG capsule     [START ON 9/6/2023] lisdexamfetamine (VYVANSE) 10 MG capsule     Lysine 500 MG TABS     magnesium citrate solution     NONFORMULARY     NONFORMULARY     UNABLE TO FIND     UNABLE TO FIND     valACYclovir (VALTREX) 1000 mg tablet     valACYclovir (VALTREX) 500 MG tablet     bimatoprost (LATISSE) 0.03 % external opthalmic solution     No current facility-administered medications for this visit.       Physical Exam:  ?  Vitals:  /69   Pulse 76   Wt 61.2 kg (135 lb)   BMI 23.54 kg/m     General:  WD/WN, in NAD.  A&O x3.  Dermatologic:    Skin is intact, open lesions absent.   Skin texture, turgor is normal.  Vascular:  Pulses palpable bilateral.  Digital capillary refill time normal bilateral.  Skin temperature is normal bilateral.  Generalized edema- none right.  Focal edema- none  right.  Neurologic:    Gross sensation normal.  Oscar's Click absent 2nd, 3rd IMS with forefoot compression, R  Palpation 2nd IMS while performing reproduces 3rd digit pain  Gait and balance normal.  Musculoskeletal:  Palpation 2nd IMS reproduces 3rd digit pain - shoots into toe  No pain to palpation of sub 3rd or 4th mets, 3rd IMS, sub 2nd met, 1st MTPJ right.  Lesser MTPJ ROM full, smooth, not painful, R    Muscle strength 5/5  foot and ankle bilateral.    Stance:  RCSP Valgus bilateral.  Foot type:  Flexible valgus  Clinical deformity: hallux valugs > limitus    Imaging:   MRI independently reviewed and interpreted by myself today.   right foot dated 2022, reveal possibel 2nd IMS neuroma, 3rd IMS bursitis.      x-ray independently reviewed and interpreted by myself today.  Weight-bearing views right foot dated 11/23/22,  reveal no lesser MTPJ degeneration, moderate hallux valgus w/ mild 1st MTPJ degeneration.                      Again, thank you for allowing me to participate in the care of your patient.        Sincerely,        Flori Whittington DPM

## 2023-07-12 NOTE — PATIENT INSTRUCTIONS
"PATIENT INSTRUCTIONS - Podiatry / Foot & Ankle Surgery        Place metatarsal pads on an orthotic or shoe liner, adhesive side down.    The pad should contact your foot BEHIND the metatarsophalangeal joints (\"MPJs\" or \"ball\") of your foot.   If the pad sits beneath the joints themselves, it may actually increase pain.  Use glue to prevent shifting of the metatarsal pads on the orthotic / shoe liner.  Available on [email or web address] (Blue PPT metatarsal pads).    SuperFeet orthotics are available on [employer], at [employer].     Place the metatarsal pads on the SuperFeet to ensure they are in the exact location needed to float the MPJs  You can then move the SuperFeet with metatarsal pads between different shoes.  Berry or dot Yu at [facility] - 2nd opinion for 2nd IMS neuroma removal      Sclerosing injection - we do not offer here            Aftercare for Steroid Injection  Activity:  -Resume normal activity as tolerated.  -Tendon, ligament, fascia injectionons- avoid high-impact activity for 1 week.  Icing:  -May apply to the injection site if needed.  Infection:  -Risk is generally low (<1%), but must be aware of the signs/symptoms.    -Both infection and an inflammatory reaction to the steroid (\"steroid flare\") will cause redness, warmth, and increased pain.   -If these symptoms develop within 12-24h & resolve within 2-3 days- \"steroid flare\"- ice (non-worrisome)  -If these symptoms develop after 24-48h, progressively get worse, & are associated with a fever- possible infection; call the clinic or present to urgent care immediately    "

## 2023-07-13 ENCOUNTER — VIRTUAL VISIT (OUTPATIENT)
Dept: PSYCHOLOGY | Facility: CLINIC | Age: 53
End: 2023-07-13
Payer: COMMERCIAL

## 2023-07-13 DIAGNOSIS — F43.89 REACTION TO CHRONIC STRESS: Primary | ICD-10-CM

## 2023-07-13 DIAGNOSIS — F32.A DEPRESSIVE DISORDER: ICD-10-CM

## 2023-07-13 PROCEDURE — 90834 PSYTX W PT 45 MINUTES: CPT | Mod: VID | Performed by: SOCIAL WORKER

## 2023-07-13 NOTE — TELEPHONE ENCOUNTER
Please advise this patient regarding the recommended insertables/padding for her shoe as she is having difficulties accessing them.   Thanks!  Bo Clark ATC

## 2023-07-13 NOTE — PROGRESS NOTES
[facility]                                     Progress Note    Patient Name: Kiesha Mcguire  Date: 7/13/2023       Service Type: Individual      Session Start Time: 1:04 PM Session End Time: 1:49 PM     Session Length: 38-52 minutes    Session #: 35 with this provider    Attendees: Client attended alone    Service Modality:  Video Visit:      Provider verified identity through the following two step process.  Patient provided:  Patient is known previously to provider    Telemedicine Visit: The patient's condition can be safely assessed and treated via synchronous audio and visual telemedicine encounter.      Reason for Telemedicine Visit: Patient convenience (e.g. access to timely appointments / distance to available provider) and Services only offered telehealth    Originating Site (Patient Location): Patient's home    Distant Site (Provider Location): Provider Remote Setting- Home Office/Off-site    Consent:  The patient/guardian has verbally consented to: the potential risks and benefits of telemedicine (video visit) versus in person care; bill my insurance or make self-payment for services provided; and responsibility for payment of non-covered services.     Patient would like the video invitation sent by:  [employer]    Mode of Communication:  Video Conference via [employer]    As the provider I attest to compliance with applicable laws and regulations related to telemedicine.    DATA  Interactive Complexity: No   Crisis: No      Progress Since Last Session (Related to Symptoms / Goals / Homework):  Symptoms: no change since previous appointment    Homework: Completed in session      Episode of Care Goals: Satisfactory progress - ACTION (Actively working towards change); Intervened by reinforcing change plan / affirming steps taken     Current / Ongoing Stressors and Concerns:   Grief related to past and impact on herself and her children   Difficulty trusting others and being  "vulnerable   Desire for more intimate relationships   Fear of being rejected by others; belief she is not good enough   Lack of trust with providers  Dynamics in relationship with ex-  Lack of support system  Son's health  Fear/sadness about children growing up      Treatment Objective(s) Addressed in This Session:   identify 2 fears / thoughts that contribute to feeling anxious  Identify negative self-talk and behaviors: challenge core beliefs, myths, and actions     Safe/calm state titled \"calm\"  Container: box with a lock    Potential Targets:   Relationship with ex-  Guilt about how divorce ended, Negative Belief \"It's my fault\"  Guilt about son's surgery and outcome  Negative Belief: There is something wrong with me  Being blamed by ex-boyfriend, feel shame  Childhood: not belonging, not being good enough      Current Potential Targets  Absence of friendships  Not fitting in  Defensiveness  Ex on social media  Driving in certain areas will remind her of her ex  Relationship with ex-  Need to be perfect    Past:   image of child self at 8 years old   [facility]   [facility]    Trauma symptoms: avoids places in neighborhood for fear of running into ex-boyfriend  Has avoided romantic relationships since  Dissociative symptoms while in relationship  Recurrent, intrusive, distressing memories  Problems with concentration     Intervention:   CBT: identified feelings, cognitions, and behaviors, modeled cognitive restructuring   Supported client with recent stressors   EMDR: discussed next steps    Assessments completed prior to visit:   The following assessments were completed by patient for this visit:  None    ASSESSMENT: Current Emotional / Mental Status (status of significant symptoms):   Risk status (Self / Other harm or suicidal ideation)   Patient denies current fears or concerns for personal safety.   Patient denies current or recent suicidal " ideation or behaviors.   Patient denies current or recent homicidal ideation or behaviors.   Patient denies current or recent self injurious behavior or ideation.   Patient denies other safety concerns.   Patient reports there has been no change in risk factors since their last session.     Patient reports there has been no change in protective factors since their last session.     Recommended that patient call 911 or go to the local ED should there be a change in any of these risk factors.     Appearance:   Appropriate    Eye Contact:   Good    Psychomotor Behavior: Normal  Restless hands   Attitude:   Cooperative  Interested Open to feedback and support    Orientation:   All   Speech    Rate / Production: Normal/ Responsive    Volume:  Normal    Mood:    frustrated Anxious    Affect:    Tearful Appropriate   Thought Content:  Clear    Thought Form:  Coherent  Goal Directed    Insight:    Good      Medication Review:   No changes to current psychiatric medication(s)   Vyvanse      Medication Compliance:   Yes     Changes in Health Issues:   None reported     Chemical Use Review:   Substance Use: no use     Tobacco Use: no use    Diagnosis:   Other Specified Trauma and Stress Related Disorder   Unspecified Depressive Disorder          Generalized Anxiety Disorder, Provisional    Collateral Reports Completed:   Not Applicable    PLAN: (Patient Tasks / Therapist Tasks / Other)  EMDR:  Plan for next session to have client keep her eyes open.    Next Target: divorce, negative belief: I am a failure/I did something wrong  Client is reading the following books; Co-regulation handbook, pathological demand avoidance, and Declarative language      Magali Montilla, [facility] 7/13/2023    ______________________________________________________________________    Individual Treatment Plan    Patient's Name: Kiesha Mcguire  YOB: 1970    Date of Creation: 5/11/2022  Date Treatment Plan Last Reviewed/Revised:  3/29/2023    DSM5 Diagnoses:   1. Unspecified Depressive Disorder     Other Specified Trauma and Stress Related Disorder   Generalized Anxiety Disorder, Provisional    Psychosocial / Contextual Factors: stressors related to parenting  PROMIS (reviewed every 90 days):  31    Referral / Collaboration:  Referral to another professional/service is not indicated at this time..    Anticipated number of session for this episode of care: will review in 90 days  Anticipation frequency of session: Every other week  Anticipated Duration of each session: 38-52 minutes  Treatment plan will be reviewed in 90 days or when goals have been changed.       MeasurableTreatment Goal(s) related to diagnosis / functional impairment(s)  Goal 1: Patient will sustain attention and concentration for consistently longer periods of time.  Patient will meet goals set for completing tasks 80% of the time.   Client's Goal:  I will know I've met my goal when my space isn't so chaotic, meeting deadlines around bills and work, when I am not always playing catch-up, completing tasks.      Objective #A (Patient Action)    Patient will learn and implement organization and planning skills.  Status: New - Date: 5/11/2022, continued date: 9/8/2022, continued date: 12/21/2022, completed date: 3/29/2023    Intervention(s)  Therapist will teach the client organization and planning skills.  Therapist will address any potential barriers to using skills.    Objective #B  Patient will identify, challenge, and change self-talk that contributes to maladaptive feelings and actions.  Status: New - Date: 5/11/2022, Continued date: 9/8/2022, continued date: 12/21/2022, continued date: 3/29/2023    Intervention(s)  Therapist will teach the CBT model, cognitive distortions, and cognitive restructuring techniques.      Goal 2: Patient will be able to recall the traumatic events without becoming overwhelmed with negative thoughts, feelings, or urges.   Client's Goal:  I  "will know I've met my goal when I do not cry every time I talk about it.\"    Objective #A (Patient Action)    Status: New - Date: 5/11/2022, continued date: 9/8/2022, continued date: 12/21/2022, continued date: 3/29/2023    Patient will describe the history of and nature of trauma symptoms    Intervention(s)  Therapist will assess the client's frequency, intensity, duration, and history of trauma symptoms and their impact on functioning.    Objective #B (Patient Action)  Status: New Date: 5/11/2022, continued date: 9/8/2022, continued date: 12/21/2022, continued date: 3/29/2023    Patient will cooperate with eye movement desensitization and reprocessing (EMDR) to reduce emotional reaction to traumatic event(s)    Intervention(s)  Therapist will utilize EMDR to reduce the client's emotional reactivity to the traumatic event(s).    Objective #C (Patient Action)  Status: New Date: 5/11/2022, continued date: 9/8/2022, continued date: 12/21/2022, continued date: 3/29/2023    Patient will learn and implement calming and coping strategies to manage trauma symptoms.    Intervention(s)  Therapist will teach grounding techniques, distress tolerance, and emotion regulation techniques.      Patient has reviewed and agreed to the above plan.      Magali Montilla, [facility]  May 11, 2022  Magali Montilla, [facility]  9/8/2022  Magali Montilla, [facility]  12/21/2022  Magali Montilla [facility]  3/29/2023  "

## 2023-07-28 ENCOUNTER — VIRTUAL VISIT (OUTPATIENT)
Dept: PSYCHOLOGY | Facility: CLINIC | Age: 53
End: 2023-07-28
Payer: COMMERCIAL

## 2023-07-28 DIAGNOSIS — F41.1 GENERALIZED ANXIETY DISORDER: Primary | ICD-10-CM

## 2023-07-28 DIAGNOSIS — F43.89 REACTION TO CHRONIC STRESS: ICD-10-CM

## 2023-07-28 DIAGNOSIS — F32.A DEPRESSIVE DISORDER: ICD-10-CM

## 2023-07-28 PROCEDURE — 90834 PSYTX W PT 45 MINUTES: CPT | Mod: VID | Performed by: SOCIAL WORKER

## 2023-07-28 ASSESSMENT — ANXIETY QUESTIONNAIRES
2. NOT BEING ABLE TO STOP OR CONTROL WORRYING: NOT AT ALL
GAD7 TOTAL SCORE: 7
3. WORRYING TOO MUCH ABOUT DIFFERENT THINGS: SEVERAL DAYS
6. BECOMING EASILY ANNOYED OR IRRITABLE: SEVERAL DAYS
1. FEELING NERVOUS, ANXIOUS, OR ON EDGE: MORE THAN HALF THE DAYS
4. TROUBLE RELAXING: SEVERAL DAYS
7. FEELING AFRAID AS IF SOMETHING AWFUL MIGHT HAPPEN: SEVERAL DAYS
GAD7 TOTAL SCORE: 7
5. BEING SO RESTLESS THAT IT IS HARD TO SIT STILL: SEVERAL DAYS

## 2023-07-28 NOTE — PROGRESS NOTES
[facility]                                     Progress Note    Patient Name: Kiesha Mcguire  Date: 7/28/2023       Service Type: Individual      Session Start Time: 11:05 AM Session End Time: 11:50 AM     Session Length: 38-52 minutes    Session #: 36 with this provider    Attendees: Client attended alone    Service Modality:  Video Visit:      Provider verified identity through the following two step process.  Patient provided:  Patient is known previously to provider    Telemedicine Visit: The patient's condition can be safely assessed and treated via synchronous audio and visual telemedicine encounter.      Reason for Telemedicine Visit: Patient convenience (e.g. access to timely appointments / distance to available provider) and Services only offered telehealth    Originating Site (Patient Location): Patient's home    Distant Site (Provider Location): Provider Remote Setting- Home Office/Off-site    Consent:  The patient/guardian has verbally consented to: the potential risks and benefits of telemedicine (video visit) versus in person care; bill my insurance or make self-payment for services provided; and responsibility for payment of non-covered services.     Patient would like the video invitation sent by:  [employer]    Mode of Communication:  Video Conference via [employer]    As the provider I attest to compliance with applicable laws and regulations related to telemedicine.    DATA  Interactive Complexity: No   Crisis: No      Progress Since Last Session (Related to Symptoms / Goals / Homework):  Symptoms:  no change since previous appointment    Homework: Completed in session      Episode of Care Goals: Satisfactory progress - ACTION (Actively working towards change); Intervened by reinforcing change plan / affirming steps taken     Current / Ongoing Stressors and Concerns:   Grief related to past and impact on herself and her children   Difficulty trusting others and being  "vulnerable   Desire for more intimate relationships   Fear of being rejected by others; belief she is not good enough   Lack of trust with providers  Dynamics in relationship with ex-  Lack of support system  Son's health      Treatment Objective(s) Addressed in This Session:   identify 2 fears / thoughts that contribute to feeling anxious  Identify negative self-talk and behaviors: challenge core beliefs, myths, and actions     Safe/calm state titled \"calm\"  Container: box with a lock    Potential Targets:   Relationship with ex-  Guilt about how divorce ended, Negative Belief \"It's my fault\"  Guilt about son's surgery and outcome  Negative Belief: There is something wrong with me  Being blamed by ex-boyfriend, feel shame  Childhood: not belonging, not being good enough      Current Potential Targets  Absence of friendships  Not fitting in  Defensiveness  Ex on social media  Driving in certain areas will remind her of her ex  Relationship with ex-  Need to be perfect    Past:   image of child self at 8 years old   Disagreement about salad   [facility]   head injury    Trauma symptoms: avoids places in neighborhood for fear of running into ex-boyfriend  Has avoided romantic relationships since  Dissociative symptoms while in relationship  Recurrent, intrusive, distressing memories  Problems with concentration     Intervention:   CBT: identified feelings, cognitions, and behaviors   Offered validation and Support about recent stressors   Engaged client in completion of GAD7   Provided psychoeducation on anxiety   EMDR: discussed next steps    Assessments completed prior to visit:  The following assessments were completed by patient for this visit:  GAD7:       9/23/2019    10:44 AM 10/12/2020     5:41 PM 12/30/2021    10:44 AM 4/6/2022     1:00 PM 8/11/2022     8:54 AM 7/28/2023    11:38 AM   NORBERT-7 SCORE   Total Score     2 (minimal anxiety) 7 (mild anxiety)   Total Score 0 0 3 2 " 2 7        ASSESSMENT: Current Emotional / Mental Status (status of significant symptoms):   Risk status (Self / Other harm or suicidal ideation)   Patient denies current fears or concerns for personal safety.   Patient denies current or recent suicidal ideation or behaviors.   Patient denies current or recent homicidal ideation or behaviors.   Patient denies current or recent self injurious behavior or ideation.   Patient denies other safety concerns.   Patient reports there has been no change in risk factors since their last session.     Patient reports there has been no change in protective factors since their last session.     Recommended that patient call 911 or go to the local ED should there be a change in any of these risk factors.     Appearance:   Appropriate    Eye Contact:   Good    Psychomotor Behavior: Normal    Attitude:   Cooperative  Interested Open to feedback and support    Orientation:   All   Speech    Rate / Production: Normal/ Responsive    Volume:  Normal    Mood:    Anxious  Sad   Affect:    Tearful Appropriate   Thought Content:  Clear    Thought Form:  Coherent  Goal Directed  Logical    Insight:    Good      Medication Review:   Changes to psychiatric medications, see updated Medication List in EPIC.    Jasvir Willams-added, client is not taking daily     Medication Compliance:   Yes     Changes in Health Issues:   None reported     Chemical Use Review:   Substance Use: no use     Tobacco Use: no use    Diagnosis:  Generalized Anxiety Disorder   Other Specified Trauma and Stress Related Disorder   Unspecified Depressive Disorder     Collateral Reports Completed:   Not Applicable    PLAN: (Patient Tasks / Therapist Tasks / Other)  EMDR:  Plan for next session to have client keep her eyes open.    Next Target: divorce, negative belief: I am a failure/I did something wrong  Client is reading the following books; Co-regulation handbook, pathological demand avoidance, and Declarative  language  Client shared goal to increase engagement in self-care and organization.  Client will complete PROMIS 10 and PHQ9    Magali Roldan, [facility] 7/13/2023    ______________________________________________________________________    Individual Treatment Plan    Patient's Name: Kiesha Mcguire  YOB: 1970    Date of Creation: 5/11/2022  Date Treatment Plan Last Reviewed/Revised: 7/28/2023    DSM5 Diagnoses:    Generalized Anxiety Disorder  Other Specified Trauma and Stress Related Disorder   Unspecified Depressive Disorder     Psychosocial / Contextual Factors: stressors related to parenting  PROMIS (reviewed every 90 days):  31    Referral / Collaboration:  Referral to another professional/service is not indicated at this time..    Anticipated number of session for this episode of care: will review in 90 days  Anticipation frequency of session: Every other week  Anticipated Duration of each session: 38-52 minutes  Treatment plan will be reviewed in 90 days or when goals have been changed.       MeasurableTreatment Goal(s) related to diagnosis / functional impairment(s)  Goal 1: Patient will sustain attention and concentration for consistently longer periods of time.  Patient will meet goals set for completing tasks 80% of the time.   Client's Goal:  I will know I've met my goal when my space isn't so chaotic, meeting deadlines around bills and work, when I am not always playing catch-up, completing tasks.      Objective #A (Patient Action)    Patient will learn and implement organization and planning skills.  Status: New - Date: 5/11/2022 , continued date: 9/8/2022, continued date: 12/21/2022, completed date: 3/29/2023    Intervention(s)  Therapist will teach the client organization and planning skills.  Therapist will address any potential barriers to using skills.    Objective #B  Patient will  identify, challenge, and change self-talk that contributes to maladaptive feelings and actions  ".  Status: New - Date: 5/11/2022 , Continued date: 9/8/2022, continued date: 12/21/2022, continued date: 3/29/2023, continued date: 7/28/2023    Intervention(s)  Therapist will teach the CBT model, cognitive distortions, and cognitive restructuring techniques.      Goal 2: Patient will be able to recall the traumatic events without becoming overwhelmed with negative thoughts, feelings, or urges.   Client's Goal:  I will know I've met my goal when I do not cry every time I talk about it.\"    Objective #A (Patient Action)    Status: New - Date: 5/11/2022, continued date: 9/8/2022, continued date: 12/21/2022, continued date: 3/29/2023, continued date: 7/28/2023    Patient will describe the history of and nature of trauma symptoms    Intervention(s)  Therapist will assess the client's frequency, intensity, duration, and history of trauma symptoms and their impact on functioning.    Objective #B (Patient Action)  Status: New Date: 5/11/2022, continued date: 9/8/2022, continued date: 12/21/2022, continued date: 3/29/2023, continued date: 7/28/2023    Patient will cooperate with eye movement desensitization and reprocessing (EMDR) to reduce emotional reaction to traumatic event(s)    Intervention(s)  Therapist will utilize EMDR to reduce the client's emotional reactivity to the traumatic event(s).    Objective #C (Patient Action)  Status: New Date: 5/11/2022, continued date: 9/8/2022, continued date: 12/21/2022, continued date: 3/29/2023, continued date: 7/28/2023    Patient will learn and implement calming and coping strategies to manage trauma symptoms.    Intervention(s)  Therapist will teach grounding techniques, distress tolerance, and emotion regulation techniques.      Patient has reviewed and agreed to the above plan.      Magali Montilla, [facility]  May 11, 2022  Magail Montilla, [facility]  9/8/2022  Magali Montilla, [facility]  12/21/2022  Magali Montilla, [facility]  3/29/2023  Magali Montilla, [facility]  7/28/2023    "

## 2023-08-01 ENCOUNTER — NURSE TRIAGE (OUTPATIENT)
Dept: NURSING | Facility: CLINIC | Age: 53
End: 2023-08-01
Payer: COMMERCIAL

## 2023-08-01 NOTE — TELEPHONE ENCOUNTER
"Pt reports \"virus for a week, negative Covid and strep test\". Pt reports currently her most concerning symptom is a cough. Pt reports TA temperature at time of call is 98.4. Pt denies fever. Pt states she has \"pneumonia like lung pain, like when you have bronchitis\". Pt denies she has difficulty breathing or chest pain.     Writer advised pt to be seen in ER now per protocol.     Pt  declines ER or UC visit. Requests clinic visit. Pt hung up prior to transfer to scheduling. Writer contacted scheduling to call pt and offer clinic visit.       Reason for Disposition   Chest pain  (Exception: MILD central chest pain, present only when coughing)    Additional Information   Negative: SEVERE difficulty breathing (e.g., struggling for each breath, speaks in single words)   Negative: Bluish (or gray) lips or face now   Negative: [1] Difficulty breathing AND [2] exposure to flames, smoke, or fumes   Negative: [1] Stridor AND [2] difficulty breathing   Negative: Sounds like a life-threatening emergency to the triager   Negative: [1] Previous asthma attacks AND [2] this feels like asthma attack   Negative: Dry cough (non-productive;  no sputum or minimal clear sputum)   Negative: [1] MODERATE difficulty breathing (e.g., speaks in phrases, SOB even at rest, pulse 100-120) AND [2] still present when not coughing    Protocols used: Cough - Acute Duhonqihnu-X-RV    "

## 2023-08-07 ENCOUNTER — OFFICE VISIT (OUTPATIENT)
Dept: OBGYN | Facility: CLINIC | Age: 53
End: 2023-08-07
Attending: ADVANCED PRACTICE MIDWIFE
Payer: COMMERCIAL

## 2023-08-07 ENCOUNTER — LAB (OUTPATIENT)
Dept: LAB | Facility: CLINIC | Age: 53
End: 2023-08-07
Attending: ADVANCED PRACTICE MIDWIFE
Payer: COMMERCIAL

## 2023-08-07 VITALS
HEART RATE: 72 BPM | DIASTOLIC BLOOD PRESSURE: 65 MMHG | BODY MASS INDEX: 23.01 KG/M2 | SYSTOLIC BLOOD PRESSURE: 99 MMHG | HEIGHT: 64 IN | WEIGHT: 134.8 LBS

## 2023-08-07 DIAGNOSIS — Z00.00 VISIT FOR PREVENTIVE HEALTH EXAMINATION: Primary | ICD-10-CM

## 2023-08-07 DIAGNOSIS — Z00.00 VISIT FOR PREVENTIVE HEALTH EXAMINATION: ICD-10-CM

## 2023-08-07 DIAGNOSIS — Z79.890 POSTMENOPAUSAL HORMONE THERAPY: ICD-10-CM

## 2023-08-07 DIAGNOSIS — Z13.29 SCREENING FOR THYROID DISORDER: ICD-10-CM

## 2023-08-07 DIAGNOSIS — Z13.21 ENCOUNTER FOR VITAMIN DEFICIENCY SCREENING: ICD-10-CM

## 2023-08-07 DIAGNOSIS — N95.1 SYMPTOMATIC MENOPAUSAL OR FEMALE CLIMACTERIC STATES: ICD-10-CM

## 2023-08-07 LAB
TSH SERPL DL<=0.005 MIU/L-ACNC: 1.92 UIU/ML (ref 0.3–4.2)
VIT B12 SERPL-MCNC: 644 PG/ML (ref 232–1245)

## 2023-08-07 PROCEDURE — 99396 PREV VISIT EST AGE 40-64: CPT | Performed by: ADVANCED PRACTICE MIDWIFE

## 2023-08-07 PROCEDURE — 36415 COLL VENOUS BLD VENIPUNCTURE: CPT

## 2023-08-07 PROCEDURE — 84443 ASSAY THYROID STIM HORMONE: CPT

## 2023-08-07 PROCEDURE — 82306 VITAMIN D 25 HYDROXY: CPT

## 2023-08-07 PROCEDURE — 99213 OFFICE O/P EST LOW 20 MIN: CPT | Mod: 25 | Performed by: ADVANCED PRACTICE MIDWIFE

## 2023-08-07 PROCEDURE — 82607 VITAMIN B-12: CPT

## 2023-08-07 PROCEDURE — G0463 HOSPITAL OUTPT CLINIC VISIT: HCPCS | Performed by: ADVANCED PRACTICE MIDWIFE

## 2023-08-07 RX ORDER — ESTRADIOL 0.03 MG/D
1 FILM, EXTENDED RELEASE TRANSDERMAL
Qty: 24 PATCH | Refills: 3 | Status: SHIPPED | OUTPATIENT
Start: 2023-08-07 | End: 2024-08-08

## 2023-08-07 NOTE — PROGRESS NOTES
Subjective:  Kiesha Mcguire is an 52 year old, , who requests an evaluation of menopause symptoms and annual preventative health exam.       Concerns today include: perimenopause    Mariana/menopause symptoms include:   Hot flashes, Night sweats, Vaginal dryness, and Brain fog    Gynecologic History  No LMP recorded. (Menstrual status: IUD).       Current contraception: Mirena IUD  STI History: no    Pap due 10/2025      Obstetric History  OB History    Para Term  AB Living   3 2 2 0 1 2   SAB IAB Ectopic Multiple Live Births   0 0 0 0 2      # Outcome Date GA Lbr [place]/2nd Weight Sex Delivery Anes PTL Lv   3 Term 07/27/10    M    ARMAND   2 Term 07    M    ARMAND      Birth Comments: System Generated. Please review and update pregnancy details.   1 AB 2006                Health Maintenance  Colonoscopy   Mammogram     Labs:  TSH   Date Value Ref Range Status   2023 1.92 0.30 - 4.20 uIU/mL Final   2022 1.99 0.40 - 4.00 mU/L Final   2016 1.88 0.40 - 4.00 mU/L Final     Lab Results   Component Value Date    CHOL 231 2021    CHOL 212 2019     Lab Results   Component Value Date    HDL 79 2021    HDL 65 2019     Lab Results   Component Value Date     2021     2019     Lab Results   Component Value Date    TRIG 52 2021    TRIG 66 2019     Lab Results   Component Value Date    CHOLHDLRATIO 3.4 2012       Past Medical History  Past Medical History:   Diagnosis Date    Depressive disorder     Depressive disorder, not elsewhere classified     resolved    Herpes simplex without mention of complication     oral    IUD (intrauterine device) in place 08/15/2013    Mirena    Migraine headache with aura     very occass, sig aura, speech loss x1    Pure hypercholesterolemia     follows w BIPIN CHATMAN       Past Surgical History  Past Surgical History:   Procedure Laterality Date    COLONOSCOPY N/A 10/27/2021     Procedure: COLONOSCOPY, WITH POLYPECTOMY AND CLIP;  Surgeon: Og Gutierres MD;  [place] [facility] [place]     DILATION/CURETTAGE DIAG/THER NON OB  2006    LAPAROSCOPIC CHOLECYSTECTOMY N/A 2021    Procedure: CHOLECYSTECTOMY, LAPAROSCOPIC;  Surgeon: Denise Cast MD;  Location: [facility]       Medications  Current Outpatient Medications   Medication    Acetaminophen (TYLENOL PO)    Biotin 10 MG TABS tablet    CALCIUM-VITAMIN D-VITAMIN K PO    Collagen Hydrolysate POWD    Cyanocobalamin (VITAMIN B 12 PO)    estradiol (VIVELLE-DOT) 0.025 MG/24HR bi-weekly patch    glucosamine-chondroitin 500-400 MG CAPS per capsule    hydroxychloroquine (PLAQUENIL) 200 MG tablet    levonorgestrel (MIRENA) 20 MCG/24HR IUD    lisdexamfetamine (VYVANSE) 10 MG capsule    [START ON 2023] lisdexamfetamine (VYVANSE) 10 MG capsule    Lysine 500 MG TABS    magnesium citrate solution    NONFORMULARY    NONFORMULARY    UNABLE TO FIND    UNABLE TO FIND    valACYclovir (VALTREX) 1000 mg tablet    valACYclovir (VALTREX) 500 MG tablet    azithromycin (ZITHROMAX) 250 MG tablet     No current facility-administered medications for this visit.       Allergies     Allergies   Allergen Reactions    Triptans Unknown     imitrex    Amoxicillin-Pot Clavulanate Hives     Unsure if true reaction to med        Social History  Social History     Socioeconomic History    Marital status:      Spouse name: Not on file    Number of children: 2    Years of education: 16    Highest education level: Not on file   Occupational History    Occupation:      Employer: [employer]     Comment: fulltime   Tobacco Use    Smoking status: Former     Packs/day: 0.50     Years: 10.00     Pack years: 5.00     Types: Cigarettes     Quit date: 1994     Years since quittin.1    Smokeless tobacco: Never   Vaping Use    Vaping Use: Never used   Substance and Sexual Activity    Alcohol use: Yes     Comment: very rare    Drug use: No     Sexual activity: Not Currently     Partners: Male     Birth control/protection: I.U.D.     Comment: Mirena   Other Topics Concern    Parent/sibling w/ CABG, MI or angioplasty before 65F 55M? No   Social History Narrative    How much exercise per week? 2 90 minute yoga sessions      How much calcium per day? Diet       How much caffeine per day? 0    How much vitamin D per day? Supplement     Do you/your family wear seatbelts?  Yes    Do you/your family use safety helmets? Yes    Do you/your family use sunscreen? Yes    Do you/your family keep firearms in the home? No    Do you/your family have a smoke detector(s)? Yes        Do you feel safe in your home? Yes    Has anyone ever touched you in an unwanted manner? No     Explain Kiesha Fletcher [facility] 7/16/18        [facility]             Social Determinants of Health     Financial Resource Strain: Not on file   Food Insecurity: Not on file   Transportation Needs: Not on file   Physical Activity: Insufficiently Active (10/12/2020)    Exercise Vital Sign     Days of Exercise per Week: 1 day     Minutes of Exercise per Session: 10 min   Stress: Stress Concern Present (10/12/2020)    [employer] - Occupational Stress Questionnaire     Feeling of Stress : To some extent   Social Connections: Socially Isolated (10/12/2020)    Social Connection and Isolation Panel [NHANES]     Frequency of Communication with Friends and Family: Three times a week     Frequency of Social Gatherings with Friends and Family: Twice a week     Attends Rastafari Services: Never     Active Member of Clubs or Organizations: No     Attends Club or Organization Meetings: Never     Marital Status:    Intimate Partner Violence: Unknown (10/12/2020)    Humiliation, Afraid, Rape, and Kick questionnaire     Fear of Current or Ex-Partner: Not asked     Emotionally Abused: Not asked     Physically Abused: Not asked     Sexually Abused: Not asked   Housing  "Stability: Not on file       Family History  Family History   Problem Relation Age of Onset    Hypertension Mother     Hyperlipidemia Mother     Depression Mother     Squamous cell carcinoma Mother     Thyroid Disease Mother         unclear dx    Hypertension Father     Cancer Father 65        neuro endrocine CA, neck    Hyperlipidemia Father     Aortic aneurysm Maternal Grandfather          of ascending aortic aneurysm at age 64    Aortic aneurysm Maternal Aunt          in her 40s from ascending aortic aneurysm    C.A.D. No family hx of     Cancer - colorectal No family hx of     Diabetes No family hx of     Cerebrovascular Disease No family hx of     Breast Cancer No family hx of     Prostate Cancer No family hx of      No family history of breast, uterine, ovarian or colon cancer.    Review of Systems   ROS: 10 point ROS neg other than the symptoms noted above in the HPI.      Objective  EXAM:  Blood pressure 99/65, pulse 72, height 1.615 m (5' 3.58\"), weight 61.1 kg (134 lb 12.8 oz), not currently breastfeeding. Body mass index is 23.44 kg/m .  General - pleasant female in no acute distress.  Skin - no suspicious lesions or rashes  EENT-  PERRLA, euthyroid without palpable nodules  Neck - supple without lymphadenopathy.  Lungs - clear to auscultation bilaterally.  Heart - regular rate and rhythm without murmur.  Abdomen - soft, nontender, nondistended, no masses or organomegaly noted.  Musculoskeletal - no gross deformities.  Neurological - normal strength, sensation, and mental status.    Breast Exam:  Breast: Without visible skin changes. No dimpling or lesions seen.   Breasts supple, non-tender with palpation, no dominant mass, nodularity, or nipple discharge noted bilaterally. Axillary nodes negative.     Pelvic Exam: deferred      ASSESSMENT:  Kiesha Mcguire is an 52 year old, , who requests an evaluation of menopause symptoms and annual preventative health exam.    ICD-10-CM    1. " Visit for preventive health examination  Z00.00 Vitamin B12      2. Symptomatic menopausal or female climacteric states  N95.1 estradiol (VIVELLE-DOT) 0.025 MG/24HR bi-weekly patch      3. Screening for thyroid disorder  Z13.29 TSH with free T4 reflex      4. Encounter for vitamin deficiency screening  Z13.21 25- OH-Vitamin D          PLAN:  Orders Placed This Encounter   Procedures    TSH with free T4 reflex    25- OH-Vitamin D    Vitamin B12     -Discussed risk versus benefit with patient in initiating systemic HT if age of 60 years or 10 years since LMP. Kiesha Mcguire is an 52 year old with No LMP recorded. (Menstrual status: IUD)..  -Patient does not have any absolute contraindications and requests to initiate treatment today using:  transdermal estradiol patch including nightly oral progesterone. Discussed medication regimen with patient utilizing  continuous progesterone.  -Acupuncture, yoga, exercise, chiropractic work, and paced respirations may help to cope with VMS but have not shown evidence to alleviate them.   -Discussed ways to decrease vulvar irritation including wearing 100% cotton underwear, avoiding soaps or scented products, pat-drying and utilizing preservative-free emollients, and avoiding douching.   -Reviewed use of vaginal lubrication for GSM.  Pt has been  using vaginal estrogen, will discontinue for now, but knows she can use both if the GSM is not controlled with  systemic hormones.    -Consider the following for preventative health:  Age 40-64 Annual Preventative Exam  Screenings: Thyroid  Immunizations:   none    Return to clinic in 1year.  Follow-up as needed.  JREAMY Guerin CNM    bill as annual and split billing for addition of menopause  Answers submitted by the patient for this visit:  NORBERT-7 (Submitted on 7/28/2023)  NORBERT 7 TOTAL SCORE: 7      Nell Dos Santos, SHAYY, APRN, CNM, FACNM

## 2023-08-07 NOTE — PATIENT INSTRUCTIONS
Thank you for trusting us with your care!     If you need to contact us for questions about:  Symptoms, Scheduling & Medical Questions; Non-urgent (2-3 day response) Elizaedel message, Urgent (needing response today) [phone] (if after 3:30pm next day response)   Prescriptions: Please call your Pharmacy   Billing: [place] [phone] or [facility]:[phone]    PREVENTIVE HEALTH RECOMMENDATIONS:   Most women need a yearly breast and pelvic exam.    A PAP screen, a test done DURING a pelvic exam, is NO longer recommended yearly.    March 2013, screening guidelines recommended by ACOG for PAP screen are:    1) First pap at age 21.    2) Pap every 3 years until age 30.    3) After age 30, pap every 3 years or Pap with HR HPV screen every 5 years until age 65.  4) Women do NOT need a vaginal Pap screen after a hysterectomy (surgical removal of the uterus) when they have not had cancer.    Exceptions:  1) Yearly pap if HIV+ or immunosuppressed secondary to organ transplant  2) LOGAN II-III continue routine screening for 20 years.    I encourage you continue looking for opportunities to choose a healthy lifestyle:       * Choose to eat a heart healthy diet. Check out the FOOD PLATE guidelines at: http://www.choosemyplate.gov/ for helpful hints on weight and cholesterol management.  Balance your caloric intake with exercise to maintain a BMI in the 22 to 26 range. For bone health: Eat calcium-rich foods like yogurt, broccoli or take chewable calcium pills (500 to 600 mg) twice a day with food.       * Exercise for at least an average of 30 minutes a day, 5 days of the week. This will help you control your weight, release stress, and help prevent disease.      * Take a Vitamin D3 supplement daily fall through spring and during summer unless you [id number]' full body sun exposure to skin without sunscreen.      * DO wear sunscreen to prevent skin cancer after the first 15-30 minutes.      * Identify stressors in your  life, find ways to release the stress, and, make time for yourself. PLEASE ask for help if mood changes last longer than two weeks.     * Limit alcohol to one drink per day.  No smoking.  Avoid second hand smoke. If you smoke, ask for help to stop.       *  If you are in a sexual relationship, talk with your partner about possible infection risks and take action to protect yourself from exposure to a sexual infection.    Please request an infection screen for STIs (sexually transmitted infections) if you are less than age 26 OR believe that you may be at risk.     Get a flu shot each year. Get a tetanus shot every 10 years. EVERYONE needs a pertussis (Whooping cough) booster.    See your dentist twice a year for an exam and preventive care cleaning.     Consider the following screen tests:    1) cholesterol test every 5 years.     2) yearly mammogram after age 40 unless you have identified risks.    3) colonoscopy every 10 years after age 50 unless you have identified risks.    4) diabetes blood test screening if you are at risk for diabetes.      Additional information that you may also find helpful:  The Internet now gives us access to LOTS of information -- some of it helpful, research documented and also plenty of harmful, anecdotal information that may not pertain to your situtaion. Consider visiting the following websites for accurate health information:    www.vitamindcouncil.org/ : Info and ongoing research re Vitamin D    www.fairview.org : Up to date and easily searchable information on multiple topics.    www.medlineplus.gov : medication info, interactive tutorials, watch real surgeries online    www.cdc.gov : public health info, travel advisories, epidemics (H1N1)    www.whitney/std.org: current research re diagnosis, treatment and prevention of sexually contacted infections.    www.health.state.[place].us : [place] dept of heatlh, public health issues in [place], N1N1    www.familydoctor.org : good info from the Academy  of Family Physicians

## 2023-08-07 NOTE — LETTER
2023       RE: Kiesha Mcguire  [address] [address]     Dear Colleague,    Thank you for referring your patient, Kiesha Mcguire, to the [facility] [place] at [facility]. Please see a copy of my visit note below.        Subjective:  Kiesha Mcguire is an 52 year old, , who requests an evaluation of menopause symptoms and annual preventative health exam.       Concerns today include: perimenopause    Mariana/menopause symptoms include:   Hot flashes, Night sweats, Vaginal dryness, and Brain fog    Gynecologic History  No LMP recorded. (Menstrual status: IUD).       Current contraception: Mirena IUD  STI History: no    Pap due 10/2025      Obstetric History  OB History    Para Term  AB Living   3 2 2 0 1 2   SAB IAB Ectopic Multiple Live Births   0 0 0 0 2      # Outcome Date GA Lbr [place]/2nd Weight Sex Delivery Anes PTL Lv   3 Term 07/27/10    M    ARMAND   2 Term 07    M    ARMAND      Birth Comments: System Generated. Please review and update pregnancy details.   1 AB 2006                Health Maintenance  Colonoscopy   Mammogram     Labs:  TSH   Date Value Ref Range Status   2023 1.92 0.30 - 4.20 uIU/mL Final   2022 1.99 0.40 - 4.00 mU/L Final   2016 1.88 0.40 - 4.00 mU/L Final     Lab Results   Component Value Date    CHOL 231 2021    CHOL 212 2019     Lab Results   Component Value Date    HDL 79 2021    HDL 65 2019     Lab Results   Component Value Date     2021     2019     Lab Results   Component Value Date    TRIG 52 2021    TRIG 66 2019     Lab Results   Component Value Date    CHOLHDLRATIO 3.4 2012       Past Medical History  Past Medical History:   Diagnosis Date    Depressive disorder     Depressive disorder, not elsewhere classified     resolved    Herpes simplex without mention of  complication     oral    IUD (intrauterine device) in place 08/15/2013    Mirena    Migraine headache with aura     very occass, sig aura, speech loss x1    Pure hypercholesterolemia     follows kate VOSS MD       Past Surgical History  Past Surgical History:   Procedure Laterality Date    COLONOSCOPY N/A 10/27/2021    Procedure: COLONOSCOPY, WITH POLYPECTOMY AND CLIP;  Surgeon: Og Gutierres MD;  Location: [facility] OR     DILATION/CURETTAGE DIAG/THER NON OB  12/2006    LAPAROSCOPIC CHOLECYSTECTOMY N/A 12/24/2021    Procedure: CHOLECYSTECTOMY, LAPAROSCOPIC;  Surgeon: Denise Cast MD;  Location[facility]       Medications  Current Outpatient Medications   Medication    Acetaminophen (TYLENOL PO)    Biotin 10 MG TABS tablet    CALCIUM-VITAMIN D-VITAMIN K PO    Collagen Hydrolysate POWD    Cyanocobalamin (VITAMIN B 12 PO)    estradiol (VIVELLE-DOT) 0.025 MG/24HR bi-weekly patch    glucosamine-chondroitin 500-400 MG CAPS per capsule    hydroxychloroquine (PLAQUENIL) 200 MG tablet    levonorgestrel (MIRENA) 20 MCG/24HR IUD    lisdexamfetamine (VYVANSE) 10 MG capsule    [START ON 9/6/2023] lisdexamfetamine (VYVANSE) 10 MG capsule    Lysine 500 MG TABS    magnesium citrate solution    NONFORMULARY    NONFORMULARY    UNABLE TO FIND    UNABLE TO FIND    valACYclovir (VALTREX) 1000 mg tablet    valACYclovir (VALTREX) 500 MG tablet    azithromycin (ZITHROMAX) 250 MG tablet     No current facility-administered medications for this visit.       Allergies     Allergies   Allergen Reactions    Triptans Unknown     imitrex    Amoxicillin-Pot Clavulanate Hives     Unsure if true reaction to med        Social History  Social History     Socioeconomic History    Marital status:      Spouse name: Not on file    Number of children: 2    Years of education: 16    Highest education level: Not on file   Occupational History    Occupation:      Employer: [employer]     Comment: fulltime   Tobacco Use     Smoking status: Former     Packs/day: 0.50     Years: 10.00     Pack years: 5.00     Types: Cigarettes     Quit date: 1994     Years since quittin.1    Smokeless tobacco: Never   Vaping Use    Vaping Use: Never used   Substance and Sexual Activity    Alcohol use: Yes     Comment: very rare    Drug use: No    Sexual activity: Not Currently     Partners: Male     Birth control/protection: I.U.D.     Comment: Mirena   Other Topics Concern    Parent/sibling w/ CABG, MI or angioplasty before 65F 55M? No   Social History Narrative    How much exercise per week? 2 90 minute yoga sessions      How much calcium per day? Diet       How much caffeine per day? 0    How much vitamin D per day? Supplement     Do you/your family wear seatbelts?  Yes    Do you/your family use safety helmets? Yes    Do you/your family use sunscreen? Yes    Do you/your family keep firearms in the home? No    Do you/your family have a smoke detector(s)? Yes        Do you feel safe in your home? Yes    Has anyone ever touched you in an unwanted manner? No     Explain Kiesha Fletcher [facility] 18        Reviewed [facility] 10-12-20             Social Determinants of Health     Financial Resource Strain: Not on file   Food Insecurity: Not on file   Transportation Needs: Not on file   Physical Activity: Insufficiently Active (10/12/2020)    Exercise Vital Sign     Days of Exercise per Week: 1 day     Minutes of Exercise per Session: 10 min   Stress: Stress Concern Present (10/12/2020)    [employer] - Occupational Stress Questionnaire     Feeling of Stress : To some extent   Social Connections: Socially Isolated (10/12/2020)    Social Connection and Isolation Panel [NHANES]     Frequency of Communication with Friends and Family: Three times a week     Frequency of Social Gatherings with Friends and Family: Twice a week     Attends Zoroastrianism Services: Never     Active Member of Clubs or Organizations: No     Attends  "Club or Organization Meetings: Never     Marital Status:    Intimate Partner Violence: Unknown (10/12/2020)    Humiliation, Afraid, Rape, and Kick questionnaire     Fear of Current or Ex-Partner: Not asked     Emotionally Abused: Not asked     Physically Abused: Not asked     Sexually Abused: Not asked   Housing Stability: Not on file       Family History  Family History   Problem Relation Age of Onset    Hypertension Mother     Hyperlipidemia Mother     Depression Mother     Squamous cell carcinoma Mother     Thyroid Disease Mother         unclear dx    Hypertension Father     Cancer Father 65        neuro endrocine CA, neck    Hyperlipidemia Father     Aortic aneurysm Maternal Grandfather          of ascending aortic aneurysm at age 64    Aortic aneurysm Maternal Aunt          in her 40s from ascending aortic aneurysm    C.A.D. No family hx of     Cancer - colorectal No family hx of     Diabetes No family hx of     Cerebrovascular Disease No family hx of     Breast Cancer No family hx of     Prostate Cancer No family hx of      No family history of breast, uterine, ovarian or colon cancer.    Review of Systems   ROS: 10 point ROS neg other than the symptoms noted above in the HPI.      Objective  EXAM:  Blood pressure 99/65, pulse 72, height 1.615 m (5' 3.58\"), weight 61.1 kg (134 lb 12.8 oz), not currently breastfeeding. Body mass index is 23.44 kg/m .  General - pleasant female in no acute distress.  Skin - no suspicious lesions or rashes  EENT-  PERRLA, euthyroid without palpable nodules  Neck - supple without lymphadenopathy.  Lungs - clear to auscultation bilaterally.  Heart - regular rate and rhythm without murmur.  Abdomen - soft, nontender, nondistended, no masses or organomegaly noted.  Musculoskeletal - no gross deformities.  Neurological - normal strength, sensation, and mental status.    Breast Exam:  Breast: Without visible skin changes. No dimpling or lesions seen.   Breasts supple, " non-tender with palpation, no dominant mass, nodularity, or nipple discharge noted bilaterally. Axillary nodes negative.     Pelvic Exam: deferred      ASSESSMENT:  Kiesha Mcguire is an 52 year old, , who requests an evaluation of menopause symptoms and annual preventative health exam.    ICD-10-CM    1. Visit for preventive health examination  Z00.00 Vitamin B12      2. Symptomatic menopausal or female climacteric states  N95.1 estradiol (VIVELLE-DOT) 0.025 MG/24HR bi-weekly patch      3. Screening for thyroid disorder  Z13.29 TSH with free T4 reflex      4. Encounter for vitamin deficiency screening  Z13.21 25- OH-Vitamin D          PLAN:  Orders Placed This Encounter   Procedures    TSH with free T4 reflex    25- OH-Vitamin D    Vitamin B12     -Discussed risk versus benefit with patient in initiating systemic HT if age of 60 years or 10 years since LMP. Kiesha Mcguire is an 52 year old with No LMP recorded. (Menstrual status: IUD)..  -Patient does not have any absolute contraindications and requests to initiate treatment today using:  transdermal estradiol patch including nightly oral progesterone. Discussed medication regimen with patient utilizing  continuous progesterone.  -Acupuncture, yoga, exercise, chiropractic work, and paced respirations may help to cope with VMS but have not shown evidence to alleviate them.   -Discussed ways to decrease vulvar irritation including wearing 100% cotton underwear, avoiding soaps or scented products, pat-drying and utilizing preservative-free emollients, and avoiding douching.   -Reviewed use of vaginal lubrication for GSM.  Pt has been  using vaginal estrogen, will discontinue for now, but knows she can use both if the GSM is not controlled with  systemic hormones.    -Consider the following for preventative health:  Age 40-64 Annual Preventative Exam  Screenings: Thyroid  Immunizations:   none    Return to clinic in 1year.  Follow-up as needed.  Nell  JERAMY Vines CNM    bill as annual and split billing for addition of menopause  Answers submitted by the patient for this visit:  NORBERT-7 (Submitted on 7/28/2023)  NORBERT 7 TOTAL SCORE: 7      Nell Dos Santos, SHAYY, APRN, CNM, FACNM

## 2023-08-08 LAB — DEPRECATED CALCIDIOL+CALCIFEROL SERPL-MC: 45 UG/L (ref 20–75)

## 2023-08-09 ENCOUNTER — OFFICE VISIT (OUTPATIENT)
Dept: PODIATRY | Facility: CLINIC | Age: 53
End: 2023-08-09
Payer: COMMERCIAL

## 2023-08-09 VITALS
HEART RATE: 89 BPM | DIASTOLIC BLOOD PRESSURE: 61 MMHG | WEIGHT: 134 LBS | SYSTOLIC BLOOD PRESSURE: 101 MMHG | BODY MASS INDEX: 23.3 KG/M2

## 2023-08-09 DIAGNOSIS — G57.61 NEUROMA OF SECOND INTERSPACE OF RIGHT FOOT: Primary | ICD-10-CM

## 2023-08-09 PROCEDURE — 99213 OFFICE O/P EST LOW 20 MIN: CPT | Performed by: PODIATRIST

## 2023-08-09 NOTE — TELEPHONE ENCOUNTER
Patient calling back.  She did not seek care as advised by triage last week.  She stated that triage advised her to go to ED due to chest pain but she would not describe the symptom as a pain.  She continues to have a cough and discomfort when taking deep breaths.   Denies fever, CP, or SOB  She is concerned about possible pneumonia.  Appointment scheduled with David Tijerina for today for further denisse Agee RN  [facility]

## 2023-08-09 NOTE — PROGRESS NOTES
Assessment:      ICD-10-CM    1. Neuroma of second interspace of right foot  G57.61              Plan:  No orders of the defined types were placed in this encounter.      Discussed the etiology and treatment of the condition with the patient.  Imaging studies reviewed and discussed with the patient.  Discussed surgical and conservative options.      -Met pads  -NSAID- voltaren gel ok  -Orthoses- SuperFeet + met pads  -See AVS        Return:  No follow-ups on file.    Flori Whittington DPM                Chief Complaint:     No chief complaint on file.     right foot pain    HPI:  Kiesha Mcguire is a 52 year old year old female who presents for evaluation of foot pain.    Pain location- 3rd digit only    No 2nd or 4th digit pain    No joint or bunion pain but notices sub 2nd callus (bunion)  Had injection to neuroma before, did not help per pt    Went to go get orthotics, had a bad experience so didn't get them      Past Medical & Surgical History:  Past Medical History:   Diagnosis Date    Depressive disorder     Depressive disorder, not elsewhere classified     resolved    Herpes simplex without mention of complication     oral    IUD (intrauterine device) in place 08/15/2013    Mirena    Migraine headache with aura     very occass, sig aura, speech loss x1    Pure hypercholesterolemia     follows w BIPIN CHATMAN      Past Surgical History:   Procedure Laterality Date    COLONOSCOPY N/A 10/27/2021    Procedure: COLONOSCOPY, WITH POLYPECTOMY AND CLIP;  Surgeon: Og Gutierres MD;  Location: [facility] [place]     DILATION/CURETTAGE DIAG/THER NON OB  12/2006    LAPAROSCOPIC CHOLECYSTECTOMY N/A 12/24/2021    Procedure: CHOLECYSTECTOMY, LAPAROSCOPIC;  Surgeon: Denise Cast MD;  Location:  OR      Family History   Problem Relation Age of Onset    Hypertension Mother     Hyperlipidemia Mother     Depression Mother     Squamous cell carcinoma Mother     Thyroid Disease Mother         unclear dx    Hypertension  Father     Cancer Father 65        neuro endrocine CA, neck    Hyperlipidemia Father     Aortic aneurysm Maternal Grandfather          of ascending aortic aneurysm at age 64    Aortic aneurysm Maternal Aunt          in her 40s from ascending aortic aneurysm    C.A.D. No family hx of     Cancer - colorectal No family hx of     Diabetes No family hx of     Cerebrovascular Disease No family hx of     Breast Cancer No family hx of     Prostate Cancer No family hx of         Social History:  ?  History   Smoking Status    Former    Packs/day: 0.50    Years: 10.00    Types: Cigarettes    Quit date: 1994   Smokeless Tobacco    Never     History   Drug Use No     Social History    Substance and Sexual Activity      Alcohol use: Yes        Comment: very rare      Allergies:  ?   Allergies   Allergen Reactions    Triptans Unknown     imitrex    Amoxicillin-Pot Clavulanate Hives     Unsure if true reaction to med         Medications:    Current Outpatient Medications   Medication    Acetaminophen (TYLENOL PO)    Biotin 10 MG TABS tablet    CALCIUM-VITAMIN D-VITAMIN K PO    Collagen Hydrolysate POWD    Cyanocobalamin (VITAMIN B 12 PO)    diclofenac (VOLTAREN) 75 MG EC tablet    estradiol (VAGIFEM) 10 MCG TABS vaginal tablet    estradiol (VIVELLE-DOT) 0.025 MG/24HR bi-weekly patch    glucosamine-chondroitin 500-400 MG CAPS per capsule    hydroxychloroquine (PLAQUENIL) 200 MG tablet    levonorgestrel (MIRENA) 20 MCG/24HR IUD    lisdexamfetamine (VYVANSE) 10 MG capsule    [START ON 2023] lisdexamfetamine (VYVANSE) 10 MG capsule    Lysine 500 MG TABS    magnesium citrate solution    NONFORMULARY    NONFORMULARY    UNABLE TO FIND    UNABLE TO FIND    valACYclovir (VALTREX) 1000 mg tablet    valACYclovir (VALTREX) 500 MG tablet     No current facility-administered medications for this visit.       Physical Exam:  ?  Vitals:  There were no vitals taken for this visit.   General:  WD/WN, in NAD.  A&O x3.  Dermatologic:     Skin is intact, open lesions absent.   Skin texture, turgor is normal.  Vascular:  Pulses palpable bilateral.  Digital capillary refill time normal bilateral.  Skin temperature is normal bilateral.  Generalized edema- none right.  Focal edema- none  right.  Neurologic:    Gross sensation normal.  Oscar's Click absent 2nd, 3rd IMS with forefoot compression, R  Palpation 2nd IMS while performing reproduces 3rd digit pain  Gait and balance normal.  Musculoskeletal:  Palpation 2nd IMS reproduces 3rd digit pain - shoots into toe  No pain to palpation of sub 3rd or 4th mets, 3rd IMS, sub 2nd met, 1st MTPJ right.  Lesser MTPJ ROM full, smooth, not painful, R    Muscle strength 5/5  foot and ankle bilateral.    Stance:  RCSP Valgus bilateral.  Foot type:  Flexible valgus  Clinical deformity: hallux valugs > limitus    Imaging:   MRI independently reviewed and interpreted by myself today.   right foot dated 2022, reveal possibel 2nd IMS neuroma, 3rd IMS bursitis.      x-ray independently reviewed and interpreted by myself today.  Weight-bearing views right foot dated 11/23/22, reveal no lesser MTPJ degeneration, moderate hallux valgus w/ mild 1st MTPJ degeneration.

## 2023-08-09 NOTE — LETTER
8/9/2023         RE: Kiesha Mcguire  [address] [facility] [address]        Dear Colleague,    Thank you for referring your patient, Kiesha Mcguire, to the [facility]. Please see a copy of my visit note below.    Assessment:      ICD-10-CM    1. Neuroma of second interspace of right foot  G57.61              Plan:  No orders of the defined types were placed in this encounter.      Discussed the etiology and treatment of the condition with the patient.  Imaging studies reviewed and discussed with the patient.  Discussed surgical and conservative options.      -Met pads  -NSAID- voltaren gel ok  -Orthoses- SuperFeet + met pads  -See AVS        Return:  No follow-ups on file.    Flori Whittington DPM                Chief Complaint:     No chief complaint on file.     right foot pain    HPI:  Kiesha Mcguire is a 52 year old year old female who presents for evaluation of foot pain.    Pain location- 3rd digit only    No 2nd or 4th digit pain    No joint or bunion pain but notices sub 2nd callus (bunion)  Had injection to neuroma before, did not help per pt    Went to go get orthotics, had a bad experience so didn't get them      Past Medical & Surgical History:  Past Medical History:   Diagnosis Date     Depressive disorder      Depressive disorder, not elsewhere classified     resolved     Herpes simplex without mention of complication     oral     IUD (intrauterine device) in place 08/15/2013    Mirena     Migraine headache with aura     very occass, sig aura, speech loss x1     Pure hypercholesterolemia     follows w BIPIN CHATMAN      Past Surgical History:   Procedure Laterality Date     COLONOSCOPY N/A 10/27/2021    Procedure: COLONOSCOPY, WITH POLYPECTOMY AND CLIP;  Surgeon: Og Gutierres MD;  Location: [facility] OR      DILATION/CURETTAGE DIAG/THER NON OB  12/2006     LAPAROSCOPIC CHOLECYSTECTOMY N/A 12/24/2021    Procedure: CHOLECYSTECTOMY, LAPAROSCOPIC;   Surgeon: Denise Cast MD;  Location:  OR      Family History   Problem Relation Age of Onset     Hypertension Mother      Hyperlipidemia Mother      Depression Mother      Squamous cell carcinoma Mother      Thyroid Disease Mother         unclear dx     Hypertension Father      Cancer Father 65        neuro endrocine CA, neck     Hyperlipidemia Father      Aortic aneurysm Maternal Grandfather          of ascending aortic aneurysm at age 64     Aortic aneurysm Maternal Aunt          in her 40s from ascending aortic aneurysm     C.A.D. No family hx of      Cancer - colorectal No family hx of      Diabetes No family hx of      Cerebrovascular Disease No family hx of      Breast Cancer No family hx of      Prostate Cancer No family hx of         Social History:  ?  History   Smoking Status     Former     Packs/day: 0.50     Years: 10.00     Types: Cigarettes     Quit date: 1994   Smokeless Tobacco     Never     History   Drug Use No     Social History    Substance and Sexual Activity      Alcohol use: Yes        Comment: very rare      Allergies:  ?   Allergies   Allergen Reactions     Triptans Unknown     imitrex     Amoxicillin-Pot Clavulanate Hives     Unsure if true reaction to med         Medications:    Current Outpatient Medications   Medication     Acetaminophen (TYLENOL PO)     Biotin 10 MG TABS tablet     CALCIUM-VITAMIN D-VITAMIN K PO     Collagen Hydrolysate POWD     Cyanocobalamin (VITAMIN B 12 PO)     diclofenac (VOLTAREN) 75 MG EC tablet     estradiol (VAGIFEM) 10 MCG TABS vaginal tablet     estradiol (VIVELLE-DOT) 0.025 MG/24HR bi-weekly patch     glucosamine-chondroitin 500-400 MG CAPS per capsule     hydroxychloroquine (PLAQUENIL) 200 MG tablet     levonorgestrel (MIRENA) 20 MCG/24HR IUD     lisdexamfetamine (VYVANSE) 10 MG capsule     [START ON 2023] lisdexamfetamine (VYVANSE) 10 MG capsule     Lysine 500 MG TABS     magnesium citrate solution     NONFORMULARY      NONFORMULARY     UNABLE TO FIND     UNABLE TO FIND     valACYclovir (VALTREX) 1000 mg tablet     valACYclovir (VALTREX) 500 MG tablet     No current facility-administered medications for this visit.       Physical Exam:  ?  Vitals:  There were no vitals taken for this visit.   General:  WD/WN, in NAD.  A&O x3.  Dermatologic:    Skin is intact, open lesions absent.   Skin texture, turgor is normal.  Vascular:  Pulses palpable bilateral.  Digital capillary refill time normal bilateral.  Skin temperature is normal bilateral.  Generalized edema- none right.  Focal edema- none  right.  Neurologic:    Gross sensation normal.  Oscar's Click absent 2nd, 3rd IMS with forefoot compression, R  Palpation 2nd IMS while performing reproduces 3rd digit pain  Gait and balance normal.  Musculoskeletal:  Palpation 2nd IMS reproduces 3rd digit pain - shoots into toe  No pain to palpation of sub 3rd or 4th mets, 3rd IMS, sub 2nd met, 1st MTPJ right.  Lesser MTPJ ROM full, smooth, not painful, R    Muscle strength 5/5  foot and ankle bilateral.    Stance:  RCSP Valgus bilateral.  Foot type:  Flexible valgus  Clinical deformity: hallux valugs > limitus    Imaging:   MRI independently reviewed and interpreted by myself today.   right foot dated 2022, reveal possibel 2nd IMS neuroma, 3rd IMS bursitis.      x-ray independently reviewed and interpreted by myself today.  Weight-bearing views right foot dated 11/23/22, reveal no lesser MTPJ degeneration, moderate hallux valgus w/ mild 1st MTPJ degeneration.                  Again, thank you for allowing me to participate in the care of your patient.        Sincerely,        Flori Whittington DPM

## 2023-08-10 ENCOUNTER — VIRTUAL VISIT (OUTPATIENT)
Dept: PSYCHOLOGY | Facility: CLINIC | Age: 53
End: 2023-08-10
Payer: COMMERCIAL

## 2023-08-10 DIAGNOSIS — F32.A DEPRESSIVE DISORDER: ICD-10-CM

## 2023-08-10 DIAGNOSIS — F43.89 REACTION TO CHRONIC STRESS: ICD-10-CM

## 2023-08-10 DIAGNOSIS — F41.1 GENERALIZED ANXIETY DISORDER: Primary | ICD-10-CM

## 2023-08-10 PROCEDURE — 90834 PSYTX W PT 45 MINUTES: CPT | Mod: VID | Performed by: SOCIAL WORKER

## 2023-08-10 ASSESSMENT — PATIENT HEALTH QUESTIONNAIRE - PHQ9
10. IF YOU CHECKED OFF ANY PROBLEMS, HOW DIFFICULT HAVE THESE PROBLEMS MADE IT FOR YOU TO DO YOUR WORK, TAKE CARE OF THINGS AT HOME, OR GET ALONG WITH OTHER PEOPLE: SOMEWHAT DIFFICULT
SUM OF ALL RESPONSES TO PHQ QUESTIONS 1-9: 3
SUM OF ALL RESPONSES TO PHQ QUESTIONS 1-9: 3

## 2023-08-10 NOTE — PROGRESS NOTES
[facility]                                     Progress Note    Patient Name: Kiesha Mcguire  Date: 8/10/2023       Service Type: Individual      Session Start Time: 1:03 PM Session End Time: 1:50 PM     Session Length: 38-52 minutes    Session #: 37 with this provider    Attendees: Client attended alone    Service Modality:  Video Visit:      Provider verified identity through the following two step process.  Patient provided:  Patient is known previously to provider    Telemedicine Visit: The patient's condition can be safely assessed and treated via synchronous audio and visual telemedicine encounter.      Reason for Telemedicine Visit: Patient convenience (e.g. access to timely appointments / distance to available provider) and Services only offered telehealth    Originating Site (Patient Location): Patient's home    Distant Site (Provider Location): Provider Remote Setting- Home Office/Off-site    Consent:  The patient/guardian has verbally consented to: the potential risks and benefits of telemedicine (video visit) versus in person care; bill my insurance or make self-payment for services provided; and responsibility for payment of non-covered services.     Patient would like the video invitation sent by:  [employer]    Mode of Communication:  Video Conference via [employer]    As the provider I attest to compliance with applicable laws and regulations related to telemedicine.    DATA  Interactive Complexity: No   Crisis: No      Progress Since Last Session (Related to Symptoms / Goals / Homework):  Symptoms:  client reported increase in feeling on edge when taking Vyvanse  Low motivation, feeling lonely, down due to feeling sick    Homework: Completed in session      Episode of Care Goals: Satisfactory progress - ACTION (Actively working towards change); Intervened by reinforcing change plan / affirming steps taken     Current / Ongoing Stressors and Concerns:   Grief related to past and  "impact on herself and her children   Difficulty trusting others and being vulnerable   Ambivalence about having more intimate relationships   Fear of being rejected by others; belief she is not good enough   Lack of trust with providers  Dynamics in relationship with ex-  Lack of support system  Son's health      Treatment Objective(s) Addressed in This Session:   identify 2 fears / thoughts that contribute to feeling anxious  Increase interest, engagement, and pleasure in doing things  Decrease frequency and intensity of feeling down, depressed, hopeless  Feel less tired and more energy during the day   Identify negative self-talk and behaviors: challenge core beliefs, myths, and actions     Safe/calm state titled \"calm\"  Container: box with a lock    Potential Targets:   Relationship with ex-  Guilt about how divorce ended, Negative Belief \"It's my fault\"  Guilt about son's surgery and outcome  Negative Belief: There is something wrong with me  Being blamed by ex-boyfriend, feel shame  Childhood: not belonging, not being good enough      Current Potential Targets  Absence of friendships  Not fitting in  Defensiveness  Ex on social media  Driving in certain areas will remind her of her ex  Relationship with ex-  Need to be perfect    Past:   image of child self at 8 years old   Disagreement about salad   [facility]   head injury    Trauma symptoms: avoids places in neighborhood for fear of running into ex-boyfriend  Has avoided romantic relationships since  Dissociative symptoms while in relationship  Recurrent, intrusive, distressing memories  Problems with concentration     Intervention:   CBT: identified feelings, cognitions, and behaviors ,    Explored ambivalence about interpersonal relationships   Reviewed flowsheets      Assessments completed prior to visit:  The following assessments were completed by patient for this visit:  PHQ9:       12/22/2016     2:17 PM 9/23/2019 "    10:44 AM 10/12/2020     5:39 PM 4/6/2022     1:00 PM 8/11/2022     8:53 AM 2/27/2023     9:52 AM 8/10/2023    12:49 PM   PHQ-9 SCORE   PHQ-9 Total Score MyChart     4 (Minimal depression) 2 (Minimal depression) 3 (Minimal depression)   PHQ-9 Total Score 0 0 0 2 4 2 3     PROMIS 10-Global Health (only subscores and total score):       8/25/2022     9:51 AM 12/7/2022    12:04 PM 12/21/2022     1:01 PM 12/29/2022     9:56 AM 4/11/2023     1:09 PM 4/25/2023     9:54 AM 8/10/2023    12:52 PM   PROMIS-10 Scores Only   Global Mental Health Score 13 14 14 13 13 13 13   Global Physical Health Score 18 17 17 16 16 17 17   PROMIS TOTAL - SUBSCORES 31 31 31 29 29 30 30        ASSESSMENT: Current Emotional / Mental Status (status of significant symptoms):   Risk status (Self / Other harm or suicidal ideation)   Patient denies current fears or concerns for personal safety.   Patient denies current or recent suicidal ideation or behaviors.   Patient denies current or recent homicidal ideation or behaviors.   Patient denies current or recent self injurious behavior or ideation.   Patient denies other safety concerns.   Patient reports there has been no change in risk factors since their last session.     Patient reports there has been no change in protective factors since their last session.     Recommended that patient call 911 or go to the local ED should there be a change in any of these risk factors.     Appearance:   Appropriate    Eye Contact:   Good    Psychomotor Behavior: Normal    Attitude:   Cooperative  Interested Pleasant    Orientation:   All   Speech    Rate / Production: Normal/ Responsive    Volume:  Normal    Mood:    Anxious  Ambivalence Down   Affect:    Tearful -minimal, Appropriate   Thought Content:  Clear    Thought Form:  Coherent  Goal Directed    Insight:    Good      Medication Review:   Changes to psychiatric medications, see updated Medication List in EPIC.    Jasvir Willams-added, client is not  taking daily    Estrogen patch added yesterday     Medication Compliance:   Yes     Changes in Health Issues:   None reported     Chemical Use Review:   Substance Use: no use     Tobacco Use: no use    Diagnosis:  Generalized Anxiety Disorder   Other Specified Trauma and Stress Related Disorder   Unspecified Depressive Disorder     Collateral Reports Completed:   Not Applicable    PLAN: (Patient Tasks / Therapist Tasks / Other)  EMDR:  Plan for next session to have client keep her eyes open.    Next Target: divorce, negative belief: I am a failure/I did something wrong  Client is reading the following books; Co-regulation handbook, pathological demand avoidance, and Declarative language  Client is considering tracking mood and journaling.  Client would like to increase engagement in exercise and improve diet.    Magali Montilla, [facility] 8/10/2023    ______________________________________________________________________    Individual Treatment Plan    Patient's Name: Kiesha Mcguire  YOB: 1970    Date of Creation: 5/11/2022  Date Treatment Plan Last Reviewed/Revised: 7/28/2023    DSM5 Diagnoses:    Generalized Anxiety Disorder  Other Specified Trauma and Stress Related Disorder   Unspecified Depressive Disorder     Psychosocial / Contextual Factors: stressors related to parenting  PROMIS (reviewed every 90 days):  31    Referral / Collaboration:  Referral to another professional/service is not indicated at this time..    Anticipated number of session for this episode of care: will review in 90 days  Anticipation frequency of session: Every other week  Anticipated Duration of each session: 38-52 minutes  Treatment plan will be reviewed in 90 days or when goals have been changed.       MeasurableTreatment Goal(s) related to diagnosis / functional impairment(s)  Goal 1: Patient will sustain attention and concentration for consistently longer periods of time.  Patient will meet goals set for completing  "tasks 80% of the time.   Client's Goal:  I will know I've met my goal when my space isn't so chaotic, meeting deadlines around bills and work, when I am not always playing catch-up, completing tasks.      Objective #A (Patient Action)    Patient will learn and implement organization and planning skills.  Status: New - Date: 5/11/2022 , continued date: 9/8/2022, continued date: 12/21/2022, completed date: 3/29/2023    Intervention(s)  Therapist will teach the client organization and planning skills.  Therapist will address any potential barriers to using skills.    Objective #B  Patient will  identify, challenge, and change self-talk that contributes to maladaptive feelings and actions .  Status: New - Date: 5/11/2022 , Continued date: 9/8/2022, continued date: 12/21/2022, continued date: 3/29/2023, continued date: 7/28/2023    Intervention(s)  Therapist will teach the CBT model, cognitive distortions, and cognitive restructuring techniques.      Goal 2: Patient will be able to recall the traumatic events without becoming overwhelmed with negative thoughts, feelings, or urges.   Client's Goal:  I will know I've met my goal when I do not cry every time I talk about it.\"    Objective #A (Patient Action)    Status: New - Date: 5/11/2022, continued date: 9/8/2022, continued date: 12/21/2022, continued date: 3/29/2023, continued date: 7/28/2023    Patient will describe the history of and nature of trauma symptoms    Intervention(s)  Therapist will assess the client's frequency, intensity, duration, and history of trauma symptoms and their impact on functioning.    Objective #B (Patient Action)  Status: New Date: 5/11/2022, continued date: 9/8/2022, continued date: 12/21/2022, continued date: 3/29/2023, continued date: 7/28/2023    Patient will cooperate with eye movement desensitization and reprocessing (EMDR) to reduce emotional reaction to traumatic event(s)    Intervention(s)  Therapist will utilize EMDR to reduce the " client's emotional reactivity to the traumatic event(s).    Objective #C (Patient Action)  Status: New Date: 5/11/2022, continued date: 9/8/2022, continued date: 12/21/2022, continued date: 3/29/2023, continued date: 7/28/2023    Patient will learn and implement calming and coping strategies to manage trauma symptoms.    Intervention(s)  Therapist will teach grounding techniques, distress tolerance, and emotion regulation techniques.      Patient has reviewed and agreed to the above plan.      Magali Montilla, [facility]  May 11, 2022  Magali Montilla, [facility]  9/8/2022  Magali Montilla, [facility]  12/21/2022  Magali Montilla, [facility]  3/29/2023  Magali Montilla, [facility]  7/28/2023

## 2023-08-14 ENCOUNTER — OFFICE VISIT (OUTPATIENT)
Dept: FAMILY MEDICINE | Facility: CLINIC | Age: 53
End: 2023-08-14
Payer: COMMERCIAL

## 2023-08-14 VITALS
OXYGEN SATURATION: 100 % | TEMPERATURE: 97.9 F | BODY MASS INDEX: 24.03 KG/M2 | SYSTOLIC BLOOD PRESSURE: 99 MMHG | HEART RATE: 73 BPM | WEIGHT: 138.2 LBS | DIASTOLIC BLOOD PRESSURE: 65 MMHG

## 2023-08-14 DIAGNOSIS — J40 BRONCHITIS: Primary | ICD-10-CM

## 2023-08-14 PROCEDURE — 99213 OFFICE O/P EST LOW 20 MIN: CPT | Performed by: FAMILY MEDICINE

## 2023-08-14 RX ORDER — AZITHROMYCIN 250 MG/1
TABLET, FILM COATED ORAL
Qty: 6 TABLET | Refills: 0 | Status: SHIPPED | OUTPATIENT
Start: 2023-08-14 | End: 2023-08-19

## 2023-08-14 ASSESSMENT — ENCOUNTER SYMPTOMS: COUGH: 1

## 2023-08-14 ASSESSMENT — PAIN SCALES - GENERAL: PAINLEVEL: NO PAIN (0)

## 2023-08-14 NOTE — PROGRESS NOTES
Assessment & Plan     Bronchitis  SEE EPIC care orders  The potential side effects of this medication have been discussed with the patient.  Call if any significant problems with these are experienced.  Hold Plaquanil while on thsi medicines for a week  Follow up 2 weeks if not better  - azithromycin (ZITHROMAX) 250 MG tablet; Take 2 tablets (500 mg) by mouth daily for 1 day, THEN 1 tablet (250 mg) daily for 4 days.  Mariza Rey MD  [facility] SANGEETHA Pop is a 52 year old, presenting for the following health issues:  Cough (X 3 weeks)      8/14/2023    11:33 AM   Additional Questions   Roomed by Tamika Quick    History of Present Illness       Reason for visit:  Chest cough three weeks (Patient has had 3 home negative covid test and negative strep test at [facility].)  Symptom onset:  3-4 weeks ago  Symptoms include:  Chest cough  Symptom intensity:  Moderate  Symptom progression:  Staying the same  Had these symptoms before:  Yes  Has tried/received treatment for these symptoms:  Yes  Previous treatment was successful:  Yes  Prior treatment description:  Antibiotics  What makes it worse:  No  What makes it better:  No    She eats 2-3 servings of fruits and vegetables daily.She consumes 1 sweetened beverage(s) daily.She exercises with enough effort to increase her heart rate 9 or less minutes per day.  She exercises with enough effort to increase her heart rate 3 or less days per week.   She is taking medications regularly.   No fever or chills  No sob  No sinus congestion  She does not smoke  No known allergies    Review of Systems   Respiratory:  Positive for cough.       CONSTITUTIONAL: NEGATIVE for fever, chills, change in weight  INTEGUMENTARY/SKIN: NEGATIVE for worrisome rashes, moles or lesions  ENT/MOUTH: as above  RESP: NEGATIVE for significant cough or SOB  CV: NEGATIVE for chest pain, palpitations or peripheral edema  GI: NEGATIVE for nausea, abdominal pain,  heartburn, or change in bowel habits      Objective    BP 99/65   Pulse 73   Temp 97.9  F (36.6  C) (Temporal)   Wt 62.7 kg (138 lb 3.2 oz)   SpO2 100%   BMI 24.03 kg/m    Body mass index is 24.03 kg/m .  Physical Exam   GENERAL: healthy, alert and no distress  EYES: Eyes grossly normal to inspection  HENT: ear canals and TM's normal, nose and mouth without ulcers or lesions  NECK: no adenopathy, no asymmetry, masses, or scars and thyroid normal to palpation  RESP: lungs clear to auscultation - no rales, rhonchi or wheezes  CV: regular rate and rhythm, normal S1 S2, no S3 or S4, no murmur, click or rub, no peripheral edema and peripheral pulses strong  ABDOMEN: soft, nontender, no hepatosplenomegaly, no masses and bowel sounds normal  MS: no gross musculoskeletal defects noted, no edema    none

## 2023-08-24 ENCOUNTER — VIRTUAL VISIT (OUTPATIENT)
Dept: PSYCHOLOGY | Facility: CLINIC | Age: 53
End: 2023-08-24
Payer: COMMERCIAL

## 2023-08-24 DIAGNOSIS — F41.1 GENERALIZED ANXIETY DISORDER: Primary | ICD-10-CM

## 2023-08-24 DIAGNOSIS — F43.89 REACTION TO CHRONIC STRESS: ICD-10-CM

## 2023-08-24 PROCEDURE — 90834 PSYTX W PT 45 MINUTES: CPT | Mod: VID | Performed by: SOCIAL WORKER

## 2023-08-24 NOTE — PROGRESS NOTES
[facility]                                     Progress Note    Patient Name: Kiesha Mcguire  Date: 8/24/2023       Service Type: Individual      Session Start Time: 11:02 AM Session End Time: 11:46 AM     Session Length: 38-52 minutes    Session #: 38 with this provider    Attendees: Client attended alone    Service Modality:  Video Visit:      Provider verified identity through the following two step process.  Patient provided:  Patient is known previously to provider    Telemedicine Visit: The patient's condition can be safely assessed and treated via synchronous audio and visual telemedicine encounter.      Reason for Telemedicine Visit: Patient convenience (e.g. access to timely appointments / distance to available provider) and Services only offered telehealth    Originating Site (Patient Location): Patient's home    Distant Site (Provider Location): Provider Remote Setting- Home Office/Off-site    Consent:  The patient/guardian has verbally consented to: the potential risks and benefits of telemedicine (video visit) versus in person care; bill my insurance or make self-payment for services provided; and responsibility for payment of non-covered services.     Patient would like the video invitation sent by:  TeleFlip    Mode of Communication:  Video Conference via Hipui    As the provider I attest to compliance with applicable laws and regulations related to telemedicine.    DATA  Interactive Complexity: No   Crisis: No      Progress Since Last Session (Related to Symptoms / Goals / Homework):  Symptoms:  client reported less anxiety    Homework: Completed in session      Episode of Care Goals: Satisfactory progress - ACTION (Actively working towards change); Intervened by reinforcing change plan / affirming steps taken     Current / Ongoing Stressors and Concerns:   Grief related to past and impact on herself and her children   Difficulty trusting others and being  "vulnerable   Ambivalence about having more intimate relationships   Fear of being rejected by others; belief she is not good enough   Lack of trust with providers  Dynamics in relationship with ex-  Lack of support system  Son's health  Upcoming trip      Treatment Objective(s) Addressed in This Session:   identify 2 fears / thoughts that contribute to feeling anxious   Setting boundaries    Safe/calm state titled \"calm\"  Container: box with a lock    Potential Targets:   Relationship with ex-  Guilt about how divorce ended, Negative Belief \"It's my fault\"  Guilt about son's surgery and outcome  Negative Belief: There is something wrong with me  Being blamed by ex-boyfriend, feel shame  Childhood: not belonging, not being good enough      Current Potential Targets  Absence of friendships  Not fitting in  Defensiveness  Ex on social media  Driving in certain areas will remind her of her ex  Relationship with ex-  Need to be perfect    Past:   image of child self at 8 years old   Disagreement about salad   [facility]   head injury    Trauma symptoms: avoids places in neighborhood for fear of running into ex-boyfriend  Has avoided romantic relationships since  Dissociative symptoms while in relationship  Recurrent, intrusive, distressing memories  Problems with concentration     Intervention:   Provided psychoeducation on anxiety ,    Offered parenting support and validation    Assessments completed prior to visit:  The following assessments were completed by patient for this visit:  None    ASSESSMENT: Current Emotional / Mental Status (status of significant symptoms):   Risk status (Self / Other harm or suicidal ideation)   Patient denies current fears or concerns for personal safety.   Patient denies current or recent suicidal ideation or behaviors.   Patient denies current or recent homicidal ideation or behaviors.   Patient denies current or recent self injurious behavior or " ideation.   Patient denies other safety concerns.   Patient reports there has been no change in risk factors since their last session.     Patient reports there has been no change in protective factors since their last session.     Recommended that patient call 911 or go to the local ED should there be a change in any of these risk factors.     Appearance:   Appropriate    Eye Contact:   Good    Psychomotor Behavior: Normal    Attitude:   Cooperative  Interested Pleasant    Orientation:   All   Speech    Rate / Production: Normal/ Responsive    Volume:  Normal    Mood:    Anxious  Sad     Affect:    Tearful -minimal, Mood Congruent   Thought Content:  Clear    Thought Form:  Coherent  Goal Directed  Logical    Insight:    Good      Medication Review:   No changes to current psychiatric medication(s)   Vyvanse   Buspar-added, client is not taking daily    Estrogen patch added two weeks ago     Medication Compliance:   Yes     Changes in Health Issues:   None reported     Chemical Use Review:   Substance Use: no use     Tobacco Use: no use    Diagnosis:  Generalized Anxiety Disorder   Other Specified Trauma and Stress Related Disorder   Unspecified Depressive Disorder     Collateral Reports Completed:   Not Applicable    PLAN: (Patient Tasks / Therapist Tasks / Other)  EMDR:  Plan for next session to have client keep her eyes open.    Next Target: divorce, negative belief: I am a failure/I did something wrong  Client is reading the following books; Co-regulation handbook, pathological demand avoidance, and Declarative language  Therapist sent client the following resource, Anxious Kids Anxious Parents by Huber Valdez and Zenaida Montilla, [facility] 8/24/2023    ______________________________________________________________________    Individual Treatment Plan    Patient's Name: Kiesha Mcguire  YOB: 1970    Date of Creation: 5/11/2022  Date Treatment Plan Last Reviewed/Revised:  7/28/2023    DSM5 Diagnoses:    Generalized Anxiety Disorder  Other Specified Trauma and Stress Related Disorder   Unspecified Depressive Disorder     Psychosocial / Contextual Factors: stressors related to parenting  PROMIS (reviewed every 90 days):  31    Referral / Collaboration:  Referral to another professional/service is not indicated at this time..    Anticipated number of session for this episode of care: will review in 90 days  Anticipation frequency of session: Every other week  Anticipated Duration of each session: 38-52 minutes  Treatment plan will be reviewed in 90 days or when goals have been changed.       MeasurableTreatment Goal(s) related to diagnosis / functional impairment(s)  Goal 1: Patient will sustain attention and concentration for consistently longer periods of time.  Patient will meet goals set for completing tasks 80% of the time.   Client's Goal:  I will know I've met my goal when my space isn't so chaotic, meeting deadlines around bills and work, when I am not always playing catch-up, completing tasks.      Objective #A (Patient Action)    Patient will learn and implement organization and planning skills.  Status: New - Date: 5/11/2022 , continued date: 9/8/2022, continued date: 12/21/2022, completed date: 3/29/2023    Intervention(s)  Therapist will teach the client organization and planning skills.  Therapist will address any potential barriers to using skills.    Objective #B  Patient will  identify, challenge, and change self-talk that contributes to maladaptive feelings and actions .  Status: New - Date: 5/11/2022 , Continued date: 9/8/2022, continued date: 12/21/2022, continued date: 3/29/2023, continued date: 7/28/2023    Intervention(s)  Therapist will teach the CBT model, cognitive distortions, and cognitive restructuring techniques.      Goal 2: Patient will be able to recall the traumatic events without becoming overwhelmed with negative thoughts, feelings, or urges.    "Client's Goal:  I will know I've met my goal when I do not cry every time I talk about it.\"    Objective #A (Patient Action)    Status: New - Date: 5/11/2022, continued date: 9/8/2022, continued date: 12/21/2022, continued date: 3/29/2023, continued date: 7/28/2023    Patient will describe the history of and nature of trauma symptoms    Intervention(s)  Therapist will assess the client's frequency, intensity, duration, and history of trauma symptoms and their impact on functioning.    Objective #B (Patient Action)  Status: New Date: 5/11/2022, continued date: 9/8/2022, continued date: 12/21/2022, continued date: 3/29/2023, continued date: 7/28/2023    Patient will cooperate with eye movement desensitization and reprocessing (EMDR) to reduce emotional reaction to traumatic event(s)    Intervention(s)  Therapist will utilize EMDR to reduce the client's emotional reactivity to the traumatic event(s).    Objective #C (Patient Action)  Status: New Date: 5/11/2022, continued date: 9/8/2022, continued date: 12/21/2022, continued date: 3/29/2023, continued date: 7/28/2023    Patient will learn and implement calming and coping strategies to manage trauma symptoms.    Intervention(s)  Therapist will teach grounding techniques, distress tolerance, and emotion regulation techniques.      Patient has reviewed and agreed to the above plan.      Magali Montilla, [facility]  May 11, 2022  Magali Montilla, [facility]  9/8/2022  Magali Montilla, [facility]  12/21/2022  Magali Montilla, [facility]  3/29/2023  Magali Montilla, [facility]  7/28/2023  "

## 2023-08-30 DIAGNOSIS — F90.0 ATTENTION DEFICIT HYPERACTIVITY DISORDER (ADHD), PREDOMINANTLY INATTENTIVE TYPE: ICD-10-CM

## 2023-08-30 NOTE — TELEPHONE ENCOUNTER
Received a fax from the patient's pharmacy stating that they need a new hard copy sent to the pharmacy due to the medication being inactivated.     Medication: Busbar 10mg    Elyssa Cerna [facility]

## 2023-08-30 NOTE — TELEPHONE ENCOUNTER
I talked to the pharmacy and it had been taken off the patients list and it needs to be reordered.    Elyssa Cerna [facility]

## 2023-08-31 ENCOUNTER — THERAPY VISIT (OUTPATIENT)
Dept: PHYSICAL THERAPY | Facility: CLINIC | Age: 53
End: 2023-08-31
Payer: COMMERCIAL

## 2023-08-31 ENCOUNTER — MYC MEDICAL ADVICE (OUTPATIENT)
Dept: FAMILY MEDICINE | Facility: CLINIC | Age: 53
End: 2023-08-31

## 2023-08-31 DIAGNOSIS — M62.89 PELVIC FLOOR DYSFUNCTION: Primary | ICD-10-CM

## 2023-08-31 PROCEDURE — 97530 THERAPEUTIC ACTIVITIES: CPT | Mod: GP | Performed by: PHYSICAL THERAPIST

## 2023-08-31 PROCEDURE — 97110 THERAPEUTIC EXERCISES: CPT | Mod: GP | Performed by: PHYSICAL THERAPIST

## 2023-08-31 RX ORDER — BUSPIRONE HYDROCHLORIDE 10 MG/1
10 TABLET ORAL 2 TIMES DAILY
Qty: 60 TABLET | Refills: 2 | Status: SHIPPED | OUTPATIENT
Start: 2023-08-31

## 2023-08-31 NOTE — TELEPHONE ENCOUNTER
Dr. Trejo,    Looks like med was stopped by patient. She is now requesting this from pharm. Ok to send?    Thanks,  YOCASTA Stuart  [facility]

## 2023-09-07 ENCOUNTER — VIRTUAL VISIT (OUTPATIENT)
Dept: PSYCHOLOGY | Facility: CLINIC | Age: 53
End: 2023-09-07
Payer: COMMERCIAL

## 2023-09-07 DIAGNOSIS — F41.1 GENERALIZED ANXIETY DISORDER: Primary | ICD-10-CM

## 2023-09-07 DIAGNOSIS — F43.89 REACTION TO CHRONIC STRESS: ICD-10-CM

## 2023-09-07 DIAGNOSIS — F32.A DEPRESSIVE DISORDER: ICD-10-CM

## 2023-09-07 PROCEDURE — 90834 PSYTX W PT 45 MINUTES: CPT | Mod: VID | Performed by: SOCIAL WORKER

## 2023-09-07 NOTE — PROGRESS NOTES
[facility]                                     Progress Note    Patient Name: Kiesha Mcguire  Date: 9/7/2023       Service Type: Individual      Session Start Time: 10:33 AM Session End Time: 11:19 AM     Session Length: 38-52 minutes    Session #: 39 with this provider    Attendees: Client attended alone    Service Modality:  Video Visit:      Provider verified identity through the following two step process.  Patient provided:  Patient is known previously to provider    Telemedicine Visit: The patient's condition can be safely assessed and treated via synchronous audio and visual telemedicine encounter.      Reason for Telemedicine Visit: Patient convenience (e.g. access to timely appointments / distance to available provider) and Services only offered telehealth    Originating Site (Patient Location): Patient's home    Distant Site (Provider Location): Provider Remote Setting- Home Office/Off-site    Consent:  The patient/guardian has verbally consented to: the potential risks and benefits of telemedicine (video visit) versus in person care; bill my insurance or make self-payment for services provided; and responsibility for payment of non-covered services.     Patient would like the video invitation sent by:  [employer]er    Mode of Communication:  Video Conference via TrabajoPanel    As the provider I attest to compliance with applicable laws and regulations related to telemedicine.    DATA  Interactive Complexity: No   Crisis: No      Progress Since Last Session (Related to Symptoms / Goals / Homework):  Symptoms:  anxiety     Homework: Achieved / completed to satisfaction      Episode of Care Goals: Satisfactory progress - ACTION (Actively working towards change); Intervened by reinforcing change plan / affirming steps taken     Current / Ongoing Stressors and Concerns:   Grief related to past and impact on herself and her children   Difficulty trusting others and being  "vulnerable   Ambivalence about having more intimate relationships   Fear of being rejected by others; belief she is not good enough   Lack of trust with providers  Dynamics in relationship with ex-  Lack of support system  Son's health, school absences   Upcoming trip      Treatment Objective(s) Addressed in This Session:   identify 2 fears / thoughts that contribute to feeling anxious   Increase engagement in self-care  Setting boundaries    Safe/calm state titled \"calm\"  Container: box with a lock    Potential Targets:   Relationship with ex-  Guilt about how divorce ended, Negative Belief \"It's my fault\"  Guilt about son's surgery and outcome  Negative Belief: There is something wrong with me  Being blamed by ex-boyfriend, feel shame  Childhood: not belonging, not being good enough      Current Potential Targets  Absence of friendships  Not fitting in  Defensiveness  Ex on social media  Driving in certain areas will remind her of her ex  Relationship with ex-  Need to be perfect    Past:   image of child self at 8 years old   Disagreement about salad   [facility]   head injury    Trauma symptoms: avoids places in neighborhood for fear of running into ex-boyfriend  Has avoided romantic relationships since  Dissociative symptoms while in relationship  Recurrent, intrusive, distressing memories  Problems with concentration     Intervention:   Engaged client in problem solving ,    Offered support and validation about recent stressors   Modeled  boundary setting    Assessments completed prior to visit:  The following assessments were completed by patient for this visit:  None    ASSESSMENT: Current Emotional / Mental Status (status of significant symptoms):   Risk status (Self / Other harm or suicidal ideation)   Patient denies current fears or concerns for personal safety.   Patient denies current or recent suicidal ideation or behaviors.   Patient denies current or recent " homicidal ideation or behaviors.   Patient denies current or recent self injurious behavior or ideation.   Patient denies other safety concerns.   Patient reports there has been no change in risk factors since their last session.     Patient reports there has been no change in protective factors since their last session.     Recommended that patient call 911 or go to the local ED should there be a change in any of these risk factors.     Appearance:   Appropriate    Eye Contact:   Good    Psychomotor Behavior: Normal    Attitude:   Cooperative  Interested Pleasant    Orientation:   All   Speech    Rate / Production: Talkative    Volume:  Normal    Mood:    Anxious     Affect:    Mood Congruent     Thought Content:  Clear    Thought Form:  Coherent  Goal Directed  Logical  Tangential    Insight:    Good      Medication Review:   No changes to current psychiatric medication(s)   Vyvanse   Buspar-added, client is not taking daily    Estrogen patch added two weeks ago     Medication Compliance:   Yes     Changes in Health Issues:   None reported     Chemical Use Review:   Substance Use: no use     Tobacco Use: no use    Diagnosis:  Generalized Anxiety Disorder   Other Specified Trauma and Stress Related Disorder   Unspecified Depressive Disorder     Collateral Reports Completed:   Not Applicable    PLAN: (Patient Tasks / Therapist Tasks / Other)  EMDR:  Plan for next session to have client keep her eyes open.    Next Target: divorce, negative belief: I am a failure/I did something wrong  Client is reading the following books; Co-regulation handbook, pathological demand avoidance, and Declarative language  Therapist previously sent client the following resource, Anxious Kids Anxious Parents by Huber Valdez and Zenaida Flores  Client is considering creating a pros and cons list.  Client will continue to manage anxiety symptoms and identify ways to increase self-care.    Magali Montilla,  LICSW 9/7/2023    ______________________________________________________________________    Individual Treatment Plan    Patient's Name: Kiesha Mcguire  YOB: 1970    Date of Creation: 5/11/2022  Date Treatment Plan Last Reviewed/Revised: 7/28/2023    DSM5 Diagnoses:    Generalized Anxiety Disorder  Other Specified Trauma and Stress Related Disorder   Unspecified Depressive Disorder     Psychosocial / Contextual Factors: stressors related to parenting  PROMIS (reviewed every 90 days):  31    Referral / Collaboration:  Referral to another professional/service is not indicated at this time..    Anticipated number of session for this episode of care: will review in 90 days  Anticipation frequency of session: Every other week  Anticipated Duration of each session: 38-52 minutes  Treatment plan will be reviewed in 90 days or when goals have been changed.       MeasurableTreatment Goal(s) related to diagnosis / functional impairment(s)  Goal 1: Patient will sustain attention and concentration for consistently longer periods of time.  Patient will meet goals set for completing tasks 80% of the time.   Client's Goal:  I will know I've met my goal when my space isn't so chaotic, meeting deadlines around bills and work, when I am not always playing catch-up, completing tasks.      Objective #A (Patient Action)    Patient will learn and implement organization and planning skills.  Status: New - Date: 5/11/2022 , continued date: 9/8/2022, continued date: 12/21/2022, completed date: 3/29/2023    Intervention(s)  Therapist will teach the client organization and planning skills.  Therapist will address any potential barriers to using skills.    Objective #B  Patient will  identify, challenge, and change self-talk that contributes to maladaptive feelings and actions .  Status: New - Date: 5/11/2022 , Continued date: 9/8/2022, continued date: 12/21/2022, continued date: 3/29/2023, continued date:  "7/28/2023    Intervention(s)  Therapist will teach the CBT model, cognitive distortions, and cognitive restructuring techniques.      Goal 2: Patient will be able to recall the traumatic events without becoming overwhelmed with negative thoughts, feelings, or urges.   Client's Goal:  I will know I've met my goal when I do not cry every time I talk about it.\"    Objective #A (Patient Action)    Status: New - Date: 5/11/2022, continued date: 9/8/2022, continued date: 12/21/2022, continued date: 3/29/2023, continued date: 7/28/2023    Patient will describe the history of and nature of trauma symptoms    Intervention(s)  Therapist will assess the client's frequency, intensity, duration, and history of trauma symptoms and their impact on functioning.    Objective #B (Patient Action)  Status: New Date: 5/11/2022, continued date: 9/8/2022, continued date: 12/21/2022, continued date: 3/29/2023, continued date: 7/28/2023    Patient will cooperate with eye movement desensitization and reprocessing (EMDR) to reduce emotional reaction to traumatic event(s)    Intervention(s)  Therapist will utilize EMDR to reduce the client's emotional reactivity to the traumatic event(s).    Objective #C (Patient Action)  Status: New Date: 5/11/2022, continued date: 9/8/2022, continued date: 12/21/2022, continued date: 3/29/2023, continued date: 7/28/2023    Patient will learn and implement calming and coping strategies to manage trauma symptoms.    Intervention(s)  Therapist will teach grounding techniques, distress tolerance, and emotion regulation techniques.      Patient has reviewed and agreed to the above plan.      Magali Montilla, [facility]  May 11, 2022  Magali Montilla, [facility]  9/8/2022  Magali Montilla, [facility]  12/21/2022  Magali Montilla, [facility]  3/29/2023  Magali Montilla, [facility]  7/28/2023  "

## 2023-09-08 ENCOUNTER — OFFICE VISIT (OUTPATIENT)
Dept: FAMILY MEDICINE | Facility: CLINIC | Age: 53
End: 2023-09-08
Payer: COMMERCIAL

## 2023-09-08 VITALS
BODY MASS INDEX: 23.12 KG/M2 | DIASTOLIC BLOOD PRESSURE: 78 MMHG | SYSTOLIC BLOOD PRESSURE: 112 MMHG | WEIGHT: 135.4 LBS | RESPIRATION RATE: 16 BRPM | HEIGHT: 64 IN | HEART RATE: 70 BPM | OXYGEN SATURATION: 99 %

## 2023-09-08 DIAGNOSIS — E78.5 HYPERLIPIDEMIA LDL GOAL <160: ICD-10-CM

## 2023-09-08 DIAGNOSIS — M25.551 HIP DISCOMFORT, RIGHT: ICD-10-CM

## 2023-09-08 DIAGNOSIS — M25.521 RIGHT ELBOW PAIN: Primary | ICD-10-CM

## 2023-09-08 PROCEDURE — 99214 OFFICE O/P EST MOD 30 MIN: CPT | Performed by: FAMILY MEDICINE

## 2023-09-08 RX ORDER — CYCLOBENZAPRINE HCL 10 MG
5 TABLET ORAL 3 TIMES DAILY PRN
Qty: 30 TABLET | Refills: 0 | Status: SHIPPED | OUTPATIENT
Start: 2023-09-08

## 2023-09-08 ASSESSMENT — PAIN SCALES - GENERAL: PAINLEVEL: SEVERE PAIN (7)

## 2023-09-08 NOTE — PATIENT INSTRUCTIONS
At [facility], we strive to deliver an exceptional experience to you, every time we see you. If you receive a survey, please complete it as we do value your feedback.  If you have MyChart, you can expect to receive results automatically within 24 hours of their completion.  Your provider will send a note interpreting your results as well.   If you do not have MyChart, you should receive your results in about a week by mail.    Your care team:                            Family Medicine Internal Medicine   MD Figueroa Rowell MD Shantel Branch-Fleming, MD Srinivasa Vaka, MD Katya Belousova, PADAKOTA Slater, MD Pediatrics   Clark Farfan, PAMD Allison Frances MD Amelia Massimini APRN CNP   JERAMY Lay CNP, MD Charanya Pasupathi, MD Kathleen Widmer, NP coming October 2023 Same-Day (No follow up visit)    KEVIN Roman PA coming Oct 2023     Clinic hours: Monday - Thursday 7 am-6 pm; Fridays 7 am-5 pm.   Urgent care: Monday - Friday 10 am- 8 pm; Saturday and Sunday 9 am-5 pm.    Clinic: [phone]       [facility]: Monday - Thursday 8 am - 7 pm; Friday 8 am - 6 pm  [facility]: [phone]

## 2023-09-08 NOTE — PROGRESS NOTES
Assessment & Plan     Right elbow pain  -Kiesha presented with discomfort in her right elbow region, forearm for the past 2 weeks intermittently.  -She does a lot of typing.  No history of trauma.  -Mild tenderness on palpating lateral epicondyle.  -No sensory deficits.  Motor strength 5/5.    PLAN:  -RICE, elbow splint, Tylenol as needed.  -No indication for imaging.    Hyperlipidemia LDL goal <160  -Had lipid panel done at [facility].  -ASCVD 1.1% -no indication for statins.    Hip discomfort, right  -Chronic right hip discomfort on as needed cyclobenzaprine.  Wanted refills.  Undergoing physical therapy.    - cyclobenzaprine (FLEXERIL) 10 MG tablet; Take 0.5 tablets (5 mg) by mouth 3 times daily as needed for muscle spasms                 Stacey Ruiz MD  [facility] RUPERT Pop is a 52 year old, presenting for the following health issues:  Musculoskeletal Problem      9/8/2023     1:23 PM   Additional Questions   Roomed by ramona       Musculoskeletal Problem    History of Present Illness       Reason for visit:  Arm pain  Symptom onset:  1-2 weeks ago  Symptom intensity:  Moderate  Symptom progression:  Worsening  What makes it worse:  No  What makes it better:  No    She eats 2-3 servings of fruits and vegetables daily.She consumes 1 sweetened beverage(s) daily.She exercises with enough effort to increase her heart rate 9 or less minutes per day.  She exercises with enough effort to increase her heart rate 3 or less days per week.   She is taking medications regularly.               Review of Systems   Constitutional, HEENT, cardiovascular, pulmonary, gi and gu systems are negative, except as otherwise noted.      Objective    There were no vitals taken for this visit.  There is no height or weight on file to calculate BMI.  Physical Exam   GENERAL: healthy, alert and no distress  NECK: no adenopathy, no asymmetry, masses, or scars and  thyroid normal to palpation  RESP: lungs clear to auscultation - no rales, rhonchi or wheezes  CV: regular rate and rhythm, normal S1 S2, no S3 or S4, no murmur, click or rub, no peripheral edema and peripheral pulses strong  ABDOMEN: soft, nontender, no hepatosplenomegaly, no masses and bowel sounds normal  MS: no gross musculoskeletal defects noted, no edema

## 2023-09-21 ENCOUNTER — VIRTUAL VISIT (OUTPATIENT)
Dept: PSYCHOLOGY | Facility: CLINIC | Age: 53
End: 2023-09-21
Payer: COMMERCIAL

## 2023-09-21 DIAGNOSIS — F41.1 GENERALIZED ANXIETY DISORDER: Primary | ICD-10-CM

## 2023-09-21 DIAGNOSIS — F43.89 REACTION TO CHRONIC STRESS: ICD-10-CM

## 2023-09-21 DIAGNOSIS — F32.A DEPRESSIVE DISORDER: ICD-10-CM

## 2023-09-21 PROCEDURE — 90834 PSYTX W PT 45 MINUTES: CPT | Mod: VID | Performed by: SOCIAL WORKER

## 2023-09-21 NOTE — PROGRESS NOTES
[facility]                                     Progress Note    Patient Name: Kiesha Mcguire  Date: 9/21/2023       Service Type: Individual      Session Start Time: 11:31 AM Session End Time: 12:19 PM     Session Length: 38-52 minutes    Session #: 40 with this provider    Attendees: Client attended alone    Service Modality:  Video Visit:      Provider verified identity through the following two step process.  Patient provided:  Patient is known previously to provider    Telemedicine Visit: The patient's condition can be safely assessed and treated via synchronous audio and visual telemedicine encounter.      Reason for Telemedicine Visit: Patient convenience (e.g. access to timely appointments / distance to available provider) and Services only offered telehealth    Originating Site (Patient Location): Patient's home    Distant Site (Provider Location): Provider Remote Setting- Home Office/Off-site    Consent:  The patient/guardian has verbally consented to: the potential risks and benefits of telemedicine (video visit) versus in person care; bill my insurance or make self-payment for services provided; and responsibility for payment of non-covered services.     Patient would like the video invitation sent by:  [employer]    Mode of Communication:  Video Conference via [employer]    As the provider I attest to compliance with applicable laws and regulations related to telemedicine.    DATA  Interactive Complexity: No   Crisis: No      Progress Since Last Session (Related to Symptoms / Goals / Homework):  Symptoms:  increase in confidence     Homework: Achieved / completed to satisfaction      Episode of Care Goals: Satisfactory progress - ACTION (Actively working towards change); Intervened by reinforcing change plan / affirming steps taken     Current / Ongoing Stressors and Concerns:   Grief related to past and impact on herself and her children   Difficulty trusting others and being  "vulnerable   Ambivalence about having more intimate relationships   Fear of being rejected by others; belief she is not good enough   Lack of trust with providers  Dynamics in relationship with ex-  Lack of support system  Son's school refusal      Treatment Objective(s) Addressed in This Session:   Identify 1 way to increase engagement in self-care      Safe/calm state titled \"calm\"  Container: box with a lock    Potential Targets:   Relationship with ex-  Guilt about how divorce ended, Negative Belief \"It's my fault\"  Guilt about son's surgery and outcome  Negative Belief: There is something wrong with me  Being blamed by ex-boyfriend, feel shame  Childhood: not belonging, not being good enough      Current Potential Targets  Absence of friendships  Not fitting in  Defensiveness  Ex on social media  Driving in certain areas will remind her of her ex  Relationship with ex-  Need to be perfect    Past:   image of child self at 8 years old   Disagreement about salad   [facility]   head injury    Trauma symptoms: avoids places in neighborhood for fear of running into ex-boyfriend  Has avoided romantic relationships since  Dissociative symptoms while in relationship  Recurrent, intrusive, distressing memories  Problems with concentration     Intervention:   Offered support and validation about recent stressors ,    Offered coaching on goal setting    Assessments completed prior to visit:  The following assessments were completed by patient for this visit:  None    ASSESSMENT: Current Emotional / Mental Status (status of significant symptoms):   Risk status (Self / Other harm or suicidal ideation)   Patient denies current fears or concerns for personal safety.   Patient denies current or recent suicidal ideation or behaviors.   Patient denies current or recent homicidal ideation or behaviors.   Patient denies current or recent self injurious behavior or ideation.   Patient denies " other safety concerns.   Patient reports there has been no change in risk factors since their last session.     Patient reports there has been no change in protective factors since their last session.     Recommended that patient call 911 or go to the local ED should there be a change in any of these risk factors.     Appearance:   Appropriate    Eye Contact:   Good    Psychomotor Behavior: Normal    Attitude:   Cooperative  Interested Pleasant    Orientation:   All   Speech    Rate / Production: Normal/ Responsive    Volume:  Normal    Mood:    Anxious Stable    Affect:    Mood Congruent     Thought Content:  Clear    Thought Form:  Coherent  Goal Directed  Logical  Tangential    Insight:    Good      Medication Review:   No changes to current psychiatric medication(s)   Vyvanse   Buspar-added, client is not taking daily    Estrogen patch-discontinued for a period, now cut patch in half     Medication Compliance:   Yes     Changes in Health Issues:   None reported     Chemical Use Review:   Substance Use: no use     Tobacco Use: no use    Diagnosis:  Generalized Anxiety Disorder   Other Specified Trauma and Stress Related Disorder   Unspecified Depressive Disorder     Collateral Reports Completed:   Not Applicable    PLAN: (Patient Tasks / Therapist Tasks / Other)  EMDR:  Plan for next session to have client keep her eyes open.    Next Target: divorce, negative belief: I am a failure/I did something wrong  Client is reading the following books; Co-regulation handbook, pathological demand avoidance, and Declarative language  Therapist previously sent client the following resource, Anxious Kids Anxious Parents by Huber Valdez and Zenaida Flores  Client shared plans to reach out to a friend, improve diet, and increase engagement in self-care.  Therapist encouraged setting smaller goals.    Magali Montilla, [facility] 9/21/2023    ______________________________________________________________________    Individual Treatment  Plan    Patient's Name: Kiesha Mcguire  YOB: 1970    Date of Creation: 5/11/2022  Date Treatment Plan Last Reviewed/Revised: 7/28/2023    DSM5 Diagnoses:    Generalized Anxiety Disorder  Other Specified Trauma and Stress Related Disorder   Unspecified Depressive Disorder     Psychosocial / Contextual Factors: stressors related to parenting  PROMIS (reviewed every 90 days):  31    Referral / Collaboration:  Referral to another professional/service is not indicated at this time..    Anticipated number of session for this episode of care: will review in 90 days  Anticipation frequency of session: Every other week  Anticipated Duration of each session: 38-52 minutes  Treatment plan will be reviewed in 90 days or when goals have been changed.       MeasurableTreatment Goal(s) related to diagnosis / functional impairment(s)  Goal 1: Patient will sustain attention and concentration for consistently longer periods of time.  Patient will meet goals set for completing tasks 80% of the time.   Client's Goal:  I will know I've met my goal when my space isn't so chaotic, meeting deadlines around bills and work, when I am not always playing catch-up, completing tasks.      Objective #A (Patient Action)    Patient will learn and implement organization and planning skills.  Status: New - Date: 5/11/2022 , continued date: 9/8/2022, continued date: 12/21/2022, completed date: 3/29/2023    Intervention(s)  Therapist will teach the client organization and planning skills.  Therapist will address any potential barriers to using skills.    Objective #B  Patient will  identify, challenge, and change self-talk that contributes to maladaptive feelings and actions .  Status: New - Date: 5/11/2022 , Continued date: 9/8/2022, continued date: 12/21/2022, continued date: 3/29/2023, continued date: 7/28/2023    Intervention(s)  Therapist will teach the CBT model, cognitive distortions, and cognitive restructuring  "techniques.      Goal 2: Patient will be able to recall the traumatic events without becoming overwhelmed with negative thoughts, feelings, or urges.   Client's Goal:  I will know I've met my goal when I do not cry every time I talk about it.\"    Objective #A (Patient Action)    Status: New - Date: 5/11/2022, continued date: 9/8/2022, continued date: 12/21/2022, continued date: 3/29/2023, continued date: 7/28/2023    Patient will describe the history of and nature of trauma symptoms    Intervention(s)  Therapist will assess the client's frequency, intensity, duration, and history of trauma symptoms and their impact on functioning.    Objective #B (Patient Action)  Status: New Date: 5/11/2022, continued date: 9/8/2022, continued date: 12/21/2022, continued date: 3/29/2023, continued date: 7/28/2023    Patient will cooperate with eye movement desensitization and reprocessing (EMDR) to reduce emotional reaction to traumatic event(s)    Intervention(s)  Therapist will utilize EMDR to reduce the client's emotional reactivity to the traumatic event(s).    Objective #C (Patient Action)  Status: New Date: 5/11/2022, continued date: 9/8/2022, continued date: 12/21/2022, continued date: 3/29/2023, continued date: 7/28/2023    Patient will learn and implement calming and coping strategies to manage trauma symptoms.    Intervention(s)  Therapist will teach grounding techniques, distress tolerance, and emotion regulation techniques.      Patient has reviewed and agreed to the above plan.      Magali Montilla, [facility]  May 11, 2022  Magali Montilla, [facility]  9/8/2022  Magali Montilla, [facility]  12/21/2022  Magali Montilla, [facility]  3/29/2023  Magali Montilla, [facility]  7/28/2023  "

## 2023-09-27 ENCOUNTER — OFFICE VISIT (OUTPATIENT)
Dept: DERMATOLOGY | Facility: CLINIC | Age: 53
End: 2023-09-27
Payer: COMMERCIAL

## 2023-09-27 DIAGNOSIS — L66.12 FRONTAL FIBROSING ALOPECIA: Primary | ICD-10-CM

## 2023-09-27 LAB
ALBUMIN SERPL BCG-MCNC: 4.4 G/DL (ref 3.5–5.2)
ALP SERPL-CCNC: 69 U/L (ref 35–104)
ALT SERPL W P-5'-P-CCNC: 22 U/L (ref 0–50)
ANION GAP SERPL CALCULATED.3IONS-SCNC: 10 MMOL/L (ref 7–15)
AST SERPL W P-5'-P-CCNC: 27 U/L (ref 0–45)
BILIRUB SERPL-MCNC: 0.4 MG/DL
BUN SERPL-MCNC: 14.5 MG/DL (ref 6–20)
CALCIUM SERPL-MCNC: 9.6 MG/DL (ref 8.6–10)
CHLORIDE SERPL-SCNC: 104 MMOL/L (ref 98–107)
CREAT SERPL-MCNC: 0.72 MG/DL (ref 0.51–0.95)
DEPRECATED HCO3 PLAS-SCNC: 26 MMOL/L (ref 22–29)
EGFRCR SERPLBLD CKD-EPI 2021: >90 ML/MIN/1.73M2
ERYTHROCYTE [DISTWIDTH] IN BLOOD BY AUTOMATED COUNT: 12.7 % (ref 10–15)
GLUCOSE SERPL-MCNC: 80 MG/DL (ref 70–99)
HCT VFR BLD AUTO: 36.5 % (ref 35–47)
HGB BLD-MCNC: 12 G/DL (ref 11.7–15.7)
MCH RBC QN AUTO: 30.2 PG (ref 26.5–33)
MCHC RBC AUTO-ENTMCNC: 32.9 G/DL (ref 31.5–36.5)
MCV RBC AUTO: 92 FL (ref 78–100)
PLATELET # BLD AUTO: 190 10E3/UL (ref 150–450)
POTASSIUM SERPL-SCNC: 4.1 MMOL/L (ref 3.4–5.3)
PROT SERPL-MCNC: 6.7 G/DL (ref 6.4–8.3)
RBC # BLD AUTO: 3.98 10E6/UL (ref 3.8–5.2)
SODIUM SERPL-SCNC: 140 MMOL/L (ref 135–145)
WBC # BLD AUTO: 3.6 10E3/UL (ref 4–11)

## 2023-09-27 PROCEDURE — 85027 COMPLETE CBC AUTOMATED: CPT | Performed by: PHYSICIAN ASSISTANT

## 2023-09-27 PROCEDURE — 80053 COMPREHEN METABOLIC PANEL: CPT | Performed by: PHYSICIAN ASSISTANT

## 2023-09-27 PROCEDURE — 36415 COLL VENOUS BLD VENIPUNCTURE: CPT | Performed by: PHYSICIAN ASSISTANT

## 2023-09-27 PROCEDURE — 11900 INJECT SKIN LESIONS </W 7: CPT | Performed by: PHYSICIAN ASSISTANT

## 2023-09-27 PROCEDURE — 99213 OFFICE O/P EST LOW 20 MIN: CPT | Mod: 25 | Performed by: PHYSICIAN ASSISTANT

## 2023-09-27 NOTE — PROGRESS NOTES
Kiesha Mcguire is an extremely pleasant 51 year old year old female patient here today to recheck frontal fibrosing alopecia. She notes that she taking Plaquenil, last eye exam was February which was normal. She notes hair loss has stabilized. Patient has no other skin complaints today.  Remainder of the HPI, Meds, PMH, Allergies, FH, and SH was reviewed in chart.    Past Medical History:   Diagnosis Date    Depressive disorder     Depressive disorder, not elsewhere classified     resolved    Herpes simplex without mention of complication     oral    IUD (intrauterine device) in place 08/15/2013    Mirena    Migraine headache with aura     very occass, sig aura, speech loss x1    Pure hypercholesterolemia     follows kate VOSS MD       Past Surgical History:   Procedure Laterality Date    COLONOSCOPY N/A 10/27/2021    Procedure: COLONOSCOPY, WITH POLYPECTOMY AND CLIP;  Surgeon: Og Gutierres MD;  Location: [facility] [place] OR     DILATION/CURETTAGE DIAG/THER NON OB  2006    LAPAROSCOPIC CHOLECYSTECTOMY N/A 2021    Procedure: CHOLECYSTECTOMY, LAPAROSCOPIC;  Surgeon: Denise Cast MD;  Location:  OR        Family History   Problem Relation Age of Onset    Hypertension Mother     Hyperlipidemia Mother     Depression Mother     Squamous cell carcinoma Mother     Thyroid Disease Mother         unclear dx    Hypertension Father     Cancer Father 65        neuro endrocine CA, neck    Hyperlipidemia Father     Aortic aneurysm Maternal Grandfather          of ascending aortic aneurysm at age 64    Aortic aneurysm Maternal Aunt          in her 40s from ascending aortic aneurysm    C.A.D. No family hx of     Cancer - colorectal No family hx of     Diabetes No family hx of     Cerebrovascular Disease No family hx of     Breast Cancer No family hx of     Prostate Cancer No family hx of        Social History     Socioeconomic History    Marital status:      Spouse name: Not on file    Number of  children: 2    Years of education: 16    Highest education level: Not on file   Occupational History    Occupation:      Employer: [employer]     Comment: fulltime   Tobacco Use    Smoking status: Former     Packs/day: 0.50     Years: 10.00     Pack years: 5.00     Types: Cigarettes     Quit date: 1994     Years since quittin.2    Smokeless tobacco: Never   Vaping Use    Vaping Use: Never used   Substance and Sexual Activity    Alcohol use: Yes     Comment: very rare    Drug use: No    Sexual activity: Not Currently     Partners: Male     Birth control/protection: I.U.D.     Comment: Mirena   Other Topics Concern    Parent/sibling w/ CABG, MI or angioplasty before 65F 55M? No   Social History Narrative    How much exercise per week? 2 90 minute yoga sessions      How much calcium per day? Diet       How much caffeine per day? 0    How much vitamin D per day? Supplement     Do you/your family wear seatbelts?  Yes    Do you/your family use safety helmets? Yes    Do you/your family use sunscreen? Yes    Do you/your family keep firearms in the home? No    Do you/your family have a smoke detector(s)? Yes        Do you feel safe in your home? Yes    Has anyone ever touched you in an unwanted manner? No     Explain Kiesha [facility] 18        Reviewed [facility] 10-12-20             Social Determinants of Health     Financial Resource Strain: Not on file   Food Insecurity: Not on file   Transportation Needs: Not on file   Physical Activity: Insufficiently Active (10/12/2020)    Exercise Vital Sign     Days of Exercise per Week: 1 day     Minutes of Exercise per Session: 10 min   Stress: Stress Concern Present (10/12/2020)    [employer] - Occupational Stress Questionnaire     Feeling of Stress : To some extent   Social Connections: Socially Isolated (10/12/2020)    Social Connection and Isolation Panel [NHANES]     Frequency of Communication with Friends and  Family: Three times a week     Frequency of Social Gatherings with Friends and Family: Twice a week     Attends Congregation Services: Never     Active Member of Clubs or Organizations: No     Attends Club or Organization Meetings: Never     Marital Status:    Interpersonal Safety: Unknown (10/12/2020)    Humiliation, Afraid, Rape, and Kick questionnaire     Fear of Current or Ex-Partner: Not asked     Emotionally Abused: Not asked     Physically Abused: Not asked     Sexually Abused: Not asked   Housing Stability: Not on file       Outpatient Encounter Medications as of 9/27/2023   Medication Sig Dispense Refill    Acetaminophen (TYLENOL PO)       Biotin 10 MG TABS tablet Take 10,000 mcg by mouth daily      busPIRone (BUSPAR) 10 MG tablet Take 1 tablet (10 mg) by mouth 2 times daily 60 tablet 2    CALCIUM-VITAMIN D-VITAMIN K PO       Collagen Hydrolysate POWD       Cyanocobalamin (VITAMIN B 12 PO) Take by mouth daily       cyclobenzaprine (FLEXERIL) 10 MG tablet Take 0.5 tablets (5 mg) by mouth 3 times daily as needed for muscle spasms 30 tablet 0    estradiol (VIVELLE-DOT) 0.025 MG/24HR bi-weekly patch Place 1 patch onto the skin twice a week 24 patch 3    glucosamine-chondroitin 500-400 MG CAPS per capsule Take 1 capsule by mouth daily Patient reported: over the counter, unsure of dose (Patient not taking: Reported on 9/8/2023)      hydroxychloroquine (PLAQUENIL) 200 MG tablet Take 1 tablet (200 mg) by mouth 2 times daily 180 tablet 3    levonorgestrel (MIRENA) 20 MCG/24HR IUD 1 each by Intrauterine route once      lisdexamfetamine (VYVANSE) 10 MG capsule Take 1 capsule (10 mg) by mouth daily for 30 days 30 capsule 0    Lysine 500 MG TABS Take 1,000 mg by mouth daily       magnesium citrate solution Takes 1 tsp twice a day, 150 mg daily      NONFORMULARY 1 capsule daily carmen      NONFORMULARY 2 tablets daily jozef      UNABLE TO FIND daily José Antonio - patient reported      UNABLE TO FIND daily  Rhodiola- patient reported      valACYclovir (VALTREX) 1000 mg tablet Take 2 tablets (2,000 mg) by mouth 2 times daily For 2 days as needed for cold sores 12 tablet 11    valACYclovir (VALTREX) 500 MG tablet Take 1 tablet (500 mg) by mouth daily 90 tablet 1     No facility-administered encounter medications on file as of 9/27/2023.             O:   NAD, WDWN, Alert & Oriented, Mood & Affect wnl, Vitals stable   Here today alone   There were no vitals taken for this visit.   General appearance normal   Vitals stable   Alert, oriented and in no acute distress       Patches of alopecia on frontal/temporal side of scalp, minimal accentuation of hair follicle, no scaling seen.      Eyes: Conjunctivae/lids:Normal     ENT: Lips,: normal    MSK:Normal    Pulm: Breathing Normal    Neuro/Psych: Orientation:Alert and Orientedx3 ; Mood/Affect:normal   A/P:  1. FFA  Well controlled.   IL TAC: PGACAC discussed.  Risks including but not limited to injection site reaction, bruising, no resolution.  All questions answered and entertained to patient s satisfaction.  Informed consent obtained.  IL TAC in concentration of 5 mg/ml was injected ID to frontal fibrosing alopecia.  Total injected was  0.4 ml.  Patient tolerated without complications and given wound care instructions, including not to move product around.  Return in 4 weeks for follow-up and possible additional IL TAC.    Continue plaquenil 200 mg bid .   Check cbc and cmp.   Recheck in 3-4 months.

## 2023-09-27 NOTE — LETTER
2023         RE: Kiesha Mcguire  [address] [facility] [address]        Dear Colleague,    Thank you for referring your patient, Kiesha Mcguire, to the [facility]. Please see a copy of my visit note below.    Kiesha Mcguire is an extremely pleasant 51 year old year old female patient here today to recheck frontal fibrosing alopecia. She notes that she taking Plaquenil, last eye exam was February which was normal. She notes hair loss has stabilized. Patient has no other skin complaints today.  Remainder of the HPI, Meds, PMH, Allergies, FH, and SH was reviewed in chart.    Past Medical History:   Diagnosis Date     Depressive disorder      Depressive disorder, not elsewhere classified     resolved     Herpes simplex without mention of complication     oral     IUD (intrauterine device) in place 08/15/2013    Mirena     Migraine headache with aura     very occass, sig aura, speech loss x1     Pure hypercholesterolemia     follows w BIPIN CHATMAN       Past Surgical History:   Procedure Laterality Date     COLONOSCOPY N/A 10/27/2021    Procedure: COLONOSCOPY, WITH POLYPECTOMY AND CLIP;  Surgeon: Og Gutierres MD;  Location: [facility] OR      DILATION/CURETTAGE DIAG/THER NON OB  2006     LAPAROSCOPIC CHOLECYSTECTOMY N/A 2021    Procedure: CHOLECYSTECTOMY, LAPAROSCOPIC;  Surgeon: Denise Cast MD;  Location:  OR        Family History   Problem Relation Age of Onset     Hypertension Mother      Hyperlipidemia Mother      Depression Mother      Squamous cell carcinoma Mother      Thyroid Disease Mother         unclear dx     Hypertension Father      Cancer Father 65        neuro endrocine CA, neck     Hyperlipidemia Father      Aortic aneurysm Maternal Grandfather          of ascending aortic aneurysm at age 64     Aortic aneurysm Maternal Aunt          in her 40s from ascending aortic aneurysm     C.A.D. No family hx of      Cancer -  colorectal No family hx of      Diabetes No family hx of      Cerebrovascular Disease No family hx of      Breast Cancer No family hx of      Prostate Cancer No family hx of        Social History     Socioeconomic History     Marital status:      Spouse name: Not on file     Number of children: 2     Years of education: 16     Highest education level: Not on file   Occupational History     Occupation:      Employer: [employer]     Comment: fulltime   Tobacco Use     Smoking status: Former     Packs/day: 0.50     Years: 10.00     Pack years: 5.00     Types: Cigarettes     Quit date: 1994     Years since quittin.2     Smokeless tobacco: Never   Vaping Use     Vaping Use: Never used   Substance and Sexual Activity     Alcohol use: Yes     Comment: very rare     Drug use: No     Sexual activity: Not Currently     Partners: Male     Birth control/protection: I.U.D.     Comment: Mirena   Other Topics Concern     Parent/sibling w/ CABG, MI or angioplasty before 65F 55M? No   Social History Narrative    How much exercise per week? 2 90 minute yoga sessions      How much calcium per day? Diet       How much caffeine per day? 0    How much vitamin D per day? Supplement     Do you/your family wear seatbelts?  Yes    Do you/your family use safety helmets? Yes    Do you/your family use sunscreen? Yes    Do you/your family keep firearms in the home? No    Do you/your family have a smoke detector(s)? Yes        Do you feel safe in your home? Yes    Has anyone ever touched you in an unwanted manner? No     Explain Kiesha [facility]        [facility]             Social Determinants of Health     Financial Resource Strain: Not on file   Food Insecurity: Not on file   Transportation Needs: Not on file   Physical Activity: Insufficiently Active (10/12/2020)    Exercise Vital Sign      Days of Exercise per Week: 1 day      Minutes of Exercise per Session: 10 min   Stress:  Stress Concern Present (10/12/2020)    [employer] [place] [employer] - Occupational Stress Questionnaire      Feeling of Stress : To some extent   Social Connections: Socially Isolated (10/12/2020)    Social Connection and Isolation Panel [NHANES]      Frequency of Communication with Friends and Family: Three times a week      Frequency of Social Gatherings with Friends and Family: Twice a week      Attends Jewish Services: Never      Active Member of Clubs or Organizations: No      Attends Club or Organization Meetings: Never      Marital Status:    Interpersonal Safety: Unknown (10/12/2020)    Humiliation, Afraid, Rape, and Kick questionnaire      Fear of Current or Ex-Partner: Not asked      Emotionally Abused: Not asked      Physically Abused: Not asked      Sexually Abused: Not asked   Housing Stability: Not on file       Outpatient Encounter Medications as of 9/27/2023   Medication Sig Dispense Refill     Acetaminophen (TYLENOL PO)        Biotin 10 MG TABS tablet Take 10,000 mcg by mouth daily       busPIRone (BUSPAR) 10 MG tablet Take 1 tablet (10 mg) by mouth 2 times daily 60 tablet 2     CALCIUM-VITAMIN D-VITAMIN K PO        Collagen Hydrolysate POWD        Cyanocobalamin (VITAMIN B 12 PO) Take by mouth daily        cyclobenzaprine (FLEXERIL) 10 MG tablet Take 0.5 tablets (5 mg) by mouth 3 times daily as needed for muscle spasms 30 tablet 0     estradiol (VIVELLE-DOT) 0.025 MG/24HR bi-weekly patch Place 1 patch onto the skin twice a week 24 patch 3     glucosamine-chondroitin 500-400 MG CAPS per capsule Take 1 capsule by mouth daily Patient reported: over the counter, unsure of dose (Patient not taking: Reported on 9/8/2023)       hydroxychloroquine (PLAQUENIL) 200 MG tablet Take 1 tablet (200 mg) by mouth 2 times daily 180 tablet 3     levonorgestrel (MIRENA) 20 MCG/24HR IUD 1 each by Intrauterine route once       lisdexamfetamine (VYVANSE) 10 MG capsule Take 1 capsule (10 mg) by  mouth daily for 30 days 30 capsule 0     Lysine 500 MG TABS Take 1,000 mg by mouth daily        magnesium citrate solution Takes 1 tsp twice a day, 150 mg daily       NONFORMULARY 1 capsule daily carmen       NONFORMULARY 2 tablets daily jozef       UNABLE TO FIND daily Ashwaganda - patient reported       UNABLE TO FIND daily Rhodiola- patient reported       valACYclovir (VALTREX) 1000 mg tablet Take 2 tablets (2,000 mg) by mouth 2 times daily For 2 days as needed for cold sores 12 tablet 11     valACYclovir (VALTREX) 500 MG tablet Take 1 tablet (500 mg) by mouth daily 90 tablet 1     No facility-administered encounter medications on file as of 9/27/2023.             O:   NAD, WDWN, Alert & Oriented, Mood & Affect wnl, Vitals stable   Here today alone   There were no vitals taken for this visit.   General appearance normal   Vitals stable   Alert, oriented and in no acute distress       Patches of alopecia on frontal/temporal side of scalp, minimal accentuation of hair follicle, no scaling seen.      Eyes: Conjunctivae/lids:Normal     ENT: Lips,: normal    MSK:Normal    Pulm: Breathing Normal    Neuro/Psych: Orientation:Alert and Orientedx3 ; Mood/Affect:normal   A/P:  1. FFA  Well controlled.   IL TAC: PGACAC discussed.  Risks including but not limited to injection site reaction, bruising, no resolution.  All questions answered and entertained to patient s satisfaction.  Informed consent obtained.  IL TAC in concentration of 5 mg/ml was injected ID to frontal fibrosing alopecia.  Total injected was  0.4 ml.  Patient tolerated without complications and given wound care instructions, including not to move product around.  Return in 4 weeks for follow-up and possible additional IL TAC.    Continue plaquenil 200 mg bid .   Check cbc and cmp.   Recheck in 3-4 months.       Again, thank you for allowing me to participate in the care of your patient.        Sincerely,        Rosemary Henao PA-C

## 2023-10-05 ENCOUNTER — VIRTUAL VISIT (OUTPATIENT)
Dept: PSYCHOLOGY | Facility: CLINIC | Age: 53
End: 2023-10-05
Payer: COMMERCIAL

## 2023-10-05 DIAGNOSIS — F41.1 GENERALIZED ANXIETY DISORDER: Primary | ICD-10-CM

## 2023-10-05 DIAGNOSIS — F43.89 REACTION TO CHRONIC STRESS: ICD-10-CM

## 2023-10-05 PROCEDURE — 90834 PSYTX W PT 45 MINUTES: CPT | Mod: VID | Performed by: SOCIAL WORKER

## 2023-10-05 NOTE — PROGRESS NOTES
[facility]                                     Progress Note    Patient Name: Kiesha Mcguire  Date: 10/5/2023       Service Type: Individual      Session Start Time: 10:31 AM Session End Time: 11:18 AM     Session Length: 38-52 minutes    Session #: 41 with this provider    Attendees: Client attended alone    Service Modality:  Telephone Visit:      Provider verified identity through the following two step process.  Patient provided:  Patient is known previously to provider    Telemedicine Visit: The patient's condition can be safely assessed and treated via synchronous audio and visual telemedicine encounter.      Reason for Telemedicine Visit: Patient convenience (e.g. access to timely appointments / distance to available provider) and Services only offered telehealth    Originating Site (Patient Location): Patient's home    Distant Site (Provider Location): Provider Remote Setting- Home Office/Off-site    Consent:  The patient/guardian has verbally consented to: the potential risks and benefits of telemedicine (video visit) versus in person care; bill my insurance or make self-payment for services provided; and responsibility for payment of non-covered services.     Patient would like the video invitation sent by:  [employer] [facility]    Mode of Communication:  Telephone due to issues with video    As the provider I attest to compliance with applicable laws and regulations related to telemedicine.    DATA  Interactive Complexity: No   Crisis: No      Progress Since Last Session (Related to Symptoms / Goals / Homework):  Symptoms:  client reported increase in anxiety and stress, dissociative symptoms, some dizziness and difficulty breathing yesterday     Homework: Achieved / completed to satisfaction  Using resources      Episode of Care Goals: Satisfactory progress - ACTION (Actively working towards change); Intervened by reinforcing change plan / affirming steps taken     Current / Ongoing  "Stressors and Concerns:   Grief related to past and impact on herself and her children   Difficulty trusting others and being vulnerable   Ambivalence about having more intimate relationships   Fear of being rejected by others; belief she is not good enough   Lack of trust with providers  Dynamics in relationship with ex-  Lack of support system  Son's school refusal and mental health      Treatment Objective(s) Addressed in This Session:   Identify dissociative symptoms and use 1 strategy to ground and regulate    Identify 2 fears/thoughts that contribute to anxiety symptoms    Safe/calm state titled \"calm\"  Container: box with a lock    Potential Targets:   Relationship with ex-  Guilt about how divorce ended, Negative Belief \"It's my fault\"  Guilt about son's surgery and outcome  Negative Belief: There is something wrong with me  Being blamed by ex-boyfriend, feel shame  Childhood: not belonging, not being good enough      Current Potential Targets  Absence of friendships  Not fitting in  Defensiveness  Ex on social media  Driving in certain areas will remind her of her ex  Relationship with ex-  Need to be perfect    Past:   image of child self at 8 years old   Disagreement about [facility]   head injury    Trauma symptoms: avoids places in neighborhood for fear of running into ex-boyfriend  Has avoided romantic relationships since  Dissociative symptoms while in relationship  Recurrent, intrusive, distressing memories  Problems with concentration     Intervention:   Offered support and validation about recent stressors ,    Engaged client in state change   Discussed parenting strategies    Assessments completed prior to visit:  The following assessments were completed by patient for this visit:  None    ASSESSMENT: Current Emotional / Mental Status (status of significant symptoms):   Risk status (Self / Other harm or suicidal ideation)   Patient denies current fears " or concerns for personal safety.   Patient denies current or recent suicidal ideation or behaviors.   Patient denies current or recent homicidal ideation or behaviors.   Patient denies current or recent self injurious behavior or ideation.   Patient denies other safety concerns.   Patient reports there has been no change in risk factors since their last session.     Patient reports there has been no change in protective factors since their last session.     Recommended that patient call 911 or go to the local ED should there be a change in any of these risk factors.     Appearance:   Unable to assess via telephone    Eye Contact:   Unable to assess via telephone    Psychomotor Behavior: Unable to assess via telephone   Attitude:   Cooperative  Interested Pleasant    Orientation:   All   Speech    Rate / Production: Normal/ Responsive    Volume:  Normal    Mood:    Anxious    Affect:    Unable to assess via telephone     Thought Content:  Clear rumination   Thought Form:  Coherent  Goal Directed    Insight:    Good      Medication Review:   No changes to current psychiatric medication(s)   Vyvanse   Buspar-added, client is not taking daily    Estrogen patch-discontinued for a period, now cut patch in half     Medication Compliance:   Yes     Changes in Health Issues:   None reported     Chemical Use Review:   Substance Use: no use     Tobacco Use: no use    Diagnosis:  Generalized Anxiety Disorder   Other Specified Trauma and Stress Related Disorder   Unspecified Depressive Disorder     Collateral Reports Completed:   Not Applicable    PLAN: (Patient Tasks / Therapist Tasks / Other)  EMDR:  Plan for next session to have client keep her eyes open.    Next Target: divorce, negative belief: I am a failure/I did something wrong  Client is reading the following books; Co-regulation handbook, pathological demand avoidance, and Declarative language  Therapist previously sent client the following resource, Anxious Kids  Anxious Parents by Huber Valdez and Zenaida Flores  Client will continue to use strategies to manage anxiety symptoms    Magali Mensahmelquiades, [facility] 10/5/2023    ______________________________________________________________________    Individual Treatment Plan    Patient's Name: Kiesha Mcguire  YOB: 1970    Date of Creation: 5/11/2022  Date Treatment Plan Last Reviewed/Revised: 7/28/2023    DSM5 Diagnoses:    Generalized Anxiety Disorder  Other Specified Trauma and Stress Related Disorder   Unspecified Depressive Disorder     Psychosocial / Contextual Factors: stressors related to parenting  PROMIS (reviewed every 90 days):  31    Referral / Collaboration:  Referral to another professional/service is not indicated at this time..    Anticipated number of session for this episode of care: will review in 90 days  Anticipation frequency of session: Every other week  Anticipated Duration of each session: 38-52 minutes  Treatment plan will be reviewed in 90 days or when goals have been changed.       MeasurableTreatment Goal(s) related to diagnosis / functional impairment(s)  Goal 1: Patient will sustain attention and concentration for consistently longer periods of time.  Patient will meet goals set for completing tasks 80% of the time.   Client's Goal:  I will know I've met my goal when my space isn't so chaotic, meeting deadlines around bills and work, when I am not always playing catch-up, completing tasks.      Objective #A (Patient Action)    Patient will learn and implement organization and planning skills.  Status: New - Date: 5/11/2022 , continued date: 9/8/2022, continued date: 12/21/2022, completed date: 3/29/2023    Intervention(s)  Therapist will teach the client organization and planning skills.  Therapist will address any potential barriers to using skills.    Objective #B  Patient will  identify, challenge, and change self-talk that contributes to maladaptive feelings and actions .  Status:  "New - Date: 5/11/2022 , Continued date: 9/8/2022, continued date: 12/21/2022, continued date: 3/29/2023, continued date: 7/28/2023    Intervention(s)  Therapist will teach the CBT model, cognitive distortions, and cognitive restructuring techniques.      Goal 2: Patient will be able to recall the traumatic events without becoming overwhelmed with negative thoughts, feelings, or urges.   Client's Goal:  I will know I've met my goal when I do not cry every time I talk about it.\"    Objective #A (Patient Action)    Status: New - Date: 5/11/2022, continued date: 9/8/2022, continued date: 12/21/2022, continued date: 3/29/2023, continued date: 7/28/2023    Patient will describe the history of and nature of trauma symptoms    Intervention(s)  Therapist will assess the client's frequency, intensity, duration, and history of trauma symptoms and their impact on functioning.    Objective #B (Patient Action)  Status: New Date: 5/11/2022, continued date: 9/8/2022, continued date: 12/21/2022, continued date: 3/29/2023, continued date: 7/28/2023    Patient will cooperate with eye movement desensitization and reprocessing (EMDR) to reduce emotional reaction to traumatic event(s)    Intervention(s)  Therapist will utilize EMDR to reduce the client's emotional reactivity to the traumatic event(s).    Objective #C (Patient Action)  Status: New Date: 5/11/2022, continued date: 9/8/2022, continued date: 12/21/2022, continued date: 3/29/2023, continued date: 7/28/2023    Patient will learn and implement calming and coping strategies to manage trauma symptoms.    Intervention(s)  Therapist will teach grounding techniques, distress tolerance, and emotion regulation techniques.      Patient has reviewed and agreed to the above plan.      Magali Montilla, [facility]  May 11, 2022  Magali Montilla, [facility]  9/8/2022  Magali Montilla, [facility]  12/21/2022  Magali Montilla, [facility]  3/29/2023  Magali Montilla, [facility]  7/28/2023  "

## 2023-10-16 ENCOUNTER — VIRTUAL VISIT (OUTPATIENT)
Dept: PSYCHOLOGY | Facility: CLINIC | Age: 53
End: 2023-10-16
Payer: COMMERCIAL

## 2023-10-16 DIAGNOSIS — F32.A DEPRESSIVE DISORDER: ICD-10-CM

## 2023-10-16 DIAGNOSIS — F41.1 GENERALIZED ANXIETY DISORDER: Primary | ICD-10-CM

## 2023-10-16 DIAGNOSIS — F43.89 REACTION TO CHRONIC STRESS: ICD-10-CM

## 2023-10-16 PROCEDURE — 90837 PSYTX W PT 60 MINUTES: CPT | Mod: VID | Performed by: SOCIAL WORKER

## 2023-10-24 ENCOUNTER — VIRTUAL VISIT (OUTPATIENT)
Dept: PSYCHOLOGY | Facility: CLINIC | Age: 53
End: 2023-10-24
Payer: COMMERCIAL

## 2023-10-24 DIAGNOSIS — F41.1 GENERALIZED ANXIETY DISORDER: Primary | ICD-10-CM

## 2023-10-24 DIAGNOSIS — F32.A DEPRESSIVE DISORDER: ICD-10-CM

## 2023-10-24 DIAGNOSIS — F43.89 REACTION TO CHRONIC STRESS: ICD-10-CM

## 2023-10-24 PROCEDURE — 90834 PSYTX W PT 45 MINUTES: CPT | Mod: VID | Performed by: SOCIAL WORKER

## 2023-10-24 NOTE — PROGRESS NOTES
[facility]                                     Progress Note    Patient Name: Kiesha Mcguire  Date: 10/24/2023       Service Type: Individual      Session Start Time: 11:33 AM Session End Time: 12:21 PM     Session Length: 38-52 minutes    Session #: 43 with this provider    Attendees: Client attended alone    Service Modality:  Video Visit:      Provider verified identity through the following two step process.  Patient provided:  Patient is known previously to provider    Telemedicine Visit: The patient's condition can be safely assessed and treated via synchronous audio and visual telemedicine encounter.      Reason for Telemedicine Visit: Patient convenience (e.g. access to timely appointments / distance to available provider) and Services only offered telehealth    Originating Site (Patient Location): Patient's home    Distant Site (Provider Location): Provider Remote Setting- Home Office/Off-site    Consent:  The patient/guardian has verbally consented to: the potential risks and benefits of telemedicine (video visit) versus in person care; bill my insurance or make self-payment for services provided; and responsibility for payment of non-covered services.     Patient would like the video invitation sent by:  [employer]    Mode of Communication:  Video    As the provider I attest to compliance with applicable laws and regulations related to telemedicine.    DATA  Interactive Complexity: No   Crisis: No      Progress Since Last Session (Related to Symptoms / Goals / Homework):  Symptoms:  worsening depression and anxiety symptoms  Questioning whether a good parent    Homework: Completed in session     Episode of Care Goals: Satisfactory progress - ACTION (Actively working towards change); Intervened by reinforcing change plan / affirming steps taken     Current / Ongoing Stressors and Concerns:   Grief related to past and impact on herself and her children   Difficulty trusting others  "and being vulnerable   Ambivalence about having more intimate relationships   Fear of being rejected by others; belief she is not good enough   Lack of trust with providers  Dynamics in relationship with ex-  Lack of support system  Son's school refusal and mental health  Experiencing blame from others about parenting strategies      Treatment Objective(s) Addressed in This Session:   identify 1 fears / thoughts that contribute to feeling anxious  Use a minimum of 1 strategy to manage symptoms    Setting healthy boundaries  Identify impact of trauma on current functioning    Safe/calm state titled \"calm\"  Container: box with a lock    Potential Targets:   Relationship with ex-  Guilt about how divorce ended, Negative Belief \"It's my fault\"  Guilt about son's surgery and outcome  Negative Belief: There is something wrong with me  Being blamed by ex-boyfriend, feel shame  Childhood: not belonging, not being good enough      Current Potential Targets  Absence of friendships  Not fitting in  Defensiveness  Ex on social media  Driving in certain areas will remind her of her ex  Relationship with ex-  Need to be perfect    Past:   image of child self at 8 years old   Disagreement about [facility]   head injury    Trauma symptoms: avoids places in neighborhood for fear of running into ex-boyfriend  Has avoided romantic relationships since  Dissociative symptoms while in relationship  Recurrent, intrusive, distressing memories  Problems with concentration     Intervention:   Offered support and validation on current stressors   Offered parenting support and strategies   Provided psychoeducation on anxiety   CBT: modeled cognitive restructuring    Assessments completed prior to visit:  The following assessments were completed by patient for this visit:  None    ASSESSMENT: Current Emotional / Mental Status (status of significant symptoms):   Risk status (Self / Other harm or " suicidal ideation)   Patient denies current fears or concerns for personal safety.   Patient denies current or recent suicidal ideation or behaviors.   Patient denies current or recent homicidal ideation or behaviors.   Patient denies current or recent self injurious behavior or ideation.   Patient denies other safety concerns.   Patient reports there has been no change in risk factors since their last session.     Patient reports there has been no change in protective factors since their last session.     Recommended that patient call 911 or go to the local ED should there be a change in any of these risk factors.     Appearance:   Appropriate    Eye Contact:   Good ; some distraction by son texting to be picked up from school   Psychomotor Behavior: Normal   Attitude:   Cooperative  Interested Open to feedback and support    Orientation:   All   Speech    Rate / Production: Normal/ Responsive Emotional    Volume:  Normal    Mood:    Anxious Frustrated Sad   Affect:    Tearful    Thought Content:  Clear    Thought Form:  Coherent  Goal Directed    Insight:    Good      Medication Review:   No changes to current psychiatric medication(s)   Vyvanse   Buspar-added, client is not taking daily    Estrogen patch-discontinued for a period, now cut patch in half     Medication Compliance:   Yes     Changes in Health Issues:   None reported     Chemical Use Review:   Substance Use: no use     Tobacco Use: no use    Diagnosis:  Generalized Anxiety Disorder   Other Specified Trauma and Stress Related Disorder   Unspecified Depressive Disorder     Collateral Reports Completed:   Not Applicable    PLAN: (Patient Tasks / Therapist Tasks / Other)  EMDR:  Plan for next session to have client keep her eyes open.    Next Target: divorce, negative belief: I am a failure/I did something wrong  Client is reading the following books; Co-regulation handbook, pathological demand avoidance, and Declarative language  Therapist previously  sent client the following resource, Anxious Kids Anxious Parents by Huber Valdez and Zenaida Flores  Client will continue to manage own feelings of anxiety and depression.  Therapist to consider resetting affective circuits.    Magali Montilla, [facility] 10/24/2023    ______________________________________________________________________    Individual Treatment Plan    Patient's Name: Kiesha Mcguire  YOB: 1970    Date of Creation: 5/11/2022  Date Treatment Plan Last Reviewed/Revised: 7/28/2023    DSM5 Diagnoses:    Generalized Anxiety Disorder  Other Specified Trauma and Stress Related Disorder   Unspecified Depressive Disorder     Psychosocial / Contextual Factors: stressors related to parenting  PROMIS (reviewed every 90 days):  31    Referral / Collaboration:  Referral to another professional/service is not indicated at this time..    Anticipated number of session for this episode of care: will review in 90 days  Anticipation frequency of session: Every other week  Anticipated Duration of each session: 38-52 minutes  Treatment plan will be reviewed in 90 days or when goals have been changed.       MeasurableTreatment Goal(s) related to diagnosis / functional impairment(s)  Goal 1: Patient will sustain attention and concentration for consistently longer periods of time.  Patient will meet goals set for completing tasks 80% of the time.   Client's Goal:  I will know I've met my goal when my space isn't so chaotic, meeting deadlines around bills and work, when I am not always playing catch-up, completing tasks.      Objective #A (Patient Action)    Patient will learn and implement organization and planning skills.  Status: New - Date: 5/11/2022 , continued date: 9/8/2022, continued date: 12/21/2022, completed date: 3/29/2023    Intervention(s)  Therapist will teach the client organization and planning skills.  Therapist will address any potential barriers to using skills.    Objective #B  Patient will   "identify, challenge, and change self-talk that contributes to maladaptive feelings and actions .  Status: New - Date: 5/11/2022 , Continued date: 9/8/2022, continued date: 12/21/2022, continued date: 3/29/2023, continued date: 7/28/2023    Intervention(s)  Therapist will teach the CBT model, cognitive distortions, and cognitive restructuring techniques.      Goal 2: Patient will be able to recall the traumatic events without becoming overwhelmed with negative thoughts, feelings, or urges.   Client's Goal:  I will know I've met my goal when I do not cry every time I talk about it.\"    Objective #A (Patient Action)    Status: New - Date: 5/11/2022, continued date: 9/8/2022, continued date: 12/21/2022, continued date: 3/29/2023, continued date: 7/28/2023    Patient will describe the history of and nature of trauma symptoms    Intervention(s)  Therapist will assess the client's frequency, intensity, duration, and history of trauma symptoms and their impact on functioning.    Objective #B (Patient Action)  Status: New Date: 5/11/2022, continued date: 9/8/2022, continued date: 12/21/2022, continued date: 3/29/2023, continued date: 7/28/2023    Patient will cooperate with eye movement desensitization and reprocessing (EMDR) to reduce emotional reaction to traumatic event(s)    Intervention(s)  Therapist will utilize EMDR to reduce the client's emotional reactivity to the traumatic event(s).    Objective #C (Patient Action)  Status: New Date: 5/11/2022, continued date: 9/8/2022, continued date: 12/21/2022, continued date: 3/29/2023, continued date: 7/28/2023    Patient will learn and implement calming and coping strategies to manage trauma symptoms.    Intervention(s)  Therapist will teach grounding techniques, distress tolerance, and emotion regulation techniques.      Patient has reviewed and agreed to the above plan.      Magali Montilla, [facility]  May 11, 2022  Magali Montilla, [facility]  9/8/2022  Magali Montilla, " [facility]  12/21/2022  Magali Montilla, [facility]  3/29/2023  Magali Montilla, [facility]  7/28/2023

## 2023-10-30 ENCOUNTER — MYC MEDICAL ADVICE (OUTPATIENT)
Dept: FAMILY MEDICINE | Facility: CLINIC | Age: 53
End: 2023-10-30
Payer: COMMERCIAL

## 2023-10-30 DIAGNOSIS — F90.0 ATTENTION DEFICIT HYPERACTIVITY DISORDER (ADHD), PREDOMINANTLY INATTENTIVE TYPE: Primary | ICD-10-CM

## 2023-10-30 RX ORDER — LISDEXAMFETAMINE DIMESYLATE 10 MG/1
10 CAPSULE ORAL EVERY MORNING
Qty: 30 CAPSULE | Refills: 0 | Status: SHIPPED | OUTPATIENT
Start: 2023-10-30 | End: 2023-11-15

## 2023-10-31 ENCOUNTER — THERAPY VISIT (OUTPATIENT)
Dept: PHYSICAL THERAPY | Facility: CLINIC | Age: 53
End: 2023-10-31
Payer: COMMERCIAL

## 2023-10-31 DIAGNOSIS — M62.89 PELVIC FLOOR DYSFUNCTION: Primary | ICD-10-CM

## 2023-10-31 PROCEDURE — 97110 THERAPEUTIC EXERCISES: CPT | Mod: GP | Performed by: PHYSICAL THERAPIST

## 2023-10-31 PROCEDURE — 97140 MANUAL THERAPY 1/> REGIONS: CPT | Mod: GP | Performed by: PHYSICAL THERAPIST

## 2023-10-31 PROCEDURE — 97530 THERAPEUTIC ACTIVITIES: CPT | Mod: GP | Performed by: PHYSICAL THERAPIST

## 2023-11-02 ENCOUNTER — VIRTUAL VISIT (OUTPATIENT)
Dept: PSYCHOLOGY | Facility: CLINIC | Age: 53
End: 2023-11-02
Payer: COMMERCIAL

## 2023-11-02 DIAGNOSIS — F43.89 REACTION TO CHRONIC STRESS: ICD-10-CM

## 2023-11-02 DIAGNOSIS — F32.A DEPRESSIVE DISORDER: ICD-10-CM

## 2023-11-02 DIAGNOSIS — F41.1 GENERALIZED ANXIETY DISORDER: Primary | ICD-10-CM

## 2023-11-02 PROCEDURE — 90834 PSYTX W PT 45 MINUTES: CPT | Mod: VID | Performed by: SOCIAL WORKER

## 2023-11-02 NOTE — PROGRESS NOTES
[facility]                                     Progress Note    Patient Name: Kiesha Mcguire  Date: 11/2/2023       Service Type: Individual      Session Start Time: 11:33 AM Session End Time: 12:18 PM     Session Length: 38-52 minutes    Session #: 44 with this provider    Attendees: Client attended alone    Service Modality:  Video Visit:      Provider verified identity through the following two step process.  Patient provided:  Patient is known previously to provider    Telemedicine Visit: The patient's condition can be safely assessed and treated via synchronous audio and visual telemedicine encounter.      Reason for Telemedicine Visit: Patient convenience (e.g. access to timely appointments / distance to available provider) and Services only offered telehealth    Originating Site (Patient Location): Patient's home    Distant Site (Provider Location): Provider Remote Setting- Home Office/Off-site    Consent:  The patient/guardian has verbally consented to: the potential risks and benefits of telemedicine (video visit) versus in person care; bill my insurance or make self-payment for services provided; and responsibility for payment of non-covered services.     Patient would like the video invitation sent by:  [employer] [employer]    Mode of Communication:  Video    As the provider I attest to compliance with applicable laws and regulations related to telemedicine.    DATA  Interactive Complexity: No   Crisis: No      Progress Since Last Session (Related to Symptoms / Goals / Homework):  Symptoms:  improving this past week    Homework: Achieved / completed to satisfaction     Episode of Care Goals: Satisfactory progress - ACTION (Actively working towards change); Intervened by reinforcing change plan / affirming steps taken     Current / Ongoing Stressors and Concerns:   Grief related to past and impact on herself and her children   Difficulty trusting others and being vulnerable   Ambivalence  "about having more intimate relationships   Fear of being rejected by others; belief she is not good enough   Lack of trust with providers  Dynamics in relationship with ex-  Lack of support system  Son's school refusal and mental health; client reported recent improvements  Experiencing blame from others about parenting strategies      Treatment Objective(s) Addressed in This Session:   identify 2 fears / thoughts that contribute to feeling anxious  Identify negative self-talk and behaviors: challenge core beliefs, myths, and actions     Safe/calm state titled \"calm\"  Container: box with a lock    Potential Targets:   Relationship with ex-  Guilt about how divorce ended, Negative Belief \"It's my fault\"  Guilt about son's surgery and outcome  Negative Belief: There is something wrong with me  Being blamed by ex-boyfriend, feel shame  Childhood: not belonging, not being good enough      Current Potential Targets  Absence of friendships  Not fitting in  Defensiveness  Ex on social media  Driving in certain areas will remind her of her ex  Relationship with ex-  Need to be perfect    Past:   image of child self at 8 years old   Disagreement about [facility]   head injury    Trauma symptoms: avoids places in neighborhood for fear of running into ex-boyfriend  Has avoided romantic relationships since  Dissociative symptoms while in relationship  Recurrent, intrusive, distressing memories  Problems with concentration     Intervention:   Offered support and validation on current stressors   Engaged in active listening    Assessments completed prior to visit:  The following assessments were completed by patient for this visit:  None    ASSESSMENT: Current Emotional / Mental Status (status of significant symptoms):   Risk status (Self / Other harm or suicidal ideation)   Patient denies current fears or concerns for personal safety.   Patient denies current or recent suicidal " ideation or behaviors.   Patient denies current or recent homicidal ideation or behaviors.   Patient denies current or recent self injurious behavior or ideation.   Patient denies other safety concerns.   Patient reports there has been no change in risk factors since their last session.     Patient reports there has been no change in protective factors since their last session.     Recommended that patient call 911 or go to the local ED should there be a change in any of these risk factors.     Appearance:   Appropriate    Eye Contact:   Good    Psychomotor Behavior: Normal   Attitude:   Cooperative  Interested Pleasant    Orientation:   All   Speech    Rate / Production: Talkative    Volume:  Normal    Mood:    Anxious    Affect:    Appropriate  Brighter   Thought Content:  Clear    Thought Form:  Coherent  Goal Directed  Logical    Insight:    Good      Medication Review:   No changes to current psychiatric medication(s)   Vyvanse   Buspar-added, client is not taking daily    Estrogen patch-discontinued for a period, now cut patch in half     Medication Compliance:   Yes     Changes in Health Issues:   None reported     Chemical Use Review:   Substance Use: no use     Tobacco Use: no use    Diagnosis:  Generalized Anxiety Disorder   Other Specified Trauma and Stress Related Disorder   Unspecified Depressive Disorder     Collateral Reports Completed:   Not Applicable    PLAN: (Patient Tasks / Therapist Tasks / Other)  EMDR:  Plan for next session to have client keep her eyes open.    Next Target: divorce, negative belief: I am a failure/I did something wrong  Client is reading the following books; Co-regulation handbook, pathological demand avoidance, and Declarative language  Therapist previously sent client the following resource, Anxious Kids Anxious Parents by Huber Valdez and Zenaida Flores  Therapist to consider resetting affective circuits.    Magali Montilla,  LICSW 11/2/2023    ______________________________________________________________________    Individual Treatment Plan    Patient's Name: Kiesha Mcguire  YOB: 1970    Date of Creation: 5/11/2022  Date Treatment Plan Last Reviewed/Revised: 7/28/2023    DSM5 Diagnoses:    Generalized Anxiety Disorder  Other Specified Trauma and Stress Related Disorder   Unspecified Depressive Disorder     Psychosocial / Contextual Factors: stressors related to parenting  PROMIS (reviewed every 90 days):  31    Referral / Collaboration:  Referral to another professional/service is not indicated at this time..    Anticipated number of session for this episode of care: will review in 90 days  Anticipation frequency of session: Every other week  Anticipated Duration of each session: 38-52 minutes  Treatment plan will be reviewed in 90 days or when goals have been changed.       MeasurableTreatment Goal(s) related to diagnosis / functional impairment(s)  Goal 1: Patient will sustain attention and concentration for consistently longer periods of time.  Patient will meet goals set for completing tasks 80% of the time.   Client's Goal:  I will know I've met my goal when my space isn't so chaotic, meeting deadlines around bills and work, when I am not always playing catch-up, completing tasks.      Objective #A (Patient Action)    Patient will learn and implement organization and planning skills.  Status: New - Date: 5/11/2022 , continued date: 9/8/2022, continued date: 12/21/2022, completed date: 3/29/2023    Intervention(s)  Therapist will teach the client organization and planning skills.  Therapist will address any potential barriers to using skills.    Objective #B  Patient will  identify, challenge, and change self-talk that contributes to maladaptive feelings and actions .  Status: New - Date: 5/11/2022 , Continued date: 9/8/2022, continued date: 12/21/2022, continued date: 3/29/2023, continued date:  "7/28/2023    Intervention(s)  Therapist will teach the CBT model, cognitive distortions, and cognitive restructuring techniques.      Goal 2: Patient will be able to recall the traumatic events without becoming overwhelmed with negative thoughts, feelings, or urges.   Client's Goal:  I will know I've met my goal when I do not cry every time I talk about it.\"    Objective #A (Patient Action)    Status: New - Date: 5/11/2022, continued date: 9/8/2022, continued date: 12/21/2022, continued date: 3/29/2023, continued date: 7/28/2023    Patient will describe the history of and nature of trauma symptoms    Intervention(s)  Therapist will assess the client's frequency, intensity, duration, and history of trauma symptoms and their impact on functioning.    Objective #B (Patient Action)  Status: New Date: 5/11/2022, continued date: 9/8/2022, continued date: 12/21/2022, continued date: 3/29/2023, continued date: 7/28/2023    Patient will cooperate with eye movement desensitization and reprocessing (EMDR) to reduce emotional reaction to traumatic event(s)    Intervention(s)  Therapist will utilize EMDR to reduce the client's emotional reactivity to the traumatic event(s).    Objective #C (Patient Action)  Status: New Date: 5/11/2022, continued date: 9/8/2022, continued date: 12/21/2022, continued date: 3/29/2023, continued date: 7/28/2023    Patient will learn and implement calming and coping strategies to manage trauma symptoms.    Intervention(s)  Therapist will teach grounding techniques, distress tolerance, and emotion regulation techniques.      Patient has reviewed and agreed to the above plan.      Magali Montilla, [facility]  May 11, 2022  Magali Montilla, [facility]  9/8/2022  Magali Montilla, [facility]  12/21/2022  Magali Montilla, [facility]  3/29/2023  Magali Montilla, [facility]  7/28/2023  "

## 2023-11-09 ENCOUNTER — VIRTUAL VISIT (OUTPATIENT)
Dept: PSYCHOLOGY | Facility: CLINIC | Age: 53
End: 2023-11-09
Payer: COMMERCIAL

## 2023-11-09 DIAGNOSIS — F41.1 GENERALIZED ANXIETY DISORDER: Primary | ICD-10-CM

## 2023-11-09 DIAGNOSIS — F43.89 REACTION TO CHRONIC STRESS: ICD-10-CM

## 2023-11-09 PROCEDURE — 90834 PSYTX W PT 45 MINUTES: CPT | Mod: VID | Performed by: SOCIAL WORKER

## 2023-11-09 NOTE — PROGRESS NOTES
[facility]                                     Progress Note    Patient Name: Kiesha Mcguire  Date: 11/9/2023       Service Type: Individual      Session Start Time: 1:32 PM Session End Time: 2:15 PM     Session Length: 38-52 minutes    Session #: 45 with this provider    Attendees: Client attended alone    Service Modality:  Video Visit:      Provider verified identity through the following two step process.  Patient provided:  Patient is known previously to provider    Telemedicine Visit: The patient's condition can be safely assessed and treated via synchronous audio and visual telemedicine encounter.      Reason for Telemedicine Visit: Patient convenience (e.g. access to timely appointments / distance to available provider) and Services only offered telehealth    Originating Site (Patient Location): Patient's home    Distant Site (Provider Location): Provider Remote Setting- Home Office/Off-site    Consent:  The patient/guardian has verbally consented to: the potential risks and benefits of telemedicine (video visit) versus in person care; bill my insurance or make self-payment for services provided; and responsibility for payment of non-covered services.     Patient would like the video invitation sent by:  [employer]    Mode of Communication:  Video    As the provider I attest to compliance with applicable laws and regulations related to telemedicine.    DATA  Interactive Complexity: No   Crisis: No      Progress Since Last Session (Related to Symptoms / Goals / Homework):  Symptoms:  no change since previous appointment  Poor appetite due to stimulant     Homework: Completed in session     Episode of Care Goals: Satisfactory progress - ACTION (Actively working towards change); Intervened by reinforcing change plan / affirming steps taken     Current / Ongoing Stressors and Concerns:   Difficulty trusting others and being vulnerable  Dynamics in relationship with ex-  Lack of  "support system  Experiencing blame from others about parenting strategies      Treatment Objective(s) Addressed in This Session:   Setting boundaries      Safe/calm state titled \"calm\"  Container: box with a lock    Potential Targets:   Relationship with ex-  Guilt about how divorce ended, Negative Belief \"It's my fault\"  Guilt about son's surgery and outcome  Negative Belief: There is something wrong with me  Being blamed by ex-boyfriend, feel shame  Childhood: not belonging, not being good enough      Current Potential Targets  Absence of friendships  Not fitting in  Defensiveness  Ex on social media  Driving in certain areas will remind her of her ex  Relationship with ex-  Need to be perfect    Past:   image of child self at 8 years old   Disagreement about salad   [facility]   head injury    Trauma symptoms: avoids places in neighborhood for fear of running into ex-boyfriend  Has avoided romantic relationships since  Dissociative symptoms while in relationship  Recurrent, intrusive, distressing memories  Problems with concentration     Intervention:   Engaged in active listening   Provided psychoeducation on resetting affective circuits    Assessments completed prior to visit:  The following assessments were completed by patient for this visit:  None    ASSESSMENT: Current Emotional / Mental Status (status of significant symptoms):   Risk status (Self / Other harm or suicidal ideation)   Patient denies current fears or concerns for personal safety.   Patient denies current or recent suicidal ideation or behaviors.   Patient denies current or recent homicidal ideation or behaviors.   Patient denies current or recent self injurious behavior or ideation.   Patient denies other safety concerns.   Patient reports there has been no change in risk factors since their last session.     Patient reports there has been no change in protective factors since their last session.     Recommended " that patient call 911 or go to the local ED should there be a change in any of these risk factors.     Appearance:   Appropriate    Eye Contact:   Good    Psychomotor Behavior: Normal   Attitude:   Cooperative  Interested    Orientation:   All   Speech    Rate / Production: Talkative Normal     Volume:  Normal    Mood:    Anxious    Affect:    Appropriate     Thought Content:  Clear    Thought Form:  Coherent  Goal Directed  Logical    Insight:    Good      Medication Review:   No changes to current psychiatric medication(s)   Vyvanse   Buspar-added, client is not taking daily    Estrogen patch-discontinued for a period, now cut patch in half     Medication Compliance:   Yes     Changes in Health Issues:   None reported     Chemical Use Review:   Substance Use: no use     Tobacco Use: no use    Diagnosis:  Generalized Anxiety Disorder   Other Specified Trauma and Stress Related Disorder   Unspecified Depressive Disorder     Collateral Reports Completed:   Not Applicable    PLAN: (Patient Tasks / Therapist Tasks / Other)  EMDR:  Plan for next session to have client keep her eyes open.    Next Target: divorce, negative belief: I am a failure/I did something wrong  Client is reading the following books; Co-regulation handbook, pathological demand avoidance, and Declarative language  Therapist previously sent client the following resource, Anxious Kids Anxious Parents by Huber Valdez and Zenaida Flores  Therapist to consider resetting affective circuits.  PCP appt on 11/20/2023    Magali Montilla, LICSW 11/9/2023    ______________________________________________________________________    Individual Treatment Plan    Patient's Name: Kiesha Mcguire  YOB: 1970    Date of Creation: 5/11/2022  Date Treatment Plan Last Reviewed/Revised: 11/9/2023    DSM5 Diagnoses:    Generalized Anxiety Disorder  Other Specified Trauma and Stress Related Disorder   Unspecified Depressive Disorder     Psychosocial /  Contextual Factors: stressors related to parenting  PROMIS (reviewed every 90 days):    PROMIS 10-Global Health (only subscores and total score):       12/7/2022    12:04 PM 12/21/2022     1:01 PM 12/29/2022     9:56 AM 4/11/2023     1:09 PM 4/25/2023     9:54 AM 8/10/2023    12:52 PM 9/21/2023    11:25 AM   PROMIS-10 Scores Only   Global Mental Health Score 14 14 13 13 13 13 15   Global Physical Health Score 17 17 16 16 17 17 17   PROMIS TOTAL - SUBSCORES 31 31 29 29 30 30 32        Referral / Collaboration:  Referral to another professional/service is not indicated at this time..    Anticipated number of session for this episode of care: will review in 90 days  Anticipation frequency of session: Every other week  Anticipated Duration of each session: 38-52 minutes  Treatment plan will be reviewed in 90 days or when goals have been changed.       MeasurableTreatment Goal(s) related to diagnosis / functional impairment(s)  Goal 1: Patient will sustain attention and concentration for consistently longer periods of time.  Patient will meet goals set for completing tasks 80% of the time.   Client's Goal:  I will know I've met my goal when my space isn't so chaotic, meeting deadlines around bills and work, when I am not always playing catch-up, completing tasks.      Objective #A (Patient Action)    Patient will learn and implement organization and planning skills.  Status: New - Date: 5/11/2022 , continued date: 9/8/2022, continued date: 12/21/2022, completed date: 3/29/2023    Intervention(s)  Therapist will teach the client organization and planning skills.  Therapist will address any potential barriers to using skills.    Objective #B  Patient will  identify, challenge, and change self-talk that contributes to maladaptive feelings and actions .  Status: New - Date: 5/11/2022 , Continued date: 9/8/2022, continued date: 12/21/2022, continued date: 3/29/2023, continued date: 7/28/2023, continued date:  "11/9/2023    Intervention(s)  Therapist will teach the CBT model, cognitive distortions, and cognitive restructuring techniques.      Goal 2: Patient will be able to recall the traumatic events without becoming overwhelmed with negative thoughts, feelings, or urges.   Client's Goal:  I will know I've met my goal when I do not cry every time I talk about it.\"    Objective #A (Patient Action)    Status: New - Date: 5/11/2022, continued date: 9/8/2022, continued date: 12/21/2022, continued date: 3/29/2023, continued date: 7/28/2023, continued date: 11/9/2023    Patient will describe the history of and nature of trauma symptoms    Intervention(s)  Therapist will assess the client's frequency, intensity, duration, and history of trauma symptoms and their impact on functioning.    Objective #B (Patient Action)  Status: New Date: 5/11/2022, continued date: 9/8/2022, continued date: 12/21/2022, continued date: 3/29/2023, continued date: 7/28/2023, continued date: 11/9/2023    Patient will cooperate with eye movement desensitization and reprocessing (EMDR) to reduce emotional reaction to traumatic event(s)    Intervention(s)  Therapist will utilize EMDR to reduce the client's emotional reactivity to the traumatic event(s).    Objective #C (Patient Action)  Status: New Date: 5/11/2022, continued date: 9/8/2022, continued date: 12/21/2022, continued date: 3/29/2023, continued date: 7/28/2023, continued date: 11/9/2023    Patient will learn and implement calming and coping strategies to manage trauma symptoms.    Intervention(s)  Therapist will teach grounding techniques, distress tolerance, and emotion regulation techniques.      Patient has reviewed and agreed to the above plan.      Magali Montilla, [facility]  May 11, 2022  Magali Montilla, [facility]  9/8/2022  Magali Montilla, [facility]  12/21/2022  Magali Montilla, [facility]  3/29/2023  Magali Montilla, [facility]  7/28/2023  Magali Montilla, [facility]  11/9/2023  "

## 2023-11-14 ENCOUNTER — THERAPY VISIT (OUTPATIENT)
Dept: PHYSICAL THERAPY | Facility: CLINIC | Age: 53
End: 2023-11-14
Payer: COMMERCIAL

## 2023-11-14 DIAGNOSIS — M62.89 PELVIC FLOOR DYSFUNCTION: Primary | ICD-10-CM

## 2023-11-14 PROCEDURE — 97140 MANUAL THERAPY 1/> REGIONS: CPT | Mod: GP | Performed by: PHYSICAL THERAPIST

## 2023-11-14 PROCEDURE — 97110 THERAPEUTIC EXERCISES: CPT | Mod: GP | Performed by: PHYSICAL THERAPIST

## 2023-11-14 PROCEDURE — 97530 THERAPEUTIC ACTIVITIES: CPT | Mod: GP | Performed by: PHYSICAL THERAPIST

## 2023-11-15 RX ORDER — LISDEXAMFETAMINE DIMESYLATE 10 MG/1
10 CAPSULE ORAL EVERY MORNING
Qty: 30 CAPSULE | Refills: 0 | Status: SHIPPED | OUTPATIENT
Start: 2023-11-15 | End: 2024-01-05

## 2023-11-16 ENCOUNTER — VIRTUAL VISIT (OUTPATIENT)
Dept: PSYCHOLOGY | Facility: CLINIC | Age: 53
End: 2023-11-16
Payer: COMMERCIAL

## 2023-11-16 DIAGNOSIS — F32.A DEPRESSIVE DISORDER: ICD-10-CM

## 2023-11-16 DIAGNOSIS — F43.89 REACTION TO CHRONIC STRESS: ICD-10-CM

## 2023-11-16 DIAGNOSIS — F41.1 GENERALIZED ANXIETY DISORDER: Primary | ICD-10-CM

## 2023-11-16 PROCEDURE — 90834 PSYTX W PT 45 MINUTES: CPT | Mod: VID | Performed by: SOCIAL WORKER

## 2023-11-16 NOTE — PROGRESS NOTES
"[facility]                                     Progress Note    Patient Name: Kiesha Mcguire  Date: 11/16/2023       Service Type: Individual      Session Start Time:  11:38 AM Session End Time: 12:16 PM     Session Length: 38-52 minutes    Session #: 46 with this provider    Attendees: Client attended alone    Service Modality:  Video Visit:      Provider verified identity through the following two step process.  Patient provided:  Patient is known previously to provider    Telemedicine Visit: The patient's condition can be safely assessed and treated via synchronous audio and visual telemedicine encounter.      Reason for Telemedicine Visit: Patient convenience (e.g. access to timely appointments / distance to available provider) and Services only offered telehealth    Originating Site (Patient Location): Patient's home    Distant Site (Provider Location): Provider Remote Setting- Home Office/Off-site    Consent:  The patient/guardian has verbally consented to: the potential risks and benefits of telemedicine (video visit) versus in person care; bill my insurance or make self-payment for services provided; and responsibility for payment of non-covered services.     Patient would like the video invitation sent by:  [employer]    Mode of Communication:  Video    As the provider I attest to compliance with applicable laws and regulations related to telemedicine.    DATA  Interactive Complexity: No   Crisis: No      Progress Since Last Session (Related to Symptoms / Goals / Homework):  Symptoms:  client reported feeling more emotional, agitated, and difficulty falling asleep/\"still feel medicated\" since medication change    Homework: Achieved / completed to satisfaction  Using assertive communication, setting healthy boundaries     Episode of Care Goals: Satisfactory progress - ACTION (Actively working towards change); Intervened by reinforcing change plan / affirming steps taken     Current " "/ Ongoing Stressors and Concerns:   Difficulty trusting others and being vulnerable  Dynamics in relationship with ex-  Lack of support system      Treatment Objective(s) Addressed in This Session:   Setting boundaries    Using assertive communication strategies    Safe/calm state titled \"calm\"  Container: box with a lock    Potential Targets:   Relationship with ex-  Guilt about how divorce ended, Negative Belief \"It's my fault\"  Guilt about son's surgery and outcome  Negative Belief: There is something wrong with me  Being blamed by ex-boyfriend, feel shame  Childhood: not belonging, not being good enough      Current Potential Targets  Absence of friendships  Not fitting in  Defensiveness  Ex on social media  Driving in certain areas will remind her of her ex  Relationship with ex-  Need to be perfect    Past:   image of child self at 8 years old   Disagreement about salad   [facility]   head injury    Trauma symptoms: avoids places in neighborhood for fear of running into ex-boyfriend  Has avoided romantic relationships since  Dissociative symptoms while in relationship  Recurrent, intrusive, distressing memories  Problems with concentration     Intervention:   Engaged in active listening   Reinforced behavior changes   EMDR: discussed next steps    Assessments completed prior to visit:  The following assessments were completed by patient for this visit:  None    ASSESSMENT: Current Emotional / Mental Status (status of significant symptoms):   Risk status (Self / Other harm or suicidal ideation)   Patient denies current fears or concerns for personal safety.   Patient denies current or recent suicidal ideation or behaviors.   Patient denies current or recent homicidal ideation or behaviors.   Patient denies current or recent self injurious behavior or ideation.   Patient denies other safety concerns.   Patient reports there has been no change in risk factors since their last " session.     Patient reports there has been no change in protective factors since their last session.     Recommended that patient call 911 or go to the local ED should there be a change in any of these risk factors.     Appearance:   Appropriate    Eye Contact:   Good    Psychomotor Behavior: Normal   Attitude:   Cooperative  Interested    Orientation:   All   Speech    Rate / Production: Normal     Volume:  Normal    Mood:    Anxious    Affect:    Appropriate     Thought Content:  Clear    Thought Form:  Coherent  Goal Directed  Logical    Insight:    Good      Medication Review:   No changes to current psychiatric medication(s)   Vyvanse   Buspar-added, client is not taking daily    Estrogen patch-discontinued for a period, now cut patch in half     Medication Compliance:   Yes     Changes in Health Issues:   None reported     Chemical Use Review:   Substance Use: no use     Tobacco Use: no use    Diagnosis:  Generalized Anxiety Disorder   Other Specified Trauma and Stress Related Disorder   Unspecified Depressive Disorder     Collateral Reports Completed:   Not Applicable    PLAN: (Patient Tasks / Therapist Tasks / Other)  EMDR:  Plan for next session to have client keep her eyes open.    Next Target: divorce, negative belief: I am a failure/I did something wrong  Client is reading the following books; Co-regulation handbook, pathological demand avoidance, and Declarative language  Therapist previously sent client the following resource, Anxious Kids Anxious Parents by Huber Valdez and Zenaida Flores  Therapist to consider resetting affective circuits.  PCP appt on 11/20/2023    Magali Montilla, LICSW 11/16/2023    ______________________________________________________________________    Individual Treatment Plan    Patient's Name: Kiesha Mcguire  YOB: 1970    Date of Creation: 5/11/2022  Date Treatment Plan Last Reviewed/Revised: 11/9/2023    DSM5 Diagnoses:    Generalized Anxiety  Disorder  Other Specified Trauma and Stress Related Disorder   Unspecified Depressive Disorder     Psychosocial / Contextual Factors: stressors related to parenting  PROMIS (reviewed every 90 days):    PROMIS 10-Global Health (only subscores and total score):       12/7/2022    12:04 PM 12/21/2022     1:01 PM 12/29/2022     9:56 AM 4/11/2023     1:09 PM 4/25/2023     9:54 AM 8/10/2023    12:52 PM 9/21/2023    11:25 AM   PROMIS-10 Scores Only   Global Mental Health Score 14 14 13 13 13 13 15   Global Physical Health Score 17 17 16 16 17 17 17   PROMIS TOTAL - SUBSCORES 31 31 29 29 30 30 32        Referral / Collaboration:  Referral to another professional/service is not indicated at this time..    Anticipated number of session for this episode of care: will review in 90 days  Anticipation frequency of session: Every other week  Anticipated Duration of each session: 38-52 minutes  Treatment plan will be reviewed in 90 days or when goals have been changed.       MeasurableTreatment Goal(s) related to diagnosis / functional impairment(s)  Goal 1: Patient will sustain attention and concentration for consistently longer periods of time.  Patient will meet goals set for completing tasks 80% of the time.   Client's Goal:  I will know I've met my goal when my space isn't so chaotic, meeting deadlines around bills and work, when I am not always playing catch-up, completing tasks.      Objective #A (Patient Action)    Patient will learn and implement organization and planning skills.  Status: New - Date: 5/11/2022 , continued date: 9/8/2022, continued date: 12/21/2022, completed date: 3/29/2023    Intervention(s)  Therapist will teach the client organization and planning skills.  Therapist will address any potential barriers to using skills.    Objective #B  Patient will  identify, challenge, and change self-talk that contributes to maladaptive feelings and actions .  Status: New - Date: 5/11/2022 , Continued date: 9/8/2022,  "continued date: 12/21/2022, continued date: 3/29/2023, continued date: 7/28/2023, continued date: 11/9/2023    Intervention(s)  Therapist will teach the CBT model, cognitive distortions, and cognitive restructuring techniques.      Goal 2: Patient will be able to recall the traumatic events without becoming overwhelmed with negative thoughts, feelings, or urges.   Client's Goal:  I will know I've met my goal when I do not cry every time I talk about it.\"    Objective #A (Patient Action)    Status: New - Date: 5/11/2022, continued date: 9/8/2022, continued date: 12/21/2022, continued date: 3/29/2023, continued date: 7/28/2023, continued date: 11/9/2023    Patient will describe the history of and nature of trauma symptoms    Intervention(s)  Therapist will assess the client's frequency, intensity, duration, and history of trauma symptoms and their impact on functioning.    Objective #B (Patient Action)  Status: New Date: 5/11/2022, continued date: 9/8/2022, continued date: 12/21/2022, continued date: 3/29/2023, continued date: 7/28/2023, continued date: 11/9/2023    Patient will cooperate with eye movement desensitization and reprocessing (EMDR) to reduce emotional reaction to traumatic event(s)    Intervention(s)  Therapist will utilize EMDR to reduce the client's emotional reactivity to the traumatic event(s).    Objective #C (Patient Action)  Status: New Date: 5/11/2022, continued date: 9/8/2022, continued date: 12/21/2022, continued date: 3/29/2023, continued date: 7/28/2023, continued date: 11/9/2023    Patient will learn and implement calming and coping strategies to manage trauma symptoms.    Intervention(s)  Therapist will teach grounding techniques, distress tolerance, and emotion regulation techniques.      Patient has reviewed and agreed to the above plan.      Magali Montilla, [facility]  May 11, 2022  Magali Montilla, [facility]  9/8/2022  Magali Montilla, [facility]  12/21/2022  Magali Montilla, [facility]  3/29/2023  Magali" Roldan, [facility]  7/28/2023  Magali Montilla, [facility]  11/9/2023

## 2023-11-20 ENCOUNTER — OFFICE VISIT (OUTPATIENT)
Dept: FAMILY MEDICINE | Facility: CLINIC | Age: 53
End: 2023-11-20
Payer: COMMERCIAL

## 2023-11-20 VITALS
DIASTOLIC BLOOD PRESSURE: 71 MMHG | WEIGHT: 135 LBS | HEART RATE: 63 BPM | TEMPERATURE: 97.7 F | HEIGHT: 64 IN | BODY MASS INDEX: 23.05 KG/M2 | OXYGEN SATURATION: 99 % | RESPIRATION RATE: 18 BRPM | SYSTOLIC BLOOD PRESSURE: 105 MMHG

## 2023-11-20 DIAGNOSIS — M25.512 CHRONIC LEFT SHOULDER PAIN: ICD-10-CM

## 2023-11-20 DIAGNOSIS — F90.0 ATTENTION DEFICIT HYPERACTIVITY DISORDER (ADHD), PREDOMINANTLY INATTENTIVE TYPE: Primary | ICD-10-CM

## 2023-11-20 DIAGNOSIS — G89.29 CHRONIC LEFT SHOULDER PAIN: ICD-10-CM

## 2023-11-20 DIAGNOSIS — M62.89 PELVIC FLOOR DYSFUNCTION: ICD-10-CM

## 2023-11-20 DIAGNOSIS — R19.7 DIARRHEA, UNSPECIFIED TYPE: ICD-10-CM

## 2023-11-20 PROCEDURE — 90471 IMMUNIZATION ADMIN: CPT | Performed by: FAMILY MEDICINE

## 2023-11-20 PROCEDURE — 99214 OFFICE O/P EST MOD 30 MIN: CPT | Mod: 25 | Performed by: FAMILY MEDICINE

## 2023-11-20 PROCEDURE — 90682 RIV4 VACC RECOMBINANT DNA IM: CPT | Performed by: FAMILY MEDICINE

## 2023-11-20 RX ORDER — LISDEXAMFETAMINE DIMESYLATE 10 MG/1
10 CAPSULE ORAL EVERY MORNING
Qty: 30 CAPSULE | Refills: 0 | Status: CANCELLED | OUTPATIENT
Start: 2023-11-20

## 2023-11-20 RX ORDER — LISDEXAMFETAMINE DIMESYLATE 10 MG/1
10 CAPSULE ORAL DAILY
Qty: 30 CAPSULE | Refills: 0 | Status: SHIPPED | OUTPATIENT
Start: 2023-12-21 | End: 2024-01-20

## 2023-11-20 RX ORDER — LISDEXAMFETAMINE DIMESYLATE 10 MG/1
10 CAPSULE ORAL DAILY
Qty: 30 CAPSULE | Refills: 0 | Status: SHIPPED | OUTPATIENT
Start: 2023-11-20 | End: 2023-12-20

## 2023-11-20 RX ORDER — HYDROXYCHLOROQUINE SULFATE 200 MG/1
200 TABLET, FILM COATED ORAL 2 TIMES DAILY
Qty: 180 TABLET | Refills: 3 | Status: CANCELLED | OUTPATIENT
Start: 2023-11-20

## 2023-11-20 RX ORDER — ESTRADIOL 0.03 MG/D
1 FILM, EXTENDED RELEASE TRANSDERMAL
Qty: 24 PATCH | Refills: 3 | Status: CANCELLED | OUTPATIENT
Start: 2023-11-20

## 2023-11-20 RX ORDER — LISDEXAMFETAMINE DIMESYLATE 10 MG/1
10 CAPSULE ORAL DAILY
Qty: 30 CAPSULE | Refills: 0 | Status: SHIPPED | OUTPATIENT
Start: 2024-01-21 | End: 2024-02-20

## 2023-11-20 NOTE — PROGRESS NOTES
Assessment & Plan     Attention deficit hyperactivity disorder (ADHD), predominantly inattentive type  Attempted to fill new RX of name brand Vyvanse, however, insurance would not allow as it was too soon to have it refilled. Patient is switching medical coverage and requests medications go to [facility]. Plan to return to clinic in 1 month.     - VYVANSE 10 MG capsule  Dispense: 30 capsule; Refill: 0  - VYVANSE 10 MG capsule  Dispense: 30 capsule; Refill: 0  - VYVANSE 10 MG capsule  Dispense: 30 capsule; Refill: 0    Chronic left shoulder pain  - Physical Therapy Referral    Pelvic floor dysfunction  - Physical Therapy Referral    Diarrhea, unspecified type  Education regarding nutritional information status post cholecystectomy.  - Nutrition Referral      MDM: Kye MARCO sent to [facility] pharmacy. Does not require another recheck of her WBC today, last checked in September. It is mildly reduced likely due to the immunosuppression medication for Alopecia. New order for PT sent to continue working on Left shoulder and pelvic floor dysfunction. Diarrhea could be related to increased bile acids in the colon after the cholecystectomy. Appreciate dietician's input for diarrhea management. May benefit from cholestyramine or colestipol if dietary changes are not helpful.       SRINIVASAN SOOD DO  [facility] SANGEETHA Pop is a 53 year old, presenting for the following health issues:  Refill Request and PT Renewal        11/20/2023     9:05 AM   Additional Questions   Roomed by Elyssa PARIS CMA       History of Present Illness       Reason for visit:  Various    She eats 2-3 servings of fruits and vegetables daily.She consumes 1 sweetened beverage(s) daily.She exercises with enough effort to increase her heart rate 9 or less minutes per day.  She exercises with enough effort to increase her heart rate 3 or less days per week. She is missing 1 dose(s) of medications per week.     HPI: Kiesha  "is here today to follow-up on Vyvanse brand name refill, wanting to continue with PT, questions if she needs her WBC rechecked, and reports having diarrhea. She received a generic Lisdexamfetamine recently from a different  than her previous supply. She feels this 's medication makes her feel agitated and affects her sleep stating \"I feel jacked up when I go to bed.\" She has been working with PT for her chronic left shoulder pain and pelvic floor dysfunction, but now she is needing the order renewed. She feels PT has been beneficial and wishes to continue. She questions if she needs her WBC rechecked as she is concerned it is \"low\". She understands it correlates with taking the Hydroxychloroquine. Lastly, she c/o diarrhea 2-3 times per week with intermittent \"normal stool\" since her cholecystectomy on 12/24/2021. She denies blood in her stool.         Review of Systems         Objective    /71 (BP Location: Right arm, Patient Position: Chair, Cuff Size: Adult Regular)   Pulse 63   Temp 97.7  F (36.5  C) (Oral)   Resp 18   Ht 1.626 m (5' 4\")   Wt 61.2 kg (135 lb)   SpO2 99%   BMI 23.17 kg/m    Body mass index is 23.17 kg/m .  Physical Exam   GENERAL: healthy, alert and no distress  MS: no gross musculoskeletal defects noted, no edema  PSYCH: mentation appears normal, affect normal                  "

## 2023-11-28 ENCOUNTER — THERAPY VISIT (OUTPATIENT)
Dept: PHYSICAL THERAPY | Facility: CLINIC | Age: 53
End: 2023-11-28
Payer: COMMERCIAL

## 2023-11-28 DIAGNOSIS — M62.89 PELVIC FLOOR DYSFUNCTION: Primary | ICD-10-CM

## 2023-11-28 PROCEDURE — 97110 THERAPEUTIC EXERCISES: CPT | Mod: GP | Performed by: PHYSICAL THERAPIST

## 2023-11-28 PROCEDURE — 97530 THERAPEUTIC ACTIVITIES: CPT | Mod: GP | Performed by: PHYSICAL THERAPIST

## 2023-11-30 ENCOUNTER — VIRTUAL VISIT (OUTPATIENT)
Dept: PSYCHOLOGY | Facility: CLINIC | Age: 53
End: 2023-11-30
Payer: COMMERCIAL

## 2023-11-30 DIAGNOSIS — F43.89 REACTION TO CHRONIC STRESS: ICD-10-CM

## 2023-11-30 DIAGNOSIS — F41.1 GENERALIZED ANXIETY DISORDER: Primary | ICD-10-CM

## 2023-11-30 PROCEDURE — 90834 PSYTX W PT 45 MINUTES: CPT | Mod: VID | Performed by: SOCIAL WORKER

## 2023-11-30 NOTE — PROGRESS NOTES
[facility]                                     Progress Note    Patient Name: Kiesha Mcguire  Date: 11/30/2023       Service Type: Individual      Session Start Time:  4:31 PM Session End Time: 5:15 PM     Session Length: 38-52 minutes    Session #: 47 with this provider    Attendees: Client attended alone    Service Modality:  Video Visit:      Provider verified identity through the following two step process.  Patient provided:  Patient is known previously to provider    Telemedicine Visit: The patient's condition can be safely assessed and treated via synchronous audio and visual telemedicine encounter.      Reason for Telemedicine Visit: Patient convenience (e.g. access to timely appointments / distance to available provider) and Services only offered telehealth    Originating Site (Patient Location): Patient's home    Distant Site (Provider Location): Provider Remote Setting- Home Office/Off-site    Consent:  The patient/guardian has verbally consented to: the potential risks and benefits of telemedicine (video visit) versus in person care; bill my insurance or make self-payment for services provided; and responsibility for payment of non-covered services.     Patient would like the video invitation sent by:  Image Socket    Mode of Communication:  Video    As the provider I attest to compliance with applicable laws and regulations related to telemedicine.    DATA  Interactive Complexity: No   Crisis: No      Progress Since Last Session (Related to Symptoms / Goals / Homework):  Symptoms:  Client reported improvements    Homework: Completed in session     Episode of Care Goals: Satisfactory progress - ACTION (Actively working towards change); Intervened by reinforcing change plan / affirming steps taken     Current / Ongoing Stressors and Concerns:   Difficulty trusting others and being vulnerable  Dynamics in relationship with ex-      Treatment Objective(s) Addressed in This  "Session:   EMDR: phase 2      Safe/calm state titled \"calm\"  Container: box with a lock    Potential Targets:   Relationship with ex-  Guilt about how divorce ended, Negative Belief \"It's my fault\"  Guilt about son's surgery and outcome  Negative Belief: There is something wrong with me  Being blamed by ex-boyfriend, feel shame  Childhood: not belonging, not being good enough      Current Potential Targets  Absence of friendships  Not fitting in  Defensiveness  Ex on social media  Driving in certain areas will remind her of her ex  Relationship with ex-  Need to be perfect    Past:   image of child self at 8 years old   Disagreement about salad   [facility]   head injury    Trauma symptoms: avoids places in neighborhood for fear of running into ex-boyfriend  Has avoided romantic relationships since  Dissociative symptoms while in relationship  Recurrent, intrusive, distressing memories  Problems with concentration     Intervention:   EMDR: engaged client in resetting affective circuits; shame, fear, rage, panic, and care    Assessments completed prior to visit:  The following assessments were completed by patient for this visit:  None    ASSESSMENT: Current Emotional / Mental Status (status of significant symptoms):   Risk status (Self / Other harm or suicidal ideation)   Patient denies current fears or concerns for personal safety.   Patient denies current or recent suicidal ideation or behaviors.   Patient denies current or recent homicidal ideation or behaviors.   Patient denies current or recent self injurious behavior or ideation.   Patient denies other safety concerns.   Patient reports there has been no change in risk factors since their last session.     Patient reports there has been no change in protective factors since their last session.     Recommended that patient call 911 or go to the local ED should there be a change in any of these risk " factors.     Appearance:   Appropriate    Eye Contact:   Good ; closed when engaging in BLS   Psychomotor Behavior: Normal   Attitude:   Cooperative  Interested Pleasant    Orientation:   All   Speech    Rate / Production: Normal     Volume:  Normal    Mood:    Stable   Affect:    Appropriate     Thought Content:  Clear    Thought Form:  Coherent  Goal Directed  Logical    Insight:    Good      Medication Review:   No changes to current psychiatric medication(s)   Vyvanse   Buspar-added, client is not taking daily    Estrogen patch     Medication Compliance:   Yes     Changes in Health Issues:   None reported     Chemical Use Review:   Substance Use: no use     Tobacco Use: no use    Diagnosis:  Generalized Anxiety Disorder   Other Specified Trauma and Stress Related Disorder   Unspecified Depressive Disorder     Collateral Reports Completed:   Not Applicable    PLAN: (Patient Tasks / Therapist Tasks / Other)  EMDR:  Plan for next session to have client keep her eyes open.    Next Target: divorce, negative belief: I am a failure/I did something wrong  Therapist to continue resetting affective circuits.    Magali oMntilla, [facility] 11/30/2023    ______________________________________________________________________    Individual Treatment Plan    Patient's Name: Kiesha Mcguire  YOB: 1970    Date of Creation: 5/11/2022  Date Treatment Plan Last Reviewed/Revised: 11/9/2023    DSM5 Diagnoses:    Generalized Anxiety Disorder  Other Specified Trauma and Stress Related Disorder   Unspecified Depressive Disorder     Psychosocial / Contextual Factors: stressors related to parenting  PROMIS (reviewed every 90 days):    PROMIS 10-Global Health (only subscores and total score):       12/7/2022    12:04 PM 12/21/2022     1:01 PM 12/29/2022     9:56 AM 4/11/2023     1:09 PM 4/25/2023     9:54 AM 8/10/2023    12:52 PM 9/21/2023    11:25 AM   PROMIS-10 Scores Only   Global Mental Health Score 14 14 13 13 13 13 15  "  Global Physical Health Score 17 17 16 16 17 17 17   PROMIS TOTAL - SUBSCORES 31 31 29 29 30 30 32        Referral / Collaboration:  Referral to another professional/service is not indicated at this time..    Anticipated number of session for this episode of care: will review in 90 days  Anticipation frequency of session: Every other week  Anticipated Duration of each session: 38-52 minutes  Treatment plan will be reviewed in 90 days or when goals have been changed.       MeasurableTreatment Goal(s) related to diagnosis / functional impairment(s)  Goal 1: Patient will sustain attention and concentration for consistently longer periods of time.  Patient will meet goals set for completing tasks 80% of the time.   Client's Goal:  I will know I've met my goal when my space isn't so chaotic, meeting deadlines around bills and work, when I am not always playing catch-up, completing tasks.      Objective #A (Patient Action)    Patient will learn and implement organization and planning skills.  Status: New - Date: 5/11/2022 , continued date: 9/8/2022, continued date: 12/21/2022, completed date: 3/29/2023    Intervention(s)  Therapist will teach the client organization and planning skills.  Therapist will address any potential barriers to using skills.    Objective #B  Patient will  identify, challenge, and change self-talk that contributes to maladaptive feelings and actions .  Status: New - Date: 5/11/2022 , Continued date: 9/8/2022, continued date: 12/21/2022, continued date: 3/29/2023, continued date: 7/28/2023, continued date: 11/9/2023    Intervention(s)  Therapist will teach the CBT model, cognitive distortions, and cognitive restructuring techniques.      Goal 2: Patient will be able to recall the traumatic events without becoming overwhelmed with negative thoughts, feelings, or urges.   Client's Goal:  I will know I've met my goal when I do not cry every time I talk about it.\"    Objective #A (Patient " Action)    Status: New - Date: 5/11/2022, continued date: 9/8/2022, continued date: 12/21/2022, continued date: 3/29/2023, continued date: 7/28/2023, continued date: 11/9/2023    Patient will describe the history of and nature of trauma symptoms    Intervention(s)  Therapist will assess the client's frequency, intensity, duration, and history of trauma symptoms and their impact on functioning.    Objective #B (Patient Action)  Status: New Date: 5/11/2022, continued date: 9/8/2022, continued date: 12/21/2022, continued date: 3/29/2023, continued date: 7/28/2023, continued date: 11/9/2023    Patient will cooperate with eye movement desensitization and reprocessing (EMDR) to reduce emotional reaction to traumatic event(s)    Intervention(s)  Therapist will utilize EMDR to reduce the client's emotional reactivity to the traumatic event(s).    Objective #C (Patient Action)  Status: New Date: 5/11/2022, continued date: 9/8/2022, continued date: 12/21/2022, continued date: 3/29/2023, continued date: 7/28/2023, continued date: 11/9/2023    Patient will learn and implement calming and coping strategies to manage trauma symptoms.    Intervention(s)  Therapist will teach grounding techniques, distress tolerance, and emotion regulation techniques.      Patient has reviewed and agreed to the above plan.      Magali Montilla, [facility]  May 11, 2022  Magali Montilla, [facility]  9/8/2022  [facility] Montilla, [facility]  12/21/2022  Magali Montilla, [facility]  3/29/2023  Magali Montilla, [facility]  7/28/2023  Magali Montilla, [facility]  11/9/2023

## 2023-12-01 ENCOUNTER — HOSPITAL ENCOUNTER (OUTPATIENT)
Dept: NUTRITION | Facility: CLINIC | Age: 53
Discharge: HOME | End: 2023-12-01
Attending: FAMILY MEDICINE | Admitting: FAMILY MEDICINE
Payer: COMMERCIAL

## 2023-12-01 DIAGNOSIS — R19.7 DIARRHEA, UNSPECIFIED TYPE: ICD-10-CM

## 2023-12-01 PROCEDURE — 97802 MEDICAL NUTRITION INDIV IN: CPT | Mod: GT,95

## 2023-12-01 NOTE — PROGRESS NOTES
Virtual Visit Details    Type of service:  Video Visit     Originating Location (pt. Location): Home    Distant Location (provider location):  On-site  Platform used for Video Visit: [facility]  OUTPATIENT CLINICAL NUTRITION SERVICES ASSESSMENT    REASON FOR ASSESSMENT  Kiesha Mcguire referred by Dr. Trejo, Damian STEVENSON DO   for MNT related to R19.7 (ICD-10-CM) - Diarrhea, unspecified type .      Patient accompanied by herself.      ASSESSMENT   Nutrition History:  - Information obtained from patient chart and patient interview.    -PMH:  Patient Active Problem List   Diagnosis    Pure hypercholesterolemia    Herpes simplex virus (HSV) infection    iamTIBIALIS TENDINITIS    iamSPRAIN HIP & THIGH NOS    HYPERLIPIDEMIA LDL GOAL <160    IUD (intrauterine device) in place    Frontal fibrosing alopecia    Pelvic floor dysfunction    Attention deficit hyperactivity disorder (ADHD), predominantly inattentive type    Mass of upper outer quadrant of left breast       Diet Recall:  Breakfast: Hot tea   Lunch: left overs or salad   Dinner: Snacking on dinner, mediterranean diet   Snack: Grazing on left overs later in the day, snack until bed-pickles or vegetables.   Beverages: Kombucha, water-dehydration   Dining out: takeout 2-3 times per week      Nutritional Details:   -Food allergies: NKFA  -Food sensitivities: Gluten, dairy  -GI concerns: Diarrhea  -Appetite: Not good; stimulant medications  -Pace of eating: Fast  -Role of cooking: Patient is primary  -Role of food shopping: Patient is primary    Physical Activity: No activity  Days per week:  Duration:  Activity type:   Limitations: foot    NUTRITION FOCUSED PHYSICAL ASSESSMENT (NFPA) FOR DIAGNOSING MALNUTRITION  Yes  No:  Virtual visit only         Observed:   No nutrition-related physical findings observed    LABS  Labs reviewed  Office Visit on 09/27/2023   Component Date Value Ref Range Status    WBC Count 09/27/2023 3.6 (L)  4.0 - 11.0 10e3/uL Final    RBC  Count 09/27/2023 3.98  3.80 - 5.20 10e6/uL Final    Hemoglobin 09/27/2023 12.0  11.7 - 15.7 g/dL Final    Hematocrit 09/27/2023 36.5  35.0 - 47.0 % Final    MCV 09/27/2023 92  78 - 100 fL Final    MCH 09/27/2023 30.2  26.5 - 33.0 pg Final    MCHC 09/27/2023 32.9  31.5 - 36.5 g/dL Final    RDW 09/27/2023 12.7  10.0 - 15.0 % Final    Platelet Count 09/27/2023 190  150 - 450 10e3/uL Final    Sodium 09/27/2023 140  135 - 145 mmol/L Final    Reference intervals for this test were updated on 09/26/2023 to more accurately reflect our healthy population. There may be differences in the flagging of prior results with similar values performed with this method. Interpretation of those prior results can be made in the context of the updated reference intervals.     Potassium 09/27/2023 4.1  3.4 - 5.3 mmol/L Final    Carbon Dioxide (CO2) 09/27/2023 26  22 - 29 mmol/L Final    Anion Gap 09/27/2023 10  7 - 15 mmol/L Final    Urea Nitrogen 09/27/2023 14.5  6.0 - 20.0 mg/dL Final    Creatinine 09/27/2023 0.72  0.51 - 0.95 mg/dL Final    GFR Estimate 09/27/2023 >90  >60 mL/min/1.73m2 Final    Calcium 09/27/2023 9.6  8.6 - 10.0 mg/dL Final    Chloride 09/27/2023 104  98 - 107 mmol/L Final    Glucose 09/27/2023 80  70 - 99 mg/dL Final    Alkaline Phosphatase 09/27/2023 69  35 - 104 U/L Final    AST 09/27/2023 27  0 - 45 U/L Final    Reference intervals for this test were updated on 6/12/2023 to more accurately reflect our healthy population. There may be differences in the flagging of prior results with similar values performed with this method. Interpretation of those prior results can be made in the context of the updated reference intervals.    ALT 09/27/2023 22  0 - 50 U/L Final    Reference intervals for this test were updated on 6/12/2023 to more accurately reflect our healthy population. There may be differences in the flagging of prior results with similar values performed with this method. Interpretation of those prior results  "can be made in the context of the updated reference intervals.      Protein Total 09/27/2023 6.7  6.4 - 8.3 g/dL Final    Albumin 09/27/2023 4.4  3.5 - 5.2 g/dL Final    Bilirubin Total 09/27/2023 0.4  <=1.2 mg/dL Final       MEDICATIONS  Medications reviewed  Current Outpatient Medications   Medication    Acetaminophen (TYLENOL PO)    Biotin 10 MG TABS tablet    busPIRone (BUSPAR) 10 MG tablet    CALCIUM-VITAMIN D-VITAMIN K PO    Collagen Hydrolysate POWD    Cyanocobalamin (VITAMIN B 12 PO)    cyclobenzaprine (FLEXERIL) 10 MG tablet    estradiol (VIVELLE-DOT) 0.025 MG/24HR bi-weekly patch    hydroxychloroquine (PLAQUENIL) 200 MG tablet    levonorgestrel (MIRENA) 20 MCG/24HR IUD    lisdexamfetamine (VYVANSE) 10 MG capsule    Lysine 500 MG TABS    magnesium citrate solution    NONFORMULARY    NONFORMULARY    UNABLE TO FIND    UNABLE TO FIND    valACYclovir (VALTREX) 1000 mg tablet    valACYclovir (VALTREX) 500 MG tablet    VYVANSE 10 MG capsule    [START ON 12/21/2023] VYVANSE 10 MG capsule    [START ON 1/21/2024] VYVANSE 10 MG capsule     No current facility-administered medications for this visit.       ANTHROPOMETRICS   Height: 5'4\"  Weight: 135 lb (61.2 kg)  BMI (kg/m2): 23.2  Weight Status:  Normal BMI  IBW: 54.5 kg  %IBW: 112%  Weight History:   Wt Readings from Last 15 Encounters:   11/20/23 61.2 kg (135 lb)   09/08/23 61.4 kg (135 lb 6.4 oz)   08/14/23 62.7 kg (138 lb 3.2 oz)   08/09/23 60.8 kg (134 lb)   08/07/23 61.1 kg (134 lb 12.8 oz)   07/12/23 61.2 kg (135 lb)   07/06/23 61.2 kg (135 lb)   05/03/23 64.4 kg (142 lb)   03/27/23 64.4 kg (142 lb)   02/27/23 65.4 kg (144 lb 3.2 oz)   11/23/22 62.6 kg (138 lb)   11/14/22 62.7 kg (138 lb 3.2 oz)   10/31/22 61.7 kg (136 lb)   10/13/22 59 kg (130 lb)   09/07/22 63.5 kg (140 lb)   Patient weight loss does not meet criteria for significance.      Dosing weight: 61.2 kg-actual BW used.    ASSESSED NUTRITION NEEDS  Estimated Energy Needs: 1,530-1,836 kcals/day " (25-30 Kcal/Kg)  Justification:  (maintenance)  Estimated Protein Needs: 49-61 grams protein/day (0.8-1 g pro/Kg)  Justification:  (maintenance)  Estimated Fluid Needs: 1,530-1,836 mL/day (1 mL/Kcal)    ASSESSED MALNUTRITION STATUS  % Weight Loss:  Weight loss does not meet criteria for malnutrition   % Intake:  Decreased intake does not meet criteria for malnutrition   Subcutaneous Fat Loss:  Unable to assess  Loss of Muscle Mass:  unable to assess  Fluid Retention:  None noted    Malnutrition Diagnosis:  Patient does not meet two of the above criteria necessary for diagnosing malnutrition    DIAGNOSIS   Nutrition Diagnosis:  Altered GI function related to s/p cholecystectomy as evidenced by patient report of chronic diarrhea.      INTERVENTIONS   Nutrition Prescription Diarrhea MNT, soluble fiber, and fat restricted diet.       IMPLEMENTATION   Assessed learning needs and learning preference:   Teaching Method(s) used: Literature  Explanation    Nutrition Education (Content):              a)  Discussed Myplate, guidelines, role of fiber, and fat restricted MNT.              b)  Provided the following handouts: Myplate, fat restricted MNT    Nutrition Education (Application):              a)  Discussed current eating plans / recommended alternative food choices              b)  Patient verbalizes understanding of diet by identifying 1-2 lower fat alternatives.      Anticipate good compliance   Stage of Change:  action  Additional:     GOALS  1) Patient will increase meal frequency to 4-6 small meals.  2) Patient will aim to consume 2 24 fl ounce water bottles throughout the day  3) Patient will decrease intake of red meats and processed meats.    FOLLOW UP/MONITORING   Progress towards goals will be monitored and evaluated per protocol and Practice Guidelines    Time Spent with Patient  35 minutes    Cheryl Pickett RDN, MARZENA  Clinical Dietitian  Office: [phone]  Weekend pager: [phone]

## 2023-12-06 ENCOUNTER — THERAPY VISIT (OUTPATIENT)
Dept: PHYSICAL THERAPY | Facility: CLINIC | Age: 53
End: 2023-12-06
Payer: COMMERCIAL

## 2023-12-06 DIAGNOSIS — M62.89 PELVIC FLOOR DYSFUNCTION: Primary | ICD-10-CM

## 2023-12-06 PROCEDURE — 97110 THERAPEUTIC EXERCISES: CPT | Mod: GP | Performed by: PHYSICAL THERAPIST

## 2023-12-14 ENCOUNTER — VIRTUAL VISIT (OUTPATIENT)
Dept: PSYCHOLOGY | Facility: CLINIC | Age: 53
End: 2023-12-14
Payer: COMMERCIAL

## 2023-12-14 DIAGNOSIS — F43.89 REACTION TO CHRONIC STRESS: ICD-10-CM

## 2023-12-14 DIAGNOSIS — F41.1 GENERALIZED ANXIETY DISORDER: Primary | ICD-10-CM

## 2023-12-14 PROCEDURE — 90834 PSYTX W PT 45 MINUTES: CPT | Mod: VID | Performed by: SOCIAL WORKER

## 2023-12-14 NOTE — PROGRESS NOTES
[facility]                                     Progress Note    Patient Name: Kiesha Mcguire  Date: 12/14/2023       Service Type: Individual      Session Start Time:  10:33 AM Session End Time: 11:15 AM     Session Length: 38-52 minutes    Session #: 48 with this provider    Attendees: Client attended alone    Service Modality:  Video Visit:      Provider verified identity through the following two step process.  Patient provided:  Patient is known previously to provider    Telemedicine Visit: The patient's condition can be safely assessed and treated via synchronous audio and visual telemedicine encounter.      Reason for Telemedicine Visit: Patient convenience (e.g. access to timely appointments / distance to available provider) and Services only offered telehealth    Originating Site (Patient Location): Patient's home    Distant Site (Provider Location): Provider Remote Setting- Home Office/Off-site    Consent:  The patient/guardian has verbally consented to: the potential risks and benefits of telemedicine (video visit) versus in person care; bill my insurance or make self-payment for services provided; and responsibility for payment of non-covered services.     Patient would like the video invitation sent by:  [employer]    Mode of Communication:  Video    As the provider I attest to compliance with applicable laws and regulations related to telemedicine.    DATA  Interactive Complexity: No   Crisis: No      Progress Since Last Session (Related to Symptoms / Goals / Homework):  Symptoms:  no change since previous appointment    Homework: Completed in session     Episode of Care Goals: Satisfactory progress - ACTION (Actively working towards change); Intervened by reinforcing change plan / affirming steps taken     Current / Ongoing Stressors and Concerns:   Difficulty trusting others and being vulnerable  Dynamics in relationship with ex-; client reported recent improvements in  "how she is managing this relationship  Grandmother is dying; family dynamics      Treatment Objective(s) Addressed in This Session:   identify 1 fears / thoughts that contribute to feeling anxious     Safe/calm state titled \"calm\"  Container: box with a lock    Potential Targets:   Relationship with ex-  Guilt about how divorce ended, Negative Belief \"It's my fault\"  Guilt about son's surgery and outcome  Negative Belief: There is something wrong with me  Being blamed by ex-boyfriend, feel shame  Childhood: not belonging, not being good enough      Current Potential Targets  Absence of friendships  Not fitting in  Defensiveness  Ex on social media  Driving in certain areas will remind her of her ex  Relationship with ex-  Need to be perfect    Past:   image of child self at 8 years old   [facility]   head injury    Trauma symptoms: avoids places in neighborhood for fear of running into ex-boyfriend  Has avoided romantic relationships since  Dissociative symptoms while in relationship  Recurrent, intrusive, distressing memories  Problems with concentration     Intervention:   Engaged in active listening   CBT: reinforced behavior changes    Assessments completed prior to visit:  The following assessments were completed by patient for this visit:  None    ASSESSMENT: Current Emotional / Mental Status (status of significant symptoms):   Risk status (Self / Other harm or suicidal ideation)   Patient denies current fears or concerns for personal safety.   Patient denies current or recent suicidal ideation or behaviors.   Patient denies current or recent homicidal ideation or behaviors.   Patient denies current or recent self injurious behavior or ideation.   Patient denies other safety concerns.   Patient reports there has been no change in risk factors since their last session.     Patient reports there has been no change in protective factors since their last " session.     Recommended that patient call 911 or go to the local ED should there be a change in any of these risk factors.     Appearance:   Appropriate    Eye Contact:   Good    Psychomotor Behavior: Normal   Attitude:   Cooperative  Interested Pleasant    Orientation:   All   Speech    Rate / Production: Normal     Volume:  Normal    Mood:    Stable   Affect:    Appropriate     Thought Content:  Clear    Thought Form:  Coherent  Logical    Insight:    Good      Medication Review:   No changes to current psychiatric medication(s)   Vyvanse   Buspar-added, client is not taking daily    Estrogen patch     Medication Compliance:   Yes     Changes in Health Issues:   None reported     Chemical Use Review:   Substance Use: no use     Tobacco Use: no use    Diagnosis:  Generalized Anxiety Disorder   Other Specified Trauma and Stress Related Disorder   Unspecified Depressive Disorder     Collateral Reports Completed:   Not Applicable    PLAN: (Patient Tasks / Therapist Tasks / Other)  EMDR:  Plan for next session to have client keep her eyes open.    Next Target: divorce, negative belief: I am a failure/I did something wrong  Therapist to continue resetting affective circuits.  Client will continue behavior changes.    Magali Montilla, [facility] 12/14/2023    ______________________________________________________________________    Individual Treatment Plan    Patient's Name: Kiesha Mcguire  YOB: 1970    Date of Creation: 5/11/2022  Date Treatment Plan Last Reviewed/Revised: 11/9/2023    DSM5 Diagnoses:    Generalized Anxiety Disorder  Other Specified Trauma and Stress Related Disorder   Unspecified Depressive Disorder     Psychosocial / Contextual Factors: stressors related to parenting  PROMIS (reviewed every 90 days):    PROMIS 10-Global Health (only subscores and total score):       12/7/2022    12:04 PM 12/21/2022     1:01 PM 12/29/2022     9:56 AM 4/11/2023     1:09 PM 4/25/2023     9:54 AM  8/10/2023    12:52 PM 9/21/2023    11:25 AM   PROMIS-10 Scores Only   Global Mental Health Score 14 14 13 13 13 13 15   Global Physical Health Score 17 17 16 16 17 17 17   PROMIS TOTAL - SUBSCORES 31 31 29 29 30 30 32        Referral / Collaboration:  Referral to another professional/service is not indicated at this time..    Anticipated number of session for this episode of care: will review in 90 days  Anticipation frequency of session: Every other week  Anticipated Duration of each session: 38-52 minutes  Treatment plan will be reviewed in 90 days or when goals have been changed.       MeasurableTreatment Goal(s) related to diagnosis / functional impairment(s)  Goal 1: Patient will sustain attention and concentration for consistently longer periods of time.  Patient will meet goals set for completing tasks 80% of the time.   Client's Goal:  I will know I've met my goal when my space isn't so chaotic, meeting deadlines around bills and work, when I am not always playing catch-up, completing tasks.      Objective #A (Patient Action)    Patient will learn and implement organization and planning skills.  Status: New - Date: 5/11/2022 , continued date: 9/8/2022, continued date: 12/21/2022, completed date: 3/29/2023    Intervention(s)  Therapist will teach the client organization and planning skills.  Therapist will address any potential barriers to using skills.    Objective #B  Patient will  identify, challenge, and change self-talk that contributes to maladaptive feelings and actions .  Status: New - Date: 5/11/2022 , Continued date: 9/8/2022, continued date: 12/21/2022, continued date: 3/29/2023, continued date: 7/28/2023, continued date: 11/9/2023    Intervention(s)  Therapist will teach the CBT model, cognitive distortions, and cognitive restructuring techniques.      Goal 2: Patient will be able to recall the traumatic events without becoming overwhelmed with negative thoughts, feelings, or urges.   Client's  "Goal:  I will know I've met my goal when I do not cry every time I talk about it.\"    Objective #A (Patient Action)    Status: New - Date: 5/11/2022, continued date: 9/8/2022, continued date: 12/21/2022, continued date: 3/29/2023, continued date: 7/28/2023, continued date: 11/9/2023    Patient will describe the history of and nature of trauma symptoms    Intervention(s)  Therapist will assess the client's frequency, intensity, duration, and history of trauma symptoms and their impact on functioning.    Objective #B (Patient Action)  Status: New Date: 5/11/2022, continued date: 9/8/2022, continued date: 12/21/2022, continued date: 3/29/2023, continued date: 7/28/2023, continued date: 11/9/2023    Patient will cooperate with eye movement desensitization and reprocessing (EMDR) to reduce emotional reaction to traumatic event(s)    Intervention(s)  Therapist will utilize EMDR to reduce the client's emotional reactivity to the traumatic event(s).    Objective #C (Patient Action)  Status: New Date: 5/11/2022, continued date: 9/8/2022, continued date: 12/21/2022, continued date: 3/29/2023, continued date: 7/28/2023, continued date: 11/9/2023    Patient will learn and implement calming and coping strategies to manage trauma symptoms.    Intervention(s)  Therapist will teach grounding techniques, distress tolerance, and emotion regulation techniques.      Patient has reviewed and agreed to the above plan.      Magali Montilla, [facility]  May 11, 2022  Magali Montilla, [facility]  9/8/2022  Magali Montilla, [facility]  12/21/2022  Magali Montilla, [facility]  3/29/2023  Magali Montilla, [facility]  7/28/2023  Magali Montilla, [facility]  11/9/2023  "

## 2023-12-28 ENCOUNTER — VIRTUAL VISIT (OUTPATIENT)
Dept: PSYCHOLOGY | Facility: CLINIC | Age: 53
End: 2023-12-28
Payer: COMMERCIAL

## 2023-12-28 DIAGNOSIS — F32.A DEPRESSIVE DISORDER: ICD-10-CM

## 2023-12-28 DIAGNOSIS — F41.1 GENERALIZED ANXIETY DISORDER: Primary | ICD-10-CM

## 2023-12-28 DIAGNOSIS — F43.89 REACTION TO CHRONIC STRESS: ICD-10-CM

## 2023-12-28 PROCEDURE — 90834 PSYTX W PT 45 MINUTES: CPT | Mod: VID | Performed by: SOCIAL WORKER

## 2023-12-28 NOTE — PROGRESS NOTES
[facility]                                     Progress Note    Patient Name: Kiesha Mcguire  Date: 12/28/2023       Service Type: Individual      Session Start Time:  10:33 AM Session End Time: 11:16 AM     Session Length: 38-52 minutes    Session #: 49 with this provider    Attendees: Client attended alone    Service Modality:  Video Visit:      Provider verified identity through the following two step process.  Patient provided:  Patient is known previously to provider    Telemedicine Visit: The patient's condition can be safely assessed and treated via synchronous audio and visual telemedicine encounter.      Reason for Telemedicine Visit: Patient convenience (e.g. access to timely appointments / distance to available provider) and Services only offered telehealth    Originating Site (Patient Location): Patient's home    Distant Site (Provider Location): Provider Remote Setting- Home Office/Off-site    Consent:  The patient/guardian has verbally consented to: the potential risks and benefits of telemedicine (video visit) versus in person care; bill my insurance or make self-payment for services provided; and responsibility for payment of non-covered services.     Patient would like the video invitation sent by:  [employer]    Mode of Communication:  Video    As the provider I attest to compliance with applicable laws and regulations related to telemedicine.    DATA  Interactive Complexity: No   Crisis: No      Progress Since Last Session (Related to Symptoms / Goals / Homework):  Symptoms:  feeling down/lonely    Homework: Completed in session     Episode of Care Goals: Satisfactory progress - ACTION (Actively working towards change); Intervened by reinforcing change plan / affirming steps taken     Current / Ongoing Stressors and Concerns:   Difficulty trusting others and being vulnerable  Limited close relationships      Treatment Objective(s) Addressed in This Session:   identify 1  "fears / thoughts that contribute to feeling anxious   Increase social engagement    Safe/calm state titled \"calm\"  Container: box with a lock    Potential Targets:   Relationship with ex-  Guilt about how divorce ended, Negative Belief \"It's my fault\"  Guilt about son's surgery and outcome  Negative Belief: There is something wrong with me  Being blamed by ex-boyfriend, feel shame  Childhood: not belonging, not being good enough      Current Potential Targets  Absence of friendships  Not fitting in  Defensiveness  Ex on social media  Driving in certain areas will remind her of her ex  Relationship with ex-  Need to be perfect    Past:   image of child self at 8 years old   Disagreement about salad   [facility]   head injury    Trauma symptoms: avoids places in neighborhood for fear of running into ex-boyfriend  Has avoided romantic relationships since  Dissociative symptoms while in relationship  Recurrent, intrusive, distressing memories  Problems with concentration     Intervention:   Engaged in active listening and problem solving   CBT: engaged in perspective taking    Assessments completed prior to visit:  The following assessments were completed by patient for this visit:  None    ASSESSMENT: Current Emotional / Mental Status (status of significant symptoms):   Risk status (Self / Other harm or suicidal ideation)   Patient denies current fears or concerns for personal safety.   Patient denies current or recent suicidal ideation or behaviors.   Patient denies current or recent homicidal ideation or behaviors.   Patient denies current or recent self injurious behavior or ideation.   Patient denies other safety concerns.   Patient reports there has been no change in risk factors since their last session.     Patient reports there has been no change in protective factors since their last session.     Recommended that patient call 911 or go to the local ED should there be a change in " any of these risk factors.     Appearance:   Appropriate    Eye Contact:   Good    Psychomotor Behavior: Normal   Attitude:   Cooperative  Interested Pleasant    Orientation:   All   Speech    Rate / Production: Normal     Volume:  Normal    Mood:    Sad    Affect:    Appropriate  Tearful   Thought Content:  Clear    Thought Form:  Coherent  Goal Directed  Logical    Insight:    Good      Medication Review:   No changes to current psychiatric medication(s)   Vyvanse   Buspar-client is not taking daily    Estrogen patch     Medication Compliance:   Yes     Changes in Health Issues:   None reported     Chemical Use Review:   Substance Use: no use     Tobacco Use: no use    Diagnosis:  Generalized Anxiety Disorder   Other Specified Trauma and Stress Related Disorder   Unspecified Depressive Disorder     Collateral Reports Completed:   Not Applicable    PLAN: (Patient Tasks / Therapist Tasks / Other)  EMDR:  Plan for next session to have client keep her eyes open.    Next Target: divorce, negative belief: I am a failure/I did something wrong, I cannot trust others  Therapist to continue resetting affective circuits.  Client is considering ways to increase social connections.    Magali Montilla, [facility] 12/28/2023    ______________________________________________________________________    Individual Treatment Plan    Patient's Name: Kiesha Mcguire  YOB: 1970    Date of Creation: 5/11/2022  Date Treatment Plan Last Reviewed/Revised: 11/9/2023    DSM5 Diagnoses:    Generalized Anxiety Disorder  Other Specified Trauma and Stress Related Disorder   Unspecified Depressive Disorder     Psychosocial / Contextual Factors: stressors related to parenting  PROMIS (reviewed every 90 days):    PROMIS 10-Global Health (only subscores and total score):       12/7/2022    12:04 PM 12/21/2022     1:01 PM 12/29/2022     9:56 AM 4/11/2023     1:09 PM 4/25/2023     9:54 AM 8/10/2023    12:52 PM 9/21/2023    11:25 AM    PROMIS-10 Scores Only   Global Mental Health Score 14 14 13 13 13 13 15   Global Physical Health Score 17 17 16 16 17 17 17   PROMIS TOTAL - SUBSCORES 31 31 29 29 30 30 32        Referral / Collaboration:  Referral to another professional/service is not indicated at this time..    Anticipated number of session for this episode of care: will review in 90 days  Anticipation frequency of session: Every other week  Anticipated Duration of each session: 38-52 minutes  Treatment plan will be reviewed in 90 days or when goals have been changed.       MeasurableTreatment Goal(s) related to diagnosis / functional impairment(s)  Goal 1: Patient will sustain attention and concentration for consistently longer periods of time.  Patient will meet goals set for completing tasks 80% of the time.   Client's Goal:  I will know I've met my goal when my space isn't so chaotic, meeting deadlines around bills and work, when I am not always playing catch-up, completing tasks.      Objective #A (Patient Action)    Patient will learn and implement organization and planning skills.  Status: New - Date: 5/11/2022 , continued date: 9/8/2022, continued date: 12/21/2022, completed date: 3/29/2023    Intervention(s)  Therapist will teach the client organization and planning skills.  Therapist will address any potential barriers to using skills.    Objective #B  Patient will  identify, challenge, and change self-talk that contributes to maladaptive feelings and actions .  Status: New - Date: 5/11/2022 , Continued date: 9/8/2022, continued date: 12/21/2022, continued date: 3/29/2023, continued date: 7/28/2023, continued date: 11/9/2023    Intervention(s)  Therapist will teach the CBT model, cognitive distortions, and cognitive restructuring techniques.      Goal 2: Patient will be able to recall the traumatic events without becoming overwhelmed with negative thoughts, feelings, or urges.   Client's Goal:  I will know I've met my goal when I do  "not cry every time I talk about it.\"    Objective #A (Patient Action)    Status: New - Date: 5/11/2022, continued date: 9/8/2022, continued date: 12/21/2022, continued date: 3/29/2023, continued date: 7/28/2023, continued date: 11/9/2023    Patient will describe the history of and nature of trauma symptoms    Intervention(s)  Therapist will assess the client's frequency, intensity, duration, and history of trauma symptoms and their impact on functioning.    Objective #B (Patient Action)  Status: New Date: 5/11/2022, continued date: 9/8/2022, continued date: 12/21/2022, continued date: 3/29/2023, continued date: 7/28/2023, continued date: 11/9/2023    Patient will cooperate with eye movement desensitization and reprocessing (EMDR) to reduce emotional reaction to traumatic event(s)    Intervention(s)  Therapist will utilize EMDR to reduce the client's emotional reactivity to the traumatic event(s).    Objective #C (Patient Action)  Status: New Date: 5/11/2022, continued date: 9/8/2022, continued date: 12/21/2022, continued date: 3/29/2023, continued date: 7/28/2023, continued date: 11/9/2023    Patient will learn and implement calming and coping strategies to manage trauma symptoms.    Intervention(s)  Therapist will teach grounding techniques, distress tolerance, and emotion regulation techniques.      Patient has reviewed and agreed to the above plan.      Magali Montilla, [facility]  May 11, 2022  Magali Montilla, [facility]  9/8/2022  Magali Montilla, [facility]  12/21/2022  Magali Montilla, [facility]  3/29/2023  Magali Montilla, [facility]  7/28/2023  Magali Montilla, [facility]  11/9/2023  "

## 2024-01-04 ENCOUNTER — OFFICE VISIT (OUTPATIENT)
Dept: FAMILY MEDICINE | Facility: CLINIC | Age: 54
End: 2024-01-04
Payer: COMMERCIAL

## 2024-01-04 VITALS
TEMPERATURE: 98 F | WEIGHT: 135 LBS | HEART RATE: 73 BPM | DIASTOLIC BLOOD PRESSURE: 76 MMHG | HEIGHT: 64 IN | RESPIRATION RATE: 16 BRPM | OXYGEN SATURATION: 100 % | SYSTOLIC BLOOD PRESSURE: 112 MMHG | BODY MASS INDEX: 23.05 KG/M2

## 2024-01-04 DIAGNOSIS — K52.9 CHRONIC DIARRHEA: ICD-10-CM

## 2024-01-04 DIAGNOSIS — N39.0 ACUTE UTI (URINARY TRACT INFECTION): ICD-10-CM

## 2024-01-04 DIAGNOSIS — R35.0 INCREASED FREQUENCY OF URINATION: Primary | ICD-10-CM

## 2024-01-04 DIAGNOSIS — E78.5 HYPERLIPIDEMIA LDL GOAL <160: ICD-10-CM

## 2024-01-04 DIAGNOSIS — N30.90 BLADDER INFECTION: ICD-10-CM

## 2024-01-04 DIAGNOSIS — B00.9 HERPES SIMPLEX VIRUS (HSV) INFECTION: ICD-10-CM

## 2024-01-04 LAB
ALBUMIN UR-MCNC: NEGATIVE MG/DL
APPEARANCE UR: ABNORMAL
BACTERIA #/AREA URNS HPF: ABNORMAL /HPF
BILIRUB UR QL STRIP: NEGATIVE
COLOR UR AUTO: YELLOW
GLUCOSE UR STRIP-MCNC: NEGATIVE MG/DL
HGB UR QL STRIP: ABNORMAL
KETONES UR STRIP-MCNC: 15 MG/DL
LEUKOCYTE ESTERASE UR QL STRIP: ABNORMAL
NITRATE UR QL: POSITIVE
PH UR STRIP: 6 [PH] (ref 5–7)
RBC #/AREA URNS AUTO: ABNORMAL /HPF
SP GR UR STRIP: >=1.03 (ref 1–1.03)
SQUAMOUS #/AREA URNS AUTO: ABNORMAL /LPF
UROBILINOGEN UR STRIP-ACNC: 0.2 E.U./DL
WBC #/AREA URNS AUTO: ABNORMAL /HPF

## 2024-01-04 PROCEDURE — 87186 SC STD MICRODIL/AGAR DIL: CPT | Performed by: FAMILY MEDICINE

## 2024-01-04 PROCEDURE — 81001 URINALYSIS AUTO W/SCOPE: CPT | Performed by: FAMILY MEDICINE

## 2024-01-04 PROCEDURE — 99214 OFFICE O/P EST MOD 30 MIN: CPT | Performed by: FAMILY MEDICINE

## 2024-01-04 PROCEDURE — 87086 URINE CULTURE/COLONY COUNT: CPT | Performed by: FAMILY MEDICINE

## 2024-01-04 RX ORDER — VALACYCLOVIR HYDROCHLORIDE 500 MG/1
500 TABLET, FILM COATED ORAL DAILY
Qty: 90 TABLET | Refills: 1 | Status: SHIPPED | OUTPATIENT
Start: 2024-01-04

## 2024-01-04 RX ORDER — CIPROFLOXACIN 250 MG/1
250 TABLET, FILM COATED ORAL 2 TIMES DAILY
Qty: 6 TABLET | Refills: 0 | Status: SHIPPED | OUTPATIENT
Start: 2024-01-04 | End: 2024-01-09

## 2024-01-04 NOTE — PROGRESS NOTES
Assessment & Plan       ICD-10-CM    1. Increased frequency of urination  R35.0 UA Macroscopic with reflex to Microscopic and Culture - Clinic Collect     Urine Microscopic Exam     Urine Culture      2. Hyperlipidemia LDL goal <160  E78.5       3. Herpes simplex virus (HSV) infection  B00.9 valACYclovir (VALTREX) 500 MG tablet      4. Acute UTI (urinary tract infection)  N39.0 ciprofloxacin (CIPRO) 250 MG tablet      5. Bladder infection  N30.90 ciprofloxacin (CIPRO) 250 MG tablet      6. Chronic diarrhea  K52.9 Adult GI  Referral - Consult Only            Review of external notes as documented elsewhere in note         There are no Patient Instructions on file for this visit.    Arcadio Frias MD  [facility] SANGEETHA Pop is a 53 year old, presenting for the following health issues:  UTI (Urination Frequency  )      1/4/2024     4:17 PM   Additional Questions   Roomed by Elyssa PARIS CMA       History of Present Illness       Reason for visit:  Uti  Symptom onset:  1-3 days ago    She eats 2-3 servings of fruits and vegetables daily.She consumes 1 sweetened beverage(s) daily.She exercises with enough effort to increase her heart rate 9 or less minutes per day.  She exercises with enough effort to increase her heart rate 3 or less days per week. She is missing 1 dose(s) of medications per week.                 Review of Systems   Constitutional, HEENT, cardiovascular, pulmonary, gi and gu systems are negative, except as otherwise noted.      Objective    LMP  (LMP Unknown)   There is no height or weight on file to calculate BMI.  Physical Exam  Vitals reviewed.   Constitutional:       General: She is not in acute distress.     Appearance: Normal appearance. She is well-developed.   HENT:      Head: Normocephalic and atraumatic.      Right Ear: External ear normal.      Left Ear: External ear normal.      Nose: Nose normal.   Eyes:      General: No scleral icterus.      Extraocular Movements: Extraocular movements intact.      Conjunctiva/sclera: Conjunctivae normal.   Cardiovascular:      Rate and Rhythm: Normal rate.   Pulmonary:      Effort: Pulmonary effort is normal.   Musculoskeletal:         General: No deformity. Normal range of motion.      Cervical back: Normal range of motion.   Skin:     General: Skin is warm and dry.      Findings: No rash.   Neurological:      Mental Status: She is alert and oriented to person, place, and time. Mental status is at baseline.      Gait: Gait normal.   Psychiatric:         Behavior: Behavior normal.         Thought Content: Thought content normal.         Judgment: Judgment normal.

## 2024-01-05 ENCOUNTER — MYC MEDICAL ADVICE (OUTPATIENT)
Dept: FAMILY MEDICINE | Facility: CLINIC | Age: 54
End: 2024-01-05
Payer: COMMERCIAL

## 2024-01-05 DIAGNOSIS — F90.0 ATTENTION DEFICIT HYPERACTIVITY DISORDER (ADHD), PREDOMINANTLY INATTENTIVE TYPE: ICD-10-CM

## 2024-01-05 RX ORDER — LISDEXAMFETAMINE DIMESYLATE 10 MG/1
10 CAPSULE ORAL EVERY MORNING
Qty: 30 CAPSULE | Refills: 0 | Status: SHIPPED | OUTPATIENT
Start: 2024-01-05 | End: 2024-06-10

## 2024-01-06 LAB — BACTERIA UR CULT: ABNORMAL

## 2024-01-09 ENCOUNTER — VIRTUAL VISIT (OUTPATIENT)
Dept: PSYCHOLOGY | Facility: CLINIC | Age: 54
End: 2024-01-09
Payer: COMMERCIAL

## 2024-01-09 DIAGNOSIS — F43.89 REACTION TO CHRONIC STRESS: ICD-10-CM

## 2024-01-09 DIAGNOSIS — F41.1 GENERALIZED ANXIETY DISORDER: Primary | ICD-10-CM

## 2024-01-09 PROCEDURE — 90834 PSYTX W PT 45 MINUTES: CPT | Mod: 95 | Performed by: SOCIAL WORKER

## 2024-01-09 NOTE — PROGRESS NOTES
[facility]                                     Progress Note    Patient Name: Kiesha Mcguire  Date: 1/9/2024       Service Type: Individual      Session Start Time:  1:03 PM Session End Time: 1:50 PM     Session Length: 38-52 minutes    Session #: 50 with this provider    Attendees: Client attended alone    Service Modality:  Video Visit:      Provider verified identity through the following two step process.  Patient provided:  Patient is known previously to provider    Telemedicine Visit: The patient's condition can be safely assessed and treated via synchronous audio and visual telemedicine encounter.      Reason for Telemedicine Visit: Patient convenience (e.g. access to timely appointments / distance to available provider) and Services only offered telehealth    Originating Site (Patient Location): Patient's home    Distant Site (Provider Location): Provider Remote Setting- Home Office/Off-site    Consent:  The patient/guardian has verbally consented to: the potential risks and benefits of telemedicine (video visit) versus in person care; bill my insurance or make self-payment for services provided; and responsibility for payment of non-covered services.     Patient would like the video invitation sent by:  [employer]    Mode of Communication:  Video    As the provider I attest to compliance with applicable laws and regulations related to telemedicine.    DATA  Interactive Complexity: No   Crisis: No      Progress Since Last Session (Related to Symptoms / Goals / Homework):  Symptoms:  stable  [place] from ex-boyfriend; denied disturbance    Homework: Completed in session     Episode of Care Goals: Satisfactory progress - ACTION (Actively working towards change); Intervened by reinforcing change plan / affirming steps taken     Current / Ongoing Stressors and Concerns:   Difficulty trusting others and being vulnerable  Limited close relationships  Stressors related to  "parenting      Treatment Objective(s) Addressed in This Session:   identify 2 fears / thoughts that contribute to feeling anxious   Improve organization    Safe/calm state titled \"calm\"  Container: box with a lock    Potential Targets:   Relationship with ex-  Guilt about how divorce ended, Negative Belief \"It's my fault\"  Guilt about son's surgery and outcome  Negative Belief: There is something wrong with me  Being blamed by ex-boyfriend, feel shame  Childhood: not belonging, not being good enough      Current Potential Targets  Absence of friendships  Not fitting in  Defensiveness  Ex on social media  Driving in certain areas will remind her of her ex  Relationship with ex-  Need to be perfect    Past:   image of child self at 8 years old   Disagreement about salad   [facility]   head injury    Trauma symptoms: avoids places in neighborhood for fear of running into ex-boyfriend  Has avoided romantic relationships since  Dissociative symptoms while in relationship  Recurrent, intrusive, distressing memories  Problems with concentration     Intervention:   Engaged in active listening and problem solving   Offered parenting support    Assessments completed prior to visit:  The following assessments were completed by patient for this visit:  None    ASSESSMENT: Current Emotional / Mental Status (status of significant symptoms):   Risk status (Self / Other harm or suicidal ideation)   Patient denies current fears or concerns for personal safety.   Patient denies current or recent suicidal ideation or behaviors.   Patient denies current or recent homicidal ideation or behaviors.   Patient denies current or recent self injurious behavior or ideation.   Patient denies other safety concerns.   Patient reports there has been no change in risk factors since their last session.     Patient reports there has been no change in protective factors since their last session.     Recommended that " patient call 911 or go to the local ED should there be a change in any of these risk factors.     Appearance:   Appropriate    Eye Contact:   Good    Psychomotor Behavior: Normal   Attitude:   Cooperative  Interested Pleasant    Orientation:   All   Speech    Rate / Production: Normal     Volume:  Normal    Mood:    Anxious Stable   Affect:    Appropriate     Thought Content:  Clear    Thought Form:  Coherent  Goal Directed  Logical    Insight:    Good      Medication Review:   No changes to current psychiatric medication(s)   Vyvanse   Buspar-client is not taking daily    Estrogen patch     Medication Compliance:   Yes     Changes in Health Issues:   None reported     Chemical Use Review:   Substance Use: no use     Tobacco Use: no use    Diagnosis:  Generalized Anxiety Disorder   Other Specified Trauma and Stress Related Disorder   Unspecified Depressive Disorder     Collateral Reports Completed:   Not Applicable    PLAN: (Patient Tasks / Therapist Tasks / Other)  EMDR:  Plan for next session to have client keep her eyes open.    Next Target: divorce, negative belief: I am a failure/I did something wrong, I cannot trust others  Therapist to continue resetting affective circuits.  Client shared plans to work on organizing her home.    Magali Montilla, [facility] 1/9/2024    ______________________________________________________________________    Individual Treatment Plan    Patient's Name: Kiesha Mcguire  YOB: 1970    Date of Creation: 5/11/2022  Date Treatment Plan Last Reviewed/Revised: 11/9/2023    DSM5 Diagnoses:    Generalized Anxiety Disorder  Other Specified Trauma and Stress Related Disorder   Unspecified Depressive Disorder     Psychosocial / Contextual Factors: stressors related to parenting  PROMIS (reviewed every 90 days):    PROMIS 10-Global Health (only subscores and total score):       12/21/2022     1:01 PM 12/29/2022     9:56 AM 4/11/2023     1:09 PM 4/25/2023     9:54 AM  8/10/2023    12:52 PM 9/21/2023    11:25 AM 1/9/2024    12:50 PM   PROMIS-10 Scores Only   Global Mental Health Score 14 13 13 13 13 15 15   Global Physical Health Score 17 16 16 17 17 17 14   PROMIS TOTAL - SUBSCORES 31 29 29 30 30 32 29        Referral / Collaboration:  Referral to another professional/service is not indicated at this time..    Anticipated number of session for this episode of care: will review in 90 days  Anticipation frequency of session: Every other week  Anticipated Duration of each session: 38-52 minutes  Treatment plan will be reviewed in 90 days or when goals have been changed.       MeasurableTreatment Goal(s) related to diagnosis / functional impairment(s)  Goal 1: Patient will sustain attention and concentration for consistently longer periods of time.  Patient will meet goals set for completing tasks 80% of the time.   Client's Goal:  I will know I've met my goal when my space isn't so chaotic, meeting deadlines around bills and work, when I am not always playing catch-up, completing tasks.      Objective #A (Patient Action)    Patient will learn and implement organization and planning skills.  Status: New - Date: 5/11/2022 , continued date: 9/8/2022, continued date: 12/21/2022, completed date: 3/29/2023    Intervention(s)  Therapist will teach the client organization and planning skills.  Therapist will address any potential barriers to using skills.    Objective #B  Patient will  identify, challenge, and change self-talk that contributes to maladaptive feelings and actions .  Status: New - Date: 5/11/2022 , Continued date: 9/8/2022, continued date: 12/21/2022, continued date: 3/29/2023, continued date: 7/28/2023, continued date: 11/9/2023    Intervention(s)  Therapist will teach the CBT model, cognitive distortions, and cognitive restructuring techniques.      Goal 2: Patient will be able to recall the traumatic events without becoming overwhelmed with negative thoughts, feelings, or  "urges.   Client's Goal:  I will know I've met my goal when I do not cry every time I talk about it.\"    Objective #A (Patient Action)    Status: New - Date: 5/11/2022, continued date: 9/8/2022, continued date: 12/21/2022, continued date: 3/29/2023, continued date: 7/28/2023, continued date: 11/9/2023    Patient will describe the history of and nature of trauma symptoms    Intervention(s)  Therapist will assess the client's frequency, intensity, duration, and history of trauma symptoms and their impact on functioning.    Objective #B (Patient Action)  Status: New Date: 5/11/2022, continued date: 9/8/2022, continued date: 12/21/2022, continued date: 3/29/2023, continued date: 7/28/2023, continued date: 11/9/2023    Patient will cooperate with eye movement desensitization and reprocessing (EMDR) to reduce emotional reaction to traumatic event(s)    Intervention(s)  Therapist will utilize EMDR to reduce the client's emotional reactivity to the traumatic event(s).    Objective #C (Patient Action)  Status: New Date: 5/11/2022, continued date: 9/8/2022, continued date: 12/21/2022, continued date: 3/29/2023, continued date: 7/28/2023, continued date: 11/9/2023    Patient will learn and implement calming and coping strategies to manage trauma symptoms.    Intervention(s)  Therapist will teach grounding techniques, distress tolerance, and emotion regulation techniques.      Patient has reviewed and agreed to the above plan.      Magali Montilla, [facility]  May 11, 2022  Magali Montilla, [facility]  9/8/2022  Magali Montilla, [facility]  12/21/2022  Magali Montilla, [facility]  3/29/2023  Magali Montilla, [facility]  7/28/2023  Magali Montilla, [facility]  11/9/2023  "

## 2024-01-23 ENCOUNTER — VIRTUAL VISIT (OUTPATIENT)
Dept: PSYCHOLOGY | Facility: CLINIC | Age: 54
End: 2024-01-23
Payer: COMMERCIAL

## 2024-01-23 DIAGNOSIS — F43.89 REACTION TO CHRONIC STRESS: ICD-10-CM

## 2024-01-23 DIAGNOSIS — F41.1 GENERALIZED ANXIETY DISORDER: Primary | ICD-10-CM

## 2024-01-23 PROCEDURE — 90834 PSYTX W PT 45 MINUTES: CPT | Mod: 95 | Performed by: SOCIAL WORKER

## 2024-01-23 NOTE — PROGRESS NOTES
[facility]                                     Progress Note    Patient Name: Kiesha Mcguire  Date: 1/23/2024       Service Type: Individual      Session Start Time:  1:03 PM Session End Time: 1:52 PM     Session Length: 38-52 minutes    Session #: 51 with this provider    Attendees: Client attended alone    Service Modality:  Video Visit:      Provider verified identity through the following two step process.  Patient provided:  Patient is known previously to provider    Telemedicine Visit: The patient's condition can be safely assessed and treated via synchronous audio and visual telemedicine encounter.      Reason for Telemedicine Visit: Patient convenience (e.g. access to timely appointments / distance to available provider) and Services only offered telehealth    Originating Site (Patient Location): Patient's home    Distant Site (Provider Location): Provider Remote Setting- Home Office/Off-site    Consent:  The patient/guardian has verbally consented to: the potential risks and benefits of telemedicine (video visit) versus in person care; bill my insurance or make self-payment for services provided; and responsibility for payment of non-covered services.     Patient would like the video invitation sent by:  [employer]    Mode of Communication:  Video    As the provider I attest to compliance with applicable laws and regulations related to telemedicine.    DATA  Interactive Complexity: No   Crisis: No      Progress Since Last Session (Related to Symptoms / Goals / Homework):  Symptoms:  no change since previous appointment    Homework: Completed in session     Episode of Care Goals: Satisfactory progress - ACTION (Actively working towards change); Intervened by reinforcing change plan / affirming steps taken    There has been demonstrated improvement in functioning while patient has been engaged in psychotherapy/psychological service- if withdrawn the patient would deteriorate and/or  "relapse.       Current / Ongoing Stressors and Concerns:   Difficulty trusting others and being vulnerable  Limited close relationships  Stressors related to parenting      Treatment Objective(s) Addressed in This Session:   identify 2 fears / thoughts that contribute to feeling anxious     Safe/calm state titled \"calm\"  Container: box with a lock    Potential Targets:   Relationship with ex-  Guilt about how divorce ended, Negative Belief \"It's my fault\"  Guilt about son's surgery and outcome  Negative Belief: There is something wrong with me  Being blamed by ex-boyfriend, feel shame  Childhood: not belonging, not being good enough      Current Potential Targets  Absence of friendships  Not fitting in  Defensiveness  Ex on social media  Driving in certain areas will remind her of her ex  Relationship with ex-  Need to be perfect    Past:   image of child self at 8 years old   [facility]   [facility]    Trauma symptoms: avoids places in neighborhood for fear of running into ex-boyfriend  Has avoided romantic relationships since  Dissociative symptoms while in relationship  Recurrent, intrusive, distressing memories  Problems with concentration     Intervention:   Offered parenting support   Engaged client in identifying ways past experiences have impacted current relationships     Assessments completed prior to visit:  The following assessments were completed by patient for this visit:  None    ASSESSMENT: Current Emotional / Mental Status (status of significant symptoms):   Risk status (Self / Other harm or suicidal ideation)   Patient denies current fears or concerns for personal safety.   Patient denies current or recent suicidal ideation or behaviors.   Patient denies current or recent homicidal ideation or behaviors.   Patient denies current or recent self injurious behavior or ideation.   Patient denies other safety concerns.   Patient reports there " has been no change in risk factors since their last session.     Patient reports there has been no change in protective factors since their last session.     Recommended that patient call 911 or go to the local ED should there be a change in any of these risk factors.     Appearance:   Appropriate    Eye Contact:   Good    Psychomotor Behavior: Normal   Attitude:   Cooperative  Interested Pleasant    Orientation:   All   Speech    Rate / Production: Normal     Volume:  Normal    Mood:    Anxious Sad   Affect:    Appropriate  tearful-minimal   Thought Content:  Clear    Thought Form:  Coherent  Goal Directed  Logical    Insight:    Good      Medication Review:   No changes to current psychiatric medication(s)   Vyvanse   Buspar-client is not taking daily    Estrogen patch     Medication Compliance:   Yes     Changes in Health Issues:   None reported     Chemical Use Review:   Substance Use: no use     Tobacco Use: no use    Diagnosis:  Generalized Anxiety Disorder   Other Specified Trauma and Stress Related Disorder   Unspecified Depressive Disorder     Collateral Reports Completed:   Not Applicable    PLAN: (Patient Tasks / Therapist Tasks / Other)  EMDR:  Plan for next session to have client keep her eyes open.    Next Target: divorce, negative belief: I am a failure/I did something wrong, I cannot trust others  Therapist to continue resetting affective circuits.    Magali [facility]illa, [facility] 1/[facility]/2024    ______________________________________________________________________    Individual Treatment Plan    Patient's Name: Kiesha Mcguire  YOB: 1970    Date of Creation: 5/11/2022  Date Treatment Plan Last Reviewed/Revised: 1/23/2024    DSM5 Diagnoses:    Generalized Anxiety Disorder  Other Specified Trauma and Stress Related Disorder   Unspecified Depressive Disorder     Psychosocial / Contextual Factors: stressors related to parenting  PROMIS (reviewed every 90 days):    PROMIS 10-Global Health  (only subscores and total score):       12/29/2022     9:56 AM 4/11/2023     1:09 PM 4/25/2023     9:54 AM 8/10/2023    12:52 PM 9/21/2023    11:25 AM 1/9/2024    12:50 PM 1/23/2024    12:55 PM   PROMIS-10 Scores Only   Global Mental Health Score 13 13 13 13 15 15 14   Global Physical Health Score 16 16 17 17 17 14 17   PROMIS TOTAL - SUBSCORES 29 29 30 30 32 29 31        Referral / Collaboration:  Referral to another professional/service is not indicated at this time..    Anticipated number of session for this episode of care: will review in 90 days  Anticipation frequency of session: Every other week  Anticipated Duration of each session: 38-52 minutes  Treatment plan will be reviewed in 90 days or when goals have been changed.       MeasurableTreatment Goal(s) related to diagnosis / functional impairment(s)  Goal 1: Patient will sustain attention and concentration for consistently longer periods of time.  Patient will meet goals set for completing tasks 80% of the time.   Client's Goal:  I will know I've met my goal when my space isn't so chaotic, meeting deadlines around bills and work, when I am not always playing catch-up, completing tasks.      Objective #A (Patient Action)    Patient will learn and implement organization and planning skills.  Status: New - Date: 5/11/2022 , continued date: 9/8/2022, continued date: 12/21/2022, completed date: 3/29/2023    Intervention(s)  Therapist will teach the client organization and planning skills.  Therapist will address any potential barriers to using skills.    Objective #B  Patient will  identify, challenge, and change self-talk that contributes to maladaptive feelings and actions .  Status: New - Date: 5/11/2022 , Continued date: 9/8/2022, continued date: 12/21/2022, continued date: 3/29/2023, continued date: 7/28/2023, continued date: 11/9/2023, continued date: 1/23/2024    Intervention(s)  Therapist will teach the CBT model, cognitive distortions, and cognitive  "restructuring techniques.      Goal 2: Patient will be able to recall the traumatic events without becoming overwhelmed with negative thoughts, feelings, or urges.   Client's Goal:  I will know I've met my goal when I do not cry every time I talk about it.\"    Objective #A (Patient Action)    Status: New - Date: 5/11/2022, continued date: 9/8/2022, continued date: 12/21/2022, continued date: 3/29/2023, continued date: 7/28/2023, continued date: 11/9/2023, continued date: 1/23/2024    Patient will describe the history of and nature of trauma symptoms    Intervention(s)  Therapist will assess the client's frequency, intensity, duration, and history of trauma symptoms and their impact on functioning.    Objective #B (Patient Action)  Status: New Date: 5/11/2022, continued date: 9/8/2022, continued date: 12/21/2022, continued date: 3/29/2023, continued date: 7/28/2023, continued date: 11/9/2023, continued date: 1/23/2024    Patient will cooperate with eye movement desensitization and reprocessing (EMDR) to reduce emotional reaction to traumatic event(s)    Intervention(s)  Therapist will utilize EMDR to reduce the client's emotional reactivity to the traumatic event(s).    Objective #C (Patient Action)  Status: New Date: 5/11/2022, continued date: 9/8/2022, continued date: 12/21/2022, continued date: 3/29/2023, continued date: 7/28/2023, continued date: 11/9/2023, continued date: 1/23/2024    Patient will learn and implement calming and coping strategies to manage trauma symptoms.    Intervention(s)  Therapist will teach grounding techniques, distress tolerance, and emotion regulation techniques.      Patient has reviewed and agreed to the above plan.      Magali Montilla, [facility]  May 11, 2022  Magali Montilla, [facility]  9/8/2022  [facility]illa, [facility]  12/21/2022  Magali Montilla, [facility]  3/29/2023  Magali Montilla, [facility]  7/28/2023  Magali Montilla, [facility]  11/9/2023  Magali Montilla, [facility]  1/23/2024  "

## 2024-02-08 ENCOUNTER — VIRTUAL VISIT (OUTPATIENT)
Dept: PSYCHOLOGY | Facility: CLINIC | Age: 54
End: 2024-02-08
Payer: COMMERCIAL

## 2024-02-08 ENCOUNTER — PATIENT OUTREACH (OUTPATIENT)
Dept: CARE COORDINATION | Facility: CLINIC | Age: 54
End: 2024-02-08
Payer: COMMERCIAL

## 2024-02-08 DIAGNOSIS — F41.1 GENERALIZED ANXIETY DISORDER: Primary | ICD-10-CM

## 2024-02-08 DIAGNOSIS — F43.89 REACTION TO CHRONIC STRESS: ICD-10-CM

## 2024-02-08 PROCEDURE — 90834 PSYTX W PT 45 MINUTES: CPT | Mod: 95 | Performed by: SOCIAL WORKER

## 2024-02-08 NOTE — PROGRESS NOTES
[facility]                                     Progress Note    Patient Name: Kiesha Mcguire  Date: 2/8/2024       Service Type: Individual      Session Start Time:  10:32 AM Session End Time: 11:17 AM     Session Length: 38-52 minutes    Session #: 52 with this provider    Attendees: Client attended alone    Service Modality:  Video Visit:      Provider verified identity through the following two step process.  Patient provided:  Patient is known previously to provider    Telemedicine Visit: The patient's condition can be safely assessed and treated via synchronous audio and visual telemedicine encounter.      Reason for Telemedicine Visit: Patient convenience (e.g. access to timely appointments / distance to available provider) and Services only offered telehealth    Originating Site (Patient Location): Patient's home    Distant Site (Provider Location): Provider Remote Setting- Home Office/Off-site    Consent:  The patient/guardian has verbally consented to: the potential risks and benefits of telemedicine (video visit) versus in person care; bill my insurance or make self-payment for services provided; and responsibility for payment of non-covered services.     Patient would like the video invitation sent by:  [employer]    Mode of Communication:  Video    As the provider I attest to compliance with applicable laws and regulations related to telemedicine.    DATA  Interactive Complexity: No   Crisis: No      Progress Since Last Session (Related to Symptoms / Goals / Homework):  Symptoms:  increase in anxiety symptoms    Homework: Achieved / completed to satisfaction     Episode of Care Goals: Satisfactory progress - ACTION (Actively working towards change); Intervened by reinforcing change plan / affirming steps taken    There has been demonstrated improvement in functioning while patient has been engaged in psychotherapy/psychological service- if withdrawn the patient would deteriorate  "and/or relapse.       Current / Ongoing Stressors and Concerns:   Difficulty trusting others and being vulnerable  Limited close relationships  Stressors related to parenting   Physical health concerns  Recently saw ex-boyfriend  Work related stressors     Treatment Objective(s) Addressed in This Session:   EMDR: phase 8    Using assertive communication  Setting boundaries    Safe/calm state titled \"calm\"  Container: box with a lock    Potential Targets:   Relationship with ex-  Guilt about how divorce ended, Negative Belief \"It's my fault\"  Guilt about son's surgery and outcome  Negative Belief: There is something wrong with me  Being blamed by ex-boyfriend, feel shame  Childhood: not belonging, not being good enough      Current Potential Targets  Absence of friendships  Not fitting in  Defensiveness  Ex on social media  Driving in certain areas will remind her of her ex  Relationship with ex-  Need to be perfect    Past:   image of child self at 8 years old   Disagreement about salad   [facility]   head injury    Trauma symptoms: avoids places in neighborhood for fear of running into ex-boyfriend  Has avoided romantic relationships since  Dissociative symptoms while in relationship  Recurrent, intrusive, distressing memories  Problems with concentration     Intervention:   Offered parenting support   EMDR: phase 8   Reinforced behavior changes, engaged client in reflection of progress    Assessments completed prior to visit:  The following assessments were completed by patient for this visit:  None    ASSESSMENT: Current Emotional / Mental Status (status of significant symptoms):   Risk status (Self / Other harm or suicidal ideation)   Patient denies current fears or concerns for personal safety.   Patient denies current or recent suicidal ideation or behaviors.   Patient denies current or recent homicidal ideation or behaviors.   Patient denies current or recent self injurious " behavior or ideation.   Patient denies other safety concerns.   Patient reports there has been no change in risk factors since their last session.     Patient reports there has been no change in protective factors since their last session.     Recommended that patient call 911 or go to the local ED should there be a change in any of these risk factors.     Appearance:   Appropriate    Eye Contact:   Good    Psychomotor Behavior: Normal   Attitude:   Cooperative  Interested Pleasant    Orientation:   All   Speech    Rate / Production: Normal     Volume:  Normal    Mood:    Anxious    Affect:    Appropriate     Thought Content:  Clear    Thought Form:  Coherent  Goal Directed  Logical    Insight:    Good      Medication Review:   No changes to current psychiatric medication(s)   Vyvanse   Buspar-client is not taking daily    Estrogen patch     Medication Compliance:   Yes     Changes in Health Issues:   Yes: blood in stool     Chemical Use Review:   Substance Use: no use     Tobacco Use: no use    Diagnosis:  Generalized Anxiety Disorder   Other Specified Trauma and Stress Related Disorder   Unspecified Depressive Disorder     Collateral Reports Completed:   Not Applicable    PLAN: (Patient Tasks / Therapist Tasks / Other)  EMDR:  Plan for next session to have client keep her eyes open.    Next Target: divorce, negative belief: I am a failure/I did something wrong, I cannot trust others  Therapist to continue resetting affective circuits.  Therapist encouraged reading on enmeshment  Client will continue to manage anxiety symptoms, use assertive communication and set boundaries    Magali Montilla, [facility] 2/8/2024    ______________________________________________________________________    Individual Treatment Plan    Patient's Name: Kiesha Mcguire  YOB: 1970    Date of Creation: 5/11/2022  Date Treatment Plan Last Reviewed/Revised: 1/23/2024    DSM5 Diagnoses:    Generalized Anxiety Disorder  Other  Specified Trauma and Stress Related Disorder   Unspecified Depressive Disorder     Psychosocial / Contextual Factors: stressors related to parenting  PROMIS (reviewed every 90 days):    PROMIS 10-Global Health (only subscores and total score):       4/11/2023     1:09 PM 4/25/2023     9:54 AM 8/10/2023    12:52 PM 9/21/2023    11:25 AM 1/9/2024    12:50 PM 1/23/2024    12:55 PM 2/8/2024    10:13 AM   PROMIS-10 Scores Only   Global Mental Health Score 13 13 13 15 15 14 12   Global Physical Health Score 16 17 17 17 14 17 16   PROMIS TOTAL - SUBSCORES 29 30 30 32 29 31 28        Referral / Collaboration:  Referral to another professional/service is not indicated at this time..    Anticipated number of session for this episode of care: will review in 90 days  Anticipation frequency of session: Every other week  Anticipated Duration of each session: 38-52 minutes  Treatment plan will be reviewed in 90 days or when goals have been changed.       MeasurableTreatment Goal(s) related to diagnosis / functional impairment(s)  Goal 1: Patient will sustain attention and concentration for consistently longer periods of time.  Patient will meet goals set for completing tasks 80% of the time.   Client's Goal:  I will know I've met my goal when my space isn't so chaotic, meeting deadlines around bills and work, when I am not always playing catch-up, completing tasks.      Objective #A (Patient Action)    Patient will learn and implement organization and planning skills.  Status: New - Date: 5/11/2022 , continued date: 9/8/2022, continued date: 12/21/2022, completed date: 3/29/2023    Intervention(s)  Therapist will teach the client organization and planning skills.  Therapist will address any potential barriers to using skills.    Objective #B  Patient will  identify, challenge, and change self-talk that contributes to maladaptive feelings and actions .  Status: New - Date: 5/11/2022 , Continued date: 9/8/2022, continued date:  "12/21/2022, continued date: 3/29/2023, continued date: 7/28/2023, continued date: 11/9/2023, continued date: 1/23/2024    Intervention(s)  Therapist will teach the CBT model, cognitive distortions, and cognitive restructuring techniques.      Goal 2: Patient will be able to recall the traumatic events without becoming overwhelmed with negative thoughts, feelings, or urges.   Client's Goal:  I will know I've met my goal when I do not cry every time I talk about it.\"    Objective #A (Patient Action)    Status: New - Date: 5/11/2022, continued date: 9/8/2022, continued date: 12/21/2022, continued date: 3/29/2023, continued date: 7/28/2023, continued date: 11/9/2023, continued date: 1/23/2024    Patient will describe the history of and nature of trauma symptoms    Intervention(s)  Therapist will assess the client's frequency, intensity, duration, and history of trauma symptoms and their impact on functioning.    Objective #B (Patient Action)  Status: New Date: 5/11/2022, continued date: 9/8/2022, continued date: 12/21/2022, continued date: 3/29/2023, continued date: 7/28/2023, continued date: 11/9/2023, continued date: 1/23/2024    Patient will cooperate with eye movement desensitization and reprocessing (EMDR) to reduce emotional reaction to traumatic event(s)    Intervention(s)  Therapist will utilize EMDR to reduce the client's emotional reactivity to the traumatic event(s).    Objective #C (Patient Action)  Status: New Date: 5/11/2022, continued date: 9/8/2022, continued date: 12/21/2022, continued date: 3/29/2023, continued date: 7/28/2023, continued date: 11/9/2023, continued date: 1/23/2024    Patient will learn and implement calming and coping strategies to manage trauma symptoms.    Intervention(s)  Therapist will teach grounding techniques, distress tolerance, and emotion regulation techniques.      Patient has reviewed and agreed to the above plan.      Magali Montilla, [facility]  May 11, 2022  Magali Montilla, " [facility]  9/8/2022  Magali Montilla, [facility]  12/21/2022  Magali Montilla, [facility]  3/29/2023  Magali Montilla, [facility]  7/28/2023  [facility]  11/9/2023  Magali Montilla, [facility]  1/23/2024

## 2024-02-09 DIAGNOSIS — Z86.0101 HISTORY OF ADENOMATOUS POLYP OF COLON: ICD-10-CM

## 2024-02-09 DIAGNOSIS — K52.9 CHRONIC DIARRHEA: Primary | ICD-10-CM

## 2024-02-09 DIAGNOSIS — K92.1 BLOOD IN STOOL: ICD-10-CM

## 2024-02-12 ENCOUNTER — PATIENT OUTREACH (OUTPATIENT)
Dept: GASTROENTEROLOGY | Facility: CLINIC | Age: 54
End: 2024-02-12
Payer: COMMERCIAL

## 2024-02-28 ENCOUNTER — VIRTUAL VISIT (OUTPATIENT)
Dept: PSYCHOLOGY | Facility: CLINIC | Age: 54
End: 2024-02-28
Payer: COMMERCIAL

## 2024-02-28 ENCOUNTER — OFFICE VISIT (OUTPATIENT)
Dept: DERMATOLOGY | Facility: CLINIC | Age: 54
End: 2024-02-28
Payer: COMMERCIAL

## 2024-02-28 DIAGNOSIS — L66.12 FRONTAL FIBROSING ALOPECIA: Primary | ICD-10-CM

## 2024-02-28 DIAGNOSIS — F41.1 GENERALIZED ANXIETY DISORDER: Primary | ICD-10-CM

## 2024-02-28 DIAGNOSIS — F43.89 REACTION TO CHRONIC STRESS: ICD-10-CM

## 2024-02-28 PROCEDURE — 99212 OFFICE O/P EST SF 10 MIN: CPT | Mod: 25 | Performed by: PHYSICIAN ASSISTANT

## 2024-02-28 PROCEDURE — 90834 PSYTX W PT 45 MINUTES: CPT | Mod: 95 | Performed by: SOCIAL WORKER

## 2024-02-28 PROCEDURE — 11900 INJECT SKIN LESIONS </W 7: CPT | Performed by: PHYSICIAN ASSISTANT

## 2024-02-28 RX ORDER — HYDROXYCHLOROQUINE SULFATE 200 MG/1
200 TABLET, FILM COATED ORAL 2 TIMES DAILY
Qty: 180 TABLET | Refills: 3 | Status: SHIPPED | OUTPATIENT
Start: 2024-02-28

## 2024-02-28 NOTE — PROGRESS NOTES
[facility]                                     Progress Note    Patient Name: Kiesha Mcguire  Date: 2/28/2024       Service Type: Individual      Session Start Time:  11:04 AM Session End Time: 11:48 AM     Session Length: 38-52 minutes    Session #: 53 with this provider    Attendees: Client attended alone    Service Modality:  Video Visit:      Provider verified identity through the following two step process.  Patient provided:  Patient is known previously to provider    Telemedicine Visit: The patient's condition can be safely assessed and treated via synchronous audio and visual telemedicine encounter.      Reason for Telemedicine Visit: Patient convenience (e.g. access to timely appointments / distance to available provider) and Services only offered telehealth    Originating Site (Patient Location): Patient's home    Distant Site (Provider Location): [facility]: [facility] /On-site    Consent:  The patient/guardian has verbally consented to: the potential risks and benefits of telemedicine (video visit) versus in person care; bill my insurance or make self-payment for services provided; and responsibility for payment of non-covered services.     Patient would like the video invitation sent by:  [employer]    Mode of Communication:  Video    As the provider I attest to compliance with applicable laws and regulations related to telemedicine.    DATA  Interactive Complexity: No   Crisis: No      Progress Since Last Session (Related to Symptoms / Goals / Homework):  Symptoms:  waking in the middle of the night; continued anxiety symptoms    Homework: Achieved / completed to satisfaction     Episode of Care Goals: Satisfactory progress - ACTION (Actively working towards change); Intervened by reinforcing change plan / affirming steps taken    There has been demonstrated improvement in functioning while patient has been engaged in psychotherapy/psychological service- if  "withdrawn the patient would deteriorate and/or relapse.       Current / Ongoing Stressors and Concerns:   Difficulty trusting others and being vulnerable  Limited close relationships  Stressors related to parenting   Physical health concerns  Work related stressors     Treatment Objective(s) Addressed in This Session:   identify 1 initial signs or symptoms of anxiety     Safe/calm state titled \"calm\"  Container: box with a lock    Trauma symptoms: avoids places in neighborhood for fear of running into ex-boyfriend  Has avoided romantic relationships since  Dissociative symptoms while in relationship  Recurrent, intrusive, distressing memories  Problems with concentration     Intervention:   Reflected on patient progress, reinforced behavior changes   Reviewed goals in therapy   Engaged in active listening    Assessments completed prior to visit:  The following assessments were completed by patient for this visit:  None    ASSESSMENT: Current Emotional / Mental Status (status of significant symptoms):   Risk status (Self / Other harm or suicidal ideation)   Patient denies current fears or concerns for personal safety.   Patient denies current or recent suicidal ideation or behaviors.   Patient denies current or recent homicidal ideation or behaviors.   Patient denies current or recent self injurious behavior or ideation.   Patient denies other safety concerns.   Patient reports there has been no change in risk factors since their last session.     Patient reports there has been no change in protective factors since their last session.     Recommended that patient call 911 or go to the local ED should there be a change in any of these risk factors.     Appearance:   Appropriate    Eye Contact:   Good    Psychomotor Behavior: Normal restless hands   Attitude:   Cooperative  Interested Pleasant    Orientation:   All   Speech    Rate / Production: Talkative Normal     Volume:  Normal    Mood:    Anxious "    Affect:    Appropriate  minimally tearful     Thought Content:  Clear    Thought Form:  Coherent  Goal Directed  Logical    Insight:    Good      Medication Review:   No changes to current psychiatric medication(s)   Vyvanse   Buspar-client is not taking daily    Estrogen patch     Medication Compliance:   Yes     Changes in Health Issues:   None reported     Chemical Use Review:   Substance Use: no use     Tobacco Use: no use    Diagnosis:  Generalized Anxiety Disorder   Other Specified Trauma and Stress Related Disorder   Unspecified Depressive Disorder     Collateral Reports Completed:   Not Applicable    PLAN: (Patient Tasks / Therapist Tasks / Other)  EMDR:  Plan for next session to have client keep her eyes open.    Next Target: divorce, negative belief: I am a failure/I did something wrong, I cannot trust others  Therapist to continue resetting affective circuits.  Client has an appointment next week with PCP.  Therapist to engage client in somatic therapy.  Client shared goal to continue to prioritize tasks, engage in self-compassion, improve diet and increase engagement in exercise.    Magali Montilla, [facility] 2/28/2024    ______________________________________________________________________    Individual Treatment Plan    Patient's Name: Kiesha Mcguire  YOB: 1970    Date of Creation: 5/11/2022  Date Treatment Plan Last Reviewed/Revised: 1/23/2024    DSM5 Diagnoses:    Generalized Anxiety Disorder  Other Specified Trauma and Stress Related Disorder   Unspecified Depressive Disorder     Psychosocial / Contextual Factors: stressors related to parenting  PROMIS (reviewed every 90 days):    PROMIS 10-Global Health (only subscores and total score):       4/11/2023     1:09 PM 4/25/2023     9:54 AM 8/10/2023    12:52 PM 9/21/2023    11:25 AM 1/9/2024    12:50 PM 1/23/2024    12:55 PM 2/8/2024    10:13 AM   PROMIS-10 Scores Only   Global Mental Health Score 13 13 13 15 15 14 12   Global  "Physical Health Score 16 17 17 17 14 17 16   PROMIS TOTAL - SUBSCORES 29 30 30 32 29 31 28        Referral / Collaboration:  Referral to another professional/service is not indicated at this time..    Anticipated number of session for this episode of care: will review in 90 days  Anticipation frequency of session: Every other week  Anticipated Duration of each session: 38-52 minutes  Treatment plan will be reviewed in 90 days or when goals have been changed.       MeasurableTreatment Goal(s) related to diagnosis / functional impairment(s)  Goal 1: Patient will sustain attention and concentration for consistently longer periods of time.  Patient will meet goals set for completing tasks 80% of the time.   Client's Goal:  I will know I've met my goal when my space isn't so chaotic, meeting deadlines around bills and work, when I am not always playing catch-up, completing tasks.      Objective #A (Patient Action)    Patient will learn and implement organization and planning skills.  Status: New - Date: 5/11/2022 , continued date: 9/8/2022, continued date: 12/21/2022, completed date: 3/29/2023    Intervention(s)  Therapist will teach the client organization and planning skills.  Therapist will address any potential barriers to using skills.    Objective #B  Patient will  identify, challenge, and change self-talk that contributes to maladaptive feelings and actions .  Status: New - Date: 5/11/2022 , Continued date: 9/8/2022, continued date: 12/21/2022, continued date: 3/29/2023, continued date: 7/28/2023, continued date: 11/9/2023, continued date: 1/23/2024    Intervention(s)  Therapist will teach the CBT model, cognitive distortions, and cognitive restructuring techniques.      Goal 2: Patient will be able to recall the traumatic events without becoming overwhelmed with negative thoughts, feelings, or urges.   Client's Goal:  I will know I've met my goal when I do not cry every time I talk about it.\"    Objective #A " (Patient Action)    Status: New - Date: 5/11/2022, continued date: 9/8/2022, continued date: 12/21/2022, continued date: 3/29/2023, continued date: 7/28/2023, continued date: 11/9/2023, continued date: 1/23/2024    Patient will describe the history of and nature of trauma symptoms    Intervention(s)  Therapist will assess the client's frequency, intensity, duration, and history of trauma symptoms and their impact on functioning.    Objective #B (Patient Action)  Status: New Date: 5/11/2022, continued date: 9/8/2022, continued date: 12/21/2022, continued date: 3/29/2023, continued date: 7/28/2023, continued date: 11/9/2023, continued date: 1/23/2024    Patient will cooperate with eye movement desensitization and reprocessing (EMDR) to reduce emotional reaction to traumatic event(s)    Intervention(s)  Therapist will utilize EMDR to reduce the client's emotional reactivity to the traumatic event(s).    Objective #C (Patient Action)  Status: New Date: 5/11/2022, continued date: 9/8/2022, continued date: 12/21/2022, continued date: 3/29/2023, continued date: 7/28/2023, continued date: 11/9/2023, continued date: 1/23/2024    Patient will learn and implement calming and coping strategies to manage trauma symptoms.    Intervention(s)  Therapist will teach grounding techniques, distress tolerance, and emotion regulation techniques.      Patient has reviewed and agreed to the above plan.      Magali Montilla, [facility]  May 11, 2022  [facility], [facility]  9/[facility]/2022  Magali Montilla, [facility]  12/21/2022  Magali Montilla, [facility]  3/29/2023  Magali Montilla, [facility]  7/28/2023  Magali Montilla, [facility]  11/9/2023  [facility] Mont[facility], [facility]  1/23/2024

## 2024-02-28 NOTE — LETTER
2024         RE: Kiesha Mcguire  [address] [facility] [address]        Dear Colleague,    Thank you for referring your patient, Kiesha Mcguire, to the [facility]. Please see a copy of my visit note below.    Kiesha Mcguire is an extremely pleasant 51 year old year old female patient here today to recheck frontal fibrosing alopecia. She notes that she taking Plaquenil, last eye exam was February which was normal. She notes hair loss has stabilized around hairline but feels maybe slight thinning throughout hair.  Patient has no other skin complaints today.  Remainder of the HPI, Meds, PMH, Allergies, FH, and SH was reviewed in chart.    Past Medical History:   Diagnosis Date     Depressive disorder      Depressive disorder, not elsewhere classified     resolved     Herpes simplex without mention of complication     oral     IUD (intrauterine device) in place 08/15/2013    Mirena     Migraine headache with aura     very occass, sig aura, speech loss x1     Pure hypercholesterolemia     follows w BIPIN CHATMAN       Past Surgical History:   Procedure Laterality Date     COLONOSCOPY N/A 10/27/2021    Procedure: COLONOSCOPY, WITH POLYPECTOMY AND CLIP;  Surgeon: Og Gutierres MD;  Location: [facility] OR      DILATION/CURETTAGE DIAG/THER NON OB  2006     LAPAROSCOPIC CHOLECYSTECTOMY N/A 2021    Procedure: CHOLECYSTECTOMY, LAPAROSCOPIC;  Surgeon: Denise Cast MD;  Location:  OR        Family History   Problem Relation Age of Onset     Hypertension Mother      Hyperlipidemia Mother      Depression Mother      Squamous cell carcinoma Mother      Thyroid Disease Mother         unclear dx     Hypertension Father      Cancer Father 65        neuro endrocine CA, neck     Hyperlipidemia Father      Aortic aneurysm Maternal Grandfather          of ascending aortic aneurysm at age 64     Aortic aneurysm Maternal Aunt          in her 40s from  ascending aortic aneurysm     C.A.D. No family hx of      Cancer - colorectal No family hx of      Diabetes No family hx of      Cerebrovascular Disease No family hx of      Breast Cancer No family hx of      Prostate Cancer No family hx of        Social History     Socioeconomic History     Marital status:      Spouse name: Not on file     Number of children: 2     Years of education: 16     Highest education level: Not on file   Occupational History     Occupation:      Employer: [employer]     Comment: fulltime   Tobacco Use     Smoking status: Former     Packs/day: 0.50     Years: 10.00     Additional pack years: 0.00     Total pack years: 5.00     Types: Cigarettes     Quit date: 1994     Years since quittin.6     Smokeless tobacco: Never   Vaping Use     Vaping Use: Never used   Substance and Sexual Activity     Alcohol use: Yes     Comment: very rare     Drug use: No     Sexual activity: Not Currently     Partners: Male     Birth control/protection: I.U.D.     Comment: Mirena   Other Topics Concern     Parent/sibling w/ CABG, MI or angioplasty before 65F 55M? No   Social History Narrative    How much exercise per week? 2 90 minute yoga sessions      How much calcium per day? Diet       How much caffeine per day? 0    How much vitamin D per day? Supplement     Do you/your family wear seatbelts?  Yes    Do you/your family use safety helmets? Yes    Do you/your family use sunscreen? Yes    Do you/your family keep firearms in the home? No    Do you/your family have a smoke detector(s)? Yes        Do you feel safe in your home? Yes    Has anyone ever touched you in an unwanted manner? No     Explain Kiesha Fletcher [facility]        [facility]             Social Determinants of Health     Financial Resource Strain: Low Risk  (2024)    Financial Resource Strain      Within the past 12 months, have you or your family members you live with been unable to get  utilities (heat, electricity) when it was really needed?: No   Food Insecurity: Low Risk  (1/4/2024)    Food Insecurity      Within the past 12 months, did you worry that your food would run out before you got money to buy more?: No      Within the past 12 months, did the food you bought just not last and you didn t have money to get more?: No   Transportation Needs: Low Risk  (1/4/2024)    Transportation Needs      Within the past 12 months, has lack of transportation kept you from medical appointments, getting your medicines, non-medical meetings or appointments, work, or from getting things that you need?: No   Physical Activity: Insufficiently Active (10/12/2020)    Exercise Vital Sign      Days of Exercise per Week: 1 day      Minutes of Exercise per Session: 10 min   Stress: Stress Concern Present (10/12/2020)    [employer] - Occupational Stress Questionnaire      Feeling of Stress : To some extent   Social Connections: Socially Isolated (10/12/2020)    Social Connection and Isolation Panel [NHANES]      Frequency of Communication with Friends and Family: Three times a week      Frequency of Social Gatherings with Friends and Family: Twice a week      Attends Zoroastrianism Services: Never      Active Member of Clubs or Organizations: No      Attends Club or Organization Meetings: Never      Marital Status:    Interpersonal Safety: Low Risk  (1/4/2024)    Interpersonal Safety      Do you feel physically and emotionally safe where you currently live?: Yes      Within the past 12 months, have you been hit, slapped, kicked or otherwise physically hurt by someone?: No      Within the past 12 months, have you been humiliated or emotionally abused in other ways by your partner or ex-partner?: No   Housing Stability: Low Risk  (1/4/2024)    Housing Stability      Do you have housing? : Yes      Are you worried about losing your housing?: No       Outpatient Encounter Medications as of  2/28/2024   Medication Sig Dispense Refill     Acetaminophen (TYLENOL PO)        Biotin 10 MG TABS tablet Take 10,000 mcg by mouth daily       busPIRone (BUSPAR) 10 MG tablet Take 1 tablet (10 mg) by mouth 2 times daily 60 tablet 2     CALCIUM-VITAMIN D-VITAMIN K PO        Collagen Hydrolysate POWD        Cyanocobalamin (VITAMIN B 12 PO) Take by mouth daily        cyclobenzaprine (FLEXERIL) 10 MG tablet Take 0.5 tablets (5 mg) by mouth 3 times daily as needed for muscle spasms 30 tablet 0     estradiol (VIVELLE-DOT) 0.025 MG/24HR bi-weekly patch Place 1 patch onto the skin twice a week 24 patch 3     hydroxychloroquine (PLAQUENIL) 200 MG tablet Take 1 tablet (200 mg) by mouth 2 times daily 180 tablet 3     levonorgestrel (MIRENA) 20 MCG/24HR IUD 1 each by Intrauterine route once       lisdexamfetamine (VYVANSE) 10 MG capsule Take 1 capsule (10 mg) by mouth every morning 30 capsule 0     Lysine 500 MG TABS Take 1,000 mg by mouth daily        magnesium citrate solution Takes 1 tsp twice a day, 150 mg daily       NONFORMULARY 1 capsule daily carmen       NONFORMULARY 2 tablets daily seabuckthorn       UNABLE TO FIND daily Ashwaganda - patient reported       UNABLE TO FIND daily Rhodiola- patient reported       valACYclovir (VALTREX) 1000 mg tablet Take 2 tablets (2,000 mg) by mouth 2 times daily For 2 days as needed for cold sores 12 tablet 11     valACYclovir (VALTREX) 500 MG tablet Take 1 tablet (500 mg) by mouth daily 90 tablet 1     Facility-Administered Encounter Medications as of 2/28/2024   Medication Dose Route Frequency Provider Last Rate Last Admin     [COMPLETED] triamcinolone acetonide (KENALOG-10) injection 5 mg  5 mg Intradermal Once Rosemary Delcid PA-C   5 mg at 02/28/24 1309             O:   NAD, WDWN, Alert & Oriented, Mood & Affect wnl, Vitals stable   Here today alone   LMP  (LMP Unknown)    General appearance normal   Vitals stable   Alert, oriented and in no acute distress       Patches of  alopecia on frontal/temporal side of scalp, minimal accentuation of hair follicle, no scaling seen.      Eyes: Conjunctivae/lids:Normal     ENT: Lips,: normal    MSK:Normal    Pulm: Breathing Normal    Neuro/Psych: Orientation:Alert and Orientedx3 ; Mood/Affect:normal   A/P:  1. FFA  Well controlled.   IL TAC: PGACAC discussed.  Risks including but not limited to injection site reaction, bruising, no resolution.  All questions answered and entertained to patient s satisfaction.  Informed consent obtained.  IL TAC in concentration of 5 mg/ml was injected ID to frontal fibrosing alopecia.  Total injected was  0.5 ml.  Patient tolerated without complications and given wound care instructions, including not to move product around.  Return in 4 weeks for follow-up and possible additional IL TAC.    Continue plaquenil 200 mg bid .   Recommend to start rogaine.   Check cbc and cmp.   Recheck in 3-4 months.       Again, thank you for allowing me to participate in the care of your patient.        Sincerely,        Rosemary Henao PA-C

## 2024-02-29 ENCOUNTER — LAB (OUTPATIENT)
Dept: LAB | Facility: CLINIC | Age: 54
End: 2024-02-29
Payer: COMMERCIAL

## 2024-02-29 ENCOUNTER — TELEPHONE (OUTPATIENT)
Dept: GASTROENTEROLOGY | Facility: CLINIC | Age: 54
End: 2024-02-29
Payer: COMMERCIAL

## 2024-02-29 ENCOUNTER — TELEPHONE (OUTPATIENT)
Dept: GASTROENTEROLOGY | Facility: CLINIC | Age: 54
End: 2024-02-29

## 2024-02-29 DIAGNOSIS — K92.1 BLOOD IN STOOL: ICD-10-CM

## 2024-02-29 DIAGNOSIS — K52.9 CHRONIC DIARRHEA: ICD-10-CM

## 2024-02-29 NOTE — TELEPHONE ENCOUNTER
"Endoscopy Scheduling Screen    Have you had a positive Covid test in the last 14 days?  No    Are you active on MyChart?   Yes    What insurance is in the chart?  Other:  [facility]     Ordering/Referring Provider: CINDI JAMES     (If ordering provider performs procedure, schedule with ordering provider unless otherwise instructed. )    BMI: Estimated body mass index is 23.17 kg/m  as calculated from the following:    Height as of 1/4/24: 1.626 m (5' 4\").    Weight as of 1/4/24: 61.2 kg (135 lb).     Sedation Ordered  moderate sedation.   If patient BMI > 50 do not schedule in [facility].    If patient BMI > 45 do not schedule at [facility].    Are you taking methadone or Suboxone?  No    Have you had difficulties, pain, or discomfort during past endoscopy procedures?  No    Are you taking any prescription medications for pain 3 or more times per week?   NO - No RN review required.    Do you have a history of malignant hyperthermia or adverse reaction to anesthesia?  No    (Females) Are you currently pregnant?   No     Have you been diagnosed or told you have pulmonary hypertension?   No    Do you have an LVAD?  No    Have you been told you have moderate to severe sleep apnea?  No    Have you been told you have COPD, asthma, or any other lung disease?  No    Do you have any heart conditions?  No     Have you ever had an organ transplant?   No    Have you ever had or are you awaiting a heart or lung transplant?   No    Have you had a stroke or transient ischemic attack (TIA aka \"mini stroke\" in the last 6 months?   No    Have you been diagnosed with or been told you have cirrhosis of the liver?   No    Are you currently on dialysis?   No    Do you need assistance transferring?   No    BMI: Estimated body mass index is 23.17 kg/m  as calculated from the following:    Height as of 1/4/24: 1.626 m (5' 4\").    Weight as of 1/4/24: 61.2 kg (135 lb).     Is patients BMI > 40 and scheduling location [facility]?  No    Do you " take an injectable medication for weight loss or diabetes (excluding insulin)?  No    Do you take the medication Naltrexone?  No    Do you take blood thinners?  No       Prep   Are you currently on dialysis or do you have chronic kidney disease?  No    Do you have a diagnosis of diabetes?  No    Do you have a diagnosis of cystic fibrosis (CF)?  No    On a regular basis do you go 3 -5 days between bowel movements?  No    BMI > 40?  No    Preferred Pharmacy:      [id number] [place], [place] - [address]  [address] [address]  Phone: [phone] Fax: [phone]      Final Scheduling Details       Procedure scheduled  Colonoscopy    Surgeon:  Asiya      Date of procedure:  03/12/2024     Pre-OP / PAC:   No - Not required for this site.    Location  [facility] - [facility] - Patient preference.    Sedation   Moderate Sedation - Per order.      Patient Reminders:   You will receive a call from a Nurse to review instructions and health history.  This assessment must be completed prior to your procedure.  Failure to complete the Nurse assessment may result in the procedure being cancelled.      On the day of your procedure, please designate an adult(s) who can drive you home stay with you for the next 24 hours. The medicines used in the exam will make you sleepy. You will not be able to drive.      You cannot take public transportation, ride share services, or non-medical taxi service without a responsible caregiver.  Medical transport services are allowed with the requirement that a responsible caregiver will receive you at your destination.  We require that drivers and caregivers are confirmed prior to your procedure.

## 2024-02-29 NOTE — PROGRESS NOTES
Kiesha Mcguire is an extremely pleasant 51 year old year old female patient here today to recheck frontal fibrosing alopecia. She notes that she taking Plaquenil, last eye exam was February which was normal. She notes hair loss has stabilized around hairline but feels maybe slight thinning throughout hair.  Patient has no other skin complaints today.  Remainder of the HPI, Meds, PMH, Allergies, FH, and SH was reviewed in chart.    Past Medical History:   Diagnosis Date    Depressive disorder     Depressive disorder, not elsewhere classified     resolved    Herpes simplex without mention of complication     oral    IUD (intrauterine device) in place 08/15/2013    Mirena    Migraine headache with aura     very occass, sig aura, speech loss x1    Pure hypercholesterolemia     follows w BIPIN CHATMAN       Past Surgical History:   Procedure Laterality Date    COLONOSCOPY N/A 10/27/2021    Procedure: COLONOSCOPY, WITH POLYPECTOMY AND CLIP;  Surgeon: Og Gutierres MD;  Location: [facility] OR     DILATION/CURETTAGE DIAG/THER NON OB  2006    LAPAROSCOPIC CHOLECYSTECTOMY N/A 2021    Procedure: CHOLECYSTECTOMY, LAPAROSCOPIC;  Surgeon: Denise Cast MD;  Location:  OR        Family History   Problem Relation Age of Onset    Hypertension Mother     Hyperlipidemia Mother     Depression Mother     Squamous cell carcinoma Mother     Thyroid Disease Mother         unclear dx    Hypertension Father     Cancer Father 65        neuro endrocine CA, neck    Hyperlipidemia Father     Aortic aneurysm Maternal Grandfather          of ascending aortic aneurysm at age 64    Aortic aneurysm Maternal Aunt          in her 40s from ascending aortic aneurysm    C.A.D. No family hx of     Cancer - colorectal No family hx of     Diabetes No family hx of     Cerebrovascular Disease No family hx of     Breast Cancer No family hx of     Prostate Cancer No family hx of        Social History     Socioeconomic History     Marital status:      Spouse name: Not on file    Number of children: 2    Years of education: 16    Highest education level: Not on file   Occupational History    Occupation:      Employer: [employer]     Comment: fulltime   Tobacco Use    Smoking status: Former     Packs/day: 0.50     Years: 10.00     Additional pack years: 0.00     Total pack years: 5.00     Types: Cigarettes     Quit date: 1994     Years since quittin.6    Smokeless tobacco: Never   Vaping Use    Vaping Use: Never used   Substance and Sexual Activity    Alcohol use: Yes     Comment: very rare    Drug use: No    Sexual activity: Not Currently     Partners: Male     Birth control/protection: I.U.D.     Comment: Mirena   Other Topics Concern    Parent/sibling w/ CABG, MI or angioplasty before 65F 55M? No   Social History Narrative    How much exercise per week? 2 90 minute yoga sessions      How much calcium per day? Diet       How much caffeine per day? 0    How much vitamin D per day? Supplement     Do you/your family wear seatbelts?  Yes    Do you/your family use safety helmets? Yes    Do you/your family use sunscreen? Yes    Do you/your family keep firearms in the home? No    Do you/your family have a smoke detector(s)? Yes        Do you feel safe in your home? Yes    Has anyone ever touched you in an unwanted manner? No     Explain Kiesha Fletcher CMA 18        [facility]             Social Determinants of Health     Financial Resource Strain: Low Risk  (2024)    Financial Resource Strain     Within the past 12 months, have you or your family members you live with been unable to get utilities (heat, electricity) when it was really needed?: No   Food Insecurity: Low Risk  (2024)    Food Insecurity     Within the past 12 months, did you worry that your food would run out before you got money to buy more?: No     Within the past 12 months, did the food you bought just not last and you  didn t have money to get more?: No   Transportation Needs: Low Risk  (1/4/2024)    Transportation Needs     Within the past 12 months, has lack of transportation kept you from medical appointments, getting your medicines, non-medical meetings or appointments, work, or from getting things that you need?: No   Physical Activity: Insufficiently Active (10/12/2020)    Exercise Vital Sign     Days of Exercise per Week: 1 day     Minutes of Exercise per Session: 10 min   Stress: Stress Concern Present (10/12/2020)    [employer] [place] [employer] - Occupational Stress Questionnaire     Feeling of Stress : To some extent   Social Connections: Socially Isolated (10/12/2020)    Social Connection and Isolation Panel [NHANES]     Frequency of Communication with Friends and Family: Three times a week     Frequency of Social Gatherings with Friends and Family: Twice a week     Attends Taoism Services: Never     Active Member of Clubs or Organizations: No     Attends Club or Organization Meetings: Never     Marital Status:    Interpersonal Safety: Low Risk  (1/4/2024)    Interpersonal Safety     Do you feel physically and emotionally safe where you currently live?: Yes     Within the past 12 months, have you been hit, slapped, kicked or otherwise physically hurt by someone?: No     Within the past 12 months, have you been humiliated or emotionally abused in other ways by your partner or ex-partner?: No   Housing Stability: Low Risk  (1/4/2024)    Housing Stability     Do you have housing? : Yes     Are you worried about losing your housing?: No       Outpatient Encounter Medications as of 2/28/2024   Medication Sig Dispense Refill    Acetaminophen (TYLENOL PO)       Biotin 10 MG TABS tablet Take 10,000 mcg by mouth daily      busPIRone (BUSPAR) 10 MG tablet Take 1 tablet (10 mg) by mouth 2 times daily 60 tablet 2    CALCIUM-VITAMIN D-VITAMIN K PO       Collagen Hydrolysate POWD       Cyanocobalamin (VITAMIN  B 12 PO) Take by mouth daily       cyclobenzaprine (FLEXERIL) 10 MG tablet Take 0.5 tablets (5 mg) by mouth 3 times daily as needed for muscle spasms 30 tablet 0    estradiol (VIVELLE-DOT) 0.025 MG/24HR bi-weekly patch Place 1 patch onto the skin twice a week 24 patch 3    hydroxychloroquine (PLAQUENIL) 200 MG tablet Take 1 tablet (200 mg) by mouth 2 times daily 180 tablet 3    levonorgestrel (MIRENA) 20 MCG/24HR IUD 1 each by Intrauterine route once      lisdexamfetamine (VYVANSE) 10 MG capsule Take 1 capsule (10 mg) by mouth every morning 30 capsule 0    Lysine 500 MG TABS Take 1,000 mg by mouth daily       magnesium citrate solution Takes 1 tsp twice a day, 150 mg daily      NONFORMULARY 1 capsule daily carmen      NONFORMULARY 2 tablets daily seabuckthorn      UNABLE TO FIND daily Ashwaganda - patient reported      UNABLE TO FIND daily Rhodiola- patient reported      valACYclovir (VALTREX) 1000 mg tablet Take 2 tablets (2,000 mg) by mouth 2 times daily For 2 days as needed for cold sores 12 tablet 11    valACYclovir (VALTREX) 500 MG tablet Take 1 tablet (500 mg) by mouth daily 90 tablet 1     Facility-Administered Encounter Medications as of 2/28/2024   Medication Dose Route Frequency Provider Last Rate Last Admin    [COMPLETED] triamcinolone acetonide (KENALOG-10) injection 5 mg  5 mg Intradermal Once Rosemary Delcid PA-C   5 mg at 02/28/24 1309             O:   NAD, WDWN, Alert & Oriented, Mood & Affect wnl, Vitals stable   Here today alone   LMP  (LMP Unknown)    General appearance normal   Vitals stable   Alert, oriented and in no acute distress       Patches of alopecia on frontal/temporal side of scalp, minimal accentuation of hair follicle, no scaling seen.      Eyes: Conjunctivae/lids:Normal     ENT: Lips,: normal    MSK:Normal    Pulm: Breathing Normal    Neuro/Psych: Orientation:Alert and Orientedx3 ; Mood/Affect:normal   A/P:  1. FFA  Well controlled.   IL TAC: PGACAC discussed.  Risks including  but not limited to injection site reaction, bruising, no resolution.  All questions answered and entertained to patient s satisfaction.  Informed consent obtained.  IL TAC in concentration of 5 mg/ml was injected ID to frontal fibrosing alopecia.  Total injected was  0.5 ml.  Patient tolerated without complications and given wound care instructions, including not to move product around.  Return in 4 weeks for follow-up and possible additional IL TAC.    Continue plaquenil 200 mg bid .   Recommend to start rogaine.   Check cbc and cmp.   Recheck in 3-4 months.

## 2024-03-01 PROCEDURE — 82653 EL-1 FECAL QUANTITATIVE: CPT

## 2024-03-01 PROCEDURE — 83993 ASSAY FOR CALPROTECTIN FECAL: CPT

## 2024-03-01 PROCEDURE — 82274 ASSAY TEST FOR BLOOD FECAL: CPT

## 2024-03-01 PROCEDURE — 87507 IADNA-DNA/RNA PROBE TQ 12-25: CPT

## 2024-03-01 NOTE — TELEPHONE ENCOUNTER
Response received from scoping provider that c.diff labs are needed to be completed prior to procedure.     Nursing - Discuss with patient. If they cannot get it done and resulted before 3/12/24 then case will need to be delayed until this is completed.       Kiesha Moya RN  Endoscopy Procedure Pre Assessment RN  [phone] option 4

## 2024-03-01 NOTE — TELEPHONE ENCOUNTER
Pre assessment completed for upcoming procedure.   (Please see previous telephone encounter notes for complete details)    Patient is aware that they need to complete c.diff lab with negative results or this procedure will be canceled. Patient states they are going to do the lab today.     Procedure details:    Arrival time and facility location reviewed.    Pre op exam needed? N/A    Designated  policy reviewed. Instructed to have someone stay 6  hours post procedure.       Medication review:    Medications reviewed. Please see supporting documentation below. Holding recommendations discussed (if applicable).       Prep for procedure:     Procedure prep instructions reviewed.        Any additional information needed:  Patient is aware that procedure is with CS and is ok to proceed.       Patient  verbalized understanding and had no questions or concerns at this time.      Kiesha Moya RN  Endoscopy Procedure Pre Assessment RN  [phone] option 4

## 2024-03-01 NOTE — TELEPHONE ENCOUNTER
Staff message sent to scoping provider to clarify if c.diff labs pended need to be completed prior to colonoscopy procedure.     --------------------------------------------------------------------------------------------------------------------    Pre visit planning completed.      Procedure details:    Patient scheduled for Colonoscopy  on 3/12/24.     Arrival time: 0645. Procedure time 0730    Pre op exam needed? N/A    Facility location: [facility]; [address], [place] [id number]    Sedation type: Conscious sedation  - discuss with patient. Last scope was done under MAC.    Indication for procedure: diarrhea, blood in stool, hx of polyps      Chart review:     Electronic implanted devices? No    Recent diagnosis of diverticulitis within the last 6 weeks? No    Diabetic? No      Medication review:    Anticoagulants? No    NSAIDS? No    Other medication HOLDING recommendations:  N/A      Prep for procedure:     Bowel prep recommendation: Standard Miralax   Due to:  standard bowel prep.    Prep instructions sent via redIT - to be sent once received response from scoping provider.         Kiesha Moya RN  Endoscopy Procedure Pre Assessment RN  [phone] option 4

## 2024-03-02 LAB
ADV 40+41 DNA STL QL NAA+NON-PROBE: NEGATIVE
ASTRO TYP 1-8 RNA STL QL NAA+NON-PROBE: NEGATIVE
C CAYETANENSIS DNA STL QL NAA+NON-PROBE: NEGATIVE
CAMPYLOBACTER DNA SPEC NAA+PROBE: NEGATIVE
CRYPTOSP DNA STL QL NAA+NON-PROBE: NEGATIVE
E COLI O157 DNA STL QL NAA+NON-PROBE: NORMAL
E HISTOLYT DNA STL QL NAA+NON-PROBE: NEGATIVE
EAEC ASTA GENE ISLT QL NAA+PROBE: NEGATIVE
EC STX1+STX2 GENES STL QL NAA+NON-PROBE: NEGATIVE
EPEC EAE GENE STL QL NAA+NON-PROBE: NEGATIVE
ETEC LTA+ST1A+ST1B TOX ST NAA+NON-PROBE: NEGATIVE
G LAMBLIA DNA STL QL NAA+NON-PROBE: NEGATIVE
NOROVIRUS GI+II RNA STL QL NAA+NON-PROBE: NEGATIVE
P SHIGELLOIDES DNA STL QL NAA+NON-PROBE: NEGATIVE
RVA RNA STL QL NAA+NON-PROBE: NEGATIVE
SALMONELLA SP RPOD STL QL NAA+PROBE: NEGATIVE
SAPO I+II+IV+V RNA STL QL NAA+NON-PROBE: NEGATIVE
SHIGELLA SP+EIEC IPAH ST NAA+NON-PROBE: NEGATIVE
V CHOLERAE DNA SPEC QL NAA+PROBE: NEGATIVE
VIBRIO DNA SPEC NAA+PROBE: NEGATIVE
Y ENTEROCOL DNA STL QL NAA+PROBE: NEGATIVE

## 2024-03-04 ENCOUNTER — LAB (OUTPATIENT)
Dept: LAB | Facility: CLINIC | Age: 54
End: 2024-03-04
Payer: COMMERCIAL

## 2024-03-04 DIAGNOSIS — K92.1 BLOOD IN STOOL: ICD-10-CM

## 2024-03-04 DIAGNOSIS — K52.9 CHRONIC DIARRHEA: Primary | ICD-10-CM

## 2024-03-04 LAB
C CAYETANENSIS SPEC QL: NORMAL
C DIFF TOX B STL QL: NEGATIVE
CRYPTOSP STL QL ACID FAST STN: NORMAL
CYSTOISOSPORA BELLI: NORMAL
LACTOFERRIN STL QL IA: NEGATIVE

## 2024-03-04 PROCEDURE — 83630 LACTOFERRIN FECAL (QUAL): CPT

## 2024-03-04 PROCEDURE — 87493 C DIFF AMPLIFIED PROBE: CPT

## 2024-03-05 LAB
CALPROTECTIN STL-MCNT: 32.5 MG/KG (ref 0–49.9)
ELASTASE PANC STL-MCNT: >800 UG/G
HEMOCCULT STL QL IA: NEGATIVE

## 2024-03-05 NOTE — TELEPHONE ENCOUNTER
C.diff lab resulted 3/4/24 negative.     No further action needed at this time.     Kiesha Moya RN  Endoscopy Procedure Pre Assessment RN  [phone] option 4

## 2024-03-06 ENCOUNTER — OFFICE VISIT (OUTPATIENT)
Dept: GASTROENTEROLOGY | Facility: CLINIC | Age: 54
End: 2024-03-06
Attending: FAMILY MEDICINE
Payer: COMMERCIAL

## 2024-03-06 VITALS
DIASTOLIC BLOOD PRESSURE: 74 MMHG | SYSTOLIC BLOOD PRESSURE: 107 MMHG | HEART RATE: 80 BPM | BODY MASS INDEX: 22.97 KG/M2 | WEIGHT: 133.8 LBS | OXYGEN SATURATION: 99 %

## 2024-03-06 DIAGNOSIS — K52.9 CHRONIC DIARRHEA: ICD-10-CM

## 2024-03-06 DIAGNOSIS — Z86.0101 HISTORY OF ADENOMATOUS POLYP OF COLON: ICD-10-CM

## 2024-03-06 DIAGNOSIS — K92.1 BLOOD IN STOOL: ICD-10-CM

## 2024-03-06 PROCEDURE — 99213 OFFICE O/P EST LOW 20 MIN: CPT | Performed by: PHYSICIAN ASSISTANT

## 2024-03-06 NOTE — PATIENT INSTRUCTIONS
It was a pleasure taking care of you today.  I've included a brief summary of our discussion and care plan from today's visit below.  Please review this information with your primary care provider.  ______________________________________________________________________    My recommendations are summarized as follows:  --please follow up for colonoscopy on 3/12/24  --increase fiber after colonoscopy -   ---- Recommend starting supplementation with a powdered soluble fiber. When used on a daily basis, this can help regulate the consistency of your stools. Metamucil (psyllium) and Citrucel are preferred examples. You can start with 1-2 teaspoons per day, with goal to gradually increase to 1 tablespoon daily. You can increase up to 1 tablespoon three times daily if needed. It is important to stay well-hydrated with use of fiber supplementation and to make sure that the fiber powder is well mixed with water as directed on the label. You may experience some bloating with initiation of fiber, which will improve over the first few weeks. A good trial to evaluate the effect is 3-6 months. Of note, many of the fiber products contain artificial sweeteners, which can cause bloating, gas, and diarrhea in those who may be sensitive to artificial sweeteners. If this is the case, recommend trying Metamucil Premium Blend (with Stevia), Metamucil 4-in-1 without Added Sweeteners, or Bellway (sweetened with Monk fruit extract).    Insoluble fiber acts like a bulky  inner broom,  sweeping out debris from the intestine and creating more motility and movement.       Soluble fiber attracts water and swells, creating a gel that slows digestion.  Also, slows the release of glucose from foods into the blood which stops spikes in blood sugar levels.  Soluble fiber traps toxins in the gut, helping to carry them to excretion and provides healthy bacteria in the digestive tract.       -- please see scheduling information provided below     Return  to GI Clinic in 3 months to review your progress.    ______________________________________________________________________    How do I schedule labs, imaging studies, or procedures that were ordered in clinic today?     Labs: To schedule lab appointment at the [facility], use my chart or call [phone]. If you have a [facility] lab closer to home where you are regularly seen you can give them a call.     Procedures: If a colonoscopy, upper endoscopy, breath test, esophageal manometry, or pH impedence was ordered today, our endoscopy team will call you to schedule this. If you have not heard from our endoscopy team within a week, please call [phone] to schedule.     Imaging Studies: If you were scheduled for a CT scan, X-ray, MRI, ultrasound, HIDA scan or other imaging study, please call [phone] to have this scheduled.     Referral: If a referral to another specialty was ordered, expect a phone call or follow instructions above. If you have not heard from anyone regarding your referral in a week, please call our clinic to check the status.     Who do I call with any questions after my visit?  Please be in touch if there are any further questions that arise following today's visit.  There are multiple ways to contact your gastroenterology care team.      During business hours, you may reach a Gastroenterology nurse at [phone]    To schedule or reschedule an appointment, please call [phone].     You can always send a secure message through Safehouse.  Safehouse messages are answered by your nurse or doctor typically within 24 hours.  Please allow extra time on weekends and holidays.      For urgent/emergent questions after business hours, you may reach the on-call GI Fellow by contacting the [facility]  at [phone].     How will I get the results of any tests ordered?    You will receive all of your results.  If you have signed  up for MyChart, any tests ordered at your visit will be available to you after your provider reviews them.  Typically this takes 1-2 weeks.  If there are urgent results that require a change in your care plan, your provider or nurse will call you to discuss the next steps.      What is Pharmlyhart?  Noitavonne is a secure way for you to access all of your healthcare records from the [facility].  It is a web based computer program, so you can sign on to it from any location.  It also allows you to send secure messages to your care team.  I recommend signing up for [employer] access if you have not already done so and are comfortable with using a computer.      How to I schedule a follow-up visit?  If you did not schedule a follow-up visit today, please call [phone] to schedule a follow-up office visit.      Sincerely,    Jonathan Parish PA-C  Division of Gastroenterology, Hepatology & Nutrition  [facility]

## 2024-03-06 NOTE — PROGRESS NOTES
GI CLINIC VISIT    CC/REFERRING MD:  Arcadio Thorpe   REASON FOR CONSULTATION: diarrhea    ASSESSMENT/PLAN:  52 y/o F with pmhx of alopecia presents for evaluation of diarrhea.    #diarrhea  Patient reports that she developed diarrhea in March/April 2022 -- this has progressively worsened, and will wax and wane. Currently she is having one bristol type 6-7 stool every couple of days, but recently did notice bright red blood on the toilet paper with wiping. She had infectious stool studies done which were negative, as well as a negative fecal lactoferrin, and normal fecal calprotectin. Fecal elastase was normal making pancreatic insufficiency less likely. She had celiac testing in 2011 which was negative, and has been gluten free now for many years. Considering bile acid malabsorption as the cause of her diarrhea, given history of cholecystectomy, but patient is scheduled for a colonoscopy on 3/12/24 which I did recommend proceeding with to evaluate for microscopic colitis as well as further evaluation of her BRBPR. We did discuss that she could potentially have overflow diarrhea in the setting of constipation, but given she will do a clean out next week, she would like to forego an AXR now. Finally we did discuss disorders of the gut brain axis, and does note that she was under quite a bit of stress when her diarrhea worsened. Will also have her start taking fiber more regularly.   --f/up for colonoscopy -- based on results, will proceed with next steps.  --increase fiber after colonoscopy  --future considerations: if c-scope is normal could consider use of cholestyramine.         RTC 3 months    Thank you for this consultation.  It was a pleasure to participate in the care of this patient; please contact us with any further questions.       40 minutes spent on the date of the encounter doing chart review, review of outside records, review of test results, patient visit, and documentation    This note was  created with voice recognition software, and while reviewed for accuracy, typos may remain.    Jonathan Parish PA-C  Division of Gastroenterology, Hepatology and Nutrition  [facility]      HPI  54 y/o F with pmhx of alopecia presents for evaluation of diarrhea.    Patient reports that she had a cholecystectomy in December 2021 -- she was feeling well, but then in march/April 2022 she developed diarrhea. She describes stool as a bristol type 6-7, would occur sporadically. She had stool studies done including cryptosporidium, fecal fat, and parasitic testing - this returned normal. She knew that diarrhea can occur with gallbladder removal so she tried to make some dietary adjustments. She notes that she would have diarrhea once or a twice a week, and would have normal stools between. She then began to notice in sept/oct 2023 noticed increasing frequency of diarrhea - but would have solid stools in between. Stools seemed to be more liquid in nature. Then she began to have liquid diarrhea every other day, with associated abdominal cramping, as well as fecal urgency, denies fecal incontinence. Denies BRBPR.     Patient underwent stool studies on 3/1/24 , including c diff, fecal calprotectin, fecal lactoferrin, fecal elastase, enteric pathogen panel, and cryptosporidium -- all of which returned normal.     Patient reports she is no longer having liquid diarrhea - she will generally have a BM daily, but every two days she has a bristol type 6 stool, but now has noticed blood on the toilet paper with wiping, not mixed in the stool. When she has looser stools, can have 2 BM daily, denies abdominal cramping. She denies any previous hx of constipation. Before all her symptoms started, she would have a BM daily that was formed. She started a fiber supplement after she took a nutrition class last fall - she will take psyllium, not always daily.     Patient does stay gluten free - as  she may be gluten sensitive. Celiac testing in 2011 was negative. When she eats gluten, the following day she would have looser stools.     Denies nausea or vomiting. She has a poor appetite, but relates it to her ADHD medications.       Takes NSAIDs once every 2-3 weeks.  Denies Tylenol. Does take some supplements - omega 3, D, K, magnesium.     Denies use of ETOH and tobacco products. Uses marijuana gummies once every 3 months.     No family history of GI related malignancy (esophageal, gastric, pancreatic, liver or colon) or family history of IBD/celiac disease.     ROS:    No fevers or chills  No weight loss  No shortness of breath or wheezing  No chest pain or pressure  No odynophagia or dysphagia  +BRBPR  + anxiety or depression -- has previously taken zoloft in the past (last took in 2015). Does follow with a therapist.     PROBLEM LIST  Patient Active Problem List    Diagnosis Date Noted    Attention deficit hyperactivity disorder (ADHD), predominantly inattentive type 02/27/2023     Priority: Medium    Mass of upper outer quadrant of left breast 02/27/2023     Priority: Medium    Pelvic floor dysfunction 11/04/2022     Priority: Medium    Frontal fibrosing alopecia 09/07/2022     Priority: Medium    IUD (intrauterine device) in place 08/15/2013     Priority: Medium     Mirena      HYPERLIPIDEMIA LDL GOAL <160 05/09/2010     Priority: Medium    iamTIBIALIS TENDINITIS 08/31/2005     Priority: Medium    iamSPRAIN HIP & THIGH NOS 08/31/2005     Priority: Medium    Herpes simplex virus (HSV) infection 07/01/2005     Priority: Medium     oral  Problem list name updated by automated process. Provider to review      Pure hypercholesterolemia 03/31/2005     Priority: Medium       PERTINENT PAST MEDICAL HISTORY:    Past Medical History:   Diagnosis Date    Depressive disorder     Depressive disorder, not elsewhere classified     resolved    Herpes simplex without mention of complication     oral    IUD (intrauterine  device) in place 08/15/2013    Mirena    Migraine headache with aura     very occass, sig aura, speech loss x1    Pure hypercholesterolemia     follows w BIPIN CHATMAN       PREVIOUS SURGERIES:    Past Surgical History:   Procedure Laterality Date    COLONOSCOPY N/A 10/27/2021    Procedure: COLONOSCOPY, WITH POLYPECTOMY AND CLIP;  Surgeon: Og Gutierres MD;  Location: [facility] OR     DILATION/CURETTAGE DIAG/THER NON OB  12/2006    LAPAROSCOPIC CHOLECYSTECTOMY N/A 12/24/2021    Procedure: CHOLECYSTECTOMY, LAPAROSCOPIC;  Surgeon: Denise Cast MD;  Location: [facility] OR       PREVIOUS ENDOSCOPY:  Colonoscopy (2021): - One 6 mm polyp in the sigmoid colon, removed with a                        cold snare. Resected and retrieved. Clip (MR                        conditional) was placed.                        - Three 1 to 3 mm polyps at the recto-sigmoid colon,                        removed with a cold biopsy forceps. Resected and                        retrieved.                        - The examined portion of the ileum was normal   Final Diagnosis   A. SIGMOID COLON POLYP, POLYPECTOMY:  -Tubular adenoma, fragmented, negative for high-grade dysplasia    B.RECTAL SIGMOID POLYPS X 3, POLYPECTOMY:  -Three hyperplastic polyps        ALLERGIES:     Allergies   Allergen Reactions    Triptans Unknown     imitrex    Amoxicillin-Pot Clavulanate Hives     Unsure if true reaction to med     Ciprofloxacin Hives and Itching     Patient reported- possible reaction, patient unsure        PERTINENT MEDICATIONS:    Current Outpatient Medications:     Acetaminophen (TYLENOL PO), , Disp: , Rfl:     Biotin 10 MG TABS tablet, Take 10,000 mcg by mouth daily, Disp: , Rfl:     busPIRone (BUSPAR) 10 MG tablet, Take 1 tablet (10 mg) by mouth 2 times daily, Disp: 60 tablet, Rfl: 2    CALCIUM-VITAMIN D-VITAMIN K PO, , Disp: , Rfl:     Collagen Hydrolysate POWD, , Disp: , Rfl:     Cyanocobalamin (VITAMIN B 12 PO), Take by mouth daily , Disp: ,  Rfl:     cyclobenzaprine (FLEXERIL) 10 MG tablet, Take 0.5 tablets (5 mg) by mouth 3 times daily as needed for muscle spasms, Disp: 30 tablet, Rfl: 0    estradiol (VIVELLE-DOT) 0.025 MG/24HR bi-weekly patch, Place 1 patch onto the skin twice a week, Disp: 24 patch, Rfl: 3    hydroxychloroquine (PLAQUENIL) 200 MG tablet, Take 1 tablet (200 mg) by mouth 2 times daily, Disp: 180 tablet, Rfl: 3    levonorgestrel (MIRENA) 20 MCG/24HR IUD, 1 each by Intrauterine route once, Disp: , Rfl:     lisdexamfetamine (VYVANSE) 10 MG capsule, Take 1 capsule (10 mg) by mouth every morning, Disp: 30 capsule, Rfl: 0    Lysine 500 MG TABS, Take 1,000 mg by mouth daily , Disp: , Rfl:     magnesium citrate solution, Takes 1 tsp twice a day, 150 mg daily, Disp: , Rfl:     NONFORMULARY, 1 capsule daily carmen, Disp: , Rfl:     NONFORMULARY, 2 tablets daily seabuckthorn, Disp: , Rfl:     UNABLE TO FIND, daily Ashwaganda - patient reported, Disp: , Rfl:     UNABLE TO FIND, daily Rhodiola- patient reported, Disp: , Rfl:     valACYclovir (VALTREX) 1000 mg tablet, Take 2 tablets (2,000 mg) by mouth 2 times daily For 2 days as needed for cold sores, Disp: 12 tablet, Rfl: 11    valACYclovir (VALTREX) 500 MG tablet, Take 1 tablet (500 mg) by mouth daily, Disp: 90 tablet, Rfl: 1    SOCIAL HISTORY:    Social History     Socioeconomic History    Marital status:      Spouse name: Not on file    Number of children: 2    Years of education: 16    Highest education level: Not on file   Occupational History    Occupation:      Employer: [employer]     Comment: fulltime   Tobacco Use    Smoking status: Former     Packs/day: 0.50     Years: 10.00     Additional pack years: 0.00     Total pack years: 5.00     Types: Cigarettes     Quit date: 1994     Years since quittin.6    Smokeless tobacco: Never   Vaping Use    Vaping Use: Never used   Substance and Sexual Activity    Alcohol use: Yes     Comment: very rare    Drug use: No     Sexual activity: Not Currently     Partners: Male     Birth control/protection: I.U.D.     Comment: Mirena   Other Topics Concern    Parent/sibling w/ CABG, MI or angioplasty before 65F 55M? No   Social History Narrative    How much exercise per week? 2 90 minute yoga sessions      How much calcium per day? Diet       How much caffeine per day? 0    How much vitamin D per day? Supplement     Do you/your family wear seatbelts?  Yes    Do you/your family use safety helmets? Yes    Do you/your family use sunscreen? Yes    Do you/your family keep firearms in the home? No    Do you/your family have a smoke detector(s)? Yes        Do you feel safe in your home? Yes    Has anyone ever touched you in an unwanted manner? No     Explain Kiesha Fletcher [facility] 7/16/18        [facility]             Social Determinants of Health     Financial Resource Strain: Low Risk  (1/4/2024)    Financial Resource Strain     Within the past 12 months, have you or your family members you live with been unable to get utilities (heat, electricity) when it was really needed?: No   Food Insecurity: Low Risk  (1/4/2024)    Food Insecurity     Within the past 12 months, did you worry that your food would run out before you got money to buy more?: No     Within the past 12 months, did the food you bought just not last and you didn t have money to get more?: No   Transportation Needs: Low Risk  (1/4/2024)    Transportation Needs     Within the past 12 months, has lack of transportation kept you from medical appointments, getting your medicines, non-medical meetings or appointments, work, or from getting things that you need?: No   Physical Activity: Insufficiently Active (10/12/2020)    Exercise Vital Sign     Days of Exercise per Week: 1 day     Minutes of Exercise per Session: 10 min   Stress: Stress Concern Present (10/12/2020)    [place] [employer] - Occupational Stress Questionnaire     Feeling of Stress : To  some extent   Social Connections: Socially Isolated (10/12/2020)    Social Connection and Isolation Panel [NHANES]     Frequency of Communication with Friends and Family: Three times a week     Frequency of Social Gatherings with Friends and Family: Twice a week     Attends Orthodox Services: Never     Active Member of Clubs or Organizations: No     Attends Club or Organization Meetings: Never     Marital Status:    Interpersonal Safety: Low Risk  (2024)    Interpersonal Safety     Do you feel physically and emotionally safe where you currently live?: Yes     Within the past 12 months, have you been hit, slapped, kicked or otherwise physically hurt by someone?: No     Within the past 12 months, have you been humiliated or emotionally abused in other ways by your partner or ex-partner?: No   Housing Stability: Low Risk  (2024)    Housing Stability     Do you have housing? : Yes     Are you worried about losing your housing?: No       FAMILY HISTORY:  FH of CRC: None  FH of IBD: None  Family History   Problem Relation Age of Onset    Hypertension Mother     Hyperlipidemia Mother     Depression Mother     Squamous cell carcinoma Mother     Thyroid Disease Mother         unclear dx    Hypertension Father     Cancer Father 65        neuro endrocine CA, neck    Hyperlipidemia Father     Aortic aneurysm Maternal Grandfather          of ascending aortic aneurysm at age 64    Aortic aneurysm Maternal Aunt          in her 40s from ascending aortic aneurysm    C.A.D. No family hx of     Cancer - colorectal No family hx of     Diabetes No family hx of     Cerebrovascular Disease No family hx of     Breast Cancer No family hx of     Prostate Cancer No family hx of        Past/family/social history reviewed and no changes    PHYSICAL EXAMINATION:  Constitutional: aaox3, cooperative, pleasant, not dyspneic/diaphoretic, no acute distress  Vitals reviewed: /74 (BP Location: Left arm, Patient Position:  Chair, Cuff Size: Adult Regular)   Pulse 80   Wt 60.7 kg (133 lb 12.8 oz)   SpO2 99%   BMI 22.97 kg/m    Wt:   Wt Readings from Last 2 Encounters:   01/04/24 61.2 kg (135 lb)   11/20/23 61.2 kg (135 lb)      Eyes: Sclera anicteric/injected  Respiratory: Unlabored breathing  Skin: warm, perfused, no jaundice  Psych: Normal affect  MSK: Normal gait      PERTINENT STUDIES:    Lab on 03/04/2024   Component Date Value Ref Range Status    Fecal Lactoferrin 03/04/2024 Negative  Negative Final    C Difficile Toxin B by PCR 03/04/2024 Negative  Negative Final

## 2024-03-06 NOTE — NURSING NOTE
Kiesha Mcguire's goals for this visit include:   Chief Complaint   Patient presents with    Consult     Referred by Dr. Fernando  Chronic Diarrhea        She requests these members of her care team be copied on today's visit information: yes     PCP: Damian Trejo    Referring Provider:  Arcadio Fernando MD  [address] [facility]  [facility] [place] [address]    /74 (BP Location: Left arm, Patient Position: Chair, Cuff Size: Adult Regular)   Pulse 80   Wt 60.7 kg (133 lb 12.8 oz)   SpO2 99%   BMI 22.97 kg/m      Do you need any medication refills at today's visit? No     SAMMIE Almaraz   Neph/Pulm Teams  [facility]

## 2024-03-06 NOTE — LETTER
3/6/2024         RE: Kiesha Mcguire  [address] [address]        Dear Colleague,    Thank you for referring your patient, Kiesha Mcguire, to the [facility]. Please see a copy of my visit note below.    GI CLINIC VISIT    CC/REFERRING MD:  Arcadio Thorpe   REASON FOR CONSULTATION: diarrhea    ASSESSMENT/PLAN:  52 y/o F with pmhx of alopecia presents for evaluation of diarrhea.    #diarrhea  Patient reports that she developed diarrhea in March/April 2022 -- this has progressively worsened, and will wax and wane. Currently she is having one bristol type 6-7 stool every couple of days, but recently did notice bright red blood on the toilet paper with wiping. She had infectious stool studies done which were negative, as well as a negative fecal lactoferrin, and normal fecal calprotectin. Fecal elastase was normal making pancreatic insufficiency less likely. She had celiac testing in 2011 which was negative, and has been gluten free now for many years. Considering bile acid malabsorption as the cause of her diarrhea, given history of cholecystectomy, but patient is scheduled for a colonoscopy on 3/12/24 which I did recommend proceeding with to evaluate for microscopic colitis as well as further evaluation of her BRBPR. We did discuss that she could potentially have overflow diarrhea in the setting of constipation, but given she will do a clean out next week, she would like to forego an AXR now. Finally we did discuss disorders of the gut brain axis, and does note that she was under quite a bit of stress when her diarrhea worsened. Will also have her start taking fiber more regularly.   --f/up for colonoscopy -- based on results, will proceed with next steps.  --increase fiber after colonoscopy  --future considerations: if c-scope is normal could consider use of cholestyramine.         RTC 3 months    Thank you for this consultation.  It was a pleasure  to participate in the care of this patient; please contact us with any further questions.       40 minutes spent on the date of the encounter doing chart review, review of outside records, review of test results, patient visit, and documentation    This note was created with voice recognition software, and while reviewed for accuracy, typos may remain.    Jonathan Parish PA-C  Division of Gastroenterology, Hepatology and Nutrition  [facility]      HPI  52 y/o F with pmhx of alopecia presents for evaluation of diarrhea.    Patient reports that she had a cholecystectomy in December 2021 -- she was feeling well, but then in march/April 2022 she developed diarrhea. She describes stool as a bristol type 6-7, would occur sporadically. She had stool studies done including cryptosporidium, fecal fat, and parasitic testing - this returned normal. She knew that diarrhea can occur with gallbladder removal so she tried to make some dietary adjustments. She notes that she would have diarrhea once or a twice a week, and would have normal stools between. She then began to notice in sept/oct 2023 noticed increasing frequency of diarrhea - but would have solid stools in between. Stools seemed to be more liquid in nature. Then she began to have liquid diarrhea every other day, with associated abdominal cramping, as well as fecal urgency, denies fecal incontinence. Denies BRBPR.     Patient underwent stool studies on 3/1/24 , including c diff, fecal calprotectin, fecal lactoferrin, fecal elastase, enteric pathogen panel, and cryptosporidium -- all of which returned normal.     Patient reports she is no longer having liquid diarrhea - she will generally have a BM daily, but every two days she has a bristol type 6 stool, but now has noticed blood on the toilet paper with wiping, not mixed in the stool. When she has looser stools, can have 2 BM daily, denies abdominal cramping. She denies any  previous hx of constipation. Before all her symptoms started, she would have a BM daily that was formed. She started a fiber supplement after she took a nutrition class last fall - she will take psyllium, not always daily.     Patient does stay gluten free - as she may be gluten sensitive. Celiac testing in 2011 was negative. When she eats gluten, the following day she would have looser stools.     Denies nausea or vomiting. She has a poor appetite, but relates it to her ADHD medications.       Takes NSAIDs once every 2-3 weeks.  Denies Tylenol. Does take some supplements - omega 3, D, K, magnesium.     Denies use of ETOH and tobacco products. Uses marijuana gummies once every 3 months.     No family history of GI related malignancy (esophageal, gastric, pancreatic, liver or colon) or family history of IBD/celiac disease.     ROS:    No fevers or chills  No weight loss  No shortness of breath or wheezing  No chest pain or pressure  No odynophagia or dysphagia  +BRBPR  + anxiety or depression -- has previously taken zoloft in the past (last took in 2015). Does follow with a therapist.     PROBLEM LIST  Patient Active Problem List    Diagnosis Date Noted     Attention deficit hyperactivity disorder (ADHD), predominantly inattentive type 02/27/2023     Priority: Medium     Mass of upper outer quadrant of left breast 02/27/2023     Priority: Medium     Pelvic floor dysfunction 11/04/2022     Priority: Medium     Frontal fibrosing alopecia 09/07/2022     Priority: Medium     IUD (intrauterine device) in place 08/15/2013     Priority: Medium     Mirena       HYPERLIPIDEMIA LDL GOAL <160 05/09/2010     Priority: Medium     iamTIBIALIS TENDINITIS 08/31/2005     Priority: Medium     iamSPRAIN HIP & THIGH NOS 08/31/2005     Priority: Medium     Herpes simplex virus (HSV) infection 07/01/2005     Priority: Medium     oral  Problem list name updated by automated process. Provider to review       Pure hypercholesterolemia  03/31/2005     Priority: Medium       PERTINENT PAST MEDICAL HISTORY:    Past Medical History:   Diagnosis Date     Depressive disorder      Depressive disorder, not elsewhere classified     resolved     Herpes simplex without mention of complication     oral     IUD (intrauterine device) in place 08/15/2013    Mirena     Migraine headache with aura     very occass, sig aura, speech loss x1     Pure hypercholesterolemia     follows w BIPIN CHATMAN       PREVIOUS SURGERIES:    Past Surgical History:   Procedure Laterality Date     COLONOSCOPY N/A 10/27/2021    Procedure: COLONOSCOPY, WITH POLYPECTOMY AND CLIP;  Surgeon: Og Gutierres MD;  [place] [facility] [place]      DILATION/CURETTAGE DIAG/THER NON OB  12/2006     LAPAROSCOPIC CHOLECYSTECTOMY N/A 12/24/2021    Procedure: CHOLECYSTECTOMY, LAPAROSCOPIC;  Surgeon: Denise Cast MD;  Location: [facility] OR       PREVIOUS ENDOSCOPY:  Colonoscopy (2021): - One 6 mm polyp in the sigmoid colon, removed with a                        cold snare. Resected and retrieved. Clip (MR                        conditional) was placed.                        - Three 1 to 3 mm polyps at the recto-sigmoid colon,                        removed with a cold biopsy forceps. Resected and                        retrieved.                        - The examined portion of the ileum was normal   Final Diagnosis   A. SIGMOID COLON POLYP, POLYPECTOMY:  -Tubular adenoma, fragmented, negative for high-grade dysplasia    B.RECTAL SIGMOID POLYPS X 3, POLYPECTOMY:  -Three hyperplastic polyps        ALLERGIES:     Allergies   Allergen Reactions     Triptans Unknown     imitrex     Amoxicillin-Pot Clavulanate Hives     Unsure if true reaction to med      Ciprofloxacin Hives and Itching     Patient reported- possible reaction, patient unsure        PERTINENT MEDICATIONS:    Current Outpatient Medications:      Acetaminophen (TYLENOL PO), , Disp: , Rfl:      Biotin 10 MG TABS tablet, Take 10,000 mcg by mouth  daily, Disp: , Rfl:      busPIRone (BUSPAR) 10 MG tablet, Take 1 tablet (10 mg) by mouth 2 times daily, Disp: 60 tablet, Rfl: 2     CALCIUM-VITAMIN D-VITAMIN K PO, , Disp: , Rfl:      Collagen Hydrolysate POWD, , Disp: , Rfl:      Cyanocobalamin (VITAMIN B 12 PO), Take by mouth daily , Disp: , Rfl:      cyclobenzaprine (FLEXERIL) 10 MG tablet, Take 0.5 tablets (5 mg) by mouth 3 times daily as needed for muscle spasms, Disp: 30 tablet, Rfl: 0     estradiol (VIVELLE-DOT) 0.025 MG/24HR bi-weekly patch, Place 1 patch onto the skin twice a week, Disp: 24 patch, Rfl: 3     hydroxychloroquine (PLAQUENIL) 200 MG tablet, Take 1 tablet (200 mg) by mouth 2 times daily, Disp: 180 tablet, Rfl: 3     levonorgestrel (MIRENA) 20 MCG/24HR IUD, 1 each by Intrauterine route once, Disp: , Rfl:      lisdexamfetamine (VYVANSE) 10 MG capsule, Take 1 capsule (10 mg) by mouth every morning, Disp: 30 capsule, Rfl: 0     Lysine 500 MG TABS, Take 1,000 mg by mouth daily , Disp: , Rfl:      magnesium citrate solution, Takes 1 tsp twice a day, 150 mg daily, Disp: , Rfl:      NONFORMULARY, 1 capsule daily carmen, Disp: , Rfl:      NONFORMULARY, 2 tablets daily seabuckthorn, Disp: , Rfl:      UNABLE TO FIND, daily Ashwaganda - patient reported, Disp: , Rfl:      UNABLE TO FIND, daily Rhodiola- patient reported, Disp: , Rfl:      valACYclovir (VALTREX) 1000 mg tablet, Take 2 tablets (2,000 mg) by mouth 2 times daily For 2 days as needed for cold sores, Disp: 12 tablet, Rfl: 11     valACYclovir (VALTREX) 500 MG tablet, Take 1 tablet (500 mg) by mouth daily, Disp: 90 tablet, Rfl: 1    SOCIAL HISTORY:    Social History     Socioeconomic History     Marital status:      Spouse name: Not on file     Number of children: 2     Years of education: 16     Highest education level: Not on file   Occupational History     Occupation:      Employer: [employer]     Comment: fulltime   Tobacco Use     Smoking status: Former     Packs/day: 0.50      Years: 10.00     Additional pack years: 0.00     Total pack years: 5.00     Types: Cigarettes     Quit date: 1994     Years since quittin.6     Smokeless tobacco: Never   Vaping Use     Vaping Use: Never used   Substance and Sexual Activity     Alcohol use: Yes     Comment: very rare     Drug use: No     Sexual activity: Not Currently     Partners: Male     Birth control/protection: I.U.D.     Comment: Mirena   Other Topics Concern     Parent/sibling w/ CABG, MI or angioplasty before 65F 55M? No   Social History Narrative    How much exercise per week? 2 90 minute yoga sessions      How much calcium per day? Diet       How much caffeine per day? 0    How much vitamin D per day? Supplement     Do you/your family wear seatbelts?  Yes    Do you/your family use safety helmets? Yes    Do you/your family use sunscreen? Yes    Do you/your family keep firearms in the home? No    Do you/your family have a smoke detector(s)? Yes        Do you feel safe in your home? Yes    Has anyone ever touched you in an unwanted manner? No     Explain Kiesha Fletcher [facility] 18        Reviewed [facility] 10-12-20             Social Determinants of Health     Financial Resource Strain: Low Risk  (2024)    Financial Resource Strain      Within the past 12 months, have you or your family members you live with been unable to get utilities (heat, electricity) when it was really needed?: No   Food Insecurity: Low Risk  (2024)    Food Insecurity      Within the past 12 months, did you worry that your food would run out before you got money to buy more?: No      Within the past 12 months, did the food you bought just not last and you didn t have money to get more?: No   Transportation Needs: Low Risk  (2024)    Transportation Needs      Within the past 12 months, has lack of transportation kept you from medical appointments, getting your medicines, non-medical meetings or appointments, work, or from getting things that  you need?: No   Physical Activity: Insufficiently Active (10/12/2020)    Exercise Vital Sign      Days of Exercise per Week: 1 day      Minutes of Exercise per Session: 10 min   Stress: Stress Concern Present (10/12/2020)    [employer] - Occupational Stress Questionnaire      Feeling of Stress : To some extent   Social Connections: Socially Isolated (10/12/2020)    Social Connection and Isolation Panel [NHANES]      Frequency of Communication with Friends and Family: Three times a week      Frequency of Social Gatherings with Friends and Family: Twice a week      Attends [employer] Services: Never      Active Member of Clubs or Organizations: No      Attends Club or Organization Meetings: Never      Marital Status:    Interpersonal Safety: Low Risk  (2024)    Interpersonal Safety      Do you feel physically and emotionally safe where you currently live?: Yes      Within the past 12 months, have you been hit, slapped, kicked or otherwise physically hurt by someone?: No      Within the past 12 months, have you been humiliated or emotionally abused in other ways by your partner or ex-partner?: No   Housing Stability: Low Risk  (2024)    Housing Stability      Do you have housing? : Yes      Are you worried about losing your housing?: No       FAMILY HISTORY:  FH of CRC: None  FH of IBD: None  Family History   Problem Relation Age of Onset     Hypertension Mother      Hyperlipidemia Mother      Depression Mother      Squamous cell carcinoma Mother      Thyroid Disease Mother         unclear dx     Hypertension Father      Cancer Father 65        neuro endrocine CA, neck     Hyperlipidemia Father      Aortic aneurysm Maternal Grandfather          of ascending aortic aneurysm at age 64     Aortic aneurysm Maternal Aunt          in her 40s from ascending aortic aneurysm     C.A.D. No family hx of      Cancer - colorectal No family hx of      Diabetes No family hx of       Cerebrovascular Disease No family hx of      Breast Cancer No family hx of      Prostate Cancer No family hx of        Past/family/social history reviewed and no changes    PHYSICAL EXAMINATION:  Constitutional: aaox3, cooperative, pleasant, not dyspneic/diaphoretic, no acute distress  Vitals reviewed: /74 (BP Location: Left arm, Patient Position: Chair, Cuff Size: Adult Regular)   Pulse 80   Wt 60.7 kg (133 lb 12.8 oz)   SpO2 99%   BMI 22.97 kg/m    Wt:   Wt Readings from Last 2 Encounters:   01/04/24 61.2 kg (135 lb)   11/20/23 61.2 kg (135 lb)      Eyes: Sclera anicteric/injected  Respiratory: Unlabored breathing  Skin: warm, perfused, no jaundice  Psych: Normal affect  MSK: Normal gait      PERTINENT STUDIES:    Lab on 03/04/2024   Component Date Value Ref Range Status     Fecal Lactoferrin 03/04/2024 Negative  Negative Final     C Difficile Toxin B by PCR 03/04/2024 Negative  Negative Final       Again, thank you for allowing me to participate in the care of your patient.        Sincerely,        Jonathan Parish PA-C

## 2024-03-07 ENCOUNTER — OFFICE VISIT (OUTPATIENT)
Dept: FAMILY MEDICINE | Facility: CLINIC | Age: 54
End: 2024-03-07
Payer: COMMERCIAL

## 2024-03-07 ENCOUNTER — PATIENT OUTREACH (OUTPATIENT)
Dept: CARE COORDINATION | Facility: CLINIC | Age: 54
End: 2024-03-07

## 2024-03-07 ENCOUNTER — TELEPHONE (OUTPATIENT)
Dept: FAMILY MEDICINE | Facility: CLINIC | Age: 54
End: 2024-03-07

## 2024-03-07 VITALS
TEMPERATURE: 97.6 F | WEIGHT: 132 LBS | BODY MASS INDEX: 22.53 KG/M2 | OXYGEN SATURATION: 99 % | HEIGHT: 64 IN | RESPIRATION RATE: 16 BRPM | HEART RATE: 88 BPM | DIASTOLIC BLOOD PRESSURE: 71 MMHG | SYSTOLIC BLOOD PRESSURE: 102 MMHG

## 2024-03-07 DIAGNOSIS — F90.0 ATTENTION DEFICIT HYPERACTIVITY DISORDER (ADHD), PREDOMINANTLY INATTENTIVE TYPE: Primary | ICD-10-CM

## 2024-03-07 PROCEDURE — 90750 HZV VACC RECOMBINANT IM: CPT | Performed by: FAMILY MEDICINE

## 2024-03-07 PROCEDURE — 90471 IMMUNIZATION ADMIN: CPT | Performed by: FAMILY MEDICINE

## 2024-03-07 PROCEDURE — 99213 OFFICE O/P EST LOW 20 MIN: CPT | Mod: 25 | Performed by: FAMILY MEDICINE

## 2024-03-07 RX ORDER — LISDEXAMFETAMINE DIMESYLATE 10 MG/1
10 CAPSULE ORAL DAILY
Qty: 30 CAPSULE | Refills: 0 | Status: SHIPPED | OUTPATIENT
Start: 2024-04-07 | End: 2024-05-07

## 2024-03-07 RX ORDER — LISDEXAMFETAMINE DIMESYLATE 10 MG/1
10 CAPSULE ORAL DAILY
Qty: 30 CAPSULE | Refills: 0 | Status: SHIPPED | OUTPATIENT
Start: 2024-05-08 | End: 2024-06-07

## 2024-03-07 RX ORDER — LISDEXAMFETAMINE DIMESYLATE 10 MG/1
10 CAPSULE ORAL EVERY MORNING
Qty: 30 CAPSULE | Refills: 0 | Status: CANCELLED | OUTPATIENT
Start: 2024-03-07

## 2024-03-07 RX ORDER — ESTRADIOL 0.03 MG/D
1 FILM, EXTENDED RELEASE TRANSDERMAL
Qty: 24 PATCH | Refills: 3 | Status: CANCELLED | OUTPATIENT
Start: 2024-03-07

## 2024-03-07 RX ORDER — LISDEXAMFETAMINE DIMESYLATE 10 MG/1
10 CAPSULE ORAL DAILY
Qty: 30 CAPSULE | Refills: 0 | Status: SHIPPED | OUTPATIENT
Start: 2024-03-07 | End: 2024-04-06

## 2024-03-07 RX ORDER — HYDROXYCHLOROQUINE SULFATE 200 MG/1
200 TABLET, FILM COATED ORAL 2 TIMES DAILY
Qty: 180 TABLET | Refills: 3 | Status: CANCELLED | OUTPATIENT
Start: 2024-03-07

## 2024-03-07 ASSESSMENT — PATIENT HEALTH QUESTIONNAIRE - PHQ9
SUM OF ALL RESPONSES TO PHQ QUESTIONS 1-9: 1
SUM OF ALL RESPONSES TO PHQ QUESTIONS 1-9: 1
10. IF YOU CHECKED OFF ANY PROBLEMS, HOW DIFFICULT HAVE THESE PROBLEMS MADE IT FOR YOU TO DO YOUR WORK, TAKE CARE OF THINGS AT HOME, OR GET ALONG WITH OTHER PEOPLE: NOT DIFFICULT AT ALL

## 2024-03-07 NOTE — PROGRESS NOTES
"  Assessment & Plan   Problem List Items Addressed This Visit       Attention deficit hyperactivity disorder (ADHD), predominantly inattentive type - Primary    Relevant Medications    lisdexamfetamine (VYVANSE) 10 MG capsule    lisdexamfetamine (VYVANSE) 10 MG capsule (Start on 4/7/2024)    lisdexamfetamine (VYVANSE) 10 MG capsule (Start on 5/8/2024)      Continue on current dose without change, follow up 6 months    Subjective   Kiesha is a 53 year old, presenting for the following health issues:  Recheck Medication        3/7/2024     1:35 PM   Additional Questions   Roomed by Elyssa PARIS CMA     Via the Health Maintenance questionnaire, the patient has reported the following services have been completed -Mammogram, this information has been sent to the abstraction team.  History of Present Illness       Reason for visit:  Medication    She eats 2-3 servings of fruits and vegetables daily.She consumes 1 sweetened beverage(s) daily.She exercises with enough effort to increase her heart rate 9 or less minutes per day.  She exercises with enough effort to increase her heart rate 3 or less days per week. She is missing 1 dose(s) of medications per week.  She is not taking prescribed medications regularly due to remembering to take.           Objective    /71 (BP Location: Right arm, Patient Position: Chair, Cuff Size: Adult Regular)   Pulse 88   Temp 97.6  F (36.4  C) (Oral)   Resp 16   Ht 1.626 m (5' 4\")   Wt 59.9 kg (132 lb)   LMP  (LMP Unknown)   SpO2 99%   BMI 22.66 kg/m    Body mass index is 22.66 kg/m .  Physical Exam   GENERAL: alert and no distress  PSYCH: mentation appears normal, affect normal/bright            Signed Electronically by: SRINIVASAN SOOD DO    "

## 2024-03-07 NOTE — TELEPHONE ENCOUNTER
Lizeth calling from outpatient facility PA department with [facility]. Direct line is [phone]. Fax [phone].   Pt has diagnosis codes for diagnostic colonoscopy and screening colonoscopy. Insurance will only cover 1 or the other. She needs to know which order is correct. Pt is scheduled for colonoscopy on 3/12/24.   If you would like screening colonoscopy, diagnosis of chronic diarrhea K52.9 and blood in stool K92.1 will need to be removed from order dated 2/9/24. If you would like diagnostic colonoscopy, history of adenomatous polyp of colon Z86.010 will need to be removed from order dated 2/9/24. Lizeth is needing orders updated in Epic and is able to see orders in Epic.     Routing to Provider to advise.    Kiesha Farfan RN  [facility]

## 2024-03-08 NOTE — TELEPHONE ENCOUNTER
Provider Response to 2nd Level Triage Request    I have reviewed the RN documentation.   Order changed to diagnostic colonoscopy    Arcadio Frias MD

## 2024-03-08 NOTE — TELEPHONE ENCOUNTER
Called Lizeth. Left voice message to return call at [phone].    When Lizeth returns call, please inform of update that provider had changed order to a diagnostic colonoscopy and clarify if any further needs.    JILLIAN CabreraN YOCASTA  [facility]

## 2024-03-12 ENCOUNTER — VIRTUAL VISIT (OUTPATIENT)
Dept: PSYCHOLOGY | Facility: CLINIC | Age: 54
End: 2024-03-12
Payer: COMMERCIAL

## 2024-03-12 ENCOUNTER — HOSPITAL ENCOUNTER (OUTPATIENT)
Facility: AMBULATORY SURGERY CENTER | Age: 54
Discharge: HOME | End: 2024-03-12
Attending: SURGERY | Admitting: SURGERY
Payer: COMMERCIAL

## 2024-03-12 VITALS
RESPIRATION RATE: 16 BRPM | TEMPERATURE: 97.4 F | SYSTOLIC BLOOD PRESSURE: 105 MMHG | DIASTOLIC BLOOD PRESSURE: 65 MMHG | OXYGEN SATURATION: 100 % | HEART RATE: 59 BPM

## 2024-03-12 DIAGNOSIS — F43.89 REACTION TO CHRONIC STRESS: ICD-10-CM

## 2024-03-12 DIAGNOSIS — F32.A DEPRESSIVE DISORDER: ICD-10-CM

## 2024-03-12 DIAGNOSIS — F41.1 GENERALIZED ANXIETY DISORDER: Primary | ICD-10-CM

## 2024-03-12 LAB — COLONOSCOPY: NORMAL

## 2024-03-12 PROCEDURE — G8918 PT W/O PREOP ORDER IV AB PRO: HCPCS

## 2024-03-12 PROCEDURE — G8907 PT DOC NO EVENTS ON DISCHARG: HCPCS

## 2024-03-12 PROCEDURE — 88305 TISSUE EXAM BY PATHOLOGIST: CPT | Performed by: PATHOLOGY

## 2024-03-12 PROCEDURE — 90834 PSYTX W PT 45 MINUTES: CPT | Mod: 95 | Performed by: SOCIAL WORKER

## 2024-03-12 PROCEDURE — 45380 COLONOSCOPY AND BIOPSY: CPT

## 2024-03-12 RX ORDER — LIDOCAINE 40 MG/G
CREAM TOPICAL
Status: DISCONTINUED | OUTPATIENT
Start: 2024-03-12 | End: 2024-03-13 | Stop reason: HOSPADM

## 2024-03-12 RX ORDER — ONDANSETRON 2 MG/ML
4 INJECTION INTRAMUSCULAR; INTRAVENOUS
Status: COMPLETED | OUTPATIENT
Start: 2024-03-12 | End: 2024-03-12

## 2024-03-12 RX ORDER — NALOXONE HYDROCHLORIDE 0.4 MG/ML
0.4 INJECTION, SOLUTION INTRAMUSCULAR; INTRAVENOUS; SUBCUTANEOUS
Status: DISCONTINUED | OUTPATIENT
Start: 2024-03-12 | End: 2024-03-13 | Stop reason: HOSPADM

## 2024-03-12 RX ORDER — ONDANSETRON 4 MG/1
4 TABLET, ORALLY DISINTEGRATING ORAL EVERY 6 HOURS PRN
Status: DISCONTINUED | OUTPATIENT
Start: 2024-03-12 | End: 2024-03-13 | Stop reason: HOSPADM

## 2024-03-12 RX ORDER — NALOXONE HYDROCHLORIDE 0.4 MG/ML
0.2 INJECTION, SOLUTION INTRAMUSCULAR; INTRAVENOUS; SUBCUTANEOUS
Status: DISCONTINUED | OUTPATIENT
Start: 2024-03-12 | End: 2024-03-13 | Stop reason: HOSPADM

## 2024-03-12 RX ORDER — ONDANSETRON 2 MG/ML
4 INJECTION INTRAMUSCULAR; INTRAVENOUS EVERY 6 HOURS PRN
Status: DISCONTINUED | OUTPATIENT
Start: 2024-03-12 | End: 2024-03-13 | Stop reason: HOSPADM

## 2024-03-12 RX ORDER — FENTANYL CITRATE 50 UG/ML
INJECTION, SOLUTION INTRAMUSCULAR; INTRAVENOUS PRN
Status: DISCONTINUED | OUTPATIENT
Start: 2024-03-12 | End: 2024-03-12 | Stop reason: HOSPADM

## 2024-03-12 RX ORDER — SODIUM CHLORIDE, SODIUM LACTATE, POTASSIUM CHLORIDE, CALCIUM CHLORIDE 600; 310; 30; 20 MG/100ML; MG/100ML; MG/100ML; MG/100ML
INJECTION, SOLUTION INTRAVENOUS CONTINUOUS PRN
Status: COMPLETED | OUTPATIENT
Start: 2024-03-12 | End: 2024-03-12

## 2024-03-12 RX ORDER — FLUMAZENIL 0.1 MG/ML
0.2 INJECTION, SOLUTION INTRAVENOUS
Status: ACTIVE | OUTPATIENT
Start: 2024-03-12 | End: 2024-03-12

## 2024-03-12 RX ORDER — PROCHLORPERAZINE MALEATE 10 MG
10 TABLET ORAL EVERY 6 HOURS PRN
Status: DISCONTINUED | OUTPATIENT
Start: 2024-03-12 | End: 2024-03-13 | Stop reason: HOSPADM

## 2024-03-12 RX ADMIN — ONDANSETRON 4 MG: 2 INJECTION INTRAMUSCULAR; INTRAVENOUS at 07:14

## 2024-03-12 NOTE — H&P
Pre-Endoscopy History and Physical     Kiesha Mcguire MRN# [id number]   YOB: 1970 Age: 53 year old     Date of Procedure: 3/12/2024  Primary care provider: Damian Trejo  Type of Endoscopy: colonoscopy  Reason for Procedure: diarrhea, bleeding  Type of Anesthesia Anticipated: Moderate Sedation    HPI:    Kiesha is a 53 year old female who will be undergoing the above procedure.    Had 1 adenoma on colonoscopy Oct 2021  Chronic diarrhea since 2022, bleeding      A history and physical has been performed. The patient's medications and allergies have been reviewed. The risks and benefits of the procedure and the sedation options and risks were discussed with the patient.  All questions were answered and informed consent was obtained.      She denies a personal or family history of anesthesia complications or bleeding disorders.     Allergies   Allergen Reactions    Triptans Unknown     imitrex    Amoxicillin-Pot Clavulanate Hives     Unsure if true reaction to med     Ciprofloxacin Hives and Itching     Patient reported- possible reaction, patient unsure         Cannot display prior to admission medications because the patient has not been admitted in this contact.     Current Outpatient Medications   Medication    Acetaminophen (TYLENOL PO)    Biotin 10 MG TABS tablet    busPIRone (BUSPAR) 10 MG tablet    CALCIUM-VITAMIN D-VITAMIN K PO    Collagen Hydrolysate POWD    Cyanocobalamin (VITAMIN B 12 PO)    cyclobenzaprine (FLEXERIL) 10 MG tablet    hydroxychloroquine (PLAQUENIL) 200 MG tablet    lisdexamfetamine (VYVANSE) 10 MG capsule    NONFORMULARY    NONFORMULARY    UNABLE TO FIND    UNABLE TO FIND    valACYclovir (VALTREX) 1000 mg tablet    estradiol (VIVELLE-DOT) 0.025 MG/24HR bi-weekly patch    levonorgestrel (MIRENA) 20 MCG/24HR IUD    lisdexamfetamine (VYVANSE) 10 MG capsule    [START ON 4/7/2024] lisdexamfetamine (VYVANSE) 10 MG capsule    [START ON 5/8/2024] lisdexamfetamine (VYVANSE)  10 MG capsule    Lysine 500 MG TABS    magnesium citrate solution    valACYclovir (VALTREX) 500 MG tablet     Current Facility-Administered Medications   Medication    lactated ringers infusion    lidocaine (LMX4) kit    lidocaine 1 % 0.1-1 mL    sodium chloride (PF) 0.9% PF flush 3 mL    sodium chloride (PF) 0.9% PF flush 3 mL         Patient Active Problem List   Diagnosis    Pure hypercholesterolemia    Herpes simplex virus (HSV) infection    iamTIBIALIS TENDINITIS    iamSPRAIN HIP & THIGH NOS    HYPERLIPIDEMIA LDL GOAL <160    IUD (intrauterine device) in place    Frontal fibrosing alopecia    Pelvic floor dysfunction    Attention deficit hyperactivity disorder (ADHD), predominantly inattentive type    Mass of upper outer quadrant of left breast        Past Medical History:   Diagnosis Date    Depressive disorder     Depressive disorder, not elsewhere classified     resolved    Herpes simplex without mention of complication     oral    IUD (intrauterine device) in place 08/15/2013    Mirena    Migraine headache with aura     very occass, sig aura, speech loss x1    Pure hypercholesterolemia     follows w BIPIN CHATMAN        Past Surgical History:   Procedure Laterality Date    COLONOSCOPY N/A 10/27/2021    Procedure: COLONOSCOPY, WITH POLYPECTOMY AND CLIP;  Surgeon: Og Gutierres MD;  [place] [facility] [place]     DILATION/CURETTAGE DIAG/THER NON OB  2006    LAPAROSCOPIC CHOLECYSTECTOMY N/A 2021    Procedure: CHOLECYSTECTOMY, LAPAROSCOPIC;  Surgeon: Denise Cast MD;  Location:  OR       Social History     Tobacco Use    Smoking status: Former     Packs/day: 0.50     Years: 10.00     Additional pack years: 0.00     Total pack years: 5.00     Types: Cigarettes     Quit date: 1994     Years since quittin.6     Passive exposure: Past    Smokeless tobacco: Never   Substance Use Topics    Alcohol use: Yes     Comment: very rare       Family History   Problem Relation Age of Onset     "Hypertension Mother     Hyperlipidemia Mother     Depression Mother     Squamous cell carcinoma Mother     Thyroid Disease Mother         unclear dx    Hypertension Father     Cancer Father 65        neuro endrocine CA, neck    Hyperlipidemia Father     Aortic aneurysm Maternal Grandfather          of ascending aortic aneurysm at age 64    Aortic aneurysm Maternal Aunt          in her 40s from ascending aortic aneurysm    C.A.D. No family hx of     Cancer - colorectal No family hx of     Diabetes No family hx of     Cerebrovascular Disease No family hx of     Breast Cancer No family hx of     Prostate Cancer No family hx of        REVIEW OF SYSTEMS:     5 point ROS negative except as noted above in HPI, including Gen., Resp., CV, GI &  system review.      PHYSICAL EXAM:   /78 (Cuff Size: Adult Regular)   Pulse 77   Temp 98.3  F (36.8  C) (Temporal)   Resp 16   SpO2 100%  Estimated body mass index is 22.66 kg/m  as calculated from the following:    Height as of 3/7/24: 1.626 m (5' 4\").    Weight as of 3/7/24: 59.9 kg (132 lb).   GENERAL APPEARANCE: healthy, alert, and no distress  MENTAL STATUS: alert  AIRWAY EXAM: Mallampatti Class II (visualization of the soft palate, fauces, and uvula)  RESP: lungs clear to auscultation - no rales, rhonchi or wheezes  CV: regular rates and rhythm      DIAGNOSTICS:    Not indicated      IMPRESSION   ASA Class 2 - Mild systemic disease        PLAN:       Plan for colonoscopy. We discussed the risks, benefits and alternatives and the patient wished to proceed.    The above has been forwarded to the consulting provider.      Signed Electronically by: Emanuel Braden MD  2024    "

## 2024-03-12 NOTE — PROGRESS NOTES
[facility]                                     Progress Note    Patient Name: Kiesha Mcguire  Date: 3/12/2024       Service Type: Individual      Session Start Time:  1:40 PM Session End Time: 2:18 PM     Session Length: 38-52 minutes    Session #: 54 with this provider    Attendees: Client attended alone    Service Modality:  Video Visit:      Provider verified identity through the following two step process.  Patient provided:  Patient is known previously to provider    Telemedicine Visit: The patient's condition can be safely assessed and treated via synchronous audio and visual telemedicine encounter.      Reason for Telemedicine Visit: Patient convenience (e.g. access to timely appointments / distance to available provider) and Services only offered telehealth    Originating Site (Patient Location):  Patient's mother's house    Distant Site (Provider Location): Provider Remote Setting- Home Office/Off-site    Consent:  The patient/guardian has verbally consented to: the potential risks and benefits of telemedicine (video visit) versus in person care; bill my insurance or make self-payment for services provided; and responsibility for payment of non-covered services.     Patient would like the video invitation sent by:  [employer]    Mode of Communication:  Video    As the provider I attest to compliance with applicable laws and regulations related to telemedicine.    DATA  Interactive Complexity: No   Crisis: No      Progress Since Last Session (Related to Symptoms / Goals / Homework):  Symptoms:  improving    Homework: Achieved / completed to satisfaction     Episode of Care Goals: Satisfactory progress - ACTION (Actively working towards change); Intervened by reinforcing change plan / affirming steps taken    There has been demonstrated improvement in functioning while patient has been engaged in psychotherapy/psychological service- if withdrawn the patient would deteriorate and/or  "relapse.       Current / Ongoing Stressors and Concerns:   Difficulty trusting others and being vulnerable  Limited close relationships  Stressors related to parenting   Physical health concerns; recent improvements  Work related stressors  Dynamics in relationship with ex-     Treatment Objective(s) Addressed in This Session:   identify and use 1 strategies to improve organization     Safe/calm state titled \"calm\"  Container: box with a lock    Trauma symptoms: avoids places in neighborhood for fear of running into ex-boyfriend  Has avoided romantic relationships since  Dissociative symptoms while in relationship  Recurrent, intrusive, distressing memories  Problems with concentration     Intervention:   CBT: reinforced behavior changes and reflected on progress   Engaged in active listening    Assessments completed prior to visit:  The following assessments were completed by patient for this visit:  None    ASSESSMENT: Current Emotional / Mental Status (status of significant symptoms):   Risk status (Self / Other harm or suicidal ideation)   Patient denies current fears or concerns for personal safety.   Patient denies current or recent suicidal ideation or behaviors.   Patient denies current or recent homicidal ideation or behaviors.   Patient denies current or recent self injurious behavior or ideation.   Patient denies other safety concerns.   Patient reports there has been no change in risk factors since their last session.     Patient reports there has been no change in protective factors since their last session.     Recommended that patient call 911 or go to the local ED should there be a change in any of these risk factors.     Appearance:   Appropriate    Eye Contact:   Good    Psychomotor Behavior: Normal    Attitude:   Cooperative  Interested Pleasant    Orientation:   All   Speech    Rate / Production: Talkative Normal     Volume:  Normal    Mood:    Stable   Affect:    Appropriate     Thought " Content:  Clear    Thought Form:  Coherent  Goal Directed  Logical    Insight:    Good      Medication Review:   No changes to current psychiatric medication(s)   Vyvanse   Buspar-client is not taking daily    Estrogen patch     Medication Compliance:   Yes     Changes in Health Issues:   None reported     Chemical Use Review:   Substance Use: no use     Tobacco Use: no use    Diagnosis:  Generalized Anxiety Disorder   Other Specified Trauma and Stress Related Disorder   Unspecified Depressive Disorder     Collateral Reports Completed:   Not Applicable    PLAN: (Patient Tasks / Therapist Tasks / Other)  EMDR:  Plan for next session to have client keep her eyes open.    Next Target: divorce, negative belief: I am a failure/I did something wrong, I cannot trust others  Therapist to continue resetting affective circuits.  Therapist to engage client in somatic resourcing.  Client is reading books on ADHD.  Client is trying different strategies to improve organization and completion of tasks; writing lists and prioritizing    Magail Montilla, [facility] 3/12/2024    ______________________________________________________________________    Individual Treatment Plan    Patient's Name: Kiesha Mcguire  YOB: 1970    Date of Creation: 5/11/2022  Date Treatment Plan Last Reviewed/Revised: 1/23/2024    DSM5 Diagnoses:    Generalized Anxiety Disorder  Other Specified Trauma and Stress Related Disorder   Unspecified Depressive Disorder     Psychosocial / Contextual Factors: stressors related to parenting  PROMIS (reviewed every 90 days):    PROMIS 10-Global Health (only subscores and total score):       4/11/2023     1:09 PM 4/25/2023     9:54 AM 8/10/2023    12:52 PM 9/21/2023    11:25 AM 1/9/2024    12:50 PM 1/23/2024    12:55 PM 2/8/2024    10:13 AM   PROMIS-10 Scores Only   Global Mental Health Score 13 13 13 15 15 14 12   Global Physical Health Score 16 17 17 17 14 17 16   PROMIS TOTAL - SUBSCORES 29 30 30 32  "29 31 28        Referral / Collaboration:  Referral to another professional/service is not indicated at this time..    Anticipated number of session for this episode of care: will review in 90 days  Anticipation frequency of session: Every other week  Anticipated Duration of each session: 38-52 minutes  Treatment plan will be reviewed in 90 days or when goals have been changed.       MeasurableTreatment Goal(s) related to diagnosis / functional impairment(s)  Goal 1: Patient will sustain attention and concentration for consistently longer periods of time.  Patient will meet goals set for completing tasks 80% of the time.   Client's Goal:  I will know I've met my goal when my space isn't so chaotic, meeting deadlines around bills and work, when I am not always playing catch-up, completing tasks.      Objective #A (Patient Action)    Patient will learn and implement organization and planning skills.  Status: New - Date: 5/11/2022 , continued date: 9/8/2022, continued date: 12/21/2022, completed date: 3/29/2023    Intervention(s)  Therapist will teach the client organization and planning skills.  Therapist will address any potential barriers to using skills.    Objective #B  Patient will  identify, challenge, and change self-talk that contributes to maladaptive feelings and actions .  Status: New - Date: 5/11/2022 , Continued date: 9/8/2022, continued date: 12/21/2022, continued date: 3/29/2023, continued date: 7/28/2023, continued date: 11/9/2023, continued date: 1/23/2024    Intervention(s)  Therapist will teach the CBT model, cognitive distortions, and cognitive restructuring techniques.      Goal 2: Patient will be able to recall the traumatic events without becoming overwhelmed with negative thoughts, feelings, or urges.   Client's Goal:  I will know I've met my goal when I do not cry every time I talk about it.\"    Objective #A (Patient Action)    Status: New - Date: 5/11/2022, continued date: 9/8/2022, continued " date: 12/21/2022, continued date: 3/29/2023, continued date: 7/28/2023, continued date: 11/9/2023, continued date: 1/23/2024    Patient will describe the history of and nature of trauma symptoms    Intervention(s)  Therapist will assess the client's frequency, intensity, duration, and history of trauma symptoms and their impact on functioning.    Objective #B (Patient Action)  Status: New Date: 5/11/2022, continued date: 9/8/2022, continued date: 12/21/2022, continued date: 3/29/2023, continued date: 7/28/2023, continued date: 11/9/2023, continued date: 1/23/2024    Patient will cooperate with eye movement desensitization and reprocessing (EMDR) to reduce emotional reaction to traumatic event(s)    Intervention(s)  Therapist will utilize EMDR to reduce the client's emotional reactivity to the traumatic event(s).    Objective #C (Patient Action)  Status: New Date: 5/11/2022, continued date: 9/8/2022, continued date: 12/21/2022, continued date: 3/29/2023, continued date: 7/28/2023, continued date: 11/9/2023, continued date: 1/23/2024    Patient will learn and implement calming and coping strategies to manage trauma symptoms.    Intervention(s)  Therapist will teach grounding techniques, distress tolerance, and emotion regulation techniques.      Patient has reviewed and agreed to the above plan.      Magali Montilla, [facility]  May 11, 2022  Magali Montilla, [facility]  9/8/2022  [facility]  12/21/2022  [facility], [facility]  3/29/2023  [facility], [facility]  7/28/2023  [facility]ali Motnilla, [facility]  11/9/2023  Magali Montilla, [facility]  1/23/2024

## 2024-03-27 ENCOUNTER — VIRTUAL VISIT (OUTPATIENT)
Dept: PSYCHOLOGY | Facility: CLINIC | Age: 54
End: 2024-03-27
Payer: COMMERCIAL

## 2024-03-27 DIAGNOSIS — F41.1 GENERALIZED ANXIETY DISORDER: Primary | ICD-10-CM

## 2024-03-27 DIAGNOSIS — F43.89 REACTION TO CHRONIC STRESS: ICD-10-CM

## 2024-03-27 PROCEDURE — 90834 PSYTX W PT 45 MINUTES: CPT | Mod: 95 | Performed by: SOCIAL WORKER

## 2024-03-27 NOTE — PROGRESS NOTES
[facility]                                     Progress Note    Patient Name: Kiesha Mcguire  Date: 3/27/2024       Service Type: Individual      Session Start Time:  10:04 AM Session End Time: 10:53 AM     Session Length: 38-52 minutes    Session #: 55 with this provider    Attendees: Client attended alone    Service Modality:  Video Visit:      Provider verified identity through the following two step process.  Patient provided:  Patient is known previously to provider    Telemedicine Visit: The patient's condition can be safely assessed and treated via synchronous audio and visual telemedicine encounter.      Reason for Telemedicine Visit: Patient convenience (e.g. access to timely appointments / distance to available provider) and Services only offered telehealth    Originating Site (Patient Location): Patient's home    Distant Site (Provider Location): [facility]: [place], [place] /On-site    Consent:  The patient/guardian has verbally consented to: the potential risks and benefits of telemedicine (video visit) versus in person care; bill my insurance or make self-payment for services provided; and responsibility for payment of non-covered services.     Patient would like the video invitation sent by:  [employer]    Mode of Communication:  Video    As the provider I attest to compliance with applicable laws and regulations related to telemedicine.    DATA  Interactive Complexity: No   Crisis: No      Progress Since Last Session (Related to Symptoms / Goals / Homework):  Symptoms:  client reported feeling overwhelmed, emotional    Homework: Achieved / completed to satisfaction  Ride the wave     Episode of Care Goals: Satisfactory progress - ACTION (Actively working towards change); Intervened by reinforcing change plan / affirming steps taken    There has been demonstrated improvement in functioning while patient has been engaged in psychotherapy/psychological service-  "if withdrawn the patient would deteriorate and/or relapse.       Current / Ongoing Stressors and Concerns:   Difficulty trusting others and being vulnerable  Limited close relationships  Stressors related to parenting   Work related stressors  Dynamics in relationship with ex-     Treatment Objective(s) Addressed in This Session:   identify and use 1 strategies to improve organization  identify 1 fears / thoughts that contribute to feeling anxious   Use a minimum of 1 strategy to manage symptoms    Safe/calm state titled \"calm\"  Container: box with a lock    Trauma symptoms: avoids places in neighborhood for fear of running into ex-boyfriend  Has avoided romantic relationships since  Dissociative symptoms while in relationship  Recurrent, intrusive, distressing memories  Problems with concentration     Intervention:   CBT: reinforced use of strategies   Engaged in active listening   Offered parenting support    Assessments completed prior to visit:  The following assessments were completed by patient for this visit:  None    ASSESSMENT: Current Emotional / Mental Status (status of significant symptoms):   Risk status (Self / Other harm or suicidal ideation)   Patient denies current fears or concerns for personal safety.   Patient denies current or recent suicidal ideation or behaviors.   Patient denies current or recent homicidal ideation or behaviors.   Patient denies current or recent self injurious behavior or ideation.   Patient denies other safety concerns.   Patient reports there has been no change in risk factors since their last session.     Patient reports there has been no change in protective factors since their last session.     Recommended that patient call 911 or go to the local ED should there be a change in any of these risk factors.     Appearance:   Appropriate    Eye Contact:   Good    Psychomotor Behavior: Normal    Attitude:   Cooperative  Interested Pleasant "    Orientation:   All   Speech    Rate / Production: Talkative Normal     Volume:  Normal    Mood:    Anxious  Sad -minimal   Affect:    Appropriate  Tearful-minimal   Thought Content:  Clear    Thought Form:  Coherent  Goal Directed  Logical    Insight:    Good      Medication Review:   No changes to current psychiatric medication(s)   Vyvanse   Buspar-client is not taking daily    Estrogen patch     Medication Compliance:   Yes     Changes in Health Issues:   None reported     Chemical Use Review:   Substance Use: no use     Tobacco Use: no use    Diagnosis:  Generalized Anxiety Disorder   Other Specified Trauma and Stress Related Disorder   Unspecified Depressive Disorder     Collateral Reports Completed:   Not Applicable    PLAN: (Patient Tasks / Therapist Tasks / Other)  EMDR:  Plan for next session to have client keep her eyes open.    Next Target: divorce, negative belief: I am a failure/I did something wrong, I cannot trust others  Therapist to continue resetting affective circuits.  Therapist to engage client in somatic resourcing.  Client is reading books on ADHD.  Client is trying different strategies to improve organization.   Client will continue to establish limits with child.  Client will continue to use strategies, such as ride the wave, to manage emotions.    Magali Montilla, [facility] 3/27/2024    ______________________________________________________________________    Individual Treatment Plan    Patient's Name: Kiesha Mcguire  YOB: 1970    Date of Creation: 5/11/2022  Date Treatment Plan Last Reviewed/Revised: 1/23/2024    DSM5 Diagnoses:    Generalized Anxiety Disorder  Other Specified Trauma and Stress Related Disorder   Unspecified Depressive Disorder     Psychosocial / Contextual Factors: stressors related to parenting  PROMIS (reviewed every 90 days):    PROMIS 10-Global Health (only subscores and total score):       4/11/2023     1:09 PM 4/25/2023     9:54 AM 8/10/2023     12:52 PM 9/21/2023    11:25 AM 1/9/2024    12:50 PM 1/23/2024    12:55 PM 2/8/2024    10:13 AM   PROMIS-10 Scores Only   Global Mental Health Score 13 13 13 15 15 14 12   Global Physical Health Score 16 17 17 17 14 17 16   PROMIS TOTAL - SUBSCORES 29 30 30 32 29 31 28        Referral / Collaboration:  Referral to another professional/service is not indicated at this time..    Anticipated number of session for this episode of care: will review in 90 days  Anticipation frequency of session: Every other week  Anticipated Duration of each session: 38-52 minutes  Treatment plan will be reviewed in 90 days or when goals have been changed.       MeasurableTreatment Goal(s) related to diagnosis / functional impairment(s)  Goal 1: Patient will sustain attention and concentration for consistently longer periods of time.  Patient will meet goals set for completing tasks 80% of the time.   Client's Goal:  I will know I've met my goal when my space isn't so chaotic, meeting deadlines around bills and work, when I am not always playing catch-up, completing tasks.      Objective #A (Patient Action)    Patient will learn and implement organization and planning skills.  Status: New - Date: 5/11/2022 , continued date: 9/8/2022, continued date: 12/21/2022, completed date: 3/29/2023    Intervention(s)  Therapist will teach the client organization and planning skills.  Therapist will address any potential barriers to using skills.    Objective #B  Patient will  identify, challenge, and change self-talk that contributes to maladaptive feelings and actions .  Status: New - Date: 5/11/2022 , Continued date: 9/8/2022, continued date: 12/21/2022, continued date: 3/29/2023, continued date: 7/28/2023, continued date: 11/9/2023, continued date: 1/23/2024    Intervention(s)  Therapist will teach the CBT model, cognitive distortions, and cognitive restructuring techniques.      Goal 2: Patient will be able to recall the traumatic events without  "becoming overwhelmed with negative thoughts, feelings, or urges.   Client's Goal:  I will know I've met my goal when I do not cry every time I talk about it.\"    Objective #A (Patient Action)    Status: New - Date: 5/11/2022, continued date: 9/8/2022, continued date: 12/21/2022, continued date: 3/29/2023, continued date: 7/28/2023, continued date: 11/9/2023, continued date: 1/23/2024    Patient will describe the history of and nature of trauma symptoms    Intervention(s)  Therapist will assess the client's frequency, intensity, duration, and history of trauma symptoms and their impact on functioning.    Objective #B (Patient Action)  Status: New Date: 5/11/2022, continued date: 9/8/2022, continued date: 12/21/2022, continued date: 3/29/2023, continued date: 7/28/2023, continued date: 11/9/2023, continued date: 1/23/2024    Patient will cooperate with eye movement desensitization and reprocessing (EMDR) to reduce emotional reaction to traumatic event(s)    Intervention(s)  Therapist will utilize EMDR to reduce the client's emotional reactivity to the traumatic event(s).    Objective #C (Patient Action)  Status: New Date: 5/11/2022, continued date: 9/8/2022, continued date: 12/21/2022, continued date: 3/29/2023, continued date: 7/28/2023, continued date: 11/9/2023, continued date: 1/23/2024    Patient will learn and implement calming and coping strategies to manage trauma symptoms.    Intervention(s)  Therapist will teach grounding techniques, distress tolerance, and emotion regulation techniques.      Patient has reviewed and agreed to the above plan.      Magali Montilla, [facility]  May 11, 2022  [facility], [facility]  9/[facility]/2022  [facility], [facility]  12/21/2022  Mag[facility] Montilla, [facility]  3/29/2023  [facility] Mont[facility], [facility]  7/28/2023  [facility] Mont[facility], [facility]  11/9/2023  Magali Montilla, [facility]  1/23/2024  "

## 2024-04-04 ENCOUNTER — MYC MEDICAL ADVICE (OUTPATIENT)
Dept: FAMILY MEDICINE | Facility: CLINIC | Age: 54
End: 2024-04-04
Payer: COMMERCIAL

## 2024-04-05 ENCOUNTER — LAB (OUTPATIENT)
Dept: LAB | Facility: CLINIC | Age: 54
End: 2024-04-05
Payer: COMMERCIAL

## 2024-04-05 ENCOUNTER — E-VISIT (OUTPATIENT)
Dept: URGENT CARE | Facility: CLINIC | Age: 54
End: 2024-04-05
Payer: COMMERCIAL

## 2024-04-05 DIAGNOSIS — R30.0 DYSURIA: Primary | ICD-10-CM

## 2024-04-05 DIAGNOSIS — N39.0 ACUTE UTI: ICD-10-CM

## 2024-04-05 DIAGNOSIS — R30.0 DYSURIA: ICD-10-CM

## 2024-04-05 LAB
ALBUMIN UR-MCNC: ABNORMAL MG/DL
APPEARANCE UR: CLEAR
BACTERIA #/AREA URNS HPF: ABNORMAL /HPF
BILIRUB UR QL STRIP: NEGATIVE
CLUE CELLS: ABNORMAL
COLOR UR AUTO: YELLOW
GLUCOSE UR STRIP-MCNC: NEGATIVE MG/DL
HGB UR QL STRIP: NEGATIVE
KETONES UR STRIP-MCNC: NEGATIVE MG/DL
LEUKOCYTE ESTERASE UR QL STRIP: NEGATIVE
NITRATE UR QL: POSITIVE
PH UR STRIP: 6 [PH] (ref 5–7)
RBC #/AREA URNS AUTO: ABNORMAL /HPF
SP GR UR STRIP: >=1.03 (ref 1–1.03)
SQUAMOUS #/AREA URNS AUTO: ABNORMAL /LPF
TRICHOMONAS, WET PREP: ABNORMAL
UROBILINOGEN UR STRIP-ACNC: 1 E.U./DL
WBC #/AREA URNS AUTO: ABNORMAL /HPF
WBC'S/HIGH POWER FIELD, WET PREP: ABNORMAL
YEAST, WET PREP: ABNORMAL

## 2024-04-05 PROCEDURE — 87086 URINE CULTURE/COLONY COUNT: CPT

## 2024-04-05 PROCEDURE — 87210 SMEAR WET MOUNT SALINE/INK: CPT

## 2024-04-05 PROCEDURE — 81001 URINALYSIS AUTO W/SCOPE: CPT

## 2024-04-05 PROCEDURE — 99421 OL DIG E/M SVC 5-10 MIN: CPT | Performed by: PHYSICIAN ASSISTANT

## 2024-04-05 RX ORDER — SULFAMETHOXAZOLE/TRIMETHOPRIM 800-160 MG
1 TABLET ORAL 2 TIMES DAILY
Qty: 6 TABLET | Refills: 0 | Status: SHIPPED | OUTPATIENT
Start: 2024-04-05 | End: 2024-04-08

## 2024-04-05 NOTE — PATIENT INSTRUCTIONS
Dear Kiesha Mcguire,     After reviewing your responses, I would like you to come in for a urine test to make sure we treat you correctly. This urine test is to evaluate you for a possible urinary tract infection, and should be scheduled for today or tomorrow. Schedule a Lab Only appointment here.     Lab appointments are not available at most locations on the weekends. If no Lab Only appointment is available, you should be seen in any of our convenient Walk-in or Urgent Care Centers, which can be found on our website here.     You will receive instructions with your results in [employer] once they are available.     If your symptoms worsen, you develop pain in your back or stomach, develop fevers, or are not improving in 5 days, please contact your primary care provider for an appointment or visit a Walk-in or Urgent Care Center to be seen.     Thanks again for choosing us as your health care partner,     Katie Uriarte PA-C

## 2024-04-07 LAB — BACTERIA UR CULT: NORMAL

## 2024-04-12 ENCOUNTER — VIRTUAL VISIT (OUTPATIENT)
Dept: PSYCHOLOGY | Facility: CLINIC | Age: 54
End: 2024-04-12
Payer: COMMERCIAL

## 2024-04-12 DIAGNOSIS — F41.1 GENERALIZED ANXIETY DISORDER: Primary | ICD-10-CM

## 2024-04-12 DIAGNOSIS — F43.89 REACTION TO CHRONIC STRESS: ICD-10-CM

## 2024-04-12 PROCEDURE — 90834 PSYTX W PT 45 MINUTES: CPT | Mod: 95 | Performed by: SOCIAL WORKER

## 2024-04-12 NOTE — PROGRESS NOTES
[facility]                                     Progress Note    Patient Name: Kiesha Mcguire  Date: 4/12/2024       Service Type: Individual      Session Start Time:  10:33 AM Session End Time: 11:24 AM     Session Length: 38-52 minutes    Session #: 56 with this provider    Attendees: Client attended alone    Service Modality:  Video Visit:      Provider verified identity through the following two step process.  Patient provided:  Patient is known previously to provider    Telemedicine Visit: The patient's condition can be safely assessed and treated via synchronous audio and visual telemedicine encounter.      Reason for Telemedicine Visit: Patient convenience (e.g. access to timely appointments / distance to available provider) and Services only offered telehealth    Originating Site (Patient Location): Patient's home    Distant Site (Provider Location): Provider Remote Setting- Home Office/Off-site    Consent:  The patient/guardian has verbally consented to: the potential risks and benefits of telemedicine (video visit) versus in person care; bill my insurance or make self-payment for services provided; and responsibility for payment of non-covered services.     Patient would like the video invitation sent by:  [employer]    Mode of Communication:  Video    As the provider I attest to compliance with applicable laws and regulations related to telemedicine.    DATA  Interactive Complexity: No   Crisis: No      Progress Since Last Session (Related to Symptoms / Goals / Homework):  Symptoms:  no change since previous appointment    Homework: Achieved / completed to satisfaction     Episode of Care Goals: Satisfactory progress - ACTION (Actively working towards change); Intervened by reinforcing change plan / affirming steps taken    There has been demonstrated improvement in functioning while patient has been engaged in psychotherapy/psychological service- if withdrawn the patient would  "deteriorate and/or relapse.       Current / Ongoing Stressors and Concerns:   Difficulty trusting others and being vulnerable  Limited close relationships  Stressors related to parenting   Work related stressors  Dynamics in relationship with ex-     Treatment Objective(s) Addressed in This Session:   identify and use 1 strategies to improve organization  use cognitive strategies identified in therapy to challenge anxious thoughts     Safe/calm state titled \"calm\"  Container: box with a lock    Trauma symptoms: avoids places in neighborhood for fear of running into ex-boyfriend  Has avoided romantic relationships since  Dissociative symptoms while in relationship  Recurrent, intrusive, distressing memories  Problems with concentration     Intervention:   CBT: reinforced use of strategies   Engaged in active listening   EMDR: discussed next steps   Provided psychoeducation on anxiety    Assessments completed prior to visit:  The following assessments were completed by patient for this visit:  None    ASSESSMENT: Current Emotional / Mental Status (status of significant symptoms):   Risk status (Self / Other harm or suicidal ideation)   Patient denies current fears or concerns for personal safety.   Patient denies current or recent suicidal ideation or behaviors.   Patient denies current or recent homicidal ideation or behaviors.   Patient denies current or recent self injurious behavior or ideation.   Patient denies other safety concerns.   Patient reports there has been no change in risk factors since their last session.     Patient reports there has been no change in protective factors since their last session.     Recommended that patient call 911 or go to the local ED should there be a change in any of these risk factors.     Appearance:   Appropriate    Eye Contact:   Good    Psychomotor Behavior: Normal restless hands   Attitude:   Cooperative  Interested Pleasant    Orientation:   All   Speech    Rate / " Production: Talkative Normal     Volume:  Normal    Mood:    Anxious  Sad -minimal   Affect:    Mood Congruent  Tearful-minimal   Thought Content:  Clear    Thought Form:  Coherent  Goal Directed  Logical    Insight:    Good      Medication Review:   No changes to current psychiatric medication(s)   Vyvanse   Buspar-client is not taking daily    Estrogen patch     Medication Compliance:   Yes     Changes in Health Issues:   None reported     Chemical Use Review:   Substance Use: no use     Tobacco Use: no use    Diagnosis:  Generalized Anxiety Disorder   Other Specified Trauma and Stress Related Disorder   Unspecified Depressive Disorder     Collateral Reports Completed:   Not Applicable    PLAN: (Patient Tasks / Therapist Tasks / Other)  EMDR:  Plan for next session to have client keep her eyes open.    Next Target: divorce, negative belief: I am a failure/I did something wrong, I cannot trust others  Therapist to continue resetting affective circuits.  Therapist will consider engaging client in somatic resourcing.  Client is reading books on ADHD.  Client shared plans to decrease engagement in social media.  Client will continue with behavior changes.    Magali Montilla, [facility] 4/12/2024    ______________________________________________________________________    Individual Treatment Plan    Patient's Name: Kiesha Mcguire  YOB: 1970    Date of Creation: 5/11/2022  Date Treatment Plan Last Reviewed/Revised: 1/23/2024    DSM5 Diagnoses:    Generalized Anxiety Disorder  Other Specified Trauma and Stress Related Disorder   Unspecified Depressive Disorder     Psychosocial / Contextual Factors: stressors related to parenting  PROMIS (reviewed every 90 days):    PROMIS 10-Global Health (only subscores and total score):       4/11/2023     1:09 PM 4/25/2023     9:54 AM 8/10/2023    12:52 PM 9/21/2023    11:25 AM 1/9/2024    12:50 PM 1/23/2024    12:55 PM 2/8/2024    10:13 AM   PROMIS-10 Scores Only    Global Mental Health Score 13 13 13 15 15 14 12   Global Physical Health Score 16 17 17 17 14 17 16   PROMIS TOTAL - SUBSCORES 29 30 30 32 29 31 28        Referral / Collaboration:  Referral to another professional/service is not indicated at this time..    Anticipated number of session for this episode of care: will review in 90 days  Anticipation frequency of session: Every other week  Anticipated Duration of each session: 38-52 minutes  Treatment plan will be reviewed in 90 days or when goals have been changed.       MeasurableTreatment Goal(s) related to diagnosis / functional impairment(s)  Goal 1: Patient will sustain attention and concentration for consistently longer periods of time.  Patient will meet goals set for completing tasks 80% of the time.   Client's Goal:  I will know I've met my goal when my space isn't so chaotic, meeting deadlines around bills and work, when I am not always playing catch-up, completing tasks.      Objective #A (Patient Action)    Patient will learn and implement organization and planning skills.  Status: New - Date: 5/11/2022 , continued date: 9/8/2022, continued date: 12/21/2022, completed date: 3/29/2023    Intervention(s)  Therapist will teach the client organization and planning skills.  Therapist will address any potential barriers to using skills.    Objective #B  Patient will  identify, challenge, and change self-talk that contributes to maladaptive feelings and actions .  Status: New - Date: 5/11/2022 , Continued date: 9/8/2022, continued date: 12/21/2022, continued date: 3/29/2023, continued date: 7/28/2023, continued date: 11/9/2023, continued date: 1/23/2024    Intervention(s)  Therapist will teach the CBT model, cognitive distortions, and cognitive restructuring techniques.      Goal 2: Patient will be able to recall the traumatic events without becoming overwhelmed with negative thoughts, feelings, or urges.   Client's Goal:  I will know I've met my goal when I  "do not cry every time I talk about it.\"    Objective #A (Patient Action)    Status: New - Date: 5/11/2022, continued date: 9/8/2022, continued date: 12/21/2022, continued date: 3/29/2023, continued date: 7/28/2023, continued date: 11/9/2023, continued date: 1/23/2024    Patient will describe the history of and nature of trauma symptoms    Intervention(s)  Therapist will assess the client's frequency, intensity, duration, and history of trauma symptoms and their impact on functioning.    Objective #B (Patient Action)  Status: New Date: 5/11/2022, continued date: 9/8/2022, continued date: 12/21/2022, continued date: 3/29/2023, continued date: 7/28/2023, continued date: 11/9/2023, continued date: 1/23/2024    Patient will cooperate with eye movement desensitization and reprocessing (EMDR) to reduce emotional reaction to traumatic event(s)    Intervention(s)  Therapist will utilize EMDR to reduce the client's emotional reactivity to the traumatic event(s).    Objective #C (Patient Action)  Status: New Date: 5/11/2022, continued date: 9/8/2022, continued date: 12/21/2022, continued date: 3/29/2023, continued date: 7/28/2023, continued date: 11/9/2023, continued date: 1/23/2024    Patient will learn and implement calming and coping strategies to manage trauma symptoms.    Intervention(s)  Therapist will teach grounding techniques, distress tolerance, and emotion regulation techniques.      Patient has reviewed and agreed to the above plan.      [facility], [facility]  May 11, 2022  [facility]illa, [facility]  9/8/2022  Magali Montilla, [facility]  12/21/2022  Magali Montilla, [facility]  3/29/2023  Magali Montilla, [facility]  7/28/2023  Magali Montilla, [facility]  11/9/2023  [facility], [facility]  1/23/2024  "

## 2024-04-19 ENCOUNTER — THERAPY VISIT (OUTPATIENT)
Dept: PHYSICAL THERAPY | Facility: CLINIC | Age: 54
End: 2024-04-19
Payer: COMMERCIAL

## 2024-04-19 DIAGNOSIS — M62.89 PELVIC FLOOR DYSFUNCTION: ICD-10-CM

## 2024-04-19 DIAGNOSIS — G89.29 CHRONIC LEFT SHOULDER PAIN: Primary | ICD-10-CM

## 2024-04-19 DIAGNOSIS — M25.512 CHRONIC LEFT SHOULDER PAIN: Primary | ICD-10-CM

## 2024-04-19 PROCEDURE — 97110 THERAPEUTIC EXERCISES: CPT | Mod: GP | Performed by: PHYSICAL THERAPIST

## 2024-04-19 PROCEDURE — 97161 PT EVAL LOW COMPLEX 20 MIN: CPT | Mod: GP | Performed by: PHYSICAL THERAPIST

## 2024-04-19 NOTE — PROGRESS NOTES
PHYSICAL THERAPY EVALUATION  Type of Visit: Evaluation    See electronic medical record for Abuse and Falls Screening details.    Subjective       Presenting condition or subjective complaint:  chronic left shoulder pain  Date of onset: 03/19/24    Relevant medical history:   see chart  Dates & types of surgery:  see chart    Prior diagnostic imaging/testing results:     no  Prior therapy history for the same diagnosis, illness or injury:    yes, PT    Employment:    desk job  Hobbies/Interests:      Kiesha presents today with left shoulder pain that has been going on for a couple years. This past month her shoulder has been really painful. She denies any traumatic incident that started her pain. Her pain feels sharp when she is moving her shoulder which is followed by an ache. She is right hand dominant.    Patient goals for therapy:  pain free ROM, no pain    Pain assessment: Pain present     Objective   SHOULDER EVALUATION  PAIN: Pain Level at Rest: 0/10  Pain Level with Use: 8/10  Pain Location: anterior lateral left shoulder  Pain Quality: Aching and Sharp  Pain Frequency: intermittent  Pain is Worst: activity dependent   Pain is Exacerbated By: reaching behind her back, reaching up overhead  Pain is Relieved By: certain positions, avoiding what movements cause pain  Pain Progression: Worsened  POSTURE: Sitting Posture: Rounded shoulders, Forward head  GAIT:   Weightbearing Status: WBAT  Assistive Device(s): None  Gait Deviations: WNL  ROM:   (Degrees) Left AROM Right AROM    Shoulder Flexion 95 degrees, pain WNL   Shoulder Abduction 115 degrees, pain starting at 80 degrees WNL   Shoulder External Rotation 57 degrees pain 84 degrees   Shoulder Extension and IR T9 pain T4     STRENGTH:   Pain: - none + mild ++ moderate +++ severe  Strength Scale: 0-5/5 Left Right   Shoulder Flexion 5- 5-   Shoulder Abduction 5- 5-   Shoulder Internal Rotation 5- 5-   Shoulder External Rotation 5- 5-     PALPATION:  no  tenderness to palpation infraspinatus, supraspinatus and long head of biceps tendon  JOINT MOBILITY:  AP glide L shoulder WNL  CERVICAL SCREEN: WNL    Assessment & Plan   CLINICAL IMPRESSIONS  Medical Diagnosis: chronic left shoulder pain    Treatment Diagnosis: chronic left shoulder pain   Impression/Assessment: Patient is a 53 year old female with chronic left shoulder pain complaints.  The following significant findings have been identified: Pain, Decreased ROM/flexibility, Impaired muscle performance, Decreased activity tolerance, and Impaired posture. These impairments interfere with their ability to perform self care tasks, work tasks, recreational activities, household chores, driving , household mobility, and community mobility as compared to previous level of function.     Clinical Decision Making (Complexity):  Clinical Presentation: Stable/Uncomplicated  Clinical Presentation Rationale: based on medical and personal factors listed in PT evaluation  Clinical Decision Making (Complexity): Low complexity    PLAN OF CARE  Treatment Interventions:  Modalities: Biofeedback, Cryotherapy, Dry Needling, E-stim, Hot Pack  Interventions: Gait Training, Manual Therapy, Neuromuscular Re-education, Therapeutic Activity, Therapeutic Exercise, Self-Care/Home Management    Long Term Goals     PT Goal 1  Goal Description: Pt will have 0/10 left shoulder pain reaching up overhead.  Rationale: to maximize safety and independence with performance of ADLs and functional tasks  Target Date: 08/19/24      Frequency of Treatment: 2x a month  Duration of Treatment: 4 months    Education Assessment:   Learner/Method: Patient;No Barriers to Learning    Risks and benefits of evaluation/treatment have been explained.   Patient/Family/caregiver agrees with Plan of Care.     Evaluation Time:     PT Eval, Low Complexity Minutes (27005): 25     Signing Clinician: Elvira Ludwig, PT

## 2024-04-25 ENCOUNTER — VIRTUAL VISIT (OUTPATIENT)
Dept: PSYCHOLOGY | Facility: CLINIC | Age: 54
End: 2024-04-25
Payer: COMMERCIAL

## 2024-04-25 DIAGNOSIS — F41.1 GENERALIZED ANXIETY DISORDER: ICD-10-CM

## 2024-04-25 DIAGNOSIS — F43.89 REACTION TO CHRONIC STRESS: Primary | ICD-10-CM

## 2024-04-25 PROCEDURE — 90834 PSYTX W PT 45 MINUTES: CPT | Mod: 95 | Performed by: SOCIAL WORKER

## 2024-04-25 NOTE — PROGRESS NOTES
[facility]                                     Progress Note    Patient Name: Kiesha Mcguire  Date: 4/25/2024       Service Type: Individual      Session Start Time:  9:33 AM Session End Time: 10:12 AM     Session Length: 38-52 minutes    Session #: 57 with this provider    Attendees: Client attended alone    Service Modality:  Video Visit:      Provider verified identity through the following two step process.  Patient provided:  Patient is known previously to provider    Telemedicine Visit: The patient's condition can be safely assessed and treated via synchronous audio and visual telemedicine encounter.      Reason for Telemedicine Visit: Patient convenience (e.g. access to timely appointments / distance to available provider) and Services only offered telehealth    Originating Site (Patient Location): Patient's home    Distant Site (Provider Location): Provider Remote Setting- Home Office/Off-site    Consent:  The patient/guardian has verbally consented to: the potential risks and benefits of telemedicine (video visit) versus in person care; bill my insurance or make self-payment for services provided; and responsibility for payment of non-covered services.     Patient would like the video invitation sent by:  [employer]    Mode of Communication:  Video    As the provider I attest to compliance with applicable laws and regulations related to telemedicine.    DATA  Interactive Complexity: No   Crisis: No      Progress Since Last Session (Related to Symptoms / Goals / Homework):  Symptoms:  improving    Homework: Achieved / completed to satisfaction     Episode of Care Goals: Satisfactory progress - ACTION (Actively working towards change); Intervened by reinforcing change plan / affirming steps taken    There has been demonstrated improvement in functioning while patient has been engaged in psychotherapy/psychological service- if withdrawn the patient would deteriorate and/or relapse.   "     Current / Ongoing Stressors and Concerns:   Difficulty trusting others and being vulnerable  Limited close relationships  Stressors related to parenting   Work related stressors  Dynamics in relationship with ex-     Treatment Objective(s) Addressed in This Session:   EMDR: phase 1      Safe/calm state titled \"calm\"  Container: box with a lock     Intervention:   Engaged in active listening   EMDR: discussed next steps, provided psychoeducation on the early trauma protocol, phase 1    Assessments completed prior to visit:  The following assessments were completed by patient for this visit:  None    ASSESSMENT: Current Emotional / Mental Status (status of significant symptoms):   Risk status (Self / Other harm or suicidal ideation)   Patient denies current fears or concerns for personal safety.   Patient denies current or recent suicidal ideation or behaviors.   Patient denies current or recent homicidal ideation or behaviors.   Patient denies current or recent self injurious behavior or ideation.   Patient denies other safety concerns.   Patient reports there has been no change in risk factors since their last session.     Patient reports there has been no change in protective factors since their last session.     Recommended that patient call 911 or go to the local ED should there be a change in any of these risk factors.     Appearance:   Appropriate    Eye Contact:   Good    Psychomotor Behavior: Normal    Attitude:   Cooperative  Interested Pleasant    Orientation:   All   Speech    Rate / Production: Normal     Volume:  Normal    Mood:    Sad -minimal   Affect:    Mood Congruent  Tearful-minimal   Thought Content:  Clear    Thought Form:  Coherent  Goal Directed  Logical    Insight:    Good      Medication Review:   No changes to current psychiatric medication(s)   Vyvanse   Buspar-client is not taking daily    Estrogen patch     Medication Compliance:   Yes     Changes in Health Issues:   None " reported     Chemical Use Review:   Substance Use: no use     Tobacco Use: no use    Diagnosis:  Generalized Anxiety Disorder   Other Specified Trauma and Stress Related Disorder   Unspecified Depressive Disorder     Collateral Reports Completed:   Not Applicable    PLAN: (Patient Tasks / Therapist Tasks / Other)  EMDR:  Plan for next session to have client keep her eyes open.    Therapist to continue resetting affective circuits, engage client in building resources and the early trauma protocol.    Magali Montilla, [facility] 4/25/2024    ______________________________________________________________________    Individual Treatment Plan    Patient's Name: Kiesha Mcguire  YOB: 1970    Date of Creation: 5/11/2022  Date Treatment Plan Last Reviewed/Revised: 1/23/2024    DSM5 Diagnoses:    Generalized Anxiety Disorder  Other Specified Trauma and Stress Related Disorder   Unspecified Depressive Disorder     Psychosocial / Contextual Factors: stressors related to parenting  PROMIS (reviewed every 90 days):    PROMIS 10-Global Health (only subscores and total score):       4/11/2023     1:09 PM 4/25/2023     9:54 AM 8/10/2023    12:52 PM 9/21/2023    11:25 AM 1/9/2024    12:50 PM 1/23/2024    12:55 PM 2/8/2024    10:13 AM   PROMIS-10 Scores Only   Global Mental Health Score 13 13 13 15 15 14 12   Global Physical Health Score 16 17 17 17 14 17 16   PROMIS TOTAL - SUBSCORES 29 30 30 32 29 31 28        Referral / Collaboration:  Referral to another professional/service is not indicated at this time..    Anticipated number of session for this episode of care: will review in 90 days  Anticipation frequency of session: Every other week  Anticipated Duration of each session: 38-52 minutes  Treatment plan will be reviewed in 90 days or when goals have been changed.       MeasurableTreatment Goal(s) related to diagnosis / functional impairment(s)  Goal 1: Patient will sustain attention and concentration for  "consistently longer periods of time.  Patient will meet goals set for completing tasks 80% of the time.   Client's Goal:  I will know I've met my goal when my space isn't so chaotic, meeting deadlines around bills and work, when I am not always playing catch-up, completing tasks.      Objective #A (Patient Action)    Patient will learn and implement organization and planning skills.  Status: New - Date: 5/11/2022 , continued date: 9/8/2022, continued date: 12/21/2022, completed date: 3/29/2023    Intervention(s)  Therapist will teach the client organization and planning skills.  Therapist will address any potential barriers to using skills.    Objective #B  Patient will  identify, challenge, and change self-talk that contributes to maladaptive feelings and actions .  Status: New - Date: 5/11/2022 , Continued date: 9/8/2022, continued date: 12/21/2022, continued date: 3/29/2023, continued date: 7/28/2023, continued date: 11/9/2023, continued date: 1/23/2024    Intervention(s)  Therapist will teach the CBT model, cognitive distortions, and cognitive restructuring techniques.      Goal 2: Patient will be able to recall the traumatic events without becoming overwhelmed with negative thoughts, feelings, or urges.   Client's Goal:  I will know I've met my goal when I do not cry every time I talk about it.\"    Objective #A (Patient Action)    Status: New - Date: 5/11/2022, continued date: 9/8/2022, continued date: 12/21/2022, continued date: 3/29/2023, continued date: 7/28/2023, continued date: 11/9/2023, continued date: 1/23/2024    Patient will describe the history of and nature of trauma symptoms    Intervention(s)  Therapist will assess the client's frequency, intensity, duration, and history of trauma symptoms and their impact on functioning.    Objective #B (Patient Action)  Status: New Date: 5/11/2022, continued date: 9/8/2022, continued date: 12/21/2022, continued date: 3/29/2023, continued date: 7/28/2023, " continued date: 11/9/2023, continued date: 1/23/2024    Patient will cooperate with eye movement desensitization and reprocessing (EMDR) to reduce emotional reaction to traumatic event(s)    Intervention(s)  Therapist will utilize EMDR to reduce the client's emotional reactivity to the traumatic event(s).    Objective #C (Patient Action)  Status: New Date: 5/11/2022, continued date: 9/8/2022, continued date: 12/21/2022, continued date: 3/29/2023, continued date: 7/28/2023, continued date: 11/9/2023, continued date: 1/23/2024    Patient will learn and implement calming and coping strategies to manage trauma symptoms.    Intervention(s)  Therapist will teach grounding techniques, distress tolerance, and emotion regulation techniques.      Patient has reviewed and agreed to the above plan.      Magali Montilla, [facility]  May 11, 2022  Magali Montilla, [facility]  9/8/2022  Magali Montilla, [facility]  12/21/2022  Magali Montilla, [facility]  3/29/2023  Magali Montilla, [facility]  7/28/2023  [facility], [facility]  11/9/2023  Magali Montilla, [facility]  1/23/2024

## 2024-05-09 ENCOUNTER — VIRTUAL VISIT (OUTPATIENT)
Dept: PSYCHOLOGY | Facility: CLINIC | Age: 54
End: 2024-05-09
Payer: COMMERCIAL

## 2024-05-09 DIAGNOSIS — F41.1 GENERALIZED ANXIETY DISORDER: ICD-10-CM

## 2024-05-09 DIAGNOSIS — F32.A DEPRESSIVE DISORDER: ICD-10-CM

## 2024-05-09 DIAGNOSIS — F43.89 REACTION TO CHRONIC STRESS: Primary | ICD-10-CM

## 2024-05-09 PROCEDURE — 90834 PSYTX W PT 45 MINUTES: CPT | Mod: 95 | Performed by: SOCIAL WORKER

## 2024-05-09 NOTE — PROGRESS NOTES
[facility]                                     Progress Note    Patient Name: Kiesha Mcguire  Date: 5/9/2024       Service Type: Individual      Session Start Time:  9:33 AM Session End Time: 11:19 AM     Session Length: 38-52 minutes    Session #: 58 with this provider    Attendees: Client attended alone    Service Modality:  Video Visit:      Provider verified identity through the following two step process.  Patient provided:  Patient is known previously to provider    Telemedicine Visit: The patient's condition can be safely assessed and treated via synchronous audio and visual telemedicine encounter.      Reason for Telemedicine Visit: Patient convenience (e.g. access to timely appointments / distance to available provider) and Services only offered telehealth    Originating Site (Patient Location): Patient's home    Distant Site (Provider Location): Provider Remote Setting- Home Office/Off-site    Consent:  The patient/guardian has verbally consented to: the potential risks and benefits of telemedicine (video visit) versus in person care; bill my insurance or make self-payment for services provided; and responsibility for payment of non-covered services.     Patient would like the video invitation sent by:  [employer]    Mode of Communication:  Video    As the provider I attest to compliance with applicable laws and regulations related to telemedicine.    DATA  Interactive Complexity: No   Crisis: No      Progress Since Last Session (Related to Symptoms / Goals / Homework):  Symptoms:  no change since previous appointment    Homework: Achieved / completed to satisfaction     Episode of Care Goals: Satisfactory progress - ACTION (Actively working towards change); Intervened by reinforcing change plan / affirming steps taken    There has been demonstrated improvement in functioning while patient has been engaged in psychotherapy/psychological service- if withdrawn the patient would  "deteriorate and/or relapse.       Current / Ongoing Stressors and Concerns:   Difficulty trusting others and being vulnerable  Limited close relationships  Stressors related to parenting   Work related stressors  Dynamics in relationship with ex-  Recent episodes of mental confusion  Physical health     Treatment Objective(s) Addressed in This Session:   EMDR: phase 2    Identify losses    Safe/calm state titled \"calm\"  Container: box with a lock     Intervention:   Engaged in active listening   EMDR: engaged in installing nurturer, discussed next steps    Assessments completed prior to visit:  The following assessments were completed by patient for this visit:  None    ASSESSMENT: Current Emotional / Mental Status (status of significant symptoms):   Risk status (Self / Other harm or suicidal ideation)   Patient denies current fears or concerns for personal safety.   Patient denies current or recent suicidal ideation or behaviors.   Patient denies current or recent homicidal ideation or behaviors.   Patient denies current or recent self injurious behavior or ideation.   Patient denies other safety concerns.   Patient reports there has been no change in risk factors since their last session.     Patient reports there has been no change in protective factors since their last session.     Recommended that patient call 911 or go to the local ED should there be a change in any of these risk factors.     Appearance:   Appropriate    Eye Contact:   Good closed when engaging in BLS   Psychomotor Behavior: Normal    Attitude:   Cooperative  Interested Pleasant    Orientation:   All   Speech    Rate / Production: Normal     Volume:  Normal    Mood:    Sad Stable   Affect:    Mood Congruent  Tearful-minimal   Thought Content:  Clear    Thought Form:  Coherent  Goal Directed  Logical    Insight:    Good      Medication Review:   No changes to current psychiatric medication(s)   Vyvanse   Buspar-client is not taking " daily    Estrogen patch     Medication Compliance:   Yes     Changes in Health Issues:   None reported      Believes she has Hypermobility disorder     Chemical Use Review:   Substance Use: no use     Tobacco Use: no use    Diagnosis:  Generalized Anxiety Disorder   Other Specified Trauma and Stress Related Disorder   Unspecified Depressive Disorder     Collateral Reports Completed:   Not Applicable    PLAN: (Patient Tasks / Therapist Tasks / Other)  EMDR:  Plan for next session to have client keep her eyes open.    Therapist to continue resetting affective circuits, engage client in building resources and the early trauma protocol.  Client shared plans to continue considering nurturing qualities    Magali Montilla, [facility] 5/9/2024    ______________________________________________________________________    Individual Treatment Plan    Patient's Name: Kiesha Mcguire  YOB: 1970    Date of Creation: 5/11/2022  Date Treatment Plan Last Reviewed/Revised: 5/9/2024    DSM5 Diagnoses:    Generalized Anxiety Disorder  Other Specified Trauma and Stress Related Disorder   Unspecified Depressive Disorder     Psychosocial / Contextual Factors: stressors related to parenting  PROMIS (reviewed every 90 days):    PROMIS 10-Global Health (only subscores and total score):       4/11/2023     1:09 PM 4/25/2023     9:54 AM 8/10/2023    12:52 PM 9/21/2023    11:25 AM 1/9/2024    12:50 PM 1/23/2024    12:55 PM 2/8/2024    10:13 AM   PROMIS-10 Scores Only   Global Mental Health Score 13 13 13 15 15 14 12   Global Physical Health Score 16 17 17 17 14 17 16   PROMIS TOTAL - SUBSCORES 29 30 30 32 29 31 28        Referral / Collaboration:  Referral to another professional/service is not indicated at this time..    Anticipated number of session for this episode of care: will review in 90 days  Anticipation frequency of session: Every other week  Anticipated Duration of each session: 38-52 minutes  Treatment plan will be  "reviewed in 90 days or when goals have been changed.       MeasurableTreatment Goal(s) related to diagnosis / functional impairment(s)  Goal 1: Patient will sustain attention and concentration for consistently longer periods of time.  Patient will meet goals set for completing tasks 80% of the time.   Client's Goal:  I will know I've met my goal when my space isn't so chaotic, meeting deadlines around bills and work, when I am not always playing catch-up, completing tasks.      Objective #A (Patient Action)    Patient will learn and implement organization and planning skills.  Status: New - Date: 5/11/2022 , continued date: 9/8/2022, continued date: 12/21/2022, completed date: 3/29/2023    Intervention(s)  Therapist will teach the client organization and planning skills.  Therapist will address any potential barriers to using skills.    Objective #B  Patient will  identify, challenge, and change self-talk that contributes to maladaptive feelings and actions .  Status: New - Date: 5/11/2022 , Continued date: 9/8/2022, continued date: 12/21/2022, continued date: 3/29/2023, continued date: 7/28/2023, continued date: 11/9/2023, continued date: 1/23/2024, continued date: 5/9/2024    Intervention(s)  Therapist will teach the CBT model, cognitive distortions, and cognitive restructuring techniques.      Goal 2: Patient will be able to recall the traumatic events without becoming overwhelmed with negative thoughts, feelings, or urges.   Client's Goal:  I will know I've met my goal when I do not cry every time I talk about it.\"    Objective #A (Patient Action)    Status: New - Date: 5/11/2022, continued date: 9/8/2022, continued date: 12/21/2022, continued date: 3/29/2023, continued date: 7/28/2023, continued date: 11/9/2023, continued date: 1/23/2024, continued date: 5/9/2024    Patient will describe the history of and nature of trauma symptoms    Intervention(s)  Therapist will assess the client's frequency, intensity, " duration, and history of trauma symptoms and their impact on functioning.    Objective #B (Patient Action)  Status: New Date: 5/11/2022, continued date: 9/8/2022, continued date: 12/21/2022, continued date: 3/29/2023, continued date: 7/28/2023, continued date: 11/9/2023, continued date: 1/23/2024, continued date: 5/9/2024    Patient will cooperate with eye movement desensitization and reprocessing (EMDR) to reduce emotional reaction to traumatic event(s)    Intervention(s)  Therapist will utilize EMDR to reduce the client's emotional reactivity to the traumatic event(s).    Objective #C (Patient Action)  Status: New Date: 5/11/2022, continued date: 9/8/2022, continued date: 12/21/2022, continued date: 3/29/2023, continued date: 7/28/2023, continued date: 11/9/2023, continued date: 1/23/2024, continued date: 5/9/2024    Patient will learn and implement calming and coping strategies to manage trauma symptoms.    Intervention(s)  Therapist will teach grounding techniques, distress tolerance, and emotion regulation techniques.      Patient has reviewed and agreed to the above plan.      Magali Montilla, [facility]  May 11, 2022  [facility]  9/[facility]/2022  [facility], [facility]  12/21/2022  [facility], [facility]  3/29/2023  [facility], [facility]  7/28/2023  [facility], [facility]  11/9/2023  [facility], [facility]  1/23/2024  [facility], [facility]  5/9/2024

## 2024-05-13 ENCOUNTER — THERAPY VISIT (OUTPATIENT)
Dept: PHYSICAL THERAPY | Facility: CLINIC | Age: 54
End: 2024-05-13
Payer: COMMERCIAL

## 2024-05-13 DIAGNOSIS — G89.29 CHRONIC LEFT SHOULDER PAIN: Primary | ICD-10-CM

## 2024-05-13 DIAGNOSIS — M25.512 CHRONIC LEFT SHOULDER PAIN: Primary | ICD-10-CM

## 2024-05-13 PROCEDURE — 97530 THERAPEUTIC ACTIVITIES: CPT | Mod: GP | Performed by: PHYSICAL THERAPIST

## 2024-05-13 PROCEDURE — 97110 THERAPEUTIC EXERCISES: CPT | Mod: 59 | Performed by: PHYSICAL THERAPIST

## 2024-05-23 ENCOUNTER — VIRTUAL VISIT (OUTPATIENT)
Dept: PSYCHOLOGY | Facility: CLINIC | Age: 54
End: 2024-05-23
Payer: COMMERCIAL

## 2024-05-23 DIAGNOSIS — F41.1 GENERALIZED ANXIETY DISORDER: Primary | ICD-10-CM

## 2024-05-23 DIAGNOSIS — F43.89 REACTION TO CHRONIC STRESS: ICD-10-CM

## 2024-05-23 PROCEDURE — 90834 PSYTX W PT 45 MINUTES: CPT | Mod: 95 | Performed by: SOCIAL WORKER

## 2024-05-23 NOTE — PROGRESS NOTES
[facility]                                     Progress Note    Patient Name: Kiesha Mcguire  Date: 5/23/2024       Service Type: Individual      Session Start Time:  10:32 AM Session End Time: 11:17 AM     Session Length: 38-52 minutes    Session #: 59 with this provider    Attendees: Client attended alone    Service Modality:  Video Visit:      Provider verified identity through the following two step process.  Patient provided:  Patient is known previously to provider    Telemedicine Visit: The patient's condition can be safely assessed and treated via synchronous audio and visual telemedicine encounter.      Reason for Telemedicine Visit: Patient convenience (e.g. access to timely appointments / distance to available provider) and Services only offered telehealth    Originating Site (Patient Location): Patient's home    Distant Site (Provider Location): Provider Remote Setting- Home Office/Off-site    Consent:  The patient/guardian has verbally consented to: the potential risks and benefits of telemedicine (video visit) versus in person care; bill my insurance or make self-payment for services provided; and responsibility for payment of non-covered services.     Patient would like the video invitation sent by:  [employer]    Mode of Communication:  Video    As the provider I attest to compliance with applicable laws and regulations related to telemedicine.    DATA  Interactive Complexity: No   Crisis: No      Progress Since Last Session (Related to Symptoms / Goals / Homework):  Symptoms:  no change since previous appointment    Homework: Achieved / completed to satisfaction     Episode of Care Goals: Satisfactory progress - ACTION (Actively working towards change); Intervened by reinforcing change plan / affirming steps taken    There has been demonstrated improvement in functioning while patient has been engaged in psychotherapy/psychological service- if withdrawn the patient would  "deteriorate and/or relapse.       Current / Ongoing Stressors and Concerns:   Difficulty trusting others and being vulnerable  Limited close relationships  Stressors related to parenting   Work related stressors  Dynamics in relationship with ex-  Physical health     Treatment Objective(s) Addressed in This Session:   identify 2 fears / thoughts that contribute to feeling anxious   Use cognitive strategies to manage thoughts/fears that contribute to anxiety symptoms    Safe/calm state titled \"calm\"  Container: box with a lock     Intervention:   Engaged in active listening   Offered support with parenting    CBT: engaged client in identifying fears/thoughts that contribute to anxiety symptoms    Assessments completed prior to visit:  The following assessments were completed by patient for this visit:  None    ASSESSMENT: Current Emotional / Mental Status (status of significant symptoms):   Risk status (Self / Other harm or suicidal ideation)   Patient denies current fears or concerns for personal safety.   Patient denies current or recent suicidal ideation or behaviors.   Patient denies current or recent homicidal ideation or behaviors.   Patient denies current or recent self injurious behavior or ideation.   Patient denies other safety concerns.   Patient reports there has been no change in risk factors since their last session.     Patient reports there has been no change in protective factors since their last session.     Recommended that patient call 911 or go to the local ED should there be a change in any of these risk factors.     Appearance:   Appropriate    Eye Contact:   Good    Psychomotor Behavior: Normal    Attitude:   Cooperative  Interested Pleasant    Orientation:   All   Speech    Rate / Production: Normal     Volume:  Normal    Mood:    Sad -minimal, Anxious   Affect:    Mood Congruent  Tearful-minimal   Thought Content:  Clear    Thought Form:  Coherent  Goal Directed  Logical "    Insight:    Good      Medication Review:   No changes to current psychiatric medication(s)   Vyvanse   Buspar-client is not taking daily    Estrogen patch     Medication Compliance:   Yes     Changes in Health Issues:   None reported    Surgery on June 20th  Believes she has Hypermobility disorder     Chemical Use Review:   Substance Use: no use     Tobacco Use: no use    Diagnosis:  Generalized Anxiety Disorder   Other Specified Trauma and Stress Related Disorder   Unspecified Depressive Disorder     Collateral Reports Completed:   Not Applicable    PLAN: (Patient Tasks / Therapist Tasks / Other)  EMDR:  Plan for next session to have client keep her eyes open.    Therapist to continue resetting affective circuits, engage client in building resources and the early trauma protocol.  Client will maintain limits set with children.  Client is on the waitlist for an earlier appointment.    Magali Montilla, [facility] 5/23/2024    ______________________________________________________________________    Individual Treatment Plan    Patient's Name: Kiesha Mcguire  YOB: 1970    Date of Creation: 5/11/2022  Date Treatment Plan Last Reviewed/Revised: 5/9/2024    DSM5 Diagnoses:    Generalized Anxiety Disorder  Other Specified Trauma and Stress Related Disorder   Unspecified Depressive Disorder     Psychosocial / Contextual Factors: stressors related to parenting  PROMIS (reviewed every 90 days):    PROMIS 10-Global Health (only subscores and total score):       4/11/2023     1:09 PM 4/25/2023     9:54 AM 8/10/2023    12:52 PM 9/21/2023    11:25 AM 1/9/2024    12:50 PM 1/23/2024    12:55 PM 2/8/2024    10:13 AM   PROMIS-10 Scores Only   Global Mental Health Score 13 13 13 15 15 14 12   Global Physical Health Score 16 17 17 17 14 17 16   PROMIS TOTAL - SUBSCORES 29 30 30 32 29 31 28        Referral / Collaboration:  Referral to another professional/service is not indicated at this time..    Anticipated  "number of session for this episode of care: will review in 90 days  Anticipation frequency of session: Every other week  Anticipated Duration of each session: 38-52 minutes  Treatment plan will be reviewed in 90 days or when goals have been changed.       MeasurableTreatment Goal(s) related to diagnosis / functional impairment(s)  Goal 1: Patient will sustain attention and concentration for consistently longer periods of time.  Patient will meet goals set for completing tasks 80% of the time.   Client's Goal:  I will know I've met my goal when my space isn't so chaotic, meeting deadlines around bills and work, when I am not always playing catch-up, completing tasks.      Objective #A (Patient Action)    Patient will learn and implement organization and planning skills.  Status: New - Date: 5/11/2022 , continued date: 9/8/2022, continued date: 12/21/2022, completed date: 3/29/2023    Intervention(s)  Therapist will teach the client organization and planning skills.  Therapist will address any potential barriers to using skills.    Objective #B  Patient will  identify, challenge, and change self-talk that contributes to maladaptive feelings and actions .  Status: New - Date: 5/11/2022 , Continued date: 9/8/2022, continued date: 12/21/2022, continued date: 3/29/2023, continued date: 7/28/2023, continued date: 11/9/2023, continued date: 1/23/2024, continued date: 5/9/2024    Intervention(s)  Therapist will teach the CBT model, cognitive distortions, and cognitive restructuring techniques.      Goal 2: Patient will be able to recall the traumatic events without becoming overwhelmed with negative thoughts, feelings, or urges.   Client's Goal:  I will know I've met my goal when I do not cry every time I talk about it.\"    Objective #A (Patient Action)    Status: New - Date: 5/11/2022, continued date: 9/8/2022, continued date: 12/21/2022, continued date: 3/29/2023, continued date: 7/28/2023, continued date: 11/9/2023, " continued date: 1/23/2024, continued date: 5/9/2024    Patient will describe the history of and nature of trauma symptoms    Intervention(s)  Therapist will assess the client's frequency, intensity, duration, and history of trauma symptoms and their impact on functioning.    Objective #B (Patient Action)  Status: New Date: 5/11/2022, continued date: 9/8/2022, continued date: 12/21/2022, continued date: 3/29/2023, continued date: 7/28/2023, continued date: 11/9/2023, continued date: 1/23/2024, continued date: 5/9/2024    Patient will cooperate with eye movement desensitization and reprocessing (EMDR) to reduce emotional reaction to traumatic event(s)    Intervention(s)  Therapist will utilize EMDR to reduce the client's emotional reactivity to the traumatic event(s).    Objective #C (Patient Action)  Status: New Date: 5/11/2022, continued date: 9/8/2022, continued date: 12/21/2022, continued date: 3/29/2023, continued date: 7/28/2023, continued date: 11/9/2023, continued date: 1/23/2024, continued date: 5/9/2024    Patient will learn and implement calming and coping strategies to manage trauma symptoms.    Intervention(s)  Therapist will teach grounding techniques, distress tolerance, and emotion regulation techniques.      Patient has reviewed and agreed to the above plan.      Magali Montilla, [facility]  May 11, 2022  Magali Montilla, [facility]  9/8/2022  [facility]  [facility]  [facility]  3/29/2023  [facility]  7/28/2023  [facility]  11/9/2023  [facility]  1/23/2024  Magali Montilla, [facility]  5/9/2024

## 2024-06-06 ENCOUNTER — OFFICE VISIT (OUTPATIENT)
Dept: GASTROENTEROLOGY | Facility: CLINIC | Age: 54
End: 2024-06-06
Attending: PHYSICIAN ASSISTANT
Payer: COMMERCIAL

## 2024-06-06 VITALS
DIASTOLIC BLOOD PRESSURE: 74 MMHG | WEIGHT: 136.5 LBS | HEIGHT: 64 IN | BODY MASS INDEX: 23.31 KG/M2 | OXYGEN SATURATION: 100 % | SYSTOLIC BLOOD PRESSURE: 107 MMHG | HEART RATE: 76 BPM

## 2024-06-06 DIAGNOSIS — K52.9 CHRONIC DIARRHEA: Primary | ICD-10-CM

## 2024-06-06 PROCEDURE — 99214 OFFICE O/P EST MOD 30 MIN: CPT | Performed by: PHYSICIAN ASSISTANT

## 2024-06-06 ASSESSMENT — PAIN SCALES - GENERAL: PAINLEVEL: NO PAIN (0)

## 2024-06-06 NOTE — PATIENT INSTRUCTIONS
It was a pleasure taking care of you today.  I've included a brief summary of our discussion and care plan from today's visit below.  Please review this information with your primary care provider.  ______________________________________________________________________    My recommendations are summarized as follows:  --Please increase fiber to 2tbsp daily (can increase to 3tbsp).   --Please Call to schedule with Arlen (Nutritionist): [phone] to make an appointment.  --A referral was placed to our GI psychologist,  Please call [phone] to schedule.   --- It is recommended to start Imodium (Loperamide) to help better control your diarrhea. We recommend the initial dose of 4mg taken once followed by 2mg for each additional loose stool. Maximum daily dosing is 16mg/day.        -- please see scheduling information provided below     Return to GI Clinic in 6 months to review your progress.    ______________________________________________________________________    How do I schedule labs, imaging studies, or procedures that were ordered in clinic today?     Labs: To schedule lab appointment at the [facility], use my chart or call [phone]. If you have a [facility] lab closer to home where you are regularly seen you can give them a call.     Procedures: If a colonoscopy, upper endoscopy, breath test, esophageal manometry, or pH impedence was ordered today, our endoscopy team will call you to schedule this. If you have not heard from our endoscopy team within a week, please call [phone] to schedule.     Imaging Studies: If you were scheduled for a CT scan, X-ray, MRI, ultrasound, HIDA scan or other imaging study, please call [phone] to have this scheduled.     Referral: If a referral to another specialty was ordered, expect a phone call or follow instructions above. If you have not heard from anyone regarding your referral in a week, please call our  clinic to check the status.     Who do I call with any questions after my visit?  Please be in touch if there are any further questions that arise following today's visit.  There are multiple ways to contact your gastroenterology care team.      During business hours, you may reach a Gastroenterology nurse at [phone]    To schedule or reschedule an appointment, please call [phone].     You can always send a secure message through [email or web address].  [email or web address] messages are answered by your nurse or doctor typically within 24 hours.  Please allow extra time on weekends and holidays.      For urgent/emergent questions after business hours, you may reach the on-call GI Fellow by contacting the [facility]  at [phone].     How will I get the results of any tests ordered?    You will receive all of your results.  If you have signed up for [employer], any tests ordered at your visit will be available to you after your provider reviews them.  Typically this takes 1-2 weeks.  If there are urgent results that require a change in your care plan, your provider or nurse will call you to discuss the next steps.      What is [email or web address]?  [email or web address] is a secure way for you to access all of your healthcare records from the [facility].  It is a web based computer program, so you can sign on to it from any location.  It also allows you to send secure messages to your care team.  I recommend signing up for i[employer]fm access if you have not already done so and are comfortable with using a computer.      How to I schedule a follow-up visit?  If you did not schedule a follow-up visit today, please call [phone] to schedule a follow-up office visit.      Sincerely,    Jonathan Parish PA-C  Division of Gastroenterology, Hepatology & Nutrition  [facility]     # Altered mental status    #Generalized weakness 05-16    141  |  102  |  39<H>  ----------------------------<  77  5.3<H>   |  31  |  0.6<L>    Ca    8.6      16 May 2024 09:41    TPro  6.1  /  Alb  2.9<L>  /  TBili  0.4  /  DBili  x   /  AST  48<H>  /  ALT  135<H>  /  AlkPhos  90  05-16                            10.7   7.40  )-----------( 212      ( 16 May 2024 09:41 )             34.3     CAPILLARY BLOOD GLUCOSE      POCT Blood Glucose.: 73 mg/dL (16 May 2024 08:49)      < from: CT Head No Cont (05.16.24 @ 10:02) >    IMPRESSION:  No acute intracranial pathology.    Mild chronic microvascular type changes.    --- End of Report --- 05-16    141  |  102  |  39<H>  ----------------------------<  77  5.3<H>   |  31  |  0.6<L>    Ca    8.6      16 May 2024 09:41    TPro  6.1  /  Alb  2.9<L>  /  TBili  0.4  /  DBili  x   /  AST  48<H>  /  ALT  135<H>  /  AlkPhos  90  05-16                            10.7   7.40  )-----------( 212      ( 16 May 2024 09:41 )             34.3     CAPILLARY BLOOD GLUCOSE      POCT Blood Glucose.: 73 mg/dL (16 May 2024 08:49)      < from: CT Head No Cont (05.16.24 @ 10:02) >    IMPRESSION:  No acute intracranial pathology.    Mild chronic microvascular type changes.    --- End of Report ---    < from: Xray Chest 1 View-PORTABLE IMMEDIATE (Xray Chest 1 View-PORTABLE IMMEDIATE .) (05.16.24 @ 09:40) >      Impression:    Interstitial opacities. Left basilar opacity/effusion.    < end of copied text >

## 2024-06-06 NOTE — NURSING NOTE
"Chief Complaint   Patient presents with    RECHECK     Follow up; patient reports symptoms are better, but not completely resolved     She requests these members of her care team be copied on today's visit information:  PCP: Damian Trejo    Referring Provider:  Jonathan Parish PA-C  [id number] [place] [address]    Vitals:    06/06/24 1041   BP: 107/74   BP Location: Left arm   Patient Position: Sitting   Cuff Size: Adult Regular   Pulse: 76   SpO2: 100%   Weight: 61.9 kg (136 lb 8 oz)   Height: 1.626 m (5' 4\")     Body mass index is 23.43 kg/m .    Medications were reconciled.    Does patient need any medication refills at today's visit? No        Awilda Quiñones LPN    "

## 2024-06-06 NOTE — LETTER
6/6/2024      Kiesha Mcguire  [address] [address]      Dear Colleague,    Thank you for referring your patient, Kiesha Mcguire, to the [facility]. Please see a copy of my visit note below.    GI CLINIC VISIT    CC/REFERRING MD:  Arcadio Thorpe   REASON FOR CONSULTATION: diarrhea    ASSESSMENT/PLAN:  54 y/o F with pmhx of alopecia presents for follow up of diarrhea.    #diarrhea  Patient underwent a colonoscopy in March 2024 - was otherwise normal but did show a tortuous colon, biopsies were normal as well. She does feel that bowel pattern has improved with use of daily fiber - but still has a somewhat variable stool consistency. Does have liquid stools every couple of weeks. There certainly could be a role of bile acid malabsorption in her diarrhea, but I do wonder if this is more of a disorder of the gut brain axis. We discussed the use of cholestyramine, but she does not have frequent liquid stools - will hold off at this time. Recommended she increase fiber to 2tbsp daily (can go up to 3tbsp) and follow up with GI health psychology and nutrition. She denies any abdominal bloating, do not feel SIBO Testing is warranted. She declines AXR today as she does not feel she is constipated.  --increase fiber to 2tbsp daily.  --f/up with nutritionist.  --f/up with GI health psychologist  --can use imodium PRN.   --future considerations: if having more persistent liquid stools, can trial cholestyramine.       RTC 6 months.    Thank you for this consultation.  It was a pleasure to participate in the care of this patient; please contact us with any further questions.       32 minutes spent on the date of the encounter doing chart review, review of test results, patient visit, and documentation      This note was created with voice recognition software, and while reviewed for accuracy, typos may remain.    Jonathan Parish PA-C  Division of Gastroenterology,  Hepatology and Nutrition  [facility]      HPI  54 y/o F with pmhx of alopecia presents for follow up of diarrhea.    Patient was initially evaluated on 3/6/24, please see visit details below:    Patient reports that she had a cholecystectomy in December 2021 -- she was feeling well, but then in march/April 2022 she developed diarrhea. She describes stool as a bristol type 6-7, would occur sporadically. She had stool studies done including cryptosporidium, fecal fat, and parasitic testing - this returned normal. She knew that diarrhea can occur with gallbladder removal so she tried to make some dietary adjustments. She notes that she would have diarrhea once or a twice a week, and would have normal stools between. She then began to notice in sept/oct 2023 noticed increasing frequency of diarrhea - but would have solid stools in between. Stools seemed to be more liquid in nature. Then she began to have liquid diarrhea every other day, with associated abdominal cramping, as well as fecal urgency, denies fecal incontinence. Denies BRBPR.     Patient underwent stool studies on 3/1/24 , including c diff, fecal calprotectin, fecal lactoferrin, fecal elastase, enteric pathogen panel, and cryptosporidium -- all of which returned normal.     Patient reports she is no longer having liquid diarrhea - she will generally have a BM daily, but every two days she has a bristol type 6 stool, but now has noticed blood on the toilet paper with wiping, not mixed in the stool. When she has looser stools, can have 2 BM daily, denies abdominal cramping. She denies any previous hx of constipation. Before all her symptoms started, she would have a BM daily that was formed. She started a fiber supplement after she took a nutrition class last fall - she will take psyllium, not always daily.     Patient does stay gluten free - as she may be gluten sensitive. Celiac testing in 2011 was negative.  When she eats gluten, the following day she would have looser stools.     Denies nausea or vomiting. She has a poor appetite, but relates it to her ADHD medications.       Takes NSAIDs once every 2-3 weeks.  Denies Tylenol. Does take some supplements - omega 3, D, K, magnesium.     Denies use of ETOH and tobacco products. Uses marijuana gummies once every 3 months.     No family history of GI related malignancy (esophageal, gastric, pancreatic, liver or colon) or family history of IBD/celiac disease.     6/6/24:  Colonoscopy was normal - biopsies were normal as well.    Patient will generally have a BM daily - stool consistency can vary quite a bit, but can have anywhere between a bristol type 4-7 stool. Patient does note abdominal pain, when she has looser stools. Liquid stool can occur every couple of weeks. Pain improves with having a BM. Denies nausea or vomiting. Denies BRBPR. Appetite has been poor - but is on stimulants for ADHD. Weight is stable. She has previously tried fiber pills, trying to stick to the psyllium husk capsules - with diet, she is trying to get 25g. Does have fecal urgency when she has looser stools.     ROS:    No fevers or chills  No weight loss  No shortness of breath or wheezing  No chest pain or pressure  No odynophagia or dysphagia  +BRBPR  + anxiety or depression -- has previously taken zoloft in the past (last took in 2015). Does follow with a therapist.     PROBLEM LIST  Patient Active Problem List    Diagnosis Date Noted     Attention deficit hyperactivity disorder (ADHD), predominantly inattentive type 02/27/2023     Priority: Medium     Mass of upper outer quadrant of left breast 02/27/2023     Priority: Medium     Pelvic floor dysfunction 11/04/2022     Priority: Medium     Frontal fibrosing alopecia 09/07/2022     Priority: Medium     IUD (intrauterine device) in place 08/15/2013     Priority: Medium     Mirena       HYPERLIPIDEMIA LDL GOAL <160 05/09/2010     Priority: Medium      iamTIBIALIS TENDINITIS 08/31/2005     Priority: Medium     iamSPRAIN HIP & THIGH NOS 08/31/2005     Priority: Medium     Herpes simplex virus (HSV) infection 07/01/2005     Priority: Medium     oral  Problem list name updated by automated process. Provider to review       Pure hypercholesterolemia 03/31/2005     Priority: Medium       PERTINENT PAST MEDICAL HISTORY:    Past Medical History:   Diagnosis Date     Depressive disorder      Depressive disorder, not elsewhere classified     resolved     Herpes simplex without mention of complication     oral     IUD (intrauterine device) in place 08/15/2013    Mirena     Migraine headache with aura     very occass, sig aura, speech loss x1     Pure hypercholesterolemia     follows w BIPIN CHATMAN       PREVIOUS SURGERIES:    Past Surgical History:   Procedure Laterality Date     COLONOSCOPY N/A 10/27/2021    Procedure: COLONOSCOPY, WITH POLYPECTOMY AND CLIP;  Surgeon: Og Gutierres MD;  Location: [facility] OR     COLONOSCOPY N/A 3/12/2024    Procedure: COLONOSCOPY, WITH POLYPECTOMY AND BIOPSY;  Surgeon: Emanuel Braden MD;  Location: [facility] OR     COLONOSCOPY WITH CO2 INSUFFLATION N/A 3/12/2024    Procedure: Colonoscopy with CO2 insufflation;  Surgeon: Emanuel Braden MD;  Location: [facility] OR     HC DILATION/CURETTAGE DIAG/THER NON OB  12/2006     LAPAROSCOPIC CHOLECYSTECTOMY N/A 12/24/2021    Procedure: CHOLECYSTECTOMY, LAPAROSCOPIC;  Surgeon: Denise Cast MD;  Location: [facility] OR       PREVIOUS ENDOSCOPY:  Colonoscopy (2024): - Tortuous colon.                             - The examined portion of the ileum was normal.                             Biopsied.                             - The examination was otherwise normal on direct                             and retroflexion views.                             - Biopsies were taken with a cold forceps from the                             entire colon for evaluation of microscopic colitis.   Final Diagnosis    A. TERMINAL ILEUM, BIOPSY:  Terminal ileal mucosa with preserved villi and no significant histologic abnormality     B. COLON, RANDOM BIOPSY:  Colonic mucosa with no evidence of microscopic or chronic colitis or other significant histologic abnormality          Colonoscopy (2021): - One 6 mm polyp in the sigmoid colon, removed with a                        cold snare. Resected and retrieved. Clip (MR                        conditional) was placed.                        - Three 1 to 3 mm polyps at the recto-sigmoid colon,                        removed with a cold biopsy forceps. Resected and                        retrieved.                        - The examined portion of the ileum was normal   Final Diagnosis   A. SIGMOID COLON POLYP, POLYPECTOMY:  -Tubular adenoma, fragmented, negative for high-grade dysplasia    B.RECTAL SIGMOID POLYPS X 3, POLYPECTOMY:  -Three hyperplastic polyps        ALLERGIES:     Allergies   Allergen Reactions     Triptans Unknown     imitrex     Amoxicillin-Pot Clavulanate Hives     Unsure if true reaction to med      Ciprofloxacin Hives and Itching     Patient reported- possible reaction, patient unsure        PERTINENT MEDICATIONS:    Current Outpatient Medications:      Acetaminophen (TYLENOL PO), , Disp: , Rfl:      Biotin 10 MG TABS tablet, Take 10,000 mcg by mouth daily, Disp: , Rfl:      busPIRone (BUSPAR) 10 MG tablet, Take 1 tablet (10 mg) by mouth 2 times daily, Disp: 60 tablet, Rfl: 2     CALCIUM-VITAMIN D-VITAMIN K PO, , Disp: , Rfl:      Collagen Hydrolysate POWD, , Disp: , Rfl:      Cyanocobalamin (VITAMIN B 12 PO), Take by mouth daily , Disp: , Rfl:      cyclobenzaprine (FLEXERIL) 10 MG tablet, Take 0.5 tablets (5 mg) by mouth 3 times daily as needed for muscle spasms, Disp: 30 tablet, Rfl: 0     estradiol (VIVELLE-DOT) 0.025 MG/24HR bi-weekly patch, Place 1 patch onto the skin twice a week, Disp: 24 patch, Rfl: 3     hydroxychloroquine (PLAQUENIL) 200 MG tablet, Take 1  tablet (200 mg) by mouth 2 times daily, Disp: 180 tablet, Rfl: 3     levonorgestrel (MIRENA) 20 MCG/24HR IUD, 1 each by Intrauterine route once, Disp: , Rfl:      lisdexamfetamine (VYVANSE) 10 MG capsule, Take 1 capsule (10 mg) by mouth daily for 30 days, Disp: 30 capsule, Rfl: 0     lisdexamfetamine (VYVANSE) 10 MG capsule, Take 1 capsule (10 mg) by mouth every morning, Disp: 30 capsule, Rfl: 0     Lysine 500 MG TABS, Take 1,000 mg by mouth daily , Disp: , Rfl:      magnesium citrate solution, Takes 1 tsp twice a day, 150 mg daily, Disp: , Rfl:      NONFORMULARY, 1 capsule daily carmen, Disp: , Rfl:      NONFORMULARY, 2 tablets daily seabuckthorn, Disp: , Rfl:      UNABLE TO FIND, daily Ashwaganda - patient reported, Disp: , Rfl:      UNABLE TO FIND, daily Rhodiola- patient reported, Disp: , Rfl:      valACYclovir (VALTREX) 1000 mg tablet, Take 2 tablets (2,000 mg) by mouth 2 times daily For 2 days as needed for cold sores, Disp: 12 tablet, Rfl: 11     valACYclovir (VALTREX) 500 MG tablet, Take 1 tablet (500 mg) by mouth daily, Disp: 90 tablet, Rfl: 1    SOCIAL HISTORY:    Social History     Socioeconomic History     Marital status:      Spouse name: Not on file     Number of children: 2     Years of education: 16     Highest education level: Not on file   Occupational History     Occupation:      Employer: [employer]     Comment: fulltime   Tobacco Use     Smoking status: Former     Current packs/day: 0.00     Average packs/day: 0.5 packs/day for 10.0 years (5.0 ttl pk-yrs)     Types: Cigarettes     Start date: 1984     Quit date: 1994     Years since quittin.9     Passive exposure: Past     Smokeless tobacco: Never   Vaping Use     Vaping status: Never Used   Substance and Sexual Activity     Alcohol use: Yes     Comment: very rare     Drug use: No     Sexual activity: Not Currently     Partners: Male     Birth control/protection: I.U.D.     Comment: Mirena   Other Topics Concern      Parent/sibling w/ CABG, MI or angioplasty before 65F 55M? No   Social History Narrative    How much exercise per week? 2 90 minute yoga sessions      How much calcium per day? Diet       How much caffeine per day? 0    How much vitamin D per day? Supplement     Do you/your family wear seatbelts?  Yes    Do you/your family use safety helmets? Yes    Do you/your family use sunscreen? Yes    Do you/your family keep firearms in the home? No    Do you/your family have a smoke detector(s)? Yes        Do you feel safe in your home? Yes    Has anyone ever touched you in an unwanted manner? No     Explain Kiesha [facility] 7/16/18        [place] [facility] [place] [facility]             Social Determinants of Health     Financial Resource Strain: Low Risk  (1/4/2024)    Financial Resource Strain      Within the past 12 months, have you or your family members you live with been unable to get utilities (heat, electricity) when it was really needed?: No   Food Insecurity: Low Risk  (1/4/2024)    Food Insecurity      Within the past 12 months, did you worry that your food would run out before you got money to buy more?: No      Within the past 12 months, did the food you bought just not last and you didn t have money to get more?: No   Transportation Needs: Low Risk  (1/4/2024)    Transportation Needs      Within the past 12 months, has lack of transportation kept you from medical appointments, getting your medicines, non-medical meetings or appointments, work, or from getting things that you need?: No   Physical Activity: Insufficiently Active (10/12/2020)    Exercise Vital Sign      Days of Exercise per Week: 1 day      Minutes of Exercise per Session: 10 min   Stress: Stress Concern Present (10/12/2020)    [employer] [place] [employer] - Occupational Stress Questionnaire      Feeling of Stress : To some extent   Social Connections: Socially Isolated (10/12/2020)    Social Connection and Isolation Panel [NHANES]       "Frequency of Communication with Friends and Family: Three times a week      Frequency of Social Gatherings with Friends and Family: Twice a week      Attends Rastafari Services: Never      Active Member of Clubs or Organizations: No      Attends Club or Organization Meetings: Never      Marital Status:    Interpersonal Safety: Low Risk  (3/7/2024)    Interpersonal Safety      Do you feel physically and emotionally safe where you currently live?: Yes      Within the past 12 months, have you been hit, slapped, kicked or otherwise physically hurt by someone?: No      Within the past 12 months, have you been humiliated or emotionally abused in other ways by your partner or ex-partner?: No   Housing Stability: Low Risk  (2024)    Housing Stability      Do you have housing? : Yes      Are you worried about losing your housing?: No       FAMILY HISTORY:  FH of CRC: None  FH of IBD: None  Family History   Problem Relation Age of Onset     Hypertension Mother      Hyperlipidemia Mother      Depression Mother      Squamous cell carcinoma Mother      Thyroid Disease Mother         unclear dx     Hypertension Father      Cancer Father 65        neuro endrocine CA, neck     Hyperlipidemia Father      Aortic aneurysm Maternal Grandfather          of ascending aortic aneurysm at age 64     Aortic aneurysm Maternal Aunt          in her 40s from ascending aortic aneurysm     C.A.D. No family hx of      Cancer - colorectal No family hx of      Diabetes No family hx of      Cerebrovascular Disease No family hx of      Breast Cancer No family hx of      Prostate Cancer No family hx of        Past/family/social history reviewed and no changes    PHYSICAL EXAMINATION:  Constitutional: aaox3, cooperative, pleasant, not dyspneic/diaphoretic, no acute distress  Vitals reviewed: /74 (BP Location: Left arm, Patient Position: Sitting, Cuff Size: Adult Regular)   Pulse 76   Ht 1.626 m (5' 4\")   Wt 61.9 kg (136 lb 8 " oz)   SpO2 100%   BMI 23.43 kg/m    Wt:   Wt Readings from Last 2 Encounters:   03/07/24 59.9 kg (132 lb)   03/06/24 60.7 kg (133 lb 12.8 oz)      Eyes: Sclera anicteric/injected  Respiratory: Unlabored breathing  Skin: warm, perfused, no jaundice  Psych: Normal affect  MSK: Normal gait            Again, thank you for allowing me to participate in the care of your patient.        Sincerely,        Jonathan Parish PA-C

## 2024-06-06 NOTE — PROGRESS NOTES
GI CLINIC VISIT    CC/REFERRING MD:  Arcadio Thorpe   REASON FOR CONSULTATION: diarrhea    ASSESSMENT/PLAN:  54 y/o F with pmhx of alopecia presents for follow up of diarrhea.    #diarrhea  Patient underwent a colonoscopy in March 2024 - was otherwise normal but did show a tortuous colon, biopsies were normal as well. She does feel that bowel pattern has improved with use of daily fiber - but still has a somewhat variable stool consistency. Does have liquid stools every couple of weeks. There certainly could be a role of bile acid malabsorption in her diarrhea, but I do wonder if this is more of a disorder of the gut brain axis. We discussed the use of cholestyramine, but she does not have frequent liquid stools - will hold off at this time. Recommended she increase fiber to 2tbsp daily (can go up to 3tbsp) and follow up with GI health psychology and nutrition. She denies any abdominal bloating, do not feel SIBO Testing is warranted. She declines AXR today as she does not feel she is constipated.  --increase fiber to 2tbsp daily.  --f/up with nutritionist.  --f/up with GI health psychologist  --can use imodium PRN.   --future considerations: if having more persistent liquid stools, can trial cholestyramine.       RTC 6 months.    Thank you for this consultation.  It was a pleasure to participate in the care of this patient; please contact us with any further questions.       32 minutes spent on the date of the encounter doing chart review, review of test results, patient visit, and documentation      This note was created with voice recognition software, and while reviewed for accuracy, typos may remain.    Jonathan Parish PA-C  Division of Gastroenterology, Hepatology and Nutrition  [facility]  54 y/o F with pmhx of alopecia presents for follow up of diarrhea.    Patient was initially evaluated on 3/6/24, please see visit details below:    Patient reports  that she had a cholecystectomy in December 2021 -- she was feeling well, but then in march/April 2022 she developed diarrhea. She describes stool as a bristol type 6-7, would occur sporadically. She had stool studies done including cryptosporidium, fecal fat, and parasitic testing - this returned normal. She knew that diarrhea can occur with gallbladder removal so she tried to make some dietary adjustments. She notes that she would have diarrhea once or a twice a week, and would have normal stools between. She then began to notice in sept/oct 2023 noticed increasing frequency of diarrhea - but would have solid stools in between. Stools seemed to be more liquid in nature. Then she began to have liquid diarrhea every other day, with associated abdominal cramping, as well as fecal urgency, denies fecal incontinence. Denies BRBPR.     Patient underwent stool studies on 3/1/24 , including c diff, fecal calprotectin, fecal lactoferrin, fecal elastase, enteric pathogen panel, and cryptosporidium -- all of which returned normal.     Patient reports she is no longer having liquid diarrhea - she will generally have a BM daily, but every two days she has a bristol type 6 stool, but now has noticed blood on the toilet paper with wiping, not mixed in the stool. When she has looser stools, can have 2 BM daily, denies abdominal cramping. She denies any previous hx of constipation. Before all her symptoms started, she would have a BM daily that was formed. She started a fiber supplement after she took a nutrition class last fall - she will take psyllium, not always daily.     Patient does stay gluten free - as she may be gluten sensitive. Celiac testing in 2011 was negative. When she eats gluten, the following day she would have looser stools.     Denies nausea or vomiting. She has a poor appetite, but relates it to her ADHD medications.       Takes NSAIDs once every 2-3 weeks.  Denies Tylenol. Does take some supplements - omega 3,  D, K, magnesium.     Denies use of ETOH and tobacco products. Uses marijuana gummies once every 3 months.     No family history of GI related malignancy (esophageal, gastric, pancreatic, liver or colon) or family history of IBD/celiac disease.     6/6/24:  Colonoscopy was normal - biopsies were normal as well.    Patient will generally have a BM daily - stool consistency can vary quite a bit, but can have anywhere between a bristol type 4-7 stool. Patient does note abdominal pain, when she has looser stools. Liquid stool can occur every couple of weeks. Pain improves with having a BM. Denies nausea or vomiting. Denies BRBPR. Appetite has been poor - but is on stimulants for ADHD. Weight is stable. She has previously tried fiber pills, trying to stick to the psyllium husk capsules - with diet, she is trying to get 25g. Does have fecal urgency when she has looser stools.     ROS:    No fevers or chills  No weight loss  No shortness of breath or wheezing  No chest pain or pressure  No odynophagia or dysphagia  +BRBPR  + anxiety or depression -- has previously taken zoloft in the past (last took in 2015). Does follow with a therapist.     PROBLEM LIST  Patient Active Problem List    Diagnosis Date Noted    Attention deficit hyperactivity disorder (ADHD), predominantly inattentive type 02/27/2023     Priority: Medium    Mass of upper outer quadrant of left breast 02/27/2023     Priority: Medium    Pelvic floor dysfunction 11/04/2022     Priority: Medium    Frontal fibrosing alopecia 09/07/2022     Priority: Medium    IUD (intrauterine device) in place 08/15/2013     Priority: Medium     Mirena      HYPERLIPIDEMIA LDL GOAL <160 05/09/2010     Priority: Medium    iamTIBIALIS TENDINITIS 08/31/2005     Priority: Medium    iamSPRAIN HIP & THIGH NOS 08/31/2005     Priority: Medium    Herpes simplex virus (HSV) infection 07/01/2005     Priority: Medium     oral  Problem list name updated by automated process. Provider to  review      Pure hypercholesterolemia 03/31/2005     Priority: Medium       PERTINENT PAST MEDICAL HISTORY:    Past Medical History:   Diagnosis Date    Depressive disorder     Depressive disorder, not elsewhere classified     resolved    Herpes simplex without mention of complication     oral    IUD (intrauterine device) in place 08/15/2013    Mirena    Migraine headache with aura     very occass, sig aura, speech loss x1    Pure hypercholesterolemia     follows kate VOSS MD       PREVIOUS SURGERIES:    Past Surgical History:   Procedure Laterality Date    COLONOSCOPY N/A 10/27/2021    Procedure: COLONOSCOPY, WITH POLYPECTOMY AND CLIP;  Surgeon: Og Gutierres MD;  Location: [facility] OR    COLONOSCOPY N/A 3/12/2024    Procedure: COLONOSCOPY, WITH POLYPECTOMY AND BIOPSY;  Surgeon: Emanuel Braden MD;  Location: [facility] OR    COLONOSCOPY WITH CO2 INSUFFLATION N/A 3/12/2024    Procedure: Colonoscopy with CO2 insufflation;  Surgeon: Emanuel Braden MD;  Location: [facility] OR    HC DILATION/CURETTAGE DIAG/THER NON OB  12/2006    LAPAROSCOPIC CHOLECYSTECTOMY N/A 12/24/2021    Procedure: CHOLECYSTECTOMY, LAPAROSCOPIC;  Surgeon: Denise Cast MD;  Location: [facility] OR       PREVIOUS ENDOSCOPY:  Colonoscopy (2024): - Tortuous colon.                             - The examined portion of the ileum was normal.                             Biopsied.                             - The examination was otherwise normal on direct                             and retroflexion views.                             - Biopsies were taken with a cold forceps from the                             entire colon for evaluation of microscopic colitis.   Final Diagnosis   A. TERMINAL ILEUM, BIOPSY:  Terminal ileal mucosa with preserved villi and no significant histologic abnormality     B. COLON, RANDOM BIOPSY:  Colonic mucosa with no evidence of microscopic or chronic colitis or other significant histologic abnormality          Colonoscopy  (2021): - One 6 mm polyp in the sigmoid colon, removed with a                        cold snare. Resected and retrieved. Clip (MR                        conditional) was placed.                        - Three 1 to 3 mm polyps at the recto-sigmoid colon,                        removed with a cold biopsy forceps. Resected and                        retrieved.                        - The examined portion of the ileum was normal   Final Diagnosis   A. SIGMOID COLON POLYP, POLYPECTOMY:  -Tubular adenoma, fragmented, negative for high-grade dysplasia    B.RECTAL SIGMOID POLYPS X 3, POLYPECTOMY:  -Three hyperplastic polyps        ALLERGIES:     Allergies   Allergen Reactions    Triptans Unknown     imitrex    Amoxicillin-Pot Clavulanate Hives     Unsure if true reaction to med     Ciprofloxacin Hives and Itching     Patient reported- possible reaction, patient unsure        PERTINENT MEDICATIONS:    Current Outpatient Medications:     Acetaminophen (TYLENOL PO), , Disp: , Rfl:     Biotin 10 MG TABS tablet, Take 10,000 mcg by mouth daily, Disp: , Rfl:     busPIRone (BUSPAR) 10 MG tablet, Take 1 tablet (10 mg) by mouth 2 times daily, Disp: 60 tablet, Rfl: 2    CALCIUM-VITAMIN D-VITAMIN K PO, , Disp: , Rfl:     Collagen Hydrolysate POWD, , Disp: , Rfl:     Cyanocobalamin (VITAMIN B 12 PO), Take by mouth daily , Disp: , Rfl:     cyclobenzaprine (FLEXERIL) 10 MG tablet, Take 0.5 tablets (5 mg) by mouth 3 times daily as needed for muscle spasms, Disp: 30 tablet, Rfl: 0    estradiol (VIVELLE-DOT) 0.025 MG/24HR bi-weekly patch, Place 1 patch onto the skin twice a week, Disp: 24 patch, Rfl: 3    hydroxychloroquine (PLAQUENIL) 200 MG tablet, Take 1 tablet (200 mg) by mouth 2 times daily, Disp: 180 tablet, Rfl: 3    levonorgestrel (MIRENA) 20 MCG/24HR IUD, 1 each by Intrauterine route once, Disp: , Rfl:     lisdexamfetamine (VYVANSE) 10 MG capsule, Take 1 capsule (10 mg) by mouth daily for 30 days, Disp: 30 capsule, Rfl: 0     lisdexamfetamine (VYVANSE) 10 MG capsule, Take 1 capsule (10 mg) by mouth every morning, Disp: 30 capsule, Rfl: 0    Lysine 500 MG TABS, Take 1,000 mg by mouth daily , Disp: , Rfl:     magnesium citrate solution, Takes 1 tsp twice a day, 150 mg daily, Disp: , Rfl:     NONFORMULARY, 1 capsule daily carmen, Disp: , Rfl:     NONFORMULARY, 2 tablets daily seabuckthorn, Disp: , Rfl:     UNABLE TO FIND, daily Ashwaganda - patient reported, Disp: , Rfl:     UNABLE TO FIND, daily Rhodiola- patient reported, Disp: , Rfl:     valACYclovir (VALTREX) 1000 mg tablet, Take 2 tablets (2,000 mg) by mouth 2 times daily For 2 days as needed for cold sores, Disp: 12 tablet, Rfl: 11    valACYclovir (VALTREX) 500 MG tablet, Take 1 tablet (500 mg) by mouth daily, Disp: 90 tablet, Rfl: 1    SOCIAL HISTORY:    Social History     Socioeconomic History    Marital status:      Spouse name: Not on file    Number of children: 2    Years of education: 16    Highest education level: Not on file   Occupational History    Occupation:      Employer: [employer]     Comment: fulltime   Tobacco Use    Smoking status: Former     Current packs/day: 0.00     Average packs/day: 0.5 packs/day for 10.0 years (5.0 ttl pk-yrs)     Types: Cigarettes     Start date: 1984     Quit date: 1994     Years since quittin.9     Passive exposure: Past    Smokeless tobacco: Never   Vaping Use    Vaping status: Never Used   Substance and Sexual Activity    Alcohol use: Yes     Comment: very rare    Drug use: No    Sexual activity: Not Currently     Partners: Male     Birth control/protection: I.U.D.     Comment: Mirena   Other Topics Concern    Parent/sibling w/ CABG, MI or angioplasty before 65F 55M? No   Social History Narrative    How much exercise per week? 2 90 minute yoga sessions      How much calcium per day? Diet       How much caffeine per day? 0    How much vitamin D per day? Supplement     Do you/your family wear seatbelts?   Yes    Do you/your family use safety helmets? Yes    Do you/your family use sunscreen? Yes    Do you/your family keep firearms in the home? No    Do you/your family have a smoke detector(s)? Yes        Do you feel safe in your home? Yes    Has anyone ever touched you in an unwanted manner? No     Explain [facility] 7/16/18        Reviewed [facility] 10-12-20             Social Determinants of Health     Financial Resource Strain: Low Risk  (1/4/2024)    Financial Resource Strain     Within the past 12 months, have you or your family members you live with been unable to get utilities (heat, electricity) when it was really needed?: No   Food Insecurity: Low Risk  (1/4/2024)    Food Insecurity     Within the past 12 months, did you worry that your food would run out before you got money to buy more?: No     Within the past 12 months, did the food you bought just not last and you didn t have money to get more?: No   Transportation Needs: Low Risk  (1/4/2024)    Transportation Needs     Within the past 12 months, has lack of transportation kept you from medical appointments, getting your medicines, non-medical meetings or appointments, work, or from getting things that you need?: No   Physical Activity: Insufficiently Active (10/12/2020)    Exercise Vital Sign     Days of Exercise per Week: 1 day     Minutes of Exercise per Session: 10 min   Stress: Stress Concern Present (10/12/2020)    [employer] [place] [employer] - Occupational Stress Questionnaire     Feeling of Stress : To some extent   Social Connections: Socially Isolated (10/12/2020)    Social Connection and Isolation Panel [NHANES]     Frequency of Communication with Friends and Family: Three times a week     Frequency of Social Gatherings with Friends and Family: Twice a week     Attends Taoism Services: Never     Active Member of Clubs or Organizations: No     Attends Club or Organization Meetings: Never     Marital Status:   "  Interpersonal Safety: Low Risk  (3/7/2024)    Interpersonal Safety     Do you feel physically and emotionally safe where you currently live?: Yes     Within the past 12 months, have you been hit, slapped, kicked or otherwise physically hurt by someone?: No     Within the past 12 months, have you been humiliated or emotionally abused in other ways by your partner or ex-partner?: No   Housing Stability: Low Risk  (2024)    Housing Stability     Do you have housing? : Yes     Are you worried about losing your housing?: No       FAMILY HISTORY:  FH of CRC: None  FH of IBD: None  Family History   Problem Relation Age of Onset    Hypertension Mother     Hyperlipidemia Mother     Depression Mother     Squamous cell carcinoma Mother     Thyroid Disease Mother         unclear dx    Hypertension Father     Cancer Father 65        neuro endrocine CA, neck    Hyperlipidemia Father     Aortic aneurysm Maternal Grandfather          of ascending aortic aneurysm at age 64    Aortic aneurysm Maternal Aunt          in her 40s from ascending aortic aneurysm    C.A.D. No family hx of     Cancer - colorectal No family hx of     Diabetes No family hx of     Cerebrovascular Disease No family hx of     Breast Cancer No family hx of     Prostate Cancer No family hx of        Past/family/social history reviewed and no changes    PHYSICAL EXAMINATION:  Constitutional: aaox3, cooperative, pleasant, not dyspneic/diaphoretic, no acute distress  Vitals reviewed: /74 (BP Location: Left arm, Patient Position: Sitting, Cuff Size: Adult Regular)   Pulse 76   Ht 1.626 m (5' 4\")   Wt 61.9 kg (136 lb 8 oz)   SpO2 100%   BMI 23.43 kg/m    Wt:   Wt Readings from Last 2 Encounters:   24 59.9 kg (132 lb)   24 60.7 kg (133 lb 12.8 oz)      Eyes: Sclera anicteric/injected  Respiratory: Unlabored breathing  Skin: warm, perfused, no jaundice  Psych: Normal affect  MSK: Normal gait          "

## 2024-06-10 ENCOUNTER — OFFICE VISIT (OUTPATIENT)
Dept: FAMILY MEDICINE | Facility: CLINIC | Age: 54
End: 2024-06-10
Payer: COMMERCIAL

## 2024-06-10 VITALS
RESPIRATION RATE: 16 BRPM | WEIGHT: 133 LBS | TEMPERATURE: 97.6 F | OXYGEN SATURATION: 98 % | DIASTOLIC BLOOD PRESSURE: 66 MMHG | BODY MASS INDEX: 22.71 KG/M2 | SYSTOLIC BLOOD PRESSURE: 100 MMHG | HEIGHT: 64 IN | HEART RATE: 71 BPM

## 2024-06-10 DIAGNOSIS — E78.5 HYPERLIPIDEMIA LDL GOAL <160: ICD-10-CM

## 2024-06-10 DIAGNOSIS — F90.0 ATTENTION DEFICIT HYPERACTIVITY DISORDER (ADHD), PREDOMINANTLY INATTENTIVE TYPE: ICD-10-CM

## 2024-06-10 DIAGNOSIS — Z01.818 PREOP GENERAL PHYSICAL EXAM: Primary | ICD-10-CM

## 2024-06-10 DIAGNOSIS — G57.61 MORTON'S NEUROMA, RIGHT: ICD-10-CM

## 2024-06-10 PROCEDURE — 99214 OFFICE O/P EST MOD 30 MIN: CPT | Performed by: FAMILY MEDICINE

## 2024-06-10 RX ORDER — LISDEXAMFETAMINE DIMESYLATE 10 MG/1
10 CAPSULE ORAL DAILY
Qty: 30 CAPSULE | Refills: 0 | Status: SHIPPED | OUTPATIENT
Start: 2024-08-11 | End: 2024-09-10

## 2024-06-10 RX ORDER — LISDEXAMFETAMINE DIMESYLATE 10 MG/1
10 CAPSULE ORAL DAILY
Qty: 30 CAPSULE | Refills: 0 | Status: SHIPPED | OUTPATIENT
Start: 2024-07-11 | End: 2024-08-10

## 2024-06-10 RX ORDER — LISDEXAMFETAMINE DIMESYLATE 10 MG/1
10 CAPSULE ORAL DAILY
Qty: 30 CAPSULE | Refills: 0 | Status: SHIPPED | OUTPATIENT
Start: 2024-06-10 | End: 2024-07-10

## 2024-06-10 RX ORDER — LISDEXAMFETAMINE DIMESYLATE 10 MG/1
10 CAPSULE ORAL EVERY MORNING
Qty: 30 CAPSULE | Refills: 0 | Status: CANCELLED | OUTPATIENT
Start: 2024-06-10

## 2024-06-10 NOTE — PROGRESS NOTES
Preoperative Evaluation  [facility]  [address]  [place] [address]  Phone: [phone]  Primary Provider: SRINIVASAN SOOD DO  Pre-op Performing Provider: SRINIVASAN SOOD DO  Roberto 10, 2024             6/10/2024   Surgical Information   What procedure is being done? external neurolysis nerve entrapment second intermetatarsal space right foot   Facility or Hospital where procedure/surgery will be performed: [facility]   Who is doing the procedure / surgery? Dr. Shayan Lane   Date of surgery / procedure: 6/20   Time of surgery / procedure: 7:30   Where do you plan to recover after surgery? at home with family     Fax number for surgical facility: [phone]    Assessment & Plan     The proposed surgical procedure is considered INTERMEDIATE risk.    Problem List Items Addressed This Visit       HYPERLIPIDEMIA LDL GOAL <160    Attention deficit hyperactivity disorder (ADHD), predominantly inattentive type    Relevant Medications    lisdexamfetamine (VYVANSE) 10 MG capsule    lisdexamfetamine (VYVANSE) 10 MG capsule (Start on 7/11/2024)    lisdexamfetamine (VYVANSE) 10 MG capsule (Start on 8/11/2024)     Other Visit Diagnoses       Preop general physical exam    -  Primary    Hernandez's neuroma, right                        - No identified additional risk factors other than previously addressed    Antiplatelet or Anticoagulation Medication Instructions   - Patient is on no antiplatelet or anticoagulation medications.    Additional Medication Instructions  Take all scheduled medications on the day of surgery    Recommendation  Approval given to proceed with proposed procedure, without further diagnostic evaluation.        Francine Pop is a 53 year old, presenting for the following:  Pre-Op Exam          6/10/2024    11:15 AM   Additional Questions   Roomed by Elyssa PARIS CMA     Via the Health Maintenance questionnaire,  the patient has reported the following services have been completed -Mammogram: [facility] [place] [facility] [place] 2023-03-09, this information has not been sent to the abstraction team.  HPI related to upcoming procedure: vincent neuroma right foot requiring surgery        6/10/2024   Pre-Op Questionnaire   Have you ever had a heart attack or stroke? No   Have you ever had surgery on your heart or blood vessels, such as a stent placement, a coronary artery bypass, or surgery on an artery in your head, neck, heart, or legs? No   Do you have chest pain with activity? No   Do you have a history of heart failure? No   Do you currently have a cold, bronchitis or symptoms of other infection? No   Do you have a cough, shortness of breath, or wheezing? No   Do you or anyone in your family have previous history of blood clots? No   Do you or does anyone in your family have a serious bleeding problem such as prolonged bleeding following surgeries or cuts? No   Have you ever had problems with anemia or been told to take iron pills? No   Have you had any abnormal blood loss such as black, tarry or bloody stools, or abnormal vaginal bleeding? No   Have you ever had a blood transfusion? No   Are you willing to have a blood transfusion if it is medically needed before, during, or after your surgery? Yes   Have you or any of your relatives ever had problems with anesthesia? No   Do you have sleep apnea, excessive snoring or daytime drowsiness? No   Do you have any artifical heart valves or other implanted medical devices like a pacemaker, defibrillator, or continuous glucose monitor? No   Do you have artificial joints? No   Are you allergic to latex? No     Health Care Directive  Patient does not have a Health Care Directive or Living Will: Discussed advance care planning with patient; information given to patient to review.    Preoperative Review of    reviewed - controlled substances reflected in medication  list.        Patient Active Problem List    Diagnosis Date Noted    Attention deficit hyperactivity disorder (ADHD), predominantly inattentive type 02/27/2023     Priority: Medium    Mass of upper outer quadrant of left breast 02/27/2023     Priority: Medium    Pelvic floor dysfunction 11/04/2022     Priority: Medium    Frontal fibrosing alopecia 09/07/2022     Priority: Medium    IUD (intrauterine device) in place 08/15/2013     Priority: Medium     Mirena      HYPERLIPIDEMIA LDL GOAL <160 05/09/2010     Priority: Medium    iamTIBIALIS TENDINITIS 08/31/2005     Priority: Medium    iamSPRAIN HIP & THIGH NOS 08/31/2005     Priority: Medium    Herpes simplex virus (HSV) infection 07/01/2005     Priority: Medium     oral  Problem list name updated by automated process. Provider to review      Pure hypercholesterolemia 03/31/2005     Priority: Medium      Past Medical History:   Diagnosis Date    Depressive disorder     Depressive disorder, not elsewhere classified     resolved    Herpes simplex without mention of complication     oral    IUD (intrauterine device) in place 08/15/2013    Mirena    Migraine headache with aura     very occass, sig aura, speech loss x1    Pure hypercholesterolemia     follows w BIPIN CHATMAN     Past Surgical History:   Procedure Laterality Date    COLONOSCOPY N/A 10/27/2021    Procedure: COLONOSCOPY, WITH POLYPECTOMY AND CLIP;  Surgeon: Og Gutierres MD;  Location: [facility] OR    COLONOSCOPY N/A 3/12/2024    Procedure: COLONOSCOPY, WITH POLYPECTOMY AND BIOPSY;  Surgeon: Emanuel Braden MD;  Location: [facility] OR    COLONOSCOPY WITH CO2 INSUFFLATION N/A 3/12/2024    Procedure: Colonoscopy with CO2 insufflation;  Surgeon: Emanuel Braden MD;  Location: [facility] OR    HC DILATION/CURETTAGE DIAG/THER NON OB  12/2006    LAPAROSCOPIC CHOLECYSTECTOMY N/A 12/24/2021    Procedure: CHOLECYSTECTOMY, LAPAROSCOPIC;  Surgeon: Denise Cast MD;  Location: [facility] OR     Current Outpatient Medications    Medication Sig Dispense Refill    Acetaminophen (TYLENOL PO)       Biotin 10 MG TABS tablet Take 10,000 mcg by mouth daily      busPIRone (BUSPAR) 10 MG tablet Take 1 tablet (10 mg) by mouth 2 times daily 60 tablet 2    CALCIUM-VITAMIN D-VITAMIN K PO       Collagen Hydrolysate POWD       Cyanocobalamin (VITAMIN B 12 PO) Take by mouth daily       cyclobenzaprine (FLEXERIL) 10 MG tablet Take 0.5 tablets (5 mg) by mouth 3 times daily as needed for muscle spasms 30 tablet 0    estradiol (VIVELLE-DOT) 0.025 MG/24HR bi-weekly patch Place 1 patch onto the skin twice a week 24 patch 3    hydroxychloroquine (PLAQUENIL) 200 MG tablet Take 1 tablet (200 mg) by mouth 2 times daily 180 tablet 3    levonorgestrel (MIRENA) 20 MCG/24HR IUD 1 each by Intrauterine route once      lisdexamfetamine (VYVANSE) 10 MG capsule Take 1 capsule (10 mg) by mouth daily for 30 days 30 capsule 0    [START ON 7/11/2024] lisdexamfetamine (VYVANSE) 10 MG capsule Take 1 capsule (10 mg) by mouth daily for 30 days 30 capsule 0    [START ON 8/11/2024] lisdexamfetamine (VYVANSE) 10 MG capsule Take 1 capsule (10 mg) by mouth daily for 30 days 30 capsule 0    Lysine 500 MG TABS Take 1,000 mg by mouth daily       magnesium citrate solution Takes 1 tsp twice a day, 150 mg daily      NONFORMULARY 1 capsule daily carmen      NONFORMULARY 2 tablets daily jozef      UNABLE TO FIND Take by mouth daily MEDICATION NAME: Folinic Acid      UNABLE TO FIND daily Ashwaganda - patient reported      UNABLE TO FIND daily Rhodiola- patient reported      valACYclovir (VALTREX) 1000 mg tablet Take 2 tablets (2,000 mg) by mouth 2 times daily For 2 days as needed for cold sores 12 tablet 11    valACYclovir (VALTREX) 500 MG tablet Take 1 tablet (500 mg) by mouth daily 90 tablet 1       Allergies   Allergen Reactions    Triptans Unknown     imitrex    Amoxicillin-Pot Clavulanate Hives     Unsure if true reaction to med     Ciprofloxacin Hives and Itching     Patient  "reported- possible reaction, patient unsure         Social History     Tobacco Use    Smoking status: Former     Current packs/day: 0.00     Average packs/day: 0.5 packs/day for 10.0 years (5.0 ttl pk-yrs)     Types: Cigarettes     Start date: 1984     Quit date: 1994     Years since quittin.9     Passive exposure: Past    Smokeless tobacco: Never   Substance Use Topics    Alcohol use: Yes     Comment: very rare       History   Drug Use No             Review of Systems  Constitutional, HEENT, cardiovascular, pulmonary, gi and gu systems are negative, except as otherwise noted.    Objective    /66 (BP Location: Right arm, Patient Position: Chair, Cuff Size: Adult Regular)   Pulse 71   Temp 97.6  F (36.4  C) (Temporal)   Resp 16   Ht 1.626 m (5' 4\")   Wt 60.3 kg (133 lb)   LMP  (LMP Unknown)   SpO2 98%   BMI 22.83 kg/m     Estimated body mass index is 22.83 kg/m  as calculated from the following:    Height as of this encounter: 1.626 m (5' 4\").    Weight as of this encounter: 60.3 kg (133 lb).  Physical Exam  GENERAL: alert and no distress  NECK: no adenopathy, no asymmetry, masses, or scars  RESP: lungs clear to auscultation - no rales, rhonchi or wheezes  CV: regular rate and rhythm, normal S1 S2, no S3 or S4, no murmur, click or rub, no peripheral edema  ABDOMEN: soft, nontender, no hepatosplenomegaly, no masses and bowel sounds normal  MS: no gross musculoskeletal defects noted, no edema    Recent Labs   Lab Test 23  0927   HGB 12.0         POTASSIUM 4.1   CR 0.72        Diagnostics  No labs were ordered during this visit.   No EKG required, no history of coronary heart disease, significant arrhythmia, peripheral arterial disease or other structural heart disease.    Revised Cardiac Risk Index (RCRI)  The patient has the following serious cardiovascular risks for perioperative complications:   - No serious cardiac risks = 0 points     RCRI Interpretation: 0 points: " Class I (very low risk - 0.4% complication rate)         Signed Electronically by: SRINIVASAN SOOD DO  Copy of this evaluation report is provided to requesting physician.

## 2024-06-10 NOTE — PATIENT INSTRUCTIONS

## 2024-06-12 ENCOUNTER — VIRTUAL VISIT (OUTPATIENT)
Dept: NUTRITION | Facility: CLINIC | Age: 54
End: 2024-06-12
Attending: PHYSICIAN ASSISTANT
Payer: COMMERCIAL

## 2024-06-12 DIAGNOSIS — K52.9 CHRONIC DIARRHEA: Primary | ICD-10-CM

## 2024-06-12 PROCEDURE — 97802 MEDICAL NUTRITION INDIV IN: CPT | Mod: 95 | Performed by: DIETITIAN, REGISTERED

## 2024-06-12 NOTE — PATIENT INSTRUCTIONS
NUTRITION INTERVENTION:  Low FODMAP Elimination and Reintroduction Diet     Long Term Goals:   Dietary Habits: Establish a sustainable more personalized low-FODMAP diet within 6 months.  GI Health: Long-term reduction in IBS symptoms   Symptom Management: Report reduction in GI discomfort and stabilization of bowel movements within 6 weeks.      Short Term Goals:  Goal 1: Supplementation: Regularly take recommended probiotics and reassess in 1-3 months.    Try a probiotic without different strains  and pay attention to if the prebiotic's in current probiotic taking are triggering any diarrhea (some high fodmaps are in the brand currently taking) Instead rry spore and soil strain for the diarrhea/loose stools a good  example brand would be     H.BLOOMO Probiotics Ultimate https://Transgenomic.[employer]/products/sbo-probiotics-ultimate    Adults take 2 capsules daily with 8 ounces of water  Recommend taking the capsules on an empty stomach, either when you first wake up or right before bed    Goal 2. Fiber Intake: Track both insoluble vs soluble fiber to see if triggering diarrhea     Insoluble fiber helps bulk up your stools. It can speed up the passage of food in the digestive tract, preventing constipation and keeping bowel movements regular. Insoluble fiber may make diarrhea worse.    Soluble fiber's ability to help treat both diarrhea and constipation has been well studied. In particular, psyllium seems to be beneficial.    If you are experiencing loose bowels or diarrhea, soluble fiber can help by soaking up extra water in the digestive tract. This helps slow transit through the digestive tract, reducing urgency and bowel movement frequency.    Soluble  Sources of soluble fiber include:    Apples  Bananas  Oats  Barley  Peas  Black beans  Lima beans  [place] sprouts  Psyllium (a common fiber supplement)    Insoluble   Sources of insoluble fiber include:  Whole wheat flour  Wheat bran  Nuts  Seeds  Beans, peas, and  lentils  Berries  Spinach  Avocado  Cauliflower  Popcorn  Skins of many fruits and vegetables    Goal 3. Start tracking what you eat. Writing down or tracking through an grupo what you eat as well as how you feel can help you identify patterns in your symptoms. This can help you become more aware and create a diet that is right for you without over restricting.     mysymptoms grupo, you can track symptoms, bowl movements, medications, stress, exercise, sleep and foods as well as beverages to become more mindful. Https://RentJiffy/wp/    Goal 4. Review LOW - FODMAP diet to identify any further patterns with food triggers besides dairy and wheat/gluten     What are FODMAPs?  FODMAP stands for fermentable oligo-, di-, monosaccharides and polyols    These short-chain carbs are resistant to digestion. Instead of being absorbed into your bloodstream, they reach the far end of your intestine, where most of your gut bacteria reside.    Your gut bacteria then use these carbs for fuel, producing hydrogen gas and causing digestive symptoms in sensitive individuals. FODMAPs also draw liquid into your intestine, which may cause diarrhea.    Although not everyone has a sensitivity to FODMAPs, it is very common among people with irritable bowel syndrome (IBS)     Common FODMAPs include:    Fructose: a simple sugar found in many fruits and vegetables that also makes up the structure of table sugar and most added sugars  Lactose: a carbohydrate found in dairy products like milk  Fructans: found in many foods, including grains like wheat, spelt, rye and barley  Galactans: found in large amounts in legumes  Polyols: sugar alcohols like xylitol, sorbitol, maltitol, and mannitol. They are found in some fruits and vegetables and often used as sweetener      What is the purpose of a FODMAP diet?  A FODMAP diet is a 3 step diet used to help manage digestive symptoms.  Common gut problem with symptoms including abdominal (tummy) pain,  bloating, wind (farting) and changes in bowel habit (diarrhea, constipation or both).     The aims of the diet are to:    Learn which foods and FODMAPs you tolerate, and which trigger your digestive symptoms. Understanding this will help you to follow a less restrictive, more nutritionally balanced diet for the long term that only restricts foods that trigger your digestive symptoms.    Assess whether your digestive symptoms are sensitive to FODMAPs. If you don't experience symptom improvement on the diet, than you may need to consider other digestive therapies.      How to follow a FODMAP diet      Stage 1: Restriction/Elimination   This stage involves strict avoidance of all high-FODMAP foods. It's important to note you should NOT avoid all FODMAPs long-term, and this stage should only last about 4-12 weeks. This is because it's important to include FODMAPs in the diet for gut health.      First restrict alcohol, caffeine, spicy foods and other common food triggers (wheat and dairy). Focus should be on eating whole, unprocessed real foods in their unprocessed forms such as fruits, vegetables, whole grains (prefer wheat/gluten free), nuts, extra virgin olive oil, herbs, moderate amounts of plant based proteins, and healthy fats. Monitor how you handle nightshades, corn, soy as these can be common triggers for digestive issues.     A low-fodmap diet is complex and should be tailored to your individual symptoms. Moving through the following stages over 4-12 weeks to provide more awareness as to what foods could be triggering symptoms.   Some people notice an improvement in symptoms in the first week, while others take the full eight - twelve weeks. Once you have adequate relief of your digestive symptoms, you can progress to the second stage.  If by eight to twelve weeks your gut symptoms have not resolved you should check for hidden sources of fodmaps, try probiotic/digestive enzymes and consider life stressors as  "stress is a major contributor.     The diet can be difficult to follow if you are not prepared. Here are some tips:  Try avoiding just wheat by going gluten free or taking out dairy first while you prepare to make other dietary changes to follow a low-FODMAP diet. Eliminating these two foods first and finding alternatives can make you feel less restricted before you begin to take out other foods.   Find out what to buy: Ensure you have access to credible low-FODMAP food lists. See handouts of where to find these.  Get rid of high-FODMAP foods: Clear your fridge and pantry of these foods.  Make a shopping list: Create a low-FODMAP shopping list before heading to the grocery store, so you know which foods to purchase or avoid.  Read menus in advance: Familiarize yourself with low-FODMAP menu options so you'll be prepared when dining out.    Stage 2: Reintroduction    This stage involves systematically reintroducing high-FODMAP foods. The purpose of this is twofold:   To identify which types of FODMAPs you tolerate. Few people are sensitive to all of them.  To establish the amount of FODMAPs you can tolerate. This is known as your \"threshold level.\"    In this step, you test specific foods one by one for three days each.  It is recommended that you undertake this step with a trained dietitian who can guide you through the appropriate foods. Alternatively, this grupo can help you identify which foods to reintroduce. https://www.CardioFocus/get-grupo-help/    It is worth noting that you need to continue a low-FODMAP diet throughout this stage. This means even if you can tolerate a certain high-FODMAP food, you must continue to restrict it until stage 3.  It is also important to remember that, unlike people with most food allergies, people with IBS can tolerate small amounts of FODMAPs.     Stage 3: Personalization  This stage is also known as the \"modified low-FODMAP diet.\" In other words, you still restrict some " FODMAPs. However, the amount and type are tailored to your personal tolerance, identified in stage 2.  It is important to progress to this final stage in order to address nutrient deficiencies and increase diet variety and flexibility.    This step is aimed to relax dietary restrictions as much as possible, expand the variety of foods included in your diet and establish a  personalized FODMAP diet  for the long-term. In this step well tolerated foods and FODMAPs are reintroduced to your diet, while poorly tolerated foods and FODMAPs are restricted, but only to a level that provides symptom relief. We recommend that you repeat challenges of poorly tolerated foods and FODMAPs over time to see whether your tolerance changes. You can also use the Filter function in the [employer] FODMAP Vicki to personalize your vicki experience during Step 3 of the diet.      Foods high in FODMAPs  Here is a list of some common foods and ingredients that are high in FODMAPs:    Fruits: apples, applesauce, apricots, blackberries, boysenberries, canned fruit, cherries, dates, figs, peaches, pears, watermelon  Sweeteners: fructose, high fructose corn syrup, honey, maltitol, mannitol, sorbitol, xylitol  Dairy products: ice cream, milk (from cows, goats, and sheep), most yogurts, soft and fresh cheeses (cottage cheese, ricotta, etc.), sour cream, whey protein supplements  Vegetables: artichokes, asparagus, beetroot, broccoli, [place] sprouts, cabbage, cauliflower, fennel, garlic, leeks, mushrooms, okra, onions, peas, shallots  Legumes: beans, baked beans, chickpeas, lentils, red kidney beans, soybeans  Wheat: biscuits, bread, most breakfast cereals, crackers, pancakes, pasta, tortillas, waffles  Other grains: barley, rye  Beverages: beer, fortified adilia, fruit juices, milk, soft drinks with high fructose corn syrup, soy milk      Foods you can eat on a low FODMAP diet  Keep in mind that the purpose of this diet is not to completely eliminate  FODMAPs, which is extremely difficult. Simply minimizing these types of carbs is considered sufficient to reduce digestive symptoms.    There is a wide variety of healthy and nutritious foods that you can eat on a low FODMAP diet, including    Meats, fish, and eggs (well tolerated unless they have added high FODMAP ingredients, like wheat or high fructose corn syrup)  All fats and oils  Most herbs and spices  Nuts and seeds (including almonds, peanuts, macadamia nuts, pine nuts, and sesame seeds but not pistachios or cashews, which are high in FODMAPs)    Fruits, such as:  unripe bananas  blueberries  cantaloupe  grapefruit  grapes  kiwi  macey  lime  mandarins  melons (except watermelon)  oranges  passionfruit  raspberries  strawberries  sweeteners (maple syrup, molasses, and stevia)  dairy products if they are lactose-free as well as hard cheeses and aged softer varieties (like Brie and Camembert)    Vegetables, such as:  alfalfa  bell peppers  bok alyson  carrots  celery  chives  cucumbers  eggplant  michael  green beans  kale  lettuce  olives  parsnips  potatoes  radishes  spinach  spring onion (only green)  squash  sweet potatoes  tomatoes  turnips  water chestnuts  yams  zucchini    Grains, such as:  corn  oats  quinoa  rice  sorghum  tapioca  beverages (water, coffee, tea, etc)    However, keep in mind that these lists are neither definitive nor exhaustive. Naturally, there are foods not listed here that are either high or low in FODMAPs.     In addition, everyone is different. You may tolerate some foods on the list of foods to avoid while noticing digestive symptoms from foods low in FODMAPs for other reasons.  How much of a food you eat will affect how likely you are to experience symptoms if you have IBS. The individual tolerance to FODMAPs varies.      NUTRITION RESOURCES:    APPS:   https://Influx.[email or web address]¢ Medical  mysymptoms tracker.com   Low FODMAP Diet Vicki  [place] FODMAP - [place] Fodmap    https://modifyhealth.com  https://www.Scribz.com    WEBSITE:   Low FODMAP Diet  IBS Research at [facility] - [facility] Fodmap    3 step FODMAP diet guide & food list    TOP BOOKS:   Low FODMAP Smoothie Recipes   21 Day Low FODMAP Smoothie Challenge (21 Day Low FODMAP Challenges Book 1) [place] Edition by Swathi Hand  Low FODMAP Smoothies Recipe Book: A Beginners Guide to Low FODMAP Smoothies for Gut Health  The Low-FODMAP Diet for Beginners: A 7-Day Plan to Beat Bloat and Soothe Your Gut with Recipes for Fast IBS Relief by Juana Flowers  (Author), Norma DE LA VEGA LD [facility]  (Author), Humble Oneil MD PhD

## 2024-06-12 NOTE — PROGRESS NOTES
Medical Nutrition Therapy  Visit Type: Initial assessment and intervention    Visit Details    How would you like to obtain your AVS? MyChart  If the correspondence for visit is dropped, how would you like your dietitian to reconnect with you:   call back by phone? Yes    Text to cell phone: [phone]  Will anyone else be joining your video visit or telephone call? No  {If patient encounters technical issues they should call [phone] :15    Type of service:  Video Visit     Start Time: 1:04 PM    End Time:1:54 PM    Originating Location (pt. Location): Home    Distant Location (provider location):  [facility] Nutrition Department -remote/offsite    Platform used for Video Visit: Donavan      Referring Provider: Jonathan Parish  [facility]     REASON FOR REFERRAL:   Kiesha Mcguire is a 53 year old female who is interested in Medical Nutrition Therapy (MNT) and education related to     functional diarrhea -?IBS     She is accompanied by self.     Been 6 months and not having any food triggers per patient. She isn't sure she has IBS. She had her gallbladder removed at 2020. Diarrhea wasn't so bad but continued to get worse almost daily. She wasn't able to link the diarrhea due to fats. Saw a dietitian unsure if consult last year and was suppose to have more fiber and smaller meals. The diarrhea got worse and worse over months and than started to have blood. She has done every single test and ruled out everything. Wondering if it is more of a mind body connection. Right now her diarrhea comes and goes for no reason about once every week in half. She will have a lot soft unformed stools. There is a little bit of discomfort before having a BM and after having diarrhea will have gas. Taking a probiotic most days physicians choice 60 billion with prebiotic. Digestive enzyme with prebiotic's and probiotics zenwise lactic acid     Currently following a dairy and gluten free diet  for years because it triggers mood and digestive issues    Recently started ADHD medication so doesn't have an appetite doesn't eat till 1-2pm    Lunch: leftovers unable provide specific entrees tries or fruit whatever usually skipped - doesn't get recipes and doesn't think lifestyle can accommodate meal planning or recipes right now    Snack: in the afternoon and snack cherries ate a lot unsure how much    Dinner: Salami with lettuce wrap x2 no diarrhea after antonette johns    She eats a lot of vegetables and fruit     Shoots for 25-35g of fiber per day and eats a lot of fruits and vegetables    NUTRITION ASSESSMENT:       6/11/2024     1:07 PM   Nutritional Goals   Nutritional Goal Other            6/11/2024     1:07 PM   Neurological   Migraine Headaches Current       Failed to redirect to the Timeline version of the [employer] SmartLink.        No data to display                   6/11/2024     1:07 PM   Gastrointestinal   Diarrhea Current           6/11/2024     1:07 PM   Food Sensitivities   Lactose intolerance Current   Non-celiac gluten sensitivity Current          6/11/2024     1:07 PM   Endocrine   Hair thinning/loss Current          6/11/2024     1:07 PM   Skin   Cold Sores Current          6/11/2024     1:07 PM   Cardiopulmonary   High Cholesterol Current           No data to display                   6/11/2024     1:07 PM   Psychological   ADD/ADHD/Asperger's Current           No data to display                   6/11/2024     1:07 PM   Womens Health Assessment   Hysterectomy No   I am breastfeeding. No   Are you officially in menopause? (no period for one year or longer)  Yes   Age at menopause when period has been absent for one year. 1   Select any menopausal symptoms that you are currently experiencing Decreased libido;Mood swings;Headaches;Memory problems;Vaginal dryness        Past Medical History:  Past Medical History:   Diagnosis Date    Depressive disorder     Depressive disorder, not elsewhere  classified     resolved    Herpes simplex without mention of complication     oral    IUD (intrauterine device) in place 08/15/2013    Mirena    Migraine headache with aura     very occass, sig aura, speech loss x1    Pure hypercholesterolemia     follows w BIPIN CHATMAN       Previous Surgeries:   Past Surgical History:   Procedure Laterality Date    COLONOSCOPY N/A 10/27/2021    Procedure: COLONOSCOPY, WITH POLYPECTOMY AND CLIP;  Surgeon: Og Gutierres MD;  Location: [facility] OR    COLONOSCOPY N/A 3/12/2024    Procedure: COLONOSCOPY, WITH POLYPECTOMY AND BIOPSY;  Surgeon: Emanuel Braden MD;  Location: [facility] OR    COLONOSCOPY WITH CO2 INSUFFLATION N/A 3/12/2024    Procedure: Colonoscopy with CO2 insufflation;  Surgeon: Emanuel Braden MD;  Location: [facility] OR    HC DILATION/CURETTAGE DIAG/THER NON OB  2006    LAPAROSCOPIC CHOLECYSTECTOMY N/A 2021    Procedure: CHOLECYSTECTOMY, LAPAROSCOPIC;  Surgeon: Denise Cast MD;  Location: [facility] OR        Family History:  Family History   Problem Relation Age of Onset    Hypertension Mother     Hyperlipidemia Mother     Depression Mother     Squamous cell carcinoma Mother     Thyroid Disease Mother         unclear dx    Hypertension Father     Cancer Father 65        neuro endrocine CA, neck    Hyperlipidemia Father     Aortic aneurysm Maternal Grandfather          of ascending aortic aneurysm at age 64    Aortic aneurysm Maternal Aunt          in her 40s from ascending aortic aneurysm    C.A.D. No family hx of     Cancer - colorectal No family hx of     Diabetes No family hx of     Cerebrovascular Disease No family hx of     Breast Cancer No family hx of     Prostate Cancer No family hx of         Lifestyle History:      2024     1:07 PM   Lifestyle   Do you feel your life is stressful right now?  Yes   What is the cause(s) of stress in your life?  Work;Taking care of parents, children or other family member   Are you currently implementing  any strategies to help manage stress? Yes   What are you doing to manage stress?  Meditation;Counseling;Sleep        Exercise History:      6/11/2024     1:07 PM   Exercise   Does your occupation require extended periods of sitting?  Yes   Does your occupation require extended periods of repetitive movements (ex: walking or lifting)?  No   Do you currently participate in any forms of exercise? No   Rate your level of motivation for including exercise in your routine (0=none, 10=high): 10   Do you have any medical conditions, pain, injuries, surgeries etc. restricting you from exercise? Yes        Sleep History:      6/11/2024     1:07 PM   Sleep   How many hours (on average) do you sleep per night? 7-9   What time do you turn off the lights? 11 PM   How long does it take for you to fall asleep? 15-20 mins   What time do you stop using electronic devices? 11 PM   What time do you wake up? 7 AM   When do you eat your first meal?  2 PM   Do you feel well-rested during the day?  Yes   Do you take naps?  Yes   Do you have a comfortable bedroom environment (cool, quiet, dark, etc)? Yes   Do you have a sleep routine/ ritual that you do before bed?  No   How many hours do you spend per day looking at a screen (TV, computer, tablet and phone)? 7 to 9   Select all factors that apply to your current sleep habits: Not hungry in the morning;Feel tired/sluggish/fatigued during the day        Nutrition History:      6/11/2024     1:07 PM   Nutrition   Have you ever had a nutrition consultation? Yes   Do you currently follow a special diet or nutritional program? No   What do you feel are the biggest barriers getting in the way of achieving you nutritional goals? Lack of time;Work (such as lack of time to eat, unhealthy choices, etc.)   Do you have any food allergies, sensitivities or intolerances?  Yes   Specific food(s) causing adverse reactions Gluten;Dairy;Wheat           6/11/2024     1:07 PM   Digestion   Do you experience  stomach pains/cramping? Rarely   Do you experience bloating?  Rarely   Do you experience gas?  Monthly   Do you experience heartburn/acid reflux/indigestion? Rarely   How often do you have a bowel movement? 1 time per day   What is a typical bowel movement like for you? Select all that apply: Varies a lot          6/11/2024     1:07 PM   Food Access:    Who does the grocery shopping? Self   How often is grocery shopping done? Weekly   Where do you usually receive your groceries from? Select all that apply: [employer]   Do you read food labels? Yes   What do you look at?  Gluten Free;Dairy free;Organic;Fiber;Protein;Ingredients   Who does the cooking? Select all that apply: Self   How many meals do you eat out per week?  3 to 5   What restaurants do you typically choose? [employer] ([employer], [employer], etc.)          6/11/2024     1:07 PM   Daily Patterns:   How many days per week do you have breakfast? 0   How many days per week do you have lunch? 3   How many days per week do you have dinner? 7   How many days per week do you have snacks? 7          6/11/2024     1:07 PM   Protein Intake:   How many times per day do you typically consume a protein source(s)? 1   What types of protein do you currently eat?  Ground Beef;Steak;Hamburgers;Beef Roast;Beef Hot Dogs;Quinn/Grimes Quinn;Sausage;[place];Tuna;Chicken Thighs/Legs;Chicken Sausage;Eggs;Beans;Tofu           6/11/2024     1:07 PM   Fat Intake:    How many times per day do you typically consume healthy fat(s)? 2   What types of health fats do you currently eat? Select all that apply:  Other nuts;Corey seeds;Hemp seeds;Flax seeds;Other butter;Butter;Salad dressings;Avocado oil;Sesame oil;Olive oil;Avocado           6/11/2024     1:07 PM   Fruit Intake:    How many times per day do you typically consume fruits? 3   What types of fruit do currently eat?  Apple;Apricots;Banana;Blueberries;Cantaloupe;Cherries;Grapes;Honeydew;Melon;Peaches;Plums;Raspberries;Watermelon           6/11/2024     1:07 PM   Vegetable Intake:    How many times per day do you typically consume vegetables? 3   What types of vegetables do you currently eat? Artichoke;Asparagus;Beans;Broccoli;[place] sprouts;Cabbage;Carrots;Cauliflower;Celery;Corn;Cucumber;Eggplant;Green onions/scallions;Greens (subhash, kale etc);Onions;Peas;Potato (baked, boiled, mashed, French fries);[place];Spinach;Squash (summer, zucchini);Sugar snap peas;Swiss chard;Tomato          6/11/2024     1:07 PM   Grain Intake:    How many times per day do you typically consume grains? 1   What types of grains do currently eat? Select all that apply:  Chips (gluten free);Crackers (gluten free);Pasta (gluten free);Quinoa (gluten free);Wild rice (gluten free)           6/11/2024     1:07 PM   Dairy Intake:    How many times per day do you typically consume dairy? 0   What types of dairy do currently eat? Select all that apply:  Cheese           6/11/2024     1:07 PM   Non-Dairy Alternative Intake:    How many times per day do you typically consume non-dairy alternatives? 0   What types of non-dairy alternatives do currently eat? Select all that apply:  [place] milk           6/11/2024     1:07 PM   Sweets Intake:    How many times per day do you typically consume sweets? 0          6/11/2024     1:07 PM   Beverage Intake:    How many 8 oz cups of water do you typically consume per day?  0 to 1   How many 8 oz cups of caffeine do you typically consume per day?  0   How many drinks of alcohol do you typically consume per week (1 drink = 5 oz wine, 12 oz beer, 1.5 oz spirits)?   0           6/11/2024     1:07 PM   Lifestyle Recall:    What time did you wake up? 7 AM   What time did you go to sleep? 11 PM   What time did you have breakfast? No breakfast   What time did you have a morning snack? No snack   What time did you have lunch?  1-2 PM   Where did you have lunch?  Home   What time did you have an afternoon snack? 3-4 PM   Where did you have your afternoon snack? Home   What time did you have dinner? 7-8 PM   Where did you have dinner?  Home   What time did you have an evening snack? 8-9 PM   Where did you have your evening snack? Home   What time of day did you exercise? No Exercise          Additional concerns:    MEDICATIONS:  Current Outpatient Medications   Medication Sig Dispense Refill    Acetaminophen (TYLENOL PO)       Biotin 10 MG TABS tablet Take 10,000 mcg by mouth daily      busPIRone (BUSPAR) 10 MG tablet Take 1 tablet (10 mg) by mouth 2 times daily 60 tablet 2    CALCIUM-VITAMIN D-VITAMIN K PO       Collagen Hydrolysate POWD       Cyanocobalamin (VITAMIN B 12 PO) Take by mouth daily       cyclobenzaprine (FLEXERIL) 10 MG tablet Take 0.5 tablets (5 mg) by mouth 3 times daily as needed for muscle spasms 30 tablet 0    estradiol (VIVELLE-DOT) 0.025 MG/24HR bi-weekly patch Place 1 patch onto the skin twice a week 24 patch 3    hydroxychloroquine (PLAQUENIL) 200 MG tablet Take 1 tablet (200 mg) by mouth 2 times daily 180 tablet 3    levonorgestrel (MIRENA) 20 MCG/24HR IUD 1 each by Intrauterine route once      lisdexamfetamine (VYVANSE) 10 MG capsule Take 1 capsule (10 mg) by mouth daily for 30 days 30 capsule 0    [START ON 7/11/2024] lisdexamfetamine (VYVANSE) 10 MG capsule Take 1 capsule (10 mg) by mouth daily for 30 days 30 capsule 0    [START ON 8/11/2024] lisdexamfetamine (VYVANSE) 10 MG capsule Take 1 capsule (10 mg) by mouth daily for 30 days 30 capsule 0    Lysine 500 MG TABS Take 1,000 mg by mouth daily       magnesium citrate solution Takes 1 tsp twice a day, 150 mg daily      NONFORMULARY 1 capsule daily carmen      NONFORMULARY 2 tablets daily seabuckthorn      UNABLE TO FIND Take by mouth daily MEDICATION NAME: Folinic Acid      UNABLE TO FIND daily Ashwaganda - patient reported      UNABLE TO FIND daily Rhodiola-  patient reported      valACYclovir (VALTREX) 1000 mg tablet Take 2 tablets (2,000 mg) by mouth 2 times daily For 2 days as needed for cold sores 12 tablet 11    valACYclovir (VALTREX) 500 MG tablet Take 1 tablet (500 mg) by mouth daily 90 tablet 1           6/11/2024     1:07 PM   Dietary Supplements   Individual Vitamin Supplements D;K;Folate;General multivitamin   Individual Mineral Supplements Magnesium   Herbal Complimentary products Digestive Enzymes;Fiber supplement;Probiotic         ALLERGIES:   Allergies   Allergen Reactions    Triptans Unknown     imitrex    Amoxicillin-Pot Clavulanate Hives     Unsure if true reaction to med     Ciprofloxacin Hives and Itching     Patient reported- possible reaction, patient unsure         .na  LABS:  Last Basic Metabolic Panel:  Lab Results   Component Value Date     09/27/2023     09/07/2022     04/13/2022     12/09/2016     09/25/2008      Lab Results   Component Value Date    POTASSIUM 4.1 09/27/2023    POTASSIUM 3.7 09/07/2022    POTASSIUM 4.2 04/13/2022    POTASSIUM 4.1 02/09/2022    POTASSIUM 3.1 12/09/2016    POTASSIUM 4.3 09/25/2008     Lab Results   Component Value Date    CHLORIDE 104 09/27/2023    CHLORIDE 103 09/07/2022    CHLORIDE 104 04/13/2022    CHLORIDE 102 02/09/2022    CHLORIDE 103 12/09/2016    CHLORIDE 103 09/25/2008     Lab Results   Component Value Date    MICHAEL 9.6 09/27/2023    MICHAEL 9.2 09/07/2022    MICHAEL 9.5 04/13/2022    MICHAEL 8.8 12/09/2016    MICHAEL 8.9 09/25/2008     Lab Results   Component Value Date    CO2 26 09/27/2023    CO2 29 09/07/2022    CO2 25 04/13/2022    CO2 27 02/09/2022    CO2 23 12/09/2016    CO2 27 09/25/2008     Lab Results   Component Value Date    BUN 14.5 09/27/2023    BUN 15 09/07/2022    BUN 11 04/13/2022    BUN 15 02/09/2022    BUN 12 12/09/2016    BUN 18 09/25/2008     Lab Results   Component Value Date    CR 0.72 09/27/2023    CR 0.65 09/07/2022    CR 0.76 04/13/2022    CR 0.59 12/09/2016    CR  "0.60 09/25/2008    CR 0.90 12/06/2007     Lab Results   Component Value Date    GLC 80 09/27/2023    GLC 85 09/07/2022    GLC 86 04/13/2022    GLC 48 02/09/2022    GLC 82 11/27/2019    GLC 75 12/09/2016    GLC 70 05/15/2012       Last Glucose Profile:   No results found for: \"A1C\"    Last Lipid Profile:   Cholesterol   Date Value Ref Range Status   08/05/2021 231 (H) <200 mg/dL Final     Comment:     Age 0-19 years  Desirable: <170 mg/dL  Borderline high:  170-199 mg/dl  High:            >199 mg/dl    Age 20 years and older  Desirable: <200 mg/dL   11/27/2019 212 (H) <200 mg/dL Final     Comment:     Desirable:       <200 mg/dl   11/30/2012 217 (H) 0 - 200 mg/dL Final     Comment:     LDL Cholesterol is the primary guide to therapy.   The NCEP recommends further evaluation of: patients with cholesterol greater   than 200 mg/dL if additional risk factors are present, cholesterol greater   than   240 mg/dL, triglycerides greater than 150 mg/dL, or HDL less than 40 mg/dL.   05/15/2012 204 (H) 0 - 200 mg/dL Final     Comment:     LDL Cholesterol is the primary guide to therapy.   The NCEP recommends further evaluation of: patients with cholesterol greater   than 200 mg/dL if additional risk factors are present, cholesterol greater   than   240 mg/dL, triglycerides greater than 150 mg/dL, or HDL less than 40 mg/dL.     HDL Cholesterol   Date Value Ref Range Status   11/27/2019 65 >49 mg/dL Final   11/30/2012 64 50 - 110 mg/dL Final   05/15/2012 64 50 - 110 mg/dL Final     Direct Measure HDL   Date Value Ref Range Status   08/05/2021 79 >=50 mg/dL Final     Comment:     0-19 years:       Greater than or equal to 45 mg/dL   Low: Less than 40 mg/dL   Borderline low: 40-44 mg/dL     20 years and older:   Female: Greater than or equal to 50 mg/dL   Male:   Greater than or equal to 40 mg/dL          LDL Cholesterol Calculated   Date Value Ref Range Status   08/05/2021 142 (H) <=100 mg/dL Final     Comment:     Age 0-19 " years:  Desirable: 0-110 mg/dL   Borderline high: 110-129 mg/dL   High: >= 130 mg/dL    Age 20 years and older:  Desirable: <100mg/dL  Above desirable: 100-129 mg/dL   Borderline high: 130-159 mg/dL   High: 160-189 mg/dL   Very high: >= 190 mg/dL   11/27/2019 134 (H) <100 mg/dL Final     Comment:     Above desirable:  100-129 mg/dl  Borderline High:  130-159 mg/dL  High:             160-189 mg/dL  Very high:       >189 mg/dl     11/30/2012 142 (H) 0 - 129 mg/dL Final     Comment:     LDL Cholesterol is the primary guide to therapy: LDL-cholesterol goal in high   risk patients is <100 mg/dL and in very high risk patients is <70 mg/dL.   05/15/2012 130 (H) 0 - 129 mg/dL Final     Comment:     LDL Cholesterol is the primary guide to therapy: LDL-cholesterol goal in high   risk patients is <100 mg/dL and in very high risk patients is <70 mg/dL.     Triglycerides   Date Value Ref Range Status   08/05/2021 52 <150 mg/dL Final     Comment:     0-9 years:  Normal:    Less than 75 mg/dL  Borderline high:  75-99 mg/dL  High:             Greater than or equal to 100 mg/dL    0-19 years:  Normal:    Less than 90 mg/dL  Borderline high:   mg/dL  High:             Greater than or equal to 130 mg/dL    20 years and older:  Normal:    Less than 150 mg/dL  Borderline high:  150-199 mg/dL  High:             200-499 mg/dL  Very high:   Greater than or equal to 500 mg/dL   11/27/2019 66 <150 mg/dL Final     Comment:     Non Fasting   11/30/2012 57 0 - 150 mg/dL Final   05/15/2012 50 0 - 150 mg/dL Final     Comment:     Fasting specimen     Cholesterol/HDL Ratio   Date Value Ref Range Status   11/30/2012 3.4 0.0 - 5.0 Final   05/15/2012 3.0 0.0 - 5.0 Final   12/14/2010 4.1 0.0 - 5.0 Final       Most recent CBC:  Recent Labs   Lab Test 09/27/23  0927 11/14/22  1627 09/07/22  1141   WBC 3.6* 4.1 3.6*   HGB 12.0 12.2 12.3   HCT 36.5 37.4 38.1    225 193     Most recent hepatic panel:  Recent Labs   Lab Test 09/27/23  0915  "09/07/22  1141   ALT 22 29   AST 27 23     Most recent creatinine:  Recent Labs   Lab Test 09/27/23  0927 09/07/22  1141   CR 0.72 0.65       No components found for: \"GFRESETIMATED\"LASTLAB(gfrestblack:1@  Lab Results   Component Value Date    ALBUMIN 4.4 09/27/2023    ALBUMIN 3.7 09/07/2022    ALBUMIN 4.0 12/09/2016       Last Thyroid Profile:   TSH   Date Value Ref Range Status   08/07/2023 1.92 0.30 - 4.20 uIU/mL Final   09/07/2022 1.99 0.40 - 4.00 mU/L Final   08/05/2021 2.20 0.40 - 4.00 mU/L Final   12/30/2016 1.88 0.40 - 4.00 mU/L Final   11/11/2015 1.86 0.40 - 4.00 mU/L Final   11/30/2012 2.26 0.4 - 5.0 mU/L Final     T4 Free   Date Value Ref Range Status   12/09/2016 1.02 0.76 - 1.46 ng/dL Final   11/30/2012 1.09 0.70 - 1.85 ng/dL Final     Free T4   Date Value Ref Range Status   08/05/2021 1.05 0.76 - 1.46 ng/dL Final       Last Mineral Profile:   Ferritin   Date Value Ref Range Status   08/05/2021 168 8 - 252 ng/mL Final   12/09/2016 102 8 - 252 ng/mL Final     Iron   Date Value Ref Range Status   11/30/2012 145 35 - 180 ug/dL Final     Iron Binding Cap   Date Value Ref Range Status   11/30/2012 274 240 - 430 ug/dL Final       Autoimmune & Inflammatory   CRP Inflammation   Date Value Ref Range Status   04/13/2022 4.1 0.0 - 8.0 mg/L Final         Last Vitamin Profile:   25 OH Vit D2   Date Value Ref Range Status   12/11/2009 <5 ug/L Final     25 OH Vit D3   Date Value Ref Range Status   12/11/2009 27 ug/L Final     25 OH Vit D total   Date Value Ref Range Status   12/11/2009 <32 30 - 75 ug/L Final     Comment:     Season, race, dietary intake, and treatment affect the concentration of   25-hydroxy-Vitamin D. Values may decrease during winter months and increase   during summer months. Values less than 30 ug/L may indicate Vitamin D   deficiency.       ANTHROPOMETRICS:  Vitals:   BP Readings from Last 1 Encounters:   06/10/24 100/66     Pulse Readings from Last 1 Encounters:   06/10/24 71     Estimated body " "mass index is 22.83 kg/m  as calculated from the following:    Height as of 6/10/24: 1.626 m (5' 4\").    Weight as of 6/10/24: 60.3 kg (133 lb).    Wt Readings from Last 5 Encounters:   06/10/24 60.3 kg (133 lb)   06/06/24 61.9 kg (136 lb 8 oz)   03/07/24 59.9 kg (132 lb)   03/06/24 60.7 kg (133 lb 12.8 oz)   01/04/24 61.2 kg (135 lb)       NUTRITION DIAGNOSIS:  1. Excessive intake of high FODMAP foods related to food and nutrition knowledge deficit regarding effect of FODMAPs on IBS, as evidenced by symptoms managed by taking out gluten/wheat and dairy but still having loose stools/gas/diarrhea when having potential other fodmap foods and or possibly too much insoluble fiber.     2. Inadequate soluble fiber intake related to food and nutrition knowledge deficit regarding desirable quantities of insoluble vs soluble fiber, high fodmap foods and strains of probiotics for diarrhea, as evidenced by diarrhea, gas and loose stools.         NUTRITION INTERVENTION:  Low FODMAP Elimination and Reintroduction Diet     Long Term Goals:   Dietary Habits: Establish a sustainable more personalized low-FODMAP diet within 6 months.  GI Health: Long-term reduction in IBS symptoms   Symptom Management: Report reduction in GI discomfort and stabilization of bowel movements within 6 weeks.      Short Term Goals:  Goal 1: Supplementation: Regularly take recommended probiotics and reassess in 1-3 months.    Try a probiotic without different strains  and pay attention to if the prebiotic's in current probiotic taking are triggering any diarrhea (some high fodmaps are in the brand currently taking) Instead rry spore and soil strain for the diarrhea/loose stools a good  example brand would be     SBO Probiotics Ultimate [email or web address]-[email or web address]    Adults take 2 capsules daily with 8 ounces of water  Recommend taking the capsules on an empty stomach, either when you first wake up or right before " bed    Goal 2. Fiber Intake: Track both insoluble vs soluble fiber to see if triggering diarrhea     Insoluble fiber helps bulk up your stools. It can speed up the passage of food in the digestive tract, preventing constipation and keeping bowel movements regular. Insoluble fiber may make diarrhea worse.    Soluble fiber's ability to help treat both diarrhea and constipation has been well studied. In particular, psyllium seems to be beneficial.    If you are experiencing loose bowels or diarrhea, soluble fiber can help by soaking up extra water in the digestive tract. This helps slow transit through the digestive tract, reducing urgency and bowel movement frequency.    Soluble  Sources of soluble fiber include:    Apples  Bananas  Oats  Barley  Peas  Black beans  Lima beans  [place] sprouts  Psyllium (a common fiber supplement)    Insoluble   Sources of insoluble fiber include:  Whole wheat flour  Wheat bran  Nuts  Seeds  Beans, peas, and lentils  Berries  Spinach  Avocado  Cauliflower  Popcorn  Skins of many fruits and vegetables    Goal 3. Start tracking what you eat. Writing down or tracking through an grupo what you eat as well as how you feel can help you identify patterns in your symptoms. This can help you become more aware and create a diet that is right for you without over restricting.     mysymptoms grupo, you can track symptoms, bowl movements, medications, stress, exercise, sleep and foods as well as beverages to become more mindful. [email or web address]    Goal 4. Review LOW - FODMAP diet to identify any further patterns with food triggers besides dairy and wheat/gluten     What are FODMAPs?  FODMAP stands for fermentable oligo-, di-, monosaccharides and polyols    These short-chain carbs are resistant to digestion. Instead of being absorbed into your bloodstream, they reach the far end of your intestine, where most of your gut bacteria reside.    Your gut bacteria then use these carbs for fuel,  producing hydrogen gas and causing digestive symptoms in sensitive individuals. FODMAPs also draw liquid into your intestine, which may cause diarrhea.    Although not everyone has a sensitivity to FODMAPs, it is very common among people with irritable bowel syndrome (IBS)     Common FODMAPs include:    Fructose: a simple sugar found in many fruits and vegetables that also makes up the structure of table sugar and most added sugars  Lactose: a carbohydrate found in dairy products like milk  Fructans: found in many foods, including grains like wheat, spelt, rye and barley  Galactans: found in large amounts in legumes  Polyols: sugar alcohols like xylitol, sorbitol, maltitol, and mannitol. They are found in some fruits and vegetables and often used as sweetener      What is the purpose of a FODMAP diet?  A FODMAP diet is a 3 step diet used to help manage digestive symptoms.  Common gut problem with symptoms including abdominal (tummy) pain, bloating, wind (farting) and changes in bowel habit (diarrhea, constipation or both).     The aims of the diet are to:    Learn which foods and FODMAPs you tolerate, and which trigger your digestive symptoms. Understanding this will help you to follow a less restrictive, more nutritionally balanced diet for the long term that only restricts foods that trigger your digestive symptoms.    Assess whether your digestive symptoms are sensitive to FODMAPs. If you don't experience symptom improvement on the diet, than you may need to consider other digestive therapies.      How to follow a FODMAP diet      Stage 1: Restriction/Elimination   This stage involves strict avoidance of all high-FODMAP foods. It's important to note you should NOT avoid all FODMAPs long-term, and this stage should only last about 4-12 weeks. This is because it's important to include FODMAPs in the diet for gut health.      First restrict alcohol, caffeine, spicy foods and other common food triggers (wheat and  dairy). Focus should be on eating whole, unprocessed real foods in their unprocessed forms such as fruits, vegetables, whole grains (prefer wheat/gluten free), nuts, extra virgin olive oil, herbs, moderate amounts of plant based proteins, and healthy fats. Monitor how you handle nightshades, corn, soy as these can be common triggers for digestive issues.     A low-fodmap diet is complex and should be tailored to your individual symptoms. Moving through the following stages over 4-12 weeks to provide more awareness as to what foods could be triggering symptoms.   Some people notice an improvement in symptoms in the first week, while others take the full eight - twelve weeks. Once you have adequate relief of your digestive symptoms, you can progress to the second stage.  If by eight to twelve weeks your gut symptoms have not resolved you should check for hidden sources of fodmaps, try probiotic/digestive enzymes and consider life stressors as stress is a major contributor.     The diet can be difficult to follow if you are not prepared. Here are some tips:  Try avoiding just wheat by going gluten free or taking out dairy first while you prepare to make other dietary changes to follow a low-FODMAP diet. Eliminating these two foods first and finding alternatives can make you feel less restricted before you begin to take out other foods.   Find out what to buy: Ensure you have access to credible low-FODMAP food lists. See handouts of where to find these.  Get rid of high-FODMAP foods: Clear your fridge and pantry of these foods.  Make a shopping list: Create a low-FODMAP shopping list before heading to the grocery store, so you know which foods to purchase or avoid.  Read menus in advance: Familiarize yourself with low-FODMAP menu options so you'll be prepared when dining out.    Stage 2: Reintroduction    This stage involves systematically reintroducing high-FODMAP foods. The purpose of this is twofold:   To identify  "which types of FODMAPs you tolerate. Few people are sensitive to all of them.  To establish the amount of FODMAPs you can tolerate. This is known as your \"threshold level.\"    In this step, you test specific foods one by one for three days each.  It is recommended that you undertake this step with a trained dietitian who can guide you through the appropriate foods. Alternatively, this vicki can help you identify which foods to reintroduce. [email or web address]/get-vicki-help/    It is worth noting that you need to continue a low-FODMAP diet throughout this stage. This means even if you can tolerate a certain high-FODMAP food, you must continue to restrict it until stage 3.  It is also important to remember that, unlike people with most food allergies, people with IBS can tolerate small amounts of FODMAPs.     Stage 3: Personalization  This stage is also known as the \"modified low-FODMAP diet.\" In other words, you still restrict some FODMAPs. However, the amount and type are tailored to your personal tolerance, identified in stage 2.  It is important to progress to this final stage in order to address nutrient deficiencies and increase diet variety and flexibility.    This step is aimed to relax dietary restrictions as much as possible, expand the variety of foods included in your diet and establish a  personalized FODMAP diet  for the long-term. In this step well tolerated foods and FODMAPs are reintroduced to your diet, while poorly tolerated foods and FODMAPs are restricted, but only to a level that provides symptom relief. We recommend that you repeat challenges of poorly tolerated foods and FODMAPs over time to see whether your tolerance changes. You can also use the Filter function in the [employer] FODMAP Vicki to personalize your vicki experience during Step 3 of the diet.      Foods high in FODMAPs  Here is a list of some common foods and ingredients that are high in FODMAPs:    Fruits: apples, applesauce, " apricots, blackberries, boysenberries, canned fruit, cherries, dates, figs, peaches, pears, watermelon  Sweeteners: fructose, high fructose corn syrup, honey, maltitol, mannitol, sorbitol, xylitol  Dairy products: ice cream, milk (from cows, goats, and sheep), most yogurts, soft and fresh cheeses (cottage cheese, ricotta, etc.), sour cream, whey protein supplements  Vegetables: artichokes, asparagus, beetroot, broccoli, [place] sprouts, cabbage, cauliflower, fennel, garlic, leeks, mushrooms, okra, onions, peas, shallots  Legumes: beans, baked beans, chickpeas, lentils, red kidney beans, soybeans  Wheat: biscuits, bread, most breakfast cereals, crackers, pancakes, pasta, tortillas, waffles  Other grains: barley, rye  Beverages: beer, fortified adilia, fruit juices, milk, soft drinks with high fructose corn syrup, soy milk      Foods you can eat on a low FODMAP diet  Keep in mind that the purpose of this diet is not to completely eliminate FODMAPs, which is extremely difficult. Simply minimizing these types of carbs is considered sufficient to reduce digestive symptoms.    There is a wide variety of healthy and nutritious foods that you can eat on a low FODMAP diet, including    Meats, fish, and eggs (well tolerated unless they have added high FODMAP ingredients, like wheat or high fructose corn syrup)  All fats and oils  Most herbs and spices  Nuts and seeds (including almonds, peanuts, macadamia nuts, pine nuts, and sesame seeds but not pistachios or cashews, which are high in FODMAPs)    Fruits, such as:  unripe bananas  blueberries  cantaloupe  grapefruit  grapes  kiwi  macey  lime  mandarins  melons (except watermelon)  oranges  passionfruit  raspberries  strawberries  sweeteners (maple syrup, molasses, and stevia)  dairy products if they are lactose-free as well as hard cheeses and aged softer varieties (like Brie and Camembert)    Vegetables, such as:  alfalfa  bell peppers  bok  alyson  carrots  celery  chives  cucumbers  eggplant  michael  green beans  kale  lettuce  olives  parsnips  potatoes  radishes  spinach  spring onion (only green)  squash  sweet potatoes  tomatoes  turnips  water chestnuts  yams  zucchini    Grains, such as:  corn  oats  quinoa  rice  sorghum  tapioca  beverages (water, coffee, tea, etc)    However, keep in mind that these lists are neither definitive nor exhaustive. Naturally, there are foods not listed here that are either high or low in FODMAPs.     In addition, everyone is different. You may tolerate some foods on the list of foods to avoid while noticing digestive symptoms from foods low in FODMAPs for other reasons.  How much of a food you eat will affect how likely you are to experience symptoms if you have IBS. The individual tolerance to FODMAPs varies.      NUTRITION RESOURCES:    APPS:   [email or web address] [email or web address] tracker.com   Low FODMAP Diet Vicki  [place] [employer] [facility] Fodmap   [email or web address]  [email or web address]    WEBSITE:   Low FODMAP Diet  IBS Research at [facility] - [facility] Fodmap    3 step FODMAP diet guide & food list    TOP BOOKS:   Low FODMAP Smoothie Recipes   21 Day Low FODMAP Smoothie Challenge (21 Day Low FODMAP Challenges Book 1) Gentry Edition by Swathi Hand  Low FODMAP Smoothies Recipe Book: A Beginners Guide to Low FODMAP Smoothies for Gut Health  The Low-FODMAP Diet for Beginners: A 7-Day Plan to Beat Bloat and Soothe Your Gut with Recipes for Fast IBS Relief by Juana Flowers  (Author), Norma DE LA VEGA [facility]  (Author), Humble Oneil MD PhD          PATIENT'S BEHAVIOR CHANGE GOALS:   See nutrition intervention for patient stated behavior change goals. AVS and nutrition handouts were sent to patient by [employer].     MONITOR / EVALUATE:  Registered Dietitian will monitor/evaluate the following:   Beliefs and attitudes related to food  Food and nutrition knowledge / skills  Food / Beverage /  Nutrient intake   Pertinent Labs  Progress toward meeting stated nutrition-related goals  Readiness to change nutrition-related behaviors  Weight change  Digestion     COORDINATION OF CARE:  Follow up with gastroenterology as needed       FOLLOW-UP:  Follow-up as needed.     Time spent in minutes: 49 minutes 3 units   Encounter: Individual    Sury Lam RD, CLT, LD  Integrative Registered Dietitian

## 2024-06-14 ENCOUNTER — THERAPY VISIT (OUTPATIENT)
Dept: PHYSICAL THERAPY | Facility: CLINIC | Age: 54
End: 2024-06-14
Payer: COMMERCIAL

## 2024-06-14 DIAGNOSIS — M25.512 CHRONIC LEFT SHOULDER PAIN: Primary | ICD-10-CM

## 2024-06-14 DIAGNOSIS — G89.29 CHRONIC LEFT SHOULDER PAIN: Primary | ICD-10-CM

## 2024-06-14 PROCEDURE — 97140 MANUAL THERAPY 1/> REGIONS: CPT | Mod: GP | Performed by: PHYSICAL THERAPIST

## 2024-06-14 PROCEDURE — 97530 THERAPEUTIC ACTIVITIES: CPT | Mod: GP | Performed by: PHYSICAL THERAPIST

## 2024-06-22 ENCOUNTER — HEALTH MAINTENANCE LETTER (OUTPATIENT)
Age: 54
End: 2024-06-22

## 2024-06-27 ENCOUNTER — VIRTUAL VISIT (OUTPATIENT)
Dept: PSYCHOLOGY | Facility: CLINIC | Age: 54
End: 2024-06-27
Payer: COMMERCIAL

## 2024-06-27 DIAGNOSIS — F43.89 REACTION TO CHRONIC STRESS: ICD-10-CM

## 2024-06-27 DIAGNOSIS — F41.1 GENERALIZED ANXIETY DISORDER: Primary | ICD-10-CM

## 2024-06-27 DIAGNOSIS — F32.A DEPRESSIVE DISORDER: ICD-10-CM

## 2024-06-27 PROCEDURE — 90834 PSYTX W PT 45 MINUTES: CPT | Mod: 95 | Performed by: SOCIAL WORKER

## 2024-06-27 NOTE — PROGRESS NOTES
[facility]                                     Progress Note    Patient Name: Kiesha Mcguire  Date: 6/27/2024       Service Type: Individual      Session Start Time:  11:33 AM Session End Time: 12:21 PM     Session Length: 38-52 minutes    Session #: 60 with this provider    Attendees: Client attended alone    Service Modality:  Video Visit:      Provider verified identity through the following two step process.  Patient provided:  Patient is known previously to provider    Telemedicine Visit: The patient's condition can be safely assessed and treated via synchronous audio and visual telemedicine encounter.      Reason for Telemedicine Visit: Patient convenience (e.g. access to timely appointments / distance to available provider) and Services only offered telehealth    Originating Site (Patient Location): Patient's home    Distant Site (Provider Location): Provider Remote Setting- Home Office/Off-site    Consent:  The patient/guardian has verbally consented to: the potential risks and benefits of telemedicine (video visit) versus in person care; bill my insurance or make self-payment for services provided; and responsibility for payment of non-covered services.     Patient would like the video invitation sent by:  Local [employer]eral    Mode of Communication:  Video    As the provider I attest to compliance with applicable laws and regulations related to telemedicine.    DATA  Interactive Complexity: No   Crisis: No      Progress Since Last Session (Related to Symptoms / Goals / Homework):  Symptoms:  no change since previous appointment    Homework: Achieved / completed to satisfaction     Episode of Care Goals: Satisfactory progress - ACTION (Actively working towards change); Intervened by reinforcing change plan / affirming steps taken    There has been demonstrated improvement in functioning while patient has been engaged in psychotherapy/psychological service- if withdrawn the patient would  "deteriorate and/or relapse.       Current / Ongoing Stressors and Concerns:   Difficulty trusting others and being vulnerable  Limited close relationships  Stressors related to parenting   Work related stressors  Dynamics in relationship with ex-     Treatment Objective(s) Addressed in This Session:   Identify negative self-talk and behaviors: challenge core beliefs, myths, and actions   Use cognitive strategies to engage in cognitive restructuring    Safe/calm state titled \"calm\"  Container: box with a lock     Intervention:   Engaged in active listening   CBT: engaged client in identifying thoughts that contribute to symptoms, modeled cognitive restructuring     Assessments completed prior to visit:  The following assessments were completed by patient for this visit:  None    ASSESSMENT: Current Emotional / Mental Status (status of significant symptoms):   Risk status (Self / Other harm or suicidal ideation)   Patient denies current fears or concerns for personal safety.   Patient denies current or recent suicidal ideation or behaviors.   Patient denies current or recent homicidal ideation or behaviors.   Patient denies current or recent self injurious behavior or ideation.   Patient denies other safety concerns.   Patient reports there has been no change in risk factors since their last session.     Patient reports there has been no change in protective factors since their last session.     Recommended that patient call 911 or go to the local ED should there be a change in any of these risk factors.     Appearance:   Appropriate    Eye Contact:   Good    Psychomotor Behavior: Normal    Attitude:   Cooperative  Interested Pleasant    Orientation:   All   Speech    Rate / Production: Normal     Volume:  Normal    Mood:    Anxious    Affect:    Mood Congruent     Thought Content:  Clear    Thought Form:  Coherent  Goal Directed  Logical    Insight:    Good      Medication Review:   No changes to current " psychiatric medication(s)   Vyvanse   Buspar-client is not taking daily    Estrogen patch     Medication Compliance:   Yes     Changes in Health Issues:   None reported     Chemical Use Review:   Substance Use: no use     Tobacco Use: no use    Diagnosis:  Generalized Anxiety Disorder   Other Specified Trauma and Stress Related Disorder   Unspecified Depressive Disorder     Collateral Reports Completed:   Not Applicable    PLAN: (Patient Tasks / Therapist Tasks / Other)  EMDR:  Plan for next session to have client keep her eyes open.    Therapist to continue resetting affective circuits, engage client in building resources and the early trauma protocol.  Client is considering ways to engage in behaviors she does not believe she is capable of.  Client is reading, The Courage to be Disliked    Magali Montilla, [facility] 6/27/2024    ______________________________________________________________________    Individual Treatment Plan    Patient's Name: Kiesha Mcguire  YOB: 1970    Date of Creation: 5/11/2022  Date Treatment Plan Last Reviewed/Revised: 5/9/2024    DSM5 Diagnoses:    Generalized Anxiety Disorder  Other Specified Trauma and Stress Related Disorder   Unspecified Depressive Disorder     Psychosocial / Contextual Factors: stressors related to parenting  PROMIS (reviewed every 90 days):    PROMIS 10-Global Health (only subscores and total score):       8/10/2023    12:52 PM 9/21/2023    11:25 AM 1/9/2024    12:50 PM 1/23/2024    12:55 PM 2/8/2024    10:13 AM 5/23/2024    10:26 AM 6/27/2024    10:08 AM   PROMIS-10 Scores Only   Global Mental Health Score 13 15 15 14 12 13 14   Global Physical Health Score 17 17 14 17 16 15 15   PROMIS TOTAL - SUBSCORES 30 32 29 31 28 28 29        Referral / Collaboration:  Referral to another professional/service is not indicated at this time..    Anticipated number of session for this episode of care: will review in 90 days  Anticipation frequency of session:  "Every other week  Anticipated Duration of each session: 38-52 minutes  Treatment plan will be reviewed in 90 days or when goals have been changed.       MeasurableTreatment Goal(s) related to diagnosis / functional impairment(s)  Goal 1: Patient will sustain attention and concentration for consistently longer periods of time.  Patient will meet goals set for completing tasks 80% of the time.   Client's Goal:  I will know I've met my goal when my space isn't so chaotic, meeting deadlines around bills and work, when I am not always playing catch-up, completing tasks.      Objective #A (Patient Action)    Patient will learn and implement organization and planning skills.  Status: New - Date: 5/11/2022 , continued date: 9/8/2022, continued date: 12/21/2022, completed date: 3/29/2023    Intervention(s)  Therapist will teach the client organization and planning skills.  Therapist will address any potential barriers to using skills.    Objective #B  Patient will  identify, challenge, and change self-talk that contributes to maladaptive feelings and actions .  Status: New - Date: 5/11/2022 , Continued date: 9/8/2022, continued date: 12/21/2022, continued date: 3/29/2023, continued date: 7/28/2023, continued date: 11/9/2023, continued date: 1/23/2024, continued date: 5/9/2024    Intervention(s)  Therapist will teach the CBT model, cognitive distortions, and cognitive restructuring techniques.      Goal 2: Patient will be able to recall the traumatic events without becoming overwhelmed with negative thoughts, feelings, or urges.   Client's Goal:  I will know I've met my goal when I do not cry every time I talk about it.\"    Objective #A (Patient Action)    Status: New - Date: 5/11/2022, continued date: 9/8/2022, continued date: 12/21/2022, continued date: 3/29/2023, continued date: 7/28/2023, continued date: 11/9/2023, continued date: 1/23/2024, continued date: 5/9/2024    Patient will describe the history of and nature of " trauma symptoms    Intervention(s)  Therapist will assess the client's frequency, intensity, duration, and history of trauma symptoms and their impact on functioning.    Objective #B (Patient Action)  Status: New Date: 5/11/2022, continued date: 9/8/2022, continued date: 12/21/2022, continued date: 3/29/2023, continued date: 7/28/2023, continued date: 11/9/2023, continued date: 1/23/2024, continued date: 5/9/2024    Patient will cooperate with eye movement desensitization and reprocessing (EMDR) to reduce emotional reaction to traumatic event(s)    Intervention(s)  Therapist will utilize EMDR to reduce the client's emotional reactivity to the traumatic event(s).    Objective #C (Patient Action)  Status: New Date: 5/11/2022, continued date: 9/8/2022, continued date: 12/21/2022, continued date: 3/29/2023, continued date: 7/28/2023, continued date: 11/9/2023, continued date: 1/23/2024, continued date: 5/9/2024    Patient will learn and implement calming and coping strategies to manage trauma symptoms.    Intervention(s)  Therapist will teach grounding techniques, distress tolerance, and emotion regulation techniques.      Patient has reviewed and agreed to the above plan.      Magali Montilla, [facility]  May 11, 2022  [facility] Montilla, [facility]  9/8/2022  Magali Montilla, [facility]  12/21/2022  Magali Montilla, [facility]  3/29/2023  Magali Montilla, [facility]  7/28/2023  Magali Montilla, [facility]  11/9/2023  Magali Montilla, [facility]  1/23/2024  Magali Montilla, [facility]  5/9/2024

## 2024-07-08 ENCOUNTER — PATIENT OUTREACH (OUTPATIENT)
Dept: CARE COORDINATION | Facility: CLINIC | Age: 54
End: 2024-07-08
Payer: COMMERCIAL

## 2024-07-10 ENCOUNTER — VIRTUAL VISIT (OUTPATIENT)
Dept: FAMILY MEDICINE | Facility: CLINIC | Age: 54
End: 2024-07-10
Payer: COMMERCIAL
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
## 2024-07-10 DIAGNOSIS — S40.862A TICK BITE OF LEFT UPPER ARM, INITIAL ENCOUNTER: Primary | ICD-10-CM

## 2024-07-10 PROCEDURE — 99213 OFFICE O/P EST LOW 20 MIN: CPT | Mod: 95 | Performed by: NURSE PRACTITIONER

## 2024-07-10 NOTE — PROGRESS NOTES
Kiesha is a 53 year old who is being evaluated via a billable video visit.    How would you like to obtain your AVS? MyChart  If the video visit is dropped, the invitation should be resent by: Text to cell phone: [phone]  Will anyone else be joining your video visit? No      Assessment & Plan     Tick bite of left upper arm, initial encounter    Reassured likelihood of transmission is low.  She declined the prophylaxis Doxycycline 200 mg.  Will continue to monitor.            See Patient Instructions  Patient Instructions     Reassured regarding the likelyhood of transmission of Lyme disease in this setting.   Transmission of Lyme disease is unlikely if the tick is not clearly a larval form or a clearly defined deer tick, if the tick was not engorged or implanted less than 24 hours.  In studies where the tick was on for less than 24 hours the transmission was 0%.   If the tick was likely on more than 24 hrs, and it appears to be a deer tick, in areas of high prevalence of the Lyme bacteria, a single 200mg dose of doxycycline may be indicated to reduce the risk of Lyme disease, if given within 72 hrs of the tick being taken off.         Subjective   Kiesha is a 53 year old, presenting for the following health issues:  Insect Bites (Tick bite )      7/10/2024     1:36 PM   Additional Questions   Roomed by      HPI     Was in garden yesterday and itched off something on her left forearm. She then thought that could of been a tick.  No symptoms of concerns such as fever, rash.          Review of Systems  Constitutional, HEENT, cardiovascular, pulmonary, gi and gu systems are negative, except as otherwise noted.      Objective           Vitals:  No vitals were obtained today due to virtual visit.    Physical Exam   GENERAL: alert and no distress  EYES: Eyes grossly normal to inspection.  No discharge or erythema, or obvious scleral/conjunctival abnormalities.  RESP: No audible wheeze, cough, or visible  cyanosis.    SKIN: Visible skin clear. No significant rash, abnormal pigmentation or lesions.  NEURO: Cranial nerves grossly intact.  Mentation and speech appropriate for age.  PSYCH: Appropriate affect, tone, and pace of words          Video-Visit Details    Type of service:  Video Visit   Originating Location (pt. Location): Home    Distant Location (provider location):  Off-site  Platform used for Video Visit: [place]  Signed Electronically by: JERAMY Camacho CNP

## 2024-07-10 NOTE — PATIENT INSTRUCTIONS
Reassured regarding the likelyhood of transmission of Lyme disease in this setting.   Transmission of Lyme disease is unlikely if the tick is not clearly a larval form or a clearly defined deer tick, if the tick was not engorged or implanted less than 24 hours.  In studies where the tick was on for less than 24 hours the transmission was 0%.   If the tick was likely on more than 24 hrs, and it appears to be a deer tick, in areas of high prevalence of the Lyme bacteria, a single 200mg dose of doxycycline may be indicated to reduce the risk of Lyme disease, if given within 72 hrs of the tick being taken off.

## 2024-07-15 ENCOUNTER — VIRTUAL VISIT (OUTPATIENT)
Dept: PSYCHOLOGY | Facility: CLINIC | Age: 54
End: 2024-07-15
Payer: COMMERCIAL

## 2024-07-15 DIAGNOSIS — F43.89 REACTION TO CHRONIC STRESS: ICD-10-CM

## 2024-07-15 DIAGNOSIS — F41.1 GENERALIZED ANXIETY DISORDER: Primary | ICD-10-CM

## 2024-07-15 PROCEDURE — 90834 PSYTX W PT 45 MINUTES: CPT | Mod: 95 | Performed by: SOCIAL WORKER

## 2024-07-15 NOTE — PROGRESS NOTES
[facility]                                     Progress Note    Patient Name: Kiesha Mcguire  Date: 7/15/2024       Service Type: Individual      Session Start Time:  9:33 AM Session End Time: 10:16 AM     Session Length: 38-52 minutes    Session #: 61 with this provider    Attendees: Client attended alone    Service Modality:  Video Visit:      Provider verified identity through the following two step process.  Patient provided:  Patient is known previously to provider    Telemedicine Visit: The patient's condition can be safely assessed and treated via synchronous audio and visual telemedicine encounter.      Reason for Telemedicine Visit: Patient convenience (e.g. access to timely appointments / distance to available provider) and Services only offered telehealth    Originating Site (Patient Location): Patient's home    Distant Site (Provider Location): Provider Remote Setting- Home Office/Off-site    Consent:  The patient/guardian has verbally consented to: the potential risks and benefits of telemedicine (video visit) versus in person care; bill my insurance or make self-payment for services provided; and responsibility for payment of non-covered services.     Patient would like the video invitation sent by:  [employer]    Mode of Communication:  Video    As the provider I attest to compliance with applicable laws and regulations related to telemedicine.    DATA  Interactive Complexity: No   Crisis: No      Progress Since Last Session (Related to Symptoms / Goals / Homework):  Symptoms:  no change since previous appointment    Homework: Completed in session     Episode of Care Goals: Satisfactory progress - ACTION (Actively working towards change); Intervened by reinforcing change plan / affirming steps taken    There has been demonstrated improvement in functioning while patient has been engaged in psychotherapy/psychological service- if withdrawn the patient would deteriorate and/or  "relapse.       Current / Ongoing Stressors and Concerns:   Difficulty trusting others and being vulnerable  Limited close relationships  Stressors related to parenting   Work related stressors  Dynamics in relationship with ex-     Treatment Objective(s) Addressed in This Session:   identify 1 fears / thoughts that contribute to feeling anxious  Identify negative self-talk and behaviors: challenge core beliefs, myths, and actions     Safe/calm state titled \"calm\"  Container: box with a lock     Intervention:   Engaged in active listening   Supported client with recent parenting stressors    Assessments completed prior to visit:  The following assessments were completed by patient for this visit:  None    ASSESSMENT: Current Emotional / Mental Status (status of significant symptoms):   Risk status (Self / Other harm or suicidal ideation)   Patient denies current fears or concerns for personal safety.   Patient denies current or recent suicidal ideation or behaviors.   Patient denies current or recent homicidal ideation or behaviors.   Patient denies current or recent self injurious behavior or ideation.   Patient denies other safety concerns.   Patient reports there has been no change in risk factors since their last session.     Patient reports there has been no change in protective factors since their last session.     Recommended that patient call 911 or go to the local ED should there be a change in any of these risk factors.     Appearance:   Appropriate    Eye Contact:   Good    Psychomotor Behavior: Normal    Attitude:   Cooperative  Interested    Orientation:   All   Speech    Rate / Production: Emotional Normal     Volume:  Normal    Mood:    Anxious  Sad    Affect:    Tearful Mood Congruent     Thought Content:  Clear    Thought Form:  Coherent  Goal Directed  Logical    Insight:    Good      Medication Review:   No changes to current psychiatric medication(s)   Vyvanse   Buspar-client is not taking " daily    Estrogen patch     Medication Compliance:   Yes     Changes in Health Issues:   None reported     Chemical Use Review:   Substance Use: no use     Tobacco Use: no use    Diagnosis:  Generalized Anxiety Disorder   Other Specified Trauma and Stress Related Disorder   Unspecified Depressive Disorder     Collateral Reports Completed:   Not Applicable    PLAN: (Patient Tasks / Therapist Tasks / Other)  EMDR:  Plan for next session to have client keep her eyes open.    Therapist to continue resetting affective circuits, engage client in building resources and the early trauma protocol.  Client is reading, The Courage to be Disliked  Client shared goal to complete tasks.    Magali Montilla, [facility] 7/15/2024    ______________________________________________________________________    Individual Treatment Plan    Patient's Name: Kiesha Mcguire  YOB: 1970    Date of Creation: 5/11/2022  Date Treatment Plan Last Reviewed/Revised: 5/9/2024    DSM5 Diagnoses:    Generalized Anxiety Disorder  Other Specified Trauma and Stress Related Disorder   Unspecified Depressive Disorder     Psychosocial / Contextual Factors: stressors related to parenting  PROMIS (reviewed every 90 days):    PROMIS 10-Global Health (only subscores and total score):       9/21/2023    11:25 AM 1/9/2024    12:50 PM 1/23/2024    12:55 PM 2/8/2024    10:13 AM 5/23/2024    10:26 AM 6/27/2024    10:08 AM 7/10/2024    12:04 PM   PROMIS-10 Scores Only   Global Mental Health Score 15 15 14 12 13 14 13   Global Physical Health Score 17 14 17 16 15 15 15   PROMIS TOTAL - SUBSCORES 32 29 31 28 28 29 28        Referral / Collaboration:  Referral to another professional/service is not indicated at this time..    Anticipated number of session for this episode of care: will review in 90 days  Anticipation frequency of session: Every other week  Anticipated Duration of each session: 38-52 minutes  Treatment plan will be reviewed in 90 days or  "when goals have been changed.       MeasurableTreatment Goal(s) related to diagnosis / functional impairment(s)  Goal 1: Patient will sustain attention and concentration for consistently longer periods of time.  Patient will meet goals set for completing tasks 80% of the time.   Client's Goal:  I will know I've met my goal when my space isn't so chaotic, meeting deadlines around bills and work, when I am not always playing catch-up, completing tasks.      Objective #A (Patient Action)    Patient will learn and implement organization and planning skills.  Status: New - Date: 5/11/2022 , continued date: 9/8/2022, continued date: 12/21/2022, completed date: 3/29/2023    Intervention(s)  Therapist will teach the client organization and planning skills.  Therapist will address any potential barriers to using skills.    Objective #B  Patient will  identify, challenge, and change self-talk that contributes to maladaptive feelings and actions .  Status: New - Date: 5/11/2022 , Continued date: 9/8/2022, continued date: 12/21/2022, continued date: 3/29/2023, continued date: 7/28/2023, continued date: 11/9/2023, continued date: 1/23/2024, continued date: 5/9/2024    Intervention(s)  Therapist will teach the CBT model, cognitive distortions, and cognitive restructuring techniques.      Goal 2: Patient will be able to recall the traumatic events without becoming overwhelmed with negative thoughts, feelings, or urges.   Client's Goal:  I will know I've met my goal when I do not cry every time I talk about it.\"    Objective #A (Patient Action)    Status: New - Date: 5/11/2022, continued date: 9/8/2022, continued date: 12/21/2022, continued date: 3/29/2023, continued date: 7/28/2023, continued date: 11/9/2023, continued date: 1/23/2024, continued date: 5/9/2024    Patient will describe the history of and nature of trauma symptoms    Intervention(s)  Therapist will assess the client's frequency, intensity, duration, and history of " trauma symptoms and their impact on functioning.    Objective #B (Patient Action)  Status: New Date: 5/11/2022, continued date: 9/8/2022, continued date: 12/21/2022, continued date: 3/29/2023, continued date: 7/28/2023, continued date: 11/9/2023, continued date: 1/23/2024, continued date: 5/9/2024    Patient will cooperate with eye movement desensitization and reprocessing (EMDR) to reduce emotional reaction to traumatic event(s)    Intervention(s)  Therapist will utilize EMDR to reduce the client's emotional reactivity to the traumatic event(s).    Objective #C (Patient Action)  Status: New Date: 5/11/2022, continued date: 9/8/2022, continued date: 12/21/2022, continued date: 3/29/2023, continued date: 7/28/2023, continued date: 11/9/2023, continued date: 1/23/2024, continued date: 5/9/2024    Patient will learn and implement calming and coping strategies to manage trauma symptoms.    Intervention(s)  Therapist will teach grounding techniques, distress tolerance, and emotion regulation techniques.      Patient has reviewed and agreed to the above plan.      Magali Montilla, [facility]  May 11, 2022  [facility], [facility]  9/8/2022  [facility], [facility]  12/21/2022  Magali Montilla, [facility]  3/29/2023  Magali Montilla, [facility]  7/28/2023  Magali Montilla, [facility]  11/9/2023  [facility], [facility]  1/23/2024  Magali Montilla, [facility]  5/9/2024

## 2024-07-22 ENCOUNTER — PATIENT OUTREACH (OUTPATIENT)
Dept: CARE COORDINATION | Facility: CLINIC | Age: 54
End: 2024-07-22
Payer: COMMERCIAL

## 2024-07-25 ENCOUNTER — TELEPHONE (OUTPATIENT)
Dept: OBGYN | Facility: CLINIC | Age: 54
End: 2024-07-25
Payer: COMMERCIAL

## 2024-07-25 NOTE — TELEPHONE ENCOUNTER
Talked to patient to schedule annual screening mammogram. Patient has questions regarding screening mammograms and dense breast tissue. Please advise.

## 2024-07-26 ENCOUNTER — VIRTUAL VISIT (OUTPATIENT)
Dept: PSYCHOLOGY | Facility: CLINIC | Age: 54
End: 2024-07-26
Payer: COMMERCIAL

## 2024-07-26 DIAGNOSIS — F41.1 GENERALIZED ANXIETY DISORDER: Primary | ICD-10-CM

## 2024-07-26 DIAGNOSIS — F43.89 REACTION TO CHRONIC STRESS: ICD-10-CM

## 2024-07-26 PROCEDURE — 90834 PSYTX W PT 45 MINUTES: CPT | Mod: 95 | Performed by: SOCIAL WORKER

## 2024-07-26 NOTE — TELEPHONE ENCOUNTER
Called patient and reviewed mammogram offered in clinic is 3D, which is the recommendation for those with dense breast tissue. Patient asked about a screening ultrasound. Recommended moving forward with screening mammogram and ask about further imaging during annual appointment. Patient agreeable.

## 2024-07-26 NOTE — PROGRESS NOTES
[facility]                                     Progress Note    Patient Name: Kiesha Mcguire  Date: 7/26/2024       Service Type: Individual      Session Start Time:  10:35 AM Session End Time: 11:18 AM     Session Length: 38-52 minutes    Session #: 62 with this provider    Attendees: Client attended alone    Service Modality:  Video Visit:      Provider verified identity through the following two step process.  Patient provided:  Patient is known previously to provider    Telemedicine Visit: The patient's condition can be safely assessed and treated via synchronous audio and visual telemedicine encounter.      Reason for Telemedicine Visit: Patient convenience (e.g. access to timely appointments / distance to available provider) and Services only offered telehealth    Originating Site (Patient Location): Patient's home    Distant Site (Provider Location): Provider Remote Setting- Home Office/Off-site    Consent:  The patient/guardian has verbally consented to: the potential risks and benefits of telemedicine (video visit) versus in person care; bill my insurance or make self-payment for services provided; and responsibility for payment of non-covered services.     Patient would like the video invitation sent by:  [employer]    Mode of Communication:  Video    As the provider I attest to compliance with applicable laws and regulations related to telemedicine.    DATA  Interactive Complexity: No   Crisis: No      Progress Since Last Session (Related to Symptoms / Goals / Homework):  Symptoms:  no change since previous appointment    Homework: Achieved / completed to satisfaction     Episode of Care Goals: Satisfactory progress - ACTION (Actively working towards change); Intervened by reinforcing change plan / affirming steps taken    There has been demonstrated improvement in functioning while patient has been engaged in psychotherapy/psychological service- if withdrawn the patient would  "deteriorate and/or relapse.       Current / Ongoing Stressors and Concerns:   Difficulty trusting others and being vulnerable  Limited close relationships  Stressors related to parenting   Work related stressors  Dynamics in relationship with ex-  dating     Treatment Objective(s) Addressed in This Session:   identify 1 fears / thoughts that contribute to feeling anxious   Identify 1 strategy to increase engagement in completion of tasks    Safe/calm state titled \"calm\"  Container: box with a lock     Intervention:   Engaged in active listening   Engaged client in reflection of progress, reinforced behavior changes    Assessments completed prior to visit:  The following assessments were completed by patient for this visit:  None    ASSESSMENT: Current Emotional / Mental Status (status of significant symptoms):   Risk status (Self / Other harm or suicidal ideation)   Patient denies current fears or concerns for personal safety.   Patient denies current or recent suicidal ideation or behaviors.   Patient denies current or recent homicidal ideation or behaviors.   Patient denies current or recent self injurious behavior or ideation.   Patient denies other safety concerns.   Patient reports there has been no change in risk factors since their last session.     Patient reports there has been no change in protective factors since their last session.     Recommended that patient call 911 or go to the local ED should there be a change in any of these risk factors.     Appearance:   Appropriate    Eye Contact:   Good    Psychomotor Behavior: Normal    Attitude:   Cooperative  Interested Pleasant    Orientation:   All   Speech    Rate / Production: Normal     Volume:  Normal    Mood:    Anxious Stable   Affect:    Mood Congruent     Thought Content:  Clear    Thought Form:  Coherent  Goal Directed  Logical    Insight:    Good      Medication Review:   No changes to current psychiatric medication(s)   Vyvanse "   Buspar-client is not taking daily    Estrogen patch     Medication Compliance:   Yes     Changes in Health Issues:   None reported     Chemical Use Review:   Substance Use: no use     Tobacco Use: no use    Diagnosis:  Generalized Anxiety Disorder   Other Specified Trauma and Stress Related Disorder   Unspecified Depressive Disorder     Collateral Reports Completed:   Not Applicable    PLAN: (Patient Tasks / Therapist Tasks / Other)  EMDR:  Plan for next session to have client keep her eyes open.    Therapist to continue resetting affective circuits, engage client in building resources and the early trauma protocol.  Client shared goal to increase social engagement.  Therapist will engage client in completion of update to diagnostic assessment.    Previous diagnostic assessment completed 4/12/2022    Magali Montilla, [facility] 7/26/2024    ______________________________________________________________________    Individual Treatment Plan    Patient's Name: Kiesha Mcguire  YOB: 1970    Date of Creation: 5/11/2022  Date Treatment Plan Last Reviewed/Revised: 5/9/2024    DSM5 Diagnoses:    Generalized Anxiety Disorder  Other Specified Trauma and Stress Related Disorder   Unspecified Depressive Disorder     Psychosocial / Contextual Factors: stressors related to parenting  PROMIS (reviewed every 90 days):    PROMIS 10-Global Health (only subscores and total score):       9/21/2023    11:25 AM 1/9/2024    12:50 PM 1/23/2024    12:55 PM 2/8/2024    10:13 AM 5/23/2024    10:26 AM 6/27/2024    10:08 AM 7/10/2024    12:04 PM   PROMIS-10 Scores Only   Global Mental Health Score 15 15 14 12 13 14 13   Global Physical Health Score 17 14 17 16 15 15 15   PROMIS TOTAL - SUBSCORES 32 29 31 28 28 29 28        Referral / Collaboration:  Referral to another professional/service is not indicated at this time..    Anticipated number of session for this episode of care: will review in 90 days  Anticipation frequency  "of session: Every other week  Anticipated Duration of each session: 38-52 minutes  Treatment plan will be reviewed in 90 days or when goals have been changed.       MeasurableTreatment Goal(s) related to diagnosis / functional impairment(s)  Goal 1: Patient will sustain attention and concentration for consistently longer periods of time.  Patient will meet goals set for completing tasks 80% of the time.   Client's Goal:  I will know I've met my goal when my space isn't so chaotic, meeting deadlines around bills and work, when I am not always playing catch-up, completing tasks.      Objective #A (Patient Action)    Patient will learn and implement organization and planning skills.  Status: New - Date: 5/11/2022 , continued date: 9/8/2022, continued date: 12/21/2022, completed date: 3/29/2023    Intervention(s)  Therapist will teach the client organization and planning skills.  Therapist will address any potential barriers to using skills.    Objective #B  Patient will  identify, challenge, and change self-talk that contributes to maladaptive feelings and actions .  Status: New - Date: 5/11/2022 , Continued date: 9/8/2022, continued date: 12/21/2022, continued date: 3/29/2023, continued date: 7/28/2023, continued date: 11/9/2023, continued date: 1/23/2024, continued date: 5/9/2024    Intervention(s)  Therapist will teach the CBT model, cognitive distortions, and cognitive restructuring techniques.      Goal 2: Patient will be able to recall the traumatic events without becoming overwhelmed with negative thoughts, feelings, or urges.   Client's Goal:  I will know I've met my goal when I do not cry every time I talk about it.\"    Objective #A (Patient Action)    Status: New - Date: 5/11/2022, continued date: 9/8/2022, continued date: 12/21/2022, continued date: 3/29/2023, continued date: 7/28/2023, continued date: 11/9/2023, continued date: 1/23/2024, continued date: 5/9/2024    Patient will describe the history of and " nature of trauma symptoms    Intervention(s)  Therapist will assess the client's frequency, intensity, duration, and history of trauma symptoms and their impact on functioning.    Objective #B (Patient Action)  Status: New Date: 5/11/2022, continued date: 9/8/2022, continued date: 12/21/2022, continued date: 3/29/2023, continued date: 7/28/2023, continued date: 11/9/2023, continued date: 1/23/2024, continued date: 5/9/2024    Patient will cooperate with eye movement desensitization and reprocessing (EMDR) to reduce emotional reaction to traumatic event(s)    Intervention(s)  Therapist will utilize EMDR to reduce the client's emotional reactivity to the traumatic event(s).    Objective #C (Patient Action)  Status: New Date: 5/11/2022, continued date: 9/8/2022, continued date: 12/21/2022, continued date: 3/29/2023, continued date: 7/28/2023, continued date: 11/9/2023, continued date: 1/23/2024, continued date: 5/9/2024    Patient will learn and implement calming and coping strategies to manage trauma symptoms.    Intervention(s)  Therapist will teach grounding techniques, distress tolerance, and emotion regulation techniques.      Patient has reviewed and agreed to the above plan.      Magali Montilla, [facility]  May 11, 2022  [facility], [facility]  9/[facility]/2022  [facility], [facility]  12/21/2022  Mag[facility] Mont[facility], [facility]  3/29/2023  [facility], [facility]  7/28/2023  Mag[facility] Montilla, [facility]  11/9/2023  Magali Montilla, [facility]  1/23/2024  Mag[facility]illa, [facility]  5/9/2024

## 2024-08-07 ENCOUNTER — THERAPY VISIT (OUTPATIENT)
Dept: PHYSICAL THERAPY | Facility: CLINIC | Age: 54
End: 2024-08-07
Payer: COMMERCIAL

## 2024-08-07 DIAGNOSIS — M25.512 CHRONIC LEFT SHOULDER PAIN: Primary | ICD-10-CM

## 2024-08-07 DIAGNOSIS — G89.29 CHRONIC LEFT SHOULDER PAIN: Primary | ICD-10-CM

## 2024-08-07 PROCEDURE — 97140 MANUAL THERAPY 1/> REGIONS: CPT | Mod: GP | Performed by: PHYSICAL THERAPIST

## 2024-08-07 PROCEDURE — 97530 THERAPEUTIC ACTIVITIES: CPT | Mod: GP | Performed by: PHYSICAL THERAPIST

## 2024-08-07 PROCEDURE — 97110 THERAPEUTIC EXERCISES: CPT | Mod: GP | Performed by: PHYSICAL THERAPIST

## 2024-08-08 ENCOUNTER — PATIENT OUTREACH (OUTPATIENT)
Dept: ONCOLOGY | Facility: CLINIC | Age: 54
End: 2024-08-08
Payer: COMMERCIAL

## 2024-08-08 ENCOUNTER — ANCILLARY PROCEDURE (OUTPATIENT)
Dept: MAMMOGRAPHY | Facility: CLINIC | Age: 54
End: 2024-08-08
Attending: ADVANCED PRACTICE MIDWIFE
Payer: COMMERCIAL

## 2024-08-08 ENCOUNTER — FCC EXTENDED DOCUMENTATION (OUTPATIENT)
Dept: PSYCHOLOGY | Facility: CLINIC | Age: 54
End: 2024-08-08

## 2024-08-08 ENCOUNTER — LAB (OUTPATIENT)
Dept: LAB | Facility: CLINIC | Age: 54
End: 2024-08-08
Attending: ADVANCED PRACTICE MIDWIFE
Payer: COMMERCIAL

## 2024-08-08 ENCOUNTER — OFFICE VISIT (OUTPATIENT)
Dept: OBGYN | Facility: CLINIC | Age: 54
End: 2024-08-08
Attending: ADVANCED PRACTICE MIDWIFE
Payer: COMMERCIAL

## 2024-08-08 VITALS
WEIGHT: 131.3 LBS | DIASTOLIC BLOOD PRESSURE: 73 MMHG | HEART RATE: 73 BPM | HEIGHT: 64 IN | SYSTOLIC BLOOD PRESSURE: 104 MMHG | BODY MASS INDEX: 22.42 KG/M2

## 2024-08-08 DIAGNOSIS — Z12.31 VISIT FOR SCREENING MAMMOGRAM: ICD-10-CM

## 2024-08-08 DIAGNOSIS — N95.2 VAGINAL ATROPHY: ICD-10-CM

## 2024-08-08 DIAGNOSIS — Z83.49 FAMILY HISTORY OF THYROID DISEASE IN MOTHER: ICD-10-CM

## 2024-08-08 DIAGNOSIS — F41.9 ANXIETY: ICD-10-CM

## 2024-08-08 DIAGNOSIS — Z00.00 VISIT FOR PREVENTIVE HEALTH EXAMINATION: Primary | ICD-10-CM

## 2024-08-08 DIAGNOSIS — M25.512 ACUTE PAIN OF LEFT SHOULDER: ICD-10-CM

## 2024-08-08 DIAGNOSIS — N95.1 SYMPTOMATIC MENOPAUSAL OR FEMALE CLIMACTERIC STATES: ICD-10-CM

## 2024-08-08 DIAGNOSIS — Z97.5 IUD (INTRAUTERINE DEVICE) IN PLACE: ICD-10-CM

## 2024-08-08 DIAGNOSIS — Z00.00 VISIT FOR PREVENTIVE HEALTH EXAMINATION: ICD-10-CM

## 2024-08-08 PROBLEM — G58.9 NERVE ENTRAPMENT: Status: ACTIVE | Noted: 2024-06-24

## 2024-08-08 LAB — TSH SERPL DL<=0.005 MIU/L-ACNC: 2.25 UIU/ML (ref 0.3–4.2)

## 2024-08-08 PROCEDURE — 99213 OFFICE O/P EST LOW 20 MIN: CPT | Mod: 25 | Performed by: ADVANCED PRACTICE MIDWIFE

## 2024-08-08 PROCEDURE — 99396 PREV VISIT EST AGE 40-64: CPT | Performed by: ADVANCED PRACTICE MIDWIFE

## 2024-08-08 PROCEDURE — 84443 ASSAY THYROID STIM HORMONE: CPT

## 2024-08-08 PROCEDURE — 36415 COLL VENOUS BLD VENIPUNCTURE: CPT

## 2024-08-08 PROCEDURE — 77063 BREAST TOMOSYNTHESIS BI: CPT | Mod: 26 | Performed by: STUDENT IN AN ORGANIZED HEALTH CARE EDUCATION/TRAINING PROGRAM

## 2024-08-08 PROCEDURE — 77063 BREAST TOMOSYNTHESIS BI: CPT

## 2024-08-08 PROCEDURE — 77067 SCR MAMMO BI INCL CAD: CPT | Mod: 26 | Performed by: STUDENT IN AN ORGANIZED HEALTH CARE EDUCATION/TRAINING PROGRAM

## 2024-08-08 PROCEDURE — 99213 OFFICE O/P EST LOW 20 MIN: CPT | Performed by: ADVANCED PRACTICE MIDWIFE

## 2024-08-08 RX ORDER — ESTRADIOL 10 UG/1
10 INSERT VAGINAL
Qty: 24 TABLET | Refills: 3 | Status: SHIPPED | OUTPATIENT
Start: 2024-08-08

## 2024-08-08 RX ORDER — LORAZEPAM 0.5 MG/1
0.5 TABLET ORAL EVERY 6 HOURS PRN
Qty: 30 TABLET | Refills: 0 | Status: SHIPPED | OUTPATIENT
Start: 2024-08-08

## 2024-08-08 RX ORDER — ESTRADIOL 0.03 MG/D
1 FILM, EXTENDED RELEASE TRANSDERMAL
Qty: 24 PATCH | Refills: 3 | Status: SHIPPED | OUTPATIENT
Start: 2024-08-08

## 2024-08-08 ASSESSMENT — PAIN SCALES - GENERAL: PAINLEVEL: MODERATE PAIN (5)

## 2024-08-08 NOTE — PROGRESS NOTES
New Patient Oncology Nurse Navigator Note     Referring provider:  Nell Dos Santos APRN CNM     Referring Clinic/Organization: [facility]     Referred to (specialty:) Breast Provider Referral      Date Referral Received: August 8, 2024     Evaluation for:  Z00.00 (ICD-10-CM) - Visit for preventive health examination     Clinical History (per Nurse review of records provided):      Kiesha Mcguire is a 53 year old female with a history of intermittent tenderness superiorly in the LEFT breast. Patient has previously had a prominent lymph node at this site.     1/12/2012   Left breast lump evaluated with diagnostic mammogram and ultrasound. A small mass was seen in left breast at 1:00. Ultrasound showed  superficial benign appearing lymph nodes which measures approximately 9 x 3 x 8 mm.      3/7/2013  Patient had complaints of left breast pain. Targeted ultrasound showed normal-appearing fibroglandular tissue. No suspicious sonographic abnormality identified.     5/8/2014   Left breast ultrasound performed to evaluate lateral tender lump. Tender lump corresponds to normal appearing lymph node intramammary location 2:00 position 6 CM from the nipple unchanged in appearance and size compared to January 12, 2012 study.    12/23/2016  Episodic left lateral breast pain. Recently had worsened  although now has mostly resolved. She has had it off and on for the  last several years. Left breast diagnostic mammogram and ultrasound showed no significant change mammographically including the region of pain. A normal-appearing intramammary lymph node is seen in the region of pain but no abnormality is seen.    3/9/203  Bilateral diagnostic mammograms and left breast ultrasound performed to evaluate intermittent tenderness superiorly in the LEFT breast. Patient has previously had a prominent lymph node at this site. Benign-appearing intramammary lymph  node is seen near the marker. No  concerning mammographic/tomographic  findings on either side.     Focussed left breast ultrasound showed benign intramammary lymph  node at 1:00 7 cm from the nipple on the LEFT. No concerning findings identified.     8/8/24 - Bilateral screening mammograms  ACR BI-RADS Category 1: Negative   BREAST CANCER SCREENING RECOMMENDATION: Routine yearly mammography beginning at age 40 or as discussed with your provider     Writer received referral, reviewed for appropriate plan, and referral transferred to New Patient Scheduling for completion.    Records Location: See Bookmarked material     Records Needed: Breast imaging for past 5 years

## 2024-08-08 NOTE — PATIENT INSTRUCTIONS

## 2024-08-08 NOTE — LETTER
2024       RE: Kiesha Mcguire  [address] [facility] [address]     Dear Colleague,    Thank you for referring your patient, Kiesha Mcguire, to the [facility] [place] [facility]. Please see a copy of my visit note below.        Progress Note    SUBJECTIVE:  Kiesha Mcguire is an 53 year old, , who requests an Annual Preventive Exam.       Concerns today include:   Recent foot surgery, right foot, improved  Left shoulder pain, has been seeing PT for a few months  Anxiety up tick, does not want daily meds at this point  Vasomotor symptoms controlled w MHT, up tick in vaginal atrophy, tenderness, dryness    Menstrual History:      2018    11:59 AM 10/12/2020     3:20 PM 2024    10:04 AM   Menstrual History   LAST MENSTRUAL PERIOD  2020    Period Cycle (Days) very light  no period on mirena iud   Method of Contraception Mirena IUD     Menstrual Flow Light     Menstrual Control Panty liner     Dysmenorrhea None     Reviewed Today Yes         Last    Lab Results   Component Value Date    PAP NIL 10/12/2020     History of abnormal Pap smear: No - age 30- 64 PAP with HPV every 5 years recommended    Last   Lab Results   Component Value Date    HPV16 Negative 10/12/2020     Last   Lab Results   Component Value Date    HPV18 Negative 10/12/2020     Last   Lab Results   Component Value Date    HRHPV Negative 10/12/2020       Mammogram current: yes  Last Mammogram:   US Breast Left Limited 1-3 Quadrants    Result Date: 3/9/2023  Narrative: Examination: Bilateral digital diagnostic mammography and digital breast tomosynthesis with computer aided detection, and focused ultrasound of the LEFT breast, 3/9/2023 . Comparison: 2015 and 2022. History: Intermittent tenderness superiorly in the LEFT breast. Patient has previously had a prominent lymph node at this site. BREAST DENSITY: Scattered  fibroglandular densities Findings: Bilateral mammography with digital breast tomosynthesis performed with a square-shaped marker in the upper outer LEFT breast at the patient's area of concern. Benign-appearing intramammary lymph node is seen near the marker. No concerning mammographic/tomographic findings on either side. Focussed ultrasound by radiologist and technologist of the upper outer quadrant of the LEFT breast was performed. Benign intramammary lymph node is seen at 1:00 7 cm from the nipple on the LEFT. No concerning findings identified.        Last Colonoscopy:  Results for orders placed or performed during the hospital encounter of 03/12/24   COLONOSCOPY   Result Value Ref Range    COLONOSCOPY       [facility]  Endoscopy Department-[place]  _______________________________________________________________________________  Patient Name: Kiesha Mcguire       Procedure Date: 3/12/2024 6:54 AM  MRN: [id number]                       YOB: 1970  Admit Type: Outpatient                Age: 53  Gender: Female                        Note Status: Finalized  Attending MD: Emanuel Braden , ,   Instrument Name: [id number]  _______________________________________________________________________________     Procedure:                Colonoscopy  Indications:              Chronic diarrhea, Rectal bleeding  Providers:                Emanuel Braden  Referring MD:             CINDI JAMES  Requesting Provider:      MANJINDER RICHARD  Medicines:                Midazolam 2 mg IV, Fentanyl 100 micrograms IV  Complications:            No immediate complications.  _________________________________________________________ ______________________  Procedure:                Pre-Anesthesia Assessment:                            - Prior to the procedure, a History and Physical                             was performed, and patient medications and                              allergies were reviewed. The risks and benefits of                             the procedure and the sedation options and risks                             were discussed with the patient. All questions were                             answered and informed consent was obtained. Patient                             identification and proposed procedure were verified                             by the physician in the pre-procedure area. Mental                             Status Examination: alert and oriented. Airway                             Examination: normal oropharyngeal airway and neck                             mobility. Respiratory Examination: clear to                             auscultation. CV Examination: normal. Prophylact ic                             Antibiotics: The patient does not require                             prophylactic antibiotics. Prior Anticoagulants: The                             patient has taken no anticoagulant or antiplatelet                             agents. ASA Grade Assessment: II - A patient with                             mild systemic disease. After reviewing the risks                             and benefits, the patient was deemed in                             satisfactory condition to undergo the procedure.                             The anesthesia plan was to use moderate sedation /                             analgesia (conscious sedation). This assessment was                             completed at 07:20 AM, before the administration of                             sedation.                            After obtaining informed consent, the colonoscope                             was passed under direct vision. Throughout the                             procedure,  the patient's blood pressure, pulse, and                             oxygen saturations were monitored continuously. The                             was introduced through the  anus and advanced to the                             terminal ileum, with identification of the                             appendiceal orifice and IC valve. The colonoscopy                             was performed without difficulty. The patient                             tolerated the procedure well. The quality of the                             bowel preparation was excellent. The terminal                             ileum, ileocecal valve, appendiceal orifice, and                             rectum were photographed.                                                                                   Findings:       The perianal and digital rectal examinations were normal. Pertinent        negatives include normal sphincter tone.       The sigmoid colon was moderately tortuous.       The terminal ileum appear ed normal. Biopsies were taken with a cold        forceps for histology.       Biopsies for histology were taken with a cold forceps from the entire        colon for evaluation of microscopic colitis.       The exam was otherwise without abnormality on direct and retroflexion        views.                                                                                   Moderate Sedation:       Moderate (conscious) sedation was administered by the nurse and        supervised by the endoscopist. The patient's oxygen saturation, heart        rate, blood pressure and response to care were monitored. Total        physician intraservice time was 17 minutes.       An independent trained observer was present and continuously monitored        the patient.  Impression:               - Tortuous colon.                            - The examined portion of the ileum was normal.                             Biopsied.                            - The examination was otherwise normal on direct                              and retroflexion views.                            - Biopsies were taken with a cold forceps from the                              entire colon for evaluation of microscopic colitis.  Recommendation:           - Patient has a contact number available for                             emergencies. The signs and symptoms of potential                             delayed complications were discussed with the                             patient. Return to normal activities tomorrow.                             Written discharge instructions were provided to the                             patient.                            - Resume previous diet.                            - Continue present medications.                            - Await pathology results.                            - Repeat colonoscopy in 5 years for adenoma                             surveillance.                            - Return to GI office as previously scheduled.                                                                                      Emanuel Braden MD  ______________________  Emanuel Braden,   3/12/2024 7:48:14 AM  I was physically present for the entire viewing portion of the exam.Emanuel Braden  Number of Addenda: 0    Note Initiated On: 3/12/2024 6:54 AM  Scope In:  Scope Out:           HISTORY:  Prescription Medications as of 8/14/2024         Rx Number Disp Refills Start End Last Dispensed Date Next Fill Date Owning Pharmacy    Acetaminophen (TYLENOL PO)  -- --  --       Class: Historical    Route: Oral    Biotin 10 MG TABS tablet  -- --  --       Sig: Take 10,000 mcg by mouth daily    Class: Historical    Route: Oral    busPIRone (BUSPAR) 10 MG tablet  60 tablet 2 8/31/2023 --   [facility]/pharmacy #[id number] - [facility] [address] [place] [address] [address]    Sig: Take 1 tablet (10 mg) by mouth 2 times daily    Class: E-Prescribe    Route: Oral    CALCIUM-VITAMIN D-VITAMIN K PO  -- --  --       Class: Historical    Route: Oral    Collagen Hydrolysate POWD  -- --  --       Class: Historical    Route: Does not apply     Cyanocobalamin (VITAMIN B 12 PO)  -- --  --       Sig: Take by mouth daily     Class: Historical    Route: Oral    cyclobenzaprine (FLEXERIL) 10 MG tablet  30 tablet 0 9/8/2023 --   [facility]/pharmacy #[address] [place], [place] [address] [address]    Sig: Take 0.5 tablets (5 mg) by mouth 3 times daily as needed for muscle spasms    Class: E-Prescribe    Route: Oral    estradiol (VAGIFEM) 10 MCG TABS vaginal tablet  24 tablet 3 8/8/2024 --   [facility]/pharmacy #[address] [place] [address] [address]    Sig: Place 1 tablet (10 mcg) vaginally twice a week    Class: E-Prescribe    Route: Vaginal    estradiol (VIVELLE-DOT) 0.025 MG/24HR bi-weekly patch  24 patch 3 8/8/2024 --   [facility]/pharmacy #[address] [place] [address] [address]    Sig: Place 1 patch onto the skin twice a week    Class: E-Prescribe    Route: Transdermal    hydroxychloroquine (PLAQUENIL) 200 MG tablet  180 tablet 3 2/28/2024 --   [facility] # [address] [place] [address] [address]    Sig: Take 1 tablet (200 mg) by mouth 2 times daily    Class: E-Prescribe    Route: Oral    levonorgestrel (MIRENA) 52 MG (20 mcg/day) IUD  -- -- 10/12/2022 10/12/2030       Sig: by Intrauterine route once    Class: Historical    Route: Intrauterine    lisdexamfetamine (VYVANSE) 10 MG capsule  30 capsule 0 8/11/2024 9/10/2024   [facility] # [address] [place] [address] [address]    Sig: Take 1 capsule (10 mg) by mouth daily for 30 days    Class: E-Prescribe    Earliest Fill Date: 8/8/2024    Route: Oral    LORazepam (ATIVAN) 0.5 MG tablet  30 tablet 0 8/8/2024 --   [facility]/pharmacy [address] [place] [address] [address]    Sig: Take 1 tablet (0.5 mg) by mouth every 6 hours as needed for anxiety    Class: E-Prescribe    Route: Oral    Lysine 500 MG TABS  -- --  --       Sig: Take 1,000 mg by mouth daily     Class: Historical    Route: Oral    magnesium citrate solution  -- --  --       Sig: Takes 1 tsp twice a  day, 150 mg daily    Class: Historical    Route: Oral    NONFORMULARY  -- --  --       Si capsule daily carmen    Class: Historical    NONFORMULARY  -- --  --   [facility] [address]    Si tablets daily [place]orn    Class: Historical    UNABLE TO FIND  -- --  --       Sig: Take by mouth daily MEDICATION NAME: Folinic Acid    Class: Historical    Route: Oral    UNABLE TO FIND  -- --  --       Sig: daily Ashwaganda - patient reported    Class: Historical    UNABLE TO FIND  -- --  --       Sig: daily Rhodiola- patient reported    Class: Historical    valACYclovir (VALTREX) 1000 mg tablet  12 tablet 11 2021 --   [address]    Sig: Take 2 tablets (2,000 mg) by mouth 2 times daily For 2 days as needed for cold sores    Class: E-Prescribe    Route: Oral    valACYclovir (VALTREX) 500 MG tablet  90 tablet 1 2024 --   [facility] # [id number] - [place], [place] - [address]    Sig: Take 1 tablet (500 mg) by mouth daily    Class: E-Prescribe    Route: Oral          Allergies   Allergen Reactions     Triptans Unknown     imitrex     Amoxicillin-Pot Clavulanate Hives     Unsure if true reaction to med      Ciprofloxacin Hives and Itching     Patient reported- possible reaction, patient unsure      Immunization History   Administered Date(s) Administered     COVID-19 12+ () (MODERNA) 2023     COVID-19 Bivalent 18+ (Moderna) 2022     COVID-19 MONOVALENT 12+ (Pfizer) 2021, 2021     COVID-19 Monovalent 12+ (Pfizer ) 2022     COVID-19 Monovalent 18+ (Moderna) 2021     Influenza (IIV3) PF 2010     Influenza Vaccine 18-64 (Flublok) 2021, 2022, 2023     Influenza Vaccine >6 months,quad, PF 10/14/2018, 10/18/2019     MMR 10/11/2019     TD,PF 7+ (Tenivac) 2004     TDAP Vaccine (Adacel) 2019     Twinrix A/B 2019, 10/30/2019      Typhoid IM 2019     Zoster recombinant adjuvanted (SHINGRIX) 2024       OB History    Para Term  AB Living   3 2 2 0 1 2   SAB IAB Ectopic Multiple Live Births   0 0 0 0 2     Past Medical History:   Diagnosis Date     Depressive disorder      Depressive disorder, not elsewhere classified     resolved     Herpes simplex without mention of complication     oral     IUD (intrauterine device) in place 08/15/2013    Mirena     Migraine headache with aura     very occass, sig aura, speech loss x1     Pure hypercholesterolemia     follows w BIPIN CHATMAN     Past Surgical History:   Procedure Laterality Date     COLONOSCOPY N/A 10/27/2021    Procedure: COLONOSCOPY, WITH POLYPECTOMY AND CLIP;  Surgeon: Og Gutierres MD;  Location: [facility] OR     COLONOSCOPY N/A 2024    Procedure: COLONOSCOPY, WITH POLYPECTOMY AND BIOPSY;  Surgeon: Emanuel Braden MD;  Location: [facility] OR     COLONOSCOPY WITH CO2 INSUFFLATION N/A 2024    Procedure: Colonoscopy with CO2 insufflation;  Surgeon: Emanuel Braden MD;  Location: [facility] OR     HC DILATION/CURETTAGE DIAG/THER NON OB  2006     LAPAROSCOPIC CHOLECYSTECTOMY N/A 2021    Procedure: CHOLECYSTECTOMY, LAPAROSCOPIC;  Surgeon: Denise Cast MD;  Location: [facility] OR     SOFT TISSUE SURGERY  2024     Family History   Problem Relation Age of Onset     Hypertension Mother      Hyperlipidemia Mother      Depression Mother      Squamous cell carcinoma Mother      Thyroid Disease Mother         unclear dx     Hypertension Father      Cancer Father 65        neuro endrocine CA, neck     Hyperlipidemia Father      Aortic aneurysm Maternal Grandfather          of ascending aortic aneurysm at age 64     Aortic aneurysm Maternal Aunt          in her 40s from ascending aortic aneurysm     C.A.D. No family hx of      Cancer - colorectal No family hx of      Diabetes No family hx of      Cerebrovascular Disease No family hx of       Breast Cancer No family hx of      Prostate Cancer No family hx of      Social History     Socioeconomic History     Marital status:      Spouse name: None     Number of children: 2     Years of education: 16     Highest education level: None   Occupational History     Occupation:      Employer: [employer]     Comment: fulltime   Tobacco Use     Smoking status: Former     Current packs/day: 0.00     Average packs/day: 0.5 packs/day for 10.0 years (5.0 ttl pk-yrs)     Types: Cigarettes     Start date: 1984     Quit date: 1994     Years since quittin.1     Passive exposure: Past     Smokeless tobacco: Never   Vaping Use     Vaping status: Never Used   Substance and Sexual Activity     Alcohol use: Not Currently     Comment: very rare     Drug use: No     Sexual activity: Not Currently     Partners: Male     Birth control/protection: I.U.D.     Comment: Mirena   Other Topics Concern     Parent/sibling w/ CABG, MI or angioplasty before 65F 55M? No   Social History Narrative    How much exercise per week? 2 90 minute yoga sessions      How much calcium per day? Diet       How much caffeine per day? 0    How much vitamin D per day? Supplement     Do you/your family wear seatbelts?  Yes    Do you/your family use safety helmets? Yes    Do you/your family use sunscreen? Yes    Do you/your family keep firearms in the home? No    Do you/your family have a smoke detector(s)? Yes        Do you feel safe in your home? Yes    Has anyone ever touched you in an unwanted manner? No     Explain Kiesha [facility] 18        Reviewed [facility] 10-12-20             Social Determinants of Health     Financial Resource Strain: Low Risk  (2024)    Financial Resource Strain      Within the past 12 months, have you or your family members you live with been unable to get utilities (heat, electricity) when it was really needed?: No   Food Insecurity: Low Risk  (2024)    Food Insecurity       Within the past 12 months, did you worry that your food would run out before you got money to buy more?: No      Within the past 12 months, did the food you bought just not last and you didn t have money to get more?: No   Transportation Needs: Low Risk  (1/4/2024)    Transportation Needs      Within the past 12 months, has lack of transportation kept you from medical appointments, getting your medicines, non-medical meetings or appointments, work, or from getting things that you need?: No   Physical Activity: Insufficiently Active (10/12/2020)    Exercise Vital Sign      Days of Exercise per Week: 1 day      Minutes of Exercise per Session: 10 min   Stress: Stress Concern Present (10/12/2020)    [place] [employer] - Occupational Stress Questionnaire      Feeling of Stress : To some extent   Social Connections: Socially Isolated (10/12/2020)    Social Connection and Isolation Panel [NHANES]      Frequency of Communication with Friends and Family: Three times a week      Frequency of Social Gatherings with Friends and Family: Twice a week      Attends Restorationist Services: Never      Active Member of Clubs or Organizations: No      Attends Club or Organization Meetings: Never      Marital Status:    Interpersonal Safety: Low Risk  (3/7/2024)    Interpersonal Safety      Do you feel physically and emotionally safe where you currently live?: Yes      Within the past 12 months, have you been hit, slapped, kicked or otherwise physically hurt by someone?: No      Within the past 12 months, have you been humiliated or emotionally abused in other ways by your partner or ex-partner?: No   Housing Stability: Low Risk  (1/4/2024)    Housing Stability      Do you have housing? : Yes      Are you worried about losing your housing?: No       ROS      2/27/2023     9:52 AM 8/10/2023    12:49 PM 3/7/2024     7:53 AM   PHQ-9 SCORE   PHQ-9 Total Score MyChart 2 (Minimal depression) 3 (Minimal depression) 1  "(Minimal depression)   PHQ-9 Total Score 2 3 1         EXAM:  Blood pressure 104/73, pulse 73, height 1.62 m (5' 3.78\"), weight 59.6 kg (131 lb 4.8 oz), not currently breastfeeding. Body mass index is 22.69 kg/m .  General - pleasant female in no acute distress.  Skin - no suspicious lesions or rashes  EENT-  PERRLA, euthyroid with out palpable nodules  Neck - supple without lymphadenopathy.  Lungs - clear to auscultation bilaterally.  Heart - regular rate and rhythm without murmur.  Abdomen - soft, nontender, nondistended, no masses or organomegaly noted.  Musculoskeletal - no gross deformities.  Neurological - normal strength, sensation, and mental status.    Breast Exam:  Deferred, just had mammo    Pelvic Exam: deferred, no concerns        ASSESSMENT:    Encounter Diagnoses   Name Primary?     Visit for preventive health examination Yes     IUD (intrauterine device) in place      Acute pain of left shoulder      Symptomatic menopausal or female climacteric states      Anxiety      Vaginal atrophy      Family history of thyroid disease in mother           PLAN:   Orders Placed This Encounter   Procedures     TSH with free T4 reflex     Orthopedic  Referral     Breast Provider  Referral    (Z00.00) Visit for preventive health examination  (primary encounter diagnosis)  Comment:   Plan: estradiol (VAGIFEM) 10 MCG TABS vaginal tablet,        Orthopedic  Referral, Breast Provider          Referral, LORazepam (ATIVAN) 0.5 MG         tablet, TSH with free T4 reflex            (Z97.5) IUD (intrauterine device) in place  Comment:   Plan:     (M25.512) Acute pain of left shoulder  Comment:   Plan: Orthopedic  Referral            (N95.1) Symptomatic menopausal or female climacteric states  Comment:  Plan: estradiol (VAGIFEM) 10 MCG TABS vaginal tablet,        estradiol (VIVELLE-DOT) 0.025 MG/24HR bi-weekly        patch           (F41.9) Anxiety  Comment:   Plan: LORazepam " (ATIVAN) 0.5 MG tablet      Discuss minimal use recommended, if she identifies more need, will consider daily med    (N95.2) Vaginal atrophy  Comment:   Plan: estradiol (VAGIFEM) 10 MCG TABS vaginal tablet      (Z83.49) Family history of thyroid disease in mother    Plan: TSH with free T4 reflex                Additional teaching done at this visit regarding self breast exam, perimenopause, and mental health.    Return to clinic in one year.  Follow-up as needed.  Nell Dos Santos, SHAYY, APRN, CNM, FACNM              Again, thank you for allowing me to participate in the care of your patient.      Sincerely,    JERAMY Guerin CNM

## 2024-08-08 NOTE — PROGRESS NOTES
Progress Note    SUBJECTIVE:  Kiesha Mcguire is an 53 year old, , who requests an Annual Preventive Exam.       Concerns today include:   Recent foot surgery, right foot, improved  Left shoulder pain, has been seeing PT for a few months  Anxiety up tick, does not want daily meds at this point  Vasomotor symptoms controlled w MHT, up tick in vaginal atrophy, tenderness, dryness    Menstrual History:      2018    11:59 AM 10/12/2020     3:20 PM 2024    10:04 AM   Menstrual History   LAST MENSTRUAL PERIOD  2020    Period Cycle (Days) very light  no period on mirena iud   Method of Contraception Mirena IUD     Menstrual Flow Light     Menstrual Control Panty liner     Dysmenorrhea None     Reviewed Today Yes         Last    Lab Results   Component Value Date    PAP NIL 10/12/2020     History of abnormal Pap smear: No - age 30- 64 PAP with HPV every 5 years recommended    Last   Lab Results   Component Value Date    HPV16 Negative 10/12/2020     Last   Lab Results   Component Value Date    HPV18 Negative 10/12/2020     Last   Lab Results   Component Value Date    HRHPV Negative 10/12/2020       Mammogram current: yes  Last Mammogram:   US Breast Left Limited 1-3 Quadrants    Result Date: 3/9/2023  Narrative: Examination: Bilateral digital diagnostic mammography and digital breast tomosynthesis with computer aided detection, and focused ultrasound of the LEFT breast, 3/9/2023 . Comparison: 2015 and 2022. History: Intermittent tenderness superiorly in the LEFT breast. Patient has previously had a prominent lymph node at this site. BREAST DENSITY: Scattered fibroglandular densities Findings: Bilateral mammography with digital breast tomosynthesis performed with a square-shaped marker in the upper outer LEFT breast at the patient's area of concern. Benign-appearing intramammary lymph node is seen near the marker. No concerning mammographic/tomographic findings on either side.  Focussed ultrasound by radiologist and technologist of the upper outer quadrant of the LEFT breast was performed. Benign intramammary lymph node is seen at 1:00 7 cm from the nipple on the LEFT. No concerning findings identified.        Last Colonoscopy:  Results for orders placed or performed during the hospital encounter of 03/12/24   COLONOSCOPY   Result Value Ref Range    COLONOSCOPY       [facility]  Endoscopy Department-[place]  _______________________________________________________________________________  Patient Name: Kiesha Mcguire       Procedure Date: 3/12/2024 6:54 AM  MRN: [id number]                       YOB: 1970  Admit Type: Outpatient                Age: 53  Gender: Female                        Note Status: Finalized  Attending MD: Emanuel Braden , ,   Instrument Name: [id number]  _______________________________________________________________________________     Procedure:                Colonoscopy  Indications:              Chronic diarrhea, Rectal bleeding  Providers:                Emanuel Braden  Referring MD:             CINDI JAMES  Requesting Provider:      MANJINDER RICHARD  Medicines:                Midazolam 2 mg IV, Fentanyl 100 micrograms IV  Complications:            No immediate complications.  _________________________________________________________ ______________________  Procedure:                Pre-Anesthesia Assessment:                            - Prior to the procedure, a History and Physical                             was performed, and patient medications and                             allergies were reviewed. The risks and benefits of                             the procedure and the sedation options and risks                             were discussed with the patient. All questions were                             answered and informed consent was obtained. Patient                              identification and proposed procedure were verified                             by the physician in the pre-procedure area. Mental                             Status Examination: alert and oriented. Airway                             Examination: normal oropharyngeal airway and neck                             mobility. Respiratory Examination: clear to                             auscultation. CV Examination: normal. Prophylact ic                             Antibiotics: The patient does not require                             prophylactic antibiotics. Prior Anticoagulants: The                             patient has taken no anticoagulant or antiplatelet                             agents. ASA Grade Assessment: II - A patient with                             mild systemic disease. After reviewing the risks                             and benefits, the patient was deemed in                             satisfactory condition to undergo the procedure.                             The anesthesia plan was to use moderate sedation /                             analgesia (conscious sedation). This assessment was                             completed at 07:20 AM, before the administration of                             sedation.                            After obtaining informed consent, the colonoscope                             was passed under direct vision. Throughout the                             procedure,  the patient's blood pressure, pulse, and                             oxygen saturations were monitored continuously. The                             was introduced through the anus and advanced to the                             terminal ileum, with identification of the                             appendiceal orifice and IC valve. The colonoscopy                             was performed without difficulty. The patient                             tolerated the procedure well. The quality of  the                             bowel preparation was excellent. The terminal                             ileum, ileocecal valve, appendiceal orifice, and                             rectum were photographed.                                                                                   Findings:       The perianal and digital rectal examinations were normal. Pertinent        negatives include normal sphincter tone.       The sigmoid colon was moderately tortuous.       The terminal ileum appear ed normal. Biopsies were taken with a cold        forceps for histology.       Biopsies for histology were taken with a cold forceps from the entire        colon for evaluation of microscopic colitis.       The exam was otherwise without abnormality on direct and retroflexion        views.                                                                                   Moderate Sedation:       Moderate (conscious) sedation was administered by the nurse and        supervised by the endoscopist. The patient's oxygen saturation, heart        rate, blood pressure and response to care were monitored. Total        physician intraservice time was 17 minutes.       An independent trained observer was present and continuously monitored        the patient.  Impression:               - Tortuous colon.                            - The examined portion of the ileum was normal.                             Biopsied.                            - The examination was otherwise normal on direct                              and retroflexion views.                            - Biopsies were taken with a cold forceps from the                             entire colon for evaluation of microscopic colitis.  Recommendation:           - Patient has a contact number available for                             emergencies. The signs and symptoms of potential                             delayed complications were discussed with the                              patient. Return to normal activities tomorrow.                             Written discharge instructions were provided to the                             patient.                            - Resume previous diet.                            - Continue present medications.                            - Await pathology results.                            - Repeat colonoscopy in 5 years for adenoma                             surveillance.                            - Return to GI office as previously scheduled.                                                                                      Emanuel Braden MD  ______________________  Emanuel Braden,   3/12/2024 7:48:14 AM  I was physically present for the entire viewing portion of the exam.Emanuel Braden  Number of Addenda: 0    Note Initiated On: 3/12/2024 6:54 AM  Scope In:  Scope Out:           HISTORY:  Prescription Medications as of 8/14/2024         Rx Number Disp Refills Start End Last Dispensed Date Next Fill Date Owning Pharmacy    Acetaminophen (TYLENOL PO)  -- --  --       Class: Historical    Route: Oral    Biotin 10 MG TABS tablet  -- --  --       Sig: Take 10,000 mcg by mouth daily    Class: Historical    Route: Oral    busPIRone (BUSPAR) 10 MG tablet  60 tablet 2 8/31/2023 --   [facility]/pharmacy #[address] [place] [address] [address]    Sig: Take 1 tablet (10 mg) by mouth 2 times daily    Class: E-Prescribe    Route: Oral    CALCIUM-VITAMIN D-VITAMIN K PO  -- --  --       Class: Historical    Route: Oral    Collagen Hydrolysate POWD  -- --  --       Class: Historical    Route: Does not apply    Cyanocobalamin (VITAMIN B 12 PO)  -- --  --       Sig: Take by mouth daily     Class: Historical    Route: Oral    cyclobenzaprine (FLEXERIL) 10 MG tablet  30 tablet 0 9/8/2023 --   [facility]/pharmacy #[address] [place] [address] [address]    Sig: Take 0.5 tablets (5 mg) by mouth 3 times daily as needed for  muscle spasms    Class: E-Prescribe    Route: Oral    estradiol (VAGIFEM) 10 MCG TABS vaginal tablet  24 tablet 3 2024 --   [facility]/pharmacy #[address] [place] [address] [address]    Sig: Place 1 tablet (10 mcg) vaginally twice a week    Class: E-Prescribe    Route: Vaginal    estradiol (VIVELLE-DOT) 0.025 MG/24HR bi-weekly patch  24 patch 3 2024 --   [facility]/pharmacy #[address] [place], [place] [id number]    Sig: Place 1 patch onto the skin twice a week    Class: E-Prescribe    Route: Transdermal    hydroxychloroquine (PLAQUENIL) 200 MG tablet  180 tablet 3 2024 --   [facility] # [address] [place], [place] [address]    Sig: Take 1 tablet (200 mg) by mouth 2 times daily    Class: E-Prescribe    Route: Oral    levonorgestrel (MIRENA) 52 MG (20 mcg/day) IUD  -- -- 10/12/2022 10/12/2030       Sig: by Intrauterine route once    Class: Historical    Route: Intrauterine    lisdexamfetamine (VYVANSE) 10 MG capsule  30 capsule 0 2024 9/10/2024   [facility] # [address] [place] [address] [address]    Sig: Take 1 capsule (10 mg) by mouth daily for 30 days    Class: E-Prescribe    Earliest Fill Date: 2024    Route: Oral    LORazepam (ATIVAN) 0.5 MG tablet  30 tablet 0 2024 --   [facility]/pharmacy #[address] [place] [address] [address]    Sig: Take 1 tablet (0.5 mg) by mouth every 6 hours as needed for anxiety    Class: E-Prescribe    Route: Oral    Lysine 500 MG TABS  -- --  --       Sig: Take 1,000 mg by mouth daily     Class: Historical    Route: Oral    magnesium citrate solution  -- --  --       Sig: Takes 1 tsp twice a day, 150 mg daily    Class: Historical    Route: Oral    NONFORMULARY  -- --  --       Si capsule daily carmen    Class: Historical    NONFORMULARY  -- --  --   [facility] - [address]    Si tablets daily jozef    Class: Historical    UNABLE TO FIND  -- --  --        Sig: Take by mouth daily MEDICATION NAME: Folinic Acid    Class: Historical    Route: Oral    UNABLE TO FIND  -- --  --       Sig: daily Ashwaganda - patient reported    Class: Historical    UNABLE TO FIND  -- --  --       Sig: daily Rhodiola- patient reported    Class: Historical    valACYclovir (VALTREX) 1000 mg tablet  12 tablet 11 2021 --   [facility] - [place], [place] - [address]    Sig: Take 2 tablets (2,000 mg) by mouth 2 times daily For 2 days as needed for cold sores    Class: E-Prescribe    Route: Oral    valACYclovir (VALTREX) 500 MG tablet  90 tablet 1 2024 --   [facility] # [id number] - [place], [place] - [address]    Sig: Take 1 tablet (500 mg) by mouth daily    Class: E-Prescribe    Route: Oral          Allergies   Allergen Reactions    Triptans Unknown     imitrex    Amoxicillin-Pot Clavulanate Hives     Unsure if true reaction to med     Ciprofloxacin Hives and Itching     Patient reported- possible reaction, patient unsure      Immunization History   Administered Date(s) Administered    COVID-19 12+ (-) (MODERNA) 2023    COVID-19 Bivalent 18+ (Moderna) 2022    COVID-19 MONOVALENT 12+ (Pfizer) 2021, 2021    COVID-19 Monovalent 12+ (Pfizer ) 2022    COVID-19 Monovalent 18+ (Moderna) 2021    Influenza (IIV3) PF 2010    Influenza Vaccine 18-64 (Flublok) 2021, 2022, 2023    Influenza Vaccine >6 months,quad, PF 10/14/2018, 10/18/2019    MMR 10/11/2019    TD,PF 7+ (Tenivac) 2004    TDAP Vaccine (Adacel) 2019    Twinrix A/B 2019, 10/30/2019    Typhoid IM 2019    Zoster recombinant adjuvanted (SHINGRIX) 2024       OB History    Para Term  AB Living   3 2 2 0 1 2   SAB IAB Ectopic Multiple Live Births   0 0 0 0 2     Past Medical History:   Diagnosis Date    Depressive disorder     Depressive disorder, not elsewhere classified     resolved    Herpes  simplex without mention of complication     oral    IUD (intrauterine device) in place 08/15/2013    Mirena    Migraine headache with aura     very occass, sig aura, speech loss x1    Pure hypercholesterolemia     follows w BIPIN CHATMAN     Past Surgical History:   Procedure Laterality Date    COLONOSCOPY N/A 10/27/2021    Procedure: COLONOSCOPY, WITH POLYPECTOMY AND CLIP;  Surgeon: Og Gutierres MD;  Location: [facility] OR    COLONOSCOPY N/A 2024    Procedure: COLONOSCOPY, WITH POLYPECTOMY AND BIOPSY;  Surgeon: Emanuel Braden MD;  Location: [facility] OR    COLONOSCOPY WITH CO2 INSUFFLATION N/A 2024    Procedure: Colonoscopy with CO2 insufflation;  Surgeon: Emanuel Braden MD;  Location: [facility] OR    HC DILATION/CURETTAGE DIAG/THER NON OB  2006    LAPAROSCOPIC CHOLECYSTECTOMY N/A 2021    Procedure: CHOLECYSTECTOMY, LAPAROSCOPIC;  Surgeon: Denise Cast MD;  Location: [facility] OR    SOFT TISSUE SURGERY  2024     Family History   Problem Relation Age of Onset    Hypertension Mother     Hyperlipidemia Mother     Depression Mother     Squamous cell carcinoma Mother     Thyroid Disease Mother         unclear dx    Hypertension Father     Cancer Father 65        neuro endrocine CA, neck    Hyperlipidemia Father     Aortic aneurysm Maternal Grandfather          of ascending aortic aneurysm at age 64    Aortic aneurysm Maternal Aunt          in her 40s from ascending aortic aneurysm    C.A.D. No family hx of     Cancer - colorectal No family hx of     Diabetes No family hx of     Cerebrovascular Disease No family hx of     Breast Cancer No family hx of     Prostate Cancer No family hx of      Social History     Socioeconomic History    Marital status:      Spouse name: None    Number of children: 2    Years of education: 16    Highest education level: None   Occupational History    Occupation:      Employer: [employer]     Comment: fulltime   Tobacco Use    Smoking  status: Former     Current packs/day: 0.00     Average packs/day: 0.5 packs/day for 10.0 years (5.0 ttl pk-yrs)     Types: Cigarettes     Start date: 1984     Quit date: 1994     Years since quittin.1     Passive exposure: Past    Smokeless tobacco: Never   Vaping Use    Vaping status: Never Used   Substance and Sexual Activity    Alcohol use: Not Currently     Comment: very rare    Drug use: No    Sexual activity: Not Currently     Partners: Male     Birth control/protection: I.U.D.     Comment: Mirena   Other Topics Concern    Parent/sibling w/ CABG, MI or angioplasty before 65F 55M? No   Social History Narrative    How much exercise per week? 2 90 minute yoga sessions      How much calcium per day? Diet       How much caffeine per day? 0    How much vitamin D per day? Supplement     Do you/your family wear seatbelts?  Yes    Do you/your family use safety helmets? Yes    Do you/your family use sunscreen? Yes    Do you/your family keep firearms in the home? No    Do you/your family have a smoke detector(s)? Yes        Do you feel safe in your home? Yes    Has anyone ever touched you in an unwanted manner? No     Explain Kiesha [facility] 18        Reviewed [facility] 10-12-20             Social Determinants of Health     Financial Resource Strain: Low Risk  (2024)    Financial Resource Strain     Within the past 12 months, have you or your family members you live with been unable to get utilities (heat, electricity) when it was really needed?: No   Food Insecurity: Low Risk  (2024)    Food Insecurity     Within the past 12 months, did you worry that your food would run out before you got money to buy more?: No     Within the past 12 months, did the food you bought just not last and you didn t have money to get more?: No   Transportation Needs: Low Risk  (2024)    Transportation Needs     Within the past 12 months, has lack of transportation kept you from medical appointments, getting  "your medicines, non-medical meetings or appointments, work, or from getting things that you need?: No   Physical Activity: Insufficiently Active (10/12/2020)    Exercise Vital Sign     Days of Exercise per Week: 1 day     Minutes of Exercise per Session: 10 min   Stress: Stress Concern Present (10/12/2020)    [employer] [place] [employer] - Occupational Stress Questionnaire     Feeling of Stress : To some extent   Social Connections: Socially Isolated (10/12/2020)    Social Connection and Isolation Panel [NHANES]     Frequency of Communication with Friends and Family: Three times a week     Frequency of Social Gatherings with Friends and Family: Twice a week     Attends Yazidism Services: Never     Active Member of Clubs or Organizations: No     Attends Club or Organization Meetings: Never     Marital Status:    Interpersonal Safety: Low Risk  (3/7/2024)    Interpersonal Safety     Do you feel physically and emotionally safe where you currently live?: Yes     Within the past 12 months, have you been hit, slapped, kicked or otherwise physically hurt by someone?: No     Within the past 12 months, have you been humiliated or emotionally abused in other ways by your partner or ex-partner?: No   Housing Stability: Low Risk  (1/4/2024)    Housing Stability     Do you have housing? : Yes     Are you worried about losing your housing?: No       ROS      2/27/2023     9:52 AM 8/10/2023    12:49 PM 3/7/2024     7:53 AM   PHQ-9 SCORE   PHQ-9 Total Score MyChart 2 (Minimal depression) 3 (Minimal depression) 1 (Minimal depression)   PHQ-9 Total Score 2 3 1         EXAM:  Blood pressure 104/73, pulse 73, height 1.62 m (5' 3.78\"), weight 59.6 kg (131 lb 4.8 oz), not currently breastfeeding. Body mass index is 22.69 kg/m .  General - pleasant female in no acute distress.  Skin - no suspicious lesions or rashes  EENT-  PERRLA, euthyroid with out palpable nodules  Neck - supple without lymphadenopathy.  Lungs - " clear to auscultation bilaterally.  Heart - regular rate and rhythm without murmur.  Abdomen - soft, nontender, nondistended, no masses or organomegaly noted.  Musculoskeletal - no gross deformities.  Neurological - normal strength, sensation, and mental status.    Breast Exam:  Deferred, just had mammo    Pelvic Exam: deferred, no concerns        ASSESSMENT:    Encounter Diagnoses   Name Primary?    Visit for preventive health examination Yes    IUD (intrauterine device) in place     Acute pain of left shoulder     Symptomatic menopausal or female climacteric states     Anxiety     Vaginal atrophy     Family history of thyroid disease in mother           PLAN:   Orders Placed This Encounter   Procedures    TSH with free T4 reflex    Orthopedic  Referral    Breast Provider  Referral    (Z00.00) Visit for preventive health examination  (primary encounter diagnosis)  Comment:   Plan: estradiol (VAGIFEM) 10 MCG TABS vaginal tablet,        Orthopedic  Referral, Breast Provider          Referral, LORazepam (ATIVAN) 0.5 MG         tablet, TSH with free T4 reflex            (Z97.5) IUD (intrauterine device) in place  Comment:   Plan:     (M25.512) Acute pain of left shoulder  Comment:   Plan: Orthopedic  Referral            (N95.1) Symptomatic menopausal or female climacteric states  Comment:  Plan: estradiol (VAGIFEM) 10 MCG TABS vaginal tablet,        estradiol (VIVELLE-DOT) 0.025 MG/24HR bi-weekly        patch           (F41.9) Anxiety  Comment:   Plan: LORazepam (ATIVAN) 0.5 MG tablet      Discuss minimal use recommended, if she identifies more need, will consider daily med    (N95.2) Vaginal atrophy  Comment:   Plan: estradiol (VAGIFEM) 10 MCG TABS vaginal tablet      (Z83.49) Family history of thyroid disease in mother    Plan: TSH with free T4 reflex                Additional teaching done at this visit regarding self breast exam, perimenopause, and mental  health.    Return to clinic in one year.  Follow-up as needed.  Nell Dos Santos, DNP, APRN, CNM, FACNM
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
## 2024-08-09 ENCOUNTER — VIRTUAL VISIT (OUTPATIENT)
Dept: PSYCHOLOGY | Facility: CLINIC | Age: 54
End: 2024-08-09
Payer: COMMERCIAL

## 2024-08-09 DIAGNOSIS — F41.1 GENERALIZED ANXIETY DISORDER: Primary | ICD-10-CM

## 2024-08-09 DIAGNOSIS — F43.89 REACTION TO CHRONIC STRESS: ICD-10-CM

## 2024-08-09 PROCEDURE — 90791 PSYCH DIAGNOSTIC EVALUATION: CPT | Mod: 95 | Performed by: SOCIAL WORKER

## 2024-08-09 ASSESSMENT — COLUMBIA-SUICIDE SEVERITY RATING SCALE - C-SSRS
TOTAL  NUMBER OF ABORTED OR SELF INTERRUPTED ATTEMPTS SINCE LAST CONTACT: NO
ATTEMPT SINCE LAST CONTACT: NO
1. SINCE LAST CONTACT, HAVE YOU WISHED YOU WERE DEAD OR WISHED YOU COULD GO TO SLEEP AND NOT WAKE UP?: NO
TOTAL  NUMBER OF INTERRUPTED ATTEMPTS SINCE LAST CONTACT: NO
6. HAVE YOU EVER DONE ANYTHING, STARTED TO DO ANYTHING, OR PREPARED TO DO ANYTHING TO END YOUR LIFE?: NO
2. HAVE YOU ACTUALLY HAD ANY THOUGHTS OF KILLING YOURSELF?: NO
SUICIDE, SINCE LAST CONTACT: NO
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
## 2024-08-21 ENCOUNTER — OFFICE VISIT (OUTPATIENT)
Dept: DERMATOLOGY | Facility: CLINIC | Age: 54
End: 2024-08-21
Attending: PHYSICIAN ASSISTANT
Payer: COMMERCIAL

## 2024-08-21 DIAGNOSIS — L66.12 FRONTAL FIBROSING ALOPECIA: ICD-10-CM

## 2024-08-21 DIAGNOSIS — L65.9 ALOPECIA: Primary | ICD-10-CM

## 2024-08-21 PROCEDURE — 99213 OFFICE O/P EST LOW 20 MIN: CPT | Mod: 25 | Performed by: PHYSICIAN ASSISTANT

## 2024-08-21 PROCEDURE — 11900 INJECT SKIN LESIONS </W 7: CPT | Performed by: PHYSICIAN ASSISTANT

## 2024-08-21 NOTE — PROGRESS NOTES
Kiesha Mcguire is a pleasant 53 year old year old female patient here today to recheck frontal fibrosing alopecia. She notes that she taking Plaquenil, last eye exam was February which was normal. She notes hair loss has stabilized around hairline.  Patient has no other skin complaints today.  Remainder of the HPI, Meds, PMH, Allergies, FH, and SH was reviewed in chart.    Past Medical History:   Diagnosis Date    Depressive disorder     Depressive disorder, not elsewhere classified     resolved    Herpes simplex without mention of complication     oral    IUD (intrauterine device) in place 08/15/2013    Mirena    Migraine headache with aura     very occass, sig aura, speech loss x1    Pure hypercholesterolemia     follows kate VOSS MD       Past Surgical History:   Procedure Laterality Date    COLONOSCOPY N/A 10/27/2021    Procedure: COLONOSCOPY, WITH POLYPECTOMY AND CLIP;  Surgeon: Og Gutierres MD;  Location: [facility] OR    COLONOSCOPY N/A 2024    Procedure: COLONOSCOPY, WITH POLYPECTOMY AND BIOPSY;  Surgeon: Emanuel Braden MD;  Location: [facility]    COLONOSCOPY WITH CO2 INSUFFLATION N/A 2024    Procedure: Colonoscopy with CO2 insufflation;  Surgeon: Emanuel Braden MD;  Location: [facility] OR    HC DILATION/CURETTAGE DIAG/THER NON OB  2006    LAPAROSCOPIC CHOLECYSTECTOMY N/A 2021    Procedure: CHOLECYSTECTOMY, LAPAROSCOPIC;  Surgeon: Denise Cast MD;  Location: [facility] OR    SOFT TISSUE SURGERY  2024        Family History   Problem Relation Age of Onset    Hypertension Mother     Hyperlipidemia Mother     Depression Mother     Squamous cell carcinoma Mother     Thyroid Disease Mother         unclear dx    Hypertension Father     Cancer Father 65        neuro endrocine CA, neck    Hyperlipidemia Father     Aortic aneurysm Maternal Grandfather          of ascending aortic aneurysm at age 64    Aortic aneurysm Maternal Aunt          in her 40s from ascending aortic  aneurysm    C.A.D. No family hx of     Cancer - colorectal No family hx of     Diabetes No family hx of     Cerebrovascular Disease No family hx of     Breast Cancer No family hx of     Prostate Cancer No family hx of        Social History     Socioeconomic History    Marital status:      Spouse name: Not on file    Number of children: 2    Years of education: 16    Highest education level: Not on file   Occupational History    Occupation:      Employer: [employer]     Comment: fulltime   Tobacco Use    Smoking status: Former     Current packs/day: 0.00     Average packs/day: 0.5 packs/day for 10.0 years (5.0 ttl pk-yrs)     Types: Cigarettes     Start date: 1984     Quit date: 1994     Years since quittin.1     Passive exposure: Past    Smokeless tobacco: Never   Vaping Use    Vaping status: Never Used   Substance and Sexual Activity    Alcohol use: Not Currently     Comment: very rare    Drug use: No    Sexual activity: Not Currently     Partners: Male     Birth control/protection: I.U.D.     Comment: Mirena   Other Topics Concern    Parent/sibling w/ CABG, MI or angioplasty before 65F 55M? No   Social History Narrative    How much exercise per week? 2 90 minute yoga sessions      How much calcium per day? Diet       How much caffeine per day? 0    How much vitamin D per day? Supplement     Do you/your family wear seatbelts?  Yes    Do you/your family use safety helmets? Yes    Do you/your family use sunscreen? Yes    Do you/your family keep firearms in the home? No    Do you/your family have a smoke detector(s)? Yes        Do you feel safe in your home? Yes    Has anyone ever touched you in an unwanted manner? No     Explain Kiesha Fletcher CMA 18        [facility]             Social Determinants of Health     Financial Resource Strain: Low Risk  (2024)    Financial Resource Strain     Within the past 12 months, have you or your family members you  live with been unable to get utilities (heat, electricity) when it was really needed?: No   Food Insecurity: Low Risk  (1/4/2024)    Food Insecurity     Within the past 12 months, did you worry that your food would run out before you got money to buy more?: No     Within the past 12 months, did the food you bought just not last and you didn t have money to get more?: No   Transportation Needs: Low Risk  (1/4/2024)    Transportation Needs     Within the past 12 months, has lack of transportation kept you from medical appointments, getting your medicines, non-medical meetings or appointments, work, or from getting things that you need?: No   Physical Activity: Insufficiently Active (10/12/2020)    Exercise Vital Sign     Days of Exercise per Week: 1 day     Minutes of Exercise per Session: 10 min   Stress: Stress Concern Present (10/12/2020)    [employer] [facility] - Occupational Stress Questionnaire     Feeling of Stress : To some extent   Social Connections: Socially Isolated (10/12/2020)    Social Connection and Isolation Panel [NHANES]     Frequency of Communication with Friends and Family: Three times a week     Frequency of Social Gatherings with Friends and Family: Twice a week     Attends Shinto Services: Never     Active Member of Clubs or Organizations: No     Attends Club or Organization Meetings: Never     Marital Status:    Interpersonal Safety: Low Risk  (3/7/2024)    Interpersonal Safety     Do you feel physically and emotionally safe where you currently live?: Yes     Within the past 12 months, have you been hit, slapped, kicked or otherwise physically hurt by someone?: No     Within the past 12 months, have you been humiliated or emotionally abused in other ways by your partner or ex-partner?: No   Housing Stability: Low Risk  (1/4/2024)    Housing Stability     Do you have housing? : Yes     Are you worried about losing your housing?: No       Outpatient Encounter  Medications as of 8/21/2024   Medication Sig Dispense Refill    Acetaminophen (TYLENOL PO)       Biotin 10 MG TABS tablet Take 10,000 mcg by mouth daily      busPIRone (BUSPAR) 10 MG tablet Take 1 tablet (10 mg) by mouth 2 times daily 60 tablet 2    CALCIUM-VITAMIN D-VITAMIN K PO       Collagen Hydrolysate POWD       Cyanocobalamin (VITAMIN B 12 PO) Take by mouth daily       cyclobenzaprine (FLEXERIL) 10 MG tablet Take 0.5 tablets (5 mg) by mouth 3 times daily as needed for muscle spasms 30 tablet 0    estradiol (VAGIFEM) 10 MCG TABS vaginal tablet Place 1 tablet (10 mcg) vaginally twice a week 24 tablet 3    estradiol (VIVELLE-DOT) 0.025 MG/24HR bi-weekly patch Place 1 patch onto the skin twice a week 24 patch 3    hydroxychloroquine (PLAQUENIL) 200 MG tablet Take 1 tablet (200 mg) by mouth 2 times daily 180 tablet 3    levonorgestrel (MIRENA) 52 MG (20 mcg/day) IUD by Intrauterine route once      lisdexamfetamine (VYVANSE) 10 MG capsule Take 1 capsule (10 mg) by mouth daily for 30 days 30 capsule 0    LORazepam (ATIVAN) 0.5 MG tablet Take 1 tablet (0.5 mg) by mouth every 6 hours as needed for anxiety 30 tablet 0    Lysine 500 MG TABS Take 1,000 mg by mouth daily       magnesium citrate solution Takes 1 tsp twice a day, 150 mg daily      NONFORMULARY 1 capsule daily carmen      NONFORMULARY 2 tablets daily seabuckthorn      UNABLE TO FIND Take by mouth daily MEDICATION NAME: Folinic Acid      UNABLE TO FIND daily Ashwaganda - patient reported      UNABLE TO FIND daily Rhodiola- patient reported      valACYclovir (VALTREX) 1000 mg tablet Take 2 tablets (2,000 mg) by mouth 2 times daily For 2 days as needed for cold sores 12 tablet 11    valACYclovir (VALTREX) 500 MG tablet Take 1 tablet (500 mg) by mouth daily 90 tablet 1     Facility-Administered Encounter Medications as of 8/21/2024   Medication Dose Route Frequency Provider Last Rate Last Admin    [COMPLETED] triamcinolone acetonide (KENALOG-10) injection 5 mg  5  mg Intradermal Once Rosemary Delcid PA-C   5 mg at 08/21/24 1230             O:   NAD, WDWN, Alert & Oriented, Mood & Affect wnl, Vitals stable   Here today alone   There were no vitals taken for this visit.   General appearance normal   Vitals stable   Alert, oriented and in no acute distress       Patches of alopecia on frontal/temporal side of scalp, minimal accentuation of hair follicle, no scaling seen.      Eyes: Conjunctivae/lids:Normal     ENT: Lips,: normal    MSK:Normal    Pulm: Breathing Normal    Neuro/Psych: Orientation:Alert and Orientedx3 ; Mood/Affect:normal   A/P:  1. FFA  Well controlled.   IL TAC: PGACAC discussed.  Risks including but not limited to injection site reaction, bruising, no resolution.  All questions answered and entertained to patient s satisfaction.  Informed consent obtained.  IL TAC in concentration of 5 mg/ml was injected ID to frontal fibrosing alopecia.  Total injected was  0.4 ml.  Patient tolerated without complications and given wound care instructions, including not to move product around.  Return in 4 weeks for follow-up and possible additional IL TAC.    Continue plaquenil 200 mg bid .   Recommend to start rogaine.   Check cbc and cmp.   Recheck in 3-4 months.   Clinic Administered Medication Documentation        Patient was given 0.4 ml of Kenalog 5. Prior to medication administration, verified patient's identity using patient s name and date of birth. Please see MAR and medication order for additional information. Patient instructed to remain in clinic for 15 minutes and report any adverse reaction to staff immediately.    Vial/Syringe: Multi dose vial

## 2024-08-21 NOTE — LETTER
8/21/2024      Kiesha Mcguire  [address] [facility] [address]      Dear Colleague,    Thank you for referring your patient, Kiesha Mcguire, to the [facility]. Please see a copy of my visit note below.    Kiesha Mcguire is a pleasant 53 year old year old female patient here today to recheck frontal fibrosing alopecia. She notes that she taking Plaquenil, last eye exam was February which was normal. She notes hair loss has stabilized around hairline.  Patient has no other skin complaints today.  Remainder of the HPI, Meds, PMH, Allergies, FH, and SH was reviewed in chart.    Past Medical History:   Diagnosis Date     Depressive disorder      Depressive disorder, not elsewhere classified     resolved     Herpes simplex without mention of complication     oral     IUD (intrauterine device) in place 08/15/2013    Mirena     Migraine headache with aura     very occass, sig aura, speech loss x1     Pure hypercholesterolemia     follows w BIPIN CHATMAN       Past Surgical History:   Procedure Laterality Date     COLONOSCOPY N/A 10/27/2021    Procedure: COLONOSCOPY, WITH POLYPECTOMY AND CLIP;  Surgeon: Og Gutierres MD;  Location: [facility] OR     COLONOSCOPY N/A 03/12/2024    Procedure: COLONOSCOPY, WITH POLYPECTOMY AND BIOPSY;  Surgeon: Emanuel Braden MD;  Location[facility]     COLONOSCOPY WITH CO2 INSUFFLATION N/A 03/12/2024    Procedure: Colonoscopy with CO2 insufflation;  Surgeon: Emanuel Braden MD;  Location: [facility] OR     HC DILATION/CURETTAGE DIAG/THER NON OB  12/01/2006     LAPAROSCOPIC CHOLECYSTECTOMY N/A 12/24/2021    Procedure: CHOLECYSTECTOMY, LAPAROSCOPIC;  Surgeon: Denise Cast MD;  Location: [facility] OR     SOFT TISSUE SURGERY  6/2024        Family History   Problem Relation Age of Onset     Hypertension Mother      Hyperlipidemia Mother      Depression Mother      Squamous cell carcinoma Mother      Thyroid Disease Mother         unclear dx      Hypertension Father      Cancer Father 65        neuro endrocine CA, neck     Hyperlipidemia Father      Aortic aneurysm Maternal Grandfather          of ascending aortic aneurysm at age 64     Aortic aneurysm Maternal Aunt          in her 40s from ascending aortic aneurysm     C.A.D. No family hx of      Cancer - colorectal No family hx of      Diabetes No family hx of      Cerebrovascular Disease No family hx of      Breast Cancer No family hx of      Prostate Cancer No family hx of        Social History     Socioeconomic History     Marital status:      Spouse name: Not on file     Number of children: 2     Years of education: 16     Highest education level: Not on file   Occupational History     Occupation:      Employer: [employer]     Comment: fulltime   Tobacco Use     Smoking status: Former     Current packs/day: 0.00     Average packs/day: 0.5 packs/day for 10.0 years (5.0 ttl pk-yrs)     Types: Cigarettes     Start date: 1984     Quit date: 1994     Years since quittin.1     Passive exposure: Past     Smokeless tobacco: Never   Vaping Use     Vaping status: Never Used   Substance and Sexual Activity     Alcohol use: Not Currently     Comment: very rare     Drug use: No     Sexual activity: Not Currently     Partners: Male     Birth control/protection: I.U.D.     Comment: Mirena   Other Topics Concern     Parent/sibling w/ CABG, MI or angioplasty before 65F 55M? No   Social History Narrative    How much exercise per week? 2 90 minute yoga sessions      How much calcium per day? Diet       How much caffeine per day? 0    How much vitamin D per day? Supplement     Do you/your family wear seatbelts?  Yes    Do you/your family use safety helmets? Yes    Do you/your family use sunscreen? Yes    Do you/your family keep firearms in the home? No    Do you/your family have a smoke detector(s)? Yes        Do you feel safe in your home? Yes    Has anyone ever touched you in  an unwanted manner? No     Explain Kiesha Fletcher [facility] 7/16/18        Reviewed [facility] 10-12-20             Social Determinants of Health     Financial Resource Strain: Low Risk  (1/4/2024)    Financial Resource Strain      Within the past 12 months, have you or your family members you live with been unable to get utilities (heat, electricity) when it was really needed?: No   Food Insecurity: Low Risk  (1/4/2024)    Food Insecurity      Within the past 12 months, did you worry that your food would run out before you got money to buy more?: No      Within the past 12 months, did the food you bought just not last and you didn t have money to get more?: No   Transportation Needs: Low Risk  (1/4/2024)    Transportation Needs      Within the past 12 months, has lack of transportation kept you from medical appointments, getting your medicines, non-medical meetings or appointments, work, or from getting things that you need?: No   Physical Activity: Insufficiently Active (10/12/2020)    Exercise Vital Sign      Days of Exercise per Week: 1 day      Minutes of Exercise per Session: 10 min   Stress: Stress Concern Present (10/12/2020)    [employer] - Occupational Stress Questionnaire      Feeling of Stress : To some extent   Social Connections: Socially Isolated (10/12/2020)    Social Connection and Isolation Panel [NHANES]      Frequency of Communication with Friends and Family: Three times a week      Frequency of Social Gatherings with Friends and Family: Twice a week      Attends Gnosticist Services: Never      Active Member of Clubs or Organizations: No      Attends Club or Organization Meetings: Never      Marital Status:    Interpersonal Safety: Low Risk  (3/7/2024)    Interpersonal Safety      Do you feel physically and emotionally safe where you currently live?: Yes      Within the past 12 months, have you been hit, slapped, kicked or otherwise physically hurt by someone?: No       Within the past 12 months, have you been humiliated or emotionally abused in other ways by your partner or ex-partner?: No   Housing Stability: Low Risk  (1/4/2024)    Housing Stability      Do you have housing? : Yes      Are you worried about losing your housing?: No       Outpatient Encounter Medications as of 8/21/2024   Medication Sig Dispense Refill     Acetaminophen (TYLENOL PO)        Biotin 10 MG TABS tablet Take 10,000 mcg by mouth daily       busPIRone (BUSPAR) 10 MG tablet Take 1 tablet (10 mg) by mouth 2 times daily 60 tablet 2     CALCIUM-VITAMIN D-VITAMIN K PO        Collagen Hydrolysate POWD        Cyanocobalamin (VITAMIN B 12 PO) Take by mouth daily        cyclobenzaprine (FLEXERIL) 10 MG tablet Take 0.5 tablets (5 mg) by mouth 3 times daily as needed for muscle spasms 30 tablet 0     estradiol (VAGIFEM) 10 MCG TABS vaginal tablet Place 1 tablet (10 mcg) vaginally twice a week 24 tablet 3     estradiol (VIVELLE-DOT) 0.025 MG/24HR bi-weekly patch Place 1 patch onto the skin twice a week 24 patch 3     hydroxychloroquine (PLAQUENIL) 200 MG tablet Take 1 tablet (200 mg) by mouth 2 times daily 180 tablet 3     levonorgestrel (MIRENA) 52 MG (20 mcg/day) IUD by Intrauterine route once       lisdexamfetamine (VYVANSE) 10 MG capsule Take 1 capsule (10 mg) by mouth daily for 30 days 30 capsule 0     LORazepam (ATIVAN) 0.5 MG tablet Take 1 tablet (0.5 mg) by mouth every 6 hours as needed for anxiety 30 tablet 0     Lysine 500 MG TABS Take 1,000 mg by mouth daily        magnesium citrate solution Takes 1 tsp twice a day, 150 mg daily       NONFORMULARY 1 capsule daily carmen       NONFORMULARY 2 tablets daily seabuckthorn       UNABLE TO FIND Take by mouth daily MEDICATION NAME: Folinic Acid       UNABLE TO FIND daily Ashwaganda - patient reported       UNABLE TO FIND daily Rhodiola- patient reported       valACYclovir (VALTREX) 1000 mg tablet Take 2 tablets (2,000 mg) by mouth 2 times daily For 2 days as  needed for cold sores 12 tablet 11     valACYclovir (VALTREX) 500 MG tablet Take 1 tablet (500 mg) by mouth daily 90 tablet 1     Facility-Administered Encounter Medications as of 8/21/2024   Medication Dose Route Frequency Provider Last Rate Last Admin     [COMPLETED] triamcinolone acetonide (KENALOG-10) injection 5 mg  5 mg Intradermal Once Rosemary Delcid PA-C   5 mg at 08/21/24 1230             O:   NAD, WDWN, Alert & Oriented, Mood & Affect wnl, Vitals stable   Here today alone   There were no vitals taken for this visit.   General appearance normal   Vitals stable   Alert, oriented and in no acute distress       Patches of alopecia on frontal/temporal side of scalp, minimal accentuation of hair follicle, no scaling seen.      Eyes: Conjunctivae/lids:Normal     ENT: Lips,: normal    MSK:Normal    Pulm: Breathing Normal    Neuro/Psych: Orientation:Alert and Orientedx3 ; Mood/Affect:normal   A/P:  1. FFA  Well controlled.   IL TAC: PGACAC discussed.  Risks including but not limited to injection site reaction, bruising, no resolution.  All questions answered and entertained to patient s satisfaction.  Informed consent obtained.  IL TAC in concentration of 5 mg/ml was injected ID to frontal fibrosing alopecia.  Total injected was  0.4 ml.  Patient tolerated without complications and given wound care instructions, including not to move product around.  Return in 4 weeks for follow-up and possible additional IL TAC.    Continue plaquenil 200 mg bid .   Recommend to start rogaine.   Check cbc and cmp.   Recheck in 3-4 months.   Clinic Administered Medication Documentation        Patient was given 0.4 ml of Kenalog 5. Prior to medication administration, verified patient's identity using patient s name and date of birth. Please see MAR and medication order for additional information. Patient instructed to remain in clinic for 15 minutes and report any adverse reaction to staff immediately.    Vial/Syringe: Multi  dose vial      Again, thank you for allowing me to participate in the care of your patient.        Sincerely,        Rosemary Henao PA-C

## 2024-08-22 ENCOUNTER — VIRTUAL VISIT (OUTPATIENT)
Dept: PSYCHOLOGY | Facility: CLINIC | Age: 54
End: 2024-08-22
Payer: COMMERCIAL

## 2024-08-22 DIAGNOSIS — F41.1 GENERALIZED ANXIETY DISORDER: Primary | ICD-10-CM

## 2024-08-22 PROCEDURE — 90834 PSYTX W PT 45 MINUTES: CPT | Mod: 95 | Performed by: SOCIAL WORKER

## 2024-08-22 NOTE — PROGRESS NOTES
[facility]                                     Progress Note    Patient Name: Kiesha Mcguire  Date: 8/22/2024       Service Type: Individual      Session Start Time:  11:35 AM Session End Time: 12:23 PM     Session Length: 38-52 minutes    Session #: 63 with this provider    Attendees: Client attended alone    Service Modality:  Video Visit:      Provider verified identity through the following two step process.  Patient provided:  Patient is known previously to provider    Telemedicine Visit: The patient's condition can be safely assessed and treated via synchronous audio and visual telemedicine encounter.      Reason for Telemedicine Visit: Patient convenience (e.g. access to timely appointments / distance to available provider) and Services only offered telehealth    Originating Site (Patient Location): Patient's home    Distant Site (Provider Location): Provider Remote Setting- Home Office/Off-site    Consent:  The patient/guardian has verbally consented to: the potential risks and benefits of telemedicine (video visit) versus in person care; bill my insurance or make self-payment for services provided; and responsibility for payment of non-covered services.     Patient would like the video invitation sent by:  [employer]    Mode of Communication:  Video    As the provider I attest to compliance with applicable laws and regulations related to telemedicine.    DATA  Interactive Complexity: No   Crisis: No      Progress Since Last Session (Related to Symptoms / Goals / Homework):  Symptoms:  anxiety symptoms    Homework: Completed in session     Episode of Care Goals: Satisfactory progress - ACTION (Actively working towards change); Intervened by reinforcing change plan / affirming steps taken    There has been demonstrated improvement in functioning while patient has been engaged in psychotherapy/psychological service- if withdrawn the patient would deteriorate and/or relapse.   "     Current / Ongoing Stressors and Concerns:   Difficulty trusting others and being vulnerable  Limited close relationships  Stressors related to parenting   Work related stressors  Dynamics in relationship with ex-  Dating  Fear of Failure     Treatment Objective(s) Addressed in This Session:   identify 1 fears / thoughts that contribute to feeling anxious  Identify negative self-talk and behaviors: challenge core beliefs, myths, and actions   Use cognitive strategies to manage thoughts/fears that contribute to anxiety symptoms    Safe/calm state titled \"calm\"  Container: box with a lock     Intervention:   Engaged in active listening   CBT: assisted with identifying negative cognition (mind reading), modeled cognitive restructuring    Assessments completed prior to visit:  The following assessments were completed by patient for this visit:  None    ASSESSMENT: Current Emotional / Mental Status (status of significant symptoms):   Risk status (Self / Other harm or suicidal ideation)   Patient denies current fears or concerns for personal safety.   Patient denies current or recent suicidal ideation or behaviors.   Patient denies current or recent homicidal ideation or behaviors.   Patient denies current or recent self injurious behavior or ideation.   Patient denies other safety concerns.   Patient reports there has been no change in risk factors since their last session.     Patient reports there has been no change in protective factors since their last session.     Recommended that patient call 911 or go to the local ED should there be a change in any of these risk factors.     Appearance:   Appropriate    Eye Contact:   Good    Psychomotor Behavior: Normal    Attitude:   Cooperative  Interested Friendly    Orientation:   All   Speech    Rate / Production: Talkative Normal     Volume:  Normal    Mood:    Anxious    Affect:    Mood Congruent     Thought Content:  Clear  Rumination    Thought Form:  Coherent "  Goal Directed    Insight:    Good      Medication Review:   No changes to current psychiatric medication(s)   Vyvanse   Buspar-client is not taking daily    Estrogen patch     Medication Compliance:   Yes     Changes in Health Issues:   None reported     Chemical Use Review:   Substance Use: no use     Tobacco Use: no use    Diagnosis:  Generalized Anxiety Disorder   Other Specified Trauma and Stress Related Disorder   Unspecified Depressive Disorder     Collateral Reports Completed:   Not Applicable    PLAN: (Patient Tasks / Therapist Tasks / Other)  EMDR:  Plan for next session to have client keep her eyes open.   Target: concerns about hoarding  Therapist to continue resetting affective circuits, engage client in building resources and the early trauma protocol.  Therapist encouraged use of fact checking to manage thoughts/fears that contribute to anxiety symptoms.    Magali Montilla, [facility] 7/26/2024    ______________________________________________________________________    Individual Treatment Plan    Patient's Name: Kiesha Mcguire  YOB: 1970    Date of Creation: 5/11/2022  Date Treatment Plan Last Reviewed/Revised: 5/9/2024    DSM5 Diagnoses:    Generalized Anxiety Disorder  Other Specified Trauma and Stress Related Disorder   Unspecified Depressive Disorder     Psychosocial / Contextual Factors: stressors related to parenting  PROMIS (reviewed every 90 days):    PROMIS 10-Global Health (only subscores and total score):       1/23/2024    12:55 PM 2/8/2024    10:13 AM 5/23/2024    10:26 AM 6/27/2024    10:08 AM 7/10/2024    12:04 PM 8/8/2024    12:27 PM 8/21/2024    10:36 AM   PROMIS-10 Scores Only   Global Mental Health Score 14 12 13 14 13 13 14   Global Physical Health Score 17 16 15 15 15 14 15   PROMIS TOTAL - SUBSCORES 31 28 28 29 28 27 29        Referral / Collaboration:  Referral to another professional/service is not indicated at this time..    Anticipated number of session for  "this episode of care: will review in 90 days  Anticipation frequency of session: Every other week  Anticipated Duration of each session: 38-52 minutes  Treatment plan will be reviewed in 90 days or when goals have been changed.       MeasurableTreatment Goal(s) related to diagnosis / functional impairment(s)  Goal 1: Patient will sustain attention and concentration for consistently longer periods of time.  Patient will meet goals set for completing tasks 80% of the time.   Client's Goal:  I will know I've met my goal when my space isn't so chaotic, meeting deadlines around bills and work, when I am not always playing catch-up, completing tasks.      Objective #A (Patient Action)    Patient will learn and implement organization and planning skills.  Status: New - Date: 5/11/2022 , continued date: 9/8/2022, continued date: 12/21/2022, completed date: 3/29/2023    Intervention(s)  Therapist will teach the client organization and planning skills.  Therapist will address any potential barriers to using skills.    Objective #B  Patient will  identify, challenge, and change self-talk that contributes to maladaptive feelings and actions .  Status: New - Date: 5/11/2022 , Continued date: 9/8/2022, continued date: 12/21/2022, continued date: 3/29/2023, continued date: 7/28/2023, continued date: 11/9/2023, continued date: 1/23/2024, continued date: 5/9/2024    Intervention(s)  Therapist will teach the CBT model, cognitive distortions, and cognitive restructuring techniques.      Goal 2: Patient will be able to recall the traumatic events without becoming overwhelmed with negative thoughts, feelings, or urges.   Client's Goal:  I will know I've met my goal when I do not cry every time I talk about it.\"    Objective #A (Patient Action)    Status: New - Date: 5/11/2022, continued date: 9/8/2022, continued date: 12/21/2022, continued date: 3/29/2023, continued date: 7/28/2023, continued date: 11/9/2023, continued date: 1/23/2024, " continued date: 5/9/2024    Patient will describe the history of and nature of trauma symptoms    Intervention(s)  Therapist will assess the client's frequency, intensity, duration, and history of trauma symptoms and their impact on functioning.    Objective #B (Patient Action)  Status: New Date: 5/11/2022, continued date: 9/8/2022, continued date: 12/21/2022, continued date: 3/29/2023, continued date: 7/28/2023, continued date: 11/9/2023, continued date: 1/23/2024, continued date: 5/9/2024    Patient will cooperate with eye movement desensitization and reprocessing (EMDR) to reduce emotional reaction to traumatic event(s)    Intervention(s)  Therapist will utilize EMDR to reduce the client's emotional reactivity to the traumatic event(s).    Objective #C (Patient Action)  Status: New Date: 5/11/2022, continued date: 9/8/2022, continued date: 12/21/2022, continued date: 3/29/2023, continued date: 7/28/2023, continued date: 11/9/2023, continued date: 1/23/2024, continued date: 5/9/2024    Patient will learn and implement calming and coping strategies to manage trauma symptoms.    Intervention(s)  Therapist will teach grounding techniques, distress tolerance, and emotion regulation techniques.      Patient has reviewed and agreed to the above plan.      Magali Montilla, [facility]  May 11, 2022  [facility], [facility]  9/8/2022  [facility], [facility]  12/21/2022  Mag[facility] Mont[facility], [facility]  3/29/2023  [facility], [facility]  7/28/2023  [facility], [facility]  11/9/2023  [facility] Mont[facility], [facility]  1/23/2024  [facility], [facility]  5/9/2024

## 2024-09-04 ENCOUNTER — VIRTUAL VISIT (OUTPATIENT)
Dept: PSYCHOLOGY | Facility: CLINIC | Age: 54
End: 2024-09-04
Payer: COMMERCIAL

## 2024-09-04 DIAGNOSIS — F43.89 REACTION TO CHRONIC STRESS: ICD-10-CM

## 2024-09-04 DIAGNOSIS — F41.1 GENERALIZED ANXIETY DISORDER: Primary | ICD-10-CM

## 2024-09-04 PROCEDURE — 90834 PSYTX W PT 45 MINUTES: CPT | Mod: 95 | Performed by: SOCIAL WORKER

## 2024-09-04 NOTE — PROGRESS NOTES
[facility]                                     Progress Note    Patient Name: Kiesha Mcguire  Date: 9/4/2024       Service Type: Individual      Session Start Time:  10:05 AM Session End Time: 10:56 AM     Session Length: 38-52 minutes    Session #: 64 with this provider    Attendees: Client attended alone    Service Modality:  Video Visit:      Provider verified identity through the following two step process.  Patient provided:  Patient is known previously to provider    Telemedicine Visit: The patient's condition can be safely assessed and treated via synchronous audio and visual telemedicine encounter.      Reason for Telemedicine Visit: Patient convenience (e.g. access to timely appointments / distance to available provider) and Services only offered telehealth    Originating Site (Patient Location): Patient's home    Distant Site (Provider Location): [facility]: [place] /On-site    Consent:  The patient/guardian has verbally consented to: the potential risks and benefits of telemedicine (video visit) versus in person care; bill my insurance or make self-payment for services provided; and responsibility for payment of non-covered services.     Patient would like the video invitation sent by:  [employer]    Mode of Communication:  Video    As the provider I attest to compliance with applicable laws and regulations related to telemedicine.    DATA  Interactive Complexity: No   Crisis: No      Progress Since Last Session (Related to Symptoms / Goals / Homework):  Symptoms:  anxiety symptoms; client reported feeling agitated, difficulty sleeping    Homework: Achieved / completed to satisfaction     Episode of Care Goals: Satisfactory progress - ACTION (Actively working towards change); Intervened by reinforcing change plan / affirming steps taken    There has been demonstrated improvement in functioning while patient has been engaged in psychotherapy/psychological  "service- if withdrawn the patient would deteriorate and/or relapse.       Current / Ongoing Stressors and Concerns:   Difficulty trusting others and being vulnerable  Limited close relationships  Stressors related to parenting   Work related stressors  Dynamics in relationship with ex-  Dating  Fear of Failure   School transition     Treatment Objective(s) Addressed in This Session:   identify 2 fears / thoughts that contribute to feeling anxious   Identify trauma symptoms  Use cognitive strategies to manage thoughts/fears that contribute to anxiety symptoms    Safe/calm state titled \"calm\"  Container: box with a lock     Intervention:   Engaged in active listening   Engaged client in identifying anxiety symptoms and triggers   CBT: reinforced behavior changes, engaged in perspective taking   Supported discussion about trauma history and current symptoms    Assessments completed prior to visit:  The following assessments were completed by patient for this visit:  None    ASSESSMENT: Current Emotional / Mental Status (status of significant symptoms):   Risk status (Self / Other harm or suicidal ideation)   Patient denies current fears or concerns for personal safety.   Patient denies current or recent suicidal ideation or behaviors.   Patient denies current or recent homicidal ideation or behaviors.   Patient denies current or recent self injurious behavior or ideation.   Patient denies other safety concerns.   Patient reports there has been no change in risk factors since their last session.     Patient reports there has been no change in protective factors since their last session.     Recommended that patient call 911 or go to the local ED should there be a change in any of these risk factors.     Appearance:   Appropriate    Eye Contact:   Good    Psychomotor Behavior: Normal    Attitude:   Cooperative  Interested Friendly    Orientation:   All   Speech    Rate / Production: Talkative Normal "     Volume:  Normal    Mood:    Anxious  Sad    Affect:    Mood Congruent  tearful   Thought Content:  Clear    Thought Form:  Coherent  Goal Directed  Logical    Insight:    Good      Medication Review:   No changes to current psychiatric medication(s)   Vyvanse   Buspar-client is not taking daily    Estrogen patch     Medication Compliance:   Yes     Changes in Health Issues:   None reported     Chemical Use Review:   Substance Use: no use     Tobacco Use: no use    Diagnosis:  Generalized Anxiety Disorder   Other Specified Trauma and Stress Related Disorder   Unspecified Depressive Disorder     Collateral Reports Completed:   Not Applicable    PLAN: (Patient Tasks / Therapist Tasks / Other)  EMDR:  Plan for next session to have client keep her eyes open.   Target: concerns about hoarding  Therapist to continue resetting affective circuits, engage client in building resources and the early trauma protocol.  Therapist will continue to work with client on identifying anxiety symptoms, and increasing coping strategies to manage them.  Therapist will review treatment plan with client; add anxiety as treatment goal    Magali Montilla, [facility] 9/4/2024    ______________________________________________________________________    Individual Treatment Plan    Patient's Name: Kiesha Mcguire  YOB: 1970    Date of Creation: 5/11/2022  Date Treatment Plan Last Reviewed/Revised: 5/9/2024     DSM5 Diagnoses:    Generalized Anxiety Disorder  Other Specified Trauma and Stress Related Disorder   Unspecified Depressive Disorder     Psychosocial / Contextual Factors: stressors related to parenting  PROMIS (reviewed every 90 days):    PROMIS 10-Global Health (only subscores and total score):       1/23/2024    12:55 PM 2/8/2024    10:13 AM 5/23/2024    10:26 AM 6/27/2024    10:08 AM 7/10/2024    12:04 PM 8/8/2024    12:27 PM 8/21/2024    10:36 AM   PROMIS-10 Scores Only   Global Mental Health Score 14 12 13 14 13  13 14   Global Physical Health Score 17 16 15 15 15 14 15   PROMIS TOTAL - SUBSCORES 31 28 28 29 28 27 29        Referral / Collaboration:  Referral to another professional/service is not indicated at this time..    Anticipated number of session for this episode of care: will review in 90 days  Anticipation frequency of session: Every other week  Anticipated Duration of each session: 38-52 minutes  Treatment plan will be reviewed in 90 days or when goals have been changed.       MeasurableTreatment Goal(s) related to diagnosis / functional impairment(s)  Goal 1: Patient will sustain attention and concentration for consistently longer periods of time.  Patient will meet goals set for completing tasks 80% of the time.   Client's Goal:  I will know I've met my goal when my space isn't so chaotic, meeting deadlines around bills and work, when I am not always playing catch-up, completing tasks.      Objective #A (Patient Action)    Patient will learn and implement organization and planning skills.  Status: New - Date: 5/11/2022 , continued date: 9/8/2022, continued date: 12/21/2022, completed date: 3/29/2023    Intervention(s)  Therapist will teach the client organization and planning skills.  Therapist will address any potential barriers to using skills.    Objective #B  Patient will  identify, challenge, and change self-talk that contributes to maladaptive feelings and actions .  Status: New - Date: 5/11/2022 , Continued date: 9/8/2022, continued date: 12/21/2022, continued date: 3/29/2023, continued date: 7/28/2023, continued date: 11/9/2023, continued date: 1/23/2024, continued date: 5/9/2024    Intervention(s)  Therapist will teach the CBT model, cognitive distortions, and cognitive restructuring techniques.      Goal 2: Patient will be able to recall the traumatic events without becoming overwhelmed with negative thoughts, feelings, or urges.   Client's Goal:  I will know I've met my goal when I do not cry every  "time I talk about it.\"    Objective #A (Patient Action)    Status: New - Date: 5/11/2022, continued date: 9/8/2022, continued date: 12/21/2022, continued date: 3/29/2023, continued date: 7/28/2023, continued date: 11/9/2023, continued date: 1/23/2024, continued date: 5/9/2024    Patient will describe the history of and nature of trauma symptoms    Intervention(s)  Therapist will assess the client's frequency, intensity, duration, and history of trauma symptoms and their impact on functioning.    Objective #B (Patient Action)  Status: New Date: 5/11/2022, continued date: 9/8/2022, continued date: 12/21/2022, continued date: 3/29/2023, continued date: 7/28/2023, continued date: 11/9/2023, continued date: 1/23/2024, continued date: 5/9/2024    Patient will cooperate with eye movement desensitization and reprocessing (EMDR) to reduce emotional reaction to traumatic event(s)    Intervention(s)  Therapist will utilize EMDR to reduce the client's emotional reactivity to the traumatic event(s).    Objective #C (Patient Action)  Status: New Date: 5/11/2022, continued date: 9/8/2022, continued date: 12/21/2022, continued date: 3/29/2023, continued date: 7/28/2023, continued date: 11/9/2023, continued date: 1/23/2024, continued date: 5/9/2024    Patient will learn and implement calming and coping strategies to manage trauma symptoms.    Intervention(s)  Therapist will teach grounding techniques, distress tolerance, and emotion regulation techniques.      Patient has reviewed and agreed to the above plan.      Magali Montilla, [facility]  May 11, 2022  [facility], [facility]  [facility]  [facility], [facility]  12/21/2022  [facility], [facility]  3/29/2023  [facility], [facility]  7/28/2023  [facility], [facility]  11/9/2023  [facility], [facility]  1/23/2024  Magali Montilla, [facility]  5/9/2024     "

## 2024-09-09 ENCOUNTER — OFFICE VISIT (OUTPATIENT)
Dept: ORTHOPEDICS | Facility: CLINIC | Age: 54
End: 2024-09-09
Attending: ADVANCED PRACTICE MIDWIFE
Payer: COMMERCIAL

## 2024-09-09 ENCOUNTER — ANCILLARY PROCEDURE (OUTPATIENT)
Dept: GENERAL RADIOLOGY | Facility: CLINIC | Age: 54
End: 2024-09-09
Attending: ORTHOPAEDIC SURGERY
Payer: COMMERCIAL

## 2024-09-09 VITALS
SYSTOLIC BLOOD PRESSURE: 99 MMHG | HEART RATE: 80 BPM | WEIGHT: 135 LBS | DIASTOLIC BLOOD PRESSURE: 69 MMHG | RESPIRATION RATE: 18 BRPM | HEIGHT: 64 IN | BODY MASS INDEX: 23.05 KG/M2

## 2024-09-09 DIAGNOSIS — M25.512 LEFT SHOULDER PAIN, UNSPECIFIED CHRONICITY: ICD-10-CM

## 2024-09-09 DIAGNOSIS — M25.512 LEFT SHOULDER PAIN, UNSPECIFIED CHRONICITY: Primary | ICD-10-CM

## 2024-09-09 DIAGNOSIS — M25.512 ACUTE PAIN OF LEFT SHOULDER: ICD-10-CM

## 2024-09-09 DIAGNOSIS — M25.819 SHOULDER IMPINGEMENT: ICD-10-CM

## 2024-09-09 DIAGNOSIS — M75.02 ADHESIVE CAPSULITIS OF LEFT SHOULDER: ICD-10-CM

## 2024-09-09 PROCEDURE — 73030 X-RAY EXAM OF SHOULDER: CPT | Mod: TC | Performed by: RADIOLOGY

## 2024-09-09 PROCEDURE — 99204 OFFICE O/P NEW MOD 45 MIN: CPT | Performed by: ORTHOPAEDIC SURGERY

## 2024-09-09 NOTE — PROGRESS NOTES
Kiesha Mcguire is a 53 year old female who is seen as self referral for left shoulder pain and stiffness.  She has had problems with the shoulder for the past year.  She has had some difficulty reaching up behind her back.  She is gone to physical therapy and they felt she had a mild frozen shoulder.  They have been treating this with motion exercises and massage.  They have not done any strengthening exercises.  She takes occasional ibuprofen, but never more than one 200 mg tablet per day.  She reports dull aching with occasional sharp pain rated 3 out of 10.  She feels it mainly in the posterior aspect of the shoulder.    X-ray of the left shoulder today is reviewed with the patient.  It shows no abnormalities.    Past Medical History:   Diagnosis Date    Depressive disorder     Depressive disorder, not elsewhere classified     resolved    Herpes simplex without mention of complication     oral    IUD (intrauterine device) in place 08/15/2013    Mirena    Migraine headache with aura     very occass, sig aura, speech loss x1    Pure hypercholesterolemia     follows w BIPIN CHATMAN       Past Surgical History:   Procedure Laterality Date    COLONOSCOPY N/A 10/27/2021    Procedure: COLONOSCOPY, WITH POLYPECTOMY AND CLIP;  Surgeon: Og Gutierres MD;  Location: [facility] OR    COLONOSCOPY N/A 03/12/2024    Procedure: COLONOSCOPY, WITH POLYPECTOMY AND BIOPSY;  Surgeon: Emanuel Braden MD;  Location: [facility] OR    COLONOSCOPY WITH CO2 INSUFFLATION N/A 03/12/2024    Procedure: Colonoscopy with CO2 insufflation;  Surgeon: Emanuel Braden MD;  Location: [facility] OR    HC DILATION/CURETTAGE DIAG/THER NON OB  12/01/2006    LAPAROSCOPIC CHOLECYSTECTOMY N/A 12/24/2021    Procedure: CHOLECYSTECTOMY, LAPAROSCOPIC;  Surgeon: Denise Cast MD;  Location: [facility] OR    SOFT TISSUE SURGERY  6/2024       Family History   Problem Relation Age of Onset    Hypertension Mother     Hyperlipidemia Mother     Depression Mother      Squamous cell carcinoma Mother     Thyroid Disease Mother         unclear dx    Hypertension Father     Cancer Father 65        neuro endrocine CA, neck    Hyperlipidemia Father     Aortic aneurysm Maternal Grandfather          of ascending aortic aneurysm at age 64    Aortic aneurysm Maternal Aunt          in her 40s from ascending aortic aneurysm    C.A.D. No family hx of     Cancer - colorectal No family hx of     Diabetes No family hx of     Cerebrovascular Disease No family hx of     Breast Cancer No family hx of     Prostate Cancer No family hx of        Social History     Socioeconomic History    Marital status:      Spouse name: Not on file    Number of children: 2    Years of education: 16    Highest education level: Not on file   Occupational History    Occupation:      Employer: [employer]     Comment: fulltime   Tobacco Use    Smoking status: Former     Current packs/day: 0.00     Average packs/day: 0.5 packs/day for 10.0 years (5.0 ttl pk-yrs)     Types: Cigarettes     Start date: 1984     Quit date: 1994     Years since quittin.1     Passive exposure: Past    Smokeless tobacco: Never   Vaping Use    Vaping status: Never Used   Substance and Sexual Activity    Alcohol use: Not Currently     Comment: very rare    Drug use: No    Sexual activity: Not Currently     Partners: Male     Birth control/protection: I.U.D.     Comment: Mirena   Other Topics Concern    Parent/sibling w/ CABG, MI or angioplasty before 65F 55M? No   Social History Narrative    How much exercise per week? 2 90 minute yoga sessions      How much calcium per day? Diet       How much caffeine per day? 0    How much vitamin D per day? Supplement     Do you/your family wear seatbelts?  Yes    Do you/your family use safety helmets? Yes    Do you/your family use sunscreen? Yes    Do you/your family keep firearms in the home? No    Do you/your family have a smoke detector(s)? Yes        Do you feel  safe in your home? Yes    Has anyone ever touched you in an unwanted manner? No     Explain Kiesha [facility] 7/16/18        Reviewed ana conde 10-12-20             Social Determinants of Health     Financial Resource Strain: Low Risk  (1/4/2024)    Financial Resource Strain     Within the past 12 months, have you or your family members you live with been unable to get utilities (heat, electricity) when it was really needed?: No   Food Insecurity: Low Risk  (1/4/2024)    Food Insecurity     Within the past 12 months, did you worry that your food would run out before you got money to buy more?: No     Within the past 12 months, did the food you bought just not last and you didn t have money to get more?: No   Transportation Needs: Low Risk  (1/4/2024)    Transportation Needs     Within the past 12 months, has lack of transportation kept you from medical appointments, getting your medicines, non-medical meetings or appointments, work, or from getting things that you need?: No   Physical Activity: Insufficiently Active (10/12/2020)    Exercise Vital Sign     Days of Exercise per Week: 1 day     Minutes of Exercise per Session: 10 min   Stress: Stress Concern Present (10/12/2020)    [employer] - Occupational Stress Questionnaire     Feeling of Stress : To some extent   Social Connections: Socially Isolated (10/12/2020)    Social Connection and Isolation Panel [NHANES]     Frequency of Communication with Friends and Family: Three times a week     Frequency of Social Gatherings with Friends and Family: Twice a week     Attends Yazdanism Services: Never     Active Member of Clubs or Organizations: No     Attends Club or Organization Meetings: Never     Marital Status:    Interpersonal Safety: Low Risk  (3/7/2024)    Interpersonal Safety     Do you feel physically and emotionally safe where you currently live?: Yes     Within the past 12 months, have you been hit, slapped, kicked or  otherwise physically hurt by someone?: No     Within the past 12 months, have you been humiliated or emotionally abused in other ways by your partner or ex-partner?: No   Housing Stability: Low Risk  (1/4/2024)    Housing Stability     Do you have housing? : Yes     Are you worried about losing your housing?: No       Current Outpatient Medications   Medication Sig Dispense Refill    Acetaminophen (TYLENOL PO)       Biotin 10 MG TABS tablet Take 10,000 mcg by mouth daily      busPIRone (BUSPAR) 10 MG tablet Take 1 tablet (10 mg) by mouth 2 times daily 60 tablet 2    CALCIUM-VITAMIN D-VITAMIN K PO       Collagen Hydrolysate POWD       Cyanocobalamin (VITAMIN B 12 PO) Take by mouth daily       cyclobenzaprine (FLEXERIL) 10 MG tablet Take 0.5 tablets (5 mg) by mouth 3 times daily as needed for muscle spasms 30 tablet 0    estradiol (VAGIFEM) 10 MCG TABS vaginal tablet Place 1 tablet (10 mcg) vaginally twice a week 24 tablet 3    estradiol (VIVELLE-DOT) 0.025 MG/24HR bi-weekly patch Place 1 patch onto the skin twice a week 24 patch 3    hydroxychloroquine (PLAQUENIL) 200 MG tablet Take 1 tablet (200 mg) by mouth 2 times daily 180 tablet 3    levonorgestrel (MIRENA) 52 MG (20 mcg/day) IUD by Intrauterine route once      lisdexamfetamine (VYVANSE) 10 MG capsule Take 1 capsule (10 mg) by mouth daily for 30 days 30 capsule 0    LORazepam (ATIVAN) 0.5 MG tablet Take 1 tablet (0.5 mg) by mouth every 6 hours as needed for anxiety 30 tablet 0    Lysine 500 MG TABS Take 1,000 mg by mouth daily       magnesium citrate solution Takes 1 tsp twice a day, 150 mg daily      NONFORMULARY 1 capsule daily carmen      NONFORMULARY 2 tablets daily seabuckthorn      UNABLE TO FIND Take by mouth daily MEDICATION NAME: Folinic Acid      UNABLE TO FIND daily Ashwaganda - patient reported      UNABLE TO FIND daily Rhodiola- patient reported      valACYclovir (VALTREX) 1000 mg tablet Take 2 tablets (2,000 mg) by mouth 2 times daily For 2 days as  "needed for cold sores 12 tablet 11    valACYclovir (VALTREX) 500 MG tablet Take 1 tablet (500 mg) by mouth daily 90 tablet 1       Allergies   Allergen Reactions    Triptans Unknown     imitrex    Amoxicillin-Pot Clavulanate Hives     Unsure if true reaction to med     Ciprofloxacin Hives and Itching     Patient reported- possible reaction, patient unsure        REVIEW OF SYSTEMS:  CONSTITUTIONAL:  NEGATIVE for fever, chills, change in weight, not feeling tired  SKIN:  NEGATIVE for worrisome rashes, no skin lumps, no skin ulcers and no non-healing wounds  EYES:  NEGATIVE for vision changes or irritation.  ENT/MOUTH:  NEGATIVE.  No hearing loss, no hoarseness, no difficulty swallowing.  RESP:  NEGATIVE. No cough or shortness of breath.  CV:  NEGATIVE for chest pain, palpitations or peripheral edema  GI:  NEGATIVE for nausea, abdominal pain, heartburn, or change in bowel habits  :  Negative. No dysuria, no hematuria  MUSCULOSKELETAL:  See HPI above  NEURO:  NEGATIVE . No headaches, no dizziness,  no numbness  ENDOCRINE:  NEGATIVE for temperature intolerance, skin/hair changes  HEME/ALLERGY/IMMUNE:  NEGATIVE for bleeding problems  PSYCHIATRIC:  NEGATIVE. no anxiety, no depression.     Exam:  Vitals: BP 99/69   Pulse 80   Resp 18   Ht 1.619 m (5' 3.75\")   Wt 61.2 kg (135 lb)   BMI 23.35 kg/m    BMI= Body mass index is 23.35 kg/m .  Constitutional:  healthy, alert and no distress  Neuro: Alert and Oriented x 3, no focal defects.  Psych: Affect normal   Respiratory: Breathing not labored.  Cardiovascular: normal peripheral pulses  Lymph: no adenopathy  Skin: No rashes,worrisome lesions or skin problems  She has full range of motion of the right shoulder without pain.  She has full flexion and external rotation of the left shoulder but with some pain.  On internal rotation she only gets to L5 on the left.  On the right she can reach to mid scapular level.  She has mild pain with resisted internal rotation on the " left shoulder.  She has no pain with resisted external rotation or abduction.  She had no pain with blocking test or empty can test.  She does have some pain with Duarte test on the left, negative on the right.  There was no tenderness of the subacromial space, AC joint, or trapezius.  Sensation, motor and circulation are intact.    Assessment:  left shoulder early adhesive capsulitis with mainly loss of internal rotation.  2.  Likely left shoulder impingement producing the adhesive capsulitis.  There is no evidence for rotator cuff tear.    Plan: We discussed avoidance of overhead and forward reaching, which she does not really do much.  We discussed anti-inflammatory use, which she was hesitant to do.  She will try increasing her ibuprofen.  We discussed steroid injection either in the subacromial space or glenohumeral joint, which she declined at this time.  We discussed continued physical therapy and adding strengthening, which we will order.  We discussed possible surgeries for adhesive capsulitis, but I would not consider that unless she had failed conservative treatment.

## 2024-09-09 NOTE — LETTER
9/9/2024      Kiesha Mcguire  [address] [address]      Dear Colleague,    Thank you for referring your patient, Kiesha Mcguire, to the [facility]. Please see a copy of my visit note below.    Kiesha Mcguire is a 53 year old female who is seen as self referral for left shoulder pain and stiffness.  She has had problems with the shoulder for the past year.  She has had some difficulty reaching up behind her back.  She is gone to physical therapy and they felt she had a mild frozen shoulder.  They have been treating this with motion exercises and massage.  They have not done any strengthening exercises.  She takes occasional ibuprofen, but never more than one 200 mg tablet per day.  She reports dull aching with occasional sharp pain rated 3 out of 10.  She feels it mainly in the posterior aspect of the shoulder.    X-ray of the left shoulder today is reviewed with the patient.  It shows no abnormalities.    Past Medical History:   Diagnosis Date     Depressive disorder      Depressive disorder, not elsewhere classified     resolved     Herpes simplex without mention of complication     oral     IUD (intrauterine device) in place 08/15/2013    Mirena     Migraine headache with aura     very occass, sig aura, speech loss x1     Pure hypercholesterolemia     follows w BIPIN CHATMAN       Past Surgical History:   Procedure Laterality Date     COLONOSCOPY N/A 10/27/2021    Procedure: COLONOSCOPY, WITH POLYPECTOMY AND CLIP;  Surgeon: Og Gutierres MD;  Location: [facility] OR     COLONOSCOPY N/A 03/12/2024    Procedure: COLONOSCOPY, WITH POLYPECTOMY AND BIOPSY;  Surgeon: Emanuel Braden MD;  Location: [facility] OR     COLONOSCOPY WITH CO2 INSUFFLATION N/A 03/12/2024    Procedure: Colonoscopy with CO2 insufflation;  Surgeon: Emanuel Braden MD;  Location: [facility] OR     HC DILATION/CURETTAGE DIAG/THER NON OB  12/01/2006     LAPAROSCOPIC CHOLECYSTECTOMY N/A 12/24/2021     Procedure: CHOLECYSTECTOMY, LAPAROSCOPIC;  Surgeon: Denise Cast MD;  Location: [facility] OR     SOFT TISSUE SURGERY  2024       Family History   Problem Relation Age of Onset     Hypertension Mother      Hyperlipidemia Mother      Depression Mother      Squamous cell carcinoma Mother      Thyroid Disease Mother         unclear dx     Hypertension Father      Cancer Father 65        neuro endrocine CA, neck     Hyperlipidemia Father      Aortic aneurysm Maternal Grandfather          of ascending aortic aneurysm at age 64     Aortic aneurysm Maternal Aunt          in her 40s from ascending aortic aneurysm     C.A.D. No family hx of      Cancer - colorectal No family hx of      Diabetes No family hx of      Cerebrovascular Disease No family hx of      Breast Cancer No family hx of      Prostate Cancer No family hx of        Social History     Socioeconomic History     Marital status:      Spouse name: Not on file     Number of children: 2     Years of education: 16     Highest education level: Not on file   Occupational History     Occupation:      Employer: [employer]     Comment: fulltime   Tobacco Use     Smoking status: Former     Current packs/day: 0.00     Average packs/day: 0.5 packs/day for 10.0 years (5.0 ttl pk-yrs)     Types: Cigarettes     Start date: 1984     Quit date: 1994     Years since quittin.1     Passive exposure: Past     Smokeless tobacco: Never   Vaping Use     Vaping status: Never Used   Substance and Sexual Activity     Alcohol use: Not Currently     Comment: very rare     Drug use: No     Sexual activity: Not Currently     Partners: Male     Birth control/protection: I.U.D.     Comment: Mirena   Other Topics Concern     Parent/sibling w/ CABG, MI or angioplasty before 65F 55M? No   Social History Narrative    How much exercise per week? 2 90 minute yoga sessions      How much calcium per day? Diet       How much caffeine per day? 0    How  much vitamin D per day? Supplement     Do you/your family wear seatbelts?  Yes    Do you/your family use safety helmets? Yes    Do you/your family use sunscreen? Yes    Do you/your family keep firearms in the home? No    Do you/your family have a smoke detector(s)? Yes        Do you feel safe in your home? Yes    Has anyone ever touched you in an unwanted manner? No     Explain Kiesha [facility] 7/16/18        Reviewed stephanie amaya 10-12-20             Social Determinants of Health     Financial Resource Strain: Low Risk  (1/4/2024)    Financial Resource Strain      Within the past 12 months, have you or your family members you live with been unable to get utilities (heat, electricity) when it was really needed?: No   Food Insecurity: Low Risk  (1/4/2024)    Food Insecurity      Within the past 12 months, did you worry that your food would run out before you got money to buy more?: No      Within the past 12 months, did the food you bought just not last and you didn t have money to get more?: No   Transportation Needs: Low Risk  (1/4/2024)    Transportation Needs      Within the past 12 months, has lack of transportation kept you from medical appointments, getting your medicines, non-medical meetings or appointments, work, or from getting things that you need?: No   Physical Activity: Insufficiently Active (10/12/2020)    Exercise Vital Sign      Days of Exercise per Week: 1 day      Minutes of Exercise per Session: 10 min   Stress: Stress Concern Present (10/12/2020)    [employer] [place] [employer] - Occupational Stress Questionnaire      Feeling of Stress : To some extent   Social Connections: Socially Isolated (10/12/2020)    Social Connection and Isolation Panel [NHANES]      Frequency of Communication with Friends and Family: Three times a week      Frequency of Social Gatherings with Friends and Family: Twice a week      Attends Judaism Services: Never      Active Member of Clubs or  Organizations: No      Attends Club or Organization Meetings: Never      Marital Status:    Interpersonal Safety: Low Risk  (3/7/2024)    Interpersonal Safety      Do you feel physically and emotionally safe where you currently live?: Yes      Within the past 12 months, have you been hit, slapped, kicked or otherwise physically hurt by someone?: No      Within the past 12 months, have you been humiliated or emotionally abused in other ways by your partner or ex-partner?: No   Housing Stability: Low Risk  (1/4/2024)    Housing Stability      Do you have housing? : Yes      Are you worried about losing your housing?: No       Current Outpatient Medications   Medication Sig Dispense Refill     Acetaminophen (TYLENOL PO)        Biotin 10 MG TABS tablet Take 10,000 mcg by mouth daily       busPIRone (BUSPAR) 10 MG tablet Take 1 tablet (10 mg) by mouth 2 times daily 60 tablet 2     CALCIUM-VITAMIN D-VITAMIN K PO        Collagen Hydrolysate POWD        Cyanocobalamin (VITAMIN B 12 PO) Take by mouth daily        cyclobenzaprine (FLEXERIL) 10 MG tablet Take 0.5 tablets (5 mg) by mouth 3 times daily as needed for muscle spasms 30 tablet 0     estradiol (VAGIFEM) 10 MCG TABS vaginal tablet Place 1 tablet (10 mcg) vaginally twice a week 24 tablet 3     estradiol (VIVELLE-DOT) 0.025 MG/24HR bi-weekly patch Place 1 patch onto the skin twice a week 24 patch 3     hydroxychloroquine (PLAQUENIL) 200 MG tablet Take 1 tablet (200 mg) by mouth 2 times daily 180 tablet 3     levonorgestrel (MIRENA) 52 MG (20 mcg/day) IUD by Intrauterine route once       lisdexamfetamine (VYVANSE) 10 MG capsule Take 1 capsule (10 mg) by mouth daily for 30 days 30 capsule 0     LORazepam (ATIVAN) 0.5 MG tablet Take 1 tablet (0.5 mg) by mouth every 6 hours as needed for anxiety 30 tablet 0     Lysine 500 MG TABS Take 1,000 mg by mouth daily        magnesium citrate solution Takes 1 tsp twice a day, 150 mg daily       NONFORMULARY 1 capsule daily  "carmen       NONFORMULARY 2 tablets daily jozef       UNABLE TO FIND Take by mouth daily MEDICATION NAME: Folinic Acid       UNABLE TO FIND daily Ashwaganda - patient reported       UNABLE TO FIND daily Rhodiola- patient reported       valACYclovir (VALTREX) 1000 mg tablet Take 2 tablets (2,000 mg) by mouth 2 times daily For 2 days as needed for cold sores 12 tablet 11     valACYclovir (VALTREX) 500 MG tablet Take 1 tablet (500 mg) by mouth daily 90 tablet 1       Allergies   Allergen Reactions     Triptans Unknown     imitrex     Amoxicillin-Pot Clavulanate Hives     Unsure if true reaction to med      Ciprofloxacin Hives and Itching     Patient reported- possible reaction, patient unsure        REVIEW OF SYSTEMS:  CONSTITUTIONAL:  NEGATIVE for fever, chills, change in weight, not feeling tired  SKIN:  NEGATIVE for worrisome rashes, no skin lumps, no skin ulcers and no non-healing wounds  EYES:  NEGATIVE for vision changes or irritation.  ENT/MOUTH:  NEGATIVE.  No hearing loss, no hoarseness, no difficulty swallowing.  RESP:  NEGATIVE. No cough or shortness of breath.  CV:  NEGATIVE for chest pain, palpitations or peripheral edema  GI:  NEGATIVE for nausea, abdominal pain, heartburn, or change in bowel habits  :  Negative. No dysuria, no hematuria  MUSCULOSKELETAL:  See HPI above  NEURO:  NEGATIVE . No headaches, no dizziness,  no numbness  ENDOCRINE:  NEGATIVE for temperature intolerance, skin/hair changes  HEME/ALLERGY/IMMUNE:  NEGATIVE for bleeding problems  PSYCHIATRIC:  NEGATIVE. no anxiety, no depression.     Exam:  Vitals: BP 99/69   Pulse 80   Resp 18   Ht 1.619 m (5' 3.75\")   Wt 61.2 kg (135 lb)   BMI 23.35 kg/m    BMI= Body mass index is 23.35 kg/m .  Constitutional:  healthy, alert and no distress  Neuro: Alert and Oriented x 3, no focal defects.  Psych: Affect normal   Respiratory: Breathing not labored.  Cardiovascular: normal peripheral pulses  Lymph: no adenopathy  Skin: No " rashes,worrisome lesions or skin problems  She has full range of motion of the right shoulder without pain.  She has full flexion and external rotation of the left shoulder but with some pain.  On internal rotation she only gets to L5 on the left.  On the right she can reach to mid scapular level.  She has mild pain with resisted internal rotation on the left shoulder.  She has no pain with resisted external rotation or abduction.  She had no pain with blocking test or empty can test.  She does have some pain with Duarte test on the left, negative on the right.  There was no tenderness of the subacromial space, AC joint, or trapezius.  Sensation, motor and circulation are intact.    Assessment:  left shoulder early adhesive capsulitis with mainly loss of internal rotation.  2.  Likely left shoulder impingement producing the adhesive capsulitis.  There is no evidence for rotator cuff tear.    Plan: We discussed avoidance of overhead and forward reaching, which she does not really do much.  We discussed anti-inflammatory use, which she was hesitant to do.  She will try increasing her ibuprofen.  We discussed steroid injection either in the subacromial space or glenohumeral joint, which she declined at this time.  We discussed continued physical therapy and adding strengthening, which we will order.  We discussed possible surgeries for adhesive capsulitis, but I would not consider that unless she had failed conservative treatment.    Again, thank you for allowing me to participate in the care of your patient.        Sincerely,        Zaid Garza MD

## 2024-09-11 ENCOUNTER — THERAPY VISIT (OUTPATIENT)
Dept: PHYSICAL THERAPY | Facility: CLINIC | Age: 54
End: 2024-09-11
Payer: COMMERCIAL

## 2024-09-11 DIAGNOSIS — M25.512 LEFT SHOULDER PAIN, UNSPECIFIED CHRONICITY: ICD-10-CM

## 2024-09-11 DIAGNOSIS — M25.512 ACUTE PAIN OF LEFT SHOULDER: ICD-10-CM

## 2024-09-11 PROCEDURE — 97530 THERAPEUTIC ACTIVITIES: CPT | Mod: GP | Performed by: PHYSICAL THERAPIST

## 2024-09-11 PROCEDURE — 97110 THERAPEUTIC EXERCISES: CPT | Mod: GP | Performed by: PHYSICAL THERAPIST

## 2024-09-16 NOTE — PROGRESS NOTES
NEW CONSULTATION   Sep 18, 2024    Kiesha Mcguire is a 53 year old woman who presents with history of focal left breast pain and itching. She was referred by Dr. Dos Santos.    HPI:    She has had left breast 1:00 intermittent focal pain and itching going back to . She has had diagnostic work up for this in , , ,  that was normal and demonstrated an intramammary lymph node. Her symptoms have been more frequent and intense since she started hormone replacement therapy. Her most recent breast imaging was a mammogram 24 that was normal.     Today she denies any breast change or concern including mass, skin change, nipple inversion or nipple discharge. She does have focal left breast 2:00 pain.     BREAST-SPECIFIC HISTORY:    Previous breast imaging: Yes  - 10/6/08 b/l Dmammo for right breast decreased milk output BI-RADS 2  - 12 b/l Dmammo and left breast ultrasound for lump: lymph node 9 mm. BI-RADS 2  - 3/7/13 b/l Dammmo and left breast ultrasound for pain BI-RADS 1  - 14 left breast ultrasound for lump: 2:00 6 cm FN intramammary lymph node stable BI-RADS 2  - 9/25/15 Smammo BI-RADS 1  - 16 Smammo BI-RADS 1  - / left Dmammo and left breast ultrasound for pain BI-RADS 1  - 18 Smammo BI-RADS 1  - 21 Smammo BI-RADS 1  - 22 Smammo BI-RADS 1  - 3/9/23 b/l Dmammo and left breast ultrasound for pain BI-RADS 1  - 24 Smammo BI-RADS 1    Prior breast biopsies/surgeries: No    Prior history of breast cancer or DCIS: No  Prior radiation history: No   Breast density: scattered fibroglandular densities.     GYN HISTORY:  . Age at 1st pregnancy: 36. Breastfeeding history: yes  Age at menarche: average 12  Menopausal: IUD .   Menopausal hormone replacement therapy: Yes HRT started    - estradiol vaginal  - estradiol patch     RISK ASSESSMENT: < 3% 5 year risk by Lucinda, < 5% 10 year risk by KRISTIAN, and < 20% lifetime risk by KRISTIAN  Lucinda:1.6% 5 year risk    KRISTIAN/Sindhu: 8.5% lifetime risk, 2.4% 10 year risk     FAMILY HISTORY:  Breast ca: yes  - mothers cousin  Ovarian ca: No  Pancreatic ca: No  Prostate: No  Gastric ca: No  Melanoma: No  Colon ca: No  Other cancer: Yes  - father with neuroendocrine tumor. Passed of this.  - paternal grandmother with liver cancer  Other genetic, testing, syndromes, or clotting disorders:      PAST MEDICAL HISTORY  Past Medical History:   Diagnosis Date    Depressive disorder     Depressive disorder, not elsewhere classified     resolved    Herpes simplex without mention of complication     oral    IUD (intrauterine device) in place 08/15/2013    Mirena    Migraine headache with aura     very occass, sig aura, speech loss x1    Pure hypercholesterolemia     follows w [facility]     PAST SURGICAL HISTORY   Past Surgical History:   Procedure Laterality Date    COLONOSCOPY N/A 10/27/2021    Procedure: COLONOSCOPY, WITH POLYPECTOMY AND CLIP;  Surgeon: Og Gutierres MD;  Location: [facility] OR    COLONOSCOPY N/A 03/12/2024    Procedure: COLONOSCOPY, WITH POLYPECTOMY AND BIOPSY;  Surgeon: Emanuel Braden MD;  [facility]    COLONOSCOPY WITH CO2 INSUFFLATION N/A 03/12/2024    Procedure: Colonoscopy with CO2 insufflation;  Surgeon: Emanuel Braden MD;  Location: [facility]    HC DILATION/CURETTAGE DIAG/THER NON OB  12/01/2006    LAPAROSCOPIC CHOLECYSTECTOMY N/A 12/24/2021    Procedure: CHOLECYSTECTOMY, LAPAROSCOPIC;  Surgeon: Denise Cast MD;  Location: [facility] OR    SOFT TISSUE SURGERY  6/2024     MEDICATIONS  Current Outpatient Medications   Medication Sig Dispense Refill    ibuprofen (ADVIL/MOTRIN) 200 MG capsule Take 200 mg by mouth daily. 2 tablets every 4 hours      Acetaminophen (TYLENOL PO)       Biotin 10 MG TABS tablet Take 10,000 mcg by mouth daily      busPIRone (BUSPAR) 10 MG tablet Take 1 tablet (10 mg) by mouth 2 times daily 60 tablet 2    CALCIUM-VITAMIN D-VITAMIN K PO       Collagen Hydrolysate POWD        Cyanocobalamin (VITAMIN B 12 PO) Take by mouth daily       cyclobenzaprine (FLEXERIL) 10 MG tablet Take 0.5 tablets (5 mg) by mouth 3 times daily as needed for muscle spasms 30 tablet 0    estradiol (VAGIFEM) 10 MCG TABS vaginal tablet Place 1 tablet (10 mcg) vaginally twice a week 24 tablet 3    estradiol (VIVELLE-DOT) 0.025 MG/24HR bi-weekly patch Place 1 patch onto the skin twice a week 24 patch 3    hydroxychloroquine (PLAQUENIL) 200 MG tablet Take 1 tablet (200 mg) by mouth 2 times daily 180 tablet 3    levonorgestrel (MIRENA) 52 MG (20 mcg/day) IUD by Intrauterine route once      LORazepam (ATIVAN) 0.5 MG tablet Take 1 tablet (0.5 mg) by mouth every 6 hours as needed for anxiety 30 tablet 0    Lysine 500 MG TABS Take 1,000 mg by mouth daily       magnesium citrate solution Takes 1 tsp twice a day, 150 mg daily      NONFORMULARY 1 capsule daily carmen      NONFORMULARY 2 tablets daily seabuckthorn      UNABLE TO FIND Take by mouth daily MEDICATION NAME: Folinic Acid      UNABLE TO FIND daily Ashwaganda - patient reported      UNABLE TO FIND daily Rhodiola- patient reported      valACYclovir (VALTREX) 1000 mg tablet Take 2 tablets (2,000 mg) by mouth 2 times daily For 2 days as needed for cold sores 12 tablet 11    valACYclovir (VALTREX) 500 MG tablet Take 1 tablet (500 mg) by mouth daily 90 tablet 1     ALLERGIES  Allergies   Allergen Reactions    Triptans Unknown     imitrex    Amoxicillin-Pot Clavulanate Hives     Unsure if true reaction to med     Ciprofloxacin Hives and Itching     Patient reported- possible reaction, patient unsure       SOCIAL HISTORY:  Smokes: No, former  EtOH: Yes, very minimal   Exercise: activity limited by foot injury. Plans to be more active.    Works as .     ROS:   Change in vision No  Headaches: no  Respiratory: No shortness of breath, dyspnea on exertion, cough, or hemoptysis   Cardiovascular: negative   Gastrointestinal: negative Abdominal pain:  no  Breast: negative  Musculoskeletal: negative Joint pain No Back pain: no  Psychiatric: negative  Hematologic/Lymphatic/Immunologic: negative  Endocrine: negative    EXAM  There were no vitals taken for this visit.   PHYSICAL EXAM  Respiratory: breathing non labored.   Breasts: Examination was done in both the upright and supine positions.  Breasts are symmetrical . No masses noted. No skin or nipple changes. No nipple discharge.   No clavicular, cervical, or axillary lymphadenopathy.     ASSESSMENT/PLAN:    Kiesha Mcguire is a 53 year old woman with history of left breast pain. No new breast changes or concerns. No concerning findings on previous diagnostic work up, recent mammogram, or breast exam today.     1) Breast pain  - will obtain left breast ultrasound    Breast pain is common. Up to 70% of women will experience breast pain in their lifetime.   Breast pain is most often related to normal physiologic change (hormonal fluctuations) and is a rare symptom of breast cancer.   Management of breast pain  Evaluation with imaging is important to determine if the pain is due to normal physiologic changes related to hormonal fluctuations or to a pathologic process.   Physical support  Wear a supportive Bra that is professionally fit and sized. Be sure your bra is not overly compressive or restrictive.   Wear a sports bra when exercising.   Wear comfortable breast support while sleeping.   Improving omega 3 fatty acid intake may improve breast pain. Eat 1-2 tablespoons of flaxseed daily.   Plant-based diets may help with breast pain.  Warm compresses or ice packs can be used  Consider eliminating or decrease caffeine (chocolate, tea, coffee, soda) for a two week period of time to see if pain improves/resolves  Evening Primrose Oil starting with 500 mg/day. May increase to 1000 mg 3 times daily for 6 months. Can be tried.   Chasteberry 400-500 mg/day can be tried.   If you are on hormone based medications  adjusting or decreasing these medications may help.   If needed over the counter analgesics such as acetaminophen, ibuprofen, or topical diclofenac cream can intermittently be used.     2) Continue yearly screening mammogram with tomosynthesis and clinical encounter. Be familiar with your breast and how they normally feel and appear. Promptly report any changes to your healthcare provider.   - Screening mammogram due: 8/9/25    Lianne Henderson PA-C    30 minutes spent on the date of the encounter doing chart review, review of test results, interpretation of tests, patient visit and documentation.

## 2024-09-17 NOTE — TELEPHONE ENCOUNTER
RECORDS STATUS - ALL OTHER DIAGNOSIS      RECORDS RECEIVED FROM:    Appt Date: 9/18/2024     Visit for preventive health examination   Action    Action Taken 9/17/2024 12:14pm KEB     I called pt Kiesha - it looks like all her records and scans are internal.       NOTES STATUS DETAILS   OFFICE NOTE from referring provider     OFFICE NOTE from medical oncologist     DISCHARGE SUMMARY from hospital     DISCHARGE REPORT from the ER     OPERATIVE REPORT In [facility] Colonoscopy 3/12/2024   MEDICATION LIST In EPIC    CLINICAL TRIAL TREATMENTS TO DATE     LABS     PATHOLOGY REPORTS     ANYTHING RELATED TO DIAGNOSIS In [facility] Labs last updated on 8/8/2024    PATHOLOGY FEDEX TRACKING   TRACKING #:   GENONOMIC TESTING     TYPE:     IMAGING (NEED IMAGES & REPORT)     CT SCANS     Mammo In PACS 8/8/2024, 3/9/2023, more in PACS   MRI Brain  In PACS 11/17/2015    ULTRASOUND In PACS US breast Left Limited 3/9/2023, more in PACS   PET     IMAGE DISC FEDEX TRACKING   TRACKING #:

## 2024-09-18 ENCOUNTER — PRE VISIT (OUTPATIENT)
Dept: ONCOLOGY | Facility: CLINIC | Age: 54
End: 2024-09-18

## 2024-09-18 ENCOUNTER — ONCOLOGY VISIT (OUTPATIENT)
Dept: SURGERY | Facility: CLINIC | Age: 54
End: 2024-09-18
Attending: ADVANCED PRACTICE MIDWIFE
Payer: COMMERCIAL

## 2024-09-18 VITALS
RESPIRATION RATE: 16 BRPM | TEMPERATURE: 98 F | HEIGHT: 64 IN | HEART RATE: 73 BPM | WEIGHT: 133.4 LBS | DIASTOLIC BLOOD PRESSURE: 67 MMHG | BODY MASS INDEX: 22.77 KG/M2 | OXYGEN SATURATION: 99 % | SYSTOLIC BLOOD PRESSURE: 96 MMHG

## 2024-09-18 DIAGNOSIS — N64.4 BREAST PAIN, LEFT: Primary | ICD-10-CM

## 2024-09-18 DIAGNOSIS — Z00.00 VISIT FOR PREVENTIVE HEALTH EXAMINATION: ICD-10-CM

## 2024-09-18 PROCEDURE — 99203 OFFICE O/P NEW LOW 30 MIN: CPT | Performed by: PHYSICIAN ASSISTANT

## 2024-09-18 PROCEDURE — 99213 OFFICE O/P EST LOW 20 MIN: CPT | Performed by: PHYSICIAN ASSISTANT

## 2024-09-18 RX ORDER — OMEGA-3 FATTY ACIDS/FISH OIL 300-1000MG
200 CAPSULE ORAL DAILY
COMMUNITY

## 2024-09-18 ASSESSMENT — PAIN SCALES - GENERAL: PAINLEVEL: MILD PAIN (2)

## 2024-09-18 NOTE — PATIENT INSTRUCTIONS
Kiesha Mcguire is a 53 year old woman with history of left breast pain. No new breast changes or concerns. No concerning findings on previous diagnostic work up, recent mammogram, or breast exam today.     1) Breast pain  Breast pain is common. Up to 70% of women will experience breast pain in their lifetime.   Breast pain is most often related to normal physiologic change (hormonal fluctuations) and is a rare symptom of breast cancer.   Management of breast pain  Evaluation with imaging is important to determine if the pain is due to normal physiologic changes related to hormonal fluctuations or to a pathologic process.   Physical support  Wear a supportive Bra that is professionally fit and sized. Be sure your bra is not overly compressive or restrictive.   Wear a sports bra when exercising.   Wear comfortable breast support while sleeping.   Improving omega 3 fatty acid intake may improve breast pain. Eat 1-2 tablespoons of flaxseed daily.   Plant-based diets may help with breast pain.  Warm compresses or ice packs can be used  Consider eliminating or decrease caffeine (chocolate, tea, coffee, soda) for a two week period of time to see if pain improves/resolves  Evening Primrose Oil starting with 500 mg/day. May increase to 1000 mg 3 times daily for 6 months. Can be tried.   Chasteberry 400-500 mg/day can be tried.   If you are on hormone based medications adjusting or decreasing these medications may help.   If needed over the counter analgesics such as acetaminophen, ibuprofen, or topical diclofenac cream can intermittently be used.     2) Continue yearly screening mammogram with tomosynthesis and clinical encounter. Be familiar with your breast and how they normally feel and appear. Promptly report any changes to your healthcare provider.   - Screening mammogram due: 8/9/25

## 2024-09-18 NOTE — LETTER
2024      Kiesha Mcguire  [address] [address]      Dear Colleague,    Thank you for referring your patient, Kiesha Mcguire, to the [facility]. Please see a copy of my visit note below.    NEW CONSULTATION   Sep 18, 2024    Kiesha Mcguire is a 53 year old woman who presents with history of focal left breast pain and itching. She was referred by Dr. Dos Santos.    HPI:    She has had left breast 1:00 intermittent focal pain and itching going back to . She has had diagnostic work up for this in , , ,  that was normal and demonstrated an intramammary lymph node. Her symptoms have been more frequent and intense since she started hormone replacement therapy. Her most recent breast imaging was a mammogram 24 that was normal.     Today she denies any breast change or concern including mass, skin change, nipple inversion or nipple discharge.     BREAST-SPECIFIC HISTORY:    Previous breast imaging: Yes  - 10/6/08 b/l Dmammo for right breast decreased milk output BI-RADS 2  - 12 b/l Dmammo and left breast ultrasound for lump: lymph node 9 mm. BI-RADS 2  - 3/7/13 b/l Dammmo and left breast ultrasound for pain BI-RADS 1  - 14 left breast ultrasound for lump: 2:00 6 cm FN intramammary lymph node stable BI-RADS 2  - 9/25/15 Smammo BI-RADS 1  - 16 Smammo BI-RADS 1  - 16 left Dmammo and left breast ultrasound for pain BI-RADS 1  - 18 Smammo BI-RADS 1  - 21 Smammo BI-RADS 1  - 22 Smammo BI-RADS 1  - 3/9/23 b/l Dmammo and left breast ultrasound for pain BI-RADS 1  - 24 Smammo BI-RADS 1    Prior breast biopsies/surgeries: No    Prior history of breast cancer or DCIS: No  Prior radiation history: No   Breast density: scattered fibroglandular densities.     GYN HISTORY:  . Age at 1st pregnancy: 36. Breastfeeding history: yes  Age at menarche: average 12  Menopausal: IUD .   Menopausal hormone  Received pt in bed alert and oriented x 3, no respiratory distress noted. pt denies pain or discomfort at this time. awaiting lab results. replacement therapy: Yes HRT started 2024   - estradiol vaginal  - estradiol patch     RISK ASSESSMENT: < 3% 5 year risk by Lucinda, < 5% 10 year risk by KRISTIAN, and < 20% lifetime risk by KRISTIAN  Lucinda:1.6% 5 year risk   KRISTIAN/Sindhu: 8.5% lifetime risk, 2.4% 10 year risk     FAMILY HISTORY:  Breast ca: yes  - mothers cousin  Ovarian ca: No  Pancreatic ca: No  Prostate: No  Gastric ca: No  Melanoma: No  Colon ca: No  Other cancer: Yes  - father with neuroendocrine tumor. Passed of this.  - paternal grandmother with liver cancer  Other genetic, testing, syndromes, or clotting disorders:      PAST MEDICAL HISTORY  Past Medical History:   Diagnosis Date     Depressive disorder      Depressive disorder, not elsewhere classified     resolved     Herpes simplex without mention of complication     oral     IUD (intrauterine device) in place 08/15/2013    Mirena     Migraine headache with aura     very occass, sig aura, speech loss x1     Pure hypercholesterolemia     follows w BIPIN CHATMAN     PAST SURGICAL HISTORY   Past Surgical History:   Procedure Laterality Date     COLONOSCOPY N/A 10/27/2021    Procedure: COLONOSCOPY, WITH POLYPECTOMY AND CLIP;  Surgeon: Og Gutierres MD;  Location: [facility] OR     COLONOSCOPY N/A 03/12/2024    Procedure: COLONOSCOPY, WITH POLYPECTOMY AND BIOPSY;  Surgeon: Emanuel Braden MD;  Location: [facility]     COLONOSCOPY WITH CO2 INSUFFLATION N/A 03/12/2024    Procedure: Colonoscopy with CO2 insufflation;  Surgeon: Emanuel Braden MD;  Location: [facility] OR     HC DILATION/CURETTAGE DIAG/THER NON OB  12/01/2006     LAPAROSCOPIC CHOLECYSTECTOMY N/A 12/24/2021    Procedure: CHOLECYSTECTOMY, LAPAROSCOPIC;  Surgeon: Denise Cast MD;  Location: [facility] OR     SOFT TISSUE SURGERY  6/2024     MEDICATIONS  Current Outpatient Medications   Medication Sig Dispense Refill     ibuprofen (ADVIL/MOTRIN) 200 MG capsule Take 200 mg by mouth daily. 2 tablets every 4 hours       Acetaminophen (TYLENOL PO)         Biotin 10 MG TABS tablet Take 10,000 mcg by mouth daily       busPIRone (BUSPAR) 10 MG tablet Take 1 tablet (10 mg) by mouth 2 times daily 60 tablet 2     CALCIUM-VITAMIN D-VITAMIN K PO        Collagen Hydrolysate POWD        Cyanocobalamin (VITAMIN B 12 PO) Take by mouth daily        cyclobenzaprine (FLEXERIL) 10 MG tablet Take 0.5 tablets (5 mg) by mouth 3 times daily as needed for muscle spasms 30 tablet 0     estradiol (VAGIFEM) 10 MCG TABS vaginal tablet Place 1 tablet (10 mcg) vaginally twice a week 24 tablet 3     estradiol (VIVELLE-DOT) 0.025 MG/24HR bi-weekly patch Place 1 patch onto the skin twice a week 24 patch 3     hydroxychloroquine (PLAQUENIL) 200 MG tablet Take 1 tablet (200 mg) by mouth 2 times daily 180 tablet 3     levonorgestrel (MIRENA) 52 MG (20 mcg/day) IUD by Intrauterine route once       LORazepam (ATIVAN) 0.5 MG tablet Take 1 tablet (0.5 mg) by mouth every 6 hours as needed for anxiety 30 tablet 0     Lysine 500 MG TABS Take 1,000 mg by mouth daily        magnesium citrate solution Takes 1 tsp twice a day, 150 mg daily       NONFORMULARY 1 capsule daily carmen       NONFORMULARY 2 tablets daily seabuckthorn       UNABLE TO FIND Take by mouth daily MEDICATION NAME: Folinic Acid       UNABLE TO FIND daily Ashwaganda - patient reported       UNABLE TO FIND daily Rhodiola- patient reported       valACYclovir (VALTREX) 1000 mg tablet Take 2 tablets (2,000 mg) by mouth 2 times daily For 2 days as needed for cold sores 12 tablet 11     valACYclovir (VALTREX) 500 MG tablet Take 1 tablet (500 mg) by mouth daily 90 tablet 1     ALLERGIES  Allergies   Allergen Reactions     Triptans Unknown     imitrex     Amoxicillin-Pot Clavulanate Hives     Unsure if true reaction to med      Ciprofloxacin Hives and Itching     Patient reported- possible reaction, patient unsure       SOCIAL HISTORY:  Smokes: No, former  EtOH: Yes, very minimal   Exercise: activity limited by foot injury. Plans to be more  active.    Works as .     ROS:   Change in vision No  Headaches: no  Respiratory: No shortness of breath, dyspnea on exertion, cough, or hemoptysis   Cardiovascular: negative   Gastrointestinal: negative Abdominal pain: no  Breast: negative  Musculoskeletal: negative Joint pain No Back pain: no  Psychiatric: negative  Hematologic/Lymphatic/Immunologic: negative  Endocrine: negative    EXAM  There were no vitals taken for this visit.   PHYSICAL EXAM  Respiratory: breathing non labored.   Breasts: Examination was done in both the upright and supine positions.  Breasts are symmetrical . No masses noted. No skin or nipple changes. No nipple discharge.   No clavicular, cervical, or axillary lymphadenopathy.     ASSESSMENT/PLAN:    Kiesha Mcguire is a 53 year old woman with history of left breast pain. No new breast changes or concerns. No concerning findings on previous diagnostic work up, recent mammogram, or breast exam today.     1) Breast pain  - will obtain left breast ultrasound    Breast pain is common. Up to 70% of women will experience breast pain in their lifetime.   Breast pain is most often related to normal physiologic change (hormonal fluctuations) and is a rare symptom of breast cancer.   Management of breast pain  Evaluation with imaging is important to determine if the pain is due to normal physiologic changes related to hormonal fluctuations or to a pathologic process.   Physical support  Wear a supportive Bra that is professionally fit and sized. Be sure your bra is not overly compressive or restrictive.   Wear a sports bra when exercising.   Wear comfortable breast support while sleeping.   Improving omega 3 fatty acid intake may improve breast pain. Eat 1-2 tablespoons of flaxseed daily.   Plant-based diets may help with breast pain.  Warm compresses or ice packs can be used  Consider eliminating or decrease caffeine (chocolate, tea, coffee, soda) for a two week period of  time to see if pain improves/resolves  Evening Primrose Oil starting with 500 mg/day. May increase to 1000 mg 3 times daily for 6 months. Can be tried.   Chasteberry 400-500 mg/day can be tried.   If you are on hormone based medications adjusting or decreasing these medications may help.   If needed over the counter analgesics such as acetaminophen, ibuprofen, or topical diclofenac cream can intermittently be used.     2) Continue yearly screening mammogram with tomosynthesis and clinical encounter. Be familiar with your breast and how they normally feel and appear. Promptly report any changes to your healthcare provider.   - Screening mammogram due: 8/9/25    Lianne Henderson PA-C    30 minutes spent on the date of the encounter doing chart review, review of test results, interpretation of tests, patient visit and documentation.        Again, thank you for allowing me to participate in the care of your patient.        Sincerely,        Lianne Henderson PA-C

## 2024-09-18 NOTE — NURSING NOTE
"Oncology Rooming Note    September 18, 2024 9:11 AM   Kiesha Mcguire is a 53 year old female who presents for:    Chief Complaint   Patient presents with    Oncology Clinic Visit     preventive health examination        Initial Vitals: BP 96/67 (BP Location: Right arm, Patient Position: Sitting, Cuff Size: Adult Regular)   Pulse 73   Temp 98  F (36.7  C) (Oral)   Resp 16   Ht 1.619 m (5' 3.74\")   Wt 60.5 kg (133 lb 6.4 oz)   SpO2 99%   BMI 23.09 kg/m   Estimated body mass index is 23.09 kg/m  as calculated from the following:    Height as of this encounter: 1.619 m (5' 3.74\").    Weight as of this encounter: 60.5 kg (133 lb 6.4 oz). Body surface area is 1.65 meters squared.  Mild Pain (2) Comment: Data Unavailable   No LMP recorded. (Menstrual status: IUD).  Allergies reviewed: Yes  Medications reviewed: Yes    Medications: Medication refills not needed today.  Pharmacy name entered into [employer]:    [facility] [place], [place] - [address]  [facility] [place], [place] - [address] [facility] [place], [place] - [address]  [place], [place] [address]  [facility] [place], [place] [phone]  [facility] [address] [place], [place] - [address]  [facility] [place], [place] - [address]    Frailty Screening:   Is the patient here for a new oncology consult visit in cancer care? 1. Yes. Over the past month, have you experienced difficulty or required a caregiver to assist with:   1. Balance, walking or general mobility (including any falls)? NO  2. Completion of self-care tasks such as bathing, dressing, toileting, grooming/hygiene?  NO  3. Concentration or memory that affects your daily life?  NO       Clinical concerns:  none      Lala Cano            "

## 2024-09-20 ENCOUNTER — VIRTUAL VISIT (OUTPATIENT)
Dept: PSYCHOLOGY | Facility: CLINIC | Age: 54
End: 2024-09-20
Payer: COMMERCIAL

## 2024-09-20 DIAGNOSIS — F41.1 GENERALIZED ANXIETY DISORDER: Primary | ICD-10-CM

## 2024-09-20 DIAGNOSIS — F43.89 REACTION TO CHRONIC STRESS: ICD-10-CM

## 2024-09-20 DIAGNOSIS — F32.A DEPRESSIVE DISORDER: ICD-10-CM

## 2024-09-20 PROCEDURE — 90834 PSYTX W PT 45 MINUTES: CPT | Mod: 95 | Performed by: SOCIAL WORKER

## 2024-09-20 NOTE — PROGRESS NOTES
[facility]                                     Progress Note    Patient Name: Kiesha Mcguire  Date: 9/20/2024       Service Type: Individual      Session Start Time:  8:33 AM Session End Time: 9:18 AM     Session Length: 38-52 minutes    Session #: 65 with this provider    Attendees: Client attended alone    Service Modality:  Video Visit:      Provider verified identity through the following two step process.  Patient provided:  Patient is known previously to provider    Telemedicine Visit: The patient's condition can be safely assessed and treated via synchronous audio and visual telemedicine encounter.      Reason for Telemedicine Visit: Patient convenience (e.g. access to timely appointments / distance to available provider) and Services only offered telehealth    Originating Site (Patient Location): Patient's home    Distant Site (Provider Location): Provider Remote Setting- Home Office/Off-site    Consent:  The patient/guardian has verbally consented to: the potential risks and benefits of telemedicine (video visit) versus in person care; bill my insurance or make self-payment for services provided; and responsibility for payment of non-covered services.     Patient would like the video invitation sent by:  [place]    Mode of Communication:  Video    As the provider I attest to compliance with applicable laws and regulations related to telemedicine.    DATA  Interactive Complexity: No   Crisis: No      Progress Since Last Session (Related to Symptoms / Goals / Homework):  Symptoms:  no change since previous appointment    Homework: Achieved / completed to satisfaction     Episode of Care Goals: Satisfactory progress - ACTION (Actively working towards change); Intervened by reinforcing change plan / affirming steps taken    There has been demonstrated improvement in functioning while patient has been engaged in psychotherapy/psychological service- if withdrawn the patient would  "deteriorate and/or relapse.       Current / Ongoing Stressors and Concerns:   Difficulty trusting others and being vulnerable  Limited close relationships  Stressors related to parenting   Work related stressors  Dynamics in relationship with ex-  Dating  Fear of Failure     Treatment Objective(s) Addressed in This Session:   identify 2 fears / thoughts that contribute to feeling anxious  Use cognitive strategies to manage thoughts/fears that contribute to anxiety symptoms    Safe/calm state titled \"calm\"  Container: box with a lock     Intervention:   Engaged in active listening   CBT: engaged in perspective taking, modeled cognitive restructuring, assisted with identifying negative cognitions   Reviewed treatment plan    Assessments completed prior to visit:  The following assessments were completed by patient for this visit:  None    ASSESSMENT: Current Emotional / Mental Status (status of significant symptoms):   Risk status (Self / Other harm or suicidal ideation)   Patient denies current fears or concerns for personal safety.   Patient denies current or recent suicidal ideation or behaviors.   Patient denies current or recent homicidal ideation or behaviors.   Patient denies current or recent self injurious behavior or ideation.   Patient denies other safety concerns.   Patient reports there has been no change in risk factors since their last session.     Patient reports there has been no change in protective factors since their last session.     Recommended that patient call 911 or go to the local ED should there be a change in any of these risk factors.     Appearance:   Appropriate    Eye Contact:   Good    Psychomotor Behavior: Normal    Attitude:   Cooperative  Interested Pleasant    Orientation:   All   Speech    Rate / Production: Talkative Normal     Volume:  Normal    Mood:    Anxious  Sad    Affect:    Mood Congruent  tearful-minimal   Thought Content:  Clear    Thought Form:  Coherent  Goal " Directed  Logical    Insight:    Good      Medication Review:   No changes to current psychiatric medication(s)   Vyvanse   Buspar-client is not taking daily    Estrogen patch     Medication Compliance:   Yes     Changes in Health Issues:   None reported     Chemical Use Review:   Substance Use: no use     Tobacco Use: no use    Diagnosis:  Generalized Anxiety Disorder   Other Specified Trauma and Stress Related Disorder   Unspecified Depressive Disorder     Collateral Reports Completed:   Not Applicable    PLAN: (Patient Tasks / Therapist Tasks / Other)  EMDR:  Plan for next session to have client keep her eyes open.   Target: concerns about hoarding  Therapist to continue resetting affective circuits, engage client in building resources and the early trauma protocol.  Therapist will continue to work with client on identifying anxiety symptoms, and increasing coping strategies to manage them  Therapist encouraged client to identify areas within her control.    Magali Montilla, [facility] 9/20/2024    ______________________________________________________________________    Individual Treatment Plan    Patient's Name: Kiesha Mcguire  YOB: 1970    Date of Creation: 5/11/2022  Date Treatment Plan Last Reviewed/Revised: 9/20/2024    DSM5 Diagnoses:    Generalized Anxiety Disorder  Other Specified Trauma and Stress Related Disorder  Unspecified Depressive Disorder     Psychosocial / Contextual Factors: stressors related to parenting  PROMIS (reviewed every 90 days):    PROMIS 10-Global Health (only subscores and total score):       1/23/2024    12:55 PM 2/8/2024    10:13 AM 5/23/2024    10:26 AM 6/27/2024    10:08 AM 7/10/2024    12:04 PM 8/8/2024    12:27 PM 8/21/2024    10:36 AM   PROMIS-10 Scores Only   Global Mental Health Score 14 12 13 14 13 13 14   Global Physical Health Score 17 16 15 15 15 14 15   PROMIS TOTAL - SUBSCORES 31 28 28 29 28 27 29        Referral / Collaboration:  Referral to  "another professional/service is not indicated at this time..    Anticipated number of session for this episode of care: will review in 90 days  Anticipation frequency of session: Every other week  Anticipated Duration of each session: 38-52 minutes  Treatment plan will be reviewed in 90 days or when goals have been changed.       MeasurableTreatment Goal(s) related to diagnosis / functional impairment(s)  Goal 1: Patient will sustain attention and concentration for consistently longer periods of time.  Patient will meet goals set for completing tasks 80% of the time.   Client's Goal:  I will know I've met my goal when my space isn't so chaotic, meeting deadlines around bills and work, when I am not always playing catch-up, completing tasks.      Objective #A (Patient Action)    Patient will learn and implement organization and planning skills.  Status: New - Date: 5/11/2022 , continued date: 9/8/2022, continued date: 12/21/2022, completed date: 3/29/2023    Intervention(s)  Therapist will teach the client organization and planning skills.  Therapist will address any potential barriers to using skills.    Objective #B  Patient will  identify, challenge, and change self-talk that contributes to maladaptive feelings and actions .  Status: New - Date: 5/11/2022 , Continued date: 9/8/2022, continued date: 12/21/2022, continued date: 3/29/2023, continued date: 7/28/2023, continued date: 11/9/2023, continued date: 1/23/2024, continued date: 5/9/2024, continued date: 9/20/2024    Intervention(s)  Therapist will teach the CBT model, cognitive distortions, and cognitive restructuring techniques.      Goal 2: Patient will be able to recall the traumatic events without becoming overwhelmed with negative thoughts, feelings, or urges.   Client's Goal:  I will know I've met my goal when I do not cry every time I talk about it.\"    Objective #A (Patient Action)    Status: New - Date: 5/11/2022, continued date: 9/8/2022, continued " date: 12/21/2022, continued date: 3/29/2023, continued date: 7/28/2023, continued date: 11/9/2023, continued date: 1/23/2024, continued date: 5/9/2024, continued date: 9/20/2024    Patient will describe the history of and nature of trauma symptoms    Intervention(s)  Therapist will assess the client's frequency, intensity, duration, and history of trauma symptoms and their impact on functioning.    Objective #B (Patient Action)  Status: New Date: 5/11/2022, continued date: 9/8/2022, continued date: 12/21/2022, continued date: 3/29/2023, continued date: 7/28/2023, continued date: 11/9/2023, continued date: 1/23/2024, continued date: 5/9/2024, continued date: 9/20/2024    Patient will cooperate with eye movement desensitization and reprocessing (EMDR) to reduce emotional reaction to traumatic event(s)    Intervention(s)  Therapist will utilize EMDR to reduce the client's emotional reactivity to the traumatic event(s).    Objective #C (Patient Action)  Status: New Date: 5/11/2022, continued date: 9/8/2022, continued date: 12/21/2022, continued date: 3/29/2023, continued date: 7/28/2023, continued date: 11/9/2023, continued date: 1/23/2024, continued date: 5/9/2024, continued date: 9/20/2024    Patient will learn and implement calming and coping strategies to manage trauma symptoms.    Intervention(s)  Therapist will teach grounding techniques, distress tolerance, and emotion regulation techniques.    Goal 3: Patient's anxiety symptoms will remit as evidenced by a decrease in GAD7 scores, where symptoms occur fewer than half the days for a minimum of four weeks.    Objective #A (Patient Action)    Patient will identify 3 symptoms of anxiety.  Status: New - Date: 9/20/2024    Intervention(s)  Therapist will provide psychoeducation on anxiety and engage client in identifying symptoms in appointments.    Objective #B  Patient will use a minimum of 3 strategies to manage anxiety symptoms  Status: New - Date:  9/20/2024    Intervention(s)  Therapist will teach strategies such as grounding techniques, thought stopping, and use of distraction    Objective #C  Patient will use cognitive strategies to manage thoughts/fears that contribute to anxiety symptoms.  Status: New - Date:     Intervention(s)  Therapist will teach the CBT (Cognitive Behavioral Therapy) model, including cognitive distortions and cognitive restructuring techniques.       Patient has reviewed and agreed to the above plan.      Magali Montilla, [facility]  May 11, 2022  Magali Montilla, [facility]  9/8/2022  Magali Montilla, [facility]  12/21/2022  Magali Montilla, [facility]  3/29/2023  Magali Montilla, [facility]  7/28/2023  Magali Montilla, [facility]  11/9/2023  Magali Montilla, [facility]  1/23/2024  Magali Montilla, [facility]  5/9/2024  Magali Montilla, [facility]  9/20/2024

## 2024-09-23 ENCOUNTER — OFFICE VISIT (OUTPATIENT)
Dept: FAMILY MEDICINE | Facility: CLINIC | Age: 54
End: 2024-09-23
Payer: COMMERCIAL

## 2024-09-23 VITALS
HEIGHT: 64 IN | RESPIRATION RATE: 16 BRPM | SYSTOLIC BLOOD PRESSURE: 99 MMHG | TEMPERATURE: 97.4 F | DIASTOLIC BLOOD PRESSURE: 67 MMHG | BODY MASS INDEX: 22.53 KG/M2 | OXYGEN SATURATION: 99 % | HEART RATE: 70 BPM | WEIGHT: 132 LBS

## 2024-09-23 DIAGNOSIS — M24.9 HYPERMOBILE JOINTS: ICD-10-CM

## 2024-09-23 DIAGNOSIS — Z88.0 ALLERGY TO AMOXICILLIN: ICD-10-CM

## 2024-09-23 DIAGNOSIS — F90.0 ATTENTION DEFICIT HYPERACTIVITY DISORDER (ADHD), PREDOMINANTLY INATTENTIVE TYPE: Primary | ICD-10-CM

## 2024-09-23 DIAGNOSIS — R42 DIZZINESS: ICD-10-CM

## 2024-09-23 PROCEDURE — 99214 OFFICE O/P EST MOD 30 MIN: CPT | Performed by: FAMILY MEDICINE

## 2024-09-23 RX ORDER — LISDEXAMFETAMINE DIMESYLATE 10 MG/1
10 CAPSULE ORAL EVERY MORNING
Qty: 30 CAPSULE | Refills: 0 | Status: SHIPPED | OUTPATIENT
Start: 2025-01-21 | End: 2025-02-20

## 2024-09-23 RX ORDER — LISDEXAMFETAMINE DIMESYLATE 10 MG/1
10 CAPSULE ORAL EVERY MORNING
Qty: 30 CAPSULE | Refills: 0 | Status: SHIPPED | OUTPATIENT
Start: 2024-10-23 | End: 2024-11-22

## 2024-09-23 RX ORDER — LISDEXAMFETAMINE DIMESYLATE 10 MG/1
10 CAPSULE ORAL EVERY MORNING
Qty: 30 CAPSULE | Refills: 0 | Status: SHIPPED | OUTPATIENT
Start: 2024-09-23 | End: 2024-10-23

## 2024-09-23 RX ORDER — LISDEXAMFETAMINE DIMESYLATE 10 MG/1
10 CAPSULE ORAL EVERY MORNING
Qty: 30 CAPSULE | Refills: 0 | Status: SHIPPED | OUTPATIENT
Start: 2024-11-22 | End: 2024-12-22

## 2024-09-23 RX ORDER — LISDEXAMFETAMINE DIMESYLATE 10 MG/1
10 CAPSULE ORAL EVERY MORNING
Qty: 30 CAPSULE | Refills: 0 | Status: SHIPPED | OUTPATIENT
Start: 2025-02-20 | End: 2025-03-22

## 2024-09-23 RX ORDER — LISDEXAMFETAMINE DIMESYLATE 10 MG/1
10 CAPSULE ORAL EVERY MORNING
Qty: 30 CAPSULE | Refills: 0 | Status: SHIPPED | OUTPATIENT
Start: 2024-12-22 | End: 2025-01-21

## 2024-09-23 NOTE — PROGRESS NOTES
Assessment & Plan   Problem List Items Addressed This Visit       Attention deficit hyperactivity disorder (ADHD), predominantly inattentive type - Primary    Relevant Medications    lisdexamfetamine (VYVANSE) 10 MG capsule    lisdexamfetamine (VYVANSE) 10 MG capsule (Start on 10/23/2024)    lisdexamfetamine (VYVANSE) 10 MG capsule (Start on 2/20/2025)    lisdexamfetamine (VYVANSE) 10 MG capsule (Start on 11/22/2024)    lisdexamfetamine (VYVANSE) 10 MG capsule (Start on 12/22/2024)    lisdexamfetamine (VYVANSE) 10 MG capsule (Start on 1/21/2025)     Other Visit Diagnoses       Allergy to amoxicillin        Relevant Orders    Adult Allergy/Asthma  Referral    Hypermobile joints        Dizziness        Relevant Medications    lisdexamfetamine (VYVANSE) 10 MG capsule    lisdexamfetamine (VYVANSE) 10 MG capsule (Start on 10/23/2024)    lisdexamfetamine (VYVANSE) 10 MG capsule (Start on 2/20/2025)    lisdexamfetamine (VYVANSE) 10 MG capsule (Start on 11/22/2024)    lisdexamfetamine (VYVANSE) 10 MG capsule (Start on 12/22/2024)    lisdexamfetamine (VYVANSE) 10 MG capsule (Start on 1/21/2025)    Other Relevant Orders    Adult Cardiology Eval  Referral           ADHD stable, follow up 6 months    Allergy testing to potentially take amox/PCN off allergy list    Appreciate cardiology help with presyncopal workup        Subjective   Kiesha is a 53 year old, presenting for the following health issues:  Recheck Medication        9/23/2024     5:03 PM   Additional Questions   Roomed by Elyssa PARIS CMA     History of Present Illness       Reason for visit:  Medication refill    She eats 2-3 servings of fruits and vegetables daily.She consumes 1 sweetened beverage(s) daily.She exercises with enough effort to increase her heart rate 9 or less minutes per day.  She exercises with enough effort to increase her heart rate 3 or less days per week.   She is taking medications regularly.     Doing well on 10mg  "Vyvanse  Would like to remove PCN from allergy list, not sure if she is allergic      Objective    BP 99/67 (BP Location: Right arm, Patient Position: Chair, Cuff Size: Adult Regular)   Pulse 70   Temp 97.4  F (36.3  C) (Temporal)   Resp 16   Ht 1.619 m (5' 3.74\")   Wt 59.9 kg (132 lb)   LMP  (LMP Unknown)   SpO2 99%   BMI 22.84 kg/m    Body mass index is 22.84 kg/m .  Physical Exam   Gen NAD  Normal mood/affect  RRR, no murmur          Signed Electronically by: SRINIVASAN SOOD DO    "

## 2024-10-01 ENCOUNTER — E-VISIT (OUTPATIENT)
Dept: URGENT CARE | Facility: CLINIC | Age: 54
End: 2024-10-01
Payer: COMMERCIAL

## 2024-10-01 DIAGNOSIS — R05.1 ACUTE COUGH: Primary | ICD-10-CM

## 2024-10-01 PROCEDURE — 99207 PR NON-BILLABLE SERV PER CHARTING: CPT | Performed by: EMERGENCY MEDICINE

## 2024-10-01 NOTE — PATIENT INSTRUCTIONS
Dear Kiesha Mcguire,    We are sorry you are not feeling well. Based on the responses you provided, it is recommended that you be seen in-person in urgent care so we can better evaluate your symptoms. Please click here to find the nearest urgent care location to you.   You will not be charged for this Visit. Thank you for trusting us with your care.    Jovanny Jasmine MD

## 2024-10-02 ENCOUNTER — VIRTUAL VISIT (OUTPATIENT)
Dept: PSYCHOLOGY | Facility: CLINIC | Age: 54
End: 2024-10-02
Payer: COMMERCIAL

## 2024-10-02 DIAGNOSIS — F43.89 REACTION TO CHRONIC STRESS: ICD-10-CM

## 2024-10-02 DIAGNOSIS — F41.1 GENERALIZED ANXIETY DISORDER: Primary | ICD-10-CM

## 2024-10-02 PROCEDURE — 90834 PSYTX W PT 45 MINUTES: CPT | Mod: 95 | Performed by: SOCIAL WORKER

## 2024-10-08 ENCOUNTER — VIRTUAL VISIT (OUTPATIENT)
Dept: PSYCHOLOGY | Facility: CLINIC | Age: 54
End: 2024-10-08
Payer: COMMERCIAL

## 2024-10-08 DIAGNOSIS — F43.89 REACTION TO CHRONIC STRESS: ICD-10-CM

## 2024-10-08 DIAGNOSIS — F41.1 GENERALIZED ANXIETY DISORDER: Primary | ICD-10-CM

## 2024-10-08 PROCEDURE — 90834 PSYTX W PT 45 MINUTES: CPT | Mod: 95 | Performed by: SOCIAL WORKER

## 2024-10-08 NOTE — PROGRESS NOTES
[facility]                                     Progress Note    Patient Name: Kiesha Mcguire  Date: 10/8/2024       Service Type: Individual      Session Start Time: 12:33 PM Session End Time: 1:25 PM     Session Length: 38-52 minutes    Session #: 67 with this provider    Attendees: Client attended alone    Service Modality:  Video Visit:      Provider verified identity through the following two step process.  Patient provided:  Patient is known previously to provider    Telemedicine Visit: The patient's condition can be safely assessed and treated via synchronous audio and visual telemedicine encounter.      Reason for Telemedicine Visit: Patient convenience (e.g. access to timely appointments / distance to available provider) and Services only offered telehealth    Originating Site (Patient Location): Patient's home    Distant Site (Provider Location): Provider Remote Setting- Home Office/Off-site    Consent:  The patient/guardian has verbally consented to: the potential risks and benefits of telemedicine (video visit) versus in person care; bill my insurance or make self-payment for services provided; and responsibility for payment of non-covered services.     Patient would like the video invitation sent by:  [employer]    Mode of Communication:  Video    As the provider I attest to compliance with applicable laws and regulations related to telemedicine.    DATA  Interactive Complexity: No   Crisis: No      Progress Since Last Session (Related to Symptoms / Goals / Homework):  Symptoms:  no change since previous appointment    Homework: Achieved / completed to satisfaction     Episode of Care Goals: Satisfactory progress - ACTION (Actively working towards change); Intervened by reinforcing change plan / affirming steps taken    There has been demonstrated improvement in functioning while patient has been engaged in psychotherapy/psychological service- if withdrawn the patient would  "deteriorate and/or relapse.       Current / Ongoing Stressors and Concerns:   Difficulty trusting others and being vulnerable  Limited close relationships  Stressors related to parenting   Work related stressors  Dynamics in relationship with ex-  Fear of Failure     Treatment Objective(s) Addressed in This Session:   identify 3 fears / thoughts that contribute to feeling anxious  Maintain behavior changes    Safe/calm state titled \"calm\"  Container: box with a lock     Intervention:   Engaged in active listening   CBT: engaged client in identifying fears/thoughts that contribute to anxiety symptoms   Explored origins of beliefs    Assessments completed prior to visit:  The following assessments were completed by patient for this visit:  None    ASSESSMENT: Current Emotional / Mental Status (status of significant symptoms):   Risk status (Self / Other harm or suicidal ideation)   Patient denies current fears or concerns for personal safety.   Patient denies current or recent suicidal ideation or behaviors.   Patient denies current or recent homicidal ideation or behaviors.   Patient denies current or recent self injurious behavior or ideation.   Patient denies other safety concerns.   Patient reports there has been no change in risk factors since their last session.     Patient reports there has been no change in protective factors since their last session.     Recommended that patient call 911 or go to the local ED should there be a change in any of these risk factors.     Appearance:   Appropriate    Eye Contact:   Good    Psychomotor Behavior: Normal Restless hands   Attitude:   Cooperative  Interested Friendly Pleasant    Orientation:   All   Speech    Rate / Production: Normal     Volume:  Normal    Mood:    Anxious Stable Sad-minimal   Affect:    Mood Congruent  tearful-minimal   Thought Content:  Clear    Thought Form:  Coherent  Goal Directed  Logical    Insight:    Good      Medication Review:   No " changes to current psychiatric medication(s)   Vyvanse   Buspar-client is not taking daily    Estrogen patch     Medication Compliance:   Yes     Changes in Health Issues:   None reported     Chemical Use Review:   Substance Use: no use     Tobacco Use: no use    Diagnosis:  Generalized Anxiety Disorder   Other Specified Trauma and Stress Related Disorder    Collateral Reports Completed:   Not Applicable    PLAN: (Patient Tasks / Therapist Tasks / Other)  EMDR:  Plan for next session to have client keep her eyes open.   Target: concerns about hoarding  Therapist to continue resetting affective circuits, engage client in building resources and the early trauma protocol.  Client shared goal to read books on hoarding.    Magali Montilla, [facility] 10/8/2024    ______________________________________________________________________    Individual Treatment Plan    Patient's Name: Kiesha Mcguire  YOB: 1970    Date of Creation: 5/11/2022  Date Treatment Plan Last Reviewed/Revised: 9/20/2024    DSM5 Diagnoses:    Generalized Anxiety Disorder  Other Specified Trauma and Stress Related Disorder  Unspecified Depressive Disorder     Psychosocial / Contextual Factors: stressors related to parenting  PROMIS (reviewed every 90 days):    PROMIS 10-Global Health (only subscores and total score):       1/23/2024    12:55 PM 2/8/2024    10:13 AM 5/23/2024    10:26 AM 6/27/2024    10:08 AM 7/10/2024    12:04 PM 8/8/2024    12:27 PM 8/21/2024    10:36 AM   PROMIS-10 Scores Only   Global Mental Health Score 14 12 13 14 13 13 14   Global Physical Health Score 17 16 15 15 15 14 15   PROMIS TOTAL - SUBSCORES 31 28 28 29 28 27 29        Referral / Collaboration:  Referral to another professional/service is not indicated at this time..    Anticipated number of session for this episode of care: will review in 90 days  Anticipation frequency of session: Every other week  Anticipated Duration of each session: 38-52  "minutes  Treatment plan will be reviewed in 90 days or when goals have been changed.       MeasurableTreatment Goal(s) related to diagnosis / functional impairment(s)  Goal 1: Patient will sustain attention and concentration for consistently longer periods of time.  Patient will meet goals set for completing tasks 80% of the time.   Client's Goal:  I will know I've met my goal when my space isn't so chaotic, meeting deadlines around bills and work, when I am not always playing catch-up, completing tasks.      Objective #A (Patient Action)    Patient will learn and implement organization and planning skills.  Status: New - Date: 5/11/2022 , continued date: 9/8/2022, continued date: 12/21/2022, completed date: 3/29/2023    Intervention(s)  Therapist will teach the client organization and planning skills.  Therapist will address any potential barriers to using skills.    Objective #B  Patient will  identify, challenge, and change self-talk that contributes to maladaptive feelings and actions .  Status: New - Date: 5/11/2022 , Continued date: 9/8/2022, continued date: 12/21/2022, continued date: 3/29/2023, continued date: 7/28/2023, continued date: 11/9/2023, continued date: 1/23/2024, continued date: 5/9/2024, continued date: 9/20/2024    Intervention(s)  Therapist will teach the CBT model, cognitive distortions, and cognitive restructuring techniques.      Goal 2: Patient will be able to recall the traumatic events without becoming overwhelmed with negative thoughts, feelings, or urges.   Client's Goal:  I will know I've met my goal when I do not cry every time I talk about it.\"    Objective #A (Patient Action)    Status: New - Date: 5/11/2022, continued date: 9/8/2022, continued date: 12/21/2022, continued date: 3/29/2023, continued date: 7/28/2023, continued date: 11/9/2023, continued date: 1/23/2024, continued date: 5/9/2024, continued date: 9/20/2024    Patient will describe the history of and nature of trauma " symptoms    Intervention(s)  Therapist will assess the client's frequency, intensity, duration, and history of trauma symptoms and their impact on functioning.    Objective #B (Patient Action)  Status: New Date: 5/11/2022, continued date: 9/8/2022, continued date: 12/21/2022, continued date: 3/29/2023, continued date: 7/28/2023, continued date: 11/9/2023, continued date: 1/23/2024, continued date: 5/9/2024, continued date: 9/20/2024    Patient will cooperate with eye movement desensitization and reprocessing (EMDR) to reduce emotional reaction to traumatic event(s)    Intervention(s)  Therapist will utilize EMDR to reduce the client's emotional reactivity to the traumatic event(s).    Objective #C (Patient Action)  Status: New Date: 5/11/2022, continued date: 9/8/2022, continued date: 12/21/2022, continued date: 3/29/2023, continued date: 7/28/2023, continued date: 11/9/2023, continued date: 1/23/2024, continued date: 5/9/2024, continued date: 9/20/2024    Patient will learn and implement calming and coping strategies to manage trauma symptoms.    Intervention(s)  Therapist will teach grounding techniques, distress tolerance, and emotion regulation techniques.    Goal 3: Patient's anxiety symptoms will remit as evidenced by a decrease in GAD7 scores, where symptoms occur fewer than half the days for a minimum of four weeks.    Objective #A (Patient Action)    Patient will identify 3 symptoms of anxiety.  Status: New - Date: 9/20/2024    Intervention(s)  Therapist will provide psychoeducation on anxiety and engage client in identifying symptoms in appointments.    Objective #B  Patient will use a minimum of 3 strategies to manage anxiety symptoms  Status: New - Date: 9/20/2024    Intervention(s)  Therapist will teach strategies such as grounding techniques, thought stopping, and use of distraction    Objective #C  Patient will use cognitive strategies to manage thoughts/fears that contribute to anxiety  symptoms.  Status: New - Date:     Intervention(s)  Therapist will teach the CBT (Cognitive Behavioral Therapy) model, including cognitive distortions and cognitive restructuring techniques.       Patient has reviewed and agreed to the above plan.      Magali Montilla, [facility]  May 11, 2022  Magali Montilla, [facility]  9/8/2022  [facility], [facility]  12/21/2022  Magali Montilla, [facility]  3/29/2023  Magali Montilla, [facility]  7/28/2023  [facility] Mont[facility], [facility]  11/9/2023  Magali Montilla, [facility]  1/23/2024  Mag[facility] Montilla, [facility]  5/9/2024  Mag[facility] Montilla, [facility]  9/20/2024

## 2024-10-10 ENCOUNTER — OFFICE VISIT (OUTPATIENT)
Dept: FAMILY MEDICINE | Facility: CLINIC | Age: 54
End: 2024-10-10
Payer: COMMERCIAL

## 2024-10-10 ENCOUNTER — ANCILLARY PROCEDURE (OUTPATIENT)
Dept: GENERAL RADIOLOGY | Facility: CLINIC | Age: 54
End: 2024-10-10
Attending: FAMILY MEDICINE
Payer: COMMERCIAL

## 2024-10-10 VITALS
BODY MASS INDEX: 22.53 KG/M2 | HEART RATE: 74 BPM | OXYGEN SATURATION: 100 % | TEMPERATURE: 97.4 F | WEIGHT: 132 LBS | RESPIRATION RATE: 16 BRPM | HEIGHT: 64 IN | SYSTOLIC BLOOD PRESSURE: 100 MMHG | DIASTOLIC BLOOD PRESSURE: 69 MMHG

## 2024-10-10 DIAGNOSIS — R05.1 ACUTE COUGH: ICD-10-CM

## 2024-10-10 DIAGNOSIS — E78.5 HYPERLIPIDEMIA LDL GOAL <160: Primary | ICD-10-CM

## 2024-10-10 PROCEDURE — 71046 X-RAY EXAM CHEST 2 VIEWS: CPT | Mod: TC | Performed by: RADIOLOGY

## 2024-10-10 PROCEDURE — 99214 OFFICE O/P EST MOD 30 MIN: CPT | Performed by: FAMILY MEDICINE

## 2024-10-10 RX ORDER — BENZONATATE 200 MG/1
200 CAPSULE ORAL 3 TIMES DAILY PRN
Qty: 10 CAPSULE | Refills: 1 | Status: SHIPPED | OUTPATIENT
Start: 2024-10-10

## 2024-10-10 ASSESSMENT — ENCOUNTER SYMPTOMS: COUGH: 1

## 2024-10-10 NOTE — PROGRESS NOTES
"  Assessment & Plan   Problem List Items Addressed This Visit       HYPERLIPIDEMIA LDL GOAL <160 - Primary     Other Visit Diagnoses       Acute cough        Relevant Medications    benzonatate (TESSALON) 200 MG capsule    Other Relevant Orders    XR Chest 2 Views             CXR appears normal - pending radiology review  Supportive measures for viral URI       Subjective   Kiesha is a 53 year old, presenting for the following health issues:  Cough        10/10/2024     9:48 AM   Additional Questions   Roomed by Elyssa PARIS CMA     History of Present Illness       Reason for visit:  Cough  Symptom onset:  1-2 weeks ago   She is taking medications regularly.               Started 9/26 runny nose, fevers. COVID negative at home.   Dry cough.  Mucinex DM          Objective    /69 (BP Location: Right arm, Patient Position: Chair, Cuff Size: Adult Regular)   Pulse 74   Temp 97.4  F (36.3  C) (Temporal)   Resp 16   Ht 1.619 m (5' 3.74\")   Wt 59.9 kg (132 lb)   LMP  (LMP Unknown)   SpO2 100%   BMI 22.84 kg/m    Body mass index is 22.84 kg/m .  Physical Exam   GENERAL: alert and no distress  HENT: ear canals and TM's normal, nose and mouth without ulcers or lesions  NECK: no adenopathy, no asymmetry, masses, or scars  RESP: lungs clear to auscultation - no rales, rhonchi or wheezes  CV: regular rate and rhythm, normal S1 S2, no S3 or S4, no murmur, click or rub, no peripheral edema  MS: no gross musculoskeletal defects noted, no edema            Signed Electronically by: SRINIVASAN SOOD DO    "

## 2024-10-14 ENCOUNTER — VIRTUAL VISIT (OUTPATIENT)
Dept: PSYCHOLOGY | Facility: CLINIC | Age: 54
End: 2024-10-14
Payer: COMMERCIAL

## 2024-10-14 DIAGNOSIS — F41.1 GENERALIZED ANXIETY DISORDER: Primary | ICD-10-CM

## 2024-10-14 DIAGNOSIS — F43.89 REACTION TO CHRONIC STRESS: ICD-10-CM

## 2024-10-14 PROCEDURE — 90834 PSYTX W PT 45 MINUTES: CPT | Mod: 95 | Performed by: SOCIAL WORKER

## 2024-10-14 NOTE — PROGRESS NOTES
[facility]                                     Progress Note    Patient Name: Kiesha Mcguire  Date: 10/14/2024       Service Type: Individual      Session Start Time: 1:33 PM Session End Time: 2:18 PM     Session Length: 38-52 minutes    Session #: 68 with this provider    Attendees: Client attended alone    Service Modality:  Video Visit:      Provider verified identity through the following two step process.  Patient provided:  Patient is known previously to provider    Telemedicine Visit: The patient's condition can be safely assessed and treated via synchronous audio and visual telemedicine encounter.      Reason for Telemedicine Visit: Patient convenience (e.g. access to timely appointments / distance to available provider) and Services only offered telehealth    Originating Site (Patient Location): Patient's home    Distant Site (Provider Location): Provider Remote Setting- Home Office/Off-site    Consent:  The patient/guardian has verbally consented to: the potential risks and benefits of telemedicine (video visit) versus in person care; bill my insurance or make self-payment for services provided; and responsibility for payment of non-covered services.     Patient would like the video invitation sent by:  [email or web address]    Mode of Communication:  Video    As the provider I attest to compliance with applicable laws and regulations related to telemedicine.    DATA  Interactive Complexity: No   Crisis: No      Progress Since Last Session (Related to Symptoms / Goals / Homework):  Symptoms:  no change since previous appointment    Homework: Achieved / completed to satisfaction     Episode of Care Goals: Satisfactory progress - ACTION (Actively working towards change); Intervened by reinforcing change plan / affirming steps taken    There has been demonstrated improvement in functioning while patient has been engaged in psychotherapy/psychological service- if withdrawn the patient would  "deteriorate and/or relapse.       Current / Ongoing Stressors and Concerns:   Difficulty trusting others and being vulnerable  Limited close relationships  Stressors related to parenting   Work related stressors  Fear of Failure     Treatment Objective(s) Addressed in This Session:   identify 2 fears / thoughts that contribute to feeling anxious  Maintain behavior changes    Safe/calm state titled \"calm\"  Container: box with a lock     Intervention:   Engaged in active listening   Engaged client in reflection of progress    Assessments completed prior to visit:  The following assessments were completed by patient for this visit:  None    ASSESSMENT: Current Emotional / Mental Status (status of significant symptoms):   Risk status (Self / Other harm or suicidal ideation)   Patient denies current fears or concerns for personal safety.   Patient denies current or recent suicidal ideation or behaviors.   Patient denies current or recent homicidal ideation or behaviors.   Patient denies current or recent self injurious behavior or ideation.   Patient denies other safety concerns.   Patient reports there has been no change in risk factors since their last session.     Patient reports there has been no change in protective factors since their last session.     Recommended that patient call 911 or go to the local ED should there be a change in any of these risk factors.     Appearance:   Appropriate    Eye Contact:   Good    Psychomotor Behavior: Normal Restless hands   Attitude:   Cooperative  Interested Friendly Pleasant    Orientation:   All   Speech    Rate / Production: Talkative Normal     Volume:  Normal    Mood:    Anxious Stable   Affect:    Mood Congruent     Thought Content:  Clear    Thought Form:  Coherent  Goal Directed  Logical    Insight:    Good      Medication Review:   No changes to current psychiatric medication(s)   Vyvanse   Buspar-client is not taking daily    Estrogen patch     Medication " Compliance:   Yes     Changes in Health Issues:   None reported     Chemical Use Review:   Substance Use: no use     Tobacco Use: no use    Diagnosis:  Generalized Anxiety Disorder   Other Specified Trauma and Stress Related Disorder    Collateral Reports Completed:   Not Applicable    PLAN: (Patient Tasks / Therapist Tasks / Other)  EMDR:  Plan for next session to have client keep her eyes open.   Target: concerns about hoarding  Therapist to continue resetting affective circuits, engage client in building resources and the early trauma protocol.  Client shared goal to read books on hoarding.  Client shared plans for social engagement.    Magali Montilla, [facility] 10/14/2024    ______________________________________________________________________    Individual Treatment Plan    Patient's Name: Kiesha Mcguire  YOB: 1970    Date of Creation: 5/11/2022  Date Treatment Plan Last Reviewed/Revised: 9/20/2024    DSM5 Diagnoses:    Generalized Anxiety Disorder  Other Specified Trauma and Stress Related Disorder  Unspecified Depressive Disorder     Psychosocial / Contextual Factors: stressors related to parenting  PROMIS (reviewed every 90 days):    PROMIS 10-Global Health (only subscores and total score):       1/23/2024    12:55 PM 2/8/2024    10:13 AM 5/23/2024    10:26 AM 6/27/2024    10:08 AM 7/10/2024    12:04 PM 8/8/2024    12:27 PM 8/21/2024    10:36 AM   PROMIS-10 Scores Only   Global Mental Health Score 14 12 13 14 13 13 14   Global Physical Health Score 17 16 15 15 15 14 15   PROMIS TOTAL - SUBSCORES 31 28 28 29 28 27 29        Referral / Collaboration:  Referral to another professional/service is not indicated at this time..    Anticipated number of session for this episode of care: will review in 90 days  Anticipation frequency of session: Every other week  Anticipated Duration of each session: 38-52 minutes  Treatment plan will be reviewed in 90 days or when goals have been changed.  "      MeasurableTreatment Goal(s) related to diagnosis / functional impairment(s)  Goal 1: Patient will sustain attention and concentration for consistently longer periods of time.  Patient will meet goals set for completing tasks 80% of the time.   Client's Goal:  I will know I've met my goal when my space isn't so chaotic, meeting deadlines around bills and work, when I am not always playing catch-up, completing tasks.      Objective #A (Patient Action)    Patient will learn and implement organization and planning skills.  Status: New - Date: 5/11/2022 , continued date: 9/8/2022, continued date: 12/21/2022, completed date: 3/29/2023    Intervention(s)  Therapist will teach the client organization and planning skills.  Therapist will address any potential barriers to using skills.    Objective #B  Patient will  identify, challenge, and change self-talk that contributes to maladaptive feelings and actions .  Status: New - Date: 5/11/2022 , Continued date: 9/8/2022, continued date: 12/21/2022, continued date: 3/29/2023, continued date: 7/28/2023, continued date: 11/9/2023, continued date: 1/23/2024, continued date: 5/9/2024, continued date: 9/20/2024    Intervention(s)  Therapist will teach the CBT model, cognitive distortions, and cognitive restructuring techniques.      Goal 2: Patient will be able to recall the traumatic events without becoming overwhelmed with negative thoughts, feelings, or urges.   Client's Goal:  I will know I've met my goal when I do not cry every time I talk about it.\"    Objective #A (Patient Action)    Status: New - Date: 5/11/2022, continued date: 9/8/2022, continued date: 12/21/2022, continued date: 3/29/2023, continued date: 7/28/2023, continued date: 11/9/2023, continued date: 1/23/2024, continued date: 5/9/2024, continued date: 9/20/2024    Patient will describe the history of and nature of trauma symptoms    Intervention(s)  Therapist will assess the client's frequency, intensity, " duration, and history of trauma symptoms and their impact on functioning.    Objective #B (Patient Action)  Status: New Date: 5/11/2022, continued date: 9/8/2022, continued date: 12/21/2022, continued date: 3/29/2023, continued date: 7/28/2023, continued date: 11/9/2023, continued date: 1/23/2024, continued date: 5/9/2024, continued date: 9/20/2024    Patient will cooperate with eye movement desensitization and reprocessing (EMDR) to reduce emotional reaction to traumatic event(s)    Intervention(s)  Therapist will utilize EMDR to reduce the client's emotional reactivity to the traumatic event(s).    Objective #C (Patient Action)  Status: New Date: 5/11/2022, continued date: 9/8/2022, continued date: 12/21/2022, continued date: 3/29/2023, continued date: 7/28/2023, continued date: 11/9/2023, continued date: 1/23/2024, continued date: 5/9/2024, continued date: 9/20/2024    Patient will learn and implement calming and coping strategies to manage trauma symptoms.    Intervention(s)  Therapist will teach grounding techniques, distress tolerance, and emotion regulation techniques.    Goal 3: Patient's anxiety symptoms will remit as evidenced by a decrease in GAD7 scores, where symptoms occur fewer than half the days for a minimum of four weeks.    Objective #A (Patient Action)    Patient will identify 3 symptoms of anxiety.  Status: New - Date: 9/20/2024    Intervention(s)  Therapist will provide psychoeducation on anxiety and engage client in identifying symptoms in appointments.    Objective #B  Patient will use a minimum of 3 strategies to manage anxiety symptoms  Status: New - Date: 9/20/2024    Intervention(s)  Therapist will teach strategies such as grounding techniques, thought stopping, and use of distraction    Objective #C  Patient will use cognitive strategies to manage thoughts/fears that contribute to anxiety symptoms.  Status: New - Date:     Intervention(s)  Therapist will teach the CBT (Cognitive  Behavioral Therapy) model, including cognitive distortions and cognitive restructuring techniques.       Patient has reviewed and agreed to the above plan.      Magali Montilla, [facility]  May 11, 2022  [facility], [facility]  9/8/2022  Magali Montilla, [facility]  12/21/2022  Magali Montilla, [facility]  3/29/2023  Magali Montilla, [facility]  7/28/2023  Magali Montilla, [facility]  11/9/2023  Magali Montilla, [facility]  1/23/2024  Magali Montilla, [facility]  5/9/2024  Magali Montilla, [facility]  9/20/2024

## 2024-10-15 DIAGNOSIS — N95.1 SYMPTOMATIC MENOPAUSAL OR FEMALE CLIMACTERIC STATES: ICD-10-CM

## 2024-10-15 RX ORDER — ESTRADIOL 0.03 MG/D
1 FILM, EXTENDED RELEASE TRANSDERMAL
Qty: 24 PATCH | Refills: 2 | Status: SHIPPED | OUTPATIENT
Start: 2024-10-17

## 2024-10-15 NOTE — TELEPHONE ENCOUNTER
"Pharmacy refill request received for estradiol patch. Pt last seen for clinic visit on 8/8/24 with Nell Dos Santos CNM who noted: \"Return to clinic in one year. Follow-up as needed.\"    Refill submitted to pharmacy per RN protocol.       "

## 2024-10-22 ENCOUNTER — VIRTUAL VISIT (OUTPATIENT)
Dept: PSYCHOLOGY | Facility: CLINIC | Age: 54
End: 2024-10-22
Payer: COMMERCIAL

## 2024-10-22 DIAGNOSIS — F41.1 GENERALIZED ANXIETY DISORDER: Primary | ICD-10-CM

## 2024-10-22 DIAGNOSIS — F43.89 REACTION TO CHRONIC STRESS: ICD-10-CM

## 2024-10-22 PROCEDURE — 90834 PSYTX W PT 45 MINUTES: CPT | Mod: 95 | Performed by: SOCIAL WORKER

## 2024-10-22 NOTE — PROGRESS NOTES
[facility]                                     Progress Note    Patient Name: Kiesha Mcguire  Date: 10/22/2024       Service Type: Individual      Session Start Time: 11:34 AM Session End Time: 12:15 PM     Session Length: 38-52 minutes    Session #: 69 with this provider    Attendees: Client attended alone    Service Modality:  Video Visit:      Provider verified identity through the following two step process.  Patient provided:  Patient is known previously to provider    Telemedicine Visit: The patient's condition can be safely assessed and treated via synchronous audio and visual telemedicine encounter.      Reason for Telemedicine Visit: Patient convenience (e.g. access to timely appointments / distance to available provider) and Services only offered telehealth    Originating Site (Patient Location): Patient's home    Distant Site (Provider Location): Provider Remote Setting- Home Office/Off-site    Consent:  The patient/guardian has verbally consented to: the potential risks and benefits of telemedicine (video visit) versus in person care; bill my insurance or make self-payment for services provided; and responsibility for payment of non-covered services.     Patient would like the video invitation sent by:  [employer]    Mode of Communication:  Video    As the provider I attest to compliance with applicable laws and regulations related to telemedicine.    DATA  Interactive Complexity: No   Crisis: No      Progress Since Last Session (Related to Symptoms / Goals / Homework):  Symptoms:  increase in negative self talk, feeling emotional, since hearing about ex-boyfriend    Homework: Achieved / completed to satisfaction     Episode of Care Goals: Satisfactory progress - ACTION (Actively working towards change); Intervened by reinforcing change plan / affirming steps taken    There has been demonstrated improvement in functioning while patient has been engaged in  "psychotherapy/psychological service- if withdrawn the patient would deteriorate and/or relapse.       Current / Ongoing Stressors and Concerns:   Difficulty trusting others and being vulnerable  Limited close relationships  Stressors related to parenting   Work related stressors  Fear of Failure  Hearing about ex     Treatment Objective(s) Addressed in This Session:   Identify negative self-talk and behaviors: challenge core beliefs, myths, and actions  Identify 2 fears/thoughts that contribute to anxiety symptoms  Engage in cognitive restructuring    Safe/calm state titled \"calm\"  Container: box with a lock     Intervention:   Engaged in active listening   CBT: engaged client in identifying negative cognitions and emotions    Assessments completed prior to visit:  The following assessments were completed by patient for this visit:  None    ASSESSMENT: Current Emotional / Mental Status (status of significant symptoms):   Risk status (Self / Other harm or suicidal ideation)   Patient denies current fears or concerns for personal safety.   Patient denies current or recent suicidal ideation or behaviors.   Patient denies current or recent homicidal ideation or behaviors.   Patient denies current or recent self injurious behavior or ideation.   Patient denies other safety concerns.   Patient reports there has been no change in risk factors since their last session.     Patient reports there has been no change in protective factors since their last session.     Recommended that patient call 911 or go to the local ED should there be a change in any of these risk factors     Appearance:   Appropriate    Eye Contact:   Good    Psychomotor Behavior: Normal    Attitude:   Cooperative  Interested    Orientation:   All   Speech    Rate / Production: Emotional Normal     Volume:  Normal    Mood:    Down   Affect:    Mood Congruent  Tearful   Thought Content:  Clear    Thought Form:  Coherent  Goal Directed  Logical "    Insight:    Good      Medication Review:   No changes to current psychiatric medication(s)   Vyvanse   Buspar-client is not taking daily    Estrogen patch     Medication Compliance:   Yes     Changes in Health Issues:   None reported     Chemical Use Review:   Substance Use: no use     Tobacco Use: no use    Diagnosis:  Generalized Anxiety Disorder   Other Specified Trauma and Stress Related Disorder    Collateral Reports Completed:   Not Applicable    PLAN: (Patient Tasks / Therapist Tasks / Other)  EMDR:  Plan for next session to have client keep her eyes open.   Target: concerns about hoarding  Therapist to continue resetting affective circuits, engage client in building resources and the early trauma protocol.  Client shared goal to read books on hoarding.  Client shared plans to engage in self care by resting, eating nutritious foods.    Magali Montilla, [facility] 10/22/2024    ______________________________________________________________________    Individual Treatment Plan    Patient's Name: Kiesha Mcguire  YOB: 1970    Date of Creation: 5/11/2022  Date Treatment Plan Last Reviewed/Revised: 9/20/2024    DSM5 Diagnoses:    Generalized Anxiety Disorder  Other Specified Trauma and Stress Related Disorder  Unspecified Depressive Disorder     Psychosocial / Contextual Factors: stressors related to parenting  PROMIS (reviewed every 90 days):    PROMIS 10-Global Health (only subscores and total score):       1/23/2024    12:55 PM 2/8/2024    10:13 AM 5/23/2024    10:26 AM 6/27/2024    10:08 AM 7/10/2024    12:04 PM 8/8/2024    12:27 PM 8/21/2024    10:36 AM   PROMIS-10 Scores Only   Global Mental Health Score 14 12 13 14 13 13 14   Global Physical Health Score 17 16 15 15 15 14 15   PROMIS TOTAL - SUBSCORES 31 28 28 29 28 27 29        Referral / Collaboration:  Referral to another professional/service is not indicated at this time..    Anticipated number of session for this episode of care:  "will review in 90 days  Anticipation frequency of session: Every other week  Anticipated Duration of each session: 38-52 minutes  Treatment plan will be reviewed in 90 days or when goals have been changed.       MeasurableTreatment Goal(s) related to diagnosis / functional impairment(s)  Goal 1: Patient will sustain attention and concentration for consistently longer periods of time.  Patient will meet goals set for completing tasks 80% of the time.   Client's Goal:  I will know I've met my goal when my space isn't so chaotic, meeting deadlines around bills and work, when I am not always playing catch-up, completing tasks.      Objective #A (Patient Action)    Patient will learn and implement organization and planning skills.  Status: New - Date: 5/11/2022 , continued date: 9/8/2022, continued date: 12/21/2022, completed date: 3/29/2023    Intervention(s)  Therapist will teach the client organization and planning skills.  Therapist will address any potential barriers to using skills.    Objective #B  Patient will  identify, challenge, and change self-talk that contributes to maladaptive feelings and actions .  Status: New - Date: 5/11/2022 , Continued date: 9/8/2022, continued date: 12/21/2022, continued date: 3/29/2023, continued date: 7/28/2023, continued date: 11/9/2023, continued date: 1/23/2024, continued date: 5/9/2024, continued date: 9/20/2024    Intervention(s)  Therapist will teach the CBT model, cognitive distortions, and cognitive restructuring techniques.      Goal 2: Patient will be able to recall the traumatic events without becoming overwhelmed with negative thoughts, feelings, or urges.   Client's Goal:  I will know I've met my goal when I do not cry every time I talk about it.\"    Objective #A (Patient Action)    Status: New - Date: 5/11/2022, continued date: 9/8/2022, continued date: 12/21/2022, continued date: 3/29/2023, continued date: 7/28/2023, continued date: 11/9/2023, continued date: " 1/23/2024, continued date: 5/9/2024, continued date: 9/20/2024    Patient will describe the history of and nature of trauma symptoms    Intervention(s)  Therapist will assess the client's frequency, intensity, duration, and history of trauma symptoms and their impact on functioning.    Objective #B (Patient Action)  Status: New Date: 5/11/2022, continued date: 9/8/2022, continued date: 12/21/2022, continued date: 3/29/2023, continued date: 7/28/2023, continued date: 11/9/2023, continued date: 1/23/2024, continued date: 5/9/2024, continued date: 9/20/2024    Patient will cooperate with eye movement desensitization and reprocessing (EMDR) to reduce emotional reaction to traumatic event(s)    Intervention(s)  Therapist will utilize EMDR to reduce the client's emotional reactivity to the traumatic event(s).    Objective #C (Patient Action)  Status: New Date: 5/11/2022, continued date: 9/8/2022, continued date: 12/21/2022, continued date: 3/29/2023, continued date: 7/28/2023, continued date: 11/9/2023, continued date: 1/23/2024, continued date: 5/9/2024, continued date: 9/20/2024    Patient will learn and implement calming and coping strategies to manage trauma symptoms.    Intervention(s)  Therapist will teach grounding techniques, distress tolerance, and emotion regulation techniques.    Goal 3: Patient's anxiety symptoms will remit as evidenced by a decrease in GAD7 scores, where symptoms occur fewer than half the days for a minimum of four weeks.    Objective #A (Patient Action)    Patient will identify 3 symptoms of anxiety.  Status: New - Date: 9/20/2024    Intervention(s)  Therapist will provide psychoeducation on anxiety and engage client in identifying symptoms in appointments.    Objective #B  Patient will use a minimum of 3 strategies to manage anxiety symptoms  Status: New - Date: 9/20/2024    Intervention(s)  Therapist will teach strategies such as grounding techniques, thought stopping, and use of  distraction    Objective #C  Patient will use cognitive strategies to manage thoughts/fears that contribute to anxiety symptoms.  Status: New - Date:     Intervention(s)  Therapist will teach the CBT (Cognitive Behavioral Therapy) model, including cognitive distortions and cognitive restructuring techniques.       Patient has reviewed and agreed to the above plan.      Magali Montilla, [facility]  May 11, 2022  Magali Montilla, [facility]  9/8/2022  [facility]  12/21/2022  [facility]  3/29/2023  [facility], [facility]  7/28/2023  [facility], [facility]  11/9/2023  [facility], [facility]  1/23/2024  [facility], [facility]  5/9/2024  [facility], [facility]  9/20/2024

## 2024-10-31 ENCOUNTER — VIRTUAL VISIT (OUTPATIENT)
Dept: PSYCHOLOGY | Facility: CLINIC | Age: 54
End: 2024-10-31
Payer: COMMERCIAL

## 2024-10-31 DIAGNOSIS — F41.1 GENERALIZED ANXIETY DISORDER: Primary | ICD-10-CM

## 2024-10-31 DIAGNOSIS — F43.89 REACTION TO CHRONIC STRESS: ICD-10-CM

## 2024-10-31 PROCEDURE — 90834 PSYTX W PT 45 MINUTES: CPT | Mod: 95 | Performed by: SOCIAL WORKER

## 2024-10-31 NOTE — PROGRESS NOTES
[facility]                                     Progress Note    Patient Name: Kiesha Mcguire  Date: 10/31/2024       Service Type: Individual      Session Start Time: 10:34 AM Session End Time: 11:20 AM     Session Length: 38-52 minutes    Session #: 70 with this provider    Attendees: Client attended alone    Service Modality:  Video Visit:      Provider verified identity through the following two step process.  Patient provided:  Patient is known previously to provider    Telemedicine Visit: The patient's condition can be safely assessed and treated via synchronous audio and visual telemedicine encounter.      Reason for Telemedicine Visit: Patient convenience (e.g. access to timely appointments / distance to available provider) and Services only offered telehealth    Originating Site (Patient Location): Patient's home    Distant Site (Provider Location): Provider Remote Setting- Home Office/Off-site    Consent:  The patient/guardian has verbally consented to: the potential risks and benefits of telemedicine (video visit) versus in person care; bill my insurance or make self-payment for services provided; and responsibility for payment of non-covered services.     Patient would like the video invitation sent by:  [employer]    Mode of Communication:  Video    As the provider I attest to compliance with applicable laws and regulations related to telemedicine.    DATA  Interactive Complexity: No   Crisis: No      Progress Since Last Session (Related to Symptoms / Goals / Homework):  Symptoms:  continued anxiety symptoms    Homework: Achieved / completed to satisfaction     Episode of Care Goals: Satisfactory progress - ACTION (Actively working towards change); Intervened by reinforcing change plan / affirming steps taken    There has been demonstrated improvement in functioning while patient has been engaged in psychotherapy/psychological service- if withdrawn the patient would deteriorate  "and/or relapse.       Current / Ongoing Stressors and Concerns:   Difficulty trusting others and being vulnerable  Limited close relationships  Stressors related to parenting   Work related stressors  Fear of Failure     Treatment Objective(s) Addressed in This Session:   identify 2 fears / thoughts that contribute to feeling anxious    Safe/calm state titled \"calm\"  Container: box with a lock     Intervention:   Engaged in active listening   CBT: engaged client in identifying cognitions and emotions    Assessments completed prior to visit:  The following assessments were completed by patient for this visit:  None    ASSESSMENT: Current Emotional / Mental Status (status of significant symptoms):   Risk status (Self / Other harm or suicidal ideation)   Patient denies current fears or concerns for personal safety.   Patient denies current or recent suicidal ideation or behaviors.   Patient denies current or recent homicidal ideation or behaviors.   Patient denies current or recent self injurious behavior or ideation.   Patient denies other safety concerns.   Patient reports there has been no change in risk factors since their last session.     Patient reports there has been no change in protective factors since their last session.     Recommended that patient call 911 or go to the local ED should there be a change in any of these risk factors     Appearance:   Appropriate    Eye Contact:   Good    Psychomotor Behavior: Normal    Attitude:   Cooperative  Interested Friendly    Orientation:   All   Speech    Rate / Production: Normal     Volume:  Normal    Mood:    Anxious    Affect:    Mood Congruent     Thought Content:  Clear    Thought Form:  Coherent  Goal Directed  Logical    Insight:    Good      Medication Review:   No changes to current psychiatric medication(s)   Vyvanse   Buspar-client is not taking daily    Estrogen patch     Medication Compliance:   Yes     Changes in Health Issues:   None " reported     Chemical Use Review:   Substance Use: no use     Tobacco Use: no use    Diagnosis:  Generalized Anxiety Disorder   Other Specified Trauma and Stress Related Disorder    Collateral Reports Completed:   Not Applicable    PLAN: (Patient Tasks / Therapist Tasks / Other)  EMDR:  Plan for next session to have client keep her eyes open.   Target: concerns about hoarding  Therapist to continue resetting affective circuits, engage client in building resources and the early trauma protocol.  Client shared goal to read books on hoarding.  Client shared increase engagement in self-care and plan for completion of tasks.    Magali Montilla, [facility] 10/31/2024    ______________________________________________________________________    Individual Treatment Plan    Patient's Name: Kiesha Mcguire  YOB: 1970    Date of Creation: 5/11/2022  Date Treatment Plan Last Reviewed/Revised: 9/20/2024    DSM5 Diagnoses:    Generalized Anxiety Disorder  Other Specified Trauma and Stress Related Disorder  Unspecified Depressive Disorder     Psychosocial / Contextual Factors: stressors related to parenting  PROMIS (reviewed every 90 days):    PROMIS 10-Global Health (only subscores and total score):       1/23/2024    12:55 PM 2/8/2024    10:13 AM 5/23/2024    10:26 AM 6/27/2024    10:08 AM 7/10/2024    12:04 PM 8/8/2024    12:27 PM 8/21/2024    10:36 AM   PROMIS-10 Scores Only   Global Mental Health Score 14 12 13 14 13 13 14   Global Physical Health Score 17 16 15 15 15 14 15   PROMIS TOTAL - SUBSCORES 31 28 28 29 28 27 29        Referral / Collaboration:  Referral to another professional/service is not indicated at this time..    Anticipated number of session for this episode of care: will review in 90 days  Anticipation frequency of session: Every other week  Anticipated Duration of each session: 38-52 minutes  Treatment plan will be reviewed in 90 days or when goals have been changed.  "      MeasurableTreatment Goal(s) related to diagnosis / functional impairment(s)  Goal 1: Patient will sustain attention and concentration for consistently longer periods of time.  Patient will meet goals set for completing tasks 80% of the time.   Client's Goal:  I will know I've met my goal when my space isn't so chaotic, meeting deadlines around bills and work, when I am not always playing catch-up, completing tasks.      Objective #A (Patient Action)    Patient will learn and implement organization and planning skills.  Status: New - Date: 5/11/2022 , continued date: 9/8/2022, continued date: 12/21/2022, completed date: 3/29/2023    Intervention(s)  Therapist will teach the client organization and planning skills.  Therapist will address any potential barriers to using skills.    Objective #B  Patient will  identify, challenge, and change self-talk that contributes to maladaptive feelings and actions .  Status: New - Date: 5/11/2022 , Continued date: 9/8/2022, continued date: 12/21/2022, continued date: 3/29/2023, continued date: 7/28/2023, continued date: 11/9/2023, continued date: 1/23/2024, continued date: 5/9/2024, continued date: 9/20/2024    Intervention(s)  Therapist will teach the CBT model, cognitive distortions, and cognitive restructuring techniques.      Goal 2: Patient will be able to recall the traumatic events without becoming overwhelmed with negative thoughts, feelings, or urges.   Client's Goal:  I will know I've met my goal when I do not cry every time I talk about it.\"    Objective #A (Patient Action)    Status: New - Date: 5/11/2022, continued date: 9/8/2022, continued date: 12/21/2022, continued date: 3/29/2023, continued date: 7/28/2023, continued date: 11/9/2023, continued date: 1/23/2024, continued date: 5/9/2024, continued date: 9/20/2024    Patient will describe the history of and nature of trauma symptoms    Intervention(s)  Therapist will assess the client's frequency, intensity, " duration, and history of trauma symptoms and their impact on functioning.    Objective #B (Patient Action)  Status: New Date: 5/11/2022, continued date: 9/8/2022, continued date: 12/21/2022, continued date: 3/29/2023, continued date: 7/28/2023, continued date: 11/9/2023, continued date: 1/23/2024, continued date: 5/9/2024, continued date: 9/20/2024    Patient will cooperate with eye movement desensitization and reprocessing (EMDR) to reduce emotional reaction to traumatic event(s)    Intervention(s)  Therapist will utilize EMDR to reduce the client's emotional reactivity to the traumatic event(s).    Objective #C (Patient Action)  Status: New Date: 5/11/2022, continued date: 9/8/2022, continued date: 12/21/2022, continued date: 3/29/2023, continued date: 7/28/2023, continued date: 11/9/2023, continued date: 1/23/2024, continued date: 5/9/2024, continued date: 9/20/2024    Patient will learn and implement calming and coping strategies to manage trauma symptoms.    Intervention(s)  Therapist will teach grounding techniques, distress tolerance, and emotion regulation techniques.    Goal 3: Patient's anxiety symptoms will remit as evidenced by a decrease in GAD7 scores, where symptoms occur fewer than half the days for a minimum of four weeks.    Objective #A (Patient Action)    Patient will identify 3 symptoms of anxiety.  Status: New - Date: 9/20/2024    Intervention(s)  Therapist will provide psychoeducation on anxiety and engage client in identifying symptoms in appointments.    Objective #B  Patient will use a minimum of 3 strategies to manage anxiety symptoms  Status: New - Date: 9/20/2024    Intervention(s)  Therapist will teach strategies such as grounding techniques, thought stopping, and use of distraction    Objective #C  Patient will use cognitive strategies to manage thoughts/fears that contribute to anxiety symptoms.  Status: New - Date:     Intervention(s)  Therapist will teach the CBT (Cognitive  Behavioral Therapy) model, including cognitive distortions and cognitive restructuring techniques.       Patient has reviewed and agreed to the above plan.      Magali Montilla, [facility]  May 11, 2022  [facility], [facility]  9/8/2022  Magali Montilla, [facility]  12/21/2022  Magali Montilla, [facility]  3/29/2023  Magali Montilla, [facility]  7/28/2023  Magali Montilla, [facility]  11/9/2023  Magali Montilla, [facility]  1/23/2024  Magali Montilla, [facility]  5/9/2024  Magali Montilla, [facility]  9/20/2024

## 2024-11-06 ENCOUNTER — VIRTUAL VISIT (OUTPATIENT)
Dept: PSYCHOLOGY | Facility: CLINIC | Age: 54
End: 2024-11-06
Payer: COMMERCIAL

## 2024-11-06 DIAGNOSIS — F43.89 REACTION TO CHRONIC STRESS: ICD-10-CM

## 2024-11-06 DIAGNOSIS — F41.1 GENERALIZED ANXIETY DISORDER: Primary | ICD-10-CM

## 2024-11-06 PROCEDURE — 90834 PSYTX W PT 45 MINUTES: CPT | Mod: 95 | Performed by: SOCIAL WORKER

## 2024-11-06 NOTE — PROGRESS NOTES
[facility]                                     Progress Note    Patient Name: Kiesha Mcguire  Date: 11/6/2024       Service Type: Individual      Session Start Time: 10:01 AM Session End Time: 10:47 AM     Session Length: 38-52 minutes    Session #: 71 with this provider    Attendees: Client attended alone    Service Modality:  Video Visit:      Provider verified identity through the following two step process.  Patient provided:  Patient is known previously to provider    Telemedicine Visit: The patient's condition can be safely assessed and treated via synchronous audio and visual telemedicine encounter.      Reason for Telemedicine Visit: Patient convenience (e.g. access to timely appointments / distance to available provider) and Services only offered telehealth    Originating Site (Patient Location): Patient's home    Distant Site (Provider Location): [facility]: [place] /On-site    Consent:  The patient/guardian has verbally consented to: the potential risks and benefits of telemedicine (video visit) versus in person care; bill my insurance or make self-payment for services provided; and responsibility for payment of non-covered services.     Patient would like the video invitation sent by:  [employer]    Mode of Communication:  Video    As the provider I attest to compliance with applicable laws and regulations related to telemedicine.    DATA  Interactive Complexity: No   Crisis: No      Progress Since Last Session (Related to Symptoms / Goals / Homework):  Symptoms:  no change since previous appointment    Homework: Achieved / completed to satisfaction     Episode of Care Goals: Satisfactory progress - ACTION (Actively working towards change); Intervened by reinforcing change plan / affirming steps taken    There has been demonstrated improvement in functioning while patient has been engaged in psychotherapy/psychological service- if withdrawn the patient would  "deteriorate and/or relapse.       Current / Ongoing Stressors and Concerns:   Difficulty trusting others and being vulnerable  Limited close relationships  Stressors related to parenting   Work related stressors  Uncertainties about future     Treatment Objective(s) Addressed in This Session:   identify 1 fears / thoughts that contribute to feeling anxious  EMDR: phase 1 & 2    Safe/calm state titled \"calm\"  Container: box with a lock     Intervention:   Engaged in active listening   EMDR: provided psychoeducation on early trauma protocol, discussed next steps    Assessments completed prior to visit:  The following assessments were completed by patient for this visit:  None    ASSESSMENT: Current Emotional / Mental Status (status of significant symptoms):   Risk status (Self / Other harm or suicidal ideation)   Patient denies current fears or concerns for personal safety.   Patient denies current or recent suicidal ideation or behaviors.   Patient denies current or recent homicidal ideation or behaviors.   Patient denies current or recent self injurious behavior or ideation.   Patient denies other safety concerns.   Patient reports there has been no change in risk factors since their last session.     Patient reports there has been no change in protective factors since their last session.     Recommended that patient call 911 or go to the local ED should there be a change in any of these risk factors     Appearance:   Appropriate    Eye Contact:   Good    Psychomotor Behavior: Normal    Attitude:   Cooperative  Interested Pleasant    Orientation:   All   Speech    Rate / Production: Normal     Volume:  Normal    Mood:    Anxious Sad   Affect:    Mood Congruent  Tearful   Thought Content:  Clear    Thought Form:  Coherent  Goal Directed  Logical    Insight:    Good      Medication Review:   No changes to current psychiatric medication(s)   Vyvanse   Buspar-client is not taking daily    Estrogen patch     Medication " Compliance:   Yes     Changes in Health Issues:   None reported     Chemical Use Review:   Substance Use: no use     Tobacco Use: no use    Diagnosis:  Generalized Anxiety Disorder   Other Specified Trauma and Stress Related Disorder    Collateral Reports Completed:   Not Applicable    PLAN: (Patient Tasks / Therapist Tasks / Other)  EMDR:  Plan for next session to have client keep her eyes open.   Engage in early trauma protocol  Client shared goal to limit engagement in social media.  Client will increase awareness of dissociative symptoms.    Magali Montilla, [facility] 11/6/2024    ______________________________________________________________________    Individual Treatment Plan    Patient's Name: Kiesha Mcguire  YOB: 1970    Date of Creation: 5/11/2022  Date Treatment Plan Last Reviewed/Revised: 9/20/2024    DSM5 Diagnoses:    Generalized Anxiety Disorder  Other Specified Trauma and Stress Related Disorder  Unspecified Depressive Disorder     Psychosocial / Contextual Factors: stressors related to parenting  PROMIS (reviewed every 90 days):    PROMIS 10-Global Health (only subscores and total score):       1/23/2024    12:55 PM 2/8/2024    10:13 AM 5/23/2024    10:26 AM 6/27/2024    10:08 AM 7/10/2024    12:04 PM 8/8/2024    12:27 PM 8/21/2024    10:36 AM   PROMIS-10 Scores Only   Global Mental Health Score 14 12 13 14 13 13 14   Global Physical Health Score 17 16 15 15 15 14 15   PROMIS TOTAL - SUBSCORES 31 28 28 29 28 27 29        Referral / Collaboration:  Referral to another professional/service is not indicated at this time..    Anticipated number of session for this episode of care: will review in 90 days  Anticipation frequency of session: Every other week  Anticipated Duration of each session: 38-52 minutes  Treatment plan will be reviewed in 90 days or when goals have been changed.       MeasurableTreatment Goal(s) related to diagnosis / functional impairment(s)  Goal 1: Patient will  "sustain attention and concentration for consistently longer periods of time.  Patient will meet goals set for completing tasks 80% of the time.   Client's Goal:  I will know I've met my goal when my space isn't so chaotic, meeting deadlines around bills and work, when I am not always playing catch-up, completing tasks.      Objective #A (Patient Action)    Patient will learn and implement organization and planning skills.  Status: New - Date: 5/11/2022 , continued date: 9/8/2022, continued date: 12/21/2022, completed date: 3/29/2023    Intervention(s)  Therapist will teach the client organization and planning skills.  Therapist will address any potential barriers to using skills.    Objective #B  Patient will  identify, challenge, and change self-talk that contributes to maladaptive feelings and actions .  Status: New - Date: 5/11/2022 , Continued date: 9/8/2022, continued date: 12/21/2022, continued date: 3/29/2023, continued date: 7/28/2023, continued date: 11/9/2023, continued date: 1/23/2024, continued date: 5/9/2024, continued date: 9/20/2024    Intervention(s)  Therapist will teach the CBT model, cognitive distortions, and cognitive restructuring techniques.      Goal 2: Patient will be able to recall the traumatic events without becoming overwhelmed with negative thoughts, feelings, or urges.   Client's Goal:  I will know I've met my goal when I do not cry every time I talk about it.\"    Objective #A (Patient Action)    Status: New - Date: 5/11/2022, continued date: 9/8/2022, continued date: 12/21/2022, continued date: 3/29/2023, continued date: 7/28/2023, continued date: 11/9/2023, continued date: 1/23/2024, continued date: 5/9/2024, continued date: 9/20/2024    Patient will describe the history of and nature of trauma symptoms    Intervention(s)  Therapist will assess the client's frequency, intensity, duration, and history of trauma symptoms and their impact on functioning.    Objective #B (Patient " Action)  Status: New Date: 5/11/2022, continued date: 9/8/2022, continued date: 12/21/2022, continued date: 3/29/2023, continued date: 7/28/2023, continued date: 11/9/2023, continued date: 1/23/2024, continued date: 5/9/2024, continued date: 9/20/2024    Patient will cooperate with eye movement desensitization and reprocessing (EMDR) to reduce emotional reaction to traumatic event(s)    Intervention(s)  Therapist will utilize EMDR to reduce the client's emotional reactivity to the traumatic event(s).    Objective #C (Patient Action)  Status: New Date: 5/11/2022, continued date: 9/8/2022, continued date: 12/21/2022, continued date: 3/29/2023, continued date: 7/28/2023, continued date: 11/9/2023, continued date: 1/23/2024, continued date: 5/9/2024, continued date: 9/20/2024    Patient will learn and implement calming and coping strategies to manage trauma symptoms.    Intervention(s)  Therapist will teach grounding techniques, distress tolerance, and emotion regulation techniques.    Goal 3: Patient's anxiety symptoms will remit as evidenced by a decrease in GAD7 scores, where symptoms occur fewer than half the days for a minimum of four weeks.    Objective #A (Patient Action)    Patient will identify 3 symptoms of anxiety.  Status: New - Date: 9/20/2024    Intervention(s)  Therapist will provide psychoeducation on anxiety and engage client in identifying symptoms in appointments.    Objective #B  Patient will use a minimum of 3 strategies to manage anxiety symptoms  Status: New - Date: 9/20/2024    Intervention(s)  Therapist will teach strategies such as grounding techniques, thought stopping, and use of distraction    Objective #C  Patient will use cognitive strategies to manage thoughts/fears that contribute to anxiety symptoms.  Status: New - Date:     Intervention(s)  Therapist will teach the CBT (Cognitive Behavioral Therapy) model, including cognitive distortions and cognitive restructuring techniques.        Patient has reviewed and agreed to the above plan.      Magali Montilla, [facility]  May 11, 2022  Magali Montilla, LICSW  9/8/2022  Magali Montilla, LICSW  12/21/2022  Magali Montilla, LICSW  3/29/2023  Magali Montilla, LICSW  7/28/2023  Magali Montilla, LICSW  11/9/2023  Magali Montilla, LICSW  1/23/2024  Magali Montilla, LICSW  5/9/2024  Magali Montilla, LICSW  9/20/2024

## 2024-11-14 ENCOUNTER — VIRTUAL VISIT (OUTPATIENT)
Dept: PSYCHOLOGY | Facility: CLINIC | Age: 54
End: 2024-11-14
Payer: COMMERCIAL

## 2024-11-14 DIAGNOSIS — F41.1 GENERALIZED ANXIETY DISORDER: Primary | ICD-10-CM

## 2024-11-14 DIAGNOSIS — F43.89 REACTION TO CHRONIC STRESS: ICD-10-CM

## 2024-11-14 PROCEDURE — 90834 PSYTX W PT 45 MINUTES: CPT | Mod: 95 | Performed by: SOCIAL WORKER

## 2024-11-14 NOTE — PROGRESS NOTES
[facility]                                     Progress Note    Patient Name: Kiesha Mcguire  Date: 11/14/2024       Service Type: Individual      Session Start Time: 12:35 PM Session End Time: 1:23 PM     Session Length: 38-52 minutes    Session #: 72 with this provider    Attendees: Client attended alone    Service Modality:  Video Visit:      Provider verified identity through the following two step process.  Patient provided:  Patient is known previously to provider    Telemedicine Visit: The patient's condition can be safely assessed and treated via synchronous audio and visual telemedicine encounter.      Reason for Telemedicine Visit: Patient convenience (e.g. access to timely appointments / distance to available provider) and Services only offered telehealth    Originating Site (Patient Location): Patient's home    Distant Site (Provider Location): Provider Remote Setting- Home Office/Off-site    Consent:  The patient/guardian has verbally consented to: the potential risks and benefits of telemedicine (video visit) versus in person care; bill my insurance or make self-payment for services provided; and responsibility for payment of non-covered services.     Patient would like the video invitation sent by:  [employer]    Mode of Communication:  Video    As the provider I attest to compliance with applicable laws and regulations related to telemedicine.    DATA  Interactive Complexity: No   Crisis: No      Progress Since Last Session (Related to Symptoms / Goals / Homework):  Symptoms:  increase in feeling down and worry following back injury, client reported improvements the past few days    Homework: Achieved / completed to satisfaction    Created and using a to-do list     Episode of Care Goals: Satisfactory progress - ACTION (Actively working towards change); Intervened by reinforcing change plan / affirming steps taken    There has been demonstrated improvement in functioning  "while patient has been engaged in psychotherapy/psychological service- if withdrawn the patient would deteriorate and/or relapse.       Current / Ongoing Stressors and Concerns:   Difficulty trusting others and being vulnerable  Limited close relationships  Stressors related to parenting   Work related stressors  Uncertainties about future     Treatment Objective(s) Addressed in This Session:   identify 2 fears / thoughts that contribute to feeling anxious  EMDR: phase 1 & 2    Safe/calm state titled \"calm\"  Container: box with a lock     Intervention:   Engaged in active listening   EMDR: explored nurturers, identified trauma symptoms, provided psychoeducation    Assessments completed prior to visit:  The following assessments were completed by patient for this visit:  None    ASSESSMENT: Current Emotional / Mental Status (status of significant symptoms):   Risk status (Self / Other harm or suicidal ideation)   Patient denies current fears or concerns for personal safety.   Patient denies current or recent suicidal ideation or behaviors.   Patient denies current or recent homicidal ideation or behaviors.   Patient denies current or recent self injurious behavior or ideation.   Patient denies other safety concerns.   Patient reports there has been no change in risk factors since their last session.     Patient reports there has been no change in protective factors since their last session.     Recommended that patient call 911 or go to the local ED should there be a change in any of these risk factors     Appearance:   Appropriate    Eye Contact:   Good    Psychomotor Behavior: Normal    Attitude:   Cooperative  Interested Pleasant    Orientation:   All   Speech    Rate / Production: Normal     Volume:  Normal    Mood:    Anxious Sad   Affect:    Mood Congruent  Tearful   Thought Content:  Clear    Thought Form:  Coherent  Goal Directed  Logical    Insight:    Good      Medication Review:   No changes to current " psychiatric medication(s)   Vyvanse   Buspar-client is not taking daily    Estrogen patch     Medication Compliance:   Yes     Changes in Health Issues:   Yes: degenerative disc, will engage in physical therapy     Chemical Use Review:   Substance Use: no use     Tobacco Use: no use    Diagnosis:  Generalized Anxiety Disorder   Other Specified Trauma and Stress Related Disorder    Collateral Reports Completed:   Not Applicable    PLAN: (Patient Tasks / Therapist Tasks / Other)  EMDR:  Plan for next session to have client keep her eyes open.   Engage in early trauma protocol  Client will increase awareness of dissociative symptoms.    Magali Montilla, [facility] 11/14/2024    ______________________________________________________________________    Individual Treatment Plan    Patient's Name: Kiesha Mcguire  YOB: 1970    Date of Creation: 5/11/2022  Date Treatment Plan Last Reviewed/Revised: 9/20/2024    DSM5 Diagnoses:    Generalized Anxiety Disorder  Other Specified Trauma and Stress Related Disorder  Unspecified Depressive Disorder     Psychosocial / Contextual Factors: stressors related to parenting  PROMIS (reviewed every 90 days):    PROMIS 10-Global Health (only subscores and total score):       1/23/2024    12:55 PM 2/8/2024    10:13 AM 5/23/2024    10:26 AM 6/27/2024    10:08 AM 7/10/2024    12:04 PM 8/8/2024    12:27 PM 8/21/2024    10:36 AM   PROMIS-10 Scores Only   Global Mental Health Score 14 12 13 14 13 13 14   Global Physical Health Score 17 16 15 15 15 14 15   PROMIS TOTAL - SUBSCORES 31 28 28 29 28 27 29        Referral / Collaboration:  Referral to another professional/service is not indicated at this time..    Anticipated number of session for this episode of care: will review in 90 days  Anticipation frequency of session: Every other week  Anticipated Duration of each session: 38-52 minutes  Treatment plan will be reviewed in 90 days or when goals have been changed.  "      MeasurableTreatment Goal(s) related to diagnosis / functional impairment(s)  Goal 1: Patient will sustain attention and concentration for consistently longer periods of time.  Patient will meet goals set for completing tasks 80% of the time.   Client's Goal:  I will know I've met my goal when my space isn't so chaotic, meeting deadlines around bills and work, when I am not always playing catch-up, completing tasks.      Objective #A (Patient Action)    Patient will learn and implement organization and planning skills.  Status: New - Date: 5/11/2022 , continued date: 9/8/2022, continued date: 12/21/2022, completed date: 3/29/2023    Intervention(s)  Therapist will teach the client organization and planning skills.  Therapist will address any potential barriers to using skills.    Objective #B  Patient will  identify, challenge, and change self-talk that contributes to maladaptive feelings and actions .  Status: New - Date: 5/11/2022 , Continued date: 9/8/2022, continued date: 12/21/2022, continued date: 3/29/2023, continued date: 7/28/2023, continued date: 11/9/2023, continued date: 1/23/2024, continued date: 5/9/2024, continued date: 9/20/2024    Intervention(s)  Therapist will teach the CBT model, cognitive distortions, and cognitive restructuring techniques.      Goal 2: Patient will be able to recall the traumatic events without becoming overwhelmed with negative thoughts, feelings, or urges.   Client's Goal:  I will know I've met my goal when I do not cry every time I talk about it.\"    Objective #A (Patient Action)    Status: New - Date: 5/11/2022, continued date: 9/8/2022, continued date: 12/21/2022, continued date: 3/29/2023, continued date: 7/28/2023, continued date: 11/9/2023, continued date: 1/23/2024, continued date: 5/9/2024, continued date: 9/20/2024    Patient will describe the history of and nature of trauma symptoms    Intervention(s)  Therapist will assess the client's frequency, intensity, " duration, and history of trauma symptoms and their impact on functioning.    Objective #B (Patient Action)  Status: New Date: 5/11/2022, continued date: 9/8/2022, continued date: 12/21/2022, continued date: 3/29/2023, continued date: 7/28/2023, continued date: 11/9/2023, continued date: 1/23/2024, continued date: 5/9/2024, continued date: 9/20/2024    Patient will cooperate with eye movement desensitization and reprocessing (EMDR) to reduce emotional reaction to traumatic event(s)    Intervention(s)  Therapist will utilize EMDR to reduce the client's emotional reactivity to the traumatic event(s).    Objective #C (Patient Action)  Status: New Date: 5/11/2022, continued date: 9/8/2022, continued date: 12/21/2022, continued date: 3/29/2023, continued date: 7/28/2023, continued date: 11/9/2023, continued date: 1/23/2024, continued date: 5/9/2024, continued date: 9/20/2024    Patient will learn and implement calming and coping strategies to manage trauma symptoms.    Intervention(s)  Therapist will teach grounding techniques, distress tolerance, and emotion regulation techniques.    Goal 3: Patient's anxiety symptoms will remit as evidenced by a decrease in GAD7 scores, where symptoms occur fewer than half the days for a minimum of four weeks.    Objective #A (Patient Action)    Patient will identify 3 symptoms of anxiety.  Status: New - Date: 9/20/2024    Intervention(s)  Therapist will provide psychoeducation on anxiety and engage client in identifying symptoms in appointments.    Objective #B  Patient will use a minimum of 3 strategies to manage anxiety symptoms  Status: New - Date: 9/20/2024    Intervention(s)  Therapist will teach strategies such as grounding techniques, thought stopping, and use of distraction    Objective #C  Patient will use cognitive strategies to manage thoughts/fears that contribute to anxiety symptoms.  Status: New - Date:     Intervention(s)  Therapist will teach the CBT (Cognitive  Behavioral Therapy) model, including cognitive distortions and cognitive restructuring techniques.       Patient has reviewed and agreed to the above plan.      Magali Montilla, [facility]  May 11, 2022  Magali Montilla, [facility]  9/8/2022  Magali Montilla, LICSW  12/21/2022  Magali Montilla, LICSW  3/29/2023  Magali Montilla, LICSW  7/28/2023  Magali Montilla, LICSW  11/9/2023  Magali Montilla, LICSW  1/23/2024  Magali Montilla, LICSW  5/9/2024  Magali Montilla, LICSW  9/20/2024

## 2024-11-26 ENCOUNTER — MYC MEDICAL ADVICE (OUTPATIENT)
Dept: FAMILY MEDICINE | Facility: CLINIC | Age: 54
End: 2024-11-26
Payer: COMMERCIAL

## 2024-11-26 DIAGNOSIS — M75.02 ADHESIVE CAPSULITIS OF LEFT SHOULDER: Primary | ICD-10-CM

## 2024-12-10 ENCOUNTER — OFFICE VISIT (OUTPATIENT)
Dept: PULMONOLOGY | Facility: CLINIC | Age: 54
End: 2024-12-10
Attending: FAMILY MEDICINE
Payer: COMMERCIAL

## 2024-12-10 ENCOUNTER — VIRTUAL VISIT (OUTPATIENT)
Dept: PSYCHOLOGY | Facility: CLINIC | Age: 54
End: 2024-12-10
Payer: COMMERCIAL

## 2024-12-10 DIAGNOSIS — R05.3 CHRONIC COUGH: ICD-10-CM

## 2024-12-10 DIAGNOSIS — F41.1 GENERALIZED ANXIETY DISORDER: Primary | ICD-10-CM

## 2024-12-10 DIAGNOSIS — F43.89 REACTION TO CHRONIC STRESS: ICD-10-CM

## 2024-12-10 PROCEDURE — 90834 PSYTX W PT 45 MINUTES: CPT | Mod: 95 | Performed by: SOCIAL WORKER

## 2024-12-10 PROCEDURE — 94726 PLETHYSMOGRAPHY LUNG VOLUMES: CPT | Performed by: INTERNAL MEDICINE

## 2024-12-10 PROCEDURE — 94060 EVALUATION OF WHEEZING: CPT | Performed by: INTERNAL MEDICINE

## 2024-12-10 PROCEDURE — 94729 DIFFUSING CAPACITY: CPT | Performed by: INTERNAL MEDICINE

## 2024-12-10 NOTE — PROGRESS NOTES
[facility]                                     Progress Note    Patient Name: Kiesah Mcguire  Date: 12/10/2024       Service Type: Individual      Session Start Time: 1:03 PM Session End Time: 1:48 PM     Session Length: 38-52 minutes    Session #: 73 with this provider    Attendees: Client attended alone    Service Modality:  Video Visit:      Provider verified identity through the following two step process.  Patient provided:  Patient is known previously to provider    Telemedicine Visit: The patient's condition can be safely assessed and treated via synchronous audio and visual telemedicine encounter.      Reason for Telemedicine Visit: Patient convenience (e.g. access to timely appointments / distance to available provider) and Services only offered telehealth    Originating Site (Patient Location): Patient's home    Distant Site (Provider Location): Provider Remote Setting- Home Office/Off-site    Consent:  The patient/guardian has verbally consented to: the potential risks and benefits of telemedicine (video visit) versus in person care; bill my insurance or make self-payment for services provided; and responsibility for payment of non-covered services.     Patient would like the video invitation sent by:  [employer]    Mode of Communication:  Video    As the provider I attest to compliance with applicable laws and regulations related to telemedicine.    DATA  Interactive Complexity: No   Crisis: No      Progress Since Last Session (Related to Symptoms / Goals / Homework):  Symptoms:  continued symptoms    Homework: Achieved / completed to satisfaction     Episode of Care Goals: Satisfactory progress - ACTION (Actively working towards change); Intervened by reinforcing change plan / affirming steps taken    There has been demonstrated improvement in functioning while patient has been engaged in psychotherapy/psychological service- if withdrawn the patient would deteriorate and/or  "relapse.       Current / Ongoing Stressors and Concerns:   Difficulty trusting others and being vulnerable  Limited close relationships  Stressors related to parenting   Work related stressors     Treatment Objective(s) Addressed in This Session:   identify 2 fears / thoughts that contribute to feeling anxious  Identify trauma symptoms    Safe/calm state titled \"calm\"  Container: box with a lock     Intervention:   Engaged in active listening   Provided psychoeducation on trauma    Assessments completed prior to visit:  The following assessments were completed by patient for this visit:  None    ASSESSMENT: Current Emotional / Mental Status (status of significant symptoms):   Risk status (Self / Other harm or suicidal ideation)   Patient denies current fears or concerns for personal safety.   Patient denies current or recent suicidal ideation or behaviors.   Patient denies current or recent homicidal ideation or behaviors.   Patient denies current or recent self injurious behavior or ideation.   Patient denies other safety concerns.   Patient reports there has been no change in risk factors since their last session.     Patient reports there has been no change in protective factors since their last session.     Recommended that patient call 911 or go to the local ED should there be a change in any of these risk factors     Appearance:   Appropriate    Eye Contact:   Good    Psychomotor Behavior: Normal    Attitude:   Cooperative  Interested Pleasant    Orientation:   All   Speech    Rate / Production: Talkative Normal     Volume:  Normal    Mood:    Anxious    Affect:    Mood Congruent  Tearful-minimal   Thought Content:  Clear    Thought Form:  Coherent  Goal Directed  Logical    Insight:    Good      Medication Review:   No changes to current psychiatric medication(s)   Vyvanse   Buspar-client is not taking daily    Estrogen patch     Medication Compliance:   Yes     Changes in Health Issues:   Yes: degenerative " disc, will engage in physical therapy     Chemical Use Review:   Substance Use: no use     Tobacco Use: no use    Diagnosis:  Generalized Anxiety Disorder   Other Specified Trauma and Stress Related Disorder    Collateral Reports Completed:   Not Applicable    PLAN: (Patient Tasks / Therapist Tasks / Other)  EMDR:  Plan for next session to have client keep her eyes open.   Engage in early trauma protocol  Client shared goal to continue to organize the basement.    Magali Montilla, [facility] 12/10/2024    ______________________________________________________________________    Individual Treatment Plan    Patient's Name: Kiesha Mcguire  YOB: 1970    Date of Creation: 5/11/2022  Date Treatment Plan Last Reviewed/Revised: 9/20/2024    DSM5 Diagnoses:    Generalized Anxiety Disorder  Other Specified Trauma and Stress Related Disorder  Unspecified Depressive Disorder     Psychosocial / Contextual Factors: stressors related to parenting  PROMIS (reviewed every 90 days):    PROMIS 10-Global Health (only subscores and total score):       2/8/2024    10:13 AM 5/23/2024    10:26 AM 6/27/2024    10:08 AM 7/10/2024    12:04 PM 8/8/2024    12:27 PM 8/21/2024    10:36 AM 12/10/2024    10:27 AM   PROMIS-10 Scores Only   Global Mental Health Score 12 13 14 13 13 14 13    Global Physical Health Score 16 15 15 15 14 15 15    PROMIS TOTAL - SUBSCORES 28 28 29 28 27 29 28        Patient-reported        Referral / Collaboration:  Referral to another professional/service is not indicated at this time..    Anticipated number of session for this episode of care: will review in 90 days  Anticipation frequency of session: Every other week  Anticipated Duration of each session: 38-52 minutes  Treatment plan will be reviewed in 90 days or when goals have been changed.       MeasurableTreatment Goal(s) related to diagnosis / functional impairment(s)  Goal 1: Patient will sustain attention and concentration for consistently  "longer periods of time.  Patient will meet goals set for completing tasks 80% of the time.   Client's Goal:  I will know I've met my goal when my space isn't so chaotic, meeting deadlines around bills and work, when I am not always playing catch-up, completing tasks.      Objective #A (Patient Action)    Patient will learn and implement organization and planning skills.  Status: New - Date: 5/11/2022 , continued date: 9/8/2022, continued date: 12/21/2022, completed date: 3/29/2023    Intervention(s)  Therapist will teach the client organization and planning skills.  Therapist will address any potential barriers to using skills.    Objective #B  Patient will  identify, challenge, and change self-talk that contributes to maladaptive feelings and actions .  Status: New - Date: 5/11/2022 , Continued date: 9/8/2022, continued date: 12/21/2022, continued date: 3/29/2023, continued date: 7/28/2023, continued date: 11/9/2023, continued date: 1/23/2024, continued date: 5/9/2024, continued date: 9/20/2024    Intervention(s)  Therapist will teach the CBT model, cognitive distortions, and cognitive restructuring techniques.      Goal 2: Patient will be able to recall the traumatic events without becoming overwhelmed with negative thoughts, feelings, or urges.   Client's Goal:  I will know I've met my goal when I do not cry every time I talk about it.\"    Objective #A (Patient Action)    Status: New - Date: 5/11/2022, continued date: 9/8/2022, continued date: 12/21/2022, continued date: 3/29/2023, continued date: 7/28/2023, continued date: 11/9/2023, continued date: 1/23/2024, continued date: 5/9/2024, continued date: 9/20/2024    Patient will describe the history of and nature of trauma symptoms    Intervention(s)  Therapist will assess the client's frequency, intensity, duration, and history of trauma symptoms and their impact on functioning.    Objective #B (Patient Action)  Status: New Date: 5/11/2022, continued date: " 9/8/2022, continued date: 12/21/2022, continued date: 3/29/2023, continued date: 7/28/2023, continued date: 11/9/2023, continued date: 1/23/2024, continued date: 5/9/2024, continued date: 9/20/2024    Patient will cooperate with eye movement desensitization and reprocessing (EMDR) to reduce emotional reaction to traumatic event(s)    Intervention(s)  Therapist will utilize EMDR to reduce the client's emotional reactivity to the traumatic event(s).    Objective #C (Patient Action)  Status: New Date: 5/11/2022, continued date: 9/8/2022, continued date: 12/21/2022, continued date: 3/29/2023, continued date: 7/28/2023, continued date: 11/9/2023, continued date: 1/23/2024, continued date: 5/9/2024, continued date: 9/20/2024    Patient will learn and implement calming and coping strategies to manage trauma symptoms.    Intervention(s)  Therapist will teach grounding techniques, distress tolerance, and emotion regulation techniques.    Goal 3: Patient's anxiety symptoms will remit as evidenced by a decrease in GAD7 scores, where symptoms occur fewer than half the days for a minimum of four weeks.    Objective #A (Patient Action)    Patient will identify 3 symptoms of anxiety.  Status: New - Date: 9/20/2024    Intervention(s)  Therapist will provide psychoeducation on anxiety and engage client in identifying symptoms in appointments.    Objective #B  Patient will use a minimum of 3 strategies to manage anxiety symptoms  Status: New - Date: 9/20/2024    Intervention(s)  Therapist will teach strategies such as grounding techniques, thought stopping, and use of distraction    Objective #C  Patient will use cognitive strategies to manage thoughts/fears that contribute to anxiety symptoms.  Status: New - Date:     Intervention(s)  Therapist will teach the CBT (Cognitive Behavioral Therapy) model, including cognitive distortions and cognitive restructuring techniques.       Patient has reviewed and agreed to the above  plan.      Magali Montilla, [facility] CenterSW  May 11, 2022  Magali Montilla, LICSW  9/8/2022  Magali Montilla, LICSW  12/21/2022  Magali Montilla, LICSW  3/29/2023  Magali Montilla, LICSW  7/28/2023  Magali Montilla, LICSW  11/9/2023  Magali Montilla, LICSW  1/23/2024  Magali Montilla, LICSW  5/9/2024  Magali Montilla, LICSW  9/20/2024

## 2024-12-10 NOTE — PROGRESS NOTES
PFTs completed. Patient left the lab in no distress.     Methacholine challenge deferred as patient finished prednisone burst on 11/25. Patient was instructed to reschedule methacholine challenge to 4 weeks after the last day of taking prednisone.

## 2024-12-11 ENCOUNTER — OFFICE VISIT (OUTPATIENT)
Dept: GASTROENTEROLOGY | Facility: CLINIC | Age: 54
End: 2024-12-11
Attending: PHYSICIAN ASSISTANT
Payer: COMMERCIAL

## 2024-12-11 VITALS
DIASTOLIC BLOOD PRESSURE: 73 MMHG | BODY MASS INDEX: 24.4 KG/M2 | HEART RATE: 82 BPM | WEIGHT: 141 LBS | OXYGEN SATURATION: 100 % | SYSTOLIC BLOOD PRESSURE: 106 MMHG

## 2024-12-11 DIAGNOSIS — K52.9 CHRONIC DIARRHEA: Primary | ICD-10-CM

## 2024-12-11 DIAGNOSIS — K92.1 BLOOD IN STOOL: ICD-10-CM

## 2024-12-11 LAB
DLCOUNC-%PRED-PRE: 96 %
DLCOUNC-PRE: 19.32 ML/MIN/MMHG
DLCOUNC-PRED: 20.05 ML/MIN/MMHG
ERV-%PRED-PRE: 122 %
ERV-PRE: 1.12 L
ERV-PRED: 0.92 L
EXPTIME-PRE: 5.37 SEC
FEF2575-%PRED-POST: 117 %
FEF2575-%PRED-PRE: 108 %
FEF2575-POST: 2.77 L/SEC
FEF2575-PRE: 2.55 L/SEC
FEF2575-PRED: 2.35 L/SEC
FEFMAX-%PRED-PRE: 88 %
FEFMAX-PRE: 5.74 L/SEC
FEFMAX-PRED: 6.5 L/SEC
FEV1-%PRED-PRE: 113 %
FEV1-PRE: 2.81 L
FEV1FEV6-PRE: 79 %
FEV1FEV6-PRED: 81 %
FEV1FVC-PRE: 79 %
FEV1FVC-PRED: 81 %
FEV1SVC-PRE: 81 %
FEV1SVC-PRED: 70 %
FIFMAX-PRE: 4.87 L/SEC
FRCPLETH-%PRED-PRE: 106 %
FRCPLETH-PRE: 2.84 L
FRCPLETH-PRED: 2.66 L
FVC-%PRED-PRE: 116 %
FVC-PRE: 3.57 L
FVC-PRED: 3.07 L
IC-%PRED-PRE: 93 %
IC-PRE: 2.35 L
IC-PRED: 2.51 L
RVPLETH-%PRED-PRE: 107 %
RVPLETH-PRE: 1.72 L
RVPLETH-PRED: 1.6 L
TLCPLETH-%PRED-PRE: 101 %
TLCPLETH-PRE: 5.2 L
TLCPLETH-PRED: 5.12 L
VA-%PRED-PRE: 105 %
VA-PRE: 4.95 L
VC-%PRED-PRE: 98 %
VC-PRE: 3.47 L
VC-PRED: 3.54 L

## 2024-12-11 PROCEDURE — 99213 OFFICE O/P EST LOW 20 MIN: CPT | Performed by: PHYSICIAN ASSISTANT

## 2024-12-11 ASSESSMENT — PAIN SCALES - GENERAL: PAINLEVEL_OUTOF10: NO PAIN (0)

## 2024-12-11 NOTE — LETTER
12/11/2024      Kiesha Mcguire  [address] [address]      Dear Colleague,    Thank you for referring your patient, Kiesha Mcguire, to the [facility]. Please see a copy of my visit note below.    GI CLINIC VISIT    CC/REFERRING MD:  Arcadio Thorpe   REASON FOR CONSULTATION: diarrhea    ASSESSMENT/PLAN:  55 y/o F with pmhx of alopecia presents for follow up of diarrhea.    #diarrhea  Patient underwent a colonoscopy in March 2024 - was otherwise normal but did show a tortuous colon, biopsies were normal as well. She has been doing well, stools are described as a bristol type 5-6, admittedly does not take a fiber supplement which I encouraged. Did see nutrition and continues to follow with her therapist. She has noticed BRBPR on occasion -- likely secondary to hemorrhoids, or irritation. She will continue to monitor.     RTC PRN    Thank you for this consultation.  It was a pleasure to participate in the care of this patient; please contact us with any further questions.       10 minutes spent on the date of the encounter doing chart review, patient visit, and documentation          This note was created with voice recognition software, and while reviewed for accuracy, typos may remain.    Jonathan Parish PA-C  Division of Gastroenterology, Hepatology and Nutrition  [facility]      HPI  55 y/o F with pmhx of alopecia presents for follow up of diarrhea.    Patient was initially evaluated on 3/6/24, please see visit details below:    Patient reports that she had a cholecystectomy in December 2021 -- she was feeling well, but then in march/April 2022 she developed diarrhea. She describes stool as a bristol type 6-7, would occur sporadically. She had stool studies done including cryptosporidium, fecal fat, and parasitic testing - this returned normal. She knew that diarrhea can occur with gallbladder removal so  she tried to make some dietary adjustments. She notes that she would have diarrhea once or a twice a week, and would have normal stools between. She then began to notice in sept/oct 2023 noticed increasing frequency of diarrhea - but would have solid stools in between. Stools seemed to be more liquid in nature. Then she began to have liquid diarrhea every other day, with associated abdominal cramping, as well as fecal urgency, denies fecal incontinence. Denies BRBPR.     Patient underwent stool studies on 3/1/24 , including c diff, fecal calprotectin, fecal lactoferrin, fecal elastase, enteric pathogen panel, and cryptosporidium -- all of which returned normal.     Patient reports she is no longer having liquid diarrhea - she will generally have a BM daily, but every two days she has a bristol type 6 stool, but now has noticed blood on the toilet paper with wiping, not mixed in the stool. When she has looser stools, can have 2 BM daily, denies abdominal cramping. She denies any previous hx of constipation. Before all her symptoms started, she would have a BM daily that was formed. She started a fiber supplement after she took a nutrition class last fall - she will take psyllium, not always daily.     Patient does stay gluten free - as she may be gluten sensitive. Celiac testing in 2011 was negative. When she eats gluten, the following day she would have looser stools.     Denies nausea or vomiting. She has a poor appetite, but relates it to her ADHD medications.       Takes NSAIDs once every 2-3 weeks.  Denies Tylenol. Does take some supplements - omega 3, D, K, magnesium.     Denies use of ETOH and tobacco products. Uses marijuana gummies once every 3 months.     No family history of GI related malignancy (esophageal, gastric, pancreatic, liver or colon) or family history of IBD/celiac disease.     6/6/24:  Colonoscopy was normal - biopsies were normal as well.    Patient will generally have a BM daily - stool  consistency can vary quite a bit, but can have anywhere between a bristol type 4-7 stool. Patient does note abdominal pain, when she has looser stools. Liquid stool can occur every couple of weeks. Pain improves with having a BM. Denies nausea or vomiting. Denies BRBPR. Appetite has been poor - but is on stimulants for ADHD. Weight is stable. She has previously tried fiber pills, trying to stick to the psyllium husk capsules - with diet, she is trying to get 25g. Does have fecal urgency when she has looser stools.     12/11/24:  Patient reports that things have continued to improve - she will generally have 1-2 BM daily, that is described as a bristol type 5-6. Did see bright red blood this week - may seth from hemorrhoid or fissure. Denies abdominal pain, nausea, or vomiting. She is no longer taking fiber. Does drink kombucha nightly, can cause some bloating.     ROS:    No fevers or chills  No weight loss  No shortness of breath or wheezing  No chest pain or pressure  No odynophagia or dysphagia  +BRBPR  + anxiety or depression -- has previously taken zoloft in the past (last took in 2015). Does follow with a therapist.     PROBLEM LIST  Patient Active Problem List    Diagnosis Date Noted     Shoulder impingement 09/09/2024     Priority: Medium     Adhesive capsulitis of left shoulder 09/09/2024     Priority: Medium     Anxiety 08/08/2024     Priority: Medium     Vaginal atrophy 08/08/2024     Priority: Medium     Symptomatic menopausal or female climacteric states 08/08/2024     Priority: Medium     Family history of thyroid disease in mother 08/08/2024     Priority: Medium     Dx of unclear       Nerve entrapment 06/24/2024     Priority: Medium     Attention deficit hyperactivity disorder (ADHD), predominantly inattentive type 02/27/2023     Priority: Medium     Mass of upper outer quadrant of left breast 02/27/2023     Priority: Medium     Pelvic floor dysfunction 11/04/2022     Priority: Medium     Frontal  fibrosing alopecia 09/07/2022     Priority: Medium     IUD (intrauterine device) in place 08/15/2013     Priority: Medium     Mirena 2020       HYPERLIPIDEMIA LDL GOAL <160 05/09/2010     Priority: Medium     iamTIBIALIS TENDINITIS 08/31/2005     Priority: Medium     iamSPRAIN HIP & THIGH NOS 08/31/2005     Priority: Medium     Herpes simplex virus (HSV) infection 07/01/2005     Priority: Medium     oral  Problem list name updated by automated process. Provider to review       Pure hypercholesterolemia 03/31/2005     Priority: Medium       PERTINENT PAST MEDICAL HISTORY:    Past Medical History:   Diagnosis Date     Depressive disorder      Depressive disorder, not elsewhere classified     resolved     Herpes simplex without mention of complication     oral     IUD (intrauterine device) in place 08/15/2013    Mirena     Migraine headache with aura     very occass, sig aura, speech loss x1     Pure hypercholesterolemia     follows w BIPIN CHATMAN       PREVIOUS SURGERIES:    Past Surgical History:   Procedure Laterality Date     COLONOSCOPY N/A 10/27/2021    Procedure: COLONOSCOPY, WITH POLYPECTOMY AND CLIP;  Surgeon: Og Gutierres MD;  Location: [facility] OR     COLONOSCOPY N/A 03/12/2024    Procedure: COLONOSCOPY, WITH POLYPECTOMY AND BIOPSY;  Surgeon: Emanuel Braden MD;  Location: [facility] OR     COLONOSCOPY WITH CO2 INSUFFLATION N/A 03/12/2024    Procedure: Colonoscopy with CO2 insufflation;  Surgeon: Emanuel Braden MD;  Location: [facility] OR     HC DILATION/CURETTAGE DIAG/THER NON OB  12/01/2006     LAPAROSCOPIC CHOLECYSTECTOMY N/A 12/24/2021    Procedure: CHOLECYSTECTOMY, LAPAROSCOPIC;  Surgeon: Denise Cast MD;  Location: [facility] OR     SOFT TISSUE SURGERY  6/2024       PREVIOUS ENDOSCOPY:  Colonoscopy (2024): - Tortuous colon.                             - The examined portion of the ileum was normal.                             Biopsied.                             - The examination was otherwise normal  on direct                             and retroflexion views.                             - Biopsies were taken with a cold forceps from the                             entire colon for evaluation of microscopic colitis.   Final Diagnosis   A. TERMINAL ILEUM, BIOPSY:  Terminal ileal mucosa with preserved villi and no significant histologic abnormality     B. COLON, RANDOM BIOPSY:  Colonic mucosa with no evidence of microscopic or chronic colitis or other significant histologic abnormality          Colonoscopy (2021): - One 6 mm polyp in the sigmoid colon, removed with a                        cold snare. Resected and retrieved. Clip (MR                        conditional) was placed.                        - Three 1 to 3 mm polyps at the recto-sigmoid colon,                        removed with a cold biopsy forceps. Resected and                        retrieved.                        - The examined portion of the ileum was normal   Final Diagnosis   A. SIGMOID COLON POLYP, POLYPECTOMY:  -Tubular adenoma, fragmented, negative for high-grade dysplasia    B.RECTAL SIGMOID POLYPS X 3, POLYPECTOMY:  -Three hyperplastic polyps        ALLERGIES:     Allergies   Allergen Reactions     Triptans Unknown     imitrex     Amoxicillin-Pot Clavulanate Hives     Unsure if true reaction to med      Ciprofloxacin Hives and Itching     Patient reported- possible reaction, patient unsure        PERTINENT MEDICATIONS:    Current Outpatient Medications:      Acetaminophen (TYLENOL PO), , Disp: , Rfl:      benzonatate (TESSALON) 200 MG capsule, Take 1 capsule (200 mg) by mouth 3 times daily as needed for cough., Disp: 10 capsule, Rfl: 1     Biotin 10 MG TABS tablet, Take 10,000 mcg by mouth daily, Disp: , Rfl:      busPIRone (BUSPAR) 10 MG tablet, Take 1 tablet (10 mg) by mouth 2 times daily, Disp: 60 tablet, Rfl: 2     CALCIUM-VITAMIN D-VITAMIN K PO, , Disp: , Rfl:      Collagen Hydrolysate POWD, , Disp: , Rfl:      Cyanocobalamin  (VITAMIN B 12 PO), Take by mouth daily , Disp: , Rfl:      cyclobenzaprine (FLEXERIL) 10 MG tablet, Take 0.5 tablets (5 mg) by mouth 3 times daily as needed for muscle spasms, Disp: 30 tablet, Rfl: 0     estradiol (VAGIFEM) 10 MCG TABS vaginal tablet, Place 1 tablet (10 mcg) vaginally twice a week, Disp: 24 tablet, Rfl: 3     estradiol (VIVELLE-DOT) 0.025 MG/24HR bi-weekly patch, Place 1 patch onto the skin twice a week., Disp: 24 patch, Rfl: 2     hydroxychloroquine (PLAQUENIL) 200 MG tablet, Take 1 tablet (200 mg) by mouth 2 times daily, Disp: 180 tablet, Rfl: 3     ibuprofen (ADVIL/MOTRIN) 200 MG capsule, Take 200 mg by mouth daily. 2 tablets every 4 hours, Disp: , Rfl:      levonorgestrel (MIRENA) 52 MG (20 mcg/day) IUD, by Intrauterine route once, Disp: , Rfl:      [START ON 2/20/2025] lisdexamfetamine (VYVANSE) 10 MG capsule, Take 1 capsule (10 mg) by mouth every morning., Disp: 30 capsule, Rfl: 0     lisdexamfetamine (VYVANSE) 10 MG capsule, Take 1 capsule (10 mg) by mouth every morning., Disp: 30 capsule, Rfl: 0     [START ON 12/22/2024] lisdexamfetamine (VYVANSE) 10 MG capsule, Take 1 capsule (10 mg) by mouth every morning., Disp: 30 capsule, Rfl: 0     [START ON 1/21/2025] lisdexamfetamine (VYVANSE) 10 MG capsule, Take 1 capsule (10 mg) by mouth every morning., Disp: 30 capsule, Rfl: 0     LORazepam (ATIVAN) 0.5 MG tablet, Take 1 tablet (0.5 mg) by mouth every 6 hours as needed for anxiety, Disp: 30 tablet, Rfl: 0     Lysine 500 MG TABS, Take 1,000 mg by mouth daily , Disp: , Rfl:      magnesium citrate solution, Takes 1 tsp twice a day, 150 mg daily, Disp: , Rfl:      NONFORMULARY, 1 capsule daily carmen, Disp: , Rfl:      NONFORMULARY, 2 tablets daily [place]ab[place]thorn, Disp: , Rfl:      UNABLE TO FIND, Take by mouth daily MEDICATION NAME: Folinic Acid, Disp: , Rfl:      UNABLE TO FIND, daily José Antonio - patient reported, Disp: , Rfl:      UNABLE TO FIND, daily Rhodiola- patient reported, Disp: , Rfl:       valACYclovir (VALTREX) 1000 mg tablet, Take 2 tablets (2,000 mg) by mouth 2 times daily For 2 days as needed for cold sores, Disp: 12 tablet, Rfl: 11     valACYclovir (VALTREX) 500 MG tablet, Take 1 tablet (500 mg) by mouth daily, Disp: 90 tablet, Rfl: 1    SOCIAL HISTORY:    Social History     Socioeconomic History     Marital status:      Spouse name: Not on file     Number of children: 2     Years of education: 16     Highest education level: Not on file   Occupational History     Occupation:      Employer: [employer]     Comment: fulltime   Tobacco Use     Smoking status: Former     Current packs/day: 0.00     Average packs/day: 0.5 packs/day for 10.0 years (5.0 ttl pk-yrs)     Types: Cigarettes     Start date: 1984     Quit date: 1994     Years since quittin.4     Passive exposure: Past     Smokeless tobacco: Never   Vaping Use     Vaping status: Never Used   Substance and Sexual Activity     Alcohol use: Not Currently     Comment: very rare     Drug use: No     Sexual activity: Not Currently     Partners: Male     Birth control/protection: I.U.D.     Comment: Mirena   Other Topics Concern     Parent/sibling w/ CABG, MI or angioplasty before 65F 55M? No   Social History Narrative    How much exercise per week? 2 90 minute yoga sessions      How much calcium per day? Diet       How much caffeine per day? 0    How much vitamin D per day? Supplement     Do you/your family wear seatbelts?  Yes    Do you/your family use safety helmets? Yes    Do you/your family use sunscreen? Yes    Do you/your family keep firearms in the home? No    Do you/your family have a smoke detector(s)? Yes        Do you feel safe in your home? Yes    Has anyone ever touched you in an unwanted manner? No     Explain Kiesha Fletcher CMA 18        Reviewed [facility] amaya 10-12-20             Social Drivers of Health     Financial Resource Strain: Low Risk  (2024)    Financial Resource Strain      Within  the past 12 months, have you or your family members you live with been unable to get utilities (heat, electricity) when it was really needed?: No   Food Insecurity: Low Risk  (1/4/2024)    Food Insecurity      Within the past 12 months, did you worry that your food would run out before you got money to buy more?: No      Within the past 12 months, did the food you bought just not last and you didn t have money to get more?: No   Transportation Needs: Low Risk  (1/4/2024)    Transportation Needs      Within the past 12 months, has lack of transportation kept you from medical appointments, getting your medicines, non-medical meetings or appointments, work, or from getting things that you need?: No   Physical Activity: Insufficiently Active (10/12/2020)    Exercise Vital Sign      Days of Exercise per Week: 1 day      Minutes of Exercise per Session: 10 min   Stress: Stress Concern Present (10/12/2020)    [employer] [place] - Occupational Stress Questionnaire      Feeling of Stress : To some extent   Social Connections: Socially Isolated (10/12/2020)    Social Connection and Isolation Panel [NHANES]      Frequency of Communication with Friends and Family: Three times a week      Frequency of Social Gatherings with Friends and Family: Twice a week      Attends Adventism Services: Never      Active Member of Clubs or Organizations: No      Attends Club or Organization Meetings: Never      Marital Status:    Interpersonal Safety: Low Risk  (9/23/2024)    Interpersonal Safety      Do you feel physically and emotionally safe where you currently live?: Yes      Within the past 12 months, have you been hit, slapped, kicked or otherwise physically hurt by someone?: No      Within the past 12 months, have you been humiliated or emotionally abused in other ways by your partner or ex-partner?: No   Housing Stability: Low Risk  (1/4/2024)    Housing Stability      Do you have housing? : Yes      Are  you worried about losing your housing?: No       FAMILY HISTORY:  FH of CRC: None  FH of IBD: None  Family History   Problem Relation Age of Onset     Hypertension Mother      Hyperlipidemia Mother      Depression Mother      Squamous cell carcinoma Mother      Thyroid Disease Mother         unclear dx     Hypertension Father      Cancer Father 65        neuro endrocine CA, neck     Hyperlipidemia Father      Aortic aneurysm Maternal Grandfather          of ascending aortic aneurysm at age 64     Aortic aneurysm Maternal Aunt          in her 40s from ascending aortic aneurysm     C.A.D. No family hx of      Cancer - colorectal No family hx of      Diabetes No family hx of      Cerebrovascular Disease No family hx of      Breast Cancer No family hx of      Prostate Cancer No family hx of        Past/family/social history reviewed and no changes    PHYSICAL EXAMINATION:  Constitutional: aaox3, cooperative, pleasant, not dyspneic/diaphoretic, no acute distress  Vitals reviewed: /73   Pulse 82   Wt 64 kg (141 lb)   SpO2 100%   BMI 24.40 kg/m    Wt:   Wt Readings from Last 2 Encounters:   24 61.7 kg (136 lb)   10/10/24 59.9 kg (132 lb)      Eyes: Sclera anicteric/injected  Respiratory: Unlabored breathing  Skin: warm, perfused, no jaundice  Psych: Normal affect  MSK: Normal gait            Again, thank you for allowing me to participate in the care of your patient.        Sincerely,        Jonathan Parish PA-C

## 2024-12-11 NOTE — NURSING NOTE
Chief Complaint   Patient presents with    Follow Up     Cronic diarrhea     She requests these members of her care team be copied on today's visit information:  PCP: Damian Trejo    Referring Provider:  Jonathan Parish PA-C  [address] [place] [address]    Vitals:    12/11/24 1243   BP: 106/73   Pulse: 82   SpO2: 100%   Weight: 64 kg (141 lb)     Body mass index is 24.4 kg/m .    Medications were reconciled.    Does patient need any medication refills at today's visit? Gina Mcfarland

## 2024-12-11 NOTE — PATIENT INSTRUCTIONS
It was a pleasure meeting with you today and discussing your healthcare plan. Below is a summary of what we covered:    ---- Recommend starting supplementation with a powdered soluble fiber. When used on a daily basis, this can help regulate the consistency of your stools. Metamucil (psyllium) and Citrucel are preferred examples. You can start with 1-2 teaspoons per day, with goal to gradually increase to 1 tablespoon daily. You can increase up to 1 tablespoon three times daily if needed. It is important to stay well-hydrated with use of fiber supplementation and to make sure that the fiber powder is well mixed with water as directed on the label. You may experience some bloating with initiation of fiber, which will improve over the first few weeks. A good trial to evaluate the effect is 3-6 months. Of note, many of the fiber products contain artificial sweeteners, which can cause bloating, gas, and diarrhea in those who may be sensitive to artificial sweeteners. If this is the case, recommend trying Metamucil Premium Blend (with Stevia), Metamucil 4-in-1 without Added Sweeteners, or Bellway (sweetened with Monk fruit extract).    Insoluble fiber acts like a bulky  inner broom,  sweeping out debris from the intestine and creating more motility and movement.       Soluble fiber attracts water and swells, creating a gel that slows digestion.  Also, slows the release of glucose from foods into the blood which stops spikes in blood sugar levels.  Soluble fiber traps toxins in the gut, helping to carry them to excretion and provides healthy bacteria in the digestive tract.     Follow up as needed.      Please see below for any additional questions and scheduling guidelines.    Sign up for [employer]: [facility] patient portal serves as a secure platform for accessing your medical records from the [facility]. Additionally, [employer] facilitates easy, timely, and secure messaging with your care team. If you have not  signed up, you may do so by using the provided code or calling [phone].    Coordinating your care after your visit:  There are multiple options for scheduling your follow-up care based on your provider's recommendation.    How do I schedule a follow-up clinic appointment:   After your appointment, you may receive scheduling assistance with the Clinic Coordinators by having a seat in the waiting room and a Clinic Coordinator will call you up to schedule.  Virtual visits or after you leave the clinic:  Your provider has placed a follow-up order in the [facility] portal for scheduling your return appointment. A member of the scheduling team will contact you to schedule.  [facility] Scheduling: Timely scheduling through [facility] [facility] is advised to ensure appointment availability.   Call to schedule: You may schedule your follow-up appointment(s) by calling [phone], option 1.    How do I schedule my endoscopy or colonoscopy procedure:  If a procedure, such as a colonoscopy or upper endoscopy was ordered by your provider, the scheduling team will contact you to schedule this procedure. Or you may choose to call to schedule at   [phone], option 2.  Please allow 20-30 minutes when scheduling a procedure.    How do I get my blood work done? To get your blood work done, you need to schedule a lab appointment at an [facility]. There are multiple ways to schedule:   At the clinic: The Clinic Coordinator you meet after your visit can help you schedule a lab appointment.   [facility] scheduling: [facility] offers online lab scheduling at all [facility] laboratory locations.   Call to schedule: You can call [phone] to schedule your lab appointment.    How do I schedule my imaging study: To schedule imaging studies, such as CT scans, ultrasounds, MRIs, or X-rays, contact Imaging Services at [phone].    How do I schedule a referral to another doctor: If your provider recommended a referral to another  specialist(s), the referral order was placed by your provider. You will receive a phone call to schedule this referral, or you may choose to call the number attached to the referral to self-schedule.    For Post-Visit Question(s):  For any inquiries following today's visit:  Please utilize [facility] messaging and allow 48 hours for reply or contact the Call Center during normal business hours at [phone], option 3.  For Emergent After-hours questions, contact the On-Call GI Fellow through the [facility]  at [phone].  In addition, you may contact your Nurse directly using the provided contact information.    Test Results:  Test results will be accessible via [facility] in compliance with the 21st Century Cures Act. This means that your results will be available to you at the same time as your provider. Often you may see your results before your provider does. Results are reviewed by staff within two weeks with communication follow-up. Results may be released in the patient portal prior to your care team review.    Prescription Refill(s):  Medication prescribed by your provider will be addressed during your visit. For future refills, please coordinate with your pharmacy. If you have not had a recent clinic visit or routine labs, for your safety, your provider may not be able to refill your prescription.

## 2024-12-11 NOTE — PROGRESS NOTES
GI CLINIC VISIT    CC/REFERRING MD:  Arcadio Thorpe   REASON FOR CONSULTATION: diarrhea    ASSESSMENT/PLAN:  53 y/o F with pmhx of alopecia presents for follow up of diarrhea.    #diarrhea  Patient underwent a colonoscopy in March 2024 - was otherwise normal but did show a tortuous colon, biopsies were normal as well. She has been doing well, stools are described as a bristol type 5-6, admittedly does not take a fiber supplement which I encouraged. Did see nutrition and continues to follow with her therapist. She has noticed BRBPR on occasion -- likely secondary to hemorrhoids, or irritation. She will continue to monitor.     RTC PRN    Thank you for this consultation.  It was a pleasure to participate in the care of this patient; please contact us with any further questions.       10 minutes spent on the date of the encounter doing chart review, patient visit, and documentation          This note was created with voice recognition software, and while reviewed for accuracy, typos may remain.    Jonathan Parish PA-C  Division of Gastroenterology, Hepatology and Nutrition  [facility]  53 y/o F with pmhx of alopecia presents for follow up of diarrhea.    Patient was initially evaluated on 3/6/24, please see visit details below:    Patient reports that she had a cholecystectomy in December 2021 -- she was feeling well, but then in march/April 2022 she developed diarrhea. She describes stool as a bristol type 6-7, would occur sporadically. She had stool studies done including cryptosporidium, fecal fat, and parasitic testing - this returned normal. She knew that diarrhea can occur with gallbladder removal so she tried to make some dietary adjustments. She notes that she would have diarrhea once or a twice a week, and would have normal stools between. She then began to notice in sept/oct 2023 noticed increasing frequency of diarrhea - but would have solid stools  in between. Stools seemed to be more liquid in nature. Then she began to have liquid diarrhea every other day, with associated abdominal cramping, as well as fecal urgency, denies fecal incontinence. Denies BRBPR.     Patient underwent stool studies on 3/1/24 , including c diff, fecal calprotectin, fecal lactoferrin, fecal elastase, enteric pathogen panel, and cryptosporidium -- all of which returned normal.     Patient reports she is no longer having liquid diarrhea - she will generally have a BM daily, but every two days she has a bristol type 6 stool, but now has noticed blood on the toilet paper with wiping, not mixed in the stool. When she has looser stools, can have 2 BM daily, denies abdominal cramping. She denies any previous hx of constipation. Before all her symptoms started, she would have a BM daily that was formed. She started a fiber supplement after she took a nutrition class last fall - she will take psyllium, not always daily.     Patient does stay gluten free - as she may be gluten sensitive. Celiac testing in 2011 was negative. When she eats gluten, the following day she would have looser stools.     Denies nausea or vomiting. She has a poor appetite, but relates it to her ADHD medications.       Takes NSAIDs once every 2-3 weeks.  Denies Tylenol. Does take some supplements - omega 3, D, K, magnesium.     Denies use of ETOH and tobacco products. Uses marijuana gummies once every 3 months.     No family history of GI related malignancy (esophageal, gastric, pancreatic, liver or colon) or family history of IBD/celiac disease.     6/6/24:  Colonoscopy was normal - biopsies were normal as well.    Patient will generally have a BM daily - stool consistency can vary quite a bit, but can have anywhere between a bristol type 4-7 stool. Patient does note abdominal pain, when she has looser stools. Liquid stool can occur every couple of weeks. Pain improves with having a BM. Denies nausea or vomiting.  Denies BRBPR. Appetite has been poor - but is on stimulants for ADHD. Weight is stable. She has previously tried fiber pills, trying to stick to the psyllium husk capsules - with diet, she is trying to get 25g. Does have fecal urgency when she has looser stools.     12/11/24:  Patient reports that things have continued to improve - she will generally have 1-2 BM daily, that is described as a bristol type 5-6. Did see bright red blood this week - may seth from hemorrhoid or fissure. Denies abdominal pain, nausea, or vomiting. She is no longer taking fiber. Does drink kombucha nightly, can cause some bloating.     ROS:    No fevers or chills  No weight loss  No shortness of breath or wheezing  No chest pain or pressure  No odynophagia or dysphagia  +BRBPR  + anxiety or depression -- has previously taken zoloft in the past (last took in 2015). Does follow with a therapist.     PROBLEM LIST  Patient Active Problem List    Diagnosis Date Noted    Shoulder impingement 09/09/2024     Priority: Medium    Adhesive capsulitis of left shoulder 09/09/2024     Priority: Medium    Anxiety 08/08/2024     Priority: Medium    Vaginal atrophy 08/08/2024     Priority: Medium    Symptomatic menopausal or female climacteric states 08/08/2024     Priority: Medium    Family history of thyroid disease in mother 08/08/2024     Priority: Medium     Dx of unclear      Nerve entrapment 06/24/2024     Priority: Medium    Attention deficit hyperactivity disorder (ADHD), predominantly inattentive type 02/27/2023     Priority: Medium    Mass of upper outer quadrant of left breast 02/27/2023     Priority: Medium    Pelvic floor dysfunction 11/04/2022     Priority: Medium    Frontal fibrosing alopecia 09/07/2022     Priority: Medium    IUD (intrauterine device) in place 08/15/2013     Priority: Medium     Mirena 2020      HYPERLIPIDEMIA LDL GOAL <160 05/09/2010     Priority: Medium    iamTIBIALIS TENDINITIS 08/31/2005     Priority: Medium     iamSPRAIN HIP & THIGH NOS 08/31/2005     Priority: Medium    Herpes simplex virus (HSV) infection 07/01/2005     Priority: Medium     oral  Problem list name updated by automated process. Provider to review      Pure hypercholesterolemia 03/31/2005     Priority: Medium       PERTINENT PAST MEDICAL HISTORY:    Past Medical History:   Diagnosis Date    Depressive disorder     Depressive disorder, not elsewhere classified     resolved    Herpes simplex without mention of complication     oral    IUD (intrauterine device) in place 08/15/2013    Mirena    Migraine headache with aura     very occass, sig aura, speech loss x1    Pure hypercholesterolemia     follows w BIPIN CHATMAN       PREVIOUS SURGERIES:    Past Surgical History:   Procedure Laterality Date    COLONOSCOPY N/A 10/27/2021    Procedure: COLONOSCOPY, WITH POLYPECTOMY AND CLIP;  Surgeon: Og Gutierres MD;  Location: [facility] OR    COLONOSCOPY N/A 03/12/2024    Procedure: COLONOSCOPY, WITH POLYPECTOMY AND BIOPSY;  Surgeon: Emanuel Braden MD;  Location: [facility] OR    COLONOSCOPY WITH CO2 INSUFFLATION N/A 03/12/2024    Procedure: Colonoscopy with CO2 insufflation;  Surgeon: Emanuel Braden MD;  Location: [facility] OR    HC DILATION/CURETTAGE DIAG/THER NON OB  12/01/2006    LAPAROSCOPIC CHOLECYSTECTOMY N/A 12/24/2021    Procedure: CHOLECYSTECTOMY, LAPAROSCOPIC;  Surgeon: Denise Cast MD;  Location: [facility] OR    SOFT TISSUE SURGERY  6/2024       PREVIOUS ENDOSCOPY:  Colonoscopy (2024): - Tortuous colon.                             - The examined portion of the ileum was normal.                             Biopsied.                             - The examination was otherwise normal on direct                             and retroflexion views.                             - Biopsies were taken with a cold forceps from the                             entire colon for evaluation of microscopic colitis.   Final Diagnosis   A. TERMINAL ILEUM, BIOPSY:  Terminal  ileal mucosa with preserved villi and no significant histologic abnormality     B. COLON, RANDOM BIOPSY:  Colonic mucosa with no evidence of microscopic or chronic colitis or other significant histologic abnormality          Colonoscopy (2021): - One 6 mm polyp in the sigmoid colon, removed with a                        cold snare. Resected and retrieved. Clip (MR                        conditional) was placed.                        - Three 1 to 3 mm polyps at the recto-sigmoid colon,                        removed with a cold biopsy forceps. Resected and                        retrieved.                        - The examined portion of the ileum was normal   Final Diagnosis   A. SIGMOID COLON POLYP, POLYPECTOMY:  -Tubular adenoma, fragmented, negative for high-grade dysplasia    B.RECTAL SIGMOID POLYPS X 3, POLYPECTOMY:  -Three hyperplastic polyps        ALLERGIES:     Allergies   Allergen Reactions    Triptans Unknown     imitrex    Amoxicillin-Pot Clavulanate Hives     Unsure if true reaction to med     Ciprofloxacin Hives and Itching     Patient reported- possible reaction, patient unsure        PERTINENT MEDICATIONS:    Current Outpatient Medications:     Acetaminophen (TYLENOL PO), , Disp: , Rfl:     benzonatate (TESSALON) 200 MG capsule, Take 1 capsule (200 mg) by mouth 3 times daily as needed for cough., Disp: 10 capsule, Rfl: 1    Biotin 10 MG TABS tablet, Take 10,000 mcg by mouth daily, Disp: , Rfl:     busPIRone (BUSPAR) 10 MG tablet, Take 1 tablet (10 mg) by mouth 2 times daily, Disp: 60 tablet, Rfl: 2    CALCIUM-VITAMIN D-VITAMIN K PO, , Disp: , Rfl:     Collagen Hydrolysate POWD, , Disp: , Rfl:     Cyanocobalamin (VITAMIN B 12 PO), Take by mouth daily , Disp: , Rfl:     cyclobenzaprine (FLEXERIL) 10 MG tablet, Take 0.5 tablets (5 mg) by mouth 3 times daily as needed for muscle spasms, Disp: 30 tablet, Rfl: 0    estradiol (VAGIFEM) 10 MCG TABS vaginal tablet, Place 1 tablet (10 mcg) vaginally twice a  week, Disp: 24 tablet, Rfl: 3    estradiol (VIVELLE-DOT) 0.025 MG/24HR bi-weekly patch, Place 1 patch onto the skin twice a week., Disp: 24 patch, Rfl: 2    hydroxychloroquine (PLAQUENIL) 200 MG tablet, Take 1 tablet (200 mg) by mouth 2 times daily, Disp: 180 tablet, Rfl: 3    ibuprofen (ADVIL/MOTRIN) 200 MG capsule, Take 200 mg by mouth daily. 2 tablets every 4 hours, Disp: , Rfl:     levonorgestrel (MIRENA) 52 MG (20 mcg/day) IUD, by Intrauterine route once, Disp: , Rfl:     [START ON 2/20/2025] lisdexamfetamine (VYVANSE) 10 MG capsule, Take 1 capsule (10 mg) by mouth every morning., Disp: 30 capsule, Rfl: 0    lisdexamfetamine (VYVANSE) 10 MG capsule, Take 1 capsule (10 mg) by mouth every morning., Disp: 30 capsule, Rfl: 0    [START ON 12/22/2024] lisdexamfetamine (VYVANSE) 10 MG capsule, Take 1 capsule (10 mg) by mouth every morning., Disp: 30 capsule, Rfl: 0    [START ON 1/21/2025] lisdexamfetamine (VYVANSE) 10 MG capsule, Take 1 capsule (10 mg) by mouth every morning., Disp: 30 capsule, Rfl: 0    LORazepam (ATIVAN) 0.5 MG tablet, Take 1 tablet (0.5 mg) by mouth every 6 hours as needed for anxiety, Disp: 30 tablet, Rfl: 0    Lysine 500 MG TABS, Take 1,000 mg by mouth daily , Disp: , Rfl:     magnesium citrate solution, Takes 1 tsp twice a day, 150 mg daily, Disp: , Rfl:     NONFORMULARY, 1 capsule daily carmen, Disp: , Rfl:     NONFORMULARY, 2 tablets daily seabuckthorn, Disp: , Rfl:     UNABLE TO FIND, Take by mouth daily MEDICATION NAME: Folinic Acid, Disp: , Rfl:     UNABLE TO FIND, daily Ashwaganda - patient reported, Disp: , Rfl:     UNABLE TO FIND, daily Rhodiola- patient reported, Disp: , Rfl:     valACYclovir (VALTREX) 1000 mg tablet, Take 2 tablets (2,000 mg) by mouth 2 times daily For 2 days as needed for cold sores, Disp: 12 tablet, Rfl: 11    valACYclovir (VALTREX) 500 MG tablet, Take 1 tablet (500 mg) by mouth daily, Disp: 90 tablet, Rfl: 1    SOCIAL HISTORY:    Social History     Socioeconomic  History    Marital status:      Spouse name: Not on file    Number of children: 2    Years of education: 16    Highest education level: Not on file   Occupational History    Occupation:      Employer: [employer]     Comment: fulltime   Tobacco Use    Smoking status: Former     Current packs/day: 0.00     Average packs/day: 0.5 packs/day for 10.0 years (5.0 ttl pk-yrs)     Types: Cigarettes     Start date: 1984     Quit date: 1994     Years since quittin.4     Passive exposure: Past    Smokeless tobacco: Never   Vaping Use    Vaping status: Never Used   Substance and Sexual Activity    Alcohol use: Not Currently     Comment: very rare    Drug use: No    Sexual activity: Not Currently     Partners: Male     Birth control/protection: I.U.D.     Comment: Mirena   Other Topics Concern    Parent/sibling w/ CABG, MI or angioplasty before 65F 55M? No   Social History Narrative    How much exercise per week? 2 90 minute yoga sessions      How much calcium per day? Diet       How much caffeine per day? 0    How much vitamin D per day? Supplement     Do you/your family wear seatbelts?  Yes    Do you/your family use safety helmets? Yes    Do you/your family use sunscreen? Yes    Do you/your family keep firearms in the home? No    Do you/your family have a smoke detector(s)? Yes        Do you feel safe in your home? Yes    Has anyone ever touched you in an unwanted manner? No     Explain Kiesha Fletcher [facility] 18        Reviewed [facility] 10-12-20             Social Drivers of Health     Financial Resource Strain: Low Risk  (2024)    Financial Resource Strain     Within the past 12 months, have you or your family members you live with been unable to get utilities (heat, electricity) when it was really needed?: No   Food Insecurity: Low Risk  (2024)    Food Insecurity     Within the past 12 months, did you worry that your food would run out before you got money to buy more?: No      Within the past 12 months, did the food you bought just not last and you didn t have money to get more?: No   Transportation Needs: Low Risk  (1/4/2024)    Transportation Needs     Within the past 12 months, has lack of transportation kept you from medical appointments, getting your medicines, non-medical meetings or appointments, work, or from getting things that you need?: No   Physical Activity: Insufficiently Active (10/12/2020)    Exercise Vital Sign     Days of Exercise per Week: 1 day     Minutes of Exercise per Session: 10 min   Stress: Stress Concern Present (10/12/2020)    [employer] [place] - Occupational Stress Questionnaire     Feeling of Stress : To some extent   Social Connections: Socially Isolated (10/12/2020)    Social Connection and Isolation Panel [NHANES]     Frequency of Communication with Friends and Family: Three times a week     Frequency of Social Gatherings with Friends and Family: Twice a week     Attends Buddhism Services: Never     Active Member of Clubs or Organizations: No     Attends Club or Organization Meetings: Never     Marital Status:    Interpersonal Safety: Low Risk  (9/23/2024)    Interpersonal Safety     Do you feel physically and emotionally safe where you currently live?: Yes     Within the past 12 months, have you been hit, slapped, kicked or otherwise physically hurt by someone?: No     Within the past 12 months, have you been humiliated or emotionally abused in other ways by your partner or ex-partner?: No   Housing Stability: Low Risk  (1/4/2024)    Housing Stability     Do you have housing? : Yes     Are you worried about losing your housing?: No       FAMILY HISTORY:  FH of CRC: None  FH of IBD: None  Family History   Problem Relation Age of Onset    Hypertension Mother     Hyperlipidemia Mother     Depression Mother     Squamous cell carcinoma Mother     Thyroid Disease Mother         unclear dx    Hypertension Father     Cancer Father  65        neuro endrocine CA, neck    Hyperlipidemia Father     Aortic aneurysm Maternal Grandfather          of ascending aortic aneurysm at age 64    Aortic aneurysm Maternal Aunt          in her 40s from ascending aortic aneurysm    C.A.D. No family hx of     Cancer - colorectal No family hx of     Diabetes No family hx of     Cerebrovascular Disease No family hx of     Breast Cancer No family hx of     Prostate Cancer No family hx of        Past/family/social history reviewed and no changes    PHYSICAL EXAMINATION:  Constitutional: aaox3, cooperative, pleasant, not dyspneic/diaphoretic, no acute distress  Vitals reviewed: /73   Pulse 82   Wt 64 kg (141 lb)   SpO2 100%   BMI 24.40 kg/m    Wt:   Wt Readings from Last 2 Encounters:   24 61.7 kg (136 lb)   10/10/24 59.9 kg (132 lb)      Eyes: Sclera anicteric/injected  Respiratory: Unlabored breathing  Skin: warm, perfused, no jaundice  Psych: Normal affect  MSK: Normal gait

## 2024-12-17 ENCOUNTER — VIRTUAL VISIT (OUTPATIENT)
Dept: PSYCHOLOGY | Facility: CLINIC | Age: 54
End: 2024-12-17
Payer: COMMERCIAL

## 2024-12-17 DIAGNOSIS — F41.1 GENERALIZED ANXIETY DISORDER: Primary | ICD-10-CM

## 2024-12-17 DIAGNOSIS — F43.89 REACTION TO CHRONIC STRESS: ICD-10-CM

## 2024-12-17 PROCEDURE — 90834 PSYTX W PT 45 MINUTES: CPT | Mod: 95 | Performed by: SOCIAL WORKER

## 2024-12-17 NOTE — PROGRESS NOTES
[facility]                                     Progress Note    Patient Name: Kiesha Mcguire  Date: 12/17/2024       Service Type: Individual      Session Start Time: 11:32 AM Session End Time: 12:19 PM     Session Length: 38-52 minutes    Session #: 74 with this provider    Attendees: Client attended alone    Service Modality:  Video Visit:      Provider verified identity through the following two step process.  Patient provided:  Patient is known previously to provider    Telemedicine Visit: The patient's condition can be safely assessed and treated via synchronous audio and visual telemedicine encounter.      Reason for Telemedicine Visit: Patient convenience (e.g. access to timely appointments / distance to available provider) and Services only offered telehealth    Originating Site (Patient Location): Patient's home    Distant Site (Provider Location): Provider Remote Setting- Home Office/Off-site    Consent:  The patient/guardian has verbally consented to: the potential risks and benefits of telemedicine (video visit) versus in person care; bill my insurance or make self-payment for services provided; and responsibility for payment of non-covered services.     Patient would like the video invitation sent by:  [employer]    Mode of Communication:  Video    As the provider I attest to compliance with applicable laws and regulations related to telemedicine.    DATA  Interactive Complexity: No   Crisis: No      Progress Since Last Session (Related to Symptoms / Goals / Homework):  Symptoms:  no change since previous appointment    Homework: Achieved / completed to satisfaction     Episode of Care Goals: Satisfactory progress - ACTION (Actively working towards change); Intervened by reinforcing change plan / affirming steps taken    There has been demonstrated improvement in functioning while patient has been engaged in psychotherapy/psychological service- if withdrawn the patient would  deteriorate and/or relapse.       Current / Ongoing Stressors and Concerns:   Difficulty trusting others and being vulnerable  Limited close relationships  Stressors related to parenting   Work related stressors     Treatment Objective(s) Addressed in This Session:   identify 2 fears / thoughts that contribute to feeling anxious  Identify negative self-talk and behaviors: challenge core beliefs, myths, and actions  Maintain behavior changes     Intervention:   Engaged in active listening   Supported client with reflection on progress   Explored therapy goals    Assessments completed prior to visit:  The following assessments were completed by patient for this visit:  None    ASSESSMENT: Current Emotional / Mental Status (status of significant symptoms):   Risk status (Self / Other harm or suicidal ideation)   Patient denies current fears or concerns for personal safety.   Patient denies current or recent suicidal ideation or behaviors.   Patient denies current or recent homicidal ideation or behaviors.   Patient denies current or recent self injurious behavior or ideation.   Patient denies other safety concerns.   Patient reports there has been no change in risk factors since their last session.     Patient reports there has been no change in protective factors since their last session.     Recommended that patient call 911 or go to the local ED should there be a change in any of these risk factors     Appearance:   Appropriate    Eye Contact:   Good    Psychomotor Behavior: Normal    Attitude:   Cooperative  Interested Pleasant    Orientation:   All   Speech    Rate / Production: Normal     Volume:  Normal    Mood:    Sad Stable   Affect:    Mood Congruent  Tearful-minimal   Thought Content:  Clear    Thought Form:  Coherent  Goal Directed  Logical    Insight:    Good      Medication Review:   No changes to current psychiatric medication(s)   Vyvanse   Buspar-client is not taking daily    Estrogen  patch     Medication Compliance:   Yes     Changes in Health Issues:   None reported     Chemical Use Review:   Substance Use: no use     Tobacco Use: no use    Diagnosis:  Generalized Anxiety Disorder   Other Specified Trauma and Stress Related Disorder    Collateral Reports Completed:   Not Applicable    PLAN: (Patient Tasks / Therapist Tasks / Other)  EMDR:  Plan for next session to have client keep her eyes open.   Engage in early trauma protocol  Client will consider therapy goals.    Magali Montilla, [facility] 12/17/2024    ______________________________________________________________________    Individual Treatment Plan    Patient's Name: Kiesha Mcguire  YOB: 1970    Date of Creation: 5/11/2022  Date Treatment Plan Last Reviewed/Revised: 9/20/2024    DSM5 Diagnoses:    Generalized Anxiety Disorder  Other Specified Trauma and Stress Related Disorder  Unspecified Depressive Disorder     Psychosocial / Contextual Factors: stressors related to parenting  PROMIS (reviewed every 90 days):    PROMIS 10-Global Health (only subscores and total score):       2/8/2024    10:13 AM 5/23/2024    10:26 AM 6/27/2024    10:08 AM 7/10/2024    12:04 PM 8/8/2024    12:27 PM 8/21/2024    10:36 AM 12/10/2024    10:27 AM   PROMIS-10 Scores Only   Global Mental Health Score 12 13 14 13 13 14 13    Global Physical Health Score 16 15 15 15 14 15 15    PROMIS TOTAL - SUBSCORES 28 28 29 28 27 29 28        Patient-reported        Referral / Collaboration:  Referral to another professional/service is not indicated at this time..    Anticipated number of session for this episode of care: will review in 90 days  Anticipation frequency of session: Every other week  Anticipated Duration of each session: 38-52 minutes  Treatment plan will be reviewed in 90 days or when goals have been changed.       MeasurableTreatment Goal(s) related to diagnosis / functional impairment(s)  Goal 1: Patient will sustain attention and  "concentration for consistently longer periods of time.  Patient will meet goals set for completing tasks 80% of the time.   Client's Goal:  I will know I've met my goal when my space isn't so chaotic, meeting deadlines around bills and work, when I am not always playing catch-up, completing tasks.      Objective #A (Patient Action)    Patient will learn and implement organization and planning skills.  Status: New - Date: 5/11/2022 , continued date: 9/8/2022, continued date: 12/21/2022, completed date: 3/29/2023    Intervention(s)  Therapist will teach the client organization and planning skills.  Therapist will address any potential barriers to using skills.    Objective #B  Patient will  identify, challenge, and change self-talk that contributes to maladaptive feelings and actions .  Status: New - Date: 5/11/2022 , Continued date: 9/8/2022, continued date: 12/21/2022, continued date: 3/29/2023, continued date: 7/28/2023, continued date: 11/9/2023, continued date: 1/23/2024, continued date: 5/9/2024, continued date: 9/20/2024    Intervention(s)  Therapist will teach the CBT model, cognitive distortions, and cognitive restructuring techniques.      Goal 2: Patient will be able to recall the traumatic events without becoming overwhelmed with negative thoughts, feelings, or urges.   Client's Goal:  I will know I've met my goal when I do not cry every time I talk about it.\"    Objective #A (Patient Action)    Status: New - Date: 5/11/2022, continued date: 9/8/2022, continued date: 12/21/2022, continued date: 3/29/2023, continued date: 7/28/2023, continued date: 11/9/2023, continued date: 1/23/2024, continued date: 5/9/2024, continued date: 9/20/2024    Patient will describe the history of and nature of trauma symptoms    Intervention(s)  Therapist will assess the client's frequency, intensity, duration, and history of trauma symptoms and their impact on functioning.    Objective #B (Patient Action)  Status: New Date: " 5/11/2022, continued date: 9/8/2022, continued date: 12/21/2022, continued date: 3/29/2023, continued date: 7/28/2023, continued date: 11/9/2023, continued date: 1/23/2024, continued date: 5/9/2024, continued date: 9/20/2024    Patient will cooperate with eye movement desensitization and reprocessing (EMDR) to reduce emotional reaction to traumatic event(s)    Intervention(s)  Therapist will utilize EMDR to reduce the client's emotional reactivity to the traumatic event(s).    Objective #C (Patient Action)  Status: New Date: 5/11/2022, continued date: 9/8/2022, continued date: 12/21/2022, continued date: 3/29/2023, continued date: 7/28/2023, continued date: 11/9/2023, continued date: 1/23/2024, continued date: 5/9/2024, continued date: 9/20/2024    Patient will learn and implement calming and coping strategies to manage trauma symptoms.    Intervention(s)  Therapist will teach grounding techniques, distress tolerance, and emotion regulation techniques.    Goal 3: Patient's anxiety symptoms will remit as evidenced by a decrease in GAD7 scores, where symptoms occur fewer than half the days for a minimum of four weeks.    Objective #A (Patient Action)    Patient will identify 3 symptoms of anxiety.  Status: New - Date: 9/20/2024    Intervention(s)  Therapist will provide psychoeducation on anxiety and engage client in identifying symptoms in appointments.    Objective #B  Patient will use a minimum of 3 strategies to manage anxiety symptoms  Status: New - Date: 9/20/2024    Intervention(s)  Therapist will teach strategies such as grounding techniques, thought stopping, and use of distraction    Objective #C  Patient will use cognitive strategies to manage thoughts/fears that contribute to anxiety symptoms.  Status: New - Date:     Intervention(s)  Therapist will teach the CBT (Cognitive Behavioral Therapy) model, including cognitive distortions and cognitive restructuring techniques.       Patient has reviewed and  agreed to the above plan.      Magali Montilla, [facility]  May 11, 2022  Magali Montilla, LICSW  9/8/2022  Magali Montilla, LICSW  12/21/2022  Magali Montilla, LICSW  3/29/2023  Magali Montilla, LICSW  7/28/2023  Magali Montilla, LICSW  11/9/2023  Magali Montilla, LICSW  1/23/2024  Magali Montilla, LICSW  5/9/2024  Magali Montilla, LICSW  9/20/2024

## 2024-12-19 ENCOUNTER — TRANSFERRED RECORDS (OUTPATIENT)
Dept: HEALTH INFORMATION MANAGEMENT | Facility: CLINIC | Age: 54
End: 2024-12-19

## 2024-12-24 ENCOUNTER — VIRTUAL VISIT (OUTPATIENT)
Dept: PSYCHOLOGY | Facility: CLINIC | Age: 54
End: 2024-12-24
Payer: COMMERCIAL

## 2024-12-24 DIAGNOSIS — F43.89 REACTION TO CHRONIC STRESS: ICD-10-CM

## 2024-12-24 DIAGNOSIS — F41.1 GENERALIZED ANXIETY DISORDER: Primary | ICD-10-CM

## 2024-12-24 PROCEDURE — 90834 PSYTX W PT 45 MINUTES: CPT | Mod: 95 | Performed by: SOCIAL WORKER

## 2024-12-24 NOTE — PROGRESS NOTES
[facility]                                     Progress Note    Patient Name: Kiesha Mcguire  Date: 12/24/2024       Service Type: Individual      Session Start Time: 10:02 AM Session End Time: 10:41 AM     Session Length: 38-52 minutes    Session #: 75 with this provider    Attendees: Client attended alone    Service Modality:  Video Visit:      Provider verified identity through the following two step process.  Patient provided:  Patient is known previously to provider    Telemedicine Visit: The patient's condition can be safely assessed and treated via synchronous audio and visual telemedicine encounter.      Reason for Telemedicine Visit: Patient convenience (e.g. access to timely appointments / distance to available provider) and Services only offered telehealth    Originating Site (Patient Location): Patient's home    Distant Site (Provider Location): Provider Remote Setting- Home Office/Off-site    Consent:  The patient/guardian has verbally consented to: the potential risks and benefits of telemedicine (video visit) versus in person care; bill my insurance or make self-payment for services provided; and responsibility for payment of non-covered services.     Patient would like the video invitation sent by:  [employer]    Mode of Communication:  Video    As the provider I attest to compliance with applicable laws and regulations related to telemedicine.    DATA  Interactive Complexity: No   Crisis: No      Progress Since Last Session (Related to Symptoms / Goals / Homework):  Symptoms:  anxiety about trip    Homework: Achieved / completed to satisfaction     Episode of Care Goals: Satisfactory progress - ACTION (Actively working towards change); Intervened by reinforcing change plan / affirming steps taken    There has been demonstrated improvement in functioning while patient has been engaged in psychotherapy/psychological service- if withdrawn the patient would deteriorate and/or  relapse.       Current / Ongoing Stressors and Concerns:   Difficulty trusting others and being vulnerable  Limited close relationships  Stressors related to parenting   Work related stressors  Upcoming trip     Treatment Objective(s) Addressed in This Session:   identify 4 fears / thoughts that contribute to feeling anxious  Maintain behavior changes     Intervention:   Engaged in active listening   Engaged client in reflection of progress    Assessments completed prior to visit:  The following assessments were completed by patient for this visit:  None    ASSESSMENT: Current Emotional / Mental Status (status of significant symptoms):   Risk status (Self / Other harm or suicidal ideation)   Patient denies current fears or concerns for personal safety.   Patient denies current or recent suicidal ideation or behaviors.   Patient denies current or recent homicidal ideation or behaviors.   Patient denies current or recent self injurious behavior or ideation.   Patient denies other safety concerns.   Patient reports there has been no change in risk factors since their last session.     Patient reports there has been no change in protective factors since their last session.     Recommended that patient call 911 or go to the local ED should there be a change in any of these risk factors     Appearance:   Appropriate    Eye Contact:   Good    Psychomotor Behavior: Normal    Attitude:   Cooperative  Interested Pleasant    Orientation:   All   Speech    Rate / Production: Normal     Volume:  Normal    Mood:    Anxious    Affect:    Mood Congruent    Thought Content:  Clear    Thought Form:  Coherent  Goal Directed  Logical    Insight:    Good      Medication Review:   No changes to current psychiatric medication(s)   Vyvanse   Buspar-client is not taking daily    Estrogen patch     Medication Compliance:   Yes     Changes in Health Issues:   None reported     Chemical Use Review:   Substance Use: no use     Tobacco Use: no  use    Diagnosis:  Generalized Anxiety Disorder   Other Specified Trauma and Stress Related Disorder    Collateral Reports Completed:   Not Applicable    PLAN: (Patient Tasks / Therapist Tasks / Other)  EMDR:  Plan for next session to have client keep her eyes open.   Engage in early trauma protocol  Client shared goals to continue to engage in completion of tasks around her home.    Magalimartin Montilla, [facility] 12/24/2024    ______________________________________________________________________    Individual Treatment Plan    Patient's Name: Kiesha Mcguire  YOB: 1970    Date of Creation: 5/11/2022  Date Treatment Plan Last Reviewed/Revised: 9/20/2024    DSM5 Diagnoses:    Generalized Anxiety Disorder  Other Specified Trauma and Stress Related Disorder  Unspecified Depressive Disorder     Psychosocial / Contextual Factors: stressors related to parenting  PROMIS (reviewed every 90 days):    PROMIS 10-Global Health (only subscores and total score):       5/23/2024    10:26 AM 6/27/2024    10:08 AM 7/10/2024    12:04 PM 8/8/2024    12:27 PM 8/21/2024    10:36 AM 12/10/2024    10:27 AM 12/23/2024     3:04 PM   PROMIS-10 Scores Only   Global Mental Health Score 13 14 13 13 14 13  13    Global Physical Health Score 15 15 15 14 15 15  15    PROMIS TOTAL - SUBSCORES 28 29 28 27 29 28  28        Patient-reported        Referral / Collaboration:  Referral to another professional/service is not indicated at this time..    Anticipated number of session for this episode of care: will review in 90 days  Anticipation frequency of session: Every other week  Anticipated Duration of each session: 38-52 minutes  Treatment plan will be reviewed in 90 days or when goals have been changed.       MeasurableTreatment Goal(s) related to diagnosis / functional impairment(s)  Goal 1: Patient will sustain attention and concentration for consistently longer periods of time.  Patient will meet goals set for completing tasks 80%  "of the time.   Client's Goal:  I will know I've met my goal when my space isn't so chaotic, meeting deadlines around bills and work, when I am not always playing catch-up, completing tasks.      Objective #A (Patient Action)    Patient will learn and implement organization and planning skills.  Status: New - Date: 5/11/2022 , continued date: 9/8/2022, continued date: 12/21/2022, completed date: 3/29/2023    Intervention(s)  Therapist will teach the client organization and planning skills.  Therapist will address any potential barriers to using skills.    Objective #B  Patient will  identify, challenge, and change self-talk that contributes to maladaptive feelings and actions .  Status: New - Date: 5/11/2022 , Continued date: 9/8/2022, continued date: 12/21/2022, continued date: 3/29/2023, continued date: 7/28/2023, continued date: 11/9/2023, continued date: 1/23/2024, continued date: 5/9/2024, continued date: 9/20/2024    Intervention(s)  Therapist will teach the CBT model, cognitive distortions, and cognitive restructuring techniques.      Goal 2: Patient will be able to recall the traumatic events without becoming overwhelmed with negative thoughts, feelings, or urges.   Client's Goal:  I will know I've met my goal when I do not cry every time I talk about it.\"    Objective #A (Patient Action)    Status: New - Date: 5/11/2022, continued date: 9/8/2022, continued date: 12/21/2022, continued date: 3/29/2023, continued date: 7/28/2023, continued date: 11/9/2023, continued date: 1/23/2024, continued date: 5/9/2024, continued date: 9/20/2024    Patient will describe the history of and nature of trauma symptoms    Intervention(s)  Therapist will assess the client's frequency, intensity, duration, and history of trauma symptoms and their impact on functioning.    Objective #B (Patient Action)  Status: New Date: 5/11/2022, continued date: 9/8/2022, continued date: 12/21/2022, continued date: 3/29/2023, continued date: " 7/28/2023, continued date: 11/9/2023, continued date: 1/23/2024, continued date: 5/9/2024, continued date: 9/20/2024    Patient will cooperate with eye movement desensitization and reprocessing (EMDR) to reduce emotional reaction to traumatic event(s)    Intervention(s)  Therapist will utilize EMDR to reduce the client's emotional reactivity to the traumatic event(s).    Objective #C (Patient Action)  Status: New Date: 5/11/2022, continued date: 9/8/2022, continued date: 12/21/2022, continued date: 3/29/2023, continued date: 7/28/2023, continued date: 11/9/2023, continued date: 1/23/2024, continued date: 5/9/2024, continued date: 9/20/2024    Patient will learn and implement calming and coping strategies to manage trauma symptoms.    Intervention(s)  Therapist will teach grounding techniques, distress tolerance, and emotion regulation techniques.    Goal 3: Patient's anxiety symptoms will remit as evidenced by a decrease in GAD7 scores, where symptoms occur fewer than half the days for a minimum of four weeks.    Objective #A (Patient Action)    Patient will identify 3 symptoms of anxiety.  Status: New - Date: 9/20/2024    Intervention(s)  Therapist will provide psychoeducation on anxiety and engage client in identifying symptoms in appointments.    Objective #B  Patient will use a minimum of 3 strategies to manage anxiety symptoms  Status: New - Date: 9/20/2024    Intervention(s)  Therapist will teach strategies such as grounding techniques, thought stopping, and use of distraction    Objective #C  Patient will use cognitive strategies to manage thoughts/fears that contribute to anxiety symptoms.  Status: New - Date:     Intervention(s)  Therapist will teach the CBT (Cognitive Behavioral Therapy) model, including cognitive distortions and cognitive restructuring techniques.       Patient has reviewed and agreed to the above plan.      ROYCE Crespo  May 11, 2022  Magali Montilla, ROY[facility]  9/8/2022  Magali  Rold[place], [facility]  12/21/2022  [facility]  3/29/2023  [facility], [facility]  7/28/2023  [facility], [facility]  11/9/2023  [facility], [facility]  1/23/2024  [facility], [facility]  5/9/2024  [facility], [facility]  9/20/2024

## 2024-12-31 ENCOUNTER — VIRTUAL VISIT (OUTPATIENT)
Dept: PSYCHOLOGY | Facility: CLINIC | Age: 54
End: 2024-12-31
Payer: COMMERCIAL

## 2024-12-31 DIAGNOSIS — F41.1 GENERALIZED ANXIETY DISORDER: Primary | ICD-10-CM

## 2024-12-31 DIAGNOSIS — F43.89 REACTION TO CHRONIC STRESS: ICD-10-CM

## 2024-12-31 PROCEDURE — 90834 PSYTX W PT 45 MINUTES: CPT | Mod: 95 | Performed by: SOCIAL WORKER

## 2024-12-31 NOTE — PROGRESS NOTES
[facility]                                     Progress Note    Patient Name: Kiesha Mcguire  Date: 12/31/2024       Service Type: Individual      Session Start Time: 11:33 AM Session End Time: 12: 15 PM     Session Length: 38-52 minutes    Session #: 76 with this provider    Attendees: Client attended alone    Service Modality:  Video Visit:      Provider verified identity through the following two step process.  Patient provided:  Patient is known previously to provider    Telemedicine Visit: The patient's condition can be safely assessed and treated via synchronous audio and visual telemedicine encounter.      Reason for Telemedicine Visit: Patient convenience (e.g. access to timely appointments / distance to available provider) and Services only offered telehealth    Originating Site (Patient Location): Patient's home    Distant Site (Provider Location): Provider Remote Setting- Home Office/Off-site    Consent:  The patient/guardian has verbally consented to: the potential risks and benefits of telemedicine (video visit) versus in person care; bill my insurance or make self-payment for services provided; and responsibility for payment of non-covered services.     Patient would like the video invitation sent by:  [facility]    Mode of Communication:  Video    As the provider I attest to compliance with applicable laws and regulations related to telemedicine.    DATA  Interactive Complexity: No   Crisis: No      Progress Since Last Session (Related to Symptoms / Goals / Homework):  Symptoms:  sad    Homework: Achieved / completed to satisfaction     Episode of Care Goals: Satisfactory progress - ACTION (Actively working towards change); Intervened by reinforcing change plan / affirming steps taken    There has been demonstrated improvement in functioning while patient has been engaged in psychotherapy/psychological service- if withdrawn the patient would deteriorate and/or relapse.        Current / Ongoing Stressors and Concerns:   Difficulty trusting others and being vulnerable  Limited close relationships  Stressors related to parenting   Work related stressors  Recent trip     Treatment Objective(s) Addressed in This Session:   identify 1 fears / thoughts that contribute to feeling anxious  Decrease frequency and intensity of feeling down, depressed, hopeless   Identify negative cognitions     Intervention:   Offered parenting support   Engaged in active listening    Assessments completed prior to visit:  The following assessments were completed by patient for this visit:  None    ASSESSMENT: Current Emotional / Mental Status (status of significant symptoms):   Risk status (Self / Other harm or suicidal ideation)   Patient denies current fears or concerns for personal safety.   Patient denies current or recent suicidal ideation or behaviors.   Patient denies current or recent homicidal ideation or behaviors.   Patient denies current or recent self injurious behavior or ideation.   Patient denies other safety concerns.   Patient reports there has been no change in risk factors since their last session.     Patient reports there has been no change in protective factors since their last session.     Recommended that patient call 911 or go to the local ED should there be a change in any of these risk factors     Appearance:   Appropriate    Eye Contact:   Good    Psychomotor Behavior: Normal    Attitude:   Cooperative  Interested Pleasant    Orientation:   All   Speech    Rate / Production: Normal     Volume:  Normal    Mood:    Sad Anxious   Affect:    Mood Congruent Tearful   Thought Content:  Clear    Thought Form:  Coherent  Goal Directed  Logical    Insight:    Good      Medication Review:   No changes to current psychiatric medication(s)   Vyvanse   Buspar-client is not taking daily    Estrogen patch     Medication Compliance:   Yes     Changes in Health Issues:   None reported     Chemical  Use Review:   Substance Use: no use     Tobacco Use: no use    Diagnosis:  Generalized Anxiety Disorder   Other Specified Trauma and Stress Related Disorder    Collateral Reports Completed:   Not Applicable    PLAN: (Patient Tasks / Therapist Tasks / Other)  EMDR:  Plan for next session to have client keep her eyes open.   Engage in early trauma protocol  Client shared goals to increase engagement in areas of interest, make changes to meal time with family.    Magali Montilla, [facility] 12/31/2024    ______________________________________________________________________    Individual Treatment Plan    Patient's Name: Kiesha Mcguire  YOB: 1970    Date of Creation: 5/11/2022  Date Treatment Plan Last Reviewed/Revised: 9/20/2024    DSM5 Diagnoses:    Generalized Anxiety Disorder  Other Specified Trauma and Stress Related Disorder  Unspecified Depressive Disorder     Psychosocial / Contextual Factors: stressors related to parenting  PROMIS (reviewed every 90 days):    PROMIS 10-Global Health (only subscores and total score):       5/23/2024    10:26 AM 6/27/2024    10:08 AM 7/10/2024    12:04 PM 8/8/2024    12:27 PM 8/21/2024    10:36 AM 12/10/2024    10:27 AM 12/23/2024     3:04 PM   PROMIS-10 Scores Only   Global Mental Health Score 13 14 13 13 14 13  13    Global Physical Health Score 15 15 15 14 15 15  15    PROMIS TOTAL - SUBSCORES 28 29 28 27 29 28  28        Patient-reported        Referral / Collaboration:  Referral to another professional/service is not indicated at this time..    Anticipated number of session for this episode of care: will review in 90 days  Anticipation frequency of session: Every other week  Anticipated Duration of each session: 38-52 minutes  Treatment plan will be reviewed in 90 days or when goals have been changed.       MeasurableTreatment Goal(s) related to diagnosis / functional impairment(s)  Goal 1: Patient will sustain attention and concentration for consistently  "longer periods of time.  Patient will meet goals set for completing tasks 80% of the time.   Client's Goal:  I will know I've met my goal when my space isn't so chaotic, meeting deadlines around bills and work, when I am not always playing catch-up, completing tasks.      Objective #A (Patient Action)    Patient will learn and implement organization and planning skills.  Status: New - Date: 5/11/2022 , continued date: 9/8/2022, continued date: 12/21/2022, completed date: 3/29/2023    Intervention(s)  Therapist will teach the client organization and planning skills.  Therapist will address any potential barriers to using skills.    Objective #B  Patient will  identify, challenge, and change self-talk that contributes to maladaptive feelings and actions .  Status: New - Date: 5/11/2022 , Continued date: 9/8/2022, continued date: 12/21/2022, continued date: 3/29/2023, continued date: 7/28/2023, continued date: 11/9/2023, continued date: 1/23/2024, continued date: 5/9/2024, continued date: 9/20/2024    Intervention(s)  Therapist will teach the CBT model, cognitive distortions, and cognitive restructuring techniques.      Goal 2: Patient will be able to recall the traumatic events without becoming overwhelmed with negative thoughts, feelings, or urges.   Client's Goal:  I will know I've met my goal when I do not cry every time I talk about it.\"    Objective #A (Patient Action)    Status: New - Date: 5/11/2022, continued date: 9/8/2022, continued date: 12/21/2022, continued date: 3/29/2023, continued date: 7/28/2023, continued date: 11/9/2023, continued date: 1/23/2024, continued date: 5/9/2024, continued date: 9/20/2024    Patient will describe the history of and nature of trauma symptoms    Intervention(s)  Therapist will assess the client's frequency, intensity, duration, and history of trauma symptoms and their impact on functioning.    Objective #B (Patient Action)  Status: New Date: 5/11/2022, continued date: " 9/8/2022, continued date: 12/21/2022, continued date: 3/29/2023, continued date: 7/28/2023, continued date: 11/9/2023, continued date: 1/23/2024, continued date: 5/9/2024, continued date: 9/20/2024    Patient will cooperate with eye movement desensitization and reprocessing (EMDR) to reduce emotional reaction to traumatic event(s)    Intervention(s)  Therapist will utilize EMDR to reduce the client's emotional reactivity to the traumatic event(s).    Objective #C (Patient Action)  Status: New Date: 5/11/2022, continued date: 9/8/2022, continued date: 12/21/2022, continued date: 3/29/2023, continued date: 7/28/2023, continued date: 11/9/2023, continued date: 1/23/2024, continued date: 5/9/2024, continued date: 9/20/2024    Patient will learn and implement calming and coping strategies to manage trauma symptoms.    Intervention(s)  Therapist will teach grounding techniques, distress tolerance, and emotion regulation techniques.    Goal 3: Patient's anxiety symptoms will remit as evidenced by a decrease in GAD7 scores, where symptoms occur fewer than half the days for a minimum of four weeks.    Objective #A (Patient Action)    Patient will identify 3 symptoms of anxiety.  Status: New - Date: 9/20/2024    Intervention(s)  Therapist will provide psychoeducation on anxiety and engage client in identifying symptoms in appointments.    Objective #B  Patient will use a minimum of 3 strategies to manage anxiety symptoms  Status: New - Date: 9/20/2024    Intervention(s)  Therapist will teach strategies such as grounding techniques, thought stopping, and use of distraction    Objective #C  Patient will use cognitive strategies to manage thoughts/fears that contribute to anxiety symptoms.  Status: New - Date:     Intervention(s)  Therapist will teach the CBT (Cognitive Behavioral Therapy) model, including cognitive distortions and cognitive restructuring techniques.       Patient has reviewed and agreed to the above  plan.      Magali Montilla, [facility]  May 11, 2022  Magali Montilla, LICSW  9/8/2022  Magali Montilla, LICSW  12/21/2022  Magali Montilla, LICSW  3/29/2023  Magali Montilla, LICSW  7/28/2023  Magali Montilla, LICSW  11/9/2023  Magali Montilla, LICSW  1/23/2024  Magali Montilla, LICSW  5/9/2024  Magali Montilla, LICSW  9/20/2024

## 2025-01-14 ENCOUNTER — VIRTUAL VISIT (OUTPATIENT)
Dept: PSYCHOLOGY | Facility: CLINIC | Age: 55
End: 2025-01-14
Payer: COMMERCIAL

## 2025-01-14 DIAGNOSIS — F43.89 REACTION TO CHRONIC STRESS: ICD-10-CM

## 2025-01-14 DIAGNOSIS — F41.1 GENERALIZED ANXIETY DISORDER: Primary | ICD-10-CM

## 2025-01-14 PROCEDURE — 90834 PSYTX W PT 45 MINUTES: CPT | Mod: 95 | Performed by: SOCIAL WORKER

## 2025-01-14 NOTE — PROGRESS NOTES
[facility]                                     Progress Note    Patient Name: Kiesha Mcguire  Date: 1/14/2025       Service Type: Individual      Session Start Time: 10:34 AM Session End Time: 11:23 AM     Session Length: 38-52 minutes    Session #: 77 with this provider    Attendees: Client attended alone    Service Modality:  Video Visit:      Provider verified identity through the following two step process.  Patient provided:  Patient is known previously to provider    Telemedicine Visit: The patient's condition can be safely assessed and treated via synchronous audio and visual telemedicine encounter.      Reason for Telemedicine Visit: Patient convenience (e.g. access to timely appointments / distance to available provider) and Services only offered telehealth    Originating Site (Patient Location): Patient's home    Distant Site (Provider Location): Provider Remote Setting- Home Office/Off-site    Consent:  The patient/guardian has verbally consented to: the potential risks and benefits of telemedicine (video visit) versus in person care; bill my insurance or make self-payment for services provided; and responsibility for payment of non-covered services.     Patient would like the video invitation sent by:  [employer]    Mode of Communication:  Video    As the provider I attest to compliance with applicable laws and regulations related to telemedicine.    DATA  Interactive Complexity: No   Crisis: No      Progress Since Last Session (Related to Symptoms / Goals / Homework):  Symptoms:  no change since previous appointment    Homework: Achieved / completed to satisfaction     Episode of Care Goals: Satisfactory progress - ACTION (Actively working towards change); Intervened by reinforcing change plan / affirming steps taken    There has been demonstrated improvement in functioning while patient has been engaged in psychotherapy/psychological service- if withdrawn the patient would  deteriorate and/or relapse.       Current / Ongoing Stressors and Concerns:   Difficulty trusting others and being vulnerable  Limited close relationships  Stressors related to parenting   Work related stressors     Treatment Objective(s) Addressed in This Session:   identify 1 fears / thoughts that contribute to feeling anxious     Intervention:   Engaged in active listening   Offered parenting support    Assessments completed prior to visit:  The following assessments were completed by patient for this visit:  None    ASSESSMENT: Current Emotional / Mental Status (status of significant symptoms):   Risk status (Self / Other harm or suicidal ideation)   Patient denies current fears or concerns for personal safety.   Patient denies current or recent suicidal ideation or behaviors.   Patient denies current or recent homicidal ideation or behaviors.   Patient denies current or recent self injurious behavior or ideation.   Patient denies other safety concerns.   Patient reports there has been no change in risk factors since their last session.     Patient reports there has been no change in protective factors since their last session.     Recommended that patient call 911 or go to the local ED should there be a change in any of these risk factors     Appearance:   Appropriate    Eye Contact:   Good    Psychomotor Behavior: Normal    Attitude:   Cooperative  Interested Pleasant    Orientation:   All   Speech    Rate / Production: Normal     Volume:  Normal    Mood:    Sad -minimal, Anxious, Stable   Affect:    Mood Congruent Tearful-minimal   Thought Content:  Clear    Thought Form:  Coherent  Goal Directed  Logical    Insight:    Good      Medication Review:   No changes to current psychiatric medication(s)   Vyvanse   Buspar-client is not taking daily    Estrogen patch     Medication Compliance:   Yes     Changes in Health Issues:   None reported     Chemical Use Review:   Substance Use: no use     Tobacco Use: no  use    Diagnosis:  Generalized Anxiety Disorder   Other Specified Trauma and Stress Related Disorder    Collateral Reports Completed:   Not Applicable    PLAN: (Patient Tasks / Therapist Tasks / Other)  EMDR:  Plan for next session to have client keep her eyes open.   Engage in early trauma protocol  Client will maintain behavior changes.    Magali Montilla, LICSW 1/14/2025    ______________________________________________________________________    Individual Treatment Plan    Patient's Name: Kiesha Mcguire  YOB: 1970    Date of Creation: 5/11/2022  Date Treatment Plan Last Reviewed/Revised: 1/14/2025    DSM5 Diagnoses:    Generalized Anxiety Disorder  Other Specified Trauma and Stress Related Disorder    Psychosocial / Contextual Factors: stressors related to parenting  PROMIS (reviewed every 90 days):    PROMIS 10-Global Health (only subscores and total score):       5/23/2024    10:26 AM 6/27/2024    10:08 AM 7/10/2024    12:04 PM 8/8/2024    12:27 PM 8/21/2024    10:36 AM 12/10/2024    10:27 AM 12/23/2024     3:04 PM   PROMIS-10 Scores Only   Global Mental Health Score 13 14 13 13 14 13  13    Global Physical Health Score 15 15 15 14 15 15  15    PROMIS TOTAL - SUBSCORES 28 29 28 27 29 28  28        Patient-reported        Referral / Collaboration:  Referral to another professional/service is not indicated at this time..    Anticipated number of session for this episode of care: will review in 90 days  Anticipation frequency of session: Every other week  Anticipated Duration of each session: 38-52 minutes  Treatment plan will be reviewed in 90 days or when goals have been changed.       MeasurableTreatment Goal(s) related to diagnosis / functional impairment(s)  Goal 1: Patient will sustain attention and concentration for consistently longer periods of time.  Patient will meet goals set for completing tasks 80% of the time.   Client's Goal:  I will know I've met my goal when my space isn't  "so chaotic, meeting deadlines around bills and work, when I am not always playing catch-up, completing tasks.      Objective #A (Patient Action)    Patient will learn and implement organization and planning skills.  Status: New - Date: 5/11/2022 , continued date: 9/8/2022, continued date: 12/21/2022, completed date: 3/29/2023    Intervention(s)  Therapist will teach the client organization and planning skills.  Therapist will address any potential barriers to using skills.    Objective #B  Patient will  identify, challenge, and change self-talk that contributes to maladaptive feelings and actions .  Status: New - Date: 5/11/2022 , Continued date: 9/8/2022, continued date: 12/21/2022, continued date: 3/29/2023, continued date: 7/28/2023, continued date: 11/9/2023, continued date: 1/23/2024, continued date: 5/9/2024, continued date: 9/20/2024, continued date: 1/14/2025    Intervention(s)  Therapist will teach the CBT model, cognitive distortions, and cognitive restructuring techniques.      Goal 2: Patient will be able to recall the traumatic events without becoming overwhelmed with negative thoughts, feelings, or urges.   Client's Goal:  I will know I've met my goal when I do not cry every time I talk about it.\"    Objective #A (Patient Action)    Status: New - Date: 5/11/2022, continued date: 9/8/2022, continued date: 12/21/2022, continued date: 3/29/2023, continued date: 7/28/2023, continued date: 11/9/2023, continued date: 1/23/2024, continued date: 5/9/2024, continued date: 9/20/2024, deferred date: 1/14/2025    Patient will describe the history of and nature of trauma symptoms    Intervention(s)  Therapist will assess the client's frequency, intensity, duration, and history of trauma symptoms and their impact on functioning.    Objective #B (Patient Action)  Status: New Date: 5/11/2022, continued date: 9/8/2022, continued date: 12/21/2022, continued date: 3/29/2023, continued date: 7/28/2023, continued date: " 11/9/2023, continued date: 1/23/2024, continued date: 5/9/2024, continued date: 9/20/2024, deferred date: 1/14/2025    Patient will cooperate with eye movement desensitization and reprocessing (EMDR) to reduce emotional reaction to traumatic event(s)    Intervention(s)  Therapist will utilize EMDR to reduce the client's emotional reactivity to the traumatic event(s).    Objective #C (Patient Action)  Status: New Date: 5/11/2022, continued date: 9/8/2022, continued date: 12/21/2022, continued date: 3/29/2023, continued date: 7/28/2023, continued date: 11/9/2023, continued date: 1/23/2024, continued date: 5/9/2024, continued date: 9/20/2024, deferred date: 1/14/2025    Patient will learn and implement calming and coping strategies to manage trauma symptoms.    Intervention(s)  Therapist will teach grounding techniques, distress tolerance, and emotion regulation techniques.    Goal 3: Patient's anxiety symptoms will remit as evidenced by a decrease in GAD7 scores, where symptoms occur fewer than half the days for a minimum of four weeks.    Objective #A (Patient Action)    Patient will identify 3 symptoms of anxiety.  Status: New - Date: 9/20/2024, continued date: 1/14/2025    Intervention(s)  Therapist will provide psychoeducation on anxiety and engage client in identifying symptoms in appointments.    Objective #B  Patient will use a minimum of 3 strategies to manage anxiety symptoms  Status: New - Date: 9/20/2024, continued date: 1/14/2025    Intervention(s)  Therapist will teach strategies such as grounding techniques, thought stopping, and use of distraction    Objective #C  Patient will use cognitive strategies to manage thoughts/fears that contribute to anxiety symptoms.  Status: New - Date: 9/20/2024, continued date: 1/14/2025    Intervention(s)  Therapist will teach the CBT (Cognitive Behavioral Therapy) model, including cognitive distortions and cognitive restructuring techniques.       Patient has reviewed  and agreed to the above plan.      Magali Montilla, [facility]  May 11, 2022  Magali Montilla, LICSW  9/8/2022  Magali Montilla, LICSW  12/21/2022  Magali Montilla, LICSW  3/29/2023  Magali Montilla, LICSW  7/28/2023  Magali Montilla, LICSW  11/9/2023  Magali Montilla, LICSW  1/23/2024  Magali Montilla, LICSW  5/9/2024  Magali Montilla, LICSW  9/20/2024  Magali Montilla, LICSW  1/14/2025

## 2025-01-20 ENCOUNTER — HOSPITAL ENCOUNTER (OUTPATIENT)
Dept: MAMMOGRAPHY | Facility: CLINIC | Age: 55
Discharge: HOME | End: 2025-01-20
Attending: PHYSICIAN ASSISTANT
Payer: COMMERCIAL

## 2025-01-20 PROCEDURE — 76642 ULTRASOUND BREAST LIMITED: CPT | Mod: LT

## 2025-02-06 NOTE — PATIENT INSTRUCTIONS
Patient Education       Proper skin care from [facility]:    -Eliminate harsh soaps as they strip the natural oils from the skin, often resulting in dry itchy skin ( i.e. Dial, Zest, [place])  -Use mild soaps such as Cetaphil or Dove Sensitive Skin in the shower. You do not need to use soap on arms, legs, and trunk every time you shower unless visibly soiled.   -Avoid hot or cold showers.  -After showering, lightly dry off and apply moisturizing within 2-3 minutes. This will help trap moisture in the skin.   -Aggressive use of a moisturizer at least 1-2 times a day to the entire body (including -Vanicream, Cetaphil, Aquaphor or Cerave) and moisturize hands after every washing.  -We recommend using moisturizers that come in a tub that needs to be scooped out, not a pump. This has more of an oil base. It will hold moisture in your skin much better than a water base moisturizer. The above recommended are non-pore clogging.      Wear a sunscreen with at least SPF 30 on your face, ears, neck and V of the chest daily. Wear sunscreen on other areas of the body if those areas are exposed to the sun throughout the day. Sunscreens can contain physical and/or chemical blockers. Physical blockers are less likely to clog pores, these include zinc oxide and titanium dioxide. Reapply every two hour and after swimming.     Sunscreen examples: https://www.ewg.org/sunscreen/    UV radiation  UVA radiation remains constant throughout the day and throughout the year. It is a longer wavelength than UVB and therefore penetrates deeper into the skin leading to immediate and delayed tanning, photoaging, and skin cancer. 70-80% of UVA and UVB radiation occurs between the hours of 10am-2pm.  UVB radiation  UVB radiation causes the most harmful effects and is more significant during the summer months. However, snow and ice can reflect UVB radiation leading to skin damage during the winter months as well. UVB radiation is  responsible for tanning, burning, inflammation, delayed erythema (pinkness), pigmentation (brown spots), and skin cancer.     I recommend self monthly full body exams and yearly full body exams with a dermatology provider. If you develop a new or changing lesion please follow up for examination. Most skin cancers are pink and scaly or pink and pearly. However, we do see blue/brown/black skin cancers.  Consider the ABCDEs of melanoma when giving yourself your monthly full body exam ( don't forget the groin, buttocks, feet, toes, etc). A-asymmetry, B-borders, C-color, D-diameter, E-elevation or evolving. If you see any of these changes please follow up in clinic. If you cannot see your back I recommend purchasing a hand held mirror to use with a larger wall mirror.       Checking for Skin Cancer  You can find cancer early by checking your skin each month. There are 3 kinds of skin cancer. They are melanoma, basal cell carcinoma, and squamous cell carcinoma. Doing monthly skin checks is the best way to find new marks or skin changes. Follow the instructions below for checking your skin.   The ABCDEs of checking moles for melanoma   Check your moles or growths for signs of melanoma using ABCDE:   Asymmetry: the sides of the mole or growth don t match  Border: the edges are ragged, notched, or blurred  Color: the color within the mole or growth varies  Diameter: the mole or growth is larger than 6 mm (size of a pencil eraser)  Evolving: the size, shape, or color of the mole or growth is changing (evolving is not shown in the images below)    Checking for other types of skin cancer  Basal cell carcinoma or squamous cell carcinoma have symptoms such as:     A spot or mole that looks different from all other marks on your skin  Changes in how an area feels, such as itching, tenderness, or pain  Changes in the skin's surface, such as oozing, bleeding, or scaliness  A sore that does not heal  New swelling or redness beyond  the border of a mole    Who s at risk?  Anyone can get skin cancer. But you are at greater risk if you have:   Fair skin, light-colored hair, or light-colored eyes  Many moles or abnormal moles on your skin  A history of sunburns from sunlight or tanning beds  A family history of skin cancer  A history of exposure to radiation or chemicals  A weakened immune system  If you have had skin cancer in the past, you are at risk for recurring skin cancer.   How to check your skin  Do your monthly skin checkups in front of a full-length mirror. Check all parts of your body, including your:   Head (ears, face, neck, and scalp)  Torso (front, back, and sides)  Arms (tops, undersides, upper, and lower armpits)  Hands (palms, backs, and fingers, including under the nails)  Buttocks and genitals  Legs (front, back, and sides)  Feet (tops, soles, toes, including under the nails, and between toes)  If you have a lot of moles, take digital photos of them each month. Make sure to take photos both up close and from a distance. These can help you see if any moles change over time.   Most skin changes are not cancer. But if you see any changes in your skin, call your doctor right away. Only he or she can diagnose a problem. If you have skin cancer, seeing your doctor can be the first step toward getting the treatment that could save your life.   [employer] last reviewed this educational content on 4/1/2019 2000-2020 [employer]. [address] [place] [address], [place] [address]. All rights reserved. This information is not intended as a substitute for professional medical care. Always follow your healthcare professional's instructions.       When should I call my doctor?  If you are worsening or not improving, please, contact us or seek urgent care as noted below.     Who should I call with questions (adults)?  [facility] (adult and pediatric): [phone]  [facility] (adult): [phone]  [facility] ([place], [place], [place] and [place]) [phone]  For urgent needs outside of business hours call the [facility] at [phone] and ask for the dermatology resident on call to be paged  If this is a medical emergency and you are unable to reach an ER, Call 911      If you need a prescription refill, please contact your pharmacy. Refills are approved or denied by our Physicians during normal business hours, Monday through Fridays    Per office policy, refills will not be granted if you have not been seen within the past year (or sooner depending on your condition)

## 2025-02-12 ENCOUNTER — OFFICE VISIT (OUTPATIENT)
Dept: DERMATOLOGY | Facility: CLINIC | Age: 55
End: 2025-02-12
Payer: COMMERCIAL

## 2025-02-12 DIAGNOSIS — L82.1 SEBORRHEIC KERATOSIS: ICD-10-CM

## 2025-02-12 DIAGNOSIS — D22.9 MULTIPLE BENIGN NEVI: ICD-10-CM

## 2025-02-12 DIAGNOSIS — L66.12 FRONTAL FIBROSING ALOPECIA: Primary | ICD-10-CM

## 2025-02-12 DIAGNOSIS — L81.4 LENTIGO: ICD-10-CM

## 2025-02-12 DIAGNOSIS — D18.01 CHERRY ANGIOMA: ICD-10-CM

## 2025-02-12 PROCEDURE — G2211 COMPLEX E/M VISIT ADD ON: HCPCS | Performed by: PHYSICIAN ASSISTANT

## 2025-02-12 PROCEDURE — 99213 OFFICE O/P EST LOW 20 MIN: CPT | Performed by: PHYSICIAN ASSISTANT

## 2025-02-12 NOTE — LETTER
2/12/2025      Kiesha Mcgurie  [address] [facility] [address]      Dear Colleague,    Thank you for referring your patient, Kiesha Mcguire, to the [facility]. Please see a copy of my visit note below.    Kiesha Mcguire is a pleasant 54 year old year old female patient here today for skin check and recheck FFA. She notes that she taking Plaquenil. She notes hair loss has stabilized around hairline. No new or changing nevi. No painful or bleeding skin lesions.  Patient has no other skin complaints today.  Remainder of the HPI, Meds, PMH, Allergies, FH, and SH was reviewed in chart.    Pertinent Hx:  History of FFA  Past Medical History:   Diagnosis Date     Depressive disorder      Depressive disorder, not elsewhere classified     resolved     Herpes simplex without mention of complication     oral     IUD (intrauterine device) in place 08/15/2013    Mirena     Migraine headache with aura     very occass, sig aura, speech loss x1     Pure hypercholesterolemia     follows w BIPIN CHATMAN       Past Surgical History:   Procedure Laterality Date     COLONOSCOPY N/A 10/27/2021    Procedure: COLONOSCOPY, WITH POLYPECTOMY AND CLIP;  Surgeon: Og Gutierres MD;  Location: [facility] OR     COLONOSCOPY N/A 03/12/2024    Procedure: COLONOSCOPY, WITH POLYPECTOMY AND BIOPSY;  Surgeon: Emanuel Braden MD;  Location: [facility] OR     COLONOSCOPY WITH CO2 INSUFFLATION N/A 03/12/2024    Procedure: Colonoscopy with CO2 insufflation;  Surgeon: Emanuel Braden MD;  Location: [facility] OR     HC DILATION/CURETTAGE DIAG/THER NON OB  12/01/2006     LAPAROSCOPIC CHOLECYSTECTOMY N/A 12/24/2021    Procedure: CHOLECYSTECTOMY, LAPAROSCOPIC;  Surgeon: Denise Cast MD;  Location: [facility] OR     SOFT TISSUE SURGERY  6/2024        Family History   Problem Relation Age of Onset     Hypertension Mother      Hyperlipidemia Mother      Depression Mother      Squamous cell carcinoma Mother      Thyroid  Disease Mother         unclear dx     Hypertension Father      Cancer Father 65        neuro endrocine CA, neck     Hyperlipidemia Father      Aortic aneurysm Maternal Grandfather          of ascending aortic aneurysm at age 64     Aortic aneurysm Maternal Aunt          in her 40s from ascending aortic aneurysm     C.A.D. No family hx of      Cancer - colorectal No family hx of      Diabetes No family hx of      Cerebrovascular Disease No family hx of      Breast Cancer No family hx of      Prostate Cancer No family hx of        Social History     Socioeconomic History     Marital status:      Spouse name: Not on file     Number of children: 2     Years of education: 16     Highest education level: Not on file   Occupational History     Occupation:      Employer: [employer]     Comment: fulltime   Tobacco Use     Smoking status: Former     Current packs/day: 0.00     Average packs/day: 0.5 packs/day for 10.0 years (5.0 ttl pk-yrs)     Types: Cigarettes     Start date: 1984     Quit date: 1994     Years since quittin.6     Passive exposure: Past     Smokeless tobacco: Never   Vaping Use     Vaping status: Never Used   Substance and Sexual Activity     Alcohol use: Not Currently     Comment: very rare     Drug use: No     Sexual activity: Not Currently     Partners: Male     Birth control/protection: I.U.D.     Comment: Mirena   Other Topics Concern     Parent/sibling w/ CABG, MI or angioplasty before 65F 55M? No   Social History Narrative    How much exercise per week? 2 90 minute yoga sessions      How much calcium per day? Diet       How much caffeine per day? 0    How much vitamin D per day? Supplement     Do you/your family wear seatbelts?  Yes    Do you/your family use safety helmets? Yes    Do you/your family use sunscreen? Yes    Do you/your family keep firearms in the home? No    Do you/your family have a smoke detector(s)? Yes        Do you feel safe in your home?  Yes    Has anyone ever touched you in an unwanted manner? No     Explain Kiesha [facility] 7/16/18        [facility]ed [facility] 10-12-20             Social Drivers of Health     Financial Resource Strain: Low Risk  (1/4/2024)    Financial Resource Strain      Within the past 12 months, have you or your family members you live with been unable to get utilities (heat, electricity) when it was really needed?: No   Food Insecurity: Low Risk  (1/4/2024)    Food Insecurity      Within the past 12 months, did you worry that your food would run out before you got money to buy more?: No      Within the past 12 months, did the food you bought just not last and you didn t have money to get more?: No   Transportation Needs: Low Risk  (1/4/2024)    Transportation Needs      Within the past 12 months, has lack of transportation kept you from medical appointments, getting your medicines, non-medical meetings or appointments, work, or from getting things that you need?: No   Physical Activity: Insufficiently Active (10/12/2020)    Exercise Vital Sign      Days of Exercise per Week: 1 day      Minutes of Exercise per Session: 10 min   Stress: Stress Concern Present (10/12/2020)    [place] [employer] - Occupational Stress Questionnaire      Feeling of Stress : To some extent   Social Connections: Socially Isolated (10/12/2020)    Social Connection and Isolation Panel [NHANES]      Frequency of Communication with Friends and Family: Three times a week      Frequency of Social Gatherings with Friends and Family: Twice a week      Attends Catholic Services: Never      Active Member of Clubs or Organizations: No      Attends Club or Organization Meetings: Never      Marital Status:    Interpersonal Safety: Low Risk  (9/23/2024)    Interpersonal Safety      Do you feel physically and emotionally safe where you currently live?: Yes      Within the past 12 months, have you been hit, slapped, kicked or  otherwise physically hurt by someone?: No      Within the past 12 months, have you been humiliated or emotionally abused in other ways by your partner or ex-partner?: No   Housing Stability: Low Risk  (1/4/2024)    Housing Stability      Do you have housing? : Yes      Are you worried about losing your housing?: No       Outpatient Encounter Medications as of 2/12/2025   Medication Sig Dispense Refill     Acetaminophen (TYLENOL PO)        benzonatate (TESSALON) 200 MG capsule Take 1 capsule (200 mg) by mouth 3 times daily as needed for cough. 10 capsule 1     Biotin 10 MG TABS tablet Take 10,000 mcg by mouth daily       busPIRone (BUSPAR) 10 MG tablet Take 1 tablet (10 mg) by mouth 2 times daily 60 tablet 2     CALCIUM-VITAMIN D-VITAMIN K PO        Collagen Hydrolysate POWD        Cyanocobalamin (VITAMIN B 12 PO) Take by mouth daily        cyclobenzaprine (FLEXERIL) 10 MG tablet Take 0.5 tablets (5 mg) by mouth 3 times daily as needed for muscle spasms 30 tablet 0     estradiol (VAGIFEM) 10 MCG TABS vaginal tablet Place 1 tablet (10 mcg) vaginally twice a week 24 tablet 3     estradiol (VIVELLE-DOT) 0.025 MG/24HR bi-weekly patch Place 1 patch onto the skin twice a week. 24 patch 2     hydroxychloroquine (PLAQUENIL) 200 MG tablet Take 1 tablet (200 mg) by mouth 2 times daily 180 tablet 3     ibuprofen (ADVIL/MOTRIN) 200 MG capsule Take 200 mg by mouth daily. 2 tablets every 4 hours       levonorgestrel (MIRENA) 52 MG (20 mcg/day) IUD by Intrauterine route once       [START ON 2/20/2025] lisdexamfetamine (VYVANSE) 10 MG capsule Take 1 capsule (10 mg) by mouth every morning. 30 capsule 0     lisdexamfetamine (VYVANSE) 10 MG capsule Take 1 capsule (10 mg) by mouth every morning. 30 capsule 0     LORazepam (ATIVAN) 0.5 MG tablet Take 1 tablet (0.5 mg) by mouth every 6 hours as needed for anxiety 30 tablet 0     Lysine 500 MG TABS Take 1,000 mg by mouth daily        magnesium citrate solution Takes 1 tsp twice a day, 150  mg daily       NONFORMULARY 1 capsule daily carmen       NONFORMULARY 2 tablets daily jozef       UNABLE TO FIND Take by mouth daily MEDICATION NAME: Folinic Acid       UNABLE TO FIND daily Ashwaganda - patient reported       UNABLE TO FIND daily Rhodiola- patient reported       valACYclovir (VALTREX) 1000 mg tablet Take 2 tablets (2,000 mg) by mouth 2 times daily For 2 days as needed for cold sores 12 tablet 11     valACYclovir (VALTREX) 500 MG tablet Take 1 tablet (500 mg) by mouth daily 90 tablet 1     No facility-administered encounter medications on file as of 2/12/2025.             O:   NAD, WDWN, Alert & Oriented, Mood & Affect wnl, Vitals stable   Here today alone   There were no vitals taken for this visit.   General appearance normal   Vitals stable   Alert, oriented and in no acute distress    Stuck on papules and brown macules on trunk and ext   Red papules on trunk  Brown papules and macules with regular pigment network and borders on torso and extremities   Patches of alopecia on frontal/temporal side of scalp, hair loss stabilized      The remainder of skin exam is normal       Eyes: Conjunctivae/lids:Normal     ENT: Lips: normal    MSK:Normal    Cardiovascular: peripheral edema none    Pulm: Breathing Normal    Neuro/Psych: Orientation:Alert and Orientedx3 ; Mood/Affect:normal     A/P:  FFA- stable.   Continue plaquenil 200 mg bid .   Recommend to start rogaine.   Check cbc and cmp.   Recheck in 6 months, if still stable can try to decrease to plaquenil once daily and try to see if we can discontinue without flaring.   2.  Seborrheic keratosis, lentigo, angioma, benign nevi   It was a pleasure speaking to Kiesha Mcguire today.  BENIGN LESIONS DISCUSSED WITH PATIENT:  I discussed the specifics of tumor, prognosis, and genetics of benign lesions.  I explained that treatment of these lesions would be purely cosmetic and not medically neccessary.  I discussed with patient different removal  options including excision, cautery and /or laser.      Nature and genetics of benign skin lesions dicussed with patient.  Signs and Symptoms of skin cancer discussed with patient.  ABCDEs of melanoma reviewed with patient.  Patient encouraged to perform monthly skin exams.  UV precautions reviewed with patient.  Risks of non-melanoma skin cancer discussed with patient   Return to clinic in 6 months.       Again, thank you for allowing me to participate in the care of your patient.        Sincerely,        Rosemary Henao PA-C    Electronically signed

## 2025-02-12 NOTE — PROGRESS NOTES
Kiesha Mcguire is a pleasant 54 year old year old female patient here today for skin check and recheck FFA. She notes that she taking Plaquenil. She notes hair loss has stabilized around hairline. No new or changing nevi. No painful or bleeding skin lesions.  Patient has no other skin complaints today.  Remainder of the HPI, Meds, PMH, Allergies, FH, and SH was reviewed in chart.    Pertinent Hx:  History of FFA  Past Medical History:   Diagnosis Date    Depressive disorder     Depressive disorder, not elsewhere classified     resolved    Herpes simplex without mention of complication     oral    IUD (intrauterine device) in place 08/15/2013    Mirena    Migraine headache with aura     very occass, sig aura, speech loss x1    Pure hypercholesterolemia     follows kate VOSS MD       Past Surgical History:   Procedure Laterality Date    COLONOSCOPY N/A 10/27/2021    Procedure: COLONOSCOPY, WITH POLYPECTOMY AND CLIP;  Surgeon: Og Gutierres MD;  Location: [facility] OR    COLONOSCOPY N/A 2024    Procedure: COLONOSCOPY, WITH POLYPECTOMY AND BIOPSY;  Surgeon: Emanuel Braden MD;  Location: [facility] OR    COLONOSCOPY WITH CO2 INSUFFLATION N/A 2024    Procedure: Colonoscopy with CO2 insufflation;  Surgeon: Emanuel Braden MD;  Location: [facility] OR    HC DILATION/CURETTAGE DIAG/THER NON OB  2006    LAPAROSCOPIC CHOLECYSTECTOMY N/A 2021    Procedure: CHOLECYSTECTOMY, LAPAROSCOPIC;  Surgeon: Denise Cast MD;  Location: [facility] OR    SOFT TISSUE SURGERY  2024        Family History   Problem Relation Age of Onset    Hypertension Mother     Hyperlipidemia Mother     Depression Mother     Squamous cell carcinoma Mother     Thyroid Disease Mother         unclear dx    Hypertension Father     Cancer Father 65        neuro endrocine CA, neck    Hyperlipidemia Father     Aortic aneurysm Maternal Grandfather          of ascending aortic aneurysm at age 64    Aortic aneurysm Maternal Aunt           in her 40s from ascending aortic aneurysm    C.A.D. No family hx of     Cancer - colorectal No family hx of     Diabetes No family hx of     Cerebrovascular Disease No family hx of     Breast Cancer No family hx of     Prostate Cancer No family hx of        Social History     Socioeconomic History    Marital status:      Spouse name: Not on file    Number of children: 2    Years of education: 16    Highest education level: Not on file   Occupational History    Occupation:      Employer: [employer]     Comment: fulltime   Tobacco Use    Smoking status: Former     Current packs/day: 0.00     Average packs/day: 0.5 packs/day for 10.0 years (5.0 ttl pk-yrs)     Types: Cigarettes     Start date: 1984     Quit date: 1994     Years since quittin.6     Passive exposure: Past    Smokeless tobacco: Never   Vaping Use    Vaping status: Never Used   Substance and Sexual Activity    Alcohol use: Not Currently     Comment: very rare    Drug use: No    Sexual activity: Not Currently     Partners: Male     Birth control/protection: I.U.D.     Comment: Mirena   Other Topics Concern    Parent/sibling w/ CABG, MI or angioplasty before 65F 55M? No   Social History Narrative    How much exercise per week? 2 90 minute yoga sessions      How much calcium per day? Diet       How much caffeine per day? 0    How much vitamin D per day? Supplement     Do you/your family wear seatbelts?  Yes    Do you/your family use safety helmets? Yes    Do you/your family use sunscreen? Yes    Do you/your family keep firearms in the home? No    Do you/your family have a smoke detector(s)? Yes        Do you feel safe in your home? Yes    Has anyone ever touched you in an unwanted manner? No     Explain Kiesha Fletcher [facility]        [facility]             Social Drivers of Health     Financial Resource Strain: Low Risk  (2024)    Financial Resource Strain     Within the past 12 months, have  you or your family members you live with been unable to get utilities (heat, electricity) when it was really needed?: No   Food Insecurity: Low Risk  (1/4/2024)    Food Insecurity     Within the past 12 months, did you worry that your food would run out before you got money to buy more?: No     Within the past 12 months, did the food you bought just not last and you didn t have money to get more?: No   Transportation Needs: Low Risk  (1/4/2024)    Transportation Needs     Within the past 12 months, has lack of transportation kept you from medical appointments, getting your medicines, non-medical meetings or appointments, work, or from getting things that you need?: No   Physical Activity: Insufficiently Active (10/12/2020)    Exercise Vital Sign     Days of Exercise per Week: 1 day     Minutes of Exercise per Session: 10 min   Stress: Stress Concern Present (10/12/2020)    [place] [employer] - Occupational Stress Questionnaire     Feeling of Stress : To some extent   Social Connections: Socially Isolated (10/12/2020)    Social Connection and Isolation Panel [NHANES]     Frequency of Communication with Friends and Family: Three times a week     Frequency of Social Gatherings with Friends and Family: Twice a week     Attends Confucianist Services: Never     Active Member of Clubs or Organizations: No     Attends Club or Organization Meetings: Never     Marital Status:    Interpersonal Safety: Low Risk  (9/23/2024)    Interpersonal Safety     Do you feel physically and emotionally safe where you currently live?: Yes     Within the past 12 months, have you been hit, slapped, kicked or otherwise physically hurt by someone?: No     Within the past 12 months, have you been humiliated or emotionally abused in other ways by your partner or ex-partner?: No   Housing Stability: Low Risk  (1/4/2024)    Housing Stability     Do you have housing? : Yes     Are you worried about losing your housing?: No        Outpatient Encounter Medications as of 2/12/2025   Medication Sig Dispense Refill    Acetaminophen (TYLENOL PO)       benzonatate (TESSALON) 200 MG capsule Take 1 capsule (200 mg) by mouth 3 times daily as needed for cough. 10 capsule 1    Biotin 10 MG TABS tablet Take 10,000 mcg by mouth daily      busPIRone (BUSPAR) 10 MG tablet Take 1 tablet (10 mg) by mouth 2 times daily 60 tablet 2    CALCIUM-VITAMIN D-VITAMIN K PO       Collagen Hydrolysate POWD       Cyanocobalamin (VITAMIN B 12 PO) Take by mouth daily       cyclobenzaprine (FLEXERIL) 10 MG tablet Take 0.5 tablets (5 mg) by mouth 3 times daily as needed for muscle spasms 30 tablet 0    estradiol (VAGIFEM) 10 MCG TABS vaginal tablet Place 1 tablet (10 mcg) vaginally twice a week 24 tablet 3    estradiol (VIVELLE-DOT) 0.025 MG/24HR bi-weekly patch Place 1 patch onto the skin twice a week. 24 patch 2    hydroxychloroquine (PLAQUENIL) 200 MG tablet Take 1 tablet (200 mg) by mouth 2 times daily 180 tablet 3    ibuprofen (ADVIL/MOTRIN) 200 MG capsule Take 200 mg by mouth daily. 2 tablets every 4 hours      levonorgestrel (MIRENA) 52 MG (20 mcg/day) IUD by Intrauterine route once      [START ON 2/20/2025] lisdexamfetamine (VYVANSE) 10 MG capsule Take 1 capsule (10 mg) by mouth every morning. 30 capsule 0    lisdexamfetamine (VYVANSE) 10 MG capsule Take 1 capsule (10 mg) by mouth every morning. 30 capsule 0    LORazepam (ATIVAN) 0.5 MG tablet Take 1 tablet (0.5 mg) by mouth every 6 hours as needed for anxiety 30 tablet 0    Lysine 500 MG TABS Take 1,000 mg by mouth daily       magnesium citrate solution Takes 1 tsp twice a day, 150 mg daily      NONFORMULARY 1 capsule daily carmen      NONFORMULARY 2 tablets daily seabuckthorn      UNABLE TO FIND Take by mouth daily MEDICATION NAME: Folinic Acid      UNABLE TO FIND daily Ashwaganda - patient reported      UNABLE TO FIND daily Rhodiola- patient reported      valACYclovir (VALTREX) 1000 mg tablet Take 2 tablets  (2,000 mg) by mouth 2 times daily For 2 days as needed for cold sores 12 tablet 11    valACYclovir (VALTREX) 500 MG tablet Take 1 tablet (500 mg) by mouth daily 90 tablet 1     No facility-administered encounter medications on file as of 2/12/2025.             O:   NAD, WDWN, Alert & Oriented, Mood & Affect wnl, Vitals stable   Here today alone   There were no vitals taken for this visit.   General appearance normal   Vitals stable   Alert, oriented and in no acute distress    Stuck on papules and brown macules on trunk and ext   Red papules on trunk  Brown papules and macules with regular pigment network and borders on torso and extremities   Patches of alopecia on frontal/temporal side of scalp, hair loss stabilized      The remainder of skin exam is normal       Eyes: Conjunctivae/lids:Normal     ENT: Lips: normal    MSK:Normal    Cardiovascular: peripheral edema none    Pulm: Breathing Normal    Neuro/Psych: Orientation:Alert and Orientedx3 ; Mood/Affect:normal     A/P:  FFA- stable.   Continue plaquenil 200 mg bid .   Recommend to start rogaine.   Check cbc and cmp.   Recheck in 6 months, if still stable can try to decrease to plaquenil once daily and try to see if we can discontinue without flaring.   2.  Seborrheic keratosis, lentigo, angioma, benign nevi   It was a pleasure speaking to Kiesha Mcguire today.  BENIGN LESIONS DISCUSSED WITH PATIENT:  I discussed the specifics of tumor, prognosis, and genetics of benign lesions.  I explained that treatment of these lesions would be purely cosmetic and not medically neccessary.  I discussed with patient different removal options including excision, cautery and /or laser.      Nature and genetics of benign skin lesions dicussed with patient.  Signs and Symptoms of skin cancer discussed with patient.  ABCDEs of melanoma reviewed with patient.  Patient encouraged to perform monthly skin exams.  UV precautions reviewed with patient.  Risks of non-melanoma skin  cancer discussed with patient   Return to clinic in 6 months.

## 2025-02-13 ENCOUNTER — VIRTUAL VISIT (OUTPATIENT)
Facility: CLINIC | Age: 55
End: 2025-02-13
Payer: COMMERCIAL

## 2025-02-13 DIAGNOSIS — F43.89 REACTION TO CHRONIC STRESS: ICD-10-CM

## 2025-02-13 DIAGNOSIS — F41.1 GENERALIZED ANXIETY DISORDER: Primary | ICD-10-CM

## 2025-02-13 NOTE — PROGRESS NOTES
[facility]                                     Progress Note    Patient Name: Kiesha Mcguire  Date: 2/13/2025       Service Type: Individual      Session Start Time: 12:33 PM Session End Time: 1:17 PM     Session Length: 38-52 minutes    Session #: 78 with this provider    Attendees: Client attended alone    Service Modality:  Video Visit:      Provider verified identity through the following two step process.  Patient provided:  Patient is known previously to provider    Telemedicine Visit: The patient's condition can be safely assessed and treated via synchronous audio and visual telemedicine encounter.      Reason for Telemedicine Visit: Patient convenience (e.g. access to timely appointments / distance to available provider) and Services only offered telehealth    Originating Site (Patient Location): Patient's home    Distant Site (Provider Location): Provider Remote Setting- Home Office/Off-site    Consent:  The patient/guardian has verbally consented to: the potential risks and benefits of telemedicine (video visit) versus in person care; bill my insurance or make self-payment for services provided; and responsibility for payment of non-covered services.     Patient would like the video invitation sent by:  [employer]    Mode of Communication:  Video    As the provider I attest to compliance with applicable laws and regulations related to telemedicine.    DATA  Interactive Complexity: No   Crisis: No      Progress Since Last Session (Related to Symptoms / Goals / Homework):  Symptoms:  anxiety symptoms    Homework: Achieved / completed to satisfaction     Episode of Care Goals: Satisfactory progress - ACTION (Actively working towards change); Intervened by reinforcing change plan / affirming steps taken    There has been demonstrated improvement in functioning while patient has been engaged in psychotherapy/psychological service- if withdrawn the patient would deteriorate and/or  relapse.       Current / Ongoing Stressors and Concerns:  Stressors related to parenting   Work related stressors     Treatment Objective(s) Addressed in This Session:   identify 1 fears / thoughts that contribute to feeling anxious  Use a minimum of 2 strategies to manage symptoms     Intervention:   Engaged in active listening; offered support with recent stressors    Assessments completed prior to visit:  The following assessments were completed by patient for this visit:  None    ASSESSMENT: Current Emotional / Mental Status (status of significant symptoms):   Risk status (Self / Other harm or suicidal ideation)   Patient denies current fears or concerns for personal safety.   Patient denies current or recent suicidal ideation or behaviors.   Patient denies current or recent homicidal ideation or behaviors.   Patient denies current or recent self injurious behavior or ideation.   Patient denies other safety concerns.   Patient reports there has been no change in risk factors since their last session.     Patient reports there has been no change in protective factors since their last session.     Recommended that patient call 911 or go to the local ED should there be a change in any of these risk factors     Appearance:   Appropriate    Eye Contact:   Good    Psychomotor Behavior: Normal    Attitude:   Cooperative  Interested Pleasant    Orientation:   All   Speech    Rate / Production: Normal     Volume:  Normal    Mood:    Sad -minimal, Anxious, Stable   Affect:    Mood Congruent Tearful-minimal   Thought Content:  Clear    Thought Form:  Coherent  Goal Directed  Logical    Insight:    Good      Medication Review:   No changes to current psychiatric medication(s)   Vyvanse   Buspar-client is not taking daily    Estrogen patch     Medication Compliance:   Yes     Changes in Health Issues:   None reported     Chemical Use Review:   Substance Use: no use     Tobacco Use: no use    Diagnosis:  Generalized Anxiety  Disorder   Other Specified Trauma and Stress Related Disorder    Collateral Reports Completed:   Not Applicable    PLAN: (Patient Tasks / Therapist Tasks / Other)  EMDR:  Plan for next session to have client keep her eyes open.   Engage in early trauma protocol  Client will maintain behavior changes.  Client shared goal to take one day at at time and continue to engage in areas of interest.  Client is considering talking with PCP about adding Wellbutrin    Most recent diagnostic assessment completed: 8/9/2024     Magali Montilla, [facility] 2/13/2025    ______________________________________________________________________    Individual Treatment Plan    Patient's Name: Kiesha Mcguire  YOB: 1970    Date of Creation: 5/11/2022  Date Treatment Plan Last Reviewed/Revised: 1/14/2025    DSM5 Diagnoses:    Generalized Anxiety Disorder  Other Specified Trauma and Stress Related Disorder    Psychosocial / Contextual Factors: stressors related to parenting  PROMIS (reviewed every 90 days):    PROMIS 10-Global Health (only subscores and total score):       5/23/2024    10:26 AM 6/27/2024    10:08 AM 7/10/2024    12:04 PM 8/8/2024    12:27 PM 8/21/2024    10:36 AM 12/10/2024    10:27 AM 12/23/2024     3:04 PM   PROMIS-10 Scores Only   Global Mental Health Score 13 14 13 13 14 13  13    Global Physical Health Score 15 15 15 14 15 15  15    PROMIS TOTAL - SUBSCORES 28 29 28 27 29 28  28        Patient-reported        Referral / Collaboration:  Referral to another professional/service is not indicated at this time..    Anticipated number of session for this episode of care: will review in 90 days  Anticipation frequency of session: Every other week  Anticipated Duration of each session: 38-52 minutes  Treatment plan will be reviewed in 90 days or when goals have been changed.       MeasurableTreatment Goal(s) related to diagnosis / functional impairment(s)  Goal 1: Patient will sustain attention and concentration  "for consistently longer periods of time.  Patient will meet goals set for completing tasks 80% of the time.   Client's Goal:  I will know I've met my goal when my space isn't so chaotic, meeting deadlines around bills and work, when I am not always playing catch-up, completing tasks.      Objective #A (Patient Action)    Patient will learn and implement organization and planning skills.  Status: New - Date: 5/11/2022 , continued date: 9/8/2022, continued date: 12/21/2022, completed date: 3/29/2023    Intervention(s)  Therapist will teach the client organization and planning skills.  Therapist will address any potential barriers to using skills.    Objective #B  Patient will  identify, challenge, and change self-talk that contributes to maladaptive feelings and actions .  Status: New - Date: 5/11/2022 , Continued date: 9/8/2022, continued date: 12/21/2022, continued date: 3/29/2023, continued date: 7/28/2023, continued date: 11/9/2023, continued date: 1/23/2024, continued date: 5/9/2024, continued date: 9/20/2024, continued date: 1/14/2025    Intervention(s)  Therapist will teach the CBT model, cognitive distortions, and cognitive restructuring techniques.      Goal 2: Patient will be able to recall the traumatic events without becoming overwhelmed with negative thoughts, feelings, or urges.   Client's Goal:  I will know I've met my goal when I do not cry every time I talk about it.\"    Objective #A (Patient Action)    Status: New - Date: 5/11/2022, continued date: 9/8/2022, continued date: 12/21/2022, continued date: 3/29/2023, continued date: 7/28/2023, continued date: 11/9/2023, continued date: 1/23/2024, continued date: 5/9/2024, continued date: 9/20/2024, deferred date: 1/14/2025    Patient will describe the history of and nature of trauma symptoms    Intervention(s)  Therapist will assess the client's frequency, intensity, duration, and history of trauma symptoms and their impact on functioning.    Objective #B " (Patient Action)  Status: New Date: 5/11/2022, continued date: 9/8/2022, continued date: 12/21/2022, continued date: 3/29/2023, continued date: 7/28/2023, continued date: 11/9/2023, continued date: 1/23/2024, continued date: 5/9/2024, continued date: 9/20/2024, deferred date: 1/14/2025    Patient will cooperate with eye movement desensitization and reprocessing (EMDR) to reduce emotional reaction to traumatic event(s)    Intervention(s)  Therapist will utilize EMDR to reduce the client's emotional reactivity to the traumatic event(s).    Objective #C (Patient Action)  Status: New Date: 5/11/2022, continued date: 9/8/2022, continued date: 12/21/2022, continued date: 3/29/2023, continued date: 7/28/2023, continued date: 11/9/2023, continued date: 1/23/2024, continued date: 5/9/2024, continued date: 9/20/2024, deferred date: 1/14/2025    Patient will learn and implement calming and coping strategies to manage trauma symptoms.    Intervention(s)  Therapist will teach grounding techniques, distress tolerance, and emotion regulation techniques.    Goal 3: Patient's anxiety symptoms will remit as evidenced by a decrease in GAD7 scores, where symptoms occur fewer than half the days for a minimum of four weeks.    Objective #A (Patient Action)    Patient will identify 3 symptoms of anxiety.  Status: New - Date: 9/20/2024, continued date: 1/14/2025    Intervention(s)  Therapist will provide psychoeducation on anxiety and engage client in identifying symptoms in appointments.    Objective #B  Patient will use a minimum of 3 strategies to manage anxiety symptoms  Status: New - Date: 9/20/2024, continued date: 1/14/2025    Intervention(s)  Therapist will teach strategies such as grounding techniques, thought stopping, and use of distraction    Objective #C  Patient will use cognitive strategies to manage thoughts/fears that contribute to anxiety symptoms.  Status: New - Date: 9/20/2024, continued date:  1/14/2025    Intervention(s)  Therapist will teach the CBT (Cognitive Behavioral Therapy) model, including cognitive distortions and cognitive restructuring techniques.       Patient has reviewed and agreed to the above plan.      Magali Montilla, [facility]  May 11, 2022  [facility], [facility]  9/8/2022  Magali Montilla, [facility]  12/21/2022  Magali Montilla, [facility]  3/29/2023  Magali Montilla, [facility]  7/28/2023  Magali Montilla, [facility]  11/9/2023  Magali Montilla, [facility]  1/23/2024  Magali Montilla, [facility]  5/9/2024  Magali Montilla, [facility]  9/20/2024  Magali Montilla, [facility]  1/14/2025

## 2025-02-25 NOTE — TELEPHONE ENCOUNTER
RECORDS RECEIVED FROM:    DATE RECEIVED:    NOTES STATUS DETAILS   OFFICE NOTE from referring provider  Internal Dr. Trejo 9-23-24   OFFICE NOTE from other cardiologists  N/A    RECORDS from hospital/ED N/A    MEDICATION LIST Internal    GENERAL CARDIO RECORDS   (ALL APPOINTMENT TYPES)     LABS (CBC,BMP,CMP, TSH) Internal    EKG (STRIPS & REPORTS) N/A    MONITORS (STRIPS & REPORTS) N/A    ECHOS (IMAGES AND REPORTS) N/A    STRESS TESTS (IMAGES AND REPORTS) N/A    cMRI (IMAGES AND REPORTS) N/A    Cardiac cath (IMAGES AND REPORTS) N/A    CT/CTA (IMAGES AND REPORTS) N/A    NEW POTS     NEUROLOGY OFFICE NOTES N/A    ICD/PACEMAKER IMPLANT No    CARDIOVERSION N/A    TILT TABLE STUDIES N/A

## 2025-02-27 ENCOUNTER — VIRTUAL VISIT (OUTPATIENT)
Facility: CLINIC | Age: 55
End: 2025-02-27
Payer: COMMERCIAL

## 2025-02-27 DIAGNOSIS — F43.89 REACTION TO CHRONIC STRESS: ICD-10-CM

## 2025-02-27 DIAGNOSIS — F41.1 GENERALIZED ANXIETY DISORDER: Primary | ICD-10-CM

## 2025-02-27 NOTE — PROGRESS NOTES
[facility]                                     Progress Note    Patient Name: Kiesha Mcguire  Date: 2/27/2025       Service Type: Individual      Session Start Time:  12:35 PM Session End Time: 1:17 PM     Session Length: 38-52 minutes    Session #: 79 with this provider    Attendees: Client attended alone    Service Modality:  Video Visit:      Provider verified identity through the following two step process.  Patient provided:  Patient is known previously to provider    Telemedicine Visit: The patient's condition can be safely assessed and treated via synchronous audio and visual telemedicine encounter.      Reason for Telemedicine Visit: Patient convenience (e.g. access to timely appointments / distance to available provider) and Services only offered telehealth    Originating Site (Patient Location): Patient's home    Distant Site (Provider Location): Provider Remote Setting- Home Office/Off-site    Consent:  The patient/guardian has verbally consented to: the potential risks and benefits of telemedicine (video visit) versus in person care; bill my insurance or make self-payment for services provided; and responsibility for payment of non-covered services.     Patient would like the video invitation sent by:  [employer]    Mode of Communication:  Video    As the provider I attest to compliance with applicable laws and regulations related to telemedicine.    DATA  Interactive Complexity: No   Crisis: No      Progress Since Last Session (Related to Symptoms / Goals / Homework):  Symptoms:  increase in crying last week, continued anxiety symptoms    Homework: Achieved / completed to satisfaction     Episode of Care Goals: Satisfactory progress - ACTION (Actively working towards change); Intervened by reinforcing change plan / affirming steps taken    There has been demonstrated improvement in functioning while patient has been engaged in psychotherapy/psychological service- if withdrawn  the patient would deteriorate and/or relapse.       Current / Ongoing Stressors and Concerns:  Stressors related to parenting   Work related stressors  Uncertainties and concern about the future     Treatment Objective(s) Addressed in This Session:   identify 2 fears / thoughts that contribute to feeling anxious  Use a minimum of 1 strategies to manage symptoms     Intervention:   Solution focused: exception questions   Engaged in active listening   Discussed referral options    Assessments completed prior to visit:  The following assessments were completed by patient for this visit:  None    ASSESSMENT: Current Emotional / Mental Status (status of significant symptoms):   Risk status (Self / Other harm or suicidal ideation)   Patient denies current fears or concerns for personal safety.   Patient denies current or recent suicidal ideation or behaviors.   Patient denies current or recent homicidal ideation or behaviors.   Patient denies current or recent self injurious behavior or ideation.   Patient denies other safety concerns.   Patient reports there has been no change in risk factors since their last session.     Patient reports there has been no change in protective factors since their last session.     Recommended that patient call 911 or go to the local ED should there be a change in any of these risk factors     Appearance:   Appropriate    Eye Contact:   Good    Psychomotor Behavior: Normal    Attitude:   Cooperative  Interested Pleasant    Orientation:   All   Speech    Rate / Production: Talkative Normal     Volume:  Normal    Mood:    Sad -minimal, Anxious   Affect:    Mood Congruent Tearful-minimal   Thought Content:  Clear    Thought Form:  Coherent  Goal Directed  Logical    Insight:    Good      Medication Review:   No changes to current psychiatric medication(s)   Vyvanse   Buspar-client is not taking daily    Lorazepam    Estrogen patch     Medication Compliance:   Yes     Changes in Health  Issues:   None reported     Chemical Use Review:   Substance Use: no use     Tobacco Use: no use    Diagnosis:  Generalized Anxiety Disorder   Other Specified Trauma and Stress Related Disorder    Collateral Reports Completed:   Not Applicable    PLAN: (Patient Tasks / Therapist Tasks / Other)  EMDR:  Plan for next session to have client keep her eyes open.   Engage in early trauma protocol  Client has an appointment with PCP.  Client shared goal to continue to engage in radical acceptance, prioritize to-do list, focus on areas within her control and consider what has helped her to manage symptoms in the past and identify ways to incorporate them.    Most recent diagnostic assessment completed: 8/9/2024     Magali Montilla, [facility] 2/27/2025    ______________________________________________________________________    Individual Treatment Plan    Patient's Name: Kiesha Mcguire  YOB: 1970    Date of Creation: 5/11/2022  Date Treatment Plan Last Reviewed/Revised: 1/14/2025    DSM5 Diagnoses:    Generalized Anxiety Disorder  Other Specified Trauma and Stress Related Disorder    Psychosocial / Contextual Factors: stressors related to parenting  PROMIS (reviewed every 90 days):    PROMIS 10-Global Health (only subscores and total score):       5/23/2024    10:26 AM 6/27/2024    10:08 AM 7/10/2024    12:04 PM 8/8/2024    12:27 PM 8/21/2024    10:36 AM 12/10/2024    10:27 AM 12/23/2024     3:04 PM   PROMIS-10 Scores Only   Global Mental Health Score 13 14 13 13 14 13  13    Global Physical Health Score 15 15 15 14 15 15  15    PROMIS TOTAL - SUBSCORES 28 29 28 27 29 28  28        Patient-reported        Referral / Collaboration:  Referral to another professional/service is not indicated at this time..    Anticipated number of session for this episode of care: will review in 90 days  Anticipation frequency of session: Every other week  Anticipated Duration of each session: 38-52 minutes  Treatment plan  "will be reviewed in 90 days or when goals have been changed.       MeasurableTreatment Goal(s) related to diagnosis / functional impairment(s)  Goal 1: Patient will sustain attention and concentration for consistently longer periods of time.  Patient will meet goals set for completing tasks 80% of the time.   Client's Goal:  I will know I've met my goal when my space isn't so chaotic, meeting deadlines around bills and work, when I am not always playing catch-up, completing tasks.      Objective #A (Patient Action)    Patient will learn and implement organization and planning skills.  Status: New - Date: 5/11/2022 , continued date: 9/8/2022, continued date: 12/21/2022, completed date: 3/29/2023    Intervention(s)  Therapist will teach the client organization and planning skills.  Therapist will address any potential barriers to using skills.    Objective #B  Patient will  identify, challenge, and change self-talk that contributes to maladaptive feelings and actions .  Status: New - Date: 5/11/2022 , Continued date: 9/8/2022, continued date: 12/21/2022, continued date: 3/29/2023, continued date: 7/28/2023, continued date: 11/9/2023, continued date: 1/23/2024, continued date: 5/9/2024, continued date: 9/20/2024, continued date: 1/14/2025    Intervention(s)  Therapist will teach the CBT model, cognitive distortions, and cognitive restructuring techniques.      Goal 2: Patient will be able to recall the traumatic events without becoming overwhelmed with negative thoughts, feelings, or urges.   Client's Goal:  I will know I've met my goal when I do not cry every time I talk about it.\"    Objective #A (Patient Action)    Status: New - Date: 5/11/2022, continued date: 9/8/2022, continued date: 12/21/2022, continued date: 3/29/2023, continued date: 7/28/2023, continued date: 11/9/2023, continued date: 1/23/2024, continued date: 5/9/2024, continued date: 9/20/2024, deferred date: 1/14/2025    Patient will describe the " history of and nature of trauma symptoms    Intervention(s)  Therapist will assess the client's frequency, intensity, duration, and history of trauma symptoms and their impact on functioning.    Objective #B (Patient Action)  Status: New Date: 5/11/2022, continued date: 9/8/2022, continued date: 12/21/2022, continued date: 3/29/2023, continued date: 7/28/2023, continued date: 11/9/2023, continued date: 1/23/2024, continued date: 5/9/2024, continued date: 9/20/2024, deferred date: 1/14/2025    Patient will cooperate with eye movement desensitization and reprocessing (EMDR) to reduce emotional reaction to traumatic event(s)    Intervention(s)  Therapist will utilize EMDR to reduce the client's emotional reactivity to the traumatic event(s).    Objective #C (Patient Action)  Status: New Date: 5/11/2022, continued date: 9/8/2022, continued date: 12/21/2022, continued date: 3/29/2023, continued date: 7/28/2023, continued date: 11/9/2023, continued date: 1/23/2024, continued date: 5/9/2024, continued date: 9/20/2024, deferred date: 1/14/2025    Patient will learn and implement calming and coping strategies to manage trauma symptoms.    Intervention(s)  Therapist will teach grounding techniques, distress tolerance, and emotion regulation techniques.    Goal 3: Patient's anxiety symptoms will remit as evidenced by a decrease in GAD7 scores, where symptoms occur fewer than half the days for a minimum of four weeks.    Objective #A (Patient Action)    Patient will identify 3 symptoms of anxiety.  Status: New - Date: 9/20/2024, continued date: 1/14/2025    Intervention(s)  Therapist will provide psychoeducation on anxiety and engage client in identifying symptoms in appointments.    Objective #B  Patient will use a minimum of 3 strategies to manage anxiety symptoms  Status: New - Date: 9/20/2024, continued date: 1/14/2025    Intervention(s)  Therapist will teach strategies such as grounding techniques, thought stopping, and  use of distraction    Objective #C  Patient will use cognitive strategies to manage thoughts/fears that contribute to anxiety symptoms.  Status: New - Date: 9/20/2024, continued date: 1/14/2025    Intervention(s)  Therapist will teach the CBT (Cognitive Behavioral Therapy) model, including cognitive distortions and cognitive restructuring techniques.       Patient has reviewed and agreed to the above plan.      Magali Montilla, [facility]  May 11, 2022  Magali Montilla, [facility]  9/8/2022  [facility], [facility]  12/21/2022  Magali Montilla, [facility]  3/29/2023  Magali Montilla, [facility]  7/28/2023  Magali Montilla, [facility]  11/9/2023  Magali Montilla, [facility]  1/23/2024  Magali Montilla, [facility]  5/9/2024  Magali Montilla, [facility]  9/20/2024  Magali Montilla, [facility]  1/14/2025

## 2025-03-03 ENCOUNTER — TELEPHONE (OUTPATIENT)
Dept: CARDIOLOGY | Facility: CLINIC | Age: 55
End: 2025-03-03
Payer: COMMERCIAL

## 2025-03-03 NOTE — TELEPHONE ENCOUNTER
Patient Contacted for the patient to call back and schedule the following:    Appointment type: NEW POTS  Provider: CAROLINA  Return date: TBD  Specialty phone number: [phone] OPT 1  Additional appointment(s) needed: N/A  Additonal Notes: Rescheduling 5/21 appt to next available. Spoke with pt and they were frustrated about having to wait another six months for an appointment they have waited six months for already. Tried to explain how the scheduling process works (appts schedule six months out only and that clinics get canceled by the provider when they know they are unable to come in) but pt disliked this response and [facility]. Please assist and advise for rescheduling - next available for these is Renee HENRY 3/3/2025

## 2025-03-04 NOTE — TELEPHONE ENCOUNTER
"Called to offer reschedule in August; patient believed that this appt was for the tilt table test. Explained that this was the initial appt to establish care with a provider; Dr. Burrows will determine if a tilt is needed during the visit. This is scheduled separately. Patient stated \"just cancel the appt\" and hung up the phone. Katelyn Méndez RN on 3/4/2025 at 3:17 PM    "

## 2025-03-05 ENCOUNTER — OFFICE VISIT (OUTPATIENT)
Dept: FAMILY MEDICINE | Facility: CLINIC | Age: 55
End: 2025-03-05
Payer: COMMERCIAL

## 2025-03-05 VITALS
DIASTOLIC BLOOD PRESSURE: 79 MMHG | BODY MASS INDEX: 23.9 KG/M2 | HEIGHT: 64 IN | TEMPERATURE: 98 F | SYSTOLIC BLOOD PRESSURE: 112 MMHG | RESPIRATION RATE: 16 BRPM | WEIGHT: 140 LBS | HEART RATE: 100 BPM | OXYGEN SATURATION: 98 %

## 2025-03-05 DIAGNOSIS — F90.0 ATTENTION DEFICIT HYPERACTIVITY DISORDER (ADHD), PREDOMINANTLY INATTENTIVE TYPE: Primary | ICD-10-CM

## 2025-03-05 PROCEDURE — 99214 OFFICE O/P EST MOD 30 MIN: CPT | Performed by: FAMILY MEDICINE

## 2025-03-05 PROCEDURE — 3074F SYST BP LT 130 MM HG: CPT | Performed by: FAMILY MEDICINE

## 2025-03-05 PROCEDURE — 3078F DIAST BP <80 MM HG: CPT | Performed by: FAMILY MEDICINE

## 2025-03-05 RX ORDER — BUPROPION HYDROCHLORIDE 150 MG/1
150 TABLET ORAL EVERY MORNING
Qty: 90 TABLET | Refills: 3 | Status: SHIPPED | OUTPATIENT
Start: 2025-03-05

## 2025-03-05 ASSESSMENT — PATIENT HEALTH QUESTIONNAIRE - PHQ9
SUM OF ALL RESPONSES TO PHQ QUESTIONS 1-9: 3
SUM OF ALL RESPONSES TO PHQ QUESTIONS 1-9: 3
10. IF YOU CHECKED OFF ANY PROBLEMS, HOW DIFFICULT HAVE THESE PROBLEMS MADE IT FOR YOU TO DO YOUR WORK, TAKE CARE OF THINGS AT HOME, OR GET ALONG WITH OTHER PEOPLE: NOT DIFFICULT AT ALL

## 2025-03-05 NOTE — PROGRESS NOTES
"  Assessment & Plan   Problem List Items Addressed This Visit       Attention deficit hyperactivity disorder (ADHD), predominantly inattentive type - Primary    Relevant Medications    buPROPion (WELLBUTRIN XL) 150 MG 24 hr tablet        Adding 150mg buproprion daily, follow up 8 weeks   988 if suicidal    Subjective   Kiesha is a 54 year old, presenting for the following health issues:  Recheck Medication        3/5/2025    10:50 AM   Additional Questions   Roomed by Elyssa PARIS CMA     History of Present Illness       Reason for visit:  Medication She is missing 1 dose(s) of medications per week.  She is not taking prescribed medications regularly due to remembering to take.      She would like to start wellbutrin. Has been talking to her therapist.    Feels the Vyvanse is not fully helping her      Objective    /79 (BP Location: Right arm, Patient Position: Chair, Cuff Size: Adult Regular)   Pulse 100   Temp 98  F (36.7  C) (Oral)   Resp 16   Ht 1.619 m (5' 3.74\")   Wt 63.5 kg (140 lb)   LMP  (LMP Unknown)   SpO2 98%   BMI 24.23 kg/m    Body mass index is 24.23 kg/m .  Physical Exam   Gen NAD  Normal mood/affect          Signed Electronically by: SRINIVASAN SOOD DO    "

## 2025-03-07 ENCOUNTER — MYC MEDICAL ADVICE (OUTPATIENT)
Dept: FAMILY MEDICINE | Facility: CLINIC | Age: 55
End: 2025-03-07
Payer: COMMERCIAL

## 2025-03-07 DIAGNOSIS — M75.02 ADHESIVE CAPSULITIS OF LEFT SHOULDER: Primary | ICD-10-CM

## 2025-03-27 ENCOUNTER — VIRTUAL VISIT (OUTPATIENT)
Facility: CLINIC | Age: 55
End: 2025-03-27
Payer: COMMERCIAL

## 2025-03-27 DIAGNOSIS — F41.1 GENERALIZED ANXIETY DISORDER: Primary | ICD-10-CM

## 2025-03-27 DIAGNOSIS — F43.89 REACTION TO CHRONIC STRESS: ICD-10-CM

## 2025-03-27 NOTE — PROGRESS NOTES
[facility]                                     Progress Note    Patient Name: Kiesha Mcguire  Date: 3/27/2025       Service Type: Individual      Session Start Time:  10:33 AM Session End Time: 11:24 AM     Session Length: 38-52 minutes    Session #: 80 with this provider    Attendees: Client attended alone    Service Modality:  Video Visit:      Provider verified identity through the following two step process.  Patient provided:  Patient is known previously to provider    Telemedicine Visit: The patient's condition can be safely assessed and treated via synchronous audio and visual telemedicine encounter.      Reason for Telemedicine Visit: Patient convenience (e.g. access to timely appointments / distance to available provider) and Services only offered telehealth    Originating Site (Patient Location): Patient's home    Distant Site (Provider Location): Provider Remote Setting- Home Office/Off-site    Consent:  The patient/guardian has verbally consented to: the potential risks and benefits of telemedicine (video visit) versus in person care; bill my insurance or make self-payment for services provided; and responsibility for payment of non-covered services.     Patient would like the video invitation sent by:  [employer]    Mode of Communication:  Video    As the provider I attest to compliance with applicable laws and regulations related to telemedicine.    DATA  Interactive Complexity: No   Crisis: No      Progress Since Last Session (Related to Symptoms / Goals / Homework):  Symptoms: improving somatic symptoms related to anxiety, increased ability to engage and complete tasks    Homework: Achieved / completed to satisfaction     Episode of Care Goals: Satisfactory progress - ACTION (Actively working towards change); Intervened by reinforcing change plan / affirming steps taken    There has been demonstrated improvement in functioning while patient has been engaged in  psychotherapy/psychological service- if withdrawn the patient would deteriorate and/or relapse.       Current / Ongoing Stressors and Concerns:  Stressors related to parenting   Work related stressors  Uncertainties and concern about the future     Treatment Objective(s) Addressed in This Session:   identify 2 fears / thoughts that contribute to feeling anxious  Identify negative self-talk and behaviors: challenge core beliefs, myths, and actions  Use a minimum of 1 strategies to manage symptoms     Intervention:   EMDR: identified negative cognitions and origins, ego state interventions   Engaged in active listening    Assessments completed prior to visit:  The following assessments were completed by patient for this visit:  None    ASSESSMENT: Current Emotional / Mental Status (status of significant symptoms):   Risk status (Self / Other harm or suicidal ideation)   Patient denies current fears or concerns for personal safety.   Patient denies current or recent suicidal ideation or behaviors.   Patient denies current or recent homicidal ideation or behaviors.   Patient denies current or recent self injurious behavior or ideation.   Patient denies other safety concerns.   Patient reports there has been no change in risk factors since their last session.     Patient reports there has been no change in protective factors since their last session.     Recommended that patient call 911 or go to the local ED should there be a change in any of these risk factors     Appearance:   Appropriate    Eye Contact:   Good    Psychomotor Behavior: Normal    Attitude:   Cooperative  Interested Pleasant    Orientation:   All   Speech    Rate / Production: Normal     Volume:  Normal    Mood:    Sad , Anxious   Affect:    Mood Congruent Tearful-minimal   Thought Content:  Clear    Thought Form:  Coherent  Goal Directed  Logical    Insight:    Good      Medication Review:   Changes to psychiatric medications, see updated Medication  List in EPIC.    Jaymiesania Willams-client is not taking daily  Buproprion- added  Lorazepam - rare use    Estrogen patch     Medication Compliance:   Yes     Changes in Health Issues:   None reported     Chemical Use Review:   Substance Use: no use     Tobacco Use: no use    Diagnosis:  Generalized Anxiety Disorder   Other Specified Trauma and Stress Related Disorder    Collateral Reports Completed:   Not Applicable    PLAN: (Patient Tasks / Therapist Tasks / Other)  EMDR:  Plan for next session to have client keep her eyes open.   Consider targeting: I am not good enough and fear  Therapist encouraged engaging with oldest, wisest self.  Client is considering ways to communicate at work.    Most recent diagnostic assessment completed: 8/9/2024     Magali Montilla, [facility] 3/27/2025    ______________________________________________________________________    Individual Treatment Plan    Patient's Name: Kiesha Mcguire  YOB: 1970    Date of Creation: 5/11/2022  Date Treatment Plan Last Reviewed/Revised: 1/14/2025    DSM5 Diagnoses:    Generalized Anxiety Disorder  Other Specified Trauma and Stress Related Disorder    Psychosocial / Contextual Factors: stressors related to parenting  PROMIS (reviewed every 90 days):    PROMIS 10-Global Health (only subscores and total score):       6/27/2024    10:08 AM 7/10/2024    12:04 PM 8/8/2024    12:27 PM 8/21/2024    10:36 AM 12/10/2024    10:27 AM 12/23/2024     3:04 PM 3/27/2025    10:24 AM   PROMIS-10 Scores Only   Global Mental Health Score 14 13 13 14 13  13  13    Global Physical Health Score 15 15 14 15 15  15  16    PROMIS TOTAL - SUBSCORES 29 28 27 29 28  28  29        Patient-reported        Referral / Collaboration:  Referral to another professional/service is not indicated at this time..    Anticipated number of session for this episode of care: will review in 90 days  Anticipation frequency of session: Every other week  Anticipated Duration of  "each session: 38-52 minutes  Treatment plan will be reviewed in 90 days or when goals have been changed.       MeasurableTreatment Goal(s) related to diagnosis / functional impairment(s)  Goal 1: Patient will sustain attention and concentration for consistently longer periods of time.  Patient will meet goals set for completing tasks 80% of the time.   Client's Goal:  I will know I've met my goal when my space isn't so chaotic, meeting deadlines around bills and work, when I am not always playing catch-up, completing tasks.      Objective #A (Patient Action)    Patient will learn and implement organization and planning skills.  Status: New - Date: 5/11/2022 , continued date: 9/8/2022, continued date: 12/21/2022, completed date: 3/29/2023    Intervention(s)  Therapist will teach the client organization and planning skills.  Therapist will address any potential barriers to using skills.    Objective #B  Patient will  identify, challenge, and change self-talk that contributes to maladaptive feelings and actions .  Status: New - Date: 5/11/2022 , Continued date: 9/8/2022, continued date: 12/21/2022, continued date: 3/29/2023, continued date: 7/28/2023, continued date: 11/9/2023, continued date: 1/23/2024, continued date: 5/9/2024, continued date: 9/20/2024, continued date: 1/14/2025    Intervention(s)  Therapist will teach the CBT model, cognitive distortions, and cognitive restructuring techniques.      Goal 2: Patient will be able to recall the traumatic events without becoming overwhelmed with negative thoughts, feelings, or urges.   Client's Goal:  I will know I've met my goal when I do not cry every time I talk about it.\"    Objective #A (Patient Action)    Status: New - Date: 5/11/2022, continued date: 9/8/2022, continued date: 12/21/2022, continued date: 3/29/2023, continued date: 7/28/2023, continued date: 11/9/2023, continued date: 1/23/2024, continued date: 5/9/2024, continued date: 9/20/2024, deferred date: " 1/14/2025    Patient will describe the history of and nature of trauma symptoms    Intervention(s)  Therapist will assess the client's frequency, intensity, duration, and history of trauma symptoms and their impact on functioning.    Objective #B (Patient Action)  Status: New Date: 5/11/2022, continued date: 9/8/2022, continued date: 12/21/2022, continued date: 3/29/2023, continued date: 7/28/2023, continued date: 11/9/2023, continued date: 1/23/2024, continued date: 5/9/2024, continued date: 9/20/2024, deferred date: 1/14/2025    Patient will cooperate with eye movement desensitization and reprocessing (EMDR) to reduce emotional reaction to traumatic event(s)    Intervention(s)  Therapist will utilize EMDR to reduce the client's emotional reactivity to the traumatic event(s).    Objective #C (Patient Action)  Status: New Date: 5/11/2022, continued date: 9/8/2022, continued date: 12/21/2022, continued date: 3/29/2023, continued date: 7/28/2023, continued date: 11/9/2023, continued date: 1/23/2024, continued date: 5/9/2024, continued date: 9/20/2024, deferred date: 1/14/2025    Patient will learn and implement calming and coping strategies to manage trauma symptoms.    Intervention(s)  Therapist will teach grounding techniques, distress tolerance, and emotion regulation techniques.    Goal 3: Patient's anxiety symptoms will remit as evidenced by a decrease in GAD7 scores, where symptoms occur fewer than half the days for a minimum of four weeks.    Objective #A (Patient Action)    Patient will identify 3 symptoms of anxiety.  Status: New - Date: 9/20/2024, continued date: 1/14/2025    Intervention(s)  Therapist will provide psychoeducation on anxiety and engage client in identifying symptoms in appointments.    Objective #B  Patient will use a minimum of 3 strategies to manage anxiety symptoms  Status: New - Date: 9/20/2024, continued date: 1/14/2025    Intervention(s)  Therapist will teach strategies such as  grounding techniques, thought stopping, and use of distraction    Objective #C  Patient will use cognitive strategies to manage thoughts/fears that contribute to anxiety symptoms.  Status: New - Date: 9/20/2024, continued date: 1/14/2025    Intervention(s)  Therapist will teach the CBT (Cognitive Behavioral Therapy) model, including cognitive distortions and cognitive restructuring techniques.       Patient has reviewed and agreed to the above plan.      Magali Montilla, [facility]  May 11, 2022  Magali Montilla, [facility]  9/8/2022  [facility], [facility]  12/21/2022  Magali Montilla, [facility]  3/29/2023  Magali Montilla, [facility]  7/28/2023  Magali Montilla, [facility]  11/9/2023  Magali Montilla, [facility]  1/23/2024  Magali Montilla, [facility]  5/9/2024  Magali Montilla, [facility]  9/20/2024  Magali Montilla, [facility]  1/14/2025

## 2025-04-07 ENCOUNTER — TELEPHONE (OUTPATIENT)
Dept: PSYCHOLOGY | Facility: CLINIC | Age: 55
End: 2025-04-07
Payer: COMMERCIAL

## 2025-04-23 ENCOUNTER — VIRTUAL VISIT (OUTPATIENT)
Dept: PSYCHOLOGY | Facility: CLINIC | Age: 55
End: 2025-04-23
Payer: COMMERCIAL

## 2025-04-23 DIAGNOSIS — F43.89 REACTION TO CHRONIC STRESS: ICD-10-CM

## 2025-04-23 DIAGNOSIS — F41.1 GENERALIZED ANXIETY DISORDER: Primary | ICD-10-CM

## 2025-04-23 PROCEDURE — 90837 PSYTX W PT 60 MINUTES: CPT | Mod: 95 | Performed by: SOCIAL WORKER

## 2025-04-23 NOTE — PROGRESS NOTES
[facility]                                     Progress Note    Patient Name: Kiesha Mcguire  Date: 4/23/2025       Service Type: Individual      Session Start Time: 10:02 AM  Session End Time: 10:55 AM     Session Length: 53+ minutes    Extended Session (53+ minutes): PROLONGED SERVICE IN THE OUTPATIENT SETTING REQUIRING DIRECT (FACE-TO-FACE) PATIENT CONTACT BEYOND THE USUAL SERVICE:    - Longer session due to limited access to mental health appointments     Session #: 81 with this provider    Attendees: Client attended alone    Service Modality:  Video Visit:      Provider verified identity through the following two step process.  Patient provided:  Patient is known previously to provider    Telemedicine Visit: The patient's condition can be safely assessed and treated via synchronous audio and visual telemedicine encounter.      Reason for Telemedicine Visit: Patient convenience (e.g. access to timely appointments / distance to available provider) and Services only offered telehealth    Originating Site (Patient Location): Patient's home    Distant Site (Provider Location): Provider Remote Setting- Home Office/Off-site    Consent:  The patient/guardian has verbally consented to: the potential risks and benefits of telemedicine (video visit) versus in person care; bill my insurance or make self-payment for services provided; and responsibility for payment of non-covered services.     Patient would like the video invitation sent by:  [employer]    Mode of Communication:  Video    As the provider I attest to compliance with applicable laws and regulations related to telemedicine.    DATA  Interactive Complexity: No   Crisis: No      Progress Since Last Session (Related to Symptoms / Goals / Homework):  Symptoms: worsening symptoms Sunday and Monday, improving today    Homework: Achieved / completed to satisfaction     Episode of Care Goals: Satisfactory progress - ACTION (Actively working  towards change); Intervened by reinforcing change plan / affirming steps taken    There has been demonstrated improvement in functioning while patient has been engaged in psychotherapy/psychological service- if withdrawn the patient would deteriorate and/or relapse.       Current / Ongoing Stressors and Concerns:  Stressors related to parenting   Work related stressors  Uncertainties and concern about the future  Recent interaction with children and ex     Treatment Objective(s) Addressed in This Session:   identify 1 fears / thoughts that contribute to feeling anxious  Use assertive communication skills  Identify negative beliefs and engage in cognitive restructuring  Identify trauma symptom     Intervention:   Engaged in active listening   Offered parenting support   Provided psychoeducation     Assessments completed prior to visit:  The following assessments were completed by patient for this visit:  None    ASSESSMENT: Current Emotional / Mental Status (status of significant symptoms):   Risk status (Self / Other harm or suicidal ideation)   Patient denies current fears or concerns for personal safety.   Patient denies current or recent suicidal ideation or behaviors.   Patient denies current or recent homicidal ideation or behaviors.   Patient denies current or recent self injurious behavior or ideation.   Patient denies other safety concerns.   Patient reports there has been no change in risk factors since their last session.     Patient reports there has been no change in protective factors since their last session.     Recommended that patient call 911 or go to the local ED should there be a change in any of these risk factors     Appearance:   Appropriate    Eye Contact:   Good    Psychomotor Behavior: Normal    Attitude:   Cooperative  Interested Pleasant    Orientation:   All   Speech    Rate / Production: Talkative    Volume:  Normal    Mood:    Sad , Anxious   Affect:    Mood Congruent  Tearful-minimal   Thought Content:  Clear  Rumination    Thought Form:  Coherent  Goal Directed  Logical    Insight:    Good      Medication Review:   No changes to current psychiatric medication(s)   Vyvanse   Buspar-client is not taking daily  Buproprion  Lorazepam - rare use    Estrogen patch     Medication Compliance:   Yes     Changes in Health Issues:   None reported     Chemical Use Review:   Substance Use: no use     Tobacco Use: no use    Diagnosis:  Generalized Anxiety Disorder   Other Specified Trauma and Stress Related Disorder    Collateral Reports Completed:   Not Applicable    PLAN: (Patient Tasks / Therapist Tasks / Other)  EMDR:  Plan for next session to have client keep her eyes open.   Consider targeting: I am not good enough and fear  Client is planning to talk with her children.  Client will continue to engage in cognitive restructuring.    Most recent diagnostic assessment completed: 8/9/2024     Magali Montilla, [facility] 4/23/2025    ______________________________________________________________________    Individual Treatment Plan    Patient's Name: Kiesha Mcguire  YOB: 1970    Date of Creation: 5/11/2022  Date Treatment Plan Last Reviewed/Revised: 1/14/2025    DSM5 Diagnoses:    Generalized Anxiety Disorder  Other Specified Trauma and Stress Related Disorder    Psychosocial / Contextual Factors: stressors related to parenting  PROMIS (reviewed every 90 days):    PROMIS 10-Global Health (only subscores and total score):       6/27/2024    10:08 AM 7/10/2024    12:04 PM 8/8/2024    12:27 PM 8/21/2024    10:36 AM 12/10/2024    10:27 AM 12/23/2024     3:04 PM 3/27/2025    10:24 AM   PROMIS-10 Scores Only   Global Mental Health Score 14 13 13 14 13  13  13    Global Physical Health Score 15 15 14 15 15  15  16    PROMIS TOTAL - SUBSCORES 29 28 27 29 28  28  29        Patient-reported        Referral / Collaboration:  Referral to another professional/service is not indicated at  "this time..    Anticipated number of session for this episode of care: will review in 90 days  Anticipation frequency of session: Every other week  Anticipated Duration of each session: 38-52 minutes  Treatment plan will be reviewed in 90 days or when goals have been changed.       MeasurableTreatment Goal(s) related to diagnosis / functional impairment(s)  Goal 1: Patient will sustain attention and concentration for consistently longer periods of time.  Patient will meet goals set for completing tasks 80% of the time.   Client's Goal:  I will know I've met my goal when my space isn't so chaotic, meeting deadlines around bills and work, when I am not always playing catch-up, completing tasks.      Objective #A (Patient Action)    Patient will learn and implement organization and planning skills.  Status: New - Date: 5/11/2022 , continued date: 9/8/2022, continued date: 12/21/2022, completed date: 3/29/2023    Intervention(s)  Therapist will teach the client organization and planning skills.  Therapist will address any potential barriers to using skills.    Objective #B  Patient will  identify, challenge, and change self-talk that contributes to maladaptive feelings and actions .  Status: New - Date: 5/11/2022 , Continued date: 9/8/2022, continued date: 12/21/2022, continued date: 3/29/2023, continued date: 7/28/2023, continued date: 11/9/2023, continued date: 1/23/2024, continued date: 5/9/2024, continued date: 9/20/2024, continued date: 1/14/2025    Intervention(s)  Therapist will teach the CBT model, cognitive distortions, and cognitive restructuring techniques.      Goal 2: Patient will be able to recall the traumatic events without becoming overwhelmed with negative thoughts, feelings, or urges.   Client's Goal:  I will know I've met my goal when I do not cry every time I talk about it.\"    Objective #A (Patient Action)    Status: New - Date: 5/11/2022, continued date: 9/8/2022, continued date: 12/21/2022, " continued date: 3/29/2023, continued date: 7/28/2023, continued date: 11/9/2023, continued date: 1/23/2024, continued date: 5/9/2024, continued date: 9/20/2024, deferred date: 1/14/2025    Patient will describe the history of and nature of trauma symptoms    Intervention(s)  Therapist will assess the client's frequency, intensity, duration, and history of trauma symptoms and their impact on functioning.    Objective #B (Patient Action)  Status: New Date: 5/11/2022, continued date: 9/8/2022, continued date: 12/21/2022, continued date: 3/29/2023, continued date: 7/28/2023, continued date: 11/9/2023, continued date: 1/23/2024, continued date: 5/9/2024, continued date: 9/20/2024, deferred date: 1/14/2025    Patient will cooperate with eye movement desensitization and reprocessing (EMDR) to reduce emotional reaction to traumatic event(s)    Intervention(s)  Therapist will utilize EMDR to reduce the client's emotional reactivity to the traumatic event(s).    Objective #C (Patient Action)  Status: New Date: 5/11/2022, continued date: 9/8/2022, continued date: 12/21/2022, continued date: 3/29/2023, continued date: 7/28/2023, continued date: 11/9/2023, continued date: 1/23/2024, continued date: 5/9/2024, continued date: 9/20/2024, deferred date: 1/14/2025    Patient will learn and implement calming and coping strategies to manage trauma symptoms.    Intervention(s)  Therapist will teach grounding techniques, distress tolerance, and emotion regulation techniques.    Goal 3: Patient's anxiety symptoms will remit as evidenced by a decrease in GAD7 scores, where symptoms occur fewer than half the days for a minimum of four weeks.    Objective #A (Patient Action)    Patient will identify 3 symptoms of anxiety.  Status: New - Date: 9/20/2024, continued date: 1/14/2025    Intervention(s)  Therapist will provide psychoeducation on anxiety and engage client in identifying symptoms in appointments.    Objective #B  Patient will use a  minimum of 3 strategies to manage anxiety symptoms  Status: New - Date: 9/20/2024, continued date: 1/14/2025    Intervention(s)  Therapist will teach strategies such as grounding techniques, thought stopping, and use of distraction    Objective #C  Patient will use cognitive strategies to manage thoughts/fears that contribute to anxiety symptoms.  Status: New - Date: 9/20/2024, continued date: 1/14/2025    Intervention(s)  Therapist will teach the CBT (Cognitive Behavioral Therapy) model, including cognitive distortions and cognitive restructuring techniques.       Patient has reviewed and agreed to the above plan.      Magali Montilla, [facility]  May 11, 2022  [facility], [facility]  9/8/2022  Magali Montilla, LICSW  12/21/2022  [facility], [facility]  3/29/2023  Magali Montilla, LICSW  7/28/2023  Magali Montilla, LICSW  11/9/2023  Magali Montilla, LICSW  1/23/2024  Magali Montilla, LICSW  5/9/2024  Magali Montilla, LICSW  9/20/2024  [facility], [facility]  1/14/2025

## 2025-05-01 ENCOUNTER — VIRTUAL VISIT (OUTPATIENT)
Facility: CLINIC | Age: 55
End: 2025-05-01
Payer: COMMERCIAL

## 2025-05-01 DIAGNOSIS — F43.89 REACTION TO CHRONIC STRESS: ICD-10-CM

## 2025-05-01 DIAGNOSIS — F41.1 GENERALIZED ANXIETY DISORDER: Primary | ICD-10-CM

## 2025-05-01 NOTE — PROGRESS NOTES
[facility]                                     Progress Note    Patient Name: Kiesha Mcguire  Date: 5/1/2025       Service Type: Individual      Session Start Time: 10:35 AM  Session End Time: 11:26 AM     Session Length: 38-52 minutes    Session #: 82 with this provider    Attendees: Client attended alone    Service Modality:  Video Visit:      Provider verified identity through the following two step process.  Patient provided:  Patient is known previously to provider    Telemedicine Visit: The patient's condition can be safely assessed and treated via synchronous audio and visual telemedicine encounter.      Reason for Telemedicine Visit: Patient convenience (e.g. access to timely appointments / distance to available provider) and Services only offered telehealth    Originating Site (Patient Location): Patient's home    Distant Site (Provider Location): Provider Remote Setting- Home Office/Off-site    Consent:  The patient/guardian has verbally consented to: the potential risks and benefits of telemedicine (video visit) versus in person care; bill my insurance or make self-payment for services provided; and responsibility for payment of non-covered services.     Patient would like the video invitation sent by:  [employer]    Mode of Communication:  Video    As the provider I attest to compliance with applicable laws and regulations related to telemedicine.    DATA  Interactive Complexity: No   Crisis: No      Progress Since Last Session (Related to Symptoms / Goals / Homework):  Symptoms: anxiety symptoms    Homework: Achieved / completed to satisfaction     Episode of Care Goals: Satisfactory progress - ACTION (Actively working towards change); Intervened by reinforcing change plan / affirming steps taken    There has been demonstrated improvement in functioning while patient has been engaged in psychotherapy/psychological service- if withdrawn the patient would deteriorate and/or  relapse.       Current / Ongoing Stressors and Concerns:  Stressors related to parenting   Work related stressors  Uncertainties and concern about the future     Treatment Objective(s) Addressed in This Session:   identify 3 fears / thoughts that contribute to feeling anxious, Identify negative self-talk and behaviors: challenge core beliefs, myths, and actions  Engage in cognitive restructuring     Intervention:   Engaged in active listening   Engaged in increased awareness of anxiety symptoms    Assessments completed prior to visit:  The following assessments were completed by patient for this visit:  None    ASSESSMENT: Current Emotional / Mental Status (status of significant symptoms):   Risk status (Self / Other harm or suicidal ideation)   Patient denies current fears or concerns for personal safety.   Patient denies current or recent suicidal ideation or behaviors.   Patient denies current or recent homicidal ideation or behaviors.   Patient denies current or recent self injurious behavior or ideation.   Patient denies other safety concerns.   Patient reports there has been no change in risk factors since their last session.     Patient reports there has been no change in protective factors since their last session.     Recommended that patient call 911 or go to the local ED should there be a change in any of these risk factors     Appearance:   Appropriate    Eye Contact:   Good    Psychomotor Behavior: Normal    Attitude:   Cooperative  Interested Pleasant    Orientation:   All   Speech    Rate / Production: Normal/ Responsive    Volume:  Normal    Mood:    Anxious    Affect:    Mood Congruent    Thought Content:  Clear    Thought Form:  Coherent  Goal Directed  Logical    Insight:    Good      Medication Review:   No changes to current psychiatric medication(s)   Vyvanse   Buspar-client is not taking daily  Buproprion  Lorazepam - rare use    Estrogen patch     Medication Compliance:   Yes     Changes in  Health Issues:   None reported     Chemical Use Review:   Substance Use: no use     Tobacco Use: no use    Diagnosis:  Generalized Anxiety Disorder   Other Specified Trauma and Stress Related Disorder    Collateral Reports Completed:   Not Applicable    PLAN: (Patient Tasks / Therapist Tasks / Other)  EMDR:  Plan for next session to have client keep her eyes open.   Consider targeting: I am not good enough and fear  Client shared plans to garden this weekend.  Therapist encouraged continued awareness of association between anxiety and behaviors of preparation.     Most recent diagnostic assessment completed: 8/9/2024     Magali Montilla, [facility] 5/1/2025    ______________________________________________________________________    Individual Treatment Plan    Patient's Name: Kiesha Mcguire  YOB: 1970    Date of Creation: 5/11/2022  Date Treatment Plan Last Reviewed/Revised: 1/14/2025    DSM5 Diagnoses:    Generalized Anxiety Disorder  Other Specified Trauma and Stress Related Disorder    Psychosocial / Contextual Factors: stressors related to parenting  PROMIS (reviewed every 90 days):    PROMIS 10-Global Health (only subscores and total score):       6/27/2024    10:08 AM 7/10/2024    12:04 PM 8/8/2024    12:27 PM 8/21/2024    10:36 AM 12/10/2024    10:27 AM 12/23/2024     3:04 PM 3/27/2025    10:24 AM   PROMIS-10 Scores Only   Global Mental Health Score 14 13 13 14 13  13  13    Global Physical Health Score 15 15 14 15 15  15  16    PROMIS TOTAL - SUBSCORES 29 28 27 29 28  28  29        Patient-reported        Referral / Collaboration:  Referral to another professional/service is not indicated at this time..    Anticipated number of session for this episode of care: will review in 90 days  Anticipation frequency of session: Every other week  Anticipated Duration of each session: 38-52 minutes  Treatment plan will be reviewed in 90 days or when goals have been changed.       MeasurableTreatment  "Goal(s) related to diagnosis / functional impairment(s)  Goal 1: Patient will sustain attention and concentration for consistently longer periods of time.  Patient will meet goals set for completing tasks 80% of the time.   Client's Goal:  I will know I've met my goal when my space isn't so chaotic, meeting deadlines around bills and work, when I am not always playing catch-up, completing tasks.      Objective #A (Patient Action)    Patient will learn and implement organization and planning skills.  Status: New - Date: 5/11/2022 , continued date: 9/8/2022, continued date: 12/21/2022, completed date: 3/29/2023    Intervention(s)  Therapist will teach the client organization and planning skills.  Therapist will address any potential barriers to using skills.    Objective #B  Patient will  identify, challenge, and change self-talk that contributes to maladaptive feelings and actions .  Status: New - Date: 5/11/2022 , Continued date: 9/8/2022, continued date: 12/21/2022, continued date: 3/29/2023, continued date: 7/28/2023, continued date: 11/9/2023, continued date: 1/23/2024, continued date: 5/9/2024, continued date: 9/20/2024, continued date: 1/14/2025    Intervention(s)  Therapist will teach the CBT model, cognitive distortions, and cognitive restructuring techniques.      Goal 2: Patient will be able to recall the traumatic events without becoming overwhelmed with negative thoughts, feelings, or urges.   Client's Goal:  I will know I've met my goal when I do not cry every time I talk about it.\"    Objective #A (Patient Action)    Status: New - Date: 5/11/2022, continued date: 9/8/2022, continued date: 12/21/2022, continued date: 3/29/2023, continued date: 7/28/2023, continued date: 11/9/2023, continued date: 1/23/2024, continued date: 5/9/2024, continued date: 9/20/2024, deferred date: 1/14/2025    Patient will describe the history of and nature of trauma symptoms    Intervention(s)  Therapist will assess the " client's frequency, intensity, duration, and history of trauma symptoms and their impact on functioning.    Objective #B (Patient Action)  Status: New Date: 5/11/2022, continued date: 9/8/2022, continued date: 12/21/2022, continued date: 3/29/2023, continued date: 7/28/2023, continued date: 11/9/2023, continued date: 1/23/2024, continued date: 5/9/2024, continued date: 9/20/2024, deferred date: 1/14/2025    Patient will cooperate with eye movement desensitization and reprocessing (EMDR) to reduce emotional reaction to traumatic event(s)    Intervention(s)  Therapist will utilize EMDR to reduce the client's emotional reactivity to the traumatic event(s).    Objective #C (Patient Action)  Status: New Date: 5/11/2022, continued date: 9/8/2022, continued date: 12/21/2022, continued date: 3/29/2023, continued date: 7/28/2023, continued date: 11/9/2023, continued date: 1/23/2024, continued date: 5/9/2024, continued date: 9/20/2024, deferred date: 1/14/2025    Patient will learn and implement calming and coping strategies to manage trauma symptoms.    Intervention(s)  Therapist will teach grounding techniques, distress tolerance, and emotion regulation techniques.    Goal 3: Patient's anxiety symptoms will remit as evidenced by a decrease in GAD7 scores, where symptoms occur fewer than half the days for a minimum of four weeks.    Objective #A (Patient Action)    Patient will identify 3 symptoms of anxiety.  Status: New - Date: 9/20/2024, continued date: 1/14/2025    Intervention(s)  Therapist will provide psychoeducation on anxiety and engage client in identifying symptoms in appointments.    Objective #B  Patient will use a minimum of 3 strategies to manage anxiety symptoms  Status: New - Date: 9/20/2024, continued date: 1/14/2025    Intervention(s)  Therapist will teach strategies such as grounding techniques, thought stopping, and use of distraction    Objective #C  Patient will use cognitive strategies to manage  thoughts/fears that contribute to anxiety symptoms.  Status: New - Date: 9/20/2024, continued date: 1/14/2025    Intervention(s)  Therapist will teach the CBT (Cognitive Behavioral Therapy) model, including cognitive distortions and cognitive restructuring techniques.       Patient has reviewed and agreed to the above plan.      Magali Montilla, [facility]  May 11, 2022  Magali Montilla, [facility]  9/8/2022  Magali Montilla, [facility]  12/21/2022  Magali Montilla, [facility]  3/29/2023  [facility]  7/28/2023  [facility], [facility]  11/9/2023  [facility], [facility]  1/23/2024  [facility], [facility]  5/9/2024  [facility], [facility]  9/20/2024  Magali Montilla, [facility]  1/14/2025

## 2025-05-08 ENCOUNTER — MYC MEDICAL ADVICE (OUTPATIENT)
Dept: FAMILY MEDICINE | Facility: CLINIC | Age: 55
End: 2025-05-08
Payer: COMMERCIAL

## 2025-05-08 NOTE — TELEPHONE ENCOUNTER
RN replied to patient via [employer]. See message for details.     Trey Vigil RN, BSN, PHN  [facility]: [place]

## 2025-05-15 ENCOUNTER — VIRTUAL VISIT (OUTPATIENT)
Facility: CLINIC | Age: 55
End: 2025-05-15
Payer: COMMERCIAL

## 2025-05-15 DIAGNOSIS — F43.89 REACTION TO CHRONIC STRESS: ICD-10-CM

## 2025-05-15 DIAGNOSIS — F41.1 GENERALIZED ANXIETY DISORDER: Primary | ICD-10-CM

## 2025-05-15 NOTE — PROGRESS NOTES
[facility]                                     Progress Note    Patient Name: Kiesha Mcguire  Date: 5/15/2025         Service Type: Individual      Session Start Time: 10:34 AM Session End Time: 11:19 AM     Session Length: 38-52 minutes    Session #: 83 with this provider    Attendees: Client attended alone    Service Modality:  Video Visit:      Provider verified identity through the following two step process.  Patient provided:  Patient is known previously to provider    Telemedicine Visit: The patient's condition can be safely assessed and treated via synchronous audio and visual telemedicine encounter.      Reason for Telemedicine Visit: Patient convenience (e.g. access to timely appointments / distance to available provider) and Services only offered telehealth    Originating Site (Patient Location): Patient's home    Distant Site (Provider Location): Provider Remote Setting- Home Office/Off-site    Consent:  The patient/guardian has verbally consented to: the potential risks and benefits of telemedicine (video visit) versus in person care; bill my insurance or make self-payment for services provided; and responsibility for payment of non-covered services.     Patient would like the video invitation sent by:  [employer]    Mode of Communication:  Video    As the provider I attest to compliance with applicable laws and regulations related to telemedicine.    DATA  Interactive Complexity: No   Crisis: No      Progress Since Last Session (Related to Symptoms / Goals / Homework):  Symptoms: no change since previous appointment    Homework: Achieved / completed to satisfaction     Episode of Care Goals: Satisfactory progress - ACTION (Actively working towards change); Intervened by reinforcing change plan / affirming steps taken    There has been demonstrated improvement in functioning while patient has been engaged in psychotherapy/psychological service- if withdrawn the patient would  deteriorate and/or relapse.       Current / Ongoing Stressors and Concerns:  Stressors related to parenting   Work related stressors  Uncertainties and concern about the future  Parenting stressors     Treatment Objective(s) Addressed in This Session:   identify 1 fears / thoughts that contribute to feeling anxious  Identify 2 triggers to trauma symptoms     Intervention:   Reviewed treatment plan    EMDR: discussed next steps, provided psychoeducation   Engaged in active listening    Assessments completed prior to visit:  The following assessments were completed by patient for this visit:  None    ASSESSMENT: Current Emotional / Mental Status (status of significant symptoms):   Risk status (Self / Other harm or suicidal ideation)   Patient denies current fears or concerns for personal safety.   Patient denies current or recent suicidal ideation or behaviors.   Patient denies current or recent homicidal ideation or behaviors.   Patient denies current or recent self injurious behavior or ideation.   Patient denies other safety concerns.   Patient reports there has been no change in risk factors since their last session.     Patient reports there has been no change in protective factors since their last session.     Recommended that patient call 911 or go to the local ED should there be a change in any of these risk factors     Appearance:   Appropriate    Eye Contact:   Good    Psychomotor Behavior: Normal    Attitude:   Cooperative  Interested    Orientation:   All   Speech    Rate / Production: Normal/ Responsive    Volume:  Normal    Mood:    Anxious  Sad -minimal   Affect:    Mood Congruent tearful-minimal   Thought Content:  Clear    Thought Form:  Coherent  Goal Directed  Logical    Insight:    Good      Medication Review:   No changes to current psychiatric medication(s)   Vyvanse   Buspar-client is not taking daily  Buproprion  Lorazepam - rare use    Estrogen patch     Medication  Compliance:   Yes     Changes in Health Issues:   None reported     Chemical Use Review:   Substance Use: no use     Tobacco Use: no use    Diagnosis:  Generalized Anxiety Disorder   Other Specified Trauma and Stress Related Disorder    Collateral Reports Completed:   Not Applicable    PLAN: (Patient Tasks / Therapist Tasks / Other)  EMDR:  Plan for next session to have client keep her eyes open.   Consider targeting: I am not good enough and fear.  Therapist to consider engagement of preverbal protocol.   Client will maintain behavior changes, including use of grupo blocker, increased awareness of triggers and strategies to manage triggers  Client shared goal to increase engagement in exercise and healthy eating.    Most recent diagnostic assessment completed: 8/9/2024     Magali Montilla, [facility] 5/15/2025    ______________________________________________________________________    Individual Treatment Plan    Patient's Name: Kiesha Mcguire  YOB: 1970    Date of Creation: 5/11/2022  Date Treatment Plan Last Reviewed/Revised: 5/15/2025    DSM5 Diagnoses:    Generalized Anxiety Disorder  Other Specified Trauma and Stress Related Disorder    Psychosocial / Contextual Factors: stressors related to parenting  PROMIS (reviewed every 90 days):    PROMIS 10-Global Health (only subscores and total score):       6/27/2024    10:08 AM 7/10/2024    12:04 PM 8/8/2024    12:27 PM 8/21/2024    10:36 AM 12/10/2024    10:27 AM 12/23/2024     3:04 PM 3/27/2025    10:24 AM   PROMIS-10 Scores Only   Global Mental Health Score 14 13 13 14 13  13  13    Global Physical Health Score 15 15 14 15 15  15  16    PROMIS TOTAL - SUBSCORES 29 28 27 29 28  28  29        Patient-reported        Referral / Collaboration:  Referral to another professional/service is not indicated at this time..    Anticipated number of session for this episode of care: will review in 90 days  Anticipation frequency of session: Every other  "week  Anticipated Duration of each session: 38-52 minutes  Treatment plan will be reviewed in 90 days or when goals have been changed.       MeasurableTreatment Goal(s) related to diagnosis / functional impairment(s)  Goal 1: Patient will sustain attention and concentration for consistently longer periods of time.  Patient will meet goals set for completing tasks 80% of the time.   Client's Goal:  I will know I've met my goal when my space isn't so chaotic, meeting deadlines around bills and work, when I am not always playing catch-up, completing tasks.      Objective #A (Patient Action)    Patient will learn and implement organization and planning skills.  Status: New - Date: 5/11/2022, continued date: 9/8/2022, continued date: 12/21/2022, completed date: 3/29/2023    Intervention(s)  Therapist will teach the client organization and planning skills.  Therapist will address any potential barriers to using skills.    Objective #B  Patient will identify, challenge, and change self-talk that contributes to maladaptive feelings and actions.  Status: New - Date: 5/11/2022, Continued date: 9/8/2022, continued date: 12/21/2022, continued date: 3/29/2023, continued date: 7/28/2023, continued date: 11/9/2023, continued date: 1/23/2024, continued date: 5/9/2024, continued date: 9/20/2024, continued date: 1/14/2025    Intervention(s)  Therapist will teach the CBT model, cognitive distortions, and cognitive restructuring techniques.      Goal 2: Patient will be able to recall the traumatic events without becoming overwhelmed with negative thoughts, feelings, or urges.   Client's Goal:  I will know I've met my goal when I do not cry every time I talk about it.\"    Objective #A (Patient Action)    Status: New - Date: 5/11/2022, continued date: 9/8/2022, continued date: 12/21/2022, continued date: 3/29/2023, continued date: 7/28/2023, continued date: 11/9/2023, continued date: 1/23/2024, continued date: 5/9/2024, continued date: " 9/20/2024, deferred date: 1/14/2025, re-started date: 5/15/2025    Patient will describe the history of and nature of trauma symptoms    Intervention(s)  Therapist will assess the client's frequency, intensity, duration, and history of trauma symptoms and their impact on functioning.    Objective #B (Patient Action)  Status: New Date: 5/11/2022, continued date: 9/8/2022, continued date: 12/21/2022, continued date: 3/29/2023, continued date: 7/28/2023, continued date: 11/9/2023, continued date: 1/23/2024, continued date: 5/9/2024, continued date: 9/20/2024, deferred date: 1/14/2025, re-started date: 5/15/2025    Patient will cooperate with eye movement desensitization and reprocessing (EMDR) to reduce emotional reaction to traumatic event(s)    Intervention(s)  Therapist will utilize EMDR to reduce the client's emotional reactivity to the traumatic event(s).    Objective #C (Patient Action)  Status: New Date: 5/11/2022, continued date: 9/8/2022, continued date: 12/21/2022, continued date: 3/29/2023, continued date: 7/28/2023, continued date: 11/9/2023, continued date: 1/23/2024, continued date: 5/9/2024, continued date: 9/20/2024, deferred date: 1/14/2025, re-started date: 5/15/2025    Patient will learn and implement calming and coping strategies to manage trauma symptoms.    Intervention(s)  Therapist will teach grounding techniques, distress tolerance, and emotion regulation techniques.    Goal 3: Patient's anxiety symptoms will remit as evidenced by a decrease in GAD7 scores, where symptoms occur fewer than half the days for a minimum of four weeks.    Objective #A (Patient Action)    Patient will identify 3 symptoms of anxiety.  Status: New - Date: 9/20/2024, continued date: 1/14/2025, continued date: 5/15/2025    Intervention(s)  Therapist will provide psychoeducation on anxiety and engage client in identifying symptoms in appointments.    Objective #B  Patient will use a minimum of 3 strategies to manage  anxiety symptoms  Status: New - Date: 9/20/2024, continued date: 1/14/2025, continued date: 5/15/2025    Intervention(s)  Therapist will teach strategies such as grounding techniques, thought stopping, and use of distraction    Objective #C  Patient will use cognitive strategies to manage thoughts/fears that contribute to anxiety symptoms.  Status: New - Date: 9/20/2024, continued date: 1/14/2025, continued date: 5/15/2025    Intervention(s)  Therapist will teach the CBT (Cognitive Behavioral Therapy) model, including cognitive distortions and cognitive restructuring techniques.       Patient has reviewed and agreed to the above plan.      aMgali Montilla, [facility]  May 11, 2022  Magali Montilla, [facility]  9/8/2022  Magali Montilla, LICSW  12/21/2022  Magali Montilla, LICSW  3/29/2023  Magali Montilla, LICSW  7/28/2023  Magali Montilla, LICSW  11/9/2023  Magali Montilla, LICSW  1/23/2024  Magali Montilla, LICSW  5/9/2024  Magali Montilla, LICSW  9/20/2024  Magali Montilla, LICSW  1/14/2025  Magali Montilla, LICSW  5/15/2025

## 2025-05-21 ENCOUNTER — PRE VISIT (OUTPATIENT)
Dept: CARDIOLOGY | Facility: CLINIC | Age: 55
End: 2025-05-21
Payer: COMMERCIAL

## 2025-05-23 ENCOUNTER — MYC MEDICAL ADVICE (OUTPATIENT)
Dept: OBGYN | Facility: CLINIC | Age: 55
End: 2025-05-23
Payer: COMMERCIAL

## 2025-05-29 ENCOUNTER — VIRTUAL VISIT (OUTPATIENT)
Facility: CLINIC | Age: 55
End: 2025-05-29
Payer: COMMERCIAL

## 2025-05-29 DIAGNOSIS — F41.1 GENERALIZED ANXIETY DISORDER: Primary | ICD-10-CM

## 2025-05-29 DIAGNOSIS — F43.89 REACTION TO CHRONIC STRESS: ICD-10-CM

## 2025-05-29 NOTE — PROGRESS NOTES
"[facility]                                     Progress Note    Patient Name: Kiesha Mcguire  Date: 5/29/2025       Service Type: Individual      Session Start Time: 10:33 AM Session End Time: 11:19 AM     Session Length: 38-52 minutes    Session #: 84 with this provider    Attendees: Client attended alone    Service Modality:  Video Visit:      Provider verified identity through the following two step process.  Patient provided:  Patient is known previously to provider    Telemedicine Visit: The patient's condition can be safely assessed and treated via synchronous audio and visual telemedicine encounter.      Reason for Telemedicine Visit: Patient convenience (e.g. access to timely appointments / distance to available provider) and Services only offered telehealth    Originating Site (Patient Location): Patient's home    Distant Site (Provider Location): Provider Remote Setting- Home Office/Off-site    Consent:  The patient/guardian has verbally consented to: the potential risks and benefits of telemedicine (video visit) versus in person care; bill my insurance or make self-payment for services provided; and responsibility for payment of non-covered services.     Patient would like the video invitation sent by:  [employer]    Mode of Communication:  Video    As the provider I attest to compliance with applicable laws and regulations related to telemedicine.    DATA  Interactive Complexity: No   Crisis: No      Progress Since Last Session (Related to Symptoms / Goals / Homework):  Symptoms: client reported anxiety symptoms and \"increase in feeling doom\"    Homework: Achieved / completed to satisfaction     Episode of Care Goals: Satisfactory progress - ACTION (Actively working towards change); Intervened by reinforcing change plan / affirming steps taken    There has been demonstrated improvement in functioning while patient has been engaged in psychotherapy/psychological service- if " withdrawn the patient would deteriorate and/or relapse.       Current / Ongoing Stressors and Concerns:  Stressors related to parenting   Work related stressors  Uncertainties and concern about the future  Parenting stressors     Treatment Objective(s) Addressed in This Session:   identify 1 fears / thoughts that contribute to feeling anxious  Identify 1 strategy to increase engagement in completion of tasks     Intervention:   Engaged in active listening   Offered support with identifying strategies to increase engagement in completion of tasks    Assessments completed prior to visit:  The following assessments were completed by patient for this visit:  None    ASSESSMENT: Current Emotional / Mental Status (status of significant symptoms):   Risk status (Self / Other harm or suicidal ideation)   Patient denies current fears or concerns for personal safety.   Patient denies current or recent suicidal ideation or behaviors.   Patient denies current or recent homicidal ideation or behaviors.   Patient denies current or recent self injurious behavior or ideation.   Patient denies other safety concerns.   Patient reports there has been no change in risk factors since their last session.     Patient reports there has been no change in protective factors since their last session.     Recommended that patient call 911 or go to the local ED should there be a change in any of these risk factors     Appearance:   Appropriate    Eye Contact:   Good    Psychomotor Behavior: Normal    Attitude:   Cooperative  Interested    Orientation:   All   Speech    Rate / Production: Normal/ Responsive    Volume:  Normal    Mood:    Anxious client reported feeling overwhelmed   Affect:    Mood Congruent    Thought Content:  Clear    Thought Form:  Coherent  Goal Directed  Logical    Insight:    Good      Medication Review:   No changes to current psychiatric medication(s)   Vyvanse   Buspar-client is not taking daily  Buproprion  Lorazepam  - rare use    Estrogen patch     Medication Compliance:   Yes     Changes in Health Issues:   None reported     Chemical Use Review:   Substance Use: no use     Tobacco Use: no use    Diagnosis:  Generalized Anxiety Disorder   Other Specified Trauma and Stress Related Disorder    Collateral Reports Completed:   Not Applicable    PLAN: (Patient Tasks / Therapist Tasks / Other)  EMDR:  Plan for next session to have client keep her eyes open.   Consider targeting: I am not good enough and fear.  Therapist to consider engagement of preverbal protocol.   Client will continue to use apps; todoist, grey and tracking diet and water intake  Client will consider ways to increase engagement in completion of tasks.    Most recent diagnostic assessment completed: 8/9/2024     Magali Montilla, [facility] 5/29/2025    ______________________________________________________________________    Individual Treatment Plan    Patient's Name: Kiesha Mcguire  YOB: 1970    Date of Creation: 5/11/2022  Date Treatment Plan Last Reviewed/Revised: 5/15/2025    DSM5 Diagnoses:    Generalized Anxiety Disorder  Other Specified Trauma and Stress Related Disorder    Psychosocial / Contextual Factors: stressors related to parenting  PROMIS (reviewed every 90 days):    PROMIS 10-Global Health (only subscores and total score):       6/27/2024    10:08 AM 7/10/2024    12:04 PM 8/8/2024    12:27 PM 8/21/2024    10:36 AM 12/10/2024    10:27 AM 12/23/2024     3:04 PM 3/27/2025    10:24 AM   PROMIS-10 Scores Only   Global Mental Health Score 14 13 13 14 13  13  13    Global Physical Health Score 15 15 14 15 15  15  16    PROMIS TOTAL - SUBSCORES 29 28 27 29 28  28  29        Patient-reported        Referral / Collaboration:  Referral to another professional/service is not indicated at this time..    Anticipated number of session for this episode of care: will review in 90 days  Anticipation frequency of session: Every other  "week  Anticipated Duration of each session: 38-52 minutes  Treatment plan will be reviewed in 90 days or when goals have been changed.       MeasurableTreatment Goal(s) related to diagnosis / functional impairment(s)  Goal 1: Patient will sustain attention and concentration for consistently longer periods of time.  Patient will meet goals set for completing tasks 80% of the time.   Client's Goal:  I will know I've met my goal when my space isn't so chaotic, meeting deadlines around bills and work, when I am not always playing catch-up, completing tasks.      Objective #A (Patient Action)    Patient will learn and implement organization and planning skills.  Status: New - Date: 5/11/2022, continued date: 9/8/2022, continued date: 12/21/2022, completed date: 3/29/2023    Intervention(s)  Therapist will teach the client organization and planning skills.  Therapist will address any potential barriers to using skills.    Objective #B  Patient will identify, challenge, and change self-talk that contributes to maladaptive feelings and actions.  Status: New - Date: 5/11/2022, Continued date: 9/8/2022, continued date: 12/21/2022, continued date: 3/29/2023, continued date: 7/28/2023, continued date: 11/9/2023, continued date: 1/23/2024, continued date: 5/9/2024, continued date: 9/20/2024, continued date: 1/14/2025    Intervention(s)  Therapist will teach the CBT model, cognitive distortions, and cognitive restructuring techniques.      Goal 2: Patient will be able to recall the traumatic events without becoming overwhelmed with negative thoughts, feelings, or urges.   Client's Goal:  I will know I've met my goal when I do not cry every time I talk about it.\"    Objective #A (Patient Action)    Status: New - Date: 5/11/2022, continued date: 9/8/2022, continued date: 12/21/2022, continued date: 3/29/2023, continued date: 7/28/2023, continued date: 11/9/2023, continued date: 1/23/2024, continued date: 5/9/2024, continued date: " 9/20/2024, deferred date: 1/14/2025, re-started date: 5/15/2025    Patient will describe the history of and nature of trauma symptoms    Intervention(s)  Therapist will assess the client's frequency, intensity, duration, and history of trauma symptoms and their impact on functioning.    Objective #B (Patient Action)  Status: New Date: 5/11/2022, continued date: 9/8/2022, continued date: 12/21/2022, continued date: 3/29/2023, continued date: 7/28/2023, continued date: 11/9/2023, continued date: 1/23/2024, continued date: 5/9/2024, continued date: 9/20/2024, deferred date: 1/14/2025, re-started date: 5/15/2025    Patient will cooperate with eye movement desensitization and reprocessing (EMDR) to reduce emotional reaction to traumatic event(s)    Intervention(s)  Therapist will utilize EMDR to reduce the client's emotional reactivity to the traumatic event(s).    Objective #C (Patient Action)  Status: New Date: 5/11/2022, continued date: 9/8/2022, continued date: 12/21/2022, continued date: 3/29/2023, continued date: 7/28/2023, continued date: 11/9/2023, continued date: 1/23/2024, continued date: 5/9/2024, continued date: 9/20/2024, deferred date: 1/14/2025, re-started date: 5/15/2025    Patient will learn and implement calming and coping strategies to manage trauma symptoms.    Intervention(s)  Therapist will teach grounding techniques, distress tolerance, and emotion regulation techniques.    Goal 3: Patient's anxiety symptoms will remit as evidenced by a decrease in GAD7 scores, where symptoms occur fewer than half the days for a minimum of four weeks.    Objective #A (Patient Action)    Patient will identify 3 symptoms of anxiety.  Status: New - Date: 9/20/2024, continued date: 1/14/2025, continued date: 5/15/2025    Intervention(s)  Therapist will provide psychoeducation on anxiety and engage client in identifying symptoms in appointments.    Objective #B  Patient will use a minimum of 3 strategies to manage  anxiety symptoms  Status: New - Date: 9/20/2024, continued date: 1/14/2025, continued date: 5/15/2025    Intervention(s)  Therapist will teach strategies such as grounding techniques, thought stopping, and use of distraction    Objective #C  Patient will use cognitive strategies to manage thoughts/fears that contribute to anxiety symptoms.  Status: New - Date: 9/20/2024, continued date: 1/14/2025, continued date: 5/15/2025    Intervention(s)  Therapist will teach the CBT (Cognitive Behavioral Therapy) model, including cognitive distortions and cognitive restructuring techniques.       Patient has reviewed and agreed to the above plan.      Mag[facility]illa, [facility]  May 11, 2022  [facility], [facility]  9/8/2022  Magali Montilla, L[facility]SW  12/21/2022  Magali Montilla, LICSW  3/29/2023  Magali Montilla, LICSW  7/28/2023  Magali Montilla, LICSW  11/9/2023  Magali Montilla, LICSW  1/23/2024  Magali Montilla, LICSW  5/9/2024  Magali Montilla, LICSW  9/20/2024  Magali Montilla, LICSW  1/14/2025  Magali Montilla, LICSW  5/15/2025

## 2025-06-03 NOTE — LETTER
2023         RE: Kiesha Mcguire  [address] [address]        Dear Colleague,    Thank you for referring your patient, Kiesha Mcguire, to the [facility]. Please see a copy of my visit note below.    Kiesha Mcguire is an extremely pleasant 51 year old year old female patient here today to recheck frontal fibrosing alopecia. She notes she had itchy for two weeks she reports that has since resolved. She denies any new products or medications. She reports she read somewhere that this can occur with general formulation of hydroxychloroquine vs brand name. She notes hair loss has stabilized. Patient has no other skin complaints today.  Remainder of the HPI, Meds, PMH, Allergies, FH, and SH was reviewed in chart.    Past Medical History:   Diagnosis Date     Depressive disorder      Depressive disorder, not elsewhere classified     resolved     Herpes simplex without mention of complication     oral     IUD (intrauterine device) in place 08/15/2013    Mirena     Migraine headache with aura     very occass, sig aura, speech loss x1     Pure hypercholesterolemia     follows w BIPIN CHATMAN       Past Surgical History:   Procedure Laterality Date     COLONOSCOPY N/A 10/27/2021    Procedure: COLONOSCOPY, WITH POLYPECTOMY AND CLIP;  Surgeon: Og Gutierres MD;  [place] [facility] [place]      DILATION/CURETTAGE DIAG/THER NON OB  2006     LAPAROSCOPIC CHOLECYSTECTOMY N/A 2021    Procedure: CHOLECYSTECTOMY, LAPAROSCOPIC;  Surgeon: Denise Cast MD;  Location:  OR        Family History   Problem Relation Age of Onset     Hypertension Mother      Hyperlipidemia Mother      Depression Mother      Hypertension Father      Cancer Father 65        neuro endrocine CA, neck     Hyperlipidemia Father      Aortic aneurysm Maternal Grandfather          of ascending aortic aneurysm at age 64     Aortic aneurysm Maternal Aunt          in her 40s from  [facility]    Kenrick Patel Patient Status:  Hospital Outpatient Surgery   Age/Gender 63 year old male MRN [id number]   Location [facility] SURGERY Attending Felipe Gill MD   Hosp Day # 0 PCP Lincoln York MD       Anesthesia Post-op Note    EXCISON OF RIGHT SUBMANDIBULAR MASS    Procedure Summary       Date: 06/03/25 Room / Location:  MAIN OR 04 / [facility] MAIN OR    Anesthesia Start: 0921 Anesthesia Stop: 1042    Procedure: EXCISON OF RIGHT SUBMANDIBULAR MASS (Right: Neck) Diagnosis: (NECK MASS)    Surgeons: Felipe Gill MD Anesthesiologist: Sundar Vega MD    Anesthesia Type: general ASA Status: 2            Anesthesia Type: general    Vitals Value Taken Time   /57 06/03/25 10:46   Temp 97.7 06/03/25 10:46   Pulse 58 06/03/25 10:46   Resp 20 06/03/25 10:46   SpO2 97 06/03/25 10:46           Patient Location: PACU    Anesthesia Type: general    Airway Patency: patent    Postop Pain Control: adequate    Mental Status: mildly sedated but able to meaningfully participate in the post-anesthesia evaluation    Nausea/Vomiting: none    Cardiopulmonary/Hydration status: stable euvolemic    Complications: no apparent anesthesia related complications    Postop vital signs: stable    Dental Exam: Unchanged from Preop    Patient to be discharged from PACU when criteria met.           ascending aortic aneurysm     C.A.D. No family hx of      Cancer - colorectal No family hx of      Diabetes No family hx of      Cerebrovascular Disease No family hx of      Breast Cancer No family hx of      Prostate Cancer No family hx of        Social History     Socioeconomic History     Marital status:      Spouse name: Not on file     Number of children: 2     Years of education: 16     Highest education level: Not on file   Occupational History     Occupation:      Employer: [employer]     Comment: fulltime   Tobacco Use     Smoking status: Former     Packs/day: 0.50     Years: 10.00     Pack years: 5.00     Types: Cigarettes     Quit date: 1994     Years since quittin.5     Smokeless tobacco: Never   Vaping Use     Vaping Use: Never used   Substance and Sexual Activity     Alcohol use: Yes     Comment: very rare     Drug use: No     Sexual activity: Not Currently     Partners: Male     Birth control/protection: I.U.D.     Comment: Mirena   Other Topics Concern     Parent/sibling w/ CABG, MI or angioplasty before 65F 55M? No   Social History Narrative    How much exercise per week? 2 90 minute yoga sessions      How much calcium per day? Diet       How much caffeine per day? 0    How much vitamin D per day? Supplement     Do you/your family wear seatbelts?  Yes    Do you/your family use safety helmets? Yes    Do you/your family use sunscreen? Yes    Do you/your family keep firearms in the home? No    Do you/your family have a smoke detector(s)? Yes        Do you feel safe in your home? Yes    Has anyone ever touched you in an unwanted manner? No     Explain Kiesha Fletcher [facility] 18        Reviewed [facility] 10-12-20             Social Determinants of Health     Financial Resource Strain: Not on file   Food Insecurity: Not on file   Transportation Needs: Not on file   Physical Activity: Not on file   Stress: Not on file   Social Connections: Not on file   Intimate Partner  Violence: Not on file   Housing Stability: Not on file       Outpatient Encounter Medications as of 1/18/2023   Medication Sig Dispense Refill     bimatoprost (LATISSE) 0.03 % external opthalmic solution Apply 1 drop topically At Bedtime 5 mL 3     glucosamine-chondroitin 500-400 MG CAPS per capsule Take 1 capsule by mouth daily Patient reported: over the counter, unsure of dose       hydroxychloroquine (PLAQUENIL) 200 MG tablet Take 1 tablet (200 mg) by mouth 2 times daily 180 tablet 3     Acetaminophen (TYLENOL PO)        Biotin 10 MG TABS tablet Take 10,000 mcg by mouth daily       CALCIUM-VITAMIN D-VITAMIN K PO        clobetasol (TEMOVATE) 0.05 % external solution Apply daily as needed to scalp (Patient not taking: Reported on 1/18/2023) 50 mL 3     Collagen Hydrolysate POWD        Cyanocobalamin (VITAMIN B 12 PO) Take by mouth daily        cyclobenzaprine (FLEXERIL) 5 MG tablet Take 1 tablet (5 mg) by mouth 3 times daily as needed for muscle spasms (Avoid driving or operating machinery while on this medication-has drowsy effects). 42 tablet 0     diclofenac (VOLTAREN) 75 MG EC tablet Take 1 tablet (75 mg) by mouth 2 times daily as needed for moderate pain (take with food) 30 tablet 0     estradiol (VAGIFEM) 10 MCG TABS vaginal tablet Place 1 tablet (10 mcg) vaginally daily X 14 days then 1 tablet twice weekly 88 tablet 3     levonorgestrel (MIRENA) 20 MCG/24HR IUD 1 each by Intrauterine route once       Lysine 500 MG TABS Take 1,000 mg by mouth daily        magnesium citrate solution Takes 1 tsp twice a day, 150 mg daily       NONFORMULARY 1 capsule daily carmen       NONFORMULARY 2 tablets daily jozef       UNABLE TO FIND daily Ashwaganda - patient reported       UNABLE TO FIND daily Rhodiola- patient reported       valACYclovir (VALTREX) 1000 mg tablet Take 2 tablets (2,000 mg) by mouth 2 times daily For 2 days as needed for cold sores 12 tablet 11     valACYclovir (VALTREX) 500 MG tablet Take 1 tablet  (500 mg) by mouth daily 90 tablet 1     Facility-Administered Encounter Medications as of 1/18/2023   Medication Dose Route Frequency Provider Last Rate Last Admin     [COMPLETED] triamcinolone acetonide (KENALOG-10) injection 5 mg  5 mg Intradermal Once Rosemary Delcid PA-C   0.1 mL at 01/18/23 1300             O:   NAD, WDWN, Alert & Oriented, Mood & Affect wnl, Vitals stable   Here today alone   There were no vitals taken for this visit.   General appearance normal   Vitals stable   Alert, oriented and in no acute distress       Patches of alopecia on frontal/temporal side of scalp, minimal accentuation of hair follicle, no scaling seen.      Eyes: Conjunctivae/lids:Normal     ENT: Lips,: normal    MSK:Normal    Pulm: Breathing Normal    Neuro/Psych: Orientation:Alert and Orientedx3 ; Mood/Affect:normal   A/P:  1. FFA  Well controlled.   IL TAC: PGACAC discussed.  Risks including but not limited to injection site reaction, bruising, no resolution.  All questions answered and entertained to patient s satisfaction.  Informed consent obtained.  IL TAC in concentration of 5 mg/ml was injected ID to frontal fibrosing alopecia.  Total injected was  0.2 ml.  Patient tolerated without complications and given wound care instructions, including not to move product around.  Return in 4 weeks for follow-up and possible additional IL TAC.    Continue plaquenil 200 mg bid .   Recheck in 3-4 months.     1. Hypotrichosis of eyelashes   Discussed side effects.  prescript on sent, use [employer] coupon.         Again, thank you for allowing me to participate in the care of your patient.        Sincerely,        Rosemary Delcid PA-C

## 2025-06-09 ENCOUNTER — VIRTUAL VISIT (OUTPATIENT)
Facility: CLINIC | Age: 55
End: 2025-06-09
Payer: COMMERCIAL

## 2025-06-09 DIAGNOSIS — F41.1 GENERALIZED ANXIETY DISORDER: Primary | ICD-10-CM

## 2025-06-09 DIAGNOSIS — F43.89 REACTION TO CHRONIC STRESS: ICD-10-CM

## 2025-06-09 PROCEDURE — 90834 PSYTX W PT 45 MINUTES: CPT | Mod: 95 | Performed by: SOCIAL WORKER

## 2025-06-09 NOTE — PROGRESS NOTES
[facility]                                     Progress Note    Patient Name: Kiesha Mcguire  Date: 6/9/2025       Service Type: Individual      Session Start Time: 12:31 PM Session End Time: 1:18 PM     Session Length: 38-52 minutes    Session #: 85 with this provider    Attendees: Client attended alone    Service Modality:  Video Visit:      Provider verified identity through the following two step process.  Patient provided:  Patient is known previously to provider    Telemedicine Visit: The patient's condition can be safely assessed and treated via synchronous audio and visual telemedicine encounter.      Reason for Telemedicine Visit: Patient convenience (e.g. access to timely appointments / distance to available provider) and Services only offered telehealth    Originating Site (Patient Location): Patient's home    Distant Site (Provider Location): Provider Remote Setting- Home Office/Off-site    Consent:  The patient/guardian has verbally consented to: the potential risks and benefits of telemedicine (video visit) versus in person care; bill my insurance or make self-payment for services provided; and responsibility for payment of non-covered services.     Patient would like the video invitation sent by:  [facility]    Mode of Communication:  Video    As the provider I attest to compliance with applicable laws and regulations related to telemedicine.    DATA  Interactive Complexity: No   Crisis: No      Progress Since Last Session (Related to Symptoms / Goals / Homework):  Symptoms: improving    Homework: Achieved / completed to satisfaction     Episode of Care Goals: Satisfactory progress - ACTION (Actively working towards change); Intervened by reinforcing change plan / affirming steps taken    There has been demonstrated improvement in functioning while patient has been engaged in psychotherapy/psychological service- if withdrawn the patient would deteriorate and/or relapse.        Current / Ongoing Stressors and Concerns:   Work related stressors  Uncertainties and concern about the future  Parenting stressors  Recent interaction with son     Treatment Objective(s) Addressed in This Session:   identify 1 fears / thoughts that contribute to feeling anxious  Use assertive communication     Intervention:   Modeled assertive communication   Engaged in active listening   CBT: reinforced behavior changes    Assessments completed prior to visit:  The following assessments were completed by patient for this visit:  None    ASSESSMENT: Current Emotional / Mental Status (status of significant symptoms):   Risk status (Self / Other harm or suicidal ideation)   Patient denies current fears or concerns for personal safety.   Patient denies current or recent suicidal ideation or behaviors.   Patient denies current or recent homicidal ideation or behaviors.   Patient denies current or recent self injurious behavior or ideation.   Patient denies other safety concerns.   Patient reports there has been no change in risk factors since their last session.     Patient reports there has been no change in protective factors since their last session.     Recommended that patient call 911 or go to the local ED should there be a change in any of these risk factors     Appearance:   Appropriate    Eye Contact:   Good    Psychomotor Behavior: Normal    Attitude:   Cooperative  Interested Pleasant    Orientation:   All   Speech    Rate / Production: Normal/ Responsive    Volume:  Normal    Mood:    Anxious Stable   Affect:    Mood Congruent    Thought Content:  Clear    Thought Form:  Coherent  Goal Directed  Logical    Insight:    Good      Medication Review:   No changes to current psychiatric medication(s)   Vyvanse   Buspar-client is not taking daily  Buproprion  Lorazepam - rare use    Estrogen patch     Medication Compliance:   Yes     Changes in Health Issues:   None reported     Chemical Use  Review:   Substance Use: no use     Tobacco Use: no use    Diagnosis:  Generalized Anxiety Disorder   Other Specified Trauma and Stress Related Disorder    Collateral Reports Completed:   Not Applicable    PLAN: (Patient Tasks / Therapist Tasks / Other)  EMDR:  Plan for next session to have client keep her eyes open.   Consider targeting: I am not good enough and fear.  Therapist to consider engagement of preverbal protocol.   Client will continue to use apps; todoist, rgey and tracking diet and water intake  Client shared plan to talk with her son.    Most recent diagnostic assessment completed: 8/9/2024     Magali Montilla, [facility] 6/9/2025    ______________________________________________________________________    Individual Treatment Plan    Patient's Name: Kiesha Mcguire  YOB: 1970    Date of Creation: 5/11/2022  Date Treatment Plan Last Reviewed/Revised: 5/15/2025    DSM5 Diagnoses:    Generalized Anxiety Disorder  Other Specified Trauma and Stress Related Disorder    Psychosocial / Contextual Factors: stressors related to parenting  PROMIS (reviewed every 90 days):    PROMIS 10-Global Health (only subscores and total score):       6/27/2024    10:08 AM 7/10/2024    12:04 PM 8/8/2024    12:27 PM 8/21/2024    10:36 AM 12/10/2024    10:27 AM 12/23/2024     3:04 PM 3/27/2025    10:24 AM   PROMIS-10 Scores Only   Global Mental Health Score 14 13 13 14 13  13  13    Global Physical Health Score 15 15 14 15 15  15  16    PROMIS TOTAL - SUBSCORES 29 28 27 29 28  28  29        Patient-reported        Referral / Collaboration:  Referral to another professional/service is not indicated at this time..    Anticipated number of session for this episode of care: will review in 90 days  Anticipation frequency of session: Every other week  Anticipated Duration of each session: 38-52 minutes  Treatment plan will be reviewed in 90 days or when goals have been changed.       MeasurableTreatment Goal(s)  "related to diagnosis / functional impairment(s)  Goal 1: Patient will sustain attention and concentration for consistently longer periods of time.  Patient will meet goals set for completing tasks 80% of the time.   Client's Goal:  I will know I've met my goal when my space isn't so chaotic, meeting deadlines around bills and work, when I am not always playing catch-up, completing tasks.      Objective #A (Patient Action)    Patient will learn and implement organization and planning skills.  Status: New - Date: 5/11/2022, continued date: 9/8/2022, continued date: 12/21/2022, completed date: 3/29/2023    Intervention(s)  Therapist will teach the client organization and planning skills.  Therapist will address any potential barriers to using skills.    Objective #B  Patient will identify, challenge, and change self-talk that contributes to maladaptive feelings and actions.  Status: New - Date: 5/11/2022, Continued date: 9/8/2022, continued date: 12/21/2022, continued date: 3/29/2023, continued date: 7/28/2023, continued date: 11/9/2023, continued date: 1/23/2024, continued date: 5/9/2024, continued date: 9/20/2024, continued date: 1/14/2025    Intervention(s)  Therapist will teach the CBT model, cognitive distortions, and cognitive restructuring techniques.      Goal 2: Patient will be able to recall the traumatic events without becoming overwhelmed with negative thoughts, feelings, or urges.   Client's Goal:  I will know I've met my goal when I do not cry every time I talk about it.\"    Objective #A (Patient Action)    Status: New - Date: 5/11/2022, continued date: 9/8/2022, continued date: 12/21/2022, continued date: 3/29/2023, continued date: 7/28/2023, continued date: 11/9/2023, continued date: 1/23/2024, continued date: 5/9/2024, continued date: 9/20/2024, deferred date: 1/14/2025, re-started date: 5/15/2025    Patient will describe the history of and nature of trauma symptoms    Intervention(s)  Therapist will " assess the client's frequency, intensity, duration, and history of trauma symptoms and their impact on functioning.    Objective #B (Patient Action)  Status: New Date: 5/11/2022, continued date: 9/8/2022, continued date: 12/21/2022, continued date: 3/29/2023, continued date: 7/28/2023, continued date: 11/9/2023, continued date: 1/23/2024, continued date: 5/9/2024, continued date: 9/20/2024, deferred date: 1/14/2025, re-started date: 5/15/2025    Patient will cooperate with eye movement desensitization and reprocessing (EMDR) to reduce emotional reaction to traumatic event(s)    Intervention(s)  Therapist will utilize EMDR to reduce the client's emotional reactivity to the traumatic event(s).    Objective #C (Patient Action)  Status: New Date: 5/11/2022, continued date: 9/8/2022, continued date: 12/21/2022, continued date: 3/29/2023, continued date: 7/28/2023, continued date: 11/9/2023, continued date: 1/23/2024, continued date: 5/9/2024, continued date: 9/20/2024, deferred date: 1/14/2025, re-started date: 5/15/2025    Patient will learn and implement calming and coping strategies to manage trauma symptoms.    Intervention(s)  Therapist will teach grounding techniques, distress tolerance, and emotion regulation techniques.    Goal 3: Patient's anxiety symptoms will remit as evidenced by a decrease in GAD7 scores, where symptoms occur fewer than half the days for a minimum of four weeks.    Objective #A (Patient Action)    Patient will identify 3 symptoms of anxiety.  Status: New - Date: 9/20/2024, continued date: 1/14/2025, continued date: 5/15/2025    Intervention(s)  Therapist will provide psychoeducation on anxiety and engage client in identifying symptoms in appointments.    Objective #B  Patient will use a minimum of 3 strategies to manage anxiety symptoms  Status: New - Date: 9/20/2024, continued date: 1/14/2025, continued date: 5/15/2025    Intervention(s)  Therapist will teach strategies such as grounding  techniques, thought stopping, and use of distraction    Objective #C  Patient will use cognitive strategies to manage thoughts/fears that contribute to anxiety symptoms.  Status: New - Date: 9/20/2024, continued date: 1/14/2025, continued date: 5/15/2025    Intervention(s)  Therapist will teach the CBT (Cognitive Behavioral Therapy) model, including cognitive distortions and cognitive restructuring techniques.       Patient has reviewed and agreed to the above plan.      Magali Montilla, [facility]  May 11, 2022  Magali Montilla, [facility]  9/8/2022  [facility]  12/21/2022  Magali Montilla, [facility]  3/29/2023  Magali Montilla, [facility]  7/28/2023  Magali Montilla, [facility]  11/9/2023  Magali Montilla, [facility]  1/23/2024  Magali Montilla, [facility]  5/9/2024  Magali Montilla, [facility]  9/20/2024  Mag[facility] Montilla, [facility]  1/14/2025  Magali Montilla, [facility]  5/15/2025

## 2025-06-23 ENCOUNTER — VIRTUAL VISIT (OUTPATIENT)
Facility: CLINIC | Age: 55
End: 2025-06-23
Payer: COMMERCIAL

## 2025-06-23 DIAGNOSIS — F41.1 GENERALIZED ANXIETY DISORDER: Primary | ICD-10-CM

## 2025-06-23 DIAGNOSIS — F43.89 REACTION TO CHRONIC STRESS: ICD-10-CM

## 2025-06-23 PROCEDURE — 90834 PSYTX W PT 45 MINUTES: CPT | Mod: 95 | Performed by: SOCIAL WORKER

## 2025-06-23 NOTE — PROGRESS NOTES
[facility]                                     Progress Note    Patient Name: Kiesha Mcguire  Date: 6/23/2025       Service Type: Individual      Session Start Time: 12:32 PM Session End Time: 1:18 PM     Session Length: 38-52 minutes    Session #: 86 with this provider    Attendees: Client attended alone    Service Modality:  Video Visit:      Provider verified identity through the following two step process.  Patient provided:  Patient is known previously to provider    Telemedicine Visit: The patient's condition can be safely assessed and treated via synchronous audio and visual telemedicine encounter.      Reason for Telemedicine Visit: Patient convenience (e.g. access to timely appointments / distance to available provider) and Services only offered telehealth    Originating Site (Patient Location): Patient's home    Distant Site (Provider Location): [facility]: [place], [place]/On-site    Consent:  The patient/guardian has verbally consented to: the potential risks and benefits of telemedicine (video visit) versus in person care; bill my insurance or make self-payment for services provided; and responsibility for payment of non-covered services.     Patient would like the video invitation sent by:  [employer]    Mode of Communication:  Video    As the provider I attest to compliance with applicable laws and regulations related to telemedicine.    DATA  Interactive Complexity: No   Crisis: No      Progress Since Last Session (Related to Symptoms / Goals / Homework):  Symptoms: no change since previous appointment    Homework: Achieved / completed to satisfaction     Episode of Care Goals: Satisfactory progress - ACTION (Actively working towards change); Intervened by reinforcing change plan / affirming steps taken    There has been demonstrated improvement in functioning while patient has been engaged in psychotherapy/psychological service- if withdrawn the patient would  deteriorate and/or relapse.       Current / Ongoing Stressors and Concerns:   Work related stressors  Uncertainties and concern about the future  Parenting stressors  Trauma history     Treatment Objective(s) Addressed in This Session:   identify 1 fears / thoughts that contribute to feeling anxious  Identify 1 trauma symptom     Intervention:   Engaged in active listening   Engaged in awareness of body sensations and emotions, engaged in grounding   Discussed resources   Support client with reflecting on progress    Assessments completed prior to visit:  The following assessments were completed by patient for this visit:  None    ASSESSMENT: Current Emotional / Mental Status (status of significant symptoms):   Risk status (Self / Other harm or suicidal ideation)   Patient denies current fears or concerns for personal safety.   Patient denies current or recent suicidal ideation or behaviors.   Patient denies current or recent homicidal ideation or behaviors.   Patient denies current or recent self injurious behavior or ideation.   Patient denies other safety concerns.   Patient reports there has been no change in risk factors since their last session.     Patient reports there has been no change in protective factors since their last session.     Recommended that patient call 911 or go to the local ED should there be a change in any of these risk factors     Appearance:   Appropriate    Eye Contact:   Good    Psychomotor Behavior: Normal    Attitude:   Cooperative  Interested Pleasant    Orientation:   All   Speech    Rate / Production: Normal/ Responsive    Volume:  Normal    Mood:    Anxious Sad   Affect:    Mood Congruent Minimally tearful   Thought Content:  Clear    Thought Form:  Coherent  Goal Directed  Logical    Insight:    Good      Medication Review:   No changes to current psychiatric medication(s)   Vyvanse   Buspar-client is not taking daily  Buproprion  Lorazepam - rare use    Estrogen  patch     Medication Compliance:   Yes     Changes in Health Issues:   None reported     Chemical Use Review:   Substance Use: no use     Tobacco Use: no use    Diagnosis:  Generalized Anxiety Disorder   Other Specified Trauma and Stress Related Disorder    Collateral Reports Completed:   Not Applicable    PLAN: (Patient Tasks / Therapist Tasks / Other)  EMDR:  Plan for next session to have client keep her eyes open.   Consider targeting: I am not good enough and fear.  Therapist to consider engagement of preverbal protocol.   Client will look at [facility] support groups available.    Most recent diagnostic assessment completed: 8/9/2024     Magali Montilla, [facility] 6/23/2025    ______________________________________________________________________    Individual Treatment Plan    Patient's Name: Kiesha Mcguire  YOB: 1970    Date of Creation: 5/11/2022  Date Treatment Plan Last Reviewed/Revised: 5/15/2025    DSM5 Diagnoses:    Generalized Anxiety Disorder  Other Specified Trauma and Stress Related Disorder    Psychosocial / Contextual Factors: stressors related to parenting  PROMIS (reviewed every 90 days):    PROMIS 10-Global Health (only subscores and total score):       6/27/2024    10:08 AM 7/10/2024    12:04 PM 8/8/2024    12:27 PM 8/21/2024    10:36 AM 12/10/2024    10:27 AM 12/23/2024     3:04 PM 3/27/2025    10:24 AM   PROMIS-10 Scores Only   Global Mental Health Score 14 13 13 14 13  13  13    Global Physical Health Score 15 15 14 15 15  15  16    PROMIS TOTAL - SUBSCORES 29 28 27 29 28  28  29        Patient-reported        Referral / Collaboration:  Referral to another professional/service is not indicated at this time..    Anticipated number of session for this episode of care: will review in 90 days  Anticipation frequency of session: Every other week  Anticipated Duration of each session: 38-52 minutes  Treatment plan will be reviewed in 90 days or when goals have been changed.  "      MeasurableTreatment Goal(s) related to diagnosis / functional impairment(s)  Goal 1: Patient will sustain attention and concentration for consistently longer periods of time.  Patient will meet goals set for completing tasks 80% of the time.   Client's Goal:  I will know I've met my goal when my space isn't so chaotic, meeting deadlines around bills and work, when I am not always playing catch-up, completing tasks.      Objective #A (Patient Action)    Patient will learn and implement organization and planning skills.  Status: New - Date: 5/11/2022, continued date: 9/8/2022, continued date: 12/21/2022, completed date: 3/29/2023    Intervention(s)  Therapist will teach the client organization and planning skills.  Therapist will address any potential barriers to using skills.    Objective #B  Patient will identify, challenge, and change self-talk that contributes to maladaptive feelings and actions.  Status: New - Date: 5/11/2022, Continued date: 9/8/2022, continued date: 12/21/2022, continued date: 3/29/2023, continued date: 7/28/2023, continued date: 11/9/2023, continued date: 1/23/2024, continued date: 5/9/2024, continued date: 9/20/2024, continued date: 1/14/2025    Intervention(s)  Therapist will teach the CBT model, cognitive distortions, and cognitive restructuring techniques.      Goal 2: Patient will be able to recall the traumatic events without becoming overwhelmed with negative thoughts, feelings, or urges.   Client's Goal:  I will know I've met my goal when I do not cry every time I talk about it.\"    Objective #A (Patient Action)    Status: New - Date: 5/11/2022, continued date: 9/8/2022, continued date: 12/21/2022, continued date: 3/29/2023, continued date: 7/28/2023, continued date: 11/9/2023, continued date: 1/23/2024, continued date: 5/9/2024, continued date: 9/20/2024, deferred date: 1/14/2025, re-started date: 5/15/2025    Patient will describe the history of and nature of trauma " symptoms    Intervention(s)  Therapist will assess the client's frequency, intensity, duration, and history of trauma symptoms and their impact on functioning.    Objective #B (Patient Action)  Status: New Date: 5/11/2022, continued date: 9/8/2022, continued date: 12/21/2022, continued date: 3/29/2023, continued date: 7/28/2023, continued date: 11/9/2023, continued date: 1/23/2024, continued date: 5/9/2024, continued date: 9/20/2024, deferred date: 1/14/2025, re-started date: 5/15/2025    Patient will cooperate with eye movement desensitization and reprocessing (EMDR) to reduce emotional reaction to traumatic event(s)    Intervention(s)  Therapist will utilize EMDR to reduce the client's emotional reactivity to the traumatic event(s).    Objective #C (Patient Action)  Status: New Date: 5/11/2022, continued date: 9/8/2022, continued date: 12/21/2022, continued date: 3/29/2023, continued date: 7/28/2023, continued date: 11/9/2023, continued date: 1/23/2024, continued date: 5/9/2024, continued date: 9/20/2024, deferred date: 1/14/2025, re-started date: 5/15/2025    Patient will learn and implement calming and coping strategies to manage trauma symptoms.    Intervention(s)  Therapist will teach grounding techniques, distress tolerance, and emotion regulation techniques.    Goal 3: Patient's anxiety symptoms will remit as evidenced by a decrease in GAD7 scores, where symptoms occur fewer than half the days for a minimum of four weeks.    Objective #A (Patient Action)    Patient will identify 3 symptoms of anxiety.  Status: New - Date: 9/20/2024, continued date: 1/14/2025, continued date: 5/15/2025    Intervention(s)  Therapist will provide psychoeducation on anxiety and engage client in identifying symptoms in appointments.    Objective #B  Patient will use a minimum of 3 strategies to manage anxiety symptoms  Status: New - Date: 9/20/2024, continued date: 1/14/2025, continued date:  5/15/2025    Intervention(s)  Therapist will teach strategies such as grounding techniques, thought stopping, and use of distraction    Objective #C  Patient will use cognitive strategies to manage thoughts/fears that contribute to anxiety symptoms.  Status: New - Date: 9/20/2024, continued date: 1/14/2025, continued date: 5/15/2025    Intervention(s)  Therapist will teach the CBT (Cognitive Behavioral Therapy) model, including cognitive distortions and cognitive restructuring techniques.       Patient has reviewed and agreed to the above plan.      Magali Montilla, [facility]  May 11, 2022  [facility], [facility]  9/8/2022  Magali Montilla, [facility]  12/21/2022  Magali Mont[facility], [facility]  3/29/2023  Magali Montilla, [facility]  7/28/2023  Magali Montilla, [facility]  11/9/2023  Magali Montilla, [facility]  1/23/2024  Magali Montilla, [facility]  5/9/2024  Magali Montilla, [facility]  9/20/2024  Magali Montilla, [facility]  1/14/2025  [facility], [facility]  5/15/2025

## 2025-07-02 ENCOUNTER — TELEPHONE (OUTPATIENT)
Dept: OBGYN | Facility: CLINIC | Age: 55
End: 2025-07-02
Payer: COMMERCIAL

## 2025-07-02 DIAGNOSIS — N95.1 SYMPTOMATIC MENOPAUSAL OR FEMALE CLIMACTERIC STATES: ICD-10-CM

## 2025-07-02 DIAGNOSIS — Z00.00 VISIT FOR PREVENTIVE HEALTH EXAMINATION: ICD-10-CM

## 2025-07-02 DIAGNOSIS — N95.2 VAGINAL ATROPHY: ICD-10-CM

## 2025-07-02 RX ORDER — ESTRADIOL 10 UG/1
10 TABLET, FILM COATED VAGINAL
Qty: 24 TABLET | Refills: 0 | Status: SHIPPED | OUTPATIENT
Start: 2025-07-03

## 2025-07-02 NOTE — TELEPHONE ENCOUNTER
Prescription refill request received via fax x 4 from patient's pharmacy for medication: estradiol vaginal tab     Patient's preferred pharmacy [place] in chart[place] [facility] [place] [address] [place] [address].     Last Written Prescription Date:  8/8/2024  Last Fill Quantity: 24  # refills: 3   Last office visit: 8/8/2024     Prescribing provider: Nell Dos Santos CNM  Future Office Visit Scheduled:   Future Appointments 7/2/2025 - 12/29/2025        Date Visit Type Length Department Provider     7/7/2025 10:30 AM ADULT PSYCHOTHERAPY RETURN 60 min [facility] Magali Montilla LICSW              7/21/2025 10:30 AM ADULT PSYCHOTHERAPY RETURN 60 min OX [facility] Magali Montilla LICSW              8/12/2025  1:00 PM RETURN ANNUAL 40 min [facility] IN WOMEN [facility] Nell Dos Santos APRN CNM    Location Instructions:     There is only one patient elevator available at this location due to maintenance. Please allow extra time for your appointment.   Due to road [place] [address], travel times to this location may be longer than usual. Please plan for extra travel time and check the [place] [employer] [address] [place] website for delay, closure, and detour information.  Parking: Park in the [place] to the left. Use the second-level skyway to access the [place].                    Next appointment is due: 8/2025.    Per RN protocol, refill is authorized for 90- day supply.

## 2025-07-07 ENCOUNTER — VIRTUAL VISIT (OUTPATIENT)
Facility: CLINIC | Age: 55
End: 2025-07-07
Payer: COMMERCIAL

## 2025-07-07 DIAGNOSIS — F43.89 REACTION TO CHRONIC STRESS: ICD-10-CM

## 2025-07-07 DIAGNOSIS — F41.1 GENERALIZED ANXIETY DISORDER: Primary | ICD-10-CM

## 2025-07-07 PROCEDURE — 90834 PSYTX W PT 45 MINUTES: CPT | Mod: 95 | Performed by: SOCIAL WORKER

## 2025-07-07 NOTE — PROGRESS NOTES
[facility]                                     Progress Note    Patient Name: Kiesha Mcguire  Date: 7/7/2025       Service Type: Individual      Session Start Time: 10:44 AM Session End Time: 11:27 AM  (Started late due to issues with technology)     Session Length: 38-52 minutes    Session #: 87 with this provider    Attendees: Client attended alone    Service Modality:  Video Visit:      Provider verified identity through the following two step process.  Patient provided:  Patient is known previously to provider    Telemedicine Visit: The patient's condition can be safely assessed and treated via synchronous audio and visual telemedicine encounter.      Reason for Telemedicine Visit: Patient convenience (e.g. access to timely appointments / distance to available provider) and Services only offered telehealth    Originating Site (Patient Location): Patient's home    Distant Site (Provider Location): [facility]: [place], [place]/On-site    Consent:  The patient/guardian has verbally consented to: the potential risks and benefits of telemedicine (video visit) versus in person care; bill my insurance or make self-payment for services provided; and responsibility for payment of non-covered services.     Patient would like the video invitation sent by:  [employer]     Mode of Communication:  Video    As the provider I attest to compliance with applicable laws and regulations related to telemedicine.    DATA  Interactive Complexity: No   Crisis: No      Progress Since Last Session (Related to Symptoms / Goals / Homework):  Symptoms: no change since previous appointment    Homework: Achieved / completed to satisfaction     Episode of Care Goals: Satisfactory progress - ACTION (Actively working towards change); Intervened by reinforcing change plan / affirming steps taken    There has been demonstrated improvement in functioning while patient has been engaged in  psychotherapy/psychological service- if withdrawn the patient would deteriorate and/or relapse.       Current / Ongoing Stressors and Concerns:   Uncertainties and concern about the future  Trauma history  Parenting stressors     Treatment Objective(s) Addressed in This Session:   identify 1 fears / thoughts that contribute to feeling anxious  Use assertive communication skills     Intervention:   Engaged in active listening   Offered support with parenting    Assessments completed prior to visit:  The following assessments were completed by patient for this visit:  None    ASSESSMENT: Current Emotional / Mental Status (status of significant symptoms):   Risk status (Self / Other harm or suicidal ideation)   Patient denies current fears or concerns for personal safety.   Patient denies current or recent suicidal ideation or behaviors.   Patient denies current or recent homicidal ideation or behaviors.   Patient denies current or recent self injurious behavior or ideation.   Patient denies other safety concerns.   Patient reports there has been no change in risk factors since their last session.     Patient reports there has been no change in protective factors since their last session.     Recommended that patient call 911 or go to the local ED should there be a change in any of these risk factors     Appearance:   Appropriate    Eye Contact:   Good    Psychomotor Behavior: Normal    Attitude:   Cooperative  Interested Pleasant    Orientation:   All   Speech    Rate / Production: Normal/ Responsive Talkative    Volume:  Normal    Mood:    Anxious    Affect:    Mood Congruent    Thought Content:  Clear    Thought Form:  Coherent  Goal Directed  Logical    Insight:    Good      Medication Review:   No changes to current psychiatric medication(s)   Vyvanse   Buspar-client is not taking daily  Buproprion  Lorazepam - rare use    Estrogen patch     Medication Compliance:   Yes     Changes in Health Issues:   None  reported     Chemical Use Review:   Substance Use: no use     Tobacco Use: no use    Diagnosis:  Generalized Anxiety Disorder   Other Specified Trauma and Stress Related Disorder    Collateral Reports Completed:   Not Applicable    PLAN: (Patient Tasks / Therapist Tasks / Other)  EMDR:  Plan for next session to have client keep her eyes open.   Consider targeting: I am not good enough and fear.  Therapist to consider engagement of preverbal protocol.   Client will continue to engage in positive re-framing specific to being a mom.      Most recent diagnostic assessment completed: 8/9/2024     Magali Montilla, [facility] 7/7/2025    ______________________________________________________________________    Individual Treatment Plan    Patient's Name: Kiesha Mcguire  YOB: 1970    Date of Creation: 5/11/2022  Date Treatment Plan Last Reviewed/Revised: 5/15/2025    DSM5 Diagnoses:    Generalized Anxiety Disorder  Other Specified Trauma and Stress Related Disorder    Psychosocial / Contextual Factors: stressors related to parenting  PROMIS (reviewed every 90 days):    PROMIS 10-Global Health (only subscores and total score):       7/10/2024    12:04 PM 8/8/2024    12:27 PM 8/21/2024    10:36 AM 12/10/2024    10:27 AM 12/23/2024     3:04 PM 3/27/2025    10:24 AM 7/5/2025     4:59 PM   PROMIS-10 Scores Only   Global Mental Health Score 13 13 14 13  13  13  14    Global Physical Health Score 15 14 15 15  15  16  16    PROMIS TOTAL - SUBSCORES 28 27 29 28  28  29  30        Patient-reported        Referral / Collaboration:  Referral to another professional/service is not indicated at this time..    Anticipated number of session for this episode of care: will review in 90 days  Anticipation frequency of session: Every other week  Anticipated Duration of each session: 38-52 minutes  Treatment plan will be reviewed in 90 days or when goals have been changed.       MeasurableTreatment Goal(s) related to diagnosis  "/ functional impairment(s)  Goal 1: Patient will sustain attention and concentration for consistently longer periods of time.  Patient will meet goals set for completing tasks 80% of the time.   Client's Goal:  I will know I've met my goal when my space isn't so chaotic, meeting deadlines around bills and work, when I am not always playing catch-up, completing tasks.      Objective #A (Patient Action)    Patient will learn and implement organization and planning skills.  Status: New - Date: 5/11/2022, continued date: 9/8/2022, continued date: 12/21/2022, completed date: 3/29/2023    Intervention(s)  Therapist will teach the client organization and planning skills.  Therapist will address any potential barriers to using skills.    Objective #B  Patient will identify, challenge, and change self-talk that contributes to maladaptive feelings and actions.  Status: New - Date: 5/11/2022, Continued date: 9/8/2022, continued date: 12/21/2022, continued date: 3/29/2023, continued date: 7/28/2023, continued date: 11/9/2023, continued date: 1/23/2024, continued date: 5/9/2024, continued date: 9/20/2024, continued date: 1/14/2025    Intervention(s)  Therapist will teach the CBT model, cognitive distortions, and cognitive restructuring techniques.      Goal 2: Patient will be able to recall the traumatic events without becoming overwhelmed with negative thoughts, feelings, or urges.   Client's Goal:  I will know I've met my goal when I do not cry every time I talk about it.\"    Objective #A (Patient Action)    Status: New - Date: 5/11/2022, continued date: 9/8/2022, continued date: 12/21/2022, continued date: 3/29/2023, continued date: 7/28/2023, continued date: 11/9/2023, continued date: 1/23/2024, continued date: 5/9/2024, continued date: 9/20/2024, deferred date: 1/14/2025, re-started date: 5/15/2025    Patient will describe the history of and nature of trauma symptoms    Intervention(s)  Therapist will assess the client's " frequency, intensity, duration, and history of trauma symptoms and their impact on functioning.    Objective #B (Patient Action)  Status: New Date: 5/11/2022, continued date: 9/8/2022, continued date: 12/21/2022, continued date: 3/29/2023, continued date: 7/28/2023, continued date: 11/9/2023, continued date: 1/23/2024, continued date: 5/9/2024, continued date: 9/20/2024, deferred date: 1/14/2025, re-started date: 5/15/2025    Patient will cooperate with eye movement desensitization and reprocessing (EMDR) to reduce emotional reaction to traumatic event(s)    Intervention(s)  Therapist will utilize EMDR to reduce the client's emotional reactivity to the traumatic event(s).    Objective #C (Patient Action)  Status: New Date: 5/11/2022, continued date: 9/8/2022, continued date: 12/21/2022, continued date: 3/29/2023, continued date: 7/28/2023, continued date: 11/9/2023, continued date: 1/23/2024, continued date: 5/9/2024, continued date: 9/20/2024, deferred date: 1/14/2025, re-started date: 5/15/2025    Patient will learn and implement calming and coping strategies to manage trauma symptoms.    Intervention(s)  Therapist will teach grounding techniques, distress tolerance, and emotion regulation techniques.    Goal 3: Patient's anxiety symptoms will remit as evidenced by a decrease in GAD7 scores, where symptoms occur fewer than half the days for a minimum of four weeks.    Objective #A (Patient Action)    Patient will identify 3 symptoms of anxiety.  Status: New - Date: 9/20/2024, continued date: 1/14/2025, continued date: 5/15/2025    Intervention(s)  Therapist will provide psychoeducation on anxiety and engage client in identifying symptoms in appointments.    Objective #B  Patient will use a minimum of 3 strategies to manage anxiety symptoms  Status: New - Date: 9/20/2024, continued date: 1/14/2025, continued date: 5/15/2025    Intervention(s)  Therapist will teach strategies such as grounding techniques, thought  stopping, and use of distraction    Objective #C  Patient will use cognitive strategies to manage thoughts/fears that contribute to anxiety symptoms.  Status: New - Date: 9/20/2024, continued date: 1/14/2025, continued date: 5/15/2025    Intervention(s)  Therapist will teach the CBT (Cognitive Behavioral Therapy) model, including cognitive distortions and cognitive restructuring techniques.       Patient has reviewed and agreed to the above plan.      Magail Montilla, [facility]  May 11, 2022  Magali Montilla, [facility]  9/8/2022  [facility], [facility]  12/21/2022  Magali [facility], [facility]  3/29/2023  Magali Montilla, [facility]  7/28/2023  Magali Montilla, [facility]  11/9/2023  Magali Montilla, [facility]  1/23/2024  Magali Montilla, [facility]  5/9/2024  Magali Montilla, [facility]  9/20/2024  Magali Mont[facility], [facility]  1/14/2025  Magali Montilla, [facility]  5/15/2025

## 2025-07-09 ENCOUNTER — PATIENT OUTREACH (OUTPATIENT)
Dept: CARE COORDINATION | Facility: CLINIC | Age: 55
End: 2025-07-09
Payer: COMMERCIAL

## 2025-07-21 ENCOUNTER — VIRTUAL VISIT (OUTPATIENT)
Facility: CLINIC | Age: 55
End: 2025-07-21
Payer: COMMERCIAL

## 2025-07-21 DIAGNOSIS — F41.1 GENERALIZED ANXIETY DISORDER: Primary | ICD-10-CM

## 2025-07-21 DIAGNOSIS — F43.89 REACTION TO CHRONIC STRESS: ICD-10-CM

## 2025-07-21 PROCEDURE — 90834 PSYTX W PT 45 MINUTES: CPT | Mod: 95 | Performed by: SOCIAL WORKER

## 2025-07-21 NOTE — PROGRESS NOTES
[facility]                                     Progress Note    Patient Name: Kiesha Mcguire  Date: 7/21/2025       Service Type: Individual      Session Start Time: 10:32 AM Session End Time: 11:20 AM     Session Length: 38-52 minutes    Session #: 88 with this provider    Attendees: Client attended alone    Service Modality:  Video Visit:      Provider verified identity through the following two step process.  Patient provided:  Patient is known previously to provider    Telemedicine Visit: The patient's condition can be safely assessed and treated via synchronous audio and visual telemedicine encounter.      Reason for Telemedicine Visit: Patient convenience (e.g. access to timely appointments / distance to available provider) and Services only offered telehealth    Originating Site (Patient Location): Patient's home    Distant Site (Provider Location): [facility]: [place], [place]/On-site    Consent:  The patient/guardian has verbally consented to: the potential risks and benefits of telemedicine (video visit) versus in person care; bill my insurance or make self-payment for services provided; and responsibility for payment of non-covered services.     Patient would like the video invitation sent by:  [email or web address]     Mode of Communication:  Video    As the provider I attest to compliance with applicable laws and regulations related to telemedicine.    DATA  Interactive Complexity: No   Crisis: No      Progress Since Last Session (Related to Symptoms / Goals / Homework):  Symptoms: client reported feeling down    Homework: Achieved / completed to satisfaction     Episode of Care Goals: Satisfactory progress - ACTION (Actively working towards change); Intervened by reinforcing change plan / affirming steps taken    There has been demonstrated improvement in functioning while patient has been engaged in psychotherapy/psychological service- if withdrawn the patient would  deteriorate and/or relapse.       Current / Ongoing Stressors and Concerns:   Uncertainties and concern about the future  Trauma history  Parenting stressors     Treatment Objective(s) Addressed in This Session:   Increase interest, engagement, and pleasure in doing things  Decrease frequency and intensity of feeling down, depressed, hopeless  Use assertive communication skills  Identify negative beliefs      Intervention:   Engaged in active listening   Offered support with recent stressors    Assessments completed prior to visit:  The following assessments were completed by patient for this visit:  None    ASSESSMENT: Current Emotional / Mental Status (status of significant symptoms):   Risk status (Self / Other harm or suicidal ideation)   Patient denies current fears or concerns for personal safety.   Patient denies current or recent suicidal ideation or behaviors.   Patient denies current or recent homicidal ideation or behaviors.   Patient denies current or recent self injurious behavior or ideation.   Patient denies other safety concerns.   Patient reports there has been no change in risk factors since their last session.     Patient reports there has been no change in protective factors since their last session.     Recommended that patient call 911 or go to the local ED should there be a change in any of these risk factors     Appearance:   Appropriate    Eye Contact:   Good    Psychomotor Behavior: Normal    Attitude:   Cooperative  Interested Pleasant    Orientation:   All   Speech    Rate / Production: Normal/ Responsive Talkative    Volume:  Normal    Mood:    Down   Affect:    Mood Congruent tearful-minimal   Thought Content:  Clear    Thought Form:  Coherent  Goal Directed  Logical    Insight:    Good      Medication Review:   No changes to current psychiatric medication(s)   Vyvanse   Buspar-client is not taking daily  Buproprion  Lorazepam - rare use    Estrogen patch     Medication  Compliance:   Yes     Changes in Health Issues:   None reported     Chemical Use Review:   Substance Use: no use     Tobacco Use: no use    Diagnosis:  Generalized Anxiety Disorder   Other Specified Trauma and Stress Related Disorder    Collateral Reports Completed:   Not Applicable    PLAN: (Patient Tasks / Therapist Tasks / Other)  EMDR:  Plan for next session to have client keep her eyes open.   Consider targeting: I am not good enough and fear.  Therapist to consider engagement of preverbal protocol.   Client shared goal to work on improving self-esteem.    Most recent diagnostic assessment completed: 8/9/2024     Magali Montilla, [facility] 7/21/2025    ______________________________________________________________________    Individual Treatment Plan    Patient's Name: Kiesha Mcguire  YOB: 1970    Date of Creation: 5/11/2022  Date Treatment Plan Last Reviewed/Revised: 5/15/2025    DSM5 Diagnoses:    Generalized Anxiety Disorder  Other Specified Trauma and Stress Related Disorder    Psychosocial / Contextual Factors: stressors related to parenting  PROMIS (reviewed every 90 days):    PROMIS 10-Global Health (only subscores and total score):       8/8/2024    12:27 PM 8/21/2024    10:36 AM 12/10/2024    10:27 AM 12/23/2024     3:04 PM 3/27/2025    10:24 AM 7/5/2025     4:59 PM 7/16/2025     8:47 AM   PROMIS-10 Scores Only   Global Mental Health Score 13 14 13  13  13  14  12    Global Physical Health Score 14 15 15  15  16  16  16    PROMIS TOTAL - SUBSCORES 27 29 28  28  29  30  28        Patient-reported        Referral / Collaboration:  Referral to another professional/service is not indicated at this time..    Anticipated number of session for this episode of care: will review in 90 days  Anticipation frequency of session: Every other week  Anticipated Duration of each session: 38-52 minutes  Treatment plan will be reviewed in 90 days or when goals have been changed.  "      MeasurableTreatment Goal(s) related to diagnosis / functional impairment(s)  Goal 1: Patient will sustain attention and concentration for consistently longer periods of time.  Patient will meet goals set for completing tasks 80% of the time.   Client's Goal:  I will know I've met my goal when my space isn't so chaotic, meeting deadlines around bills and work, when I am not always playing catch-up, completing tasks.      Objective #A (Patient Action)    Patient will learn and implement organization and planning skills.  Status: New - Date: 5/11/2022, continued date: 9/8/2022, continued date: 12/21/2022, completed date: 3/29/2023    Intervention(s)  Therapist will teach the client organization and planning skills.  Therapist will address any potential barriers to using skills.    Objective #B  Patient will identify, challenge, and change self-talk that contributes to maladaptive feelings and actions.  Status: New - Date: 5/11/2022, Continued date: 9/8/2022, continued date: 12/21/2022, continued date: 3/29/2023, continued date: 7/28/2023, continued date: 11/9/2023, continued date: 1/23/2024, continued date: 5/9/2024, continued date: 9/20/2024, continued date: 1/14/2025    Intervention(s)  Therapist will teach the CBT model, cognitive distortions, and cognitive restructuring techniques.      Goal 2: Patient will be able to recall the traumatic events without becoming overwhelmed with negative thoughts, feelings, or urges.   Client's Goal:  I will know I've met my goal when I do not cry every time I talk about it.\"    Objective #A (Patient Action)    Status: New - Date: 5/11/2022, continued date: 9/8/2022, continued date: 12/21/2022, continued date: 3/29/2023, continued date: 7/28/2023, continued date: 11/9/2023, continued date: 1/23/2024, continued date: 5/9/2024, continued date: 9/20/2024, deferred date: 1/14/2025, re-started date: 5/15/2025    Patient will describe the history of and nature of trauma " symptoms    Intervention(s)  Therapist will assess the client's frequency, intensity, duration, and history of trauma symptoms and their impact on functioning.    Objective #B (Patient Action)  Status: New Date: 5/11/2022, continued date: 9/8/2022, continued date: 12/21/2022, continued date: 3/29/2023, continued date: 7/28/2023, continued date: 11/9/2023, continued date: 1/23/2024, continued date: 5/9/2024, continued date: 9/20/2024, deferred date: 1/14/2025, re-started date: 5/15/2025    Patient will cooperate with eye movement desensitization and reprocessing (EMDR) to reduce emotional reaction to traumatic event(s)    Intervention(s)  Therapist will utilize EMDR to reduce the client's emotional reactivity to the traumatic event(s).    Objective #C (Patient Action)  Status: New Date: 5/11/2022, continued date: 9/8/2022, continued date: 12/21/2022, continued date: 3/29/2023, continued date: 7/28/2023, continued date: 11/9/2023, continued date: 1/23/2024, continued date: 5/9/2024, continued date: 9/20/2024, deferred date: 1/14/2025, re-started date: 5/15/2025    Patient will learn and implement calming and coping strategies to manage trauma symptoms.    Intervention(s)  Therapist will teach grounding techniques, distress tolerance, and emotion regulation techniques.    Goal 3: Patient's anxiety symptoms will remit as evidenced by a decrease in GAD7 scores, where symptoms occur fewer than half the days for a minimum of four weeks.    Objective #A (Patient Action)    Patient will identify 3 symptoms of anxiety.  Status: New - Date: 9/20/2024, continued date: 1/14/2025, continued date: 5/15/2025    Intervention(s)  Therapist will provide psychoeducation on anxiety and engage client in identifying symptoms in appointments.    Objective #B  Patient will use a minimum of 3 strategies to manage anxiety symptoms  Status: New - Date: 9/20/2024, continued date: 1/14/2025, continued date:  5/15/2025    Intervention(s)  Therapist will teach strategies such as grounding techniques, thought stopping, and use of distraction    Objective #C  Patient will use cognitive strategies to manage thoughts/fears that contribute to anxiety symptoms.  Status: New - Date: 9/20/2024, continued date: 1/14/2025, continued date: 5/15/2025    Intervention(s)  Therapist will teach the CBT (Cognitive Behavioral Therapy) model, including cognitive distortions and cognitive restructuring techniques.       Patient has reviewed and agreed to the above plan.      Magali Montilla, [facility]  May 11, 2022  [facility] Montilla, [facility]  9/8/2022  Magali Montilla, LICSW  12/21/2022  Magali Montilla, [facility]  3/29/2023  Magali Montilla, [facility]  7/28/2023  Magali Montilla, LICSW  11/9/2023  Magali Montilla, LICSW  1/23/2024  Magali Montilla, LICSW  5/9/2024  Magali Montilla, LICSW  9/20/2024  Magali Montilla, LICSW  1/14/2025  Magali Montilla, [facility]  5/15/2025

## 2025-07-23 ENCOUNTER — PATIENT OUTREACH (OUTPATIENT)
Dept: CARE COORDINATION | Facility: CLINIC | Age: 55
End: 2025-07-23

## 2025-07-23 ENCOUNTER — OFFICE VISIT (OUTPATIENT)
Dept: FAMILY MEDICINE | Facility: CLINIC | Age: 55
End: 2025-07-23
Payer: COMMERCIAL

## 2025-07-23 VITALS
BODY MASS INDEX: 22.53 KG/M2 | HEART RATE: 69 BPM | TEMPERATURE: 97.9 F | WEIGHT: 132 LBS | OXYGEN SATURATION: 100 % | SYSTOLIC BLOOD PRESSURE: 101 MMHG | DIASTOLIC BLOOD PRESSURE: 69 MMHG | HEIGHT: 64 IN | RESPIRATION RATE: 16 BRPM

## 2025-07-23 DIAGNOSIS — M54.50 CHRONIC BILATERAL LOW BACK PAIN WITHOUT SCIATICA: Primary | ICD-10-CM

## 2025-07-23 DIAGNOSIS — G89.29 CHRONIC BILATERAL LOW BACK PAIN WITHOUT SCIATICA: Primary | ICD-10-CM

## 2025-07-23 DIAGNOSIS — F90.0 ATTENTION DEFICIT HYPERACTIVITY DISORDER (ADHD), PREDOMINANTLY INATTENTIVE TYPE: ICD-10-CM

## 2025-07-23 PROCEDURE — 99214 OFFICE O/P EST MOD 30 MIN: CPT | Performed by: FAMILY MEDICINE

## 2025-07-23 PROCEDURE — G2211 COMPLEX E/M VISIT ADD ON: HCPCS | Performed by: FAMILY MEDICINE

## 2025-07-23 PROCEDURE — 3074F SYST BP LT 130 MM HG: CPT | Performed by: FAMILY MEDICINE

## 2025-07-23 PROCEDURE — 3078F DIAST BP <80 MM HG: CPT | Performed by: FAMILY MEDICINE

## 2025-07-23 RX ORDER — LISDEXAMFETAMINE DIMESYLATE 10 MG/1
10 CAPSULE ORAL EVERY MORNING
Qty: 30 CAPSULE | Refills: 0 | Status: SHIPPED | OUTPATIENT
Start: 2025-09-08 | End: 2025-10-08

## 2025-07-23 RX ORDER — LISDEXAMFETAMINE DIMESYLATE 10 MG/1
10 CAPSULE ORAL EVERY MORNING
Qty: 30 CAPSULE | Refills: 0 | Status: SHIPPED | OUTPATIENT
Start: 2025-10-08 | End: 2025-11-07

## 2025-07-23 RX ORDER — BUPROPION HYDROCHLORIDE 300 MG/1
300 TABLET ORAL EVERY MORNING
Qty: 90 TABLET | Refills: 0 | Status: SHIPPED | OUTPATIENT
Start: 2025-07-23

## 2025-07-23 RX ORDER — LISDEXAMFETAMINE DIMESYLATE 10 MG/1
10 CAPSULE ORAL EVERY MORNING
Qty: 30 CAPSULE | Refills: 0 | Status: SHIPPED | OUTPATIENT
Start: 2025-08-09 | End: 2025-09-08

## 2025-07-23 NOTE — PROGRESS NOTES
Assessment & Plan   Problem List Items Addressed This Visit       Attention deficit hyperactivity disorder (ADHD), predominantly inattentive type    Relevant Medications    lisdexamfetamine (VYVANSE) 10 MG capsule (Start on 8/9/2025)    lisdexamfetamine (VYVANSE) 10 MG capsule (Start on 9/8/2025)    lisdexamfetamine (VYVANSE) 10 MG capsule (Start on 10/8/2025)    buPROPion (WELLBUTRIN XL) 300 MG 24 hr tablet     Other Visit Diagnoses         Chronic bilateral low back pain without sciatica    -  Primary    Relevant Orders    Physical Therapy  Referral           Dizziness which was transient was probably migrainous - however she cannot tolerate triptans (allergy as child) and I am already making other brain med changes today so not a good time to trial prophylaxis, and she is not really interested at this point.  Printed a PT script for her to take to [facility]  Continue 10mg Vyvanse  Double Wellbutrin to 300mg, follow up PRN or in 3 months    The longitudinal plan of care for the diagnosis(es)/condition(s) as documented were addressed during this visit. Due to the added complexity in care, I will continue to support Kiesha in the subsequent management and with ongoing continuity of care.      Subjective   Kiesha is a 54 year old, presenting for the following health issues:  Recheck Medication and Dizziness        7/23/2025     9:47 AM   Additional Questions   Roomed by Elyssa PARSI CMA     History of Present Illness       Reason for visit:  Medication   She is taking medications regularly.          Dizziness  Onset/Duration: 1.5 weeks ago  Description:   Do you feel faint: YES  Does it feel like the surroundings (bed, room) are moving: YES  Unsteady/off balance: YES  Have you passed out or fallen: No  Intensity: moderate  Progression of Symptoms: intermittent  Accompanying Signs & Symptoms:  Heart palpitations or chest pain: No  Nausea, vomiting: YES  Weakness or lack of coordination in arms or legs:  "No  Vision or speech changes: No  Numbness or tingling: No  Ringing in ears (Tinnitus): No  Hearing Loss: No  History:   Head trauma/concussion history: No  Previous similar symptoms: No  Recent bleeding history: No  Any new medications (BP?): No  Precipitating factors:   Worse with activity: No  Worse with head movement: No  Alleviating factors:   Does staying in a fixed position give relief: no   Therapies tried and outcome: None    She attempted Epley maneuver at home, says it did not help    Dizziness started 9pm Saturday July 19, ended by morning.    Does have history of migraines with aura    That day She had more than usual caffeine, 3 diet cokes and matcha - she wonders if she had vestibular migraine          Objective    /69 (BP Location: Right arm, Patient Position: Chair, Cuff Size: Adult Regular)   Pulse 69   Temp 97.9  F (36.6  C) (Oral)   Resp 16   Ht 1.619 m (5' 3.74\")   Wt 59.9 kg (132 lb)   LMP  (LMP Unknown)   SpO2 100%   BMI 22.84 kg/m    Body mass index is 22.84 kg/m .  Physical Exam   GENERAL: alert and no distress  NEURO: Normal strength and tone, sensory exam grossly normal, mentation intact, cranial nerves 2-12 intact, Romberg normal, and rapid alternating movements normal  PSYCH: mentation appears normal, affect normal/bright  PERRLA          Signed Electronically by: SRINIVASAN SOOD DO    "

## 2025-08-06 ENCOUNTER — PATIENT OUTREACH (OUTPATIENT)
Dept: CARE COORDINATION | Facility: CLINIC | Age: 55
End: 2025-08-06
Payer: COMMERCIAL

## 2025-08-07 ENCOUNTER — VIRTUAL VISIT (OUTPATIENT)
Facility: CLINIC | Age: 55
End: 2025-08-07
Payer: COMMERCIAL

## 2025-08-07 DIAGNOSIS — F41.1 GENERALIZED ANXIETY DISORDER: Primary | ICD-10-CM

## 2025-08-07 DIAGNOSIS — F43.89 REACTION TO CHRONIC STRESS: ICD-10-CM

## 2025-08-11 ASSESSMENT — ANXIETY QUESTIONNAIRES
3. WORRYING TOO MUCH ABOUT DIFFERENT THINGS: NOT AT ALL
IF YOU CHECKED OFF ANY PROBLEMS ON THIS QUESTIONNAIRE, HOW DIFFICULT HAVE THESE PROBLEMS MADE IT FOR YOU TO DO YOUR WORK, TAKE CARE OF THINGS AT HOME, OR GET ALONG WITH OTHER PEOPLE: NOT DIFFICULT AT ALL
GAD7 TOTAL SCORE: 2
1. FEELING NERVOUS, ANXIOUS, OR ON EDGE: SEVERAL DAYS
6. BECOMING EASILY ANNOYED OR IRRITABLE: NOT AT ALL
GAD7 TOTAL SCORE: 2
2. NOT BEING ABLE TO STOP OR CONTROL WORRYING: NOT AT ALL
GAD7 TOTAL SCORE: 2
7. FEELING AFRAID AS IF SOMETHING AWFUL MIGHT HAPPEN: NOT AT ALL
7. FEELING AFRAID AS IF SOMETHING AWFUL MIGHT HAPPEN: NOT AT ALL
4. TROUBLE RELAXING: SEVERAL DAYS
5. BEING SO RESTLESS THAT IT IS HARD TO SIT STILL: NOT AT ALL
8. IF YOU CHECKED OFF ANY PROBLEMS, HOW DIFFICULT HAVE THESE MADE IT FOR YOU TO DO YOUR WORK, TAKE CARE OF THINGS AT HOME, OR GET ALONG WITH OTHER PEOPLE?: NOT DIFFICULT AT ALL

## 2025-08-12 ENCOUNTER — LAB (OUTPATIENT)
Dept: LAB | Facility: CLINIC | Age: 55
End: 2025-08-12
Attending: ADVANCED PRACTICE MIDWIFE
Payer: COMMERCIAL

## 2025-08-12 ENCOUNTER — OFFICE VISIT (OUTPATIENT)
Dept: OBGYN | Facility: CLINIC | Age: 55
End: 2025-08-12
Attending: ADVANCED PRACTICE MIDWIFE
Payer: COMMERCIAL

## 2025-08-12 VITALS
DIASTOLIC BLOOD PRESSURE: 66 MMHG | WEIGHT: 135 LBS | SYSTOLIC BLOOD PRESSURE: 96 MMHG | BODY MASS INDEX: 23.05 KG/M2 | HEIGHT: 64 IN | HEART RATE: 79 BPM

## 2025-08-12 DIAGNOSIS — N95.2 VAGINAL ATROPHY: ICD-10-CM

## 2025-08-12 DIAGNOSIS — Z00.00 VISIT FOR PREVENTIVE HEALTH EXAMINATION: Primary | ICD-10-CM

## 2025-08-12 DIAGNOSIS — F41.9 ANXIETY: ICD-10-CM

## 2025-08-12 DIAGNOSIS — Z01.419 ENCOUNTER FOR GYNECOLOGICAL EXAMINATION WITHOUT ABNORMAL FINDING: ICD-10-CM

## 2025-08-12 DIAGNOSIS — Z12.4 SCREENING FOR MALIGNANT NEOPLASM OF CERVIX: ICD-10-CM

## 2025-08-12 DIAGNOSIS — Z97.5 IUD (INTRAUTERINE DEVICE) IN PLACE: ICD-10-CM

## 2025-08-12 DIAGNOSIS — F90.0 ATTENTION DEFICIT HYPERACTIVITY DISORDER (ADHD), PREDOMINANTLY INATTENTIVE TYPE: ICD-10-CM

## 2025-08-12 DIAGNOSIS — Z00.00 VISIT FOR PREVENTIVE HEALTH EXAMINATION: ICD-10-CM

## 2025-08-12 DIAGNOSIS — N95.1 SYMPTOMATIC MENOPAUSAL OR FEMALE CLIMACTERIC STATES: ICD-10-CM

## 2025-08-12 DIAGNOSIS — Z83.49 FAMILY HISTORY OF THYROID DISEASE IN MOTHER: ICD-10-CM

## 2025-08-12 LAB
ALBUMIN SERPL BCG-MCNC: 4.5 G/DL (ref 3.5–5.2)
ALP SERPL-CCNC: 51 U/L (ref 40–150)
ALT SERPL W P-5'-P-CCNC: 21 U/L (ref 0–50)
ANION GAP SERPL CALCULATED.3IONS-SCNC: 12 MMOL/L (ref 7–15)
AST SERPL W P-5'-P-CCNC: 23 U/L (ref 0–45)
BASOPHILS # BLD AUTO: 0.08 10E3/UL (ref 0–0.2)
BASOPHILS NFR BLD AUTO: 1.8 %
BILIRUB SERPL-MCNC: 0.4 MG/DL
BUN SERPL-MCNC: 15.9 MG/DL (ref 6–20)
CALCIUM SERPL-MCNC: 9.3 MG/DL (ref 8.8–10.4)
CHLORIDE SERPL-SCNC: 101 MMOL/L (ref 98–107)
CREAT SERPL-MCNC: 0.81 MG/DL (ref 0.51–0.95)
EGFRCR SERPLBLD CKD-EPI 2021: 85 ML/MIN/1.73M2
EOSINOPHIL # BLD AUTO: 0.13 10E3/UL (ref 0–0.7)
EOSINOPHIL NFR BLD AUTO: 2.9 %
ERYTHROCYTE [DISTWIDTH] IN BLOOD BY AUTOMATED COUNT: 12.2 % (ref 10–15)
GLUCOSE SERPL-MCNC: 84 MG/DL (ref 70–99)
HCO3 SERPL-SCNC: 26 MMOL/L (ref 22–29)
HCT VFR BLD AUTO: 39.7 % (ref 35–47)
HGB BLD-MCNC: 13.1 G/DL (ref 11.7–15.7)
IMM GRANULOCYTES # BLD: 0.01 10E3/UL
IMM GRANULOCYTES NFR BLD: 0.2 %
LYMPHOCYTES # BLD AUTO: 1.83 10E3/UL (ref 0.8–5.3)
LYMPHOCYTES NFR BLD AUTO: 41.1 %
MCH RBC QN AUTO: 30.1 PG (ref 26.5–33)
MCHC RBC AUTO-ENTMCNC: 33 G/DL (ref 31.5–36.5)
MCV RBC AUTO: 91.3 FL (ref 78–100)
MONOCYTES # BLD AUTO: 0.47 10E3/UL (ref 0–1.3)
MONOCYTES NFR BLD AUTO: 10.6 %
NEUTROPHILS # BLD AUTO: 1.93 10E3/UL (ref 1.6–8.3)
NEUTROPHILS NFR BLD AUTO: 43.4 %
NRBC # BLD AUTO: 0 10E3/UL
NRBC BLD AUTO-RTO: 0 /100
PLATELET # BLD AUTO: 200 10E3/UL (ref 150–450)
POTASSIUM SERPL-SCNC: 3.9 MMOL/L (ref 3.4–5.3)
PROT SERPL-MCNC: 7.2 G/DL (ref 6.4–8.3)
RBC # BLD AUTO: 4.35 10E6/UL (ref 3.8–5.2)
SODIUM SERPL-SCNC: 139 MMOL/L (ref 135–145)
TSH SERPL DL<=0.005 MIU/L-ACNC: 2.51 UIU/ML (ref 0.3–4.2)
WBC # BLD AUTO: 4.45 10E3/UL (ref 4–11)

## 2025-08-12 PROCEDURE — 85004 AUTOMATED DIFF WBC COUNT: CPT

## 2025-08-12 PROCEDURE — 36415 COLL VENOUS BLD VENIPUNCTURE: CPT

## 2025-08-12 PROCEDURE — 87624 HPV HI-RISK TYP POOLED RSLT: CPT | Performed by: ADVANCED PRACTICE MIDWIFE

## 2025-08-12 PROCEDURE — 99213 OFFICE O/P EST LOW 20 MIN: CPT | Performed by: ADVANCED PRACTICE MIDWIFE

## 2025-08-12 PROCEDURE — 82947 ASSAY GLUCOSE BLOOD QUANT: CPT

## 2025-08-12 PROCEDURE — 84443 ASSAY THYROID STIM HORMONE: CPT

## 2025-08-12 RX ORDER — ESTRADIOL 0.03 MG/D
1 FILM, EXTENDED RELEASE TRANSDERMAL
Qty: 24 PATCH | Refills: 0 | Status: SHIPPED | OUTPATIENT
Start: 2025-08-14 | End: 2025-08-12

## 2025-08-12 RX ORDER — ESTRADIOL 0.03 MG/D
1 FILM, EXTENDED RELEASE TRANSDERMAL
Qty: 24 PATCH | Refills: 3 | Status: SHIPPED | OUTPATIENT
Start: 2025-08-14

## 2025-08-12 RX ORDER — ESTRADIOL 10 UG/1
10 TABLET, FILM COATED VAGINAL
Qty: 24 TABLET | Refills: 3 | Status: SHIPPED | OUTPATIENT
Start: 2025-08-14

## 2025-08-13 ENCOUNTER — PATIENT OUTREACH (OUTPATIENT)
Dept: CARE COORDINATION | Facility: CLINIC | Age: 55
End: 2025-08-13
Payer: COMMERCIAL

## 2025-08-13 LAB
HPV HR 12 DNA CVX QL NAA+PROBE: NEGATIVE
HPV16 DNA CVX QL NAA+PROBE: NEGATIVE
HPV18 DNA CVX QL NAA+PROBE: NEGATIVE
HUMAN PAPILLOMA VIRUS FINAL DIAGNOSIS: NORMAL

## 2025-08-18 LAB
BKR AP ASSOCIATED HPV REPORT: NORMAL
BKR LAB AP GYN ADEQUACY: NORMAL
BKR LAB AP GYN INTERPRETATION: NORMAL
BKR LAB AP PREVIOUS ABNORMAL: NORMAL
PATH REPORT.COMMENTS IMP SPEC: NORMAL
PATH REPORT.COMMENTS IMP SPEC: NORMAL
PATH REPORT.RELEVANT HX SPEC: NORMAL

## 2025-08-21 ENCOUNTER — VIRTUAL VISIT (OUTPATIENT)
Facility: CLINIC | Age: 55
End: 2025-08-21
Payer: COMMERCIAL

## 2025-08-21 DIAGNOSIS — F43.89 REACTION TO CHRONIC STRESS: ICD-10-CM

## 2025-08-21 DIAGNOSIS — F41.1 GENERALIZED ANXIETY DISORDER: Primary | ICD-10-CM

## (undated) DEVICE — LINEN GOWN XLG 5407

## (undated) DEVICE — ENDO TROCAR SLEEVE KII Z-THREADED 05X100MM CTS02

## (undated) DEVICE — PREP CHLORAPREP 26ML TINTED ORANGE  260815

## (undated) DEVICE — SOL NACL 0.9% INJ 1000ML BAG 2B1324X

## (undated) DEVICE — CLIP HEMOCLIP ENDOSCOPIC INSTINCT 2.8X230CM INSC-7-230-SS

## (undated) DEVICE — KIT ENDO FIRST STEP DISINFECTANT 200ML W/POUCH EP-4

## (undated) DEVICE — DEVICE SUTURE GRASPER TROCAR CLOSURE 14GA PMITCSG

## (undated) DEVICE — SUCTION IRR STRYKERFLOW II W/TIP 250-070-520

## (undated) DEVICE — SU VICRYL 0 UR-6 27" J603H

## (undated) DEVICE — ENDO POUCH UNIV RETRIEVAL SYSTEM INZII 10MM CD001

## (undated) DEVICE — LINEN TOWEL PACK X30 5481

## (undated) DEVICE — ENDO TROCAR FIRST ENTRY KII FIOS Z-THRD 05X100MM CTF03

## (undated) DEVICE — SYR 50ML LL W/O NDL 309653

## (undated) DEVICE — ENDO SNARE EXACTO COLD 9MM LOOP 2.4MMX230CM 00711115

## (undated) DEVICE — SOL WATER IRRIG 1000ML BOTTLE 07139-09

## (undated) DEVICE — GOWN IMPERVIOUS 2XL BLUE

## (undated) DEVICE — SU MONOCRYL 4-0 PS-2 27" UND Y426H

## (undated) DEVICE — LINEN TOWEL PACK X6 WHITE 5487

## (undated) DEVICE — CATH TRAY FOLEY SURESTEP 16FR W/URNE MTR STLK LATEX A303316A

## (undated) DEVICE — ESU ENDO SCISSORS 5MM CVD 5DCS

## (undated) DEVICE — SOL WATER IRRIG 1000ML BOTTLE 2F7114

## (undated) DEVICE — ENDO TROCAR FIRST ENTRY KII FIOS Z-THRD 12X100MM CTF73

## (undated) DEVICE — ANTIFOG SOLUTION W/FOAM PAD 31142527

## (undated) DEVICE — CLIP APPLIER ENDO 5MM M/L LIGAMAX EL5ML

## (undated) DEVICE — GLOVE BIOGEL PI ULTRATOUCH G SZ 7.5 42175

## (undated) DEVICE — SPECIMEN CONTAINER 3OZ W/FORMALIN 59901

## (undated) DEVICE — TUBING SUCTION 12"X1/4" N612

## (undated) DEVICE — ESU PENCIL W/SMOKE EVAC ROCKER E2350HS

## (undated) DEVICE — SOL WATER IRRIG 500ML BOTTLE 2F7113

## (undated) DEVICE — GLOVE BIOGEL PI MICRO INDICATOR UNDERGLOVE SZ 7.5 48975

## (undated) DEVICE — DEVICE SUTURE PASSER 14GA WECK EFX EFXSP2

## (undated) DEVICE — ENDO TROCAR BLUNT TIP KII BALLOON 12X100MM C0R47

## (undated) DEVICE — SUCTION MANIFOLD NEPTUNE 2 SYS 1 PORT 702-025-000

## (undated) DEVICE — Device

## (undated) DEVICE — ENDO TRAP POLYP E-TRAP 00711099

## (undated) DEVICE — ENDO FORCEP BX CAPTURA PRO SPIKE G50696

## (undated) DEVICE — ESU GROUND PAD ADULT W/CORD E7507

## (undated) DEVICE — SU VICRYL 0 TIE 54" J608H

## (undated) DEVICE — KIT ENDO TURNOVER/PROCEDURE CARRY-ON 101822

## (undated) DEVICE — DRAPE SHEET MED 44X70" 9355

## (undated) RX ORDER — SIMETHICONE 40MG/0.6ML
SUSPENSION, DROPS(FINAL DOSAGE FORM)(ML) ORAL
Status: DISPENSED
Start: 2024-03-12

## (undated) RX ORDER — DEXTROSE MONOHYDRATE 25 G/50ML
INJECTION, SOLUTION INTRAVENOUS
Status: DISPENSED
Start: 2021-12-24

## (undated) RX ORDER — BUPIVACAINE HYDROCHLORIDE 5 MG/ML
INJECTION, SOLUTION EPIDURAL; INTRACAUDAL
Status: DISPENSED
Start: 2021-12-24

## (undated) RX ORDER — ONDANSETRON 2 MG/ML
INJECTION INTRAMUSCULAR; INTRAVENOUS
Status: DISPENSED
Start: 2024-03-12

## (undated) RX ORDER — FENTANYL CITRATE 50 UG/ML
INJECTION, SOLUTION INTRAMUSCULAR; INTRAVENOUS
Status: DISPENSED
Start: 2024-03-12

## (undated) RX ORDER — FENTANYL CITRATE 50 UG/ML
INJECTION, SOLUTION INTRAMUSCULAR; INTRAVENOUS
Status: DISPENSED
Start: 2021-12-24

## (undated) RX ORDER — CLINDAMYCIN PHOSPHATE 900 MG/50ML
INJECTION, SOLUTION INTRAVENOUS
Status: DISPENSED
Start: 2021-12-24